# Patient Record
Sex: MALE | Race: WHITE | Employment: OTHER | URBAN - METROPOLITAN AREA
[De-identification: names, ages, dates, MRNs, and addresses within clinical notes are randomized per-mention and may not be internally consistent; named-entity substitution may affect disease eponyms.]

---

## 2019-01-01 ENCOUNTER — TELEPHONE (OUTPATIENT)
Dept: ONCOLOGY | Facility: CLINIC | Age: 34
End: 2019-01-01

## 2019-01-01 ENCOUNTER — HOSPITAL ENCOUNTER (OUTPATIENT)
Facility: CLINIC | Age: 34
Setting detail: SPECIMEN
Discharge: HOME | End: 2019-11-15
Attending: INTERNAL MEDICINE | Admitting: INTERNAL MEDICINE
Payer: COMMERCIAL

## 2019-01-01 ENCOUNTER — ONCOLOGY VISIT (OUTPATIENT)
Dept: ONCOLOGY | Facility: CLINIC | Age: 34
End: 2019-01-01
Attending: INTERNAL MEDICINE
Payer: COMMERCIAL

## 2019-01-01 ENCOUNTER — PRE VISIT (OUTPATIENT)
Dept: ONCOLOGY | Facility: CLINIC | Age: 34
End: 2019-01-01

## 2019-01-01 ENCOUNTER — HOSPITAL ENCOUNTER (OUTPATIENT)
Dept: PET IMAGING | Facility: CLINIC | Age: 34
End: 2019-11-25
Attending: INTERNAL MEDICINE
Payer: COMMERCIAL

## 2019-01-01 ENCOUNTER — HOSPITAL ENCOUNTER (OUTPATIENT)
Dept: MRI IMAGING | Facility: CLINIC | Age: 34
Discharge: HOME | End: 2019-11-25
Attending: INTERNAL MEDICINE | Admitting: INTERNAL MEDICINE
Payer: COMMERCIAL

## 2019-01-01 ENCOUNTER — TRANSFERRED RECORDS (OUTPATIENT)
Dept: HEALTH INFORMATION MANAGEMENT | Facility: CLINIC | Age: 34
End: 2019-01-01

## 2019-01-01 ENCOUNTER — PATIENT OUTREACH (OUTPATIENT)
Dept: ONCOLOGY | Facility: CLINIC | Age: 34
End: 2019-01-01

## 2019-01-01 ENCOUNTER — HOSPITAL ENCOUNTER (OUTPATIENT)
Facility: CLINIC | Age: 34
Setting detail: SPECIMEN
End: 2019-12-05
Attending: INTERNAL MEDICINE
Payer: COMMERCIAL

## 2019-01-01 ENCOUNTER — MEDICAL CORRESPONDENCE (OUTPATIENT)
Dept: HEALTH INFORMATION MANAGEMENT | Facility: CLINIC | Age: 34
End: 2019-01-01

## 2019-01-01 VITALS
SYSTOLIC BLOOD PRESSURE: 132 MMHG | HEART RATE: 91 BPM | BODY MASS INDEX: 21.3 KG/M2 | RESPIRATION RATE: 17 BRPM | HEIGHT: 70 IN | WEIGHT: 148.8 LBS | TEMPERATURE: 98.7 F | OXYGEN SATURATION: 100 % | DIASTOLIC BLOOD PRESSURE: 80 MMHG

## 2019-01-01 VITALS
RESPIRATION RATE: 16 BRPM | DIASTOLIC BLOOD PRESSURE: 77 MMHG | WEIGHT: 144 LBS | HEIGHT: 70 IN | BODY MASS INDEX: 20.62 KG/M2 | HEART RATE: 106 BPM | OXYGEN SATURATION: 99 % | SYSTOLIC BLOOD PRESSURE: 138 MMHG

## 2019-01-01 DIAGNOSIS — C79.31 METASTASIS TO BRAIN (H): Primary | ICD-10-CM

## 2019-01-01 DIAGNOSIS — C43.9 METASTATIC MALIGNANT MELANOMA (H): ICD-10-CM

## 2019-01-01 DIAGNOSIS — C43.9 METASTATIC MALIGNANT MELANOMA (H): Primary | ICD-10-CM

## 2019-01-01 LAB
ALBUMIN SERPL-MCNC: 4.4 G/DL (ref 3.4–5)
ALP SERPL-CCNC: 110 U/L (ref 40–150)
ALT SERPL W P-5'-P-CCNC: 41 U/L (ref 0–70)
ANION GAP SERPL CALCULATED.3IONS-SCNC: 4 MMOL/L (ref 3–14)
AST SERPL W P-5'-P-CCNC: 11 U/L (ref 0–45)
BILIRUB SERPL-MCNC: 0.5 MG/DL (ref 0.2–1.3)
BUN SERPL-MCNC: 9 MG/DL (ref 7–30)
CALCIUM SERPL-MCNC: 9.6 MG/DL (ref 8.5–10.1)
CHLORIDE SERPL-SCNC: 106 MMOL/L (ref 94–109)
CO2 SERPL-SCNC: 30 MMOL/L (ref 20–32)
COPATH REPORT: NORMAL
CREAT SERPL-MCNC: 0.78 MG/DL (ref 0.66–1.25)
ERYTHROCYTE [DISTWIDTH] IN BLOOD BY AUTOMATED COUNT: 12.8 % (ref 10–15)
GFR SERPL CREATININE-BSD FRML MDRD: >90 ML/MIN/{1.73_M2}
GLUCOSE SERPL-MCNC: 102 MG/DL (ref 70–99)
HCT VFR BLD AUTO: 37.6 % (ref 40–53)
HGB BLD-MCNC: 12.6 G/DL (ref 13.3–17.7)
LDH SERPL L TO P-CCNC: 196 U/L (ref 85–227)
MCH RBC QN AUTO: 30.8 PG (ref 26.5–33)
MCHC RBC AUTO-ENTMCNC: 33.5 G/DL (ref 31.5–36.5)
MCV RBC AUTO: 92 FL (ref 78–100)
PLATELET # BLD AUTO: 184 10E9/L (ref 150–450)
POTASSIUM SERPL-SCNC: 3.8 MMOL/L (ref 3.4–5.3)
PROT SERPL-MCNC: 7.3 G/DL (ref 6.8–8.8)
RBC # BLD AUTO: 4.09 10E12/L (ref 4.4–5.9)
SODIUM SERPL-SCNC: 140 MMOL/L (ref 133–144)
WBC # BLD AUTO: 4.9 10E9/L (ref 4–11)

## 2019-01-01 PROCEDURE — 78816 PET IMAGE W/CT FULL BODY: CPT | Mod: PS

## 2019-01-01 PROCEDURE — 00000346 ZZHCL STATISTIC REVIEW OUTSIDE SLIDES TC 88321: Performed by: INTERNAL MEDICINE

## 2019-01-01 PROCEDURE — 83615 LACTATE (LD) (LDH) ENZYME: CPT | Performed by: INTERNAL MEDICINE

## 2019-01-01 PROCEDURE — A9552 F18 FDG: HCPCS | Performed by: INTERNAL MEDICINE

## 2019-01-01 PROCEDURE — A9585 GADOBUTROL INJECTION: HCPCS | Performed by: INTERNAL MEDICINE

## 2019-01-01 PROCEDURE — 99214 OFFICE O/P EST MOD 30 MIN: CPT | Performed by: INTERNAL MEDICINE

## 2019-01-01 PROCEDURE — 99204 OFFICE O/P NEW MOD 45 MIN: CPT | Performed by: INTERNAL MEDICINE

## 2019-01-01 PROCEDURE — G0463 HOSPITAL OUTPT CLINIC VISIT: HCPCS

## 2019-01-01 PROCEDURE — 25500064 ZZH RX 255 OP 636: Performed by: INTERNAL MEDICINE

## 2019-01-01 PROCEDURE — 85027 COMPLETE CBC AUTOMATED: CPT | Performed by: INTERNAL MEDICINE

## 2019-01-01 PROCEDURE — 80053 COMPREHEN METABOLIC PANEL: CPT | Performed by: INTERNAL MEDICINE

## 2019-01-01 PROCEDURE — 70553 MRI BRAIN STEM W/O & W/DYE: CPT

## 2019-01-01 PROCEDURE — 36415 COLL VENOUS BLD VENIPUNCTURE: CPT

## 2019-01-01 PROCEDURE — 34300033 ZZH RX 343: Performed by: INTERNAL MEDICINE

## 2019-01-01 RX ORDER — GADOBUTROL 604.72 MG/ML
0.1 INJECTION INTRAVENOUS ONCE
Status: COMPLETED | OUTPATIENT
Start: 2019-01-01 | End: 2019-01-01

## 2019-01-01 RX ADMIN — FLUDEOXYGLUCOSE F-18 13.67 MCI.: 500 INJECTION, SOLUTION INTRAVENOUS at 10:09

## 2019-01-01 RX ADMIN — GADOBUTROL 6.75 ML: 604.72 INJECTION INTRAVENOUS at 09:09

## 2019-01-01 ASSESSMENT — MIFFLIN-ST. JEOR
SCORE: 1604.43
SCORE: 1626.2

## 2019-01-01 ASSESSMENT — PAIN SCALES - GENERAL
PAINLEVEL: NO PAIN (0)
PAINLEVEL: NO PAIN (0)

## 2019-09-30 NOTE — TELEPHONE ENCOUNTER
ONCOLOGY INTAKE: Records Information      APPT INFORMATION:  Referring provider:  Dr. Lisa Harrell   Referring provider s clinic:  [facility]  Reason for visit/diagnosis:  Metastatic Melanoma  Has patient been notified of appointment date and time?: no - patient will call to scheduled once he talks with his wife.  Patient is moving from [place] to [place] at the end of Oct early Nov 2019    RECORDS INFORMATION:  Were the records received with the referral (via [employer])? Some, but this is a transfer patient and we will need records going back to the diagnosis date of 2012    Has patient been seen for any external appt for this diagnosis? yes    If yes, where? [facility]    Has patient had any imaging or procedures outside of [place] for this condition? Yes       If Yes, where? [facility], multiple PET and Brain MRIs    ADDITIONAL INFORMATION:  Patient's referral is saved on the [address], after scheduling please fax to [facility]

## 2019-11-05 NOTE — TELEPHONE ENCOUNTER
APPT INFORMATION:  Referring provider:  Dr. Lisa Harrell      Referring provider s clinic:  [facility]  Reason for visit/diagnosis:  Metastatic Melanoma  Has patient been notified of appointment date and time?: Yes      RECORDS INFORMATION:  Were the records received with the referral (via [employer])? Some, but this is a transfer patient and we will need records going back to the diagnosis date of 2012     Has patient been seen for any external appt for this diagnosis? yes     If yes, where? [facility]     Has patient had any imaging or procedures outside of [place] for this condition? Yes                                    If Yes, where? [facility], multiple PET and Brain MRIs     ADDITIONAL INFORMATION:

## 2019-11-07 NOTE — PROGRESS NOTES
Patient referred for melanoma. Dr. Tesfaye has approved of the referral and records are in [facility].    I have called the patient introducing myself and my role. I informed patient that our schedulers would be reaching out to schedule for Friday 11/15. I provided a brief description of our location and appointment details.    Olamide Celeste RN

## 2019-11-11 NOTE — TELEPHONE ENCOUNTER
RECORDS STATUS - ALL OTHER DIAGNOSIS      RECORDS RECEIVED FROM: Elaine CARR   DATE RECEIVED: Care Everywhere   NOTES STATUS DETAILS   OFFICE NOTE from referring provider     OFFICE NOTE from medical oncologist     DISCHARGE SUMMARY from hospital     DISCHARGE REPORT from the ER     OPERATIVE REPORT     MEDICATION LIST     CLINICAL TRIAL TREATMENTS TO DATE     LABS     PATHOLOGY REPORTS     ANYTHING RELATED TO DIAGNOSIS Slides requested 11/11/19  TRK# [id number] [facility] CASE #'s   [id number]  [facility]-  [facility]-   GENONOMIC TESTING     TYPE:     IMAGING (NEED IMAGES & REPORT)     CT SCANS Requested 11/11/19  TRK# [id number] [facility]   MRI Requested 11/11/19  TRK# [id number] [facility]   MAMMO     ULTRASOUND     PET Requested 11/11/19  TRK# [id number] [facility]

## 2019-11-15 NOTE — PATIENT INSTRUCTIONS
1. PET scan.  2. Brain MRI.  3. Labs today. SLS  4. Follow-up after above.  5. Get scan pictures transferred from [place] to our computer ([employer]).    Scheduled, AVS Given

## 2019-11-15 NOTE — Clinical Note
11/15/2019         RE: Dallin Stevens  [address] [address] [facility] [address] [address]        Dear Colleague,    Thank you for referring your patient, Dallin Stevens, to the [facility] CANCER [facility]. Please see a copy of my visit note below.    No notes on file    Again, thank you for allowing me to participate in the care of your patient.        Sincerely,        Vero Tesfaye MD

## 2019-11-18 NOTE — PROGRESS NOTES
Visit Date:   11/15/2019     This consult has been requested by Dr. Lisa Harrell for metastatic melanoma.        Mr. Stevens is a 34-year-old male with metastatic melanoma.  The patient lived in [place] and was under the care of Dr. Lisa Harrell.  The patient has now moved to [place] and wants to establish care.      The patient was diagnosed with melanoma in 2012.  His history is reviewed and summarized below.   1.  He had superficial spreading melanoma in the left flank.   -On 02/03/2012, biopsy of left flank skin lesion revealed 1.7 mm superficial spreading melanoma without ulceration.  -On 02/23/2012, he had left flank wide local excision and left axillary sentinel node biopsy. One of two lymph node positive. He had stage III (pT2a pN1a M0) disease.    -On 03/02/2012, left axillary radical lymph node dissection was done. All 23 lymph nodes benign.  - He received 4 weeks of high-dose interferon between 04/02/2012 and 04/27/2012.     2.  The patient developed a lymph node in the neck.    -On 01/25/2017, CT scan revealed metastatic disease involving enlarged lymph nodes in the right supraclavicular (level 4), anterior and middle mediastinum, and right hilum. 3 small pulmonary nodules are present and could represent additional disease.  -On 01/26/2017, biopsy of right supraclavicular lymph node was done. It revealed metastatic melanoma.  BRAF positive.    -He received pembrolizumab between 02/08/2017 and 11/08/2017. Patient did not want any further treatment.  -PET scan on 06/28/2017 revealed significant improvement.  -PET scan on 11/29/2017 revealed new focus of mild FDG uptake (max SUV 3.9) within a 6 mm right paratracheal lymph node. No other evidence of hypermetabolic lymphadenopathy on this exam.   -PET scan on 10/02/2018 revealed new hypermetabolic lymphadenopathy in the supraclavicular region of the left neck and in the mediastinum.     3.  MRI of the brain on 04/24/2019 revealed an enhancing  intraparenchymal mass within the right parietal occipital lobe.    -He had a craniotomy and resection of a brain tumor on 04/26/2019.  Pathology was consistent with melanoma.     -The patient was recommended further treatment including postoperative radiation to the brain.  He did not want any other treatment.   -PET scan on 07/23/2019 reveals progression of disease. New hypermetabolic lymphadenopathy seen in the right supraclavicular fossa and anterior mediastinum (stations 1R and 3A).     The patient has been doing alternative/holistic treatment.  He is getting high-dose vitamin C and other treatment.  He also has done dietary modification.  The patient says that he has been feeling good.      The patient has multiple subcutaneous nodules, mainly in the chest and the back.  These seem to migrate.       REVIEW OF SYSTEMS:  The patient presents to the clinic with his wife.  He feels good.  No headache.  No dizziness.  No ear pain or sore throat.  No neck pain.  No chest pain or difficulty breathing.  No abdominal pain, nausea or vomiting.  Appetite is good.  No urinary or bowel complaints.  No bleeding.  No fever, chills or night sweats.      All other review of systems are negative.      ALLERGIES:  NONE.      MEDICATIONS:  None.      PAST MEDICAL HISTORY:   1.  Metastatic melanoma as described above.   2.  Status post wide local excision of melanoma from the left flank   3.  Craniotomy for resection of brain metastasis.      SOCIAL HISTORY:   -He is .   -No smoking.   -Occasional alcohol use.      FAMILY HISTORY:   -His mother and father are in good health.   -He has 1 brother who is in good health.   -No family history of malignancy.      PHYSICAL EXAMINATION:   GENERAL:  Alert and oriented x 3.   VITAL SIGNS:  Reviewed.  ECOG PS of 0.     EYES:  No icterus.   THROAT:  No ulcer. No thrush.   NECK:  Supple. No lymphadenopathy. No thyromegaly.   AXILLAE:  No lymphadenopathy.   LUNGS:  Good air entry  bilaterally.  No crackles or wheezing.   HEART:  Regular.  No murmur.   GI:  Soft.  Nontender. No mass.   EXTREMITIES:  No pedal edema.  No calf swelling or tenderness.   SKIN:  There are multiple firm nodules under the skin in the chest, abdomen, back and also in the axilla.  Clinically suspicious for melanoma.      ASSESSMENT:   1.  A 34-year-old gentleman with recurrent melanoma.   2.  Multiple subcutaneous nodules. Highly suspicious for metastatic melanoma.      PLAN:   1.  I had a long discussion with the patient regarding melanoma.  Previous scans were all reviewed.  I explained to him that he has progression of disease.  Last PET scan done on 07/23/2019 had revealed progression of disease.      The patient at this time is asymptomatic.  Discussed regarding getting another PET scan and also brain MRI.  He is agreeable for it.  Those will be scheduled.  We will also get some labs including LDH.      2.  Discussed regarding treatment.  The patient has not been on any systemic treatment. He is doing holistic/alternative treatment.  The patient mentioned that at this time he plans to continue with alternative/holistic treatment.  When there is significant progression of disease or he has symptoms, he will consider treatment.  The patient understands that his disease will continue to progress without systemic treatment.      As per patient's wishes, no treatment is being started.  He plans to continue holistic/alternative treatment.      When he agrees for treatment, he can be either treated with pembrolizumab or with BRAF inhibitor.      3.  The patient and his wife had a few questions, which were all answered.  I will see him back in the clinic after the above investigations.      Thanks for the consult.     ADDENDUM:   Labs from today revealS normal CMP and LDH.  CBC reveals mild anemia.         JULIANNA DOMINGUEZ MD             D: 11/18/2019   T: 11/18/2019   MT: SUPRIYA      Name:     DAVID JEANIE   MRN:       [id number]        Account:      [id number]   :      1985           Visit Date:   11/15/2019      Document: [id number]       cc: Lisa Harrell MD

## 2019-11-19 NOTE — RESULT ENCOUNTER NOTE
Dear Mr. Stevens,    Your blood tests are overall good.  There are few minor abnormalities.  They will be periodically monitored.    Please, call me with any questions.    Vero Tesfaye MD

## 2019-11-25 NOTE — TELEPHONE ENCOUNTER
I spoke to the patient today.  Informed him of the result of the brain MRI and PET scan.  It reveals progression of disease.  Brain MRI reveals new brain lesion.  PET scan reveals multiple hypermetabolic subcutaneous nodules.    Discussed with the patient regarding gamma knife treatment to the brain lesions.  Patient wants to think about it.  Patient mentioned that he will call me after he discusses this with his family.  Patient will also need systemic treatment.  Again he wants to think about it    Will wait for patient's telephone call and treatment will be decided accordingly.

## 2019-11-25 NOTE — RESULT ENCOUNTER NOTE
Dear Mr. Stevens,    This is the MRI brain we discussed over the phone.  As we discussed, you will benefit from gamma knife treatment.  You should hear from radiation in the next few days.  Please call us if you do not hear from them.    Please, call me with any questions.    Vero Tesfaye MD

## 2019-12-05 NOTE — Clinical Note
"    12/5/2019         RE: Dallin Stevens  [address] [address] [facility] [address] [facility] [address]        Dear Colleague,    Thank you for referring your patient, Dallin Stevens, to the [facility] CANCER CLINIC. Please see a copy of my visit note below.    Oncology Rooming Note    December 5, 2019 10:04 AM   Dallin Stevens is a 34 year old male who presents for:    Chief Complaint   Patient presents with     Oncology Clinic Visit     Initial Vitals: /77   Pulse 106   Resp 16   Ht 1.778 m (5' 10\")   Wt 65.3 kg (144 lb)   SpO2 99%   BMI 20.66 kg/m    Estimated body mass index is 20.66 kg/m  as calculated from the following:    Height as of this encounter: 1.778 m (5' 10\").    Weight as of this encounter: 65.3 kg (144 lb). Body surface area is 1.8 meters squared.  No Pain (0) Comment: Data Unavailable   No LMP for male patient.  Allergies reviewed: Yes  Medications reviewed: Yes    Medications: Medication refills not needed today.    Clinical concerns: no      Dena Ontiveros, [facility]              Visit Date:   12/05/2019      SUBJECTIVE:  Mr. Stevens is a 34-year-old gentleman with metastatic melanoma.  He is not on any treatment.  The patient is doing alternative/holistic treatment.  He is here for followup on the result of the scans.      MRI brain on 11/25/2019 reveals 0.7 cm enhancing lesion in the left posterior frontal lobe.  There is also an enhancing lesion in the right peritrigonal region.  There is also a 0.8 cm right posterior frontal parafalcine lesion.  A PET scan on 11/25/2019 reveals progression of his disease.  There are multiple new hypermetabolic subcutaneous nodules throughout the body.  No change in size of mediastinal lymphadenopathy.      The patient is doing well.  He has no new complaints or concerns.      ASSESSMENT:  A 34-year-old gentleman with metastatic melanoma which is progressing.  There are new brain metastases.      PLAN:   1.  I had a long discussion with the patient and his wife.  Scans were " reviewed.  Pictures were also done.  I told him there is definite progression of disease.      Discussed regarding brain metastases.  I have already discussed the case with a Gamma Radiation.  He will benefit from Gamma Knife treatment.  That Radiation Oncology Department is contacting him for an appointment.  I told the patient to take Gamma Knife for this brain metastases.      2.  Discussed regarding systemic treatment for progressive disease.  I would recommend going back on pembrolizumab.  Previously he received it and tolerated it well.      The patient at this time wants to see how he does.  He does not want to take any treatment.  He is doing alternative/holistic medicine.  He will consider taking treatment in future for any symptomatic or significant worsening.  He does understand that his cancer will continue to get worse.      3.  I will see him in 3 months' time.  Advised him to call us with any questions or concerns.         JULIANNA DOMINGUEZ MD             D: 2019   T: 2019   MT: JANIE      Name:     JEANIE HERNANDEZ   MRN:      [id number]        Account:      [id number]   :      1985           Visit Date:   2019      Document: [id number]       Again, thank you for allowing me to participate in the care of your patient.        Sincerely,        Julianna Dominguez MD

## 2019-12-05 NOTE — PROGRESS NOTES
"Oncology Rooming Note    December 5, 2019 10:04 AM   Dallin Stevens is a 34 year old male who presents for:    Chief Complaint   Patient presents with     Oncology Clinic Visit     Initial Vitals: /77   Pulse 106   Resp 16   Ht 1.778 m (5' 10\")   Wt 65.3 kg (144 lb)   SpO2 99%   BMI 20.66 kg/m   Estimated body mass index is 20.66 kg/m  as calculated from the following:    Height as of this encounter: 1.778 m (5' 10\").    Weight as of this encounter: 65.3 kg (144 lb). Body surface area is 1.8 meters squared.  No Pain (0) Comment: Data Unavailable   No LMP for male patient.  Allergies reviewed: Yes  Medications reviewed: Yes    Medications: Medication refills not needed today.    Clinical concerns: no      Dena Ontiveros CMA            "

## 2019-12-05 NOTE — PATIENT INSTRUCTIONS
1. Radiation oncology appointment. Patient will schedule.  2. Follow-up in 3 months.    Please call patient to schedule appt

## 2019-12-07 NOTE — PROGRESS NOTES
Visit Date:   12/05/2019     ONCOLOGY HISTORY: Mr. Stevens is a 34-year-old male with metastatic melanoma.    1.  He had superficial spreading melanoma in the left flank in 2012.   -On 02/03/2012, biopsy of left flank skin lesion revealed 1.7 mm superficial spreading melanoma without ulceration.  -On 02/23/2012, he had left flank wide local excision and left axillary sentinel node biopsy. One of two lymph node positive. He had stage III (pT2a pN1a M0) disease.    -On 03/02/2012, left axillary radical lymph node dissection was done. All 23 lymph nodes benign.  - He received 4 weeks of high-dose interferon between 04/02/2012 and 04/27/2012.      2.  On 01/25/2017, CT scan revealed metastatic disease involving lymph nodes in the right supraclavicular , mediastinum and right hilum. 3 small pulmonary nodules are present.  -On 01/26/2017, biopsy of right supraclavicular lymph node revealed metastatic melanoma.  BRAF positive.    -He received pembrolizumab between 02/08/2017 and 11/08/2017. Patient did not want any further treatment.  -PET scan on 06/28/2017 revealed significant improvement.  -PET scan on 11/29/2017 revealed new focus of mild FDG uptake within a 6 mm right paratracheal lymph node. No other evidence of hypermetabolic lymphadenopathy on this exam.   -PET scan on 10/02/2018 revealed new hypermetabolic lymphadenopathy in the supraclavicular region of the left neck and in the mediastinum.      3.  MRI of the brain on 04/24/2019 revealed intraparenchymal mass within the right parietal occipital lobe.    -He had a craniotomy and resection of a brain tumor on 04/26/2019.  Pathology was consistent with melanoma.     -The patient was recommended further treatment including postoperative radiation to the brain.  He did not want any other treatment.   -PET scan on 07/23/2019 revealed progression of disease. New hypermetabolic lymphadenopathy seen in the right supraclavicular fossa and anterior mediastinum.    4. MRI  brain on 11/25/2019 reveals metastasis in left posterior frontal lobe, right peritrigonal region and right posterior frontal parafalcine lesion.    -PET scan on 11/25/2019 reveals progression of his disease.  There are multiple new hypermetabolic subcutaneous nodules throughout the body.  No change in size of mediastinal lymphadenopathy.      SUBJECTIVE:  Mr. Stevens is a 34-year-old gentleman with metastatic melanoma.  He is not on any treatment.  The patient is doing alternative/holistic treatment.  He is here for followup on the result of the scans.      MRI brain on 11/25/2019 reveals 0.7 cm enhancing lesion in the left posterior frontal lobe.  There is also an enhancing lesion in the right peritrigonal region.  There is also a 0.8 cm right posterior frontal parafalcine lesion.  A PET scan on 11/25/2019 reveals progression of his disease.  There are multiple new hypermetabolic subcutaneous nodules throughout the body.  No change in size of mediastinal lymphadenopathy.      The patient is doing well.  He has no new complaints or concerns.      ASSESSMENT:  A 34-year-old gentleman with metastatic melanoma which is progressing.  There are new brain metastases.      PLAN:   1.  I had a long discussion with the patient and his wife.  Scans were reviewed.  Pictures were also done.  I told him there is definite progression of disease.      Discussed regarding brain metastases.  I have already discussed the case with radiation.  He will benefit from Gamma Knife treatment.  That Radiation Oncology Department is contacting him for an appointment.  I told the patient to take Gamma Knife for this brain metastases.      2.  Discussed regarding systemic treatment for progressive disease.  I would recommend going back on pembrolizumab.  Previously he received it and tolerated it well.      He does not want to take any treatment.  He is doing alternative/holistic medicine.  He will consider taking treatment in future for any  symptomatic or significant worsening.  He does understand that his cancer will continue to get worse.      3.  I will see him in 3 months' time.  Advised him to call us with any questions or concerns.         JULIANNA DOMINGUEZ MD             D: 2019   T: 2019   MT: JANIE      Name:     JEANIE HERNANDEZ   MRN:      [id number]        Account:      [id number]   :      1985           Visit Date:   2019      Document: [id number]

## 2020-01-01 ENCOUNTER — PATIENT OUTREACH (OUTPATIENT)
Dept: ONCOLOGY | Facility: CLINIC | Age: 35
End: 2020-01-01

## 2020-01-01 ENCOUNTER — HOME CARE/HOSPICE - HEALTHEAST (OUTPATIENT)
Dept: HOME HEALTH SERVICES | Facility: HOME HEALTH | Age: 35
End: 2020-01-01

## 2020-01-01 ENCOUNTER — MEDICAL CORRESPONDENCE (OUTPATIENT)
Dept: HEALTH INFORMATION MANAGEMENT | Facility: CLINIC | Age: 35
End: 2020-01-01

## 2020-01-01 ENCOUNTER — HOSPITAL ENCOUNTER (OUTPATIENT)
Dept: MRI IMAGING | Facility: CLINIC | Age: 35
Discharge: HOME | End: 2020-03-27
Attending: RADIOLOGY | Admitting: RADIOLOGY
Payer: COMMERCIAL

## 2020-01-01 ENCOUNTER — APPOINTMENT (OUTPATIENT)
Dept: CT IMAGING | Facility: CLINIC | Age: 35
End: 2020-01-01
Attending: STUDENT IN AN ORGANIZED HEALTH CARE EDUCATION/TRAINING PROGRAM
Payer: COMMERCIAL

## 2020-01-01 ENCOUNTER — MYC MEDICAL ADVICE (OUTPATIENT)
Dept: ONCOLOGY | Facility: CLINIC | Age: 35
End: 2020-01-01

## 2020-01-01 ENCOUNTER — RECORDS - HEALTHEAST (OUTPATIENT)
Dept: ADMINISTRATIVE | Facility: OTHER | Age: 35
End: 2020-01-01

## 2020-01-01 ENCOUNTER — TELEPHONE (OUTPATIENT)
Dept: ONCOLOGY | Facility: CLINIC | Age: 35
End: 2020-01-01

## 2020-01-01 ENCOUNTER — AMBULATORY - HEALTHEAST (OUTPATIENT)
Dept: FAMILY MEDICINE | Facility: CLINIC | Age: 35
End: 2020-01-01

## 2020-01-01 ENCOUNTER — HOSPITAL ENCOUNTER (INPATIENT)
Facility: CLINIC | Age: 35
LOS: 6 days | Discharge: ANOTHER HOSPITAL | End: 2020-01-15
Attending: PHYSICAL MEDICINE & REHABILITATION | Admitting: PHYSICAL MEDICINE & REHABILITATION
Payer: COMMERCIAL

## 2020-01-01 ENCOUNTER — RECORDS - HEALTHEAST (OUTPATIENT)
Dept: LAB | Facility: HOSPITAL | Age: 35
End: 2020-01-01

## 2020-01-01 ENCOUNTER — DOCUMENTATION ONLY (OUTPATIENT)
Dept: NEUROSURGERY | Facility: CLINIC | Age: 35
End: 2020-01-01

## 2020-01-01 ENCOUNTER — APPOINTMENT (OUTPATIENT)
Dept: PHYSICAL THERAPY | Facility: CLINIC | Age: 35
End: 2020-01-01
Attending: NURSE PRACTITIONER
Payer: COMMERCIAL

## 2020-01-01 ENCOUNTER — APPOINTMENT (OUTPATIENT)
Dept: OCCUPATIONAL THERAPY | Facility: CLINIC | Age: 35
End: 2020-01-01
Attending: HOSPITALIST
Payer: COMMERCIAL

## 2020-01-01 ENCOUNTER — VIRTUAL VISIT (OUTPATIENT)
Dept: ONCOLOGY | Facility: CLINIC | Age: 35
End: 2020-01-01
Attending: INTERNAL MEDICINE
Payer: COMMERCIAL

## 2020-01-01 ENCOUNTER — OFFICE VISIT (OUTPATIENT)
Dept: RADIATION ONCOLOGY | Facility: CLINIC | Age: 35
End: 2020-01-01
Attending: NEUROLOGICAL SURGERY
Payer: COMMERCIAL

## 2020-01-01 ENCOUNTER — APPOINTMENT (OUTPATIENT)
Dept: GENERAL RADIOLOGY | Facility: CLINIC | Age: 35
End: 2020-01-01
Attending: HOSPITALIST
Payer: COMMERCIAL

## 2020-01-01 ENCOUNTER — ANESTHESIA EVENT (OUTPATIENT)
Dept: SURGERY | Facility: CLINIC | Age: 35
End: 2020-01-01
Payer: COMMERCIAL

## 2020-01-01 ENCOUNTER — INFUSION THERAPY VISIT (OUTPATIENT)
Dept: INFUSION THERAPY | Facility: CLINIC | Age: 35
End: 2020-01-01
Attending: INTERNAL MEDICINE
Payer: COMMERCIAL

## 2020-01-01 ENCOUNTER — HOSPITAL ENCOUNTER (OUTPATIENT)
Facility: CLINIC | Age: 35
Setting detail: SPECIMEN
Discharge: HOME | End: 2020-03-03
Attending: INTERNAL MEDICINE | Admitting: NURSE PRACTITIONER
Payer: COMMERCIAL

## 2020-01-01 ENCOUNTER — APPOINTMENT (OUTPATIENT)
Dept: GENERAL RADIOLOGY | Facility: CLINIC | Age: 35
End: 2020-01-01
Attending: INTERNAL MEDICINE
Payer: COMMERCIAL

## 2020-01-01 ENCOUNTER — APPOINTMENT (OUTPATIENT)
Dept: PHYSICAL THERAPY | Facility: CLINIC | Age: 35
End: 2020-01-01
Attending: HOSPITALIST
Payer: COMMERCIAL

## 2020-01-01 ENCOUNTER — APPOINTMENT (OUTPATIENT)
Dept: NEUROLOGY | Facility: CLINIC | Age: 35
End: 2020-01-01
Attending: PHYSICIAN ASSISTANT
Payer: COMMERCIAL

## 2020-01-01 ENCOUNTER — APPOINTMENT (OUTPATIENT)
Dept: MRI IMAGING | Facility: CLINIC | Age: 35
End: 2020-01-01
Attending: STUDENT IN AN ORGANIZED HEALTH CARE EDUCATION/TRAINING PROGRAM
Payer: COMMERCIAL

## 2020-01-01 ENCOUNTER — HOSPITAL ENCOUNTER (OUTPATIENT)
Facility: CLINIC | Age: 35
Setting detail: SPECIMEN
Discharge: HOME | End: 2020-02-11
Attending: INTERNAL MEDICINE | Admitting: INTERNAL MEDICINE
Payer: COMMERCIAL

## 2020-01-01 ENCOUNTER — ANESTHESIA (OUTPATIENT)
Dept: SURGERY | Facility: CLINIC | Age: 35
End: 2020-01-01
Payer: COMMERCIAL

## 2020-01-01 ENCOUNTER — APPOINTMENT (OUTPATIENT)
Dept: PHYSICAL THERAPY | Facility: CLINIC | Age: 35
End: 2020-01-01
Attending: STUDENT IN AN ORGANIZED HEALTH CARE EDUCATION/TRAINING PROGRAM
Payer: COMMERCIAL

## 2020-01-01 ENCOUNTER — INFUSION THERAPY VISIT (OUTPATIENT)
Dept: INFUSION THERAPY | Facility: CLINIC | Age: 35
End: 2020-01-01
Attending: PHYSICIAN ASSISTANT
Payer: COMMERCIAL

## 2020-01-01 ENCOUNTER — HOME CARE/HOSPICE - HEALTHEAST (OUTPATIENT)
Dept: HOSPICE | Facility: HOSPICE | Age: 35
End: 2020-01-01

## 2020-01-01 ENCOUNTER — TUMOR CONFERENCE (OUTPATIENT)
Dept: ONCOLOGY | Facility: CLINIC | Age: 35
End: 2020-01-01
Payer: COMMERCIAL

## 2020-01-01 ENCOUNTER — DOCUMENTATION ONLY (OUTPATIENT)
Dept: MEDSURG UNIT | Facility: CLINIC | Age: 35
End: 2020-01-01

## 2020-01-01 ENCOUNTER — OFFICE VISIT (OUTPATIENT)
Dept: RADIATION ONCOLOGY | Facility: CLINIC | Age: 35
End: 2020-01-01
Attending: RADIOLOGY
Payer: COMMERCIAL

## 2020-01-01 ENCOUNTER — RECORDS - HEALTHEAST (OUTPATIENT)
Dept: LAB | Facility: CLINIC | Age: 35
End: 2020-01-01

## 2020-01-01 ENCOUNTER — TELEPHONE (OUTPATIENT)
Dept: NEUROSURGERY | Facility: CLINIC | Age: 35
End: 2020-01-01

## 2020-01-01 ENCOUNTER — APPOINTMENT (OUTPATIENT)
Dept: GENERAL RADIOLOGY | Facility: CLINIC | Age: 35
End: 2020-01-01
Attending: EMERGENCY MEDICINE
Payer: COMMERCIAL

## 2020-01-01 ENCOUNTER — HOSPITAL ENCOUNTER (INPATIENT)
Facility: CLINIC | Age: 35
LOS: 4 days | Discharge: HOME HEALTH | End: 2020-08-20
Attending: EMERGENCY MEDICINE | Admitting: HOSPITALIST
Payer: COMMERCIAL

## 2020-01-01 ENCOUNTER — APPOINTMENT (OUTPATIENT)
Dept: GENERAL RADIOLOGY | Facility: CLINIC | Age: 35
End: 2020-01-01
Attending: STUDENT IN AN ORGANIZED HEALTH CARE EDUCATION/TRAINING PROGRAM
Payer: COMMERCIAL

## 2020-01-01 ENCOUNTER — HOSPITAL ENCOUNTER (OUTPATIENT)
Facility: CLINIC | Age: 35
Setting detail: SPECIMEN
Discharge: HOME | End: 2020-04-15
Attending: INTERNAL MEDICINE | Admitting: INTERNAL MEDICINE
Payer: COMMERCIAL

## 2020-01-01 ENCOUNTER — HOSPITAL ENCOUNTER (INPATIENT)
Facility: SKILLED NURSING FACILITY | Age: 35
LOS: 7 days | Discharge: HOME HEALTH | End: 2020-01-28
Attending: HOSPITALIST | Admitting: HOSPITALIST
Payer: COMMERCIAL

## 2020-01-01 ENCOUNTER — APPOINTMENT (OUTPATIENT)
Dept: OCCUPATIONAL THERAPY | Facility: SKILLED NURSING FACILITY | Age: 35
End: 2020-01-01
Payer: COMMERCIAL

## 2020-01-01 ENCOUNTER — APPOINTMENT (OUTPATIENT)
Dept: PHYSICAL THERAPY | Facility: SKILLED NURSING FACILITY | Age: 35
End: 2020-01-01
Payer: COMMERCIAL

## 2020-01-01 ENCOUNTER — APPOINTMENT (OUTPATIENT)
Dept: MRI IMAGING | Facility: CLINIC | Age: 35
End: 2020-01-01
Attending: NURSE PRACTITIONER
Payer: COMMERCIAL

## 2020-01-01 ENCOUNTER — APPOINTMENT (OUTPATIENT)
Dept: CT IMAGING | Facility: CLINIC | Age: 35
End: 2020-01-01
Attending: EMERGENCY MEDICINE
Payer: COMMERCIAL

## 2020-01-01 ENCOUNTER — APPOINTMENT (OUTPATIENT)
Dept: CT IMAGING | Facility: CLINIC | Age: 35
End: 2020-01-01
Attending: NEUROLOGICAL SURGERY
Payer: COMMERCIAL

## 2020-01-01 ENCOUNTER — DOCUMENTATION ONLY (OUTPATIENT)
Dept: PHARMACY | Facility: CLINIC | Age: 35
End: 2020-01-01

## 2020-01-01 ENCOUNTER — APPOINTMENT (OUTPATIENT)
Dept: MRI IMAGING | Facility: CLINIC | Age: 35
End: 2020-01-01
Attending: INTERNAL MEDICINE
Payer: COMMERCIAL

## 2020-01-01 ENCOUNTER — PRE VISIT (OUTPATIENT)
Dept: ONCOLOGY | Facility: CLINIC | Age: 35
End: 2020-01-01

## 2020-01-01 ENCOUNTER — APPOINTMENT (OUTPATIENT)
Dept: OCCUPATIONAL THERAPY | Facility: CLINIC | Age: 35
End: 2020-01-01
Payer: COMMERCIAL

## 2020-01-01 ENCOUNTER — PRE VISIT (OUTPATIENT)
Dept: SURGERY | Facility: CLINIC | Age: 35
End: 2020-01-01

## 2020-01-01 ENCOUNTER — HOSPITAL ENCOUNTER (OUTPATIENT)
Facility: CLINIC | Age: 35
Discharge: HOME | End: 2020-07-30
Attending: INTERNAL MEDICINE | Admitting: INTERNAL MEDICINE
Payer: COMMERCIAL

## 2020-01-01 ENCOUNTER — HOSPITAL ENCOUNTER (OUTPATIENT)
Facility: CLINIC | Age: 35
Setting detail: SPECIMEN
Discharge: HOME | End: 2020-04-14
Attending: INTERNAL MEDICINE | Admitting: INTERNAL MEDICINE
Payer: COMMERCIAL

## 2020-01-01 ENCOUNTER — PATIENT OUTREACH (OUTPATIENT)
Dept: CARE COORDINATION | Facility: CLINIC | Age: 35
End: 2020-01-01

## 2020-01-01 ENCOUNTER — APPOINTMENT (OUTPATIENT)
Dept: LAB | Facility: CLINIC | Age: 35
End: 2020-01-01
Attending: INTERNAL MEDICINE
Payer: COMMERCIAL

## 2020-01-01 ENCOUNTER — SURGERY - HEALTHEAST (OUTPATIENT)
Dept: SURGERY | Facility: CLINIC | Age: 35
End: 2020-01-01

## 2020-01-01 ENCOUNTER — DOCUMENTATION ONLY (OUTPATIENT)
Dept: GASTROENTEROLOGY | Facility: CLINIC | Age: 35
End: 2020-01-01

## 2020-01-01 ENCOUNTER — APPOINTMENT (OUTPATIENT)
Dept: CARDIOLOGY | Facility: CLINIC | Age: 35
End: 2020-01-01
Attending: INTERNAL MEDICINE
Payer: COMMERCIAL

## 2020-01-01 ENCOUNTER — HEALTH MAINTENANCE LETTER (OUTPATIENT)
Age: 35
End: 2020-01-01

## 2020-01-01 ENCOUNTER — TELEPHONE (OUTPATIENT)
Dept: PULMONOLOGY | Facility: CLINIC | Age: 35
End: 2020-01-01

## 2020-01-01 ENCOUNTER — APPOINTMENT (OUTPATIENT)
Dept: ULTRASOUND IMAGING | Facility: CLINIC | Age: 35
End: 2020-01-01
Attending: PHYSICIAN ASSISTANT
Payer: COMMERCIAL

## 2020-01-01 ENCOUNTER — PREP FOR PROCEDURE (OUTPATIENT)
Dept: NEUROSURGERY | Facility: CLINIC | Age: 35
End: 2020-01-01

## 2020-01-01 ENCOUNTER — ANCILLARY PROCEDURE (OUTPATIENT)
Dept: MRI IMAGING | Facility: CLINIC | Age: 35
End: 2020-01-01
Attending: PHYSICIAN ASSISTANT
Payer: COMMERCIAL

## 2020-01-01 ENCOUNTER — TELEPHONE (OUTPATIENT)
Dept: PHARMACY | Facility: CLINIC | Age: 35
End: 2020-01-01

## 2020-01-01 ENCOUNTER — APPOINTMENT (OUTPATIENT)
Dept: CARDIOLOGY | Facility: CLINIC | Age: 35
End: 2020-01-01
Attending: PHYSICIAN ASSISTANT
Payer: COMMERCIAL

## 2020-01-01 ENCOUNTER — ANESTHESIA - HEALTHEAST (OUTPATIENT)
Dept: SURGERY | Facility: CLINIC | Age: 35
End: 2020-01-01

## 2020-01-01 ENCOUNTER — APPOINTMENT (OUTPATIENT)
Dept: CARDIOLOGY | Facility: CLINIC | Age: 35
End: 2020-01-01
Attending: HOSPITALIST
Payer: COMMERCIAL

## 2020-01-01 ENCOUNTER — HOSPITAL ENCOUNTER (INPATIENT)
Facility: CLINIC | Age: 35
LOS: 1 days | Discharge: HOME HEALTH | End: 2020-07-16
Attending: EMERGENCY MEDICINE | Admitting: STUDENT IN AN ORGANIZED HEALTH CARE EDUCATION/TRAINING PROGRAM
Payer: COMMERCIAL

## 2020-01-01 ENCOUNTER — TRANSFERRED RECORDS (OUTPATIENT)
Dept: HEALTH INFORMATION MANAGEMENT | Facility: CLINIC | Age: 35
End: 2020-01-01

## 2020-01-01 ENCOUNTER — COMMUNICATION - HEALTHEAST (OUTPATIENT)
Dept: SCHEDULING | Facility: CLINIC | Age: 35
End: 2020-01-01

## 2020-01-01 ENCOUNTER — HOSPITAL ENCOUNTER (OUTPATIENT)
Facility: CLINIC | Age: 35
Setting detail: SPECIMEN
Discharge: HOME | End: 2020-03-25
Attending: INTERNAL MEDICINE | Admitting: INTERNAL MEDICINE
Payer: COMMERCIAL

## 2020-01-01 ENCOUNTER — SURGERY - HEALTHEAST (OUTPATIENT)
Dept: CARDIOLOGY | Facility: CLINIC | Age: 35
End: 2020-01-01

## 2020-01-01 ENCOUNTER — APPOINTMENT (OUTPATIENT)
Dept: PHYSICAL THERAPY | Facility: CLINIC | Age: 35
End: 2020-01-01
Payer: COMMERCIAL

## 2020-01-01 ENCOUNTER — APPOINTMENT (OUTPATIENT)
Dept: MRI IMAGING | Facility: CLINIC | Age: 35
End: 2020-01-01
Attending: NEUROLOGICAL SURGERY
Payer: COMMERCIAL

## 2020-01-01 ENCOUNTER — TELEPHONE (OUTPATIENT)
Dept: RADIATION ONCOLOGY | Facility: CLINIC | Age: 35
End: 2020-01-01

## 2020-01-01 ENCOUNTER — PREP FOR PROCEDURE (OUTPATIENT)
Dept: SURGERY | Facility: CLINIC | Age: 35
End: 2020-01-01

## 2020-01-01 ENCOUNTER — APPOINTMENT (OUTPATIENT)
Dept: CT IMAGING | Facility: CLINIC | Age: 35
End: 2020-01-01
Attending: INTERNAL MEDICINE
Payer: COMMERCIAL

## 2020-01-01 ENCOUNTER — VIRTUAL VISIT (OUTPATIENT)
Dept: NEUROSURGERY | Facility: CLINIC | Age: 35
End: 2020-01-01
Attending: NEUROLOGICAL SURGERY
Payer: COMMERCIAL

## 2020-01-01 ENCOUNTER — ANCILLARY PROCEDURE (OUTPATIENT)
Dept: CT IMAGING | Facility: CLINIC | Age: 35
End: 2020-01-01
Attending: NURSE PRACTITIONER
Payer: COMMERCIAL

## 2020-01-01 ENCOUNTER — HOSPITAL ENCOUNTER (INPATIENT)
Facility: CLINIC | Age: 35
LOS: 6 days | Discharge: NURSING, REHAB OR LONG-TERM CARE | End: 2020-01-21
Attending: NEUROLOGICAL SURGERY | Admitting: NEUROLOGICAL SURGERY
Payer: COMMERCIAL

## 2020-01-01 ENCOUNTER — HOSPITAL ENCOUNTER (INPATIENT)
Facility: CLINIC | Age: 35
LOS: 3 days | Discharge: HOME HEALTH | End: 2020-04-21
Attending: EMERGENCY MEDICINE | Admitting: INTERNAL MEDICINE
Payer: COMMERCIAL

## 2020-01-01 ENCOUNTER — APPOINTMENT (OUTPATIENT)
Dept: OCCUPATIONAL THERAPY | Facility: CLINIC | Age: 35
End: 2020-01-01
Attending: NURSE PRACTITIONER
Payer: COMMERCIAL

## 2020-01-01 ENCOUNTER — HOSPITAL ENCOUNTER (INPATIENT)
Facility: CLINIC | Age: 35
LOS: 1 days | Discharge: HOME HEALTH | End: 2020-05-23
Attending: EMERGENCY MEDICINE | Admitting: INTERNAL MEDICINE
Payer: COMMERCIAL

## 2020-01-01 ENCOUNTER — NURSE TRIAGE (OUTPATIENT)
Dept: NURSING | Facility: CLINIC | Age: 35
End: 2020-01-01

## 2020-01-01 ENCOUNTER — OFFICE VISIT (OUTPATIENT)
Dept: SURGERY | Facility: CLINIC | Age: 35
End: 2020-01-01
Payer: COMMERCIAL

## 2020-01-01 ENCOUNTER — APPOINTMENT (OUTPATIENT)
Dept: INTERVENTIONAL RADIOLOGY/VASCULAR | Facility: CLINIC | Age: 35
End: 2020-01-01
Payer: COMMERCIAL

## 2020-01-01 ENCOUNTER — ONCOLOGY VISIT (OUTPATIENT)
Dept: ONCOLOGY | Facility: CLINIC | Age: 35
End: 2020-01-01
Attending: NEUROLOGICAL SURGERY
Payer: COMMERCIAL

## 2020-01-01 ENCOUNTER — HOSPITAL ENCOUNTER (OUTPATIENT)
Dept: MRI IMAGING | Facility: CLINIC | Age: 35
Discharge: HOME | End: 2020-01-23
Attending: RADIOLOGY | Admitting: RADIOLOGY
Payer: COMMERCIAL

## 2020-01-01 ENCOUNTER — VIRTUAL VISIT (OUTPATIENT)
Dept: ONCOLOGY | Facility: CLINIC | Age: 35
End: 2020-01-01
Attending: NURSE PRACTITIONER
Payer: COMMERCIAL

## 2020-01-01 ENCOUNTER — APPOINTMENT (OUTPATIENT)
Dept: OCCUPATIONAL THERAPY | Facility: CLINIC | Age: 35
End: 2020-01-01
Attending: NEUROLOGICAL SURGERY
Payer: COMMERCIAL

## 2020-01-01 ENCOUNTER — ONCOLOGY VISIT (OUTPATIENT)
Dept: RADIATION ONCOLOGY | Facility: CLINIC | Age: 35
End: 2020-01-01

## 2020-01-01 ENCOUNTER — AMBULATORY - HEALTHEAST (OUTPATIENT)
Dept: NEUROSURGERY | Facility: CLINIC | Age: 35
End: 2020-01-01

## 2020-01-01 ENCOUNTER — HOSPITAL ENCOUNTER (OUTPATIENT)
Dept: MRI IMAGING | Facility: CLINIC | Age: 35
Discharge: HOME | End: 2020-06-24
Attending: RADIOLOGY | Admitting: RADIOLOGY
Payer: COMMERCIAL

## 2020-01-01 ENCOUNTER — ANCILLARY PROCEDURE (OUTPATIENT)
Dept: ULTRASOUND IMAGING | Facility: CLINIC | Age: 35
End: 2020-01-01
Attending: EMERGENCY MEDICINE
Payer: COMMERCIAL

## 2020-01-01 ENCOUNTER — HOSPITAL ENCOUNTER (INPATIENT)
Facility: CLINIC | Age: 35
LOS: 1 days | Discharge: HOME | End: 2020-02-28
Attending: NEUROLOGICAL SURGERY | Admitting: NEUROLOGICAL SURGERY
Payer: COMMERCIAL

## 2020-01-01 ENCOUNTER — HOSPITAL ENCOUNTER (INPATIENT)
Facility: CLINIC | Age: 35
Setting detail: SURGERY ADMIT
End: 2020-01-01
Attending: NEUROLOGICAL SURGERY | Admitting: NEUROLOGICAL SURGERY
Payer: COMMERCIAL

## 2020-01-01 ENCOUNTER — TELEPHONE (OUTPATIENT)
Dept: FAMILY MEDICINE | Facility: CLINIC | Age: 35
End: 2020-01-01

## 2020-01-01 VITALS
WEIGHT: 129 LBS | HEART RATE: 90 BPM | DIASTOLIC BLOOD PRESSURE: 74 MMHG | SYSTOLIC BLOOD PRESSURE: 108 MMHG | RESPIRATION RATE: 12 BRPM | BODY MASS INDEX: 18.06 KG/M2 | OXYGEN SATURATION: 100 % | HEIGHT: 71 IN | TEMPERATURE: 98.5 F

## 2020-01-01 VITALS
HEART RATE: 110 BPM | DIASTOLIC BLOOD PRESSURE: 75 MMHG | BODY MASS INDEX: 17.94 KG/M2 | RESPIRATION RATE: 16 BRPM | OXYGEN SATURATION: 94 % | HEIGHT: 71 IN | TEMPERATURE: 98.9 F | SYSTOLIC BLOOD PRESSURE: 107 MMHG

## 2020-01-01 VITALS
HEIGHT: 70 IN | RESPIRATION RATE: 20 BRPM | TEMPERATURE: 97.3 F | HEART RATE: 107 BPM | SYSTOLIC BLOOD PRESSURE: 101 MMHG | DIASTOLIC BLOOD PRESSURE: 74 MMHG | BODY MASS INDEX: 18.85 KG/M2 | OXYGEN SATURATION: 99 % | WEIGHT: 131.7 LBS

## 2020-01-01 VITALS
SYSTOLIC BLOOD PRESSURE: 102 MMHG | OXYGEN SATURATION: 100 % | BODY MASS INDEX: 18.3 KG/M2 | DIASTOLIC BLOOD PRESSURE: 65 MMHG | HEART RATE: 101 BPM | WEIGHT: 131.2 LBS | RESPIRATION RATE: 18 BRPM | TEMPERATURE: 98.3 F

## 2020-01-01 VITALS
DIASTOLIC BLOOD PRESSURE: 61 MMHG | TEMPERATURE: 95.9 F | SYSTOLIC BLOOD PRESSURE: 112 MMHG | RESPIRATION RATE: 16 BRPM | BODY MASS INDEX: 17.22 KG/M2 | OXYGEN SATURATION: 99 % | HEART RATE: 82 BPM | WEIGHT: 123.46 LBS

## 2020-01-01 VITALS
BODY MASS INDEX: 18.54 KG/M2 | DIASTOLIC BLOOD PRESSURE: 58 MMHG | WEIGHT: 132.4 LBS | HEART RATE: 63 BPM | OXYGEN SATURATION: 99 % | TEMPERATURE: 96.8 F | SYSTOLIC BLOOD PRESSURE: 105 MMHG | RESPIRATION RATE: 17 BRPM | HEIGHT: 71 IN

## 2020-01-01 VITALS
SYSTOLIC BLOOD PRESSURE: 123 MMHG | TEMPERATURE: 99.3 F | BODY MASS INDEX: 18.49 KG/M2 | WEIGHT: 132.6 LBS | RESPIRATION RATE: 16 BRPM | HEART RATE: 86 BPM | DIASTOLIC BLOOD PRESSURE: 84 MMHG | OXYGEN SATURATION: 99 %

## 2020-01-01 VITALS
DIASTOLIC BLOOD PRESSURE: 69 MMHG | RESPIRATION RATE: 16 BRPM | WEIGHT: 126.6 LBS | HEART RATE: 89 BPM | TEMPERATURE: 97.5 F | HEIGHT: 71 IN | OXYGEN SATURATION: 99 % | SYSTOLIC BLOOD PRESSURE: 103 MMHG | BODY MASS INDEX: 17.72 KG/M2

## 2020-01-01 VITALS
SYSTOLIC BLOOD PRESSURE: 100 MMHG | TEMPERATURE: 98.3 F | RESPIRATION RATE: 40 BRPM | HEART RATE: 125 BPM | DIASTOLIC BLOOD PRESSURE: 74 MMHG | OXYGEN SATURATION: 90 % | BODY MASS INDEX: 18.51 KG/M2 | WEIGHT: 129 LBS

## 2020-01-01 VITALS
WEIGHT: 121.6 LBS | RESPIRATION RATE: 20 BRPM | TEMPERATURE: 96.7 F | HEART RATE: 109 BPM | OXYGEN SATURATION: 96 % | DIASTOLIC BLOOD PRESSURE: 68 MMHG | SYSTOLIC BLOOD PRESSURE: 111 MMHG | BODY MASS INDEX: 17.41 KG/M2 | HEIGHT: 70 IN

## 2020-01-01 VITALS — OXYGEN SATURATION: 97 % | HEART RATE: 86 BPM | DIASTOLIC BLOOD PRESSURE: 62 MMHG | SYSTOLIC BLOOD PRESSURE: 111 MMHG

## 2020-01-01 VITALS
HEART RATE: 130 BPM | OXYGEN SATURATION: 96 % | DIASTOLIC BLOOD PRESSURE: 84 MMHG | WEIGHT: 132 LBS | TEMPERATURE: 97.9 F | RESPIRATION RATE: 14 BRPM | SYSTOLIC BLOOD PRESSURE: 137 MMHG | BODY MASS INDEX: 18.9 KG/M2 | HEIGHT: 70 IN

## 2020-01-01 VITALS
SYSTOLIC BLOOD PRESSURE: 111 MMHG | HEART RATE: 86 BPM | DIASTOLIC BLOOD PRESSURE: 62 MMHG | HEIGHT: 71 IN | WEIGHT: 131.6 LBS | RESPIRATION RATE: 16 BRPM | TEMPERATURE: 98.6 F | OXYGEN SATURATION: 97 % | BODY MASS INDEX: 18.42 KG/M2

## 2020-01-01 VITALS
HEIGHT: 71 IN | WEIGHT: 130.4 LBS | SYSTOLIC BLOOD PRESSURE: 112 MMHG | RESPIRATION RATE: 16 BRPM | OXYGEN SATURATION: 98 % | HEART RATE: 98 BPM | BODY MASS INDEX: 18.26 KG/M2 | DIASTOLIC BLOOD PRESSURE: 75 MMHG | TEMPERATURE: 97.3 F

## 2020-01-01 VITALS
SYSTOLIC BLOOD PRESSURE: 96 MMHG | HEART RATE: 130 BPM | RESPIRATION RATE: 30 BRPM | WEIGHT: 139.1 LBS | TEMPERATURE: 97.8 F | OXYGEN SATURATION: 96 % | BODY MASS INDEX: 20.6 KG/M2 | DIASTOLIC BLOOD PRESSURE: 75 MMHG | HEIGHT: 69 IN

## 2020-01-01 VITALS
HEART RATE: 56 BPM | BODY MASS INDEX: 18.18 KG/M2 | HEIGHT: 71 IN | TEMPERATURE: 97.2 F | RESPIRATION RATE: 17 BRPM | SYSTOLIC BLOOD PRESSURE: 110 MMHG | WEIGHT: 129.85 LBS | DIASTOLIC BLOOD PRESSURE: 72 MMHG | OXYGEN SATURATION: 100 %

## 2020-01-01 VITALS
RESPIRATION RATE: 16 BRPM | SYSTOLIC BLOOD PRESSURE: 110 MMHG | TEMPERATURE: 98.7 F | OXYGEN SATURATION: 98 % | DIASTOLIC BLOOD PRESSURE: 73 MMHG | WEIGHT: 129.2 LBS | HEART RATE: 107 BPM | BODY MASS INDEX: 18.54 KG/M2

## 2020-01-01 VITALS
BODY MASS INDEX: 17.4 KG/M2 | RESPIRATION RATE: 18 BRPM | DIASTOLIC BLOOD PRESSURE: 62 MMHG | WEIGHT: 121.3 LBS | TEMPERATURE: 98.2 F | OXYGEN SATURATION: 97 % | SYSTOLIC BLOOD PRESSURE: 98 MMHG | HEART RATE: 97 BPM

## 2020-01-01 VITALS — BODY MASS INDEX: 17.94 KG/M2 | WEIGHT: 128.6 LBS

## 2020-01-01 VITALS
OXYGEN SATURATION: 95 % | SYSTOLIC BLOOD PRESSURE: 94 MMHG | HEART RATE: 113 BPM | DIASTOLIC BLOOD PRESSURE: 65 MMHG | RESPIRATION RATE: 23 BRPM

## 2020-01-01 DIAGNOSIS — C43.9 METASTATIC MALIGNANT MELANOMA (H): Primary | ICD-10-CM

## 2020-01-01 DIAGNOSIS — F51.02 ADJUSTMENT INSOMNIA: ICD-10-CM

## 2020-01-01 DIAGNOSIS — C79.31 METASTASIS TO BRAIN (H): Primary | ICD-10-CM

## 2020-01-01 DIAGNOSIS — D64.9 ANEMIA, NORMOCYTIC NORMOCHROMIC: ICD-10-CM

## 2020-01-01 DIAGNOSIS — C43.59 MALIGNANT MELANOMA OF SKIN OF TRUNK, EXCEPT SCROTUM (H): ICD-10-CM

## 2020-01-01 DIAGNOSIS — C79.31 MALIGNANT NEOPLASM METASTATIC TO BRAIN (H): ICD-10-CM

## 2020-01-01 DIAGNOSIS — C43.9 METASTATIC MALIGNANT MELANOMA (H): ICD-10-CM

## 2020-01-01 DIAGNOSIS — C79.31 MALIGNANT NEOPLASM METASTATIC TO BRAIN (H): Primary | ICD-10-CM

## 2020-01-01 DIAGNOSIS — C71.1 MALIGNANT NEOPLASM OF FRONTAL LOBE OF BRAIN (H): Primary | ICD-10-CM

## 2020-01-01 DIAGNOSIS — E87.1 HYPONATREMIA: ICD-10-CM

## 2020-01-01 DIAGNOSIS — I31.39 PERICARDIAL EFFUSION: ICD-10-CM

## 2020-01-01 DIAGNOSIS — J90 PLEURAL EFFUSION: Primary | ICD-10-CM

## 2020-01-01 DIAGNOSIS — M62.81 GENERALIZED MUSCLE WEAKNESS: ICD-10-CM

## 2020-01-01 DIAGNOSIS — E27.40 ADRENAL INSUFFICIENCY (H): ICD-10-CM

## 2020-01-01 DIAGNOSIS — C43.9 METASTATIC MELANOMA (H): ICD-10-CM

## 2020-01-01 DIAGNOSIS — R06.00 DYSPNEA, UNSPECIFIED TYPE: ICD-10-CM

## 2020-01-01 DIAGNOSIS — C43.59 MALIGNANT MELANOMA OF SKIN OF TRUNK, EXCEPT SCROTUM (H): Primary | ICD-10-CM

## 2020-01-01 DIAGNOSIS — Z03.818 ENCNTR FOR OBS FOR SUSP EXPSR TO OTH BIOLG AGENTS RULED OUT: ICD-10-CM

## 2020-01-01 DIAGNOSIS — C79.31 BRAIN METASTASIS: ICD-10-CM

## 2020-01-01 DIAGNOSIS — Z01.818 PREOP EXAMINATION: Primary | ICD-10-CM

## 2020-01-01 DIAGNOSIS — F41.9 ANXIETY: Primary | ICD-10-CM

## 2020-01-01 DIAGNOSIS — Z11.59 ENCOUNTER FOR SCREENING FOR OTHER VIRAL DISEASES: ICD-10-CM

## 2020-01-01 DIAGNOSIS — C79.31 BRAIN METASTASIS: Primary | ICD-10-CM

## 2020-01-01 DIAGNOSIS — R40.20 LOSS OF CONSCIOUSNESS (H): ICD-10-CM

## 2020-01-01 DIAGNOSIS — K59.00 CONSTIPATION, UNSPECIFIED CONSTIPATION TYPE: ICD-10-CM

## 2020-01-01 DIAGNOSIS — C79.31 METASTASIS TO BRAIN (H): ICD-10-CM

## 2020-01-01 DIAGNOSIS — C71.1 MALIGNANT NEOPLASM OF FRONTAL LOBE OF BRAIN (H): ICD-10-CM

## 2020-01-01 DIAGNOSIS — F43.20 ADJUSTMENT DISORDER, UNSPECIFIED TYPE: ICD-10-CM

## 2020-01-01 DIAGNOSIS — Z11.59 ENCOUNTER FOR SCREENING FOR OTHER VIRAL DISEASES: Primary | ICD-10-CM

## 2020-01-01 DIAGNOSIS — R50.9 FEVER, UNSPECIFIED FEVER CAUSE: ICD-10-CM

## 2020-01-01 DIAGNOSIS — K59.00 CONSTIPATION, UNSPECIFIED CONSTIPATION TYPE: Primary | ICD-10-CM

## 2020-01-01 DIAGNOSIS — J90 PLEURAL EFFUSION: ICD-10-CM

## 2020-01-01 DIAGNOSIS — R56.9 GENERALIZED-ONSET SEIZURES (H): ICD-10-CM

## 2020-01-01 DIAGNOSIS — Z01.818 PREOP EXAMINATION: ICD-10-CM

## 2020-01-01 DIAGNOSIS — R56.9 SEIZURES (H): ICD-10-CM

## 2020-01-01 DIAGNOSIS — F41.9 ANXIETY: ICD-10-CM

## 2020-01-01 DIAGNOSIS — C43.9 MALIGNANT MELANOMA, UNSPECIFIED SITE (H): ICD-10-CM

## 2020-01-01 DIAGNOSIS — E87.1 HYPONATREMIA: Primary | ICD-10-CM

## 2020-01-01 DIAGNOSIS — R06.02 SHORTNESS OF BREATH: ICD-10-CM

## 2020-01-01 LAB
ABO + RH BLD: NORMAL
ACTH PLAS-MCNC: 52 PG/ML
ALBUMIN SERPL-MCNC: 2 G/DL (ref 3.4–5)
ALBUMIN SERPL-MCNC: 2 G/DL (ref 3.5–5)
ALBUMIN SERPL-MCNC: 2.2 G/DL (ref 3.4–5)
ALBUMIN SERPL-MCNC: 2.3 G/DL (ref 3.5–5)
ALBUMIN SERPL-MCNC: 2.4 G/DL (ref 3.4–5)
ALBUMIN SERPL-MCNC: 2.5 G/DL (ref 3.4–5)
ALBUMIN SERPL-MCNC: 2.6 G/DL (ref 3.4–5)
ALBUMIN SERPL-MCNC: 2.7 G/DL (ref 3.4–5)
ALBUMIN SERPL-MCNC: 2.7 G/DL (ref 3.4–5)
ALBUMIN SERPL-MCNC: 2.8 G/DL (ref 3.4–5)
ALBUMIN SERPL-MCNC: 2.8 G/DL (ref 3.4–5)
ALBUMIN SERPL-MCNC: 3 G/DL (ref 3.4–5)
ALBUMIN SERPL-MCNC: 3.1 G/DL (ref 3.4–5)
ALBUMIN SERPL-MCNC: 3.1 G/DL (ref 3.4–5)
ALBUMIN SERPL-MCNC: 3.1 G/DL (ref 3.5–5)
ALBUMIN SERPL-MCNC: 3.2 G/DL (ref 3.4–5)
ALBUMIN SERPL-MCNC: 3.4 G/DL (ref 3.4–5)
ALBUMIN SERPL-MCNC: 3.4 G/DL (ref 3.4–5)
ALBUMIN SERPL-MCNC: 3.4 G/DL (ref 3.5–5)
ALBUMIN SERPL-MCNC: 3.5 G/DL (ref 3.4–5)
ALBUMIN SERPL-MCNC: 3.6 G/DL (ref 3.4–5)
ALBUMIN UR-MCNC: 10 MG/DL
ALBUMIN UR-MCNC: NEGATIVE MG/DL
ALP SERPL-CCNC: 100 U/L (ref 40–150)
ALP SERPL-CCNC: 102 U/L (ref 45–120)
ALP SERPL-CCNC: 103 U/L (ref 40–150)
ALP SERPL-CCNC: 107 U/L (ref 40–150)
ALP SERPL-CCNC: 108 U/L (ref 45–120)
ALP SERPL-CCNC: 111 U/L (ref 40–150)
ALP SERPL-CCNC: 111 U/L (ref 40–150)
ALP SERPL-CCNC: 117 U/L (ref 40–150)
ALP SERPL-CCNC: 121 U/L (ref 45–120)
ALP SERPL-CCNC: 126 U/L (ref 40–150)
ALP SERPL-CCNC: 68 U/L (ref 40–150)
ALP SERPL-CCNC: 72 U/L (ref 40–150)
ALP SERPL-CCNC: 73 U/L (ref 40–150)
ALP SERPL-CCNC: 74 U/L (ref 40–150)
ALP SERPL-CCNC: 77 U/L (ref 40–150)
ALP SERPL-CCNC: 82 U/L (ref 40–150)
ALP SERPL-CCNC: 84 U/L (ref 40–150)
ALP SERPL-CCNC: 85 U/L (ref 40–150)
ALP SERPL-CCNC: 89 U/L (ref 40–150)
ALP SERPL-CCNC: 97 U/L (ref 45–120)
ALP SERPL-CCNC: 98 U/L (ref 40–150)
ALT SERPL W P-5'-P-CCNC: 10 U/L (ref 0–45)
ALT SERPL W P-5'-P-CCNC: 10 U/L (ref 0–70)
ALT SERPL W P-5'-P-CCNC: 11 U/L (ref 0–70)
ALT SERPL W P-5'-P-CCNC: 13 U/L (ref 0–45)
ALT SERPL W P-5'-P-CCNC: 14 U/L (ref 0–70)
ALT SERPL W P-5'-P-CCNC: 15 U/L (ref 0–70)
ALT SERPL W P-5'-P-CCNC: 16 U/L (ref 0–70)
ALT SERPL W P-5'-P-CCNC: 17 U/L (ref 0–70)
ALT SERPL W P-5'-P-CCNC: 18 U/L (ref 0–70)
ALT SERPL W P-5'-P-CCNC: 24 U/L (ref 0–70)
ALT SERPL W P-5'-P-CCNC: 26 U/L (ref 0–70)
ALT SERPL W P-5'-P-CCNC: 27 U/L (ref 0–70)
ALT SERPL W P-5'-P-CCNC: 9 U/L (ref 0–70)
AMORPH CRY #/AREA URNS HPF: ABNORMAL /HPF
ANION GAP SERPL CALCULATED.3IONS-SCNC: 10 MMOL/L (ref 3–14)
ANION GAP SERPL CALCULATED.3IONS-SCNC: 11 MMOL/L (ref 3–14)
ANION GAP SERPL CALCULATED.3IONS-SCNC: 11 MMOL/L (ref 5–18)
ANION GAP SERPL CALCULATED.3IONS-SCNC: 11 MMOL/L (ref 5–18)
ANION GAP SERPL CALCULATED.3IONS-SCNC: 12 MMOL/L (ref 3–14)
ANION GAP SERPL CALCULATED.3IONS-SCNC: 12 MMOL/L (ref 5–18)
ANION GAP SERPL CALCULATED.3IONS-SCNC: 13 MMOL/L (ref 5–18)
ANION GAP SERPL CALCULATED.3IONS-SCNC: 2 MMOL/L (ref 3–14)
ANION GAP SERPL CALCULATED.3IONS-SCNC: 3 MMOL/L (ref 3–14)
ANION GAP SERPL CALCULATED.3IONS-SCNC: 4 MMOL/L (ref 3–14)
ANION GAP SERPL CALCULATED.3IONS-SCNC: 4 MMOL/L (ref 3–14)
ANION GAP SERPL CALCULATED.3IONS-SCNC: 5 MMOL/L (ref 3–14)
ANION GAP SERPL CALCULATED.3IONS-SCNC: 6 MMOL/L (ref 3–14)
ANION GAP SERPL CALCULATED.3IONS-SCNC: 7 MMOL/L (ref 3–14)
ANION GAP SERPL CALCULATED.3IONS-SCNC: 7 MMOL/L (ref 3–14)
ANION GAP SERPL CALCULATED.3IONS-SCNC: 8 MMOL/L (ref 3–14)
ANION GAP SERPL CALCULATED.3IONS-SCNC: 8 MMOL/L (ref 5–18)
ANION GAP SERPL CALCULATED.3IONS-SCNC: 9 MMOL/L (ref 3–14)
ANION GAP SERPL CALCULATED.3IONS-SCNC: 9 MMOL/L (ref 5–18)
ANISOCYTOSIS BLD QL SMEAR: SLIGHT
APPEARANCE FLD: CLEAR
APPEARANCE FLD: NORMAL
APPEARANCE UR: ABNORMAL
APPEARANCE UR: CLEAR
APTT PPP: 25 SEC (ref 22–37)
APTT PPP: 27 SEC (ref 22–37)
APTT PPP: 29 SEC (ref 22–37)
APTT PPP: 32 SEC (ref 22–37)
AST SERPL W P-5'-P-CCNC: 10 U/L (ref 0–45)
AST SERPL W P-5'-P-CCNC: 10 U/L (ref 0–45)
AST SERPL W P-5'-P-CCNC: 12 U/L (ref 0–45)
AST SERPL W P-5'-P-CCNC: 14 U/L (ref 0–45)
AST SERPL W P-5'-P-CCNC: 14 U/L (ref 0–45)
AST SERPL W P-5'-P-CCNC: 15 U/L (ref 0–45)
AST SERPL W P-5'-P-CCNC: 16 U/L (ref 0–45)
AST SERPL W P-5'-P-CCNC: 19 U/L (ref 0–40)
AST SERPL W P-5'-P-CCNC: 20 U/L (ref 0–40)
AST SERPL W P-5'-P-CCNC: 20 U/L (ref 0–45)
AST SERPL W P-5'-P-CCNC: 20 U/L (ref 0–45)
AST SERPL W P-5'-P-CCNC: 21 U/L (ref 0–45)
AST SERPL W P-5'-P-CCNC: 24 U/L (ref 0–45)
AST SERPL W P-5'-P-CCNC: 25 U/L (ref 0–40)
AST SERPL W P-5'-P-CCNC: 26 U/L (ref 0–40)
AST SERPL W P-5'-P-CCNC: 28 U/L (ref 0–45)
AST SERPL W P-5'-P-CCNC: 28 U/L (ref 0–45)
AST SERPL W P-5'-P-CCNC: 33 U/L (ref 0–45)
AST SERPL W P-5'-P-CCNC: 41 U/L (ref 0–45)
AST SERPL W P-5'-P-CCNC: 57 U/L (ref 0–45)
AST SERPL W P-5'-P-CCNC: 6 U/L (ref 0–45)
BACTERIA SPEC CULT: NO GROWTH
BACTERIA SPEC CULT: NORMAL
BACTERIA SPEC CULT: NORMAL
BASE DEFICIT BLDV-SCNC: 1.5 MMOL/L
BASE DEFICIT BLDV-SCNC: 5.5 MMOL/L
BASOPHILS # BLD AUTO: 0 10E9/L (ref 0–0.2)
BASOPHILS # BLD AUTO: 0 THOU/UL (ref 0–0.2)
BASOPHILS # BLD AUTO: 0.1 10E9/L (ref 0–0.2)
BASOPHILS # BLD AUTO: 0.1 THOU/UL (ref 0–0.2)
BASOPHILS # BLD AUTO: 0.1 THOU/UL (ref 0–0.2)
BASOPHILS NFR BLD AUTO: 0 %
BASOPHILS NFR BLD AUTO: 0.1 %
BASOPHILS NFR BLD AUTO: 0.2 %
BASOPHILS NFR BLD AUTO: 0.4 %
BASOPHILS NFR BLD AUTO: 0.4 %
BASOPHILS NFR BLD AUTO: 0.5 %
BASOPHILS NFR BLD AUTO: 0.5 %
BASOPHILS NFR BLD AUTO: 0.6 %
BASOPHILS NFR BLD AUTO: 0.9 %
BASOPHILS NFR BLD AUTO: 1 % (ref 0–2)
BASOPHILS NFR BLD AUTO: 1 % (ref 0–2)
BASOPHILS NFR BLD AUTO: 1.2 %
BASOPHILS NFR BLD AUTO: 1.3 %
BASOPHILS NFR BLD AUTO: 1.7 %
BASOPHILS NFR BLD AUTO: 2 % (ref 0–2)
BASOPHILS NFR BLD AUTO: 2.7 %
BASOPHILS NFR FLD MANUAL: 2 %
BILIRUB SERPL-MCNC: 0.2 MG/DL (ref 0.2–1.3)
BILIRUB SERPL-MCNC: 0.2 MG/DL (ref 0–1)
BILIRUB SERPL-MCNC: 0.3 MG/DL (ref 0.2–1.3)
BILIRUB SERPL-MCNC: 0.4 MG/DL (ref 0.2–1.3)
BILIRUB SERPL-MCNC: 0.4 MG/DL (ref 0–1)
BILIRUB SERPL-MCNC: 0.5 MG/DL (ref 0.2–1.3)
BILIRUB SERPL-MCNC: 0.7 MG/DL (ref 0.2–1.3)
BILIRUB SERPL-MCNC: 0.7 MG/DL (ref 0–1)
BILIRUB SERPL-MCNC: 0.9 MG/DL (ref 0.2–1.3)
BILIRUB SERPL-MCNC: 0.9 MG/DL (ref 0.2–1.3)
BILIRUB SERPL-MCNC: 0.9 MG/DL (ref 0–1)
BILIRUB UR QL STRIP: NEGATIVE
BLD GP AB SCN SERPL QL: NORMAL
BLD GP AB SCN SERPL QL: NORMAL
BLD PROD TYP BPU: NORMAL
BLD PROD TYP BPU: NORMAL
BLD UNIT ID BPU: 0
BLOOD BANK CMNT PATIENT-IMP: NORMAL
BLOOD BANK CMNT PATIENT-IMP: NORMAL
BLOOD PRODUCT CODE: NORMAL
BPU ID: NORMAL
BUN SERPL-MCNC: 10 MG/DL (ref 7–30)
BUN SERPL-MCNC: 10 MG/DL (ref 8–22)
BUN SERPL-MCNC: 11 MG/DL (ref 7–30)
BUN SERPL-MCNC: 12 MG/DL (ref 7–30)
BUN SERPL-MCNC: 12 MG/DL (ref 8–22)
BUN SERPL-MCNC: 12 MG/DL (ref 8–22)
BUN SERPL-MCNC: 13 MG/DL (ref 7–30)
BUN SERPL-MCNC: 14 MG/DL (ref 7–30)
BUN SERPL-MCNC: 14 MG/DL (ref 7–30)
BUN SERPL-MCNC: 14 MG/DL (ref 8–22)
BUN SERPL-MCNC: 17 MG/DL (ref 7–30)
BUN SERPL-MCNC: 17 MG/DL (ref 7–30)
BUN SERPL-MCNC: 17 MG/DL (ref 8–22)
BUN SERPL-MCNC: 18 MG/DL (ref 7–30)
BUN SERPL-MCNC: 26 MG/DL (ref 7–30)
BUN SERPL-MCNC: 31 MG/DL (ref 7–30)
BUN SERPL-MCNC: 6 MG/DL (ref 7–30)
BUN SERPL-MCNC: 7 MG/DL (ref 7–30)
BUN SERPL-MCNC: 8 MG/DL (ref 7–30)
BUN SERPL-MCNC: 8 MG/DL (ref 8–22)
BUN SERPL-MCNC: 9 MG/DL (ref 7–30)
BURR CELLS BLD QL SMEAR: SLIGHT
C COLI+JEJUNI+LARI FUSA STL QL NAA+PROBE: NOT DETECTED
CA-I BLD-SCNC: 5 MG/DL (ref 4.4–5.2)
CALCIUM SERPL-MCNC: 7.6 MG/DL (ref 8.5–10.1)
CALCIUM SERPL-MCNC: 7.9 MG/DL (ref 8.5–10.1)
CALCIUM SERPL-MCNC: 7.9 MG/DL (ref 8.5–10.5)
CALCIUM SERPL-MCNC: 8 MG/DL (ref 8.5–10.1)
CALCIUM SERPL-MCNC: 8 MG/DL (ref 8.5–10.1)
CALCIUM SERPL-MCNC: 8.2 MG/DL (ref 8.5–10.1)
CALCIUM SERPL-MCNC: 8.2 MG/DL (ref 8.5–10.1)
CALCIUM SERPL-MCNC: 8.2 MG/DL (ref 8.5–10.5)
CALCIUM SERPL-MCNC: 8.2 MG/DL (ref 8.5–10.5)
CALCIUM SERPL-MCNC: 8.4 MG/DL (ref 8.5–10.1)
CALCIUM SERPL-MCNC: 8.4 MG/DL (ref 8.5–10.1)
CALCIUM SERPL-MCNC: 8.4 MG/DL (ref 8.5–10.5)
CALCIUM SERPL-MCNC: 8.5 MG/DL (ref 8.5–10.1)
CALCIUM SERPL-MCNC: 8.5 MG/DL (ref 8.5–10.1)
CALCIUM SERPL-MCNC: 8.6 MG/DL (ref 8.5–10.1)
CALCIUM SERPL-MCNC: 8.7 MG/DL (ref 8.5–10.1)
CALCIUM SERPL-MCNC: 8.8 MG/DL (ref 8.5–10.1)
CALCIUM SERPL-MCNC: 8.8 MG/DL (ref 8.5–10.5)
CALCIUM SERPL-MCNC: 8.9 MG/DL (ref 8.5–10.1)
CALCIUM SERPL-MCNC: 8.9 MG/DL (ref 8.5–10.5)
CALCIUM SERPL-MCNC: 9 MG/DL (ref 8.5–10.1)
CALCIUM SERPL-MCNC: 9.2 MG/DL (ref 8.5–10.1)
CHLORIDE BLD-SCNC: 100 MMOL/L (ref 98–107)
CHLORIDE BLD-SCNC: 100 MMOL/L (ref 98–107)
CHLORIDE BLD-SCNC: 92 MMOL/L (ref 98–107)
CHLORIDE BLD-SCNC: 93 MMOL/L (ref 98–107)
CHLORIDE BLD-SCNC: 97 MMOL/L (ref 98–107)
CHLORIDE BLD-SCNC: 99 MMOL/L (ref 98–107)
CHLORIDE SERPL-SCNC: 100 MMOL/L (ref 94–109)
CHLORIDE SERPL-SCNC: 100 MMOL/L (ref 94–109)
CHLORIDE SERPL-SCNC: 101 MMOL/L (ref 94–109)
CHLORIDE SERPL-SCNC: 101 MMOL/L (ref 94–109)
CHLORIDE SERPL-SCNC: 102 MMOL/L (ref 94–109)
CHLORIDE SERPL-SCNC: 103 MMOL/L (ref 94–109)
CHLORIDE SERPL-SCNC: 105 MMOL/L (ref 94–109)
CHLORIDE SERPL-SCNC: 107 MMOL/L (ref 94–109)
CHLORIDE SERPL-SCNC: 110 MMOL/L (ref 94–109)
CHLORIDE SERPL-SCNC: 90 MMOL/L (ref 94–109)
CHLORIDE SERPL-SCNC: 94 MMOL/L (ref 94–109)
CHLORIDE SERPL-SCNC: 95 MMOL/L (ref 94–109)
CHLORIDE SERPL-SCNC: 96 MMOL/L (ref 94–109)
CHLORIDE SERPL-SCNC: 97 MMOL/L (ref 94–109)
CHLORIDE SERPL-SCNC: 98 MMOL/L (ref 94–109)
CHLORIDE SERPL-SCNC: 99 MMOL/L (ref 94–109)
CO2 BLDCOV-SCNC: 24 MMOL/L (ref 21–28)
CO2 SERPL-SCNC: 18 MMOL/L (ref 20–32)
CO2 SERPL-SCNC: 18 MMOL/L (ref 20–32)
CO2 SERPL-SCNC: 20 MMOL/L (ref 20–32)
CO2 SERPL-SCNC: 21 MMOL/L (ref 20–32)
CO2 SERPL-SCNC: 22 MMOL/L (ref 20–32)
CO2 SERPL-SCNC: 22 MMOL/L (ref 22–31)
CO2 SERPL-SCNC: 22 MMOL/L (ref 22–31)
CO2 SERPL-SCNC: 23 MMOL/L (ref 20–32)
CO2 SERPL-SCNC: 23 MMOL/L (ref 22–31)
CO2 SERPL-SCNC: 23 MMOL/L (ref 22–31)
CO2 SERPL-SCNC: 24 MMOL/L (ref 20–32)
CO2 SERPL-SCNC: 25 MMOL/L (ref 20–32)
CO2 SERPL-SCNC: 25 MMOL/L (ref 22–31)
CO2 SERPL-SCNC: 26 MMOL/L (ref 20–32)
CO2 SERPL-SCNC: 27 MMOL/L (ref 20–32)
CO2 SERPL-SCNC: 29 MMOL/L (ref 20–32)
CO2 SERPL-SCNC: 29 MMOL/L (ref 20–32)
CO2 SERPL-SCNC: 30 MMOL/L (ref 22–31)
CO2 SERPL-SCNC: 31 MMOL/L (ref 20–32)
COLOR FLD: NORMAL
COLOR FLD: YELLOW
COLOR UR AUTO: ABNORMAL
COLOR UR AUTO: YELLOW
COPATH REPORT: NORMAL
CORTIS SERPL-MCNC: 18 UG/DL (ref 4–22)
CORTIS SERPL-MCNC: 20.2 UG/DL (ref 4–22)
CORTIS SERPL-MCNC: 25.8 UG/DL (ref 4–22)
CORTIS SERPL-MCNC: 30.1 UG/DL (ref 4–22)
CORTIS SERPL-MCNC: 30.2 UG/DL (ref 4–22)
CREAT SERPL-MCNC: 0.39 MG/DL (ref 0.66–1.25)
CREAT SERPL-MCNC: 0.45 MG/DL (ref 0.66–1.25)
CREAT SERPL-MCNC: 0.54 MG/DL (ref 0.66–1.25)
CREAT SERPL-MCNC: 0.55 MG/DL (ref 0.7–1.3)
CREAT SERPL-MCNC: 0.56 MG/DL (ref 0.66–1.25)
CREAT SERPL-MCNC: 0.56 MG/DL (ref 0.7–1.3)
CREAT SERPL-MCNC: 0.58 MG/DL (ref 0.66–1.25)
CREAT SERPL-MCNC: 0.59 MG/DL (ref 0.66–1.25)
CREAT SERPL-MCNC: 0.6 MG/DL (ref 0.66–1.25)
CREAT SERPL-MCNC: 0.61 MG/DL (ref 0.66–1.25)
CREAT SERPL-MCNC: 0.61 MG/DL (ref 0.7–1.3)
CREAT SERPL-MCNC: 0.62 MG/DL (ref 0.66–1.25)
CREAT SERPL-MCNC: 0.63 MG/DL (ref 0.66–1.25)
CREAT SERPL-MCNC: 0.63 MG/DL (ref 0.7–1.3)
CREAT SERPL-MCNC: 0.64 MG/DL (ref 0.66–1.25)
CREAT SERPL-MCNC: 0.64 MG/DL (ref 0.7–1.3)
CREAT SERPL-MCNC: 0.65 MG/DL (ref 0.66–1.25)
CREAT SERPL-MCNC: 0.66 MG/DL (ref 0.66–1.25)
CREAT SERPL-MCNC: 0.67 MG/DL (ref 0.66–1.25)
CREAT SERPL-MCNC: 0.67 MG/DL (ref 0.66–1.25)
CREAT SERPL-MCNC: 0.67 MG/DL (ref 0.7–1.3)
CREAT SERPL-MCNC: 0.69 MG/DL (ref 0.66–1.25)
CREAT SERPL-MCNC: 0.7 MG/DL (ref 0.66–1.25)
CREAT SERPL-MCNC: 0.72 MG/DL (ref 0.66–1.25)
CREAT SERPL-MCNC: 0.72 MG/DL (ref 0.66–1.25)
CREAT SERPL-MCNC: 0.85 MG/DL (ref 0.66–1.25)
CREAT UR-MCNC: 99 MG/DL
CRP SERPL-MCNC: 7.5 MG/L (ref 0–8)
CRP SERPL-MCNC: 9.9 MG/L (ref 0–8)
DIFFERENTIAL METHOD BLD: ABNORMAL
DIFFERENTIAL METHOD BLD: NORMAL
EC STX1 GENE STL QL NAA+PROBE: NOT DETECTED
EC STX2 GENE STL QL NAA+PROBE: NOT DETECTED
ENTERIC PATHOGEN COMMENT: NORMAL
EOSINOPHIL # BLD AUTO: 0 10E9/L (ref 0–0.7)
EOSINOPHIL # BLD AUTO: 0 THOU/UL (ref 0–0.4)
EOSINOPHIL # BLD AUTO: 0 THOU/UL (ref 0–0.4)
EOSINOPHIL # BLD AUTO: 0.1 10E9/L (ref 0–0.7)
EOSINOPHIL # BLD AUTO: 0.1 THOU/UL (ref 0–0.4)
EOSINOPHIL NFR BLD AUTO: 0 %
EOSINOPHIL NFR BLD AUTO: 0.1 %
EOSINOPHIL NFR BLD AUTO: 0.2 %
EOSINOPHIL NFR BLD AUTO: 0.3 %
EOSINOPHIL NFR BLD AUTO: 0.4 %
EOSINOPHIL NFR BLD AUTO: 0.5 %
EOSINOPHIL NFR BLD AUTO: 0.9 %
EOSINOPHIL NFR BLD AUTO: 1 % (ref 0–6)
EOSINOPHIL NFR BLD AUTO: 1.5 %
EOSINOPHIL NFR BLD AUTO: 1.7 %
EOSINOPHIL NFR BLD AUTO: 1.9 %
ERYTHROCYTE [DISTWIDTH] IN BLOOD BY AUTOMATED COUNT: 11.9 % (ref 10–15)
ERYTHROCYTE [DISTWIDTH] IN BLOOD BY AUTOMATED COUNT: 12.1 % (ref 10–15)
ERYTHROCYTE [DISTWIDTH] IN BLOOD BY AUTOMATED COUNT: 12.2 % (ref 10–15)
ERYTHROCYTE [DISTWIDTH] IN BLOOD BY AUTOMATED COUNT: 12.2 % (ref 10–15)
ERYTHROCYTE [DISTWIDTH] IN BLOOD BY AUTOMATED COUNT: 12.3 % (ref 10–15)
ERYTHROCYTE [DISTWIDTH] IN BLOOD BY AUTOMATED COUNT: 12.3 % (ref 10–15)
ERYTHROCYTE [DISTWIDTH] IN BLOOD BY AUTOMATED COUNT: 12.5 % (ref 10–15)
ERYTHROCYTE [DISTWIDTH] IN BLOOD BY AUTOMATED COUNT: 12.5 % (ref 10–15)
ERYTHROCYTE [DISTWIDTH] IN BLOOD BY AUTOMATED COUNT: 13 % (ref 10–15)
ERYTHROCYTE [DISTWIDTH] IN BLOOD BY AUTOMATED COUNT: 13 % (ref 10–15)
ERYTHROCYTE [DISTWIDTH] IN BLOOD BY AUTOMATED COUNT: 15.5 % (ref 10–15)
ERYTHROCYTE [DISTWIDTH] IN BLOOD BY AUTOMATED COUNT: 15.6 % (ref 10–15)
ERYTHROCYTE [DISTWIDTH] IN BLOOD BY AUTOMATED COUNT: 15.6 % (ref 10–15)
ERYTHROCYTE [DISTWIDTH] IN BLOOD BY AUTOMATED COUNT: 15.8 % (ref 10–15)
ERYTHROCYTE [DISTWIDTH] IN BLOOD BY AUTOMATED COUNT: 15.9 % (ref 10–15)
ERYTHROCYTE [DISTWIDTH] IN BLOOD BY AUTOMATED COUNT: 16.6 % (ref 11–14.5)
ERYTHROCYTE [DISTWIDTH] IN BLOOD BY AUTOMATED COUNT: 16.9 % (ref 11–14.5)
ERYTHROCYTE [DISTWIDTH] IN BLOOD BY AUTOMATED COUNT: 17 % (ref 10–15)
ERYTHROCYTE [DISTWIDTH] IN BLOOD BY AUTOMATED COUNT: 17.2 % (ref 10–15)
ERYTHROCYTE [DISTWIDTH] IN BLOOD BY AUTOMATED COUNT: 17.2 % (ref 10–15)
ERYTHROCYTE [DISTWIDTH] IN BLOOD BY AUTOMATED COUNT: 17.5 % (ref 10–15)
ERYTHROCYTE [DISTWIDTH] IN BLOOD BY AUTOMATED COUNT: 17.6 % (ref 10–15)
ERYTHROCYTE [DISTWIDTH] IN BLOOD BY AUTOMATED COUNT: 17.6 % (ref 11–14.5)
ERYTHROCYTE [DISTWIDTH] IN BLOOD BY AUTOMATED COUNT: 17.7 % (ref 10–15)
ERYTHROCYTE [DISTWIDTH] IN BLOOD BY AUTOMATED COUNT: 17.9 % (ref 10–15)
ERYTHROCYTE [DISTWIDTH] IN BLOOD BY AUTOMATED COUNT: 18.1 % (ref 10–15)
ERYTHROCYTE [DISTWIDTH] IN BLOOD BY AUTOMATED COUNT: 18.1 % (ref 10–15)
ERYTHROCYTE [DISTWIDTH] IN BLOOD BY AUTOMATED COUNT: 18.4 % (ref 11–14.5)
ERYTHROCYTE [DISTWIDTH] IN BLOOD BY AUTOMATED COUNT: 18.6 % (ref 10–15)
ERYTHROCYTE [DISTWIDTH] IN BLOOD BY AUTOMATED COUNT: 18.7 % (ref 11–14.5)
FERRITIN SERPL-MCNC: 528 NG/ML (ref 27–300)
FERRITIN SERPL-MCNC: 87 NG/ML (ref 26–388)
FOLATE SERPL-MCNC: 59.3 NG/ML
FOLATE SERPL-MCNC: 7.3 NG/ML
FRACT EXCRET NA UR+SERPL-RTO: 0.5 %
GAMMA INTERFERON BACKGROUND BLD IA-ACNC: 0.03 IU/ML
GFR SERPL CREATININE-BSD FRML MDRD: >60 ML/MIN/1.73M2
GFR SERPL CREATININE-BSD FRML MDRD: >90 ML/MIN/{1.73_M2}
GLUCOSE BLD-MCNC: 100 MG/DL (ref 70–99)
GLUCOSE BLD-MCNC: 101 MG/DL (ref 70–125)
GLUCOSE BLD-MCNC: 104 MG/DL (ref 70–125)
GLUCOSE BLD-MCNC: 126 MG/DL (ref 70–125)
GLUCOSE BLD-MCNC: 128 MG/DL (ref 70–125)
GLUCOSE BLD-MCNC: 86 MG/DL (ref 70–125)
GLUCOSE BLD-MCNC: 97 MG/DL (ref 70–125)
GLUCOSE BLDC GLUCOMTR-MCNC: 101 MG/DL (ref 70–99)
GLUCOSE BLDC GLUCOMTR-MCNC: 106 MG/DL (ref 70–99)
GLUCOSE BLDC GLUCOMTR-MCNC: 107 MG/DL (ref 70–99)
GLUCOSE BLDC GLUCOMTR-MCNC: 112 MG/DL (ref 70–99)
GLUCOSE BLDC GLUCOMTR-MCNC: 114 MG/DL (ref 70–99)
GLUCOSE BLDC GLUCOMTR-MCNC: 114 MG/DL (ref 70–99)
GLUCOSE BLDC GLUCOMTR-MCNC: 116 MG/DL (ref 70–99)
GLUCOSE BLDC GLUCOMTR-MCNC: 118 MG/DL (ref 70–99)
GLUCOSE BLDC GLUCOMTR-MCNC: 120 MG/DL (ref 70–99)
GLUCOSE BLDC GLUCOMTR-MCNC: 121 MG/DL (ref 70–99)
GLUCOSE BLDC GLUCOMTR-MCNC: 123 MG/DL (ref 70–99)
GLUCOSE BLDC GLUCOMTR-MCNC: 123 MG/DL (ref 70–99)
GLUCOSE BLDC GLUCOMTR-MCNC: 124 MG/DL (ref 70–99)
GLUCOSE BLDC GLUCOMTR-MCNC: 125 MG/DL (ref 70–99)
GLUCOSE BLDC GLUCOMTR-MCNC: 130 MG/DL (ref 70–99)
GLUCOSE BLDC GLUCOMTR-MCNC: 133 MG/DL (ref 70–99)
GLUCOSE BLDC GLUCOMTR-MCNC: 133 MG/DL (ref 70–99)
GLUCOSE BLDC GLUCOMTR-MCNC: 135 MG/DL (ref 70–99)
GLUCOSE BLDC GLUCOMTR-MCNC: 135 MG/DL (ref 70–99)
GLUCOSE BLDC GLUCOMTR-MCNC: 137 MG/DL (ref 70–99)
GLUCOSE BLDC GLUCOMTR-MCNC: 142 MG/DL (ref 70–99)
GLUCOSE BLDC GLUCOMTR-MCNC: 142 MG/DL (ref 70–99)
GLUCOSE BLDC GLUCOMTR-MCNC: 149 MG/DL (ref 70–99)
GLUCOSE BLDC GLUCOMTR-MCNC: 151 MG/DL (ref 70–99)
GLUCOSE BLDC GLUCOMTR-MCNC: 163 MG/DL (ref 70–99)
GLUCOSE BLDC GLUCOMTR-MCNC: 168 MG/DL (ref 70–99)
GLUCOSE SERPL-MCNC: 100 MG/DL (ref 70–99)
GLUCOSE SERPL-MCNC: 101 MG/DL (ref 70–99)
GLUCOSE SERPL-MCNC: 104 MG/DL (ref 70–99)
GLUCOSE SERPL-MCNC: 105 MG/DL (ref 70–99)
GLUCOSE SERPL-MCNC: 106 MG/DL (ref 70–99)
GLUCOSE SERPL-MCNC: 110 MG/DL (ref 70–99)
GLUCOSE SERPL-MCNC: 112 MG/DL (ref 70–99)
GLUCOSE SERPL-MCNC: 112 MG/DL (ref 70–99)
GLUCOSE SERPL-MCNC: 113 MG/DL (ref 70–99)
GLUCOSE SERPL-MCNC: 114 MG/DL (ref 70–99)
GLUCOSE SERPL-MCNC: 116 MG/DL (ref 70–99)
GLUCOSE SERPL-MCNC: 116 MG/DL (ref 70–99)
GLUCOSE SERPL-MCNC: 118 MG/DL (ref 70–99)
GLUCOSE SERPL-MCNC: 119 MG/DL (ref 70–99)
GLUCOSE SERPL-MCNC: 120 MG/DL (ref 70–99)
GLUCOSE SERPL-MCNC: 122 MG/DL (ref 70–99)
GLUCOSE SERPL-MCNC: 122 MG/DL (ref 70–99)
GLUCOSE SERPL-MCNC: 124 MG/DL (ref 70–99)
GLUCOSE SERPL-MCNC: 125 MG/DL (ref 70–99)
GLUCOSE SERPL-MCNC: 126 MG/DL (ref 70–99)
GLUCOSE SERPL-MCNC: 127 MG/DL (ref 70–99)
GLUCOSE SERPL-MCNC: 131 MG/DL (ref 70–99)
GLUCOSE SERPL-MCNC: 135 MG/DL (ref 70–99)
GLUCOSE SERPL-MCNC: 151 MG/DL (ref 70–99)
GLUCOSE SERPL-MCNC: 86 MG/DL (ref 70–99)
GLUCOSE SERPL-MCNC: 89 MG/DL (ref 70–99)
GLUCOSE SERPL-MCNC: 98 MG/DL (ref 70–99)
GLUCOSE UR STRIP-MCNC: NEGATIVE MG/DL
GRAM STN SPEC: NORMAL
HAPTOGLOB SERPL-MCNC: 174 MG/DL (ref 33–171)
HBA1C MFR BLD: 5.3 % (ref 0–5.6)
HCO3 BLDV-SCNC: 20 MMOL/L (ref 21–28)
HCO3 BLDV-SCNC: 23 MMOL/L (ref 21–28)
HCT VFR BLD AUTO: 26.5 % (ref 40–53)
HCT VFR BLD AUTO: 27 % (ref 40–53)
HCT VFR BLD AUTO: 27.1 % (ref 40–54)
HCT VFR BLD AUTO: 27.5 % (ref 40–54)
HCT VFR BLD AUTO: 27.6 % (ref 40–53)
HCT VFR BLD AUTO: 28 % (ref 40–54)
HCT VFR BLD AUTO: 28.3 % (ref 40–53)
HCT VFR BLD AUTO: 28.6 % (ref 40–54)
HCT VFR BLD AUTO: 28.7 % (ref 40–53)
HCT VFR BLD AUTO: 28.9 % (ref 40–53)
HCT VFR BLD AUTO: 30.1 % (ref 40–53)
HCT VFR BLD AUTO: 30.1 % (ref 40–54)
HCT VFR BLD AUTO: 30.3 % (ref 40–53)
HCT VFR BLD AUTO: 30.9 % (ref 40–53)
HCT VFR BLD AUTO: 31 % (ref 40–53)
HCT VFR BLD AUTO: 31.1 % (ref 40–53)
HCT VFR BLD AUTO: 31.3 % (ref 40–53)
HCT VFR BLD AUTO: 32.3 % (ref 40–53)
HCT VFR BLD AUTO: 32.7 % (ref 40–53)
HCT VFR BLD AUTO: 33.4 % (ref 40–53)
HCT VFR BLD AUTO: 34.5 % (ref 40–53)
HCT VFR BLD AUTO: 34.8 % (ref 40–53)
HCT VFR BLD AUTO: 35.3 % (ref 40–53)
HCT VFR BLD AUTO: 35.6 % (ref 40–53)
HCT VFR BLD AUTO: 35.7 % (ref 40–53)
HCT VFR BLD AUTO: 36.4 % (ref 40–53)
HCT VFR BLD AUTO: 37.2 % (ref 40–53)
HCT VFR BLD AUTO: 37.4 % (ref 40–53)
HCT VFR BLD AUTO: 40 % (ref 40–53)
HCT VFR BLD AUTO: 40.7 % (ref 40–53)
HCT VFR BLD CALC: 30 %PCV (ref 40–53)
HGB BLD CALC-MCNC: 10.2 G/DL (ref 13.3–17.7)
HGB BLD-MCNC: 10.2 G/DL (ref 13.3–17.7)
HGB BLD-MCNC: 10.3 G/DL (ref 13.3–17.7)
HGB BLD-MCNC: 10.6 G/DL (ref 13.3–17.7)
HGB BLD-MCNC: 10.7 G/DL (ref 13.3–17.7)
HGB BLD-MCNC: 11.7 G/DL (ref 13.3–17.7)
HGB BLD-MCNC: 11.8 G/DL (ref 13.3–17.7)
HGB BLD-MCNC: 11.9 G/DL (ref 13.3–17.7)
HGB BLD-MCNC: 12.1 G/DL (ref 13.3–17.7)
HGB BLD-MCNC: 12.3 G/DL (ref 13.3–17.7)
HGB BLD-MCNC: 12.4 G/DL (ref 13.3–17.7)
HGB BLD-MCNC: 13.3 G/DL (ref 13.3–17.7)
HGB BLD-MCNC: 13.5 G/DL (ref 13.3–17.7)
HGB BLD-MCNC: 7.9 G/DL (ref 13.3–17.7)
HGB BLD-MCNC: 7.9 G/DL (ref 14–18)
HGB BLD-MCNC: 8 G/DL (ref 13.3–17.7)
HGB BLD-MCNC: 8.1 G/DL (ref 13.3–17.7)
HGB BLD-MCNC: 8.3 G/DL (ref 13.3–17.7)
HGB BLD-MCNC: 8.5 G/DL (ref 13.3–17.7)
HGB BLD-MCNC: 8.5 G/DL (ref 14–18)
HGB BLD-MCNC: 8.6 G/DL (ref 14–18)
HGB BLD-MCNC: 8.7 G/DL (ref 14–18)
HGB BLD-MCNC: 8.8 G/DL (ref 13.3–17.7)
HGB BLD-MCNC: 8.9 G/DL (ref 13.3–17.7)
HGB BLD-MCNC: 9.1 G/DL (ref 13.3–17.7)
HGB BLD-MCNC: 9.2 G/DL (ref 13.3–17.7)
HGB BLD-MCNC: 9.4 G/DL (ref 13.3–17.7)
HGB BLD-MCNC: 9.4 G/DL (ref 13.3–17.7)
HGB BLD-MCNC: 9.5 G/DL (ref 13.3–17.7)
HGB BLD-MCNC: 9.5 G/DL (ref 14–18)
HGB BLD-MCNC: 9.8 G/DL (ref 13.3–17.7)
HGB BLD-MCNC: 9.9 G/DL (ref 13.3–17.7)
HGB BLD-MCNC: 9.9 G/DL (ref 13.3–17.7)
HGB UR QL STRIP: ABNORMAL
HGB UR QL STRIP: ABNORMAL
HGB UR QL STRIP: NEGATIVE
HYALINE CASTS #/AREA URNS LPF: 1 /LPF (ref 0–2)
IMM GRANULOCYTES # BLD: 0 10E9/L (ref 0–0.4)
IMM GRANULOCYTES # BLD: 0.1 10E9/L (ref 0–0.4)
IMM GRANULOCYTES NFR BLD: 0.1 %
IMM GRANULOCYTES NFR BLD: 0.3 %
IMM GRANULOCYTES NFR BLD: 0.4 %
IMM GRANULOCYTES NFR BLD: 0.5 %
IMM GRANULOCYTES NFR BLD: 0.6 %
IMM GRANULOCYTES NFR BLD: 0.7 %
IMM GRANULOCYTES NFR BLD: 0.9 %
IMM GRANULOCYTES NFR BLD: 0.9 %
IMM GRANULOCYTES NFR BLD: 1 %
IMM GRANULOCYTES NFR BLD: 1.1 %
IMM GRANULOCYTES NFR BLD: 1.5 %
IMM GRANULOCYTES NFR BLD: 1.6 %
INR PPP: 1.03 (ref 0.86–1.14)
INR PPP: 1.04 (ref 0.86–1.14)
INR PPP: 1.05 (ref 0.86–1.14)
INR PPP: 1.06 (ref 0.86–1.14)
INR PPP: 1.06 (ref 0.86–1.14)
INR PPP: 1.1 (ref 0.86–1.14)
INR PPP: 1.12 (ref 0.86–1.14)
INR PPP: 1.5 (ref 0.86–1.14)
INTERPRETATION ECG - MUSE: NORMAL
IRON SATN MFR SERPL: 10 % (ref 20–50)
IRON SATN MFR SERPL: 7 % (ref 15–46)
IRON SERPL-MCNC: 21 UG/DL (ref 35–180)
IRON SERPL-MCNC: 23 UG/DL (ref 42–175)
KETONES UR STRIP-MCNC: 10 MG/DL
KETONES UR STRIP-MCNC: 40 MG/DL
KETONES UR STRIP-MCNC: NEGATIVE MG/DL
LACTATE BLD-SCNC: 0.7 MMOL/L (ref 0.7–2)
LACTATE BLD-SCNC: 0.9 MMOL/L (ref 0.7–2)
LACTATE BLD-SCNC: 0.9 MMOL/L (ref 0.7–2)
LACTATE BLD-SCNC: 1 MMOL/L (ref 0.7–2)
LACTATE BLD-SCNC: 1.4 MMOL/L (ref 0.7–2)
LACTATE BLD-SCNC: 1.7 MMOL/L (ref 0.7–2)
LACTATE BLD-SCNC: 1.8 MMOL/L (ref 0.7–2)
LACTATE BLD-SCNC: 1.9 MMOL/L (ref 0.7–2)
LACTATE BLD-SCNC: 2 MMOL/L (ref 0.7–2)
LACTATE BLD-SCNC: 2.2 MMOL/L (ref 0.7–2)
LACTATE BLD-SCNC: 2.3 MMOL/L (ref 0.7–2)
LACTATE BLD-SCNC: 2.6 MMOL/L (ref 0.7–2)
LACTATE BLD-SCNC: 2.7 MMOL/L (ref 0.7–2)
LACTATE BLD-SCNC: 4.2 MMOL/L (ref 0.7–2)
LDH SERPL L TO P-CCNC: 456 U/L (ref 125–220)
LDH SERPL L TO P-CCNC: 715 U/L (ref 125–220)
LEUKOCYTE ESTERASE UR QL STRIP: NEGATIVE
LIPASE SERPL-CCNC: 34 U/L (ref 73–393)
LIPASE SERPL-CCNC: 43 U/L (ref 73–393)
LYMPHOCYTES # BLD AUTO: 0.2 10E9/L (ref 0.8–5.3)
LYMPHOCYTES # BLD AUTO: 0.4 10E9/L (ref 0.8–5.3)
LYMPHOCYTES # BLD AUTO: 0.5 10E9/L (ref 0.8–5.3)
LYMPHOCYTES # BLD AUTO: 0.6 10E9/L (ref 0.8–5.3)
LYMPHOCYTES # BLD AUTO: 0.6 10E9/L (ref 0.8–5.3)
LYMPHOCYTES # BLD AUTO: 0.7 10E9/L (ref 0.8–5.3)
LYMPHOCYTES # BLD AUTO: 0.7 THOU/UL (ref 0.8–4.4)
LYMPHOCYTES # BLD AUTO: 0.8 10E9/L (ref 0.8–5.3)
LYMPHOCYTES # BLD AUTO: 0.8 10E9/L (ref 0.8–5.3)
LYMPHOCYTES # BLD AUTO: 0.9 10E9/L (ref 0.8–5.3)
LYMPHOCYTES # BLD AUTO: 0.9 10E9/L (ref 0.8–5.3)
LYMPHOCYTES # BLD AUTO: 0.9 THOU/UL (ref 0.8–4.4)
LYMPHOCYTES # BLD AUTO: 1 10E9/L (ref 0.8–5.3)
LYMPHOCYTES # BLD AUTO: 1 10E9/L (ref 0.8–5.3)
LYMPHOCYTES # BLD AUTO: 1 THOU/UL (ref 0.8–4.4)
LYMPHOCYTES # BLD AUTO: 1.8 10E9/L (ref 0.8–5.3)
LYMPHOCYTES NFR BLD AUTO: 1.7 %
LYMPHOCYTES NFR BLD AUTO: 11.9 %
LYMPHOCYTES NFR BLD AUTO: 12 %
LYMPHOCYTES NFR BLD AUTO: 12.1 %
LYMPHOCYTES NFR BLD AUTO: 13 % (ref 20–40)
LYMPHOCYTES NFR BLD AUTO: 13.9 %
LYMPHOCYTES NFR BLD AUTO: 14.2 %
LYMPHOCYTES NFR BLD AUTO: 15 % (ref 20–40)
LYMPHOCYTES NFR BLD AUTO: 16.4 %
LYMPHOCYTES NFR BLD AUTO: 19.8 %
LYMPHOCYTES NFR BLD AUTO: 2.6 %
LYMPHOCYTES NFR BLD AUTO: 20 % (ref 20–40)
LYMPHOCYTES NFR BLD AUTO: 3.8 %
LYMPHOCYTES NFR BLD AUTO: 38.3 %
LYMPHOCYTES NFR BLD AUTO: 4.3 %
LYMPHOCYTES NFR BLD AUTO: 5.1 %
LYMPHOCYTES NFR BLD AUTO: 5.3 %
LYMPHOCYTES NFR BLD AUTO: 6.2 %
LYMPHOCYTES NFR BLD AUTO: 6.3 %
LYMPHOCYTES NFR BLD AUTO: 6.6 %
LYMPHOCYTES NFR BLD AUTO: 6.7 %
LYMPHOCYTES NFR BLD AUTO: 7.2 %
LYMPHOCYTES NFR BLD AUTO: 9.3 %
LYMPHOCYTES NFR BLD AUTO: 9.8 %
LYMPHOCYTES NFR FLD MANUAL: 14 %
LYMPHOCYTES NFR FLD MANUAL: 2 %
Lab: NORMAL
M TB IFN-G BLD-IMP: NEGATIVE
M TB IFN-G CD4+ BCKGRND COR BLD-ACNC: >10 IU/ML
MAGNESIUM SERPL-MCNC: 1.8 MG/DL (ref 1.8–2.6)
MAGNESIUM SERPL-MCNC: 1.9 MG/DL (ref 1.6–2.3)
MAGNESIUM SERPL-MCNC: 1.9 MG/DL (ref 1.6–2.3)
MAGNESIUM SERPL-MCNC: 2 MG/DL (ref 1.6–2.3)
MAGNESIUM SERPL-MCNC: 2.1 MG/DL (ref 1.6–2.3)
MAGNESIUM SERPL-MCNC: 2.2 MG/DL (ref 1.6–2.3)
MAGNESIUM SERPL-MCNC: 2.3 MG/DL (ref 1.6–2.3)
MAGNESIUM SERPL-MCNC: 2.3 MG/DL (ref 1.6–2.3)
MCH RBC QN AUTO: 24.4 PG (ref 26.5–33)
MCH RBC QN AUTO: 24.5 PG (ref 27–34)
MCH RBC QN AUTO: 24.6 PG (ref 26.5–33)
MCH RBC QN AUTO: 24.9 PG (ref 26.5–33)
MCH RBC QN AUTO: 25 PG (ref 26.5–33)
MCH RBC QN AUTO: 25.1 PG (ref 26.5–33)
MCH RBC QN AUTO: 25.1 PG (ref 26.5–33)
MCH RBC QN AUTO: 25.2 PG (ref 26.5–33)
MCH RBC QN AUTO: 25.4 PG (ref 26.5–33)
MCH RBC QN AUTO: 25.4 PG (ref 27–34)
MCH RBC QN AUTO: 25.5 PG (ref 26.5–33)
MCH RBC QN AUTO: 25.6 PG (ref 26.5–33)
MCH RBC QN AUTO: 25.6 PG (ref 26.5–33)
MCH RBC QN AUTO: 25.9 PG (ref 27–34)
MCH RBC QN AUTO: 26 PG (ref 27–34)
MCH RBC QN AUTO: 26.1 PG (ref 26.5–33)
MCH RBC QN AUTO: 26.2 PG (ref 27–34)
MCH RBC QN AUTO: 26.3 PG (ref 26.5–33)
MCH RBC QN AUTO: 26.4 PG (ref 26.5–33)
MCH RBC QN AUTO: 26.7 PG (ref 26.5–33)
MCH RBC QN AUTO: 27.6 PG (ref 26.5–33)
MCH RBC QN AUTO: 27.8 PG (ref 26.5–33)
MCH RBC QN AUTO: 29.3 PG (ref 26.5–33)
MCH RBC QN AUTO: 29.4 PG (ref 26.5–33)
MCH RBC QN AUTO: 29.4 PG (ref 26.5–33)
MCH RBC QN AUTO: 29.6 PG (ref 26.5–33)
MCH RBC QN AUTO: 29.7 PG (ref 26.5–33)
MCH RBC QN AUTO: 30.1 PG (ref 26.5–33)
MCHC RBC AUTO-ENTMCNC: 28.7 G/DL (ref 32–36)
MCHC RBC AUTO-ENTMCNC: 29 G/DL (ref 31.5–36.5)
MCHC RBC AUTO-ENTMCNC: 29.8 G/DL (ref 31.5–36.5)
MCHC RBC AUTO-ENTMCNC: 30 G/DL (ref 31.5–36.5)
MCHC RBC AUTO-ENTMCNC: 30 G/DL (ref 31.5–36.5)
MCHC RBC AUTO-ENTMCNC: 30.2 G/DL (ref 31.5–36.5)
MCHC RBC AUTO-ENTMCNC: 30.2 G/DL (ref 31.5–36.5)
MCHC RBC AUTO-ENTMCNC: 30.4 G/DL (ref 31.5–36.5)
MCHC RBC AUTO-ENTMCNC: 30.4 G/DL (ref 32–36)
MCHC RBC AUTO-ENTMCNC: 30.7 G/DL (ref 31.5–36.5)
MCHC RBC AUTO-ENTMCNC: 30.7 G/DL (ref 32–36)
MCHC RBC AUTO-ENTMCNC: 30.8 G/DL (ref 31.5–36.5)
MCHC RBC AUTO-ENTMCNC: 31 G/DL (ref 31.5–36.5)
MCHC RBC AUTO-ENTMCNC: 31 G/DL (ref 31.5–36.5)
MCHC RBC AUTO-ENTMCNC: 31.2 G/DL (ref 31.5–36.5)
MCHC RBC AUTO-ENTMCNC: 31.4 G/DL (ref 32–36)
MCHC RBC AUTO-ENTMCNC: 31.6 G/DL (ref 32–36)
MCHC RBC AUTO-ENTMCNC: 31.7 G/DL (ref 31.5–36.5)
MCHC RBC AUTO-ENTMCNC: 31.8 G/DL (ref 31.5–36.5)
MCHC RBC AUTO-ENTMCNC: 31.9 G/DL (ref 31.5–36.5)
MCHC RBC AUTO-ENTMCNC: 32.7 G/DL (ref 31.5–36.5)
MCHC RBC AUTO-ENTMCNC: 33.1 G/DL (ref 31.5–36.5)
MCHC RBC AUTO-ENTMCNC: 33.2 G/DL (ref 31.5–36.5)
MCHC RBC AUTO-ENTMCNC: 33.2 G/DL (ref 31.5–36.5)
MCHC RBC AUTO-ENTMCNC: 33.4 G/DL (ref 31.5–36.5)
MCHC RBC AUTO-ENTMCNC: 33.8 G/DL (ref 31.5–36.5)
MCV RBC AUTO: 80 FL (ref 78–100)
MCV RBC AUTO: 81 FL (ref 78–100)
MCV RBC AUTO: 82 FL (ref 78–100)
MCV RBC AUTO: 83 FL (ref 78–100)
MCV RBC AUTO: 83 FL (ref 80–100)
MCV RBC AUTO: 84 FL (ref 78–100)
MCV RBC AUTO: 84 FL (ref 78–100)
MCV RBC AUTO: 85 FL (ref 78–100)
MCV RBC AUTO: 85 FL (ref 80–100)
MCV RBC AUTO: 85 FL (ref 80–100)
MCV RBC AUTO: 86 FL (ref 78–100)
MCV RBC AUTO: 87 FL (ref 78–100)
MCV RBC AUTO: 87 FL (ref 78–100)
MCV RBC AUTO: 88 FL (ref 78–100)
MCV RBC AUTO: 89 FL (ref 78–100)
MCV RBC AUTO: 90 FL (ref 78–100)
MCV RBC AUTO: 91 FL (ref 78–100)
MCV RBC AUTO: 91 FL (ref 78–100)
METAMYELOCYTES # BLD: 0.2 10E9/L
METAMYELOCYTES NFR BLD MANUAL: 1.7 %
MITOGEN IGNF BCKGRD COR BLD-ACNC: 0 IU/ML
MITOGEN IGNF BCKGRD COR BLD-ACNC: 0 IU/ML
MONOCYTES # BLD AUTO: 0 10E9/L (ref 0–1.3)
MONOCYTES # BLD AUTO: 0.1 10E9/L (ref 0–1.3)
MONOCYTES # BLD AUTO: 0.2 10E9/L (ref 0–1.3)
MONOCYTES # BLD AUTO: 0.2 10E9/L (ref 0–1.3)
MONOCYTES # BLD AUTO: 0.3 10E9/L (ref 0–1.3)
MONOCYTES # BLD AUTO: 0.3 THOU/UL (ref 0–0.9)
MONOCYTES # BLD AUTO: 0.4 10E9/L (ref 0–1.3)
MONOCYTES # BLD AUTO: 0.5 10E9/L (ref 0–1.3)
MONOCYTES # BLD AUTO: 0.6 10E9/L (ref 0–1.3)
MONOCYTES # BLD AUTO: 0.6 THOU/UL (ref 0–0.9)
MONOCYTES # BLD AUTO: 0.7 THOU/UL (ref 0–0.9)
MONOCYTES # BLD AUTO: 0.8 10E9/L (ref 0–1.3)
MONOCYTES # BLD AUTO: 0.9 10E9/L (ref 0–1.3)
MONOCYTES # BLD AUTO: 0.9 10E9/L (ref 0–1.3)
MONOCYTES # BLD AUTO: 1.5 10E9/L (ref 0–1.3)
MONOCYTES NFR BLD AUTO: 0 %
MONOCYTES NFR BLD AUTO: 1.6 %
MONOCYTES NFR BLD AUTO: 10 %
MONOCYTES NFR BLD AUTO: 10 % (ref 2–10)
MONOCYTES NFR BLD AUTO: 10.1 %
MONOCYTES NFR BLD AUTO: 10.8 %
MONOCYTES NFR BLD AUTO: 13 % (ref 2–10)
MONOCYTES NFR BLD AUTO: 3.2 %
MONOCYTES NFR BLD AUTO: 3.7 %
MONOCYTES NFR BLD AUTO: 3.9 %
MONOCYTES NFR BLD AUTO: 4.6 %
MONOCYTES NFR BLD AUTO: 5.1 %
MONOCYTES NFR BLD AUTO: 5.1 %
MONOCYTES NFR BLD AUTO: 5.4 %
MONOCYTES NFR BLD AUTO: 5.7 %
MONOCYTES NFR BLD AUTO: 6 % (ref 2–10)
MONOCYTES NFR BLD AUTO: 6.2 %
MONOCYTES NFR BLD AUTO: 6.9 %
MONOCYTES NFR BLD AUTO: 7 %
MONOCYTES NFR BLD AUTO: 7.3 %
MONOCYTES NFR BLD AUTO: 7.5 %
MONOCYTES NFR BLD AUTO: 8 %
MONOCYTES NFR BLD AUTO: 9.5 %
MONOCYTES NFR BLD AUTO: 9.6 %
MRSA DNA SPEC QL NAA+PROBE: NEGATIVE
MUCOUS THREADS #/AREA URNS LPF: PRESENT /LPF
MYELOCYTES # BLD: 0.1 10E9/L
MYELOCYTES NFR BLD MANUAL: 0.9 %
NEUTROPHILS # BLD AUTO: 0.9 10E9/L (ref 1.6–8.3)
NEUTROPHILS # BLD AUTO: 10 10E9/L (ref 1.6–8.3)
NEUTROPHILS # BLD AUTO: 10.2 10E9/L (ref 1.6–8.3)
NEUTROPHILS # BLD AUTO: 10.4 10E9/L (ref 1.6–8.3)
NEUTROPHILS # BLD AUTO: 10.6 10E9/L (ref 1.6–8.3)
NEUTROPHILS # BLD AUTO: 10.9 10E9/L (ref 1.6–8.3)
NEUTROPHILS # BLD AUTO: 11.4 10E9/L (ref 1.6–8.3)
NEUTROPHILS # BLD AUTO: 12.4 10E9/L (ref 1.6–8.3)
NEUTROPHILS # BLD AUTO: 14.1 10E9/L (ref 1.6–8.3)
NEUTROPHILS # BLD AUTO: 14.1 10E9/L (ref 1.6–8.3)
NEUTROPHILS # BLD AUTO: 15.9 10E9/L (ref 1.6–8.3)
NEUTROPHILS # BLD AUTO: 2.6 10E9/L (ref 1.6–8.3)
NEUTROPHILS # BLD AUTO: 3.2 THOU/UL (ref 2–7.7)
NEUTROPHILS # BLD AUTO: 3.3 10E9/L (ref 1.6–8.3)
NEUTROPHILS # BLD AUTO: 3.4 10E9/L (ref 1.6–8.3)
NEUTROPHILS # BLD AUTO: 3.6 10E9/L (ref 1.6–8.3)
NEUTROPHILS # BLD AUTO: 3.7 THOU/UL (ref 2–7.7)
NEUTROPHILS # BLD AUTO: 4.1 10E9/L (ref 1.6–8.3)
NEUTROPHILS # BLD AUTO: 4.7 10E9/L (ref 1.6–8.3)
NEUTROPHILS # BLD AUTO: 5.1 THOU/UL (ref 2–7.7)
NEUTROPHILS # BLD AUTO: 7.3 10E9/L (ref 1.6–8.3)
NEUTROPHILS # BLD AUTO: 9.3 10E9/L (ref 1.6–8.3)
NEUTROPHILS # BLD AUTO: 9.3 10E9/L (ref 1.6–8.3)
NEUTROPHILS # BLD AUTO: 9.6 10E9/L (ref 1.6–8.3)
NEUTROPHILS NFR BLD AUTO: 47.3 %
NEUTROPHILS NFR BLD AUTO: 70 % (ref 50–70)
NEUTROPHILS NFR BLD AUTO: 70.7 %
NEUTROPHILS NFR BLD AUTO: 71 % (ref 50–70)
NEUTROPHILS NFR BLD AUTO: 73.6 %
NEUTROPHILS NFR BLD AUTO: 73.9 %
NEUTROPHILS NFR BLD AUTO: 74 % (ref 50–70)
NEUTROPHILS NFR BLD AUTO: 74.3 %
NEUTROPHILS NFR BLD AUTO: 75.7 %
NEUTROPHILS NFR BLD AUTO: 78.4 %
NEUTROPHILS NFR BLD AUTO: 81.5 %
NEUTROPHILS NFR BLD AUTO: 84.6 %
NEUTROPHILS NFR BLD AUTO: 85.3 %
NEUTROPHILS NFR BLD AUTO: 86 %
NEUTROPHILS NFR BLD AUTO: 86.2 %
NEUTROPHILS NFR BLD AUTO: 86.9 %
NEUTROPHILS NFR BLD AUTO: 88.1 %
NEUTROPHILS NFR BLD AUTO: 91.3 %
NEUTROPHILS NFR BLD AUTO: 91.6 %
NEUTROPHILS NFR BLD AUTO: 95 %
NEUTROPHILS NFR BLD AUTO: 95.7 %
NEUTS BAND NFR FLD MANUAL: 54 %
NEUTS BAND NFR FLD MANUAL: 94 %
NITRATE UR QL: NEGATIVE
NOROV GI+II ORF1-ORF2 JNC STL QL NAA+PR: NOT DETECTED
NRBC # BLD AUTO: 0 10*3/UL
NRBC BLD AUTO-RTO: 0 /100
NUM BPU REQUESTED: 1
O+P STL MICRO: NORMAL
O+P STL MICRO: NORMAL
O2/TOTAL GAS SETTING VFR VENT: ABNORMAL %
O2/TOTAL GAS SETTING VFR VENT: ABNORMAL %
OSMOLALITY SERPL: 257 MMOL/KG (ref 275–295)
OSMOLALITY SERPL: 258 MMOL/KG (ref 275–295)
OSMOLALITY SERPL: 283 MMOL/KG (ref 275–295)
OSMOLALITY UR: 104 MMOL/KG (ref 100–1200)
OSMOLALITY UR: 202 MMOL/KG (ref 100–1200)
OSMOLALITY UR: 371 MMOL/KG (ref 100–1200)
OTHER CELLS FLD MANUAL: 2 %
OTHER CELLS FLD MANUAL: 32 %
PCO2 BLDV: 35 MM HG (ref 40–50)
PCO2 BLDV: 35 MM HG (ref 40–50)
PCO2 BLDV: 37 MM HG (ref 40–50)
PH BLDV: 7.35 PH (ref 7.32–7.43)
PH BLDV: 7.4 PH (ref 7.32–7.43)
PH BLDV: 7.44 PH (ref 7.32–7.43)
PH UR STRIP: 5.5 PH (ref 5–7)
PH UR STRIP: 7 PH (ref 5–7)
PH UR STRIP: 7 PH (ref 5–7)
PH UR STRIP: 8 PH (ref 5–7)
PH UR STRIP: 8 PH (ref 5–7)
PHOSPHATE SERPL-MCNC: 2.2 MG/DL (ref 2.5–4.5)
PHOSPHATE SERPL-MCNC: 2.4 MG/DL (ref 2.5–4.5)
PHOSPHATE SERPL-MCNC: 2.8 MG/DL (ref 2.5–4.5)
PHOSPHATE SERPL-MCNC: 3.1 MG/DL (ref 2.5–4.5)
PHOSPHATE SERPL-MCNC: 3.3 MG/DL (ref 2.5–4.5)
PHOSPHATE SERPL-MCNC: 3.3 MG/DL (ref 2.5–4.5)
PHOSPHATE SERPL-MCNC: 3.5 MG/DL (ref 2.5–4.5)
PHOSPHATE SERPL-MCNC: 3.5 MG/DL (ref 2.5–4.5)
PHOSPHATE SERPL-MCNC: 3.6 MG/DL (ref 2.5–4.5)
PHOSPHATE SERPL-MCNC: 4 MG/DL (ref 2.5–4.5)
PHOSPHATE SERPL-MCNC: 4.1 MG/DL (ref 2.5–4.5)
PHOSPHATE SERPL-MCNC: 4.5 MG/DL (ref 2.5–4.5)
PHOSPHATE SERPL-MCNC: 5.2 MG/DL (ref 2.5–4.5)
PLATELET # BLD AUTO: 138 THOU/UL (ref 140–440)
PLATELET # BLD AUTO: 148 10E9/L (ref 150–450)
PLATELET # BLD AUTO: 174 10E9/L (ref 150–450)
PLATELET # BLD AUTO: 192 10E9/L (ref 150–450)
PLATELET # BLD AUTO: 195 10E9/L (ref 150–450)
PLATELET # BLD AUTO: 200 10E9/L (ref 150–450)
PLATELET # BLD AUTO: 202 10E9/L (ref 150–450)
PLATELET # BLD AUTO: 202 10E9/L (ref 150–450)
PLATELET # BLD AUTO: 203 10E9/L (ref 150–450)
PLATELET # BLD AUTO: 205 10E9/L (ref 150–450)
PLATELET # BLD AUTO: 214 10E9/L (ref 150–450)
PLATELET # BLD AUTO: 215 10E9/L (ref 150–450)
PLATELET # BLD AUTO: 216 10E9/L (ref 150–450)
PLATELET # BLD AUTO: 221 10E9/L (ref 150–450)
PLATELET # BLD AUTO: 225 THOU/UL (ref 140–440)
PLATELET # BLD AUTO: 228 10E9/L (ref 150–450)
PLATELET # BLD AUTO: 228 10E9/L (ref 150–450)
PLATELET # BLD AUTO: 229 10E9/L (ref 150–450)
PLATELET # BLD AUTO: 230 10E9/L (ref 150–450)
PLATELET # BLD AUTO: 245 10E9/L (ref 150–450)
PLATELET # BLD AUTO: 251 THOU/UL (ref 140–440)
PLATELET # BLD AUTO: 259 10E9/L (ref 150–450)
PLATELET # BLD AUTO: 260 10E9/L (ref 150–450)
PLATELET # BLD AUTO: 263 10E9/L (ref 150–450)
PLATELET # BLD AUTO: 274 10E9/L (ref 150–450)
PLATELET # BLD AUTO: 324 THOU/UL (ref 140–440)
PLATELET # BLD AUTO: 325 10E9/L (ref 150–450)
PLATELET # BLD AUTO: 389 THOU/UL (ref 140–440)
PLATELET # BLD AUTO: 425 10E9/L (ref 150–450)
PLATELET # BLD AUTO: 434 10E9/L (ref 150–450)
PLATELET # BLD AUTO: 489 10E9/L (ref 150–450)
PLATELET # BLD AUTO: 490 10E9/L (ref 150–450)
PLATELET # BLD AUTO: 510 10E9/L (ref 150–450)
PLATELET # BLD EST: ABNORMAL 10*3/UL
PMV BLD AUTO: 10.4 FL (ref 8.5–12.5)
PMV BLD AUTO: 10.8 FL (ref 8.5–12.5)
PMV BLD AUTO: 9.1 FL (ref 8.5–12.5)
PMV BLD AUTO: 9.2 FL (ref 8.5–12.5)
PMV BLD AUTO: 9.7 FL (ref 8.5–12.5)
PO2 BLDV: 28 MM HG (ref 25–47)
PO2 BLDV: 33 MM HG (ref 25–47)
PO2 BLDV: 65 MM HG (ref 25–47)
POIKILOCYTOSIS BLD QL SMEAR: SLIGHT
POTASSIUM BLD-SCNC: 3.5 MMOL/L (ref 3.5–5)
POTASSIUM BLD-SCNC: 3.9 MMOL/L (ref 3.4–5.3)
POTASSIUM BLD-SCNC: 3.9 MMOL/L (ref 3.5–5)
POTASSIUM BLD-SCNC: 4 MMOL/L (ref 3.5–5)
POTASSIUM BLD-SCNC: 4 MMOL/L (ref 3.5–5)
POTASSIUM BLD-SCNC: 4.1 MMOL/L (ref 3.5–5)
POTASSIUM BLD-SCNC: 4.7 MMOL/L (ref 3.5–5)
POTASSIUM SERPL-SCNC: 3.5 MMOL/L (ref 3.4–5.3)
POTASSIUM SERPL-SCNC: 3.6 MMOL/L (ref 3.4–5.3)
POTASSIUM SERPL-SCNC: 3.7 MMOL/L (ref 3.4–5.3)
POTASSIUM SERPL-SCNC: 3.7 MMOL/L (ref 3.4–5.3)
POTASSIUM SERPL-SCNC: 3.8 MMOL/L (ref 3.4–5.3)
POTASSIUM SERPL-SCNC: 4 MMOL/L (ref 3.4–5.3)
POTASSIUM SERPL-SCNC: 4.1 MMOL/L (ref 3.4–5.3)
POTASSIUM SERPL-SCNC: 4.1 MMOL/L (ref 3.4–5.3)
POTASSIUM SERPL-SCNC: 4.2 MMOL/L (ref 3.4–5.3)
POTASSIUM SERPL-SCNC: 4.3 MMOL/L (ref 3.4–5.3)
POTASSIUM SERPL-SCNC: 4.3 MMOL/L (ref 3.4–5.3)
POTASSIUM SERPL-SCNC: 4.4 MMOL/L (ref 3.4–5.3)
POTASSIUM SERPL-SCNC: 4.4 MMOL/L (ref 3.4–5.3)
POTASSIUM SERPL-SCNC: 4.5 MMOL/L (ref 3.4–5.3)
PROCALCITONIN SERPL-MCNC: 0.47 NG/ML
PROCALCITONIN SERPL-MCNC: <0.05 NG/ML
PROLACTIN SERPL-MCNC: 25 UG/L (ref 2–18)
PROT SERPL-MCNC: 5.2 G/DL (ref 6.8–8.8)
PROT SERPL-MCNC: 5.2 G/DL (ref 6.8–8.8)
PROT SERPL-MCNC: 5.3 G/DL (ref 6–8)
PROT SERPL-MCNC: 5.4 G/DL (ref 6–8)
PROT SERPL-MCNC: 5.7 G/DL (ref 6.8–8.8)
PROT SERPL-MCNC: 5.8 G/DL (ref 6.8–8.8)
PROT SERPL-MCNC: 5.9 G/DL (ref 6.8–8.8)
PROT SERPL-MCNC: 6.1 G/DL (ref 6–8)
PROT SERPL-MCNC: 6.3 G/DL (ref 6.8–8.8)
PROT SERPL-MCNC: 6.5 G/DL (ref 6.8–8.8)
PROT SERPL-MCNC: 6.7 G/DL (ref 6.8–8.8)
PROT SERPL-MCNC: 6.7 G/DL (ref 6.8–8.8)
PROT SERPL-MCNC: 6.7 G/DL (ref 6–8)
PROT SERPL-MCNC: 6.8 G/DL (ref 6.8–8.8)
PROT SERPL-MCNC: 6.9 G/DL (ref 6.8–8.8)
PROT SERPL-MCNC: 7 G/DL (ref 6.8–8.8)
PROT SERPL-MCNC: 7.1 G/DL (ref 6.8–8.8)
PROT SERPL-MCNC: 7.1 G/DL (ref 6.8–8.8)
PROT SERPL-MCNC: 7.2 G/DL (ref 6.8–8.8)
PROT SERPL-MCNC: 7.4 G/DL (ref 6.8–8.8)
RADIOLOGIST FLAGS: ABNORMAL
RBC # BLD AUTO: 3.08 10E12/L (ref 4.4–5.9)
RBC # BLD AUTO: 3.2 10E12/L (ref 4.4–5.9)
RBC # BLD AUTO: 3.23 MILL/UL (ref 4.4–6.2)
RBC # BLD AUTO: 3.27 MILL/UL (ref 4.4–6.2)
RBC # BLD AUTO: 3.29 10E12/L (ref 4.4–5.9)
RBC # BLD AUTO: 3.36 MILL/UL (ref 4.4–6.2)
RBC # BLD AUTO: 3.37 10E12/L (ref 4.4–5.9)
RBC # BLD AUTO: 3.39 MILL/UL (ref 4.4–6.2)
RBC # BLD AUTO: 3.4 10E12/L (ref 4.4–5.9)
RBC # BLD AUTO: 3.52 10E12/L (ref 4.4–5.9)
RBC # BLD AUTO: 3.55 10E12/L (ref 4.4–5.9)
RBC # BLD AUTO: 3.6 10E12/L (ref 4.4–5.9)
RBC # BLD AUTO: 3.62 MILL/UL (ref 4.4–6.2)
RBC # BLD AUTO: 3.68 10E12/L (ref 4.4–5.9)
RBC # BLD AUTO: 3.73 10E12/L (ref 4.4–5.9)
RBC # BLD AUTO: 3.75 10E12/L (ref 4.4–5.9)
RBC # BLD AUTO: 3.78 10E12/L (ref 4.4–5.9)
RBC # BLD AUTO: 3.9 10E12/L (ref 4.4–5.9)
RBC # BLD AUTO: 3.91 10E12/L (ref 4.4–5.9)
RBC # BLD AUTO: 3.98 10E12/L (ref 4.4–5.9)
RBC # BLD AUTO: 3.98 10E12/L (ref 4.4–5.9)
RBC # BLD AUTO: 4.01 10E12/L (ref 4.4–5.9)
RBC # BLD AUTO: 4.02 10E12/L (ref 4.4–5.9)
RBC # BLD AUTO: 4.02 10E12/L (ref 4.4–5.9)
RBC # BLD AUTO: 4.03 10E12/L (ref 4.4–5.9)
RBC # BLD AUTO: 4.13 10E12/L (ref 4.4–5.9)
RBC # BLD AUTO: 4.19 10E12/L (ref 4.4–5.9)
RBC # BLD AUTO: 4.19 10E12/L (ref 4.4–5.9)
RBC # BLD AUTO: 4.48 10E12/L (ref 4.4–5.9)
RBC # BLD AUTO: 4.61 10E12/L (ref 4.4–5.9)
RBC #/AREA URNS AUTO: 1 /HPF (ref 0–2)
RBC #/AREA URNS AUTO: 1 /HPF (ref 0–2)
RBC #/AREA URNS AUTO: 10 /HPF (ref 0–2)
RBC #/AREA URNS AUTO: 2 /HPF (ref 0–2)
RBC #/AREA URNS AUTO: 20 /HPF (ref 0–2)
RENIN PLAS-CCNC: 2.6 NG/ML/HR
RETICS # AUTO: 0.12 MILL/UL (ref 0.01–0.11)
RETICS # AUTO: 146.8 10E9/L (ref 25–95)
RETICS/RBC NFR AUTO: 3.7 % (ref 0.5–2)
RETICS/RBC NFR AUTO: 3.87 % (ref 0.8–2.7)
RVA NSP5 STL QL NAA+PROBE: NOT DETECTED
SALMONELLA SP RPOD STL QL NAA+PROBE: NOT DETECTED
SAO2 % BLDV FROM PO2: 93 %
SARS-COV-2 PCR COMMENT: NORMAL
SARS-COV-2 RNA SPEC QL NAA+PROBE: NEGATIVE
SARS-COV-2 RNA SPEC QL NAA+PROBE: NOT DETECTED
SARS-COV-2 RNA SPEC QL NAA+PROBE: NOT DETECTED
SARS-COV-2 VIRUS SPECIMEN SOURCE: NORMAL
SHIGELLA SP+EIEC IPAH STL QL NAA+PROBE: NOT DETECTED
SODIUM BLD-SCNC: 128 MMOL/L (ref 133–144)
SODIUM SERPL-SCNC: 122 MMOL/L (ref 133–144)
SODIUM SERPL-SCNC: 124 MMOL/L (ref 133–144)
SODIUM SERPL-SCNC: 124 MMOL/L (ref 133–144)
SODIUM SERPL-SCNC: 125 MMOL/L (ref 133–144)
SODIUM SERPL-SCNC: 125 MMOL/L (ref 133–144)
SODIUM SERPL-SCNC: 125 MMOL/L (ref 136–145)
SODIUM SERPL-SCNC: 126 MMOL/L (ref 133–144)
SODIUM SERPL-SCNC: 126 MMOL/L (ref 136–145)
SODIUM SERPL-SCNC: 127 MMOL/L (ref 133–144)
SODIUM SERPL-SCNC: 128 MMOL/L (ref 133–144)
SODIUM SERPL-SCNC: 129 MMOL/L (ref 133–144)
SODIUM SERPL-SCNC: 129 MMOL/L (ref 133–144)
SODIUM SERPL-SCNC: 130 MMOL/L (ref 133–144)
SODIUM SERPL-SCNC: 131 MMOL/L (ref 133–144)
SODIUM SERPL-SCNC: 131 MMOL/L (ref 133–144)
SODIUM SERPL-SCNC: 131 MMOL/L (ref 136–145)
SODIUM SERPL-SCNC: 132 MMOL/L (ref 133–144)
SODIUM SERPL-SCNC: 133 MMOL/L (ref 133–144)
SODIUM SERPL-SCNC: 134 MMOL/L (ref 133–144)
SODIUM SERPL-SCNC: 134 MMOL/L (ref 133–144)
SODIUM SERPL-SCNC: 134 MMOL/L (ref 136–145)
SODIUM SERPL-SCNC: 135 MMOL/L (ref 133–144)
SODIUM SERPL-SCNC: 136 MMOL/L (ref 133–144)
SODIUM SERPL-SCNC: 136 MMOL/L (ref 133–144)
SODIUM SERPL-SCNC: 136 MMOL/L (ref 136–145)
SODIUM SERPL-SCNC: 137 MMOL/L (ref 133–144)
SODIUM SERPL-SCNC: 138 MMOL/L (ref 136–145)
SODIUM SERPL-SCNC: 139 MMOL/L (ref 133–144)
SODIUM SERPL-SCNC: 139 MMOL/L (ref 133–144)
SODIUM UR-SCNC: 34 MMOL/L
SODIUM UR-SCNC: 39 MMOL/L
SODIUM UR-SCNC: 56 MMOL/L
SODIUM UR-SCNC: 92 MMOL/L
SOURCE: ABNORMAL
SP GR UR STRIP: 1 (ref 1–1.03)
SP GR UR STRIP: 1.01 (ref 1–1.03)
SP GR UR STRIP: 1.02 (ref 1–1.03)
SP GR UR STRIP: 1.02 (ref 1–1.03)
SP GR UR STRIP: 1.03 (ref 1–1.03)
SPECIMEN EXP DATE BLD: NORMAL
SPECIMEN EXP DATE BLD: NORMAL
SPECIMEN SOURCE FLD: NORMAL
SPECIMEN SOURCE FLD: NORMAL
SPECIMEN SOURCE: NORMAL
T4 FREE SERPL-MCNC: 1.3 NG/DL (ref 0.76–1.46)
T4 FREE SERPL-MCNC: 1.61 NG/DL (ref 0.76–1.46)
TIBC SERPL-MCNC: 237 UG/DL (ref 313–563)
TIBC SERPL-MCNC: 315 UG/DL (ref 240–430)
TRANS CELLS #/AREA URNS HPF: <1 /HPF (ref 0–1)
TRANSFERRIN SERPL-MCNC: 190 MG/DL (ref 212–360)
TRANSFUSION STATUS PATIENT QL: NORMAL
TRANSFUSION STATUS PATIENT QL: NORMAL
TROPONIN I SERPL-MCNC: 0.02 UG/L (ref 0–0.04)
TROPONIN I SERPL-MCNC: 0.03 UG/L (ref 0–0.04)
TROPONIN I SERPL-MCNC: 0.03 UG/L (ref 0–0.04)
TROPONIN I SERPL-MCNC: 0.07 UG/L (ref 0–0.04)
TROPONIN I SERPL-MCNC: <0.015 UG/L (ref 0–0.04)
TSH SERPL DL<=0.005 MIU/L-ACNC: 0.28 MU/L (ref 0.4–4)
TSH SERPL DL<=0.005 MIU/L-ACNC: 0.51 MU/L (ref 0.4–4)
TSH SERPL DL<=0.005 MIU/L-ACNC: 0.52 MU/L (ref 0.4–4)
TSH SERPL DL<=0.005 MIU/L-ACNC: 0.64 MU/L (ref 0.4–4)
TSH SERPL DL<=0.005 MIU/L-ACNC: 1.47 MU/L (ref 0.4–4)
TSH SERPL DL<=0.005 MIU/L-ACNC: 1.54 MU/L (ref 0.4–4)
TSH SERPL DL<=0.005 MIU/L-ACNC: 1.62 MU/L (ref 0.4–4)
TSH SERPL DL<=0.005 MIU/L-ACNC: 1.68 MU/L (ref 0.4–4)
UROBILINOGEN UR STRIP-MCNC: NORMAL MG/DL (ref 0–2)
V CHOL+PARA RFBL+TRKH+TNAA STL QL NAA+PR: NOT DETECTED
VIT B12 SERPL-MCNC: 1227 PG/ML (ref 213–816)
VIT B12 SERPL-MCNC: 987 PG/ML (ref 193–986)
WBC # BLD AUTO: 1.9 10E9/L (ref 4–11)
WBC # BLD AUTO: 11 10E9/L (ref 4–11)
WBC # BLD AUTO: 11 10E9/L (ref 4–11)
WBC # BLD AUTO: 11.2 10E9/L (ref 4–11)
WBC # BLD AUTO: 11.5 10E9/L (ref 4–11)
WBC # BLD AUTO: 12.2 10E9/L (ref 4–11)
WBC # BLD AUTO: 12.3 10E9/L (ref 4–11)
WBC # BLD AUTO: 12.5 10E9/L (ref 4–11)
WBC # BLD AUTO: 12.5 10E9/L (ref 4–11)
WBC # BLD AUTO: 12.8 10E9/L (ref 4–11)
WBC # BLD AUTO: 13 10E9/L (ref 4–11)
WBC # BLD AUTO: 14.8 10E9/L (ref 4–11)
WBC # BLD AUTO: 15.5 10E9/L (ref 4–11)
WBC # BLD AUTO: 18.5 10E9/L (ref 4–11)
WBC # BLD AUTO: 3.4 10E9/L (ref 4–11)
WBC # BLD AUTO: 4.2 10E9/L (ref 4–11)
WBC # BLD AUTO: 4.6 10E9/L (ref 4–11)
WBC # BLD AUTO: 4.6 10E9/L (ref 4–11)
WBC # BLD AUTO: 4.8 10E9/L (ref 4–11)
WBC # BLD AUTO: 5.1 10E9/L (ref 4–11)
WBC # BLD AUTO: 5.4 10E9/L (ref 4–11)
WBC # BLD AUTO: 5.8 10E9/L (ref 4–11)
WBC # BLD AUTO: 6.5 10E9/L (ref 4–11)
WBC # BLD AUTO: 6.9 10E9/L (ref 4–11)
WBC # BLD AUTO: 8.6 10E9/L (ref 4–11)
WBC # FLD AUTO: 2226 /UL
WBC # FLD AUTO: 626 /UL
WBC #/AREA URNS AUTO: 0 /HPF (ref 0–5)
WBC #/AREA URNS AUTO: 0 /HPF (ref 0–5)
WBC #/AREA URNS AUTO: 2 /HPF (ref 0–5)
WBC #/AREA URNS AUTO: 2 /HPF (ref 0–5)
WBC #/AREA URNS AUTO: 3 /HPF (ref 0–5)
WBC: 2.6 THOU/UL (ref 4–11)
WBC: 4.5 THOU/UL (ref 4–11)
WBC: 4.9 THOU/UL (ref 4–11)
WBC: 5.2 THOU/UL (ref 4–11)
WBC: 6.9 THOU/UL (ref 4–11)
Y ENTERO RECN STL QL NAA+PROBE: NOT DETECTED

## 2020-01-01 PROCEDURE — 25000132 ZZH RX MED GY IP 250 OP 250 PS 637: Performed by: STUDENT IN AN ORGANIZED HEALTH CARE EDUCATION/TRAINING PROGRAM

## 2020-01-01 PROCEDURE — 88112 CYTOPATH CELL ENHANCE TECH: CPT | Performed by: INTERNAL MEDICINE

## 2020-01-01 PROCEDURE — 85730 THROMBOPLASTIN TIME PARTIAL: CPT | Performed by: NEUROLOGICAL SURGERY

## 2020-01-01 PROCEDURE — U0003 INFECTIOUS AGENT DETECTION BY NUCLEIC ACID (DNA OR RNA); SEVERE ACUTE RESPIRATORY SYNDROME CORONAVIRUS 2 (SARS-COV-2) (CORONAVIRUS DISEASE [COVID-19]), AMPLIFIED PROBE TECHNIQUE, MAKING USE OF HIGH THROUGHPUT TECHNOLOGIES AS DESCRIBED BY CMS-2020-01-R: HCPCS | Performed by: EMERGENCY MEDICINE

## 2020-01-01 PROCEDURE — 25800030 ZZH RX IP 258 OP 636: Performed by: INTERNAL MEDICINE

## 2020-01-01 PROCEDURE — 25800030 ZZH RX IP 258 OP 636: Performed by: NURSE PRACTITIONER

## 2020-01-01 PROCEDURE — 25000128 H RX IP 250 OP 636: Performed by: NEUROLOGICAL SURGERY

## 2020-01-01 PROCEDURE — 25000128 H RX IP 250 OP 636: Performed by: INTERNAL MEDICINE

## 2020-01-01 PROCEDURE — 70553 MRI BRAIN STEM W/O & W/DYE: CPT

## 2020-01-01 PROCEDURE — 86850 RBC ANTIBODY SCREEN: CPT | Performed by: STUDENT IN AN ORGANIZED HEALTH CARE EDUCATION/TRAINING PROGRAM

## 2020-01-01 PROCEDURE — 36415 COLL VENOUS BLD VENIPUNCTURE: CPT | Performed by: STUDENT IN AN ORGANIZED HEALTH CARE EDUCATION/TRAINING PROGRAM

## 2020-01-01 PROCEDURE — 99223 1ST HOSP IP/OBS HIGH 75: CPT | Mod: AI | Performed by: INTERNAL MEDICINE

## 2020-01-01 PROCEDURE — 87070 CULTURE OTHR SPECIMN AEROBIC: CPT | Performed by: INTERNAL MEDICINE

## 2020-01-01 PROCEDURE — 84295 ASSAY OF SERUM SODIUM: CPT

## 2020-01-01 PROCEDURE — 25000128 H RX IP 250 OP 636: Performed by: NURSE PRACTITIONER

## 2020-01-01 PROCEDURE — 97116 GAIT TRAINING THERAPY: CPT | Mod: GP

## 2020-01-01 PROCEDURE — 12000001 ZZH R&B MED SURG/OB UMMC

## 2020-01-01 PROCEDURE — 99207 ZZC CDG-MDM COMPONENT: MEETS LOW - DOWN CODED: CPT | Performed by: INTERNAL MEDICINE

## 2020-01-01 PROCEDURE — 77373 STRTCTC BDY RAD THER TX DLVR: CPT | Performed by: RADIOLOGY

## 2020-01-01 PROCEDURE — 25000565 ZZH ISOFLURANE, EA 15 MIN: Performed by: NEUROLOGICAL SURGERY

## 2020-01-01 PROCEDURE — 25000132 ZZH RX MED GY IP 250 OP 250 PS 637: Performed by: PHYSICIAN ASSISTANT

## 2020-01-01 PROCEDURE — 25800030 ZZH RX IP 258 OP 636: Performed by: STUDENT IN AN ORGANIZED HEALTH CARE EDUCATION/TRAINING PROGRAM

## 2020-01-01 PROCEDURE — 77370 RADIATION PHYSICS CONSULT: CPT | Performed by: RADIOLOGY

## 2020-01-01 PROCEDURE — 82565 ASSAY OF CREATININE: CPT | Performed by: INTERNAL MEDICINE

## 2020-01-01 PROCEDURE — 97535 SELF CARE MNGMENT TRAINING: CPT | Mod: GO | Performed by: OCCUPATIONAL THERAPIST

## 2020-01-01 PROCEDURE — 40000275 ZZH STATISTIC RCP TIME EA 10 MIN

## 2020-01-01 PROCEDURE — 99285 EMERGENCY DEPT VISIT HI MDM: CPT | Mod: 25 | Performed by: EMERGENCY MEDICINE

## 2020-01-01 PROCEDURE — 36415 COLL VENOUS BLD VENIPUNCTURE: CPT | Performed by: INTERNAL MEDICINE

## 2020-01-01 PROCEDURE — 40001009 ZZH VIDEO/TELEPHONE VISIT; NO CHARGE

## 2020-01-01 PROCEDURE — 93321 DOPPLER ECHO F-UP/LMTD STD: CPT | Mod: 26 | Performed by: INTERNAL MEDICINE

## 2020-01-01 PROCEDURE — 88341 IMHCHEM/IMCYTCHM EA ADD ANTB: CPT | Performed by: INTERNAL MEDICINE

## 2020-01-01 PROCEDURE — 96377 APPLICATON ON-BODY INJECTOR: CPT | Mod: 59

## 2020-01-01 PROCEDURE — 36415 COLL VENOUS BLD VENIPUNCTURE: CPT | Performed by: PHYSICIAN ASSISTANT

## 2020-01-01 PROCEDURE — 83605 ASSAY OF LACTIC ACID: CPT | Performed by: EMERGENCY MEDICINE

## 2020-01-01 PROCEDURE — 12000004 ZZH R&B IMCU UMMC

## 2020-01-01 PROCEDURE — 25000132 ZZH RX MED GY IP 250 OP 250 PS 637: Performed by: HOSPITALIST

## 2020-01-01 PROCEDURE — 99153 MOD SED SAME PHYS/QHP EA: CPT | Performed by: INTERNAL MEDICINE

## 2020-01-01 PROCEDURE — 40000986 XR CHEST PORT 1 VW

## 2020-01-01 PROCEDURE — 70552 MRI BRAIN STEM W/DYE: CPT

## 2020-01-01 PROCEDURE — 25000131 ZZH RX MED GY IP 250 OP 636 PS 637: Performed by: STUDENT IN AN ORGANIZED HEALTH CARE EDUCATION/TRAINING PROGRAM

## 2020-01-01 PROCEDURE — 85049 AUTOMATED PLATELET COUNT: CPT | Performed by: PHYSICIAN ASSISTANT

## 2020-01-01 PROCEDURE — 00000155 ZZHCL STATISTIC H-CELL BLOCK W/STAIN: Performed by: STUDENT IN AN ORGANIZED HEALTH CARE EDUCATION/TRAINING PROGRAM

## 2020-01-01 PROCEDURE — 85025 COMPLETE CBC W/AUTO DIFF WBC: CPT | Performed by: INTERNAL MEDICINE

## 2020-01-01 PROCEDURE — 37000008 ZZH ANESTHESIA TECHNICAL FEE, 1ST 30 MIN: Performed by: THORACIC SURGERY (CARDIOTHORACIC VASCULAR SURGERY)

## 2020-01-01 PROCEDURE — 40000114 ZZH STATISTIC NO CHARGE CLINIC VISIT

## 2020-01-01 PROCEDURE — 97535 SELF CARE MNGMENT TRAINING: CPT | Mod: GO | Performed by: STUDENT IN AN ORGANIZED HEALTH CARE EDUCATION/TRAINING PROGRAM

## 2020-01-01 PROCEDURE — 71045 X-RAY EXAM CHEST 1 VIEW: CPT

## 2020-01-01 PROCEDURE — 12000022 ZZH R&B SNF

## 2020-01-01 PROCEDURE — 80053 COMPREHEN METABOLIC PANEL: CPT | Performed by: EMERGENCY MEDICINE

## 2020-01-01 PROCEDURE — 84300 ASSAY OF URINE SODIUM: CPT | Performed by: STUDENT IN AN ORGANIZED HEALTH CARE EDUCATION/TRAINING PROGRAM

## 2020-01-01 PROCEDURE — 77290 THER RAD SIMULAJ FIELD CPLX: CPT | Performed by: RADIOLOGY

## 2020-01-01 PROCEDURE — 97112 NEUROMUSCULAR REEDUCATION: CPT | Mod: GO | Performed by: OCCUPATIONAL THERAPIST

## 2020-01-01 PROCEDURE — 99239 HOSP IP/OBS DSCHRG MGMT >30: CPT | Performed by: INTERNAL MEDICINE

## 2020-01-01 PROCEDURE — 97110 THERAPEUTIC EXERCISES: CPT | Mod: GP | Performed by: PHYSICAL THERAPIST

## 2020-01-01 PROCEDURE — 99233 SBSQ HOSP IP/OBS HIGH 50: CPT | Performed by: INTERNAL MEDICINE

## 2020-01-01 PROCEDURE — 97530 THERAPEUTIC ACTIVITIES: CPT | Mod: GP

## 2020-01-01 PROCEDURE — 97535 SELF CARE MNGMENT TRAINING: CPT | Mod: GO

## 2020-01-01 PROCEDURE — 25800030 ZZH RX IP 258 OP 636: Performed by: EMERGENCY MEDICINE

## 2020-01-01 PROCEDURE — 27210794 ZZH OR GENERAL SUPPLY STERILE: Performed by: NEUROLOGICAL SURGERY

## 2020-01-01 PROCEDURE — 85025 COMPLETE CBC W/AUTO DIFF WBC: CPT | Performed by: EMERGENCY MEDICINE

## 2020-01-01 PROCEDURE — 80053 COMPREHEN METABOLIC PANEL: CPT | Performed by: INTERNAL MEDICINE

## 2020-01-01 PROCEDURE — 37000008 ZZH ANESTHESIA TECHNICAL FEE, 1ST 30 MIN: Performed by: NEUROLOGICAL SURGERY

## 2020-01-01 PROCEDURE — 25000131 ZZH RX MED GY IP 250 OP 636 PS 637: Performed by: PHYSICIAN ASSISTANT

## 2020-01-01 PROCEDURE — 85610 PROTHROMBIN TIME: CPT | Performed by: NEUROLOGICAL SURGERY

## 2020-01-01 PROCEDURE — 84439 ASSAY OF FREE THYROXINE: CPT | Performed by: EMERGENCY MEDICINE

## 2020-01-01 PROCEDURE — 80048 BASIC METABOLIC PNL TOTAL CA: CPT | Performed by: INTERNAL MEDICINE

## 2020-01-01 PROCEDURE — 97116 GAIT TRAINING THERAPY: CPT | Mod: GP | Performed by: PHYSICAL THERAPIST

## 2020-01-01 PROCEDURE — 99215 OFFICE O/P EST HI 40 MIN: CPT | Mod: 95 | Performed by: INTERNAL MEDICINE

## 2020-01-01 PROCEDURE — 70450 CT HEAD/BRAIN W/O DYE: CPT

## 2020-01-01 PROCEDURE — C1769 GUIDE WIRE: HCPCS

## 2020-01-01 PROCEDURE — 86140 C-REACTIVE PROTEIN: CPT | Performed by: STUDENT IN AN ORGANIZED HEALTH CARE EDUCATION/TRAINING PROGRAM

## 2020-01-01 PROCEDURE — 99233 SBSQ HOSP IP/OBS HIGH 50: CPT | Mod: GC | Performed by: INTERNAL MEDICINE

## 2020-01-01 PROCEDURE — 25000128 H RX IP 250 OP 636: Performed by: NURSE ANESTHETIST, CERTIFIED REGISTERED

## 2020-01-01 PROCEDURE — 80053 COMPREHEN METABOLIC PANEL: CPT | Performed by: HOSPITALIST

## 2020-01-01 PROCEDURE — 96366 THER/PROPH/DIAG IV INF ADDON: CPT | Performed by: EMERGENCY MEDICINE

## 2020-01-01 PROCEDURE — 85610 PROTHROMBIN TIME: CPT | Performed by: INTERNAL MEDICINE

## 2020-01-01 PROCEDURE — 99215 OFFICE O/P EST HI 40 MIN: CPT | Mod: 95 | Performed by: NURSE PRACTITIONER

## 2020-01-01 PROCEDURE — 40000014 ZZH STATISTIC ARTERIAL MONITORING DAILY

## 2020-01-01 PROCEDURE — 99221 1ST HOSP IP/OBS SF/LOW 40: CPT | Mod: GC | Performed by: INTERNAL MEDICINE

## 2020-01-01 PROCEDURE — 82803 BLOOD GASES ANY COMBINATION: CPT | Performed by: EMERGENCY MEDICINE

## 2020-01-01 PROCEDURE — 00000146 ZZHCL STATISTIC GLUCOSE BY METER IP

## 2020-01-01 PROCEDURE — 85027 COMPLETE CBC AUTOMATED: CPT | Performed by: STUDENT IN AN ORGANIZED HEALTH CARE EDUCATION/TRAINING PROGRAM

## 2020-01-01 PROCEDURE — 97166 OT EVAL MOD COMPLEX 45 MIN: CPT | Mod: GO | Performed by: OCCUPATIONAL THERAPIST

## 2020-01-01 PROCEDURE — 84484 ASSAY OF TROPONIN QUANT: CPT | Performed by: EMERGENCY MEDICINE

## 2020-01-01 PROCEDURE — 36415 COLL VENOUS BLD VENIPUNCTURE: CPT | Performed by: NEUROLOGICAL SURGERY

## 2020-01-01 PROCEDURE — 85025 COMPLETE CBC W/AUTO DIFF WBC: CPT | Performed by: STUDENT IN AN ORGANIZED HEALTH CARE EDUCATION/TRAINING PROGRAM

## 2020-01-01 PROCEDURE — 71000015 ZZH RECOVERY PHASE 1 LEVEL 2 EA ADDTL HR: Performed by: THORACIC SURGERY (CARDIOTHORACIC VASCULAR SURGERY)

## 2020-01-01 PROCEDURE — 86850 RBC ANTIBODY SCREEN: CPT | Performed by: ANESTHESIOLOGY

## 2020-01-01 PROCEDURE — 97530 THERAPEUTIC ACTIVITIES: CPT | Mod: GO

## 2020-01-01 PROCEDURE — 27211024 ZZHC OR SUPPLY OTHER OPNP: Performed by: NEUROLOGICAL SURGERY

## 2020-01-01 PROCEDURE — 97112 NEUROMUSCULAR REEDUCATION: CPT | Mod: GP

## 2020-01-01 PROCEDURE — 82803 BLOOD GASES ANY COMBINATION: CPT

## 2020-01-01 PROCEDURE — 36415 COLL VENOUS BLD VENIPUNCTURE: CPT | Performed by: HOSPITALIST

## 2020-01-01 PROCEDURE — C1763 CONN TISS, NON-HUMAN: HCPCS | Performed by: NEUROLOGICAL SURGERY

## 2020-01-01 PROCEDURE — 97112 NEUROMUSCULAR REEDUCATION: CPT | Mod: GO | Performed by: STUDENT IN AN ORGANIZED HEALTH CARE EDUCATION/TRAINING PROGRAM

## 2020-01-01 PROCEDURE — 83735 ASSAY OF MAGNESIUM: CPT | Performed by: INTERNAL MEDICINE

## 2020-01-01 PROCEDURE — 88342 IMHCHEM/IMCYTCHM 1ST ANTB: CPT | Performed by: INTERNAL MEDICINE

## 2020-01-01 PROCEDURE — 36000062 ZZH SURGERY LEVEL 4 1ST 30 MIN - UMMC: Performed by: THORACIC SURGERY (CARDIOTHORACIC VASCULAR SURGERY)

## 2020-01-01 PROCEDURE — 83690 ASSAY OF LIPASE: CPT | Performed by: EMERGENCY MEDICINE

## 2020-01-01 PROCEDURE — 81001 URINALYSIS AUTO W/SCOPE: CPT | Performed by: STUDENT IN AN ORGANIZED HEALTH CARE EDUCATION/TRAINING PROGRAM

## 2020-01-01 PROCEDURE — 25000132 ZZH RX MED GY IP 250 OP 250 PS 637: Performed by: INTERNAL MEDICINE

## 2020-01-01 PROCEDURE — 97530 THERAPEUTIC ACTIVITIES: CPT | Mod: GO | Performed by: OCCUPATIONAL THERAPIST

## 2020-01-01 PROCEDURE — 32552 REMOVE LUNG CATHETER: CPT | Performed by: INTERNAL MEDICINE

## 2020-01-01 PROCEDURE — 96361 HYDRATE IV INFUSION ADD-ON: CPT | Performed by: EMERGENCY MEDICINE

## 2020-01-01 PROCEDURE — 84295 ASSAY OF SERUM SODIUM: CPT | Performed by: EMERGENCY MEDICINE

## 2020-01-01 PROCEDURE — 97162 PT EVAL MOD COMPLEX 30 MIN: CPT | Mod: GP

## 2020-01-01 PROCEDURE — 85027 COMPLETE CBC AUTOMATED: CPT | Performed by: NEUROLOGICAL SURGERY

## 2020-01-01 PROCEDURE — 85027 COMPLETE CBC AUTOMATED: CPT | Performed by: INTERNAL MEDICINE

## 2020-01-01 PROCEDURE — 93308 TTE F-UP OR LMTD: CPT | Mod: 26 | Performed by: EMERGENCY MEDICINE

## 2020-01-01 PROCEDURE — 25500064 ZZH RX 255 OP 636: Performed by: HOSPITALIST

## 2020-01-01 PROCEDURE — 85610 PROTHROMBIN TIME: CPT | Performed by: EMERGENCY MEDICINE

## 2020-01-01 PROCEDURE — 25500064 ZZH RX 255 OP 636: Performed by: NEUROLOGICAL SURGERY

## 2020-01-01 PROCEDURE — 25000132 ZZH RX MED GY IP 250 OP 250 PS 637: Performed by: NURSE PRACTITIONER

## 2020-01-01 PROCEDURE — 8E09XBH COMPUTER ASSISTED PROCEDURE OF HEAD AND NECK REGION, WITH MAGNETIC RESONANCE IMAGING: ICD-10-PCS | Performed by: NEUROLOGICAL SURGERY

## 2020-01-01 PROCEDURE — 12800006 ZZH R&B REHAB

## 2020-01-01 PROCEDURE — 40000893 ZZH STATISTIC PT IP EVAL DEFER

## 2020-01-01 PROCEDURE — 93005 ELECTROCARDIOGRAM TRACING: CPT

## 2020-01-01 PROCEDURE — 25000128 H RX IP 250 OP 636: Mod: ZF | Performed by: PHYSICIAN ASSISTANT

## 2020-01-01 PROCEDURE — 25000125 ZZHC RX 250: Performed by: STUDENT IN AN ORGANIZED HEALTH CARE EDUCATION/TRAINING PROGRAM

## 2020-01-01 PROCEDURE — 27211039 ZZH NEEDLE CR2

## 2020-01-01 PROCEDURE — 84300 ASSAY OF URINE SODIUM: CPT | Performed by: INTERNAL MEDICINE

## 2020-01-01 PROCEDURE — 25000128 H RX IP 250 OP 636: Mod: ZF | Performed by: INTERNAL MEDICINE

## 2020-01-01 PROCEDURE — 87040 BLOOD CULTURE FOR BACTERIA: CPT | Performed by: EMERGENCY MEDICINE

## 2020-01-01 PROCEDURE — 25000128 H RX IP 250 OP 636: Performed by: PHYSICIAN ASSISTANT

## 2020-01-01 PROCEDURE — 40000170 ZZH STATISTIC PRE-PROCEDURE ASSESSMENT II: Performed by: NEUROLOGICAL SURGERY

## 2020-01-01 PROCEDURE — 83735 ASSAY OF MAGNESIUM: CPT | Performed by: NEUROLOGICAL SURGERY

## 2020-01-01 PROCEDURE — 84443 ASSAY THYROID STIM HORMONE: CPT | Performed by: INTERNAL MEDICINE

## 2020-01-01 PROCEDURE — 82565 ASSAY OF CREATININE: CPT | Performed by: PHYSICIAN ASSISTANT

## 2020-01-01 PROCEDURE — 96367 TX/PROPH/DG ADDL SEQ IV INF: CPT

## 2020-01-01 PROCEDURE — 0W9B3ZZ DRAINAGE OF LEFT PLEURAL CAVITY, PERCUTANEOUS APPROACH: ICD-10-PCS | Performed by: INTERNAL MEDICINE

## 2020-01-01 PROCEDURE — 83935 ASSAY OF URINE OSMOLALITY: CPT | Performed by: STUDENT IN AN ORGANIZED HEALTH CARE EDUCATION/TRAINING PROGRAM

## 2020-01-01 PROCEDURE — 93321 DOPPLER ECHO F-UP/LMTD STD: CPT

## 2020-01-01 PROCEDURE — 71000016 ZZH RECOVERY PHASE 1 LEVEL 3 FIRST HR: Performed by: NEUROLOGICAL SURGERY

## 2020-01-01 PROCEDURE — 97112 NEUROMUSCULAR REEDUCATION: CPT | Mod: GO

## 2020-01-01 PROCEDURE — 96417 CHEMO IV INFUS EACH ADDL SEQ: CPT

## 2020-01-01 PROCEDURE — 37000009 ZZH ANESTHESIA TECHNICAL FEE, EACH ADDTL 15 MIN: Performed by: NEUROLOGICAL SURGERY

## 2020-01-01 PROCEDURE — 32554 ASPIRATE PLEURA W/O IMAGING: CPT | Performed by: INTERNAL MEDICINE

## 2020-01-01 PROCEDURE — 97163 PT EVAL HIGH COMPLEX 45 MIN: CPT | Mod: GP

## 2020-01-01 PROCEDURE — 84443 ASSAY THYROID STIM HORMONE: CPT | Performed by: NURSE PRACTITIONER

## 2020-01-01 PROCEDURE — 99309 SBSQ NF CARE MODERATE MDM 30: CPT | Performed by: PHYSICIAN ASSISTANT

## 2020-01-01 PROCEDURE — 36000088 ZZH SURGERY LEVEL 8 EA 15 ADDTL MIN - UMMC: Performed by: NEUROLOGICAL SURGERY

## 2020-01-01 PROCEDURE — 96360 HYDRATION IV INFUSION INIT: CPT | Performed by: EMERGENCY MEDICINE

## 2020-01-01 PROCEDURE — 84295 ASSAY OF SERUM SODIUM: CPT | Performed by: INTERNAL MEDICINE

## 2020-01-01 PROCEDURE — 84100 ASSAY OF PHOSPHORUS: CPT | Performed by: NEUROLOGICAL SURGERY

## 2020-01-01 PROCEDURE — 32557 INSERT CATH PLEURA W/ IMAGE: CPT | Mod: 50

## 2020-01-01 PROCEDURE — 80053 COMPREHEN METABOLIC PANEL: CPT | Performed by: NURSE PRACTITIONER

## 2020-01-01 PROCEDURE — 20000004 ZZH R&B ICU UMMC

## 2020-01-01 PROCEDURE — 93010 ELECTROCARDIOGRAM REPORT: CPT | Mod: Z6 | Performed by: EMERGENCY MEDICINE

## 2020-01-01 PROCEDURE — 80048 BASIC METABOLIC PNL TOTAL CA: CPT | Performed by: STUDENT IN AN ORGANIZED HEALTH CARE EDUCATION/TRAINING PROGRAM

## 2020-01-01 PROCEDURE — 99214 OFFICE O/P EST MOD 30 MIN: CPT | Performed by: INTERNAL MEDICINE

## 2020-01-01 PROCEDURE — 83605 ASSAY OF LACTIC ACID: CPT | Performed by: INTERNAL MEDICINE

## 2020-01-01 PROCEDURE — G0463 HOSPITAL OUTPT CLINIC VISIT: HCPCS | Mod: 25

## 2020-01-01 PROCEDURE — 99207 ZZC CDG-CODE INCORRECT PER BILLING BASED ON TIME: CPT | Performed by: PHYSICIAN ASSISTANT

## 2020-01-01 PROCEDURE — 87086 URINE CULTURE/COLONY COUNT: CPT | Performed by: EMERGENCY MEDICINE

## 2020-01-01 PROCEDURE — 93325 DOPPLER ECHO COLOR FLOW MAPG: CPT | Mod: 26 | Performed by: INTERNAL MEDICINE

## 2020-01-01 PROCEDURE — 25500064 ZZH RX 255 OP 636: Performed by: RADIOLOGY

## 2020-01-01 PROCEDURE — C1713 ANCHOR/SCREW BN/BN,TIS/BN: HCPCS | Performed by: NEUROLOGICAL SURGERY

## 2020-01-01 PROCEDURE — 99223 1ST HOSP IP/OBS HIGH 75: CPT | Mod: GC | Performed by: INTERNAL MEDICINE

## 2020-01-01 PROCEDURE — 27210732 ZZH ACCESSORY CR1

## 2020-01-01 PROCEDURE — 96413 CHEMO IV INFUSION 1 HR: CPT

## 2020-01-01 PROCEDURE — 70450 CT HEAD/BRAIN W/O DYE: CPT | Mod: 77

## 2020-01-01 PROCEDURE — 87209 SMEAR COMPLEX STAIN: CPT | Performed by: INTERNAL MEDICINE

## 2020-01-01 PROCEDURE — 32551 INSERTION OF CHEST TUBE: CPT | Performed by: INTERNAL MEDICINE

## 2020-01-01 PROCEDURE — 85025 COMPLETE CBC W/AUTO DIFF WBC: CPT | Performed by: NEUROLOGICAL SURGERY

## 2020-01-01 PROCEDURE — 99222 1ST HOSP IP/OBS MODERATE 55: CPT | Mod: GC | Performed by: INTERNAL MEDICINE

## 2020-01-01 PROCEDURE — 87641 MR-STAPH DNA AMP PROBE: CPT | Performed by: STUDENT IN AN ORGANIZED HEALTH CARE EDUCATION/TRAINING PROGRAM

## 2020-01-01 PROCEDURE — 25000128 H RX IP 250 OP 636: Performed by: EMERGENCY MEDICINE

## 2020-01-01 PROCEDURE — 85730 THROMBOPLASTIN TIME PARTIAL: CPT | Performed by: STUDENT IN AN ORGANIZED HEALTH CARE EDUCATION/TRAINING PROGRAM

## 2020-01-01 PROCEDURE — 86481 TB AG RESPONSE T-CELL SUSP: CPT | Performed by: PHYSICIAN ASSISTANT

## 2020-01-01 PROCEDURE — 84145 PROCALCITONIN (PCT): CPT | Performed by: EMERGENCY MEDICINE

## 2020-01-01 PROCEDURE — 99231 SBSQ HOSP IP/OBS SF/LOW 25: CPT | Performed by: INTERNAL MEDICINE

## 2020-01-01 PROCEDURE — C1729 CATH, DRAINAGE: HCPCS

## 2020-01-01 PROCEDURE — 82533 TOTAL CORTISOL: CPT | Performed by: INTERNAL MEDICINE

## 2020-01-01 PROCEDURE — 25000125 ZZHC RX 250: Performed by: NURSE ANESTHETIST, CERTIFIED REGISTERED

## 2020-01-01 PROCEDURE — 25800030 ZZH RX IP 258 OP 636: Performed by: HOSPITALIST

## 2020-01-01 PROCEDURE — 36415 COLL VENOUS BLD VENIPUNCTURE: CPT

## 2020-01-01 PROCEDURE — 25800030 ZZH RX IP 258 OP 636: Mod: ZF | Performed by: PHYSICIAN ASSISTANT

## 2020-01-01 PROCEDURE — 83735 ASSAY OF MAGNESIUM: CPT | Performed by: EMERGENCY MEDICINE

## 2020-01-01 PROCEDURE — 25000125 ZZHC RX 250: Performed by: THORACIC SURGERY (CARDIOTHORACIC VASCULAR SURGERY)

## 2020-01-01 PROCEDURE — 25000128 H RX IP 250 OP 636: Performed by: STUDENT IN AN ORGANIZED HEALTH CARE EDUCATION/TRAINING PROGRAM

## 2020-01-01 PROCEDURE — 83036 HEMOGLOBIN GLYCOSYLATED A1C: CPT | Performed by: STUDENT IN AN ORGANIZED HEALTH CARE EDUCATION/TRAINING PROGRAM

## 2020-01-01 PROCEDURE — 81001 URINALYSIS AUTO W/SCOPE: CPT | Performed by: EMERGENCY MEDICINE

## 2020-01-01 PROCEDURE — 97110 THERAPEUTIC EXERCISES: CPT | Mod: GP

## 2020-01-01 PROCEDURE — 84100 ASSAY OF PHOSPHORUS: CPT | Performed by: INTERNAL MEDICINE

## 2020-01-01 PROCEDURE — 99291 CRITICAL CARE FIRST HOUR: CPT | Mod: 25 | Performed by: EMERGENCY MEDICINE

## 2020-01-01 PROCEDURE — 87075 CULTR BACTERIA EXCEPT BLOOD: CPT | Performed by: INTERNAL MEDICINE

## 2020-01-01 PROCEDURE — 84484 ASSAY OF TROPONIN QUANT: CPT | Performed by: STUDENT IN AN ORGANIZED HEALTH CARE EDUCATION/TRAINING PROGRAM

## 2020-01-01 PROCEDURE — 83605 ASSAY OF LACTIC ACID: CPT | Performed by: STUDENT IN AN ORGANIZED HEALTH CARE EDUCATION/TRAINING PROGRAM

## 2020-01-01 PROCEDURE — 88305 TISSUE EXAM BY PATHOLOGIST: CPT | Performed by: INTERNAL MEDICINE

## 2020-01-01 PROCEDURE — 86900 BLOOD TYPING SEROLOGIC ABO: CPT | Performed by: ANESTHESIOLOGY

## 2020-01-01 PROCEDURE — 99207 ZZC CONSULT E&M CHANGED TO INITIAL LEVEL: CPT | Performed by: INTERNAL MEDICINE

## 2020-01-01 PROCEDURE — 00000155 ZZHCL STATISTIC H-CELL BLOCK W/STAIN: Performed by: INTERNAL MEDICINE

## 2020-01-01 PROCEDURE — 71045 X-RAY EXAM CHEST 1 VIEW: CPT | Mod: 77

## 2020-01-01 PROCEDURE — 93308 TTE F-UP OR LMTD: CPT | Performed by: EMERGENCY MEDICINE

## 2020-01-01 PROCEDURE — 87015 SPECIMEN INFECT AGNT CONCNTJ: CPT | Performed by: INTERNAL MEDICINE

## 2020-01-01 PROCEDURE — 95816 EEG AWAKE AND DROWSY: CPT

## 2020-01-01 PROCEDURE — 84100 ASSAY OF PHOSPHORUS: CPT | Performed by: EMERGENCY MEDICINE

## 2020-01-01 PROCEDURE — 93308 TTE F-UP OR LMTD: CPT | Mod: 26 | Performed by: INTERNAL MEDICINE

## 2020-01-01 PROCEDURE — 99214 OFFICE O/P EST MOD 30 MIN: CPT | Mod: ZP | Performed by: PHYSICIAN ASSISTANT

## 2020-01-01 PROCEDURE — 99291 CRITICAL CARE FIRST HOUR: CPT | Performed by: HOSPITALIST

## 2020-01-01 PROCEDURE — 27810169 ZZH OR IMPLANT GENERAL: Performed by: NEUROLOGICAL SURGERY

## 2020-01-01 PROCEDURE — 88112 CYTOPATH CELL ENHANCE TECH: CPT | Performed by: STUDENT IN AN ORGANIZED HEALTH CARE EDUCATION/TRAINING PROGRAM

## 2020-01-01 PROCEDURE — 85610 PROTHROMBIN TIME: CPT | Performed by: STUDENT IN AN ORGANIZED HEALTH CARE EDUCATION/TRAINING PROGRAM

## 2020-01-01 PROCEDURE — 93010 ELECTROCARDIOGRAM REPORT: CPT | Mod: 59 | Performed by: INTERNAL MEDICINE

## 2020-01-01 PROCEDURE — 99215 OFFICE O/P EST HI 40 MIN: CPT | Mod: 95 | Performed by: PHYSICIAN ASSISTANT

## 2020-01-01 PROCEDURE — 25500064 ZZH RX 255 OP 636: Performed by: INTERNAL MEDICINE

## 2020-01-01 PROCEDURE — 25000566 ZZH SEVOFLURANE, EA 15 MIN: Performed by: NEUROLOGICAL SURGERY

## 2020-01-01 PROCEDURE — 83930 ASSAY OF BLOOD OSMOLALITY: CPT | Performed by: EMERGENCY MEDICINE

## 2020-01-01 PROCEDURE — 85025 COMPLETE CBC W/AUTO DIFF WBC: CPT | Performed by: PHYSICIAN ASSISTANT

## 2020-01-01 PROCEDURE — G0463 HOSPITAL OUTPT CLINIC VISIT: HCPCS | Mod: TEL | Performed by: RADIOLOGY

## 2020-01-01 PROCEDURE — 83935 ASSAY OF URINE OSMOLALITY: CPT | Performed by: EMERGENCY MEDICINE

## 2020-01-01 PROCEDURE — 88307 TISSUE EXAM BY PATHOLOGIST: CPT | Performed by: NEUROLOGICAL SURGERY

## 2020-01-01 PROCEDURE — 71000017 ZZH RECOVERY PHASE 1 LEVEL 3 EA ADDTL HR: Performed by: NEUROLOGICAL SURGERY

## 2020-01-01 PROCEDURE — 80053 COMPREHEN METABOLIC PANEL: CPT | Performed by: STUDENT IN AN ORGANIZED HEALTH CARE EDUCATION/TRAINING PROGRAM

## 2020-01-01 PROCEDURE — 85014 HEMATOCRIT: CPT

## 2020-01-01 PROCEDURE — 27210794 ZZH OR GENERAL SUPPLY STERILE: Performed by: THORACIC SURGERY (CARDIOTHORACIC VASCULAR SURGERY)

## 2020-01-01 PROCEDURE — 25000132 ZZH RX MED GY IP 250 OP 250 PS 637: Mod: ZF | Performed by: INTERNAL MEDICINE

## 2020-01-01 PROCEDURE — 88331 PATH CONSLTJ SURG 1 BLK 1SPC: CPT | Performed by: NEUROLOGICAL SURGERY

## 2020-01-01 PROCEDURE — 86923 COMPATIBILITY TEST ELECTRIC: CPT | Performed by: STUDENT IN AN ORGANIZED HEALTH CARE EDUCATION/TRAINING PROGRAM

## 2020-01-01 PROCEDURE — 83605 ASSAY OF LACTIC ACID: CPT | Performed by: HOSPITALIST

## 2020-01-01 PROCEDURE — 84132 ASSAY OF SERUM POTASSIUM: CPT

## 2020-01-01 PROCEDURE — 99221 1ST HOSP IP/OBS SF/LOW 40: CPT | Performed by: INTERNAL MEDICINE

## 2020-01-01 PROCEDURE — 0B9P30Z DRAINAGE OF LEFT PLEURA WITH DRAINAGE DEVICE, PERCUTANEOUS APPROACH: ICD-10-PCS | Performed by: INTERNAL MEDICINE

## 2020-01-01 PROCEDURE — 83735 ASSAY OF MAGNESIUM: CPT | Performed by: STUDENT IN AN ORGANIZED HEALTH CARE EDUCATION/TRAINING PROGRAM

## 2020-01-01 PROCEDURE — C9113 INJ PANTOPRAZOLE SODIUM, VIA: HCPCS | Performed by: STUDENT IN AN ORGANIZED HEALTH CARE EDUCATION/TRAINING PROGRAM

## 2020-01-01 PROCEDURE — 99215 OFFICE O/P EST HI 40 MIN: CPT | Performed by: INTERNAL MEDICINE

## 2020-01-01 PROCEDURE — C1729 CATH, DRAINAGE: HCPCS | Performed by: THORACIC SURGERY (CARDIOTHORACIC VASCULAR SURGERY)

## 2020-01-01 PROCEDURE — 40000141 ZZH STATISTIC PERIPHERAL IV START W/O US GUIDANCE

## 2020-01-01 PROCEDURE — 80048 BASIC METABOLIC PNL TOTAL CA: CPT | Performed by: HOSPITALIST

## 2020-01-01 PROCEDURE — 72125 CT NECK SPINE W/O DYE: CPT

## 2020-01-01 PROCEDURE — A9585 GADOBUTROL INJECTION: HCPCS | Performed by: NEUROLOGICAL SURGERY

## 2020-01-01 PROCEDURE — 96375 TX/PRO/DX INJ NEW DRUG ADDON: CPT

## 2020-01-01 PROCEDURE — 36000064 ZZH SURGERY LEVEL 4 EA 15 ADDTL MIN - UMMC: Performed by: THORACIC SURGERY (CARDIOTHORACIC VASCULAR SURGERY)

## 2020-01-01 PROCEDURE — 27210886 ZZH ACCESSORY CR5

## 2020-01-01 PROCEDURE — 86901 BLOOD TYPING SEROLOGIC RH(D): CPT | Performed by: ANESTHESIOLOGY

## 2020-01-01 PROCEDURE — 83735 ASSAY OF MAGNESIUM: CPT | Performed by: HOSPITALIST

## 2020-01-01 PROCEDURE — 77295 3-D RADIOTHERAPY PLAN: CPT | Performed by: RADIOLOGY

## 2020-01-01 PROCEDURE — 97110 THERAPEUTIC EXERCISES: CPT | Mod: GO | Performed by: STUDENT IN AN ORGANIZED HEALTH CARE EDUCATION/TRAINING PROGRAM

## 2020-01-01 PROCEDURE — 40000065 ZZH STATISTIC EKG NON-CHARGEABLE

## 2020-01-01 PROCEDURE — 00000102 ZZHCL STATISTIC CYTO WRIGHT STAIN TC: Performed by: STUDENT IN AN ORGANIZED HEALTH CARE EDUCATION/TRAINING PROGRAM

## 2020-01-01 PROCEDURE — 84100 ASSAY OF PHOSPHORUS: CPT | Performed by: STUDENT IN AN ORGANIZED HEALTH CARE EDUCATION/TRAINING PROGRAM

## 2020-01-01 PROCEDURE — 84100 ASSAY OF PHOSPHORUS: CPT | Performed by: HOSPITALIST

## 2020-01-01 PROCEDURE — 93010 ELECTROCARDIOGRAM REPORT: CPT | Mod: 59 | Performed by: EMERGENCY MEDICINE

## 2020-01-01 PROCEDURE — 97166 OT EVAL MOD COMPLEX 45 MIN: CPT | Mod: GO | Performed by: STUDENT IN AN ORGANIZED HEALTH CARE EDUCATION/TRAINING PROGRAM

## 2020-01-01 PROCEDURE — A9585 GADOBUTROL INJECTION: HCPCS | Performed by: HOSPITALIST

## 2020-01-01 PROCEDURE — 84295 ASSAY OF SERUM SODIUM: CPT | Performed by: NURSE PRACTITIONER

## 2020-01-01 PROCEDURE — D0Y0KZZ LASER INTERSTITIAL THERMAL THERAPY OF BRAIN: ICD-10-PCS | Performed by: NEUROLOGICAL SURGERY

## 2020-01-01 PROCEDURE — 82570 ASSAY OF URINE CREATININE: CPT | Performed by: PHYSICIAN ASSISTANT

## 2020-01-01 PROCEDURE — 00B70ZZ EXCISION OF CEREBRAL HEMISPHERE, OPEN APPROACH: ICD-10-PCS | Performed by: NEUROLOGICAL SURGERY

## 2020-01-01 PROCEDURE — 99223 1ST HOSP IP/OBS HIGH 75: CPT | Mod: AI | Performed by: STUDENT IN AN ORGANIZED HEALTH CARE EDUCATION/TRAINING PROGRAM

## 2020-01-01 PROCEDURE — 77336 RADIATION PHYSICS CONSULT: CPT | Performed by: RADIOLOGY

## 2020-01-01 PROCEDURE — 99214 OFFICE O/P EST MOD 30 MIN: CPT | Mod: 95 | Performed by: INTERNAL MEDICINE

## 2020-01-01 PROCEDURE — 84244 ASSAY OF RENIN: CPT | Performed by: INTERNAL MEDICINE

## 2020-01-01 PROCEDURE — 96365 THER/PROPH/DIAG IV INF INIT: CPT | Performed by: EMERGENCY MEDICINE

## 2020-01-01 PROCEDURE — 96374 THER/PROPH/DIAG INJ IV PUSH: CPT | Performed by: EMERGENCY MEDICINE

## 2020-01-01 PROCEDURE — 80048 BASIC METABOLIC PNL TOTAL CA: CPT | Performed by: PHYSICIAN ASSISTANT

## 2020-01-01 PROCEDURE — 84300 ASSAY OF URINE SODIUM: CPT | Performed by: EMERGENCY MEDICINE

## 2020-01-01 PROCEDURE — 87506 IADNA-DNA/RNA PROBE TQ 6-11: CPT | Performed by: INTERNAL MEDICINE

## 2020-01-01 PROCEDURE — 99223 1ST HOSP IP/OBS HIGH 75: CPT | Performed by: INTERNAL MEDICINE

## 2020-01-01 PROCEDURE — 87040 BLOOD CULTURE FOR BACTERIA: CPT | Performed by: STUDENT IN AN ORGANIZED HEALTH CARE EDUCATION/TRAINING PROGRAM

## 2020-01-01 PROCEDURE — 36000086 ZZH SURGERY LEVEL 8 1ST 30 MIN UMMC: Performed by: NEUROLOGICAL SURGERY

## 2020-01-01 PROCEDURE — 99304 1ST NF CARE SF/LOW MDM 25: CPT | Performed by: HOSPITALIST

## 2020-01-01 PROCEDURE — 82570 ASSAY OF URINE CREATININE: CPT | Performed by: INTERNAL MEDICINE

## 2020-01-01 PROCEDURE — 97110 THERAPEUTIC EXERCISES: CPT | Mod: GO

## 2020-01-01 PROCEDURE — 99232 SBSQ HOSP IP/OBS MODERATE 35: CPT | Mod: GC | Performed by: INTERNAL MEDICINE

## 2020-01-01 PROCEDURE — 97165 OT EVAL LOW COMPLEX 30 MIN: CPT | Mod: GO | Performed by: OCCUPATIONAL THERAPIST

## 2020-01-01 PROCEDURE — 82947 ASSAY GLUCOSE BLOOD QUANT: CPT

## 2020-01-01 PROCEDURE — 25800030 ZZH RX IP 258 OP 636: Performed by: NURSE ANESTHETIST, CERTIFIED REGISTERED

## 2020-01-01 PROCEDURE — A9585 GADOBUTROL INJECTION: HCPCS | Performed by: INTERNAL MEDICINE

## 2020-01-01 PROCEDURE — 25800030 ZZH RX IP 258 OP 636: Performed by: ANESTHESIOLOGY

## 2020-01-01 PROCEDURE — 71260 CT THORAX DX C+: CPT

## 2020-01-01 PROCEDURE — 25000132 ZZH RX MED GY IP 250 OP 250 PS 637: Performed by: EMERGENCY MEDICINE

## 2020-01-01 PROCEDURE — 99223 1ST HOSP IP/OBS HIGH 75: CPT | Mod: AI | Performed by: HOSPITALIST

## 2020-01-01 PROCEDURE — 88342 IMHCHEM/IMCYTCHM 1ST ANTB: CPT | Performed by: STUDENT IN AN ORGANIZED HEALTH CARE EDUCATION/TRAINING PROGRAM

## 2020-01-01 PROCEDURE — 93005 ELECTROCARDIOGRAM TRACING: CPT | Performed by: EMERGENCY MEDICINE

## 2020-01-01 PROCEDURE — 93971 EXTREMITY STUDY: CPT | Mod: RT

## 2020-01-01 PROCEDURE — 96367 TX/PROPH/DG ADDL SEQ IV INF: CPT | Performed by: EMERGENCY MEDICINE

## 2020-01-01 PROCEDURE — 99152 MOD SED SAME PHYS/QHP 5/>YRS: CPT | Performed by: INTERNAL MEDICINE

## 2020-01-01 PROCEDURE — 84295 ASSAY OF SERUM SODIUM: CPT | Performed by: STUDENT IN AN ORGANIZED HEALTH CARE EDUCATION/TRAINING PROGRAM

## 2020-01-01 PROCEDURE — 83930 ASSAY OF BLOOD OSMOLALITY: CPT | Performed by: STUDENT IN AN ORGANIZED HEALTH CARE EDUCATION/TRAINING PROGRAM

## 2020-01-01 PROCEDURE — 87205 SMEAR GRAM STAIN: CPT | Performed by: INTERNAL MEDICINE

## 2020-01-01 PROCEDURE — 85049 AUTOMATED PLATELET COUNT: CPT | Performed by: STUDENT IN AN ORGANIZED HEALTH CARE EDUCATION/TRAINING PROGRAM

## 2020-01-01 PROCEDURE — 0W9B30Z DRAINAGE OF LEFT PLEURAL CAVITY WITH DRAINAGE DEVICE, PERCUTANEOUS APPROACH: ICD-10-PCS | Performed by: THORACIC SURGERY (CARDIOTHORACIC VASCULAR SURGERY)

## 2020-01-01 PROCEDURE — 87640 STAPH A DNA AMP PROBE: CPT | Performed by: STUDENT IN AN ORGANIZED HEALTH CARE EDUCATION/TRAINING PROGRAM

## 2020-01-01 PROCEDURE — 25000125 ZZHC RX 250: Mod: ZF | Performed by: INTERNAL MEDICINE

## 2020-01-01 PROCEDURE — 00000102 ZZHCL STATISTIC CYTO WRIGHT STAIN TC: Performed by: INTERNAL MEDICINE

## 2020-01-01 PROCEDURE — 97112 NEUROMUSCULAR REEDUCATION: CPT | Mod: GP | Performed by: PHYSICAL THERAPIST

## 2020-01-01 PROCEDURE — 99232 SBSQ HOSP IP/OBS MODERATE 35: CPT | Performed by: INTERNAL MEDICINE

## 2020-01-01 PROCEDURE — 89051 BODY FLUID CELL COUNT: CPT | Performed by: INTERNAL MEDICINE

## 2020-01-01 PROCEDURE — 84443 ASSAY THYROID STIM HORMONE: CPT | Performed by: STUDENT IN AN ORGANIZED HEALTH CARE EDUCATION/TRAINING PROGRAM

## 2020-01-01 PROCEDURE — 84155 ASSAY OF PROTEIN SERUM: CPT | Performed by: PHYSICIAN ASSISTANT

## 2020-01-01 PROCEDURE — 82803 BLOOD GASES ANY COMBINATION: CPT | Performed by: STUDENT IN AN ORGANIZED HEALTH CARE EDUCATION/TRAINING PROGRAM

## 2020-01-01 PROCEDURE — 99214 OFFICE O/P EST MOD 30 MIN: CPT | Performed by: NURSE PRACTITIONER

## 2020-01-01 PROCEDURE — A9585 GADOBUTROL INJECTION: HCPCS | Performed by: RADIOLOGY

## 2020-01-01 PROCEDURE — 36000074 ZZH SURGERY LEVEL 6 1ST 30 MIN - UMMC: Performed by: NEUROLOGICAL SURGERY

## 2020-01-01 PROCEDURE — U0003 INFECTIOUS AGENT DETECTION BY NUCLEIC ACID (DNA OR RNA); SEVERE ACUTE RESPIRATORY SYNDROME CORONAVIRUS 2 (SARS-COV-2) (CORONAVIRUS DISEASE [COVID-19]), AMPLIFIED PROBE TECHNIQUE, MAKING USE OF HIGH THROUGHPUT TECHNOLOGIES AS DESCRIBED BY CMS-2020-01-R: HCPCS | Performed by: STUDENT IN AN ORGANIZED HEALTH CARE EDUCATION/TRAINING PROGRAM

## 2020-01-01 PROCEDURE — 27210995 ZZH RX 272: Performed by: NEUROLOGICAL SURGERY

## 2020-01-01 PROCEDURE — 97110 THERAPEUTIC EXERCISES: CPT | Mod: GO | Performed by: OCCUPATIONAL THERAPIST

## 2020-01-01 PROCEDURE — 99316 NF DSCHRG MGMT 30 MIN+: CPT | Performed by: PHYSICIAN ASSISTANT

## 2020-01-01 PROCEDURE — 71000014 ZZH RECOVERY PHASE 1 LEVEL 2 FIRST HR: Performed by: THORACIC SURGERY (CARDIOTHORACIC VASCULAR SURGERY)

## 2020-01-01 PROCEDURE — G0463 HOSPITAL OUTPT CLINIC VISIT: HCPCS

## 2020-01-01 PROCEDURE — 25800030 ZZH RX IP 258 OP 636: Mod: ZF | Performed by: INTERNAL MEDICINE

## 2020-01-01 PROCEDURE — 84443 ASSAY THYROID STIM HORMONE: CPT | Performed by: EMERGENCY MEDICINE

## 2020-01-01 PROCEDURE — 85025 COMPLETE CBC W/AUTO DIFF WBC: CPT | Performed by: HOSPITALIST

## 2020-01-01 PROCEDURE — 97162 PT EVAL MOD COMPLEX 30 MIN: CPT | Mod: GP | Performed by: PHYSICAL THERAPIST

## 2020-01-01 PROCEDURE — 93010 ELECTROCARDIOGRAM REPORT: CPT | Performed by: INTERNAL MEDICINE

## 2020-01-01 PROCEDURE — 25000128 H RX IP 250 OP 636: Performed by: THORACIC SURGERY (CARDIOTHORACIC VASCULAR SURGERY)

## 2020-01-01 PROCEDURE — 77334 RADIATION TREATMENT AID(S): CPT | Performed by: RADIOLOGY

## 2020-01-01 PROCEDURE — 99285 EMERGENCY DEPT VISIT HI MDM: CPT | Mod: Z6 | Performed by: EMERGENCY MEDICINE

## 2020-01-01 PROCEDURE — 97161 PT EVAL LOW COMPLEX 20 MIN: CPT | Mod: GP

## 2020-01-01 PROCEDURE — 36000076 ZZH SURGERY LEVEL 6 EA 15 ADDTL MIN - UMMC: Performed by: NEUROLOGICAL SURGERY

## 2020-01-01 PROCEDURE — 96415 CHEMO IV INFUSION ADDL HR: CPT

## 2020-01-01 PROCEDURE — 82565 ASSAY OF CREATININE: CPT | Performed by: STUDENT IN AN ORGANIZED HEALTH CARE EDUCATION/TRAINING PROGRAM

## 2020-01-01 PROCEDURE — 86901 BLOOD TYPING SEROLOGIC RH(D): CPT | Performed by: STUDENT IN AN ORGANIZED HEALTH CARE EDUCATION/TRAINING PROGRAM

## 2020-01-01 PROCEDURE — 82024 ASSAY OF ACTH: CPT | Performed by: INTERNAL MEDICINE

## 2020-01-01 PROCEDURE — 99207 ZZC CDG-CUT & PASTE-POTENTIAL IMPACT ON LEVEL: CPT | Performed by: HOSPITALIST

## 2020-01-01 PROCEDURE — 00000346 ZZHCL STATISTIC REVIEW OUTSIDE SLIDES TC 88321: Performed by: INTERNAL MEDICINE

## 2020-01-01 PROCEDURE — 40000556 ZZH STATISTIC PERIPHERAL IV START W US GUIDANCE

## 2020-01-01 PROCEDURE — 84146 ASSAY OF PROLACTIN: CPT | Performed by: EMERGENCY MEDICINE

## 2020-01-01 PROCEDURE — 25000125 ZZHC RX 250: Performed by: RADIOLOGY

## 2020-01-01 PROCEDURE — 84439 ASSAY OF FREE THYROXINE: CPT | Performed by: NURSE PRACTITIONER

## 2020-01-01 PROCEDURE — 88305 TISSUE EXAM BY PATHOLOGIST: CPT | Performed by: STUDENT IN AN ORGANIZED HEALTH CARE EDUCATION/TRAINING PROGRAM

## 2020-01-01 PROCEDURE — 82330 ASSAY OF CALCIUM: CPT

## 2020-01-01 PROCEDURE — 86900 BLOOD TYPING SEROLOGIC ABO: CPT | Performed by: STUDENT IN AN ORGANIZED HEALTH CARE EDUCATION/TRAINING PROGRAM

## 2020-01-01 PROCEDURE — G0463 HOSPITAL OUTPT CLINIC VISIT: HCPCS | Mod: ZF

## 2020-01-01 PROCEDURE — 40000169 ZZH STATISTIC PRE-PROCEDURE ASSESSMENT I: Performed by: THORACIC SURGERY (CARDIOTHORACIC VASCULAR SURGERY)

## 2020-01-01 PROCEDURE — 99207 ZZC CDG-CUT & PASTE-POTENTIAL IMPACT ON LEVEL: CPT | Performed by: PHYSICIAN ASSISTANT

## 2020-01-01 PROCEDURE — 80048 BASIC METABOLIC PNL TOTAL CA: CPT | Performed by: NEUROLOGICAL SURGERY

## 2020-01-01 PROCEDURE — 83935 ASSAY OF URINE OSMOLALITY: CPT | Performed by: INTERNAL MEDICINE

## 2020-01-01 PROCEDURE — 37000009 ZZH ANESTHESIA TECHNICAL FEE, EACH ADDTL 15 MIN: Performed by: THORACIC SURGERY (CARDIOTHORACIC VASCULAR SURGERY)

## 2020-01-01 PROCEDURE — 99207 ZZC CDG-MDM COMPONENT: MEETS MODERATE - UP CODED: CPT | Performed by: STUDENT IN AN ORGANIZED HEALTH CARE EDUCATION/TRAINING PROGRAM

## 2020-01-01 PROCEDURE — 77300 RADIATION THERAPY DOSE PLAN: CPT | Performed by: RADIOLOGY

## 2020-01-01 PROCEDURE — 0W9930Z DRAINAGE OF RIGHT PLEURAL CAVITY WITH DRAINAGE DEVICE, PERCUTANEOUS APPROACH: ICD-10-PCS | Performed by: THORACIC SURGERY (CARDIOTHORACIC VASCULAR SURGERY)

## 2020-01-01 PROCEDURE — 82533 TOTAL CORTISOL: CPT | Performed by: EMERGENCY MEDICINE

## 2020-01-01 PROCEDURE — 25000125 ZZHC RX 250: Performed by: EMERGENCY MEDICINE

## 2020-01-01 PROCEDURE — 25000125 ZZHC RX 250: Performed by: NEUROLOGICAL SURGERY

## 2020-01-01 PROCEDURE — 40001009 ZZH VIDEO/TELEPHONE VISIT; NO CHARGE: Performed by: RADIOLOGY

## 2020-01-01 PROCEDURE — 87177 OVA AND PARASITES SMEARS: CPT | Performed by: INTERNAL MEDICINE

## 2020-01-01 DEVICE — GRAFT DURAGEN 3X3" ID330: Type: IMPLANTABLE DEVICE | Site: CRANIAL | Status: FUNCTIONAL

## 2020-01-01 DEVICE — IMP BUR HOLE COVER 24MM LOW PROFILE TI 421.528: Type: IMPLANTABLE DEVICE | Site: CRANIAL | Status: FUNCTIONAL

## 2020-01-01 DEVICE — IMP SCR SYN MATRIX LOW PRO 1.5X04MM SELF DRILL 04.503.104.01: Type: IMPLANTABLE DEVICE | Site: CRANIAL | Status: FUNCTIONAL

## 2020-01-01 RX ORDER — ONDANSETRON 2 MG/ML
4-8 INJECTION INTRAMUSCULAR; INTRAVENOUS EVERY 6 HOURS PRN
Status: DISCONTINUED | OUTPATIENT
Start: 2020-01-01 | End: 2020-01-01 | Stop reason: HOSPADM

## 2020-01-01 RX ORDER — LORAZEPAM 0.5 MG/1
0.25 TABLET ORAL EVERY 6 HOURS PRN
Status: DISCONTINUED | OUTPATIENT
Start: 2020-01-01 | End: 2020-01-01

## 2020-01-01 RX ORDER — OXYCODONE HYDROCHLORIDE 5 MG/1
5 TABLET ORAL EVERY 6 HOURS PRN
Status: DISCONTINUED | OUTPATIENT
Start: 2020-01-01 | End: 2020-01-01 | Stop reason: HOSPADM

## 2020-01-01 RX ORDER — FAMOTIDINE 20 MG
1 TABLET ORAL DAILY
COMMUNITY

## 2020-01-01 RX ORDER — GADOBUTROL 604.72 MG/ML
7.5 INJECTION INTRAVENOUS ONCE
Status: COMPLETED | OUTPATIENT
Start: 2020-01-01 | End: 2020-01-01

## 2020-01-01 RX ORDER — LABETALOL HYDROCHLORIDE 5 MG/ML
10 INJECTION, SOLUTION INTRAVENOUS
Status: DISCONTINUED | OUTPATIENT
Start: 2020-01-01 | End: 2020-01-01 | Stop reason: HOSPADM

## 2020-01-01 RX ORDER — PROCHLORPERAZINE MALEATE 5 MG
5 TABLET ORAL EVERY 6 HOURS PRN
Status: DISCONTINUED | OUTPATIENT
Start: 2020-01-01 | End: 2020-01-01 | Stop reason: HOSPADM

## 2020-01-01 RX ORDER — SODIUM CHLORIDE 1 G/1
1 TABLET ORAL 2 TIMES DAILY
Qty: 60 TABLET | Refills: 0 | Status: SHIPPED | OUTPATIENT
Start: 2020-01-01 | End: 2020-01-01

## 2020-01-01 RX ORDER — IOPAMIDOL 755 MG/ML
74 INJECTION, SOLUTION INTRAVASCULAR ONCE
Status: COMPLETED | OUTPATIENT
Start: 2020-01-01 | End: 2020-01-01

## 2020-01-01 RX ORDER — LORAZEPAM 2 MG/ML
1 INJECTION INTRAMUSCULAR ONCE
Status: DISCONTINUED | OUTPATIENT
Start: 2020-01-01 | End: 2020-01-01

## 2020-01-01 RX ORDER — LORAZEPAM 2 MG/ML
0.5 INJECTION INTRAMUSCULAR ONCE
Status: COMPLETED | OUTPATIENT
Start: 2020-01-01 | End: 2020-01-01

## 2020-01-01 RX ORDER — HYDROMORPHONE HYDROCHLORIDE 1 MG/ML
1 SOLUTION ORAL
Status: ON HOLD | COMMUNITY
End: 2020-01-01

## 2020-01-01 RX ORDER — GENTAMICIN SULFATE 80 MG/100ML
80 INJECTION, SOLUTION INTRAVENOUS ONCE
Status: COMPLETED | OUTPATIENT
Start: 2020-01-01 | End: 2020-01-01

## 2020-01-01 RX ORDER — NALOXONE HYDROCHLORIDE 0.4 MG/ML
.1-.4 INJECTION, SOLUTION INTRAMUSCULAR; INTRAVENOUS; SUBCUTANEOUS
Status: CANCELLED | OUTPATIENT
Start: 2020-01-01

## 2020-01-01 RX ORDER — FENTANYL CITRATE 50 UG/ML
25-50 INJECTION, SOLUTION INTRAMUSCULAR; INTRAVENOUS EVERY 5 MIN PRN
Status: DISCONTINUED | OUTPATIENT
Start: 2020-01-01 | End: 2020-01-01 | Stop reason: HOSPADM

## 2020-01-01 RX ORDER — FLUDROCORTISONE ACETATE 0.1 MG/1
0.1 TABLET ORAL EVERY OTHER DAY
Status: DISCONTINUED | OUTPATIENT
Start: 2020-01-01 | End: 2020-01-01

## 2020-01-01 RX ORDER — POTASSIUM CHLORIDE 1.5 G/1.58G
20-40 POWDER, FOR SOLUTION ORAL
Status: DISCONTINUED | OUTPATIENT
Start: 2020-01-01 | End: 2020-01-01 | Stop reason: HOSPADM

## 2020-01-01 RX ORDER — LORAZEPAM 0.5 MG/1
0.5 TABLET ORAL EVERY 4 HOURS PRN
Qty: 30 TABLET | Refills: 2 | Status: SHIPPED | OUTPATIENT
Start: 2020-01-01 | End: 2020-01-01

## 2020-01-01 RX ORDER — ONDANSETRON 4 MG/1
8 TABLET, ORALLY DISINTEGRATING ORAL EVERY 8 HOURS PRN
Status: DISCONTINUED | OUTPATIENT
Start: 2020-01-01 | End: 2020-01-01 | Stop reason: HOSPADM

## 2020-01-01 RX ORDER — ALBUTEROL SULFATE 90 UG/1
1-2 AEROSOL, METERED RESPIRATORY (INHALATION)
Status: CANCELLED
Start: 2020-01-01

## 2020-01-01 RX ORDER — DEXAMETHASONE 2 MG/1
2 TABLET ORAL EVERY 12 HOURS
Qty: 4 TABLET | Refills: 0 | Status: SHIPPED | OUTPATIENT
Start: 2020-01-01 | End: 2020-01-01

## 2020-01-01 RX ORDER — CEFTRIAXONE 1 G/1
1 INJECTION, POWDER, FOR SOLUTION INTRAMUSCULAR; INTRAVENOUS ONCE
Status: DISCONTINUED | OUTPATIENT
Start: 2020-01-01 | End: 2020-01-01

## 2020-01-01 RX ORDER — METOCLOPRAMIDE HYDROCHLORIDE 5 MG/ML
10 INJECTION INTRAMUSCULAR; INTRAVENOUS EVERY 6 HOURS PRN
Status: DISCONTINUED | OUTPATIENT
Start: 2020-01-01 | End: 2020-01-01 | Stop reason: HOSPADM

## 2020-01-01 RX ORDER — LORAZEPAM 2 MG/ML
.5-1 INJECTION INTRAMUSCULAR EVERY 6 HOURS PRN
Status: DISCONTINUED | OUTPATIENT
Start: 2020-01-01 | End: 2020-01-01 | Stop reason: HOSPADM

## 2020-01-01 RX ORDER — EPINEPHRINE 1 MG/ML
0.3 INJECTION, SOLUTION INTRAMUSCULAR; SUBCUTANEOUS EVERY 5 MIN PRN
Status: CANCELLED | OUTPATIENT
Start: 2020-01-01

## 2020-01-01 RX ORDER — HYDROXYZINE PAMOATE 25 MG/1
50 CAPSULE ORAL 3 TIMES DAILY PRN
Status: ON HOLD | COMMUNITY
End: 2020-01-01

## 2020-01-01 RX ORDER — NALOXONE HYDROCHLORIDE 0.4 MG/ML
.1-.4 INJECTION, SOLUTION INTRAMUSCULAR; INTRAVENOUS; SUBCUTANEOUS
Status: DISCONTINUED | OUTPATIENT
Start: 2020-01-01 | End: 2020-01-01 | Stop reason: HOSPADM

## 2020-01-01 RX ORDER — POLYETHYLENE GLYCOL 3350 17 G/17G
17 POWDER, FOR SOLUTION ORAL DAILY PRN
Status: ON HOLD | DISCHARGE
Start: 2020-01-01 | End: 2020-01-01

## 2020-01-01 RX ORDER — IOPAMIDOL 755 MG/ML
73 INJECTION, SOLUTION INTRAVASCULAR ONCE
Status: COMPLETED | OUTPATIENT
Start: 2020-01-01 | End: 2020-01-01

## 2020-01-01 RX ORDER — ACETAMINOPHEN 325 MG/1
650 TABLET ORAL EVERY 4 HOURS PRN
Status: DISCONTINUED | OUTPATIENT
Start: 2020-01-01 | End: 2020-01-01 | Stop reason: HOSPADM

## 2020-01-01 RX ORDER — ONDANSETRON 4 MG/1
4 TABLET, FILM COATED ORAL EVERY 8 HOURS PRN
Status: ON HOLD | DISCHARGE
Start: 2020-01-01 | End: 2020-01-01

## 2020-01-01 RX ORDER — ACETAMINOPHEN 325 MG/1
650 TABLET ORAL EVERY 4 HOURS PRN
Status: DISCONTINUED | OUTPATIENT
Start: 2020-01-01 | End: 2020-01-01

## 2020-01-01 RX ORDER — POTASSIUM CL/LIDO/0.9 % NACL 10MEQ/0.1L
10 INTRAVENOUS SOLUTION, PIGGYBACK (ML) INTRAVENOUS
Status: DISCONTINUED | OUTPATIENT
Start: 2020-01-01 | End: 2020-01-01 | Stop reason: HOSPADM

## 2020-01-01 RX ORDER — POTASSIUM CHLORIDE 7.45 MG/ML
10 INJECTION INTRAVENOUS
Status: DISCONTINUED | OUTPATIENT
Start: 2020-01-01 | End: 2020-01-01 | Stop reason: HOSPADM

## 2020-01-01 RX ORDER — LORAZEPAM 0.5 MG/1
0.5 TABLET ORAL EVERY 6 HOURS PRN
Qty: 20 TABLET | Refills: 0 | Status: SHIPPED | OUTPATIENT
Start: 2020-01-01 | End: 2020-01-01

## 2020-01-01 RX ORDER — LABETALOL 20 MG/4 ML (5 MG/ML) INTRAVENOUS SYRINGE
10-40 EVERY 10 MIN PRN
Status: DISCONTINUED | OUTPATIENT
Start: 2020-01-01 | End: 2020-01-01 | Stop reason: HOSPADM

## 2020-01-01 RX ORDER — OXYCODONE HYDROCHLORIDE 5 MG/1
5-10 TABLET ORAL EVERY 6 HOURS PRN
Status: DISCONTINUED | OUTPATIENT
Start: 2020-01-01 | End: 2020-01-01 | Stop reason: HOSPADM

## 2020-01-01 RX ORDER — MEPERIDINE HYDROCHLORIDE 25 MG/ML
25 INJECTION INTRAMUSCULAR; INTRAVENOUS; SUBCUTANEOUS EVERY 30 MIN PRN
Status: CANCELLED | OUTPATIENT
Start: 2020-01-01

## 2020-01-01 RX ORDER — HYDROMORPHONE HYDROCHLORIDE 1 MG/ML
.3-.5 INJECTION, SOLUTION INTRAMUSCULAR; INTRAVENOUS; SUBCUTANEOUS
Status: DISCONTINUED | OUTPATIENT
Start: 2020-01-01 | End: 2020-01-01 | Stop reason: HOSPADM

## 2020-01-01 RX ORDER — DEXTROSE MONOHYDRATE 25 G/50ML
25-50 INJECTION, SOLUTION INTRAVENOUS
Status: DISCONTINUED | OUTPATIENT
Start: 2020-01-01 | End: 2020-01-01 | Stop reason: HOSPADM

## 2020-01-01 RX ORDER — PROCHLORPERAZINE MALEATE 5 MG
10 TABLET ORAL EVERY 6 HOURS PRN
Status: DISCONTINUED | OUTPATIENT
Start: 2020-01-01 | End: 2020-01-01 | Stop reason: HOSPADM

## 2020-01-01 RX ORDER — HEPARIN SODIUM,PORCINE 10 UNIT/ML
5 VIAL (ML) INTRAVENOUS
Status: CANCELLED | OUTPATIENT
Start: 2020-01-01

## 2020-01-01 RX ORDER — LIDOCAINE 40 MG/G
CREAM TOPICAL
Status: DISCONTINUED | OUTPATIENT
Start: 2020-01-01 | End: 2020-01-01 | Stop reason: HOSPADM

## 2020-01-01 RX ORDER — CEFAZOLIN SODIUM 1 G/3ML
1 INJECTION, POWDER, FOR SOLUTION INTRAMUSCULAR; INTRAVENOUS EVERY 8 HOURS
Status: COMPLETED | OUTPATIENT
Start: 2020-01-01 | End: 2020-01-01

## 2020-01-01 RX ORDER — PROCHLORPERAZINE MALEATE 10 MG
10 TABLET ORAL EVERY 6 HOURS PRN
Qty: 30 TABLET | Refills: 3 | Status: ON HOLD | OUTPATIENT
Start: 2020-01-01 | End: 2020-01-01

## 2020-01-01 RX ORDER — SODIUM CHLORIDE 9 MG/ML
1000 INJECTION, SOLUTION INTRAVENOUS CONTINUOUS PRN
Status: CANCELLED
Start: 2020-01-01

## 2020-01-01 RX ORDER — ONDANSETRON 4 MG/1
4 TABLET, FILM COATED ORAL EVERY 8 HOURS PRN
Qty: 30 TABLET | Refills: 0 | Status: ON HOLD | OUTPATIENT
Start: 2020-01-01 | End: 2020-01-01

## 2020-01-01 RX ORDER — LORAZEPAM 0.5 MG/1
0.25 TABLET ORAL EVERY 6 HOURS PRN
Status: DISCONTINUED | OUTPATIENT
Start: 2020-01-01 | End: 2020-01-01 | Stop reason: HOSPADM

## 2020-01-01 RX ORDER — PROPOFOL 10 MG/ML
INJECTION, EMULSION INTRAVENOUS PRN
Status: DISCONTINUED | OUTPATIENT
Start: 2020-01-01 | End: 2020-01-01

## 2020-01-01 RX ORDER — METHYLPREDNISOLONE SODIUM SUCCINATE 125 MG/2ML
125 INJECTION, POWDER, LYOPHILIZED, FOR SOLUTION INTRAMUSCULAR; INTRAVENOUS
Status: CANCELLED
Start: 2020-01-01

## 2020-01-01 RX ORDER — DIPHENHYDRAMINE HYDROCHLORIDE 50 MG/ML
50 INJECTION INTRAMUSCULAR; INTRAVENOUS
Status: CANCELLED
Start: 2020-01-01

## 2020-01-01 RX ORDER — AMOXICILLIN 250 MG
1 CAPSULE ORAL 2 TIMES DAILY
Status: DISCONTINUED | OUTPATIENT
Start: 2020-01-01 | End: 2020-01-01 | Stop reason: HOSPADM

## 2020-01-01 RX ORDER — MANNITOL 20 G/100ML
INJECTION, SOLUTION INTRAVENOUS PRN
Status: DISCONTINUED | OUTPATIENT
Start: 2020-01-01 | End: 2020-01-01

## 2020-01-01 RX ORDER — COSYNTROPIN 0.25 MG/ML
250 INJECTION, POWDER, FOR SOLUTION INTRAMUSCULAR; INTRAVENOUS ONCE
Status: COMPLETED | OUTPATIENT
Start: 2020-01-01 | End: 2020-01-01

## 2020-01-01 RX ORDER — GUAIFENESIN/DEXTROMETHORPHAN 100-10MG/5
5 SYRUP ORAL EVERY 4 HOURS PRN
Qty: 1 BOTTLE | Refills: 0 | Status: SHIPPED | OUTPATIENT
Start: 2020-01-01 | End: 2020-01-01

## 2020-01-01 RX ORDER — ALBUTEROL SULFATE 0.83 MG/ML
2.5 SOLUTION RESPIRATORY (INHALATION)
Status: CANCELLED | OUTPATIENT
Start: 2020-01-01

## 2020-01-01 RX ORDER — DEXAMETHASONE 2 MG/1
2 TABLET ORAL EVERY 12 HOURS SCHEDULED
Status: DISCONTINUED | OUTPATIENT
Start: 2020-01-01 | End: 2020-01-01 | Stop reason: HOSPADM

## 2020-01-01 RX ORDER — HYDROMORPHONE HYDROCHLORIDE 1 MG/ML
.3-.5 INJECTION, SOLUTION INTRAMUSCULAR; INTRAVENOUS; SUBCUTANEOUS EVERY 5 MIN PRN
Status: DISCONTINUED | OUTPATIENT
Start: 2020-01-01 | End: 2020-01-01 | Stop reason: HOSPADM

## 2020-01-01 RX ORDER — DIPHENHYDRAMINE HCL 25 MG
50 CAPSULE ORAL ONCE
Status: CANCELLED
Start: 2020-01-01

## 2020-01-01 RX ORDER — LORAZEPAM 0.5 MG/1
0.5 TABLET ORAL 3 TIMES DAILY PRN
Status: DISCONTINUED | OUTPATIENT
Start: 2020-01-01 | End: 2020-01-01 | Stop reason: HOSPADM

## 2020-01-01 RX ORDER — POTASSIUM CHLORIDE 750 MG/1
20-40 TABLET, EXTENDED RELEASE ORAL
Status: DISCONTINUED | OUTPATIENT
Start: 2020-01-01 | End: 2020-01-01 | Stop reason: HOSPADM

## 2020-01-01 RX ORDER — SODIUM CHLORIDE 9 MG/ML
INJECTION, SOLUTION INTRAVENOUS CONTINUOUS
Status: DISCONTINUED | OUTPATIENT
Start: 2020-01-01 | End: 2020-01-01

## 2020-01-01 RX ORDER — LORAZEPAM 0.5 MG/1
0.5 TABLET ORAL PRN
Qty: 5 TABLET | Refills: 0 | Status: SHIPPED | OUTPATIENT
Start: 2020-01-01 | End: 2020-01-01

## 2020-01-01 RX ORDER — MEPERIDINE HYDROCHLORIDE 25 MG/ML
12.5 INJECTION INTRAMUSCULAR; INTRAVENOUS; SUBCUTANEOUS EVERY 5 MIN PRN
Status: DISCONTINUED | OUTPATIENT
Start: 2020-01-01 | End: 2020-01-01 | Stop reason: HOSPADM

## 2020-01-01 RX ORDER — FENTANYL CITRATE 50 UG/ML
INJECTION, SOLUTION INTRAMUSCULAR; INTRAVENOUS PRN
Status: DISCONTINUED | OUTPATIENT
Start: 2020-01-01 | End: 2020-01-01

## 2020-01-01 RX ORDER — ACETAMINOPHEN 325 MG/1
650 TABLET ORAL EVERY 6 HOURS PRN
Status: DISCONTINUED | OUTPATIENT
Start: 2020-01-01 | End: 2020-01-01 | Stop reason: HOSPADM

## 2020-01-01 RX ORDER — ONDANSETRON 2 MG/ML
INJECTION INTRAMUSCULAR; INTRAVENOUS PRN
Status: DISCONTINUED | OUTPATIENT
Start: 2020-01-01 | End: 2020-01-01

## 2020-01-01 RX ORDER — ACETAMINOPHEN 500 MG
500-1000 TABLET ORAL EVERY 4 HOURS PRN
Status: ON HOLD | COMMUNITY
End: 2020-01-01

## 2020-01-01 RX ORDER — PROCHLORPERAZINE MALEATE 10 MG
10 TABLET ORAL EVERY 6 HOURS PRN
Qty: 30 TABLET | Refills: 2 | Status: SHIPPED | OUTPATIENT
Start: 2020-01-01 | End: 2020-01-01

## 2020-01-01 RX ORDER — LORAZEPAM 0.5 MG/1
0.5 TABLET ORAL ONCE
Status: COMPLETED | OUTPATIENT
Start: 2020-01-01 | End: 2020-01-01

## 2020-01-01 RX ORDER — SODIUM CHLORIDE 1 G/1
2 TABLET ORAL 2 TIMES DAILY WITH MEALS
Status: DISCONTINUED | OUTPATIENT
Start: 2020-01-01 | End: 2020-01-01 | Stop reason: HOSPADM

## 2020-01-01 RX ORDER — HEPARIN SODIUM (PORCINE) LOCK FLUSH IV SOLN 100 UNIT/ML 100 UNIT/ML
5 SOLUTION INTRAVENOUS
Status: CANCELLED | OUTPATIENT
Start: 2020-01-01

## 2020-01-01 RX ORDER — IOPAMIDOL 755 MG/ML
82 INJECTION, SOLUTION INTRAVASCULAR ONCE
Status: COMPLETED | OUTPATIENT
Start: 2020-01-01 | End: 2020-01-01

## 2020-01-01 RX ORDER — LORAZEPAM 2 MG/ML
0.5 INJECTION INTRAMUSCULAR EVERY 4 HOURS PRN
Status: CANCELLED
Start: 2020-01-01

## 2020-01-01 RX ORDER — LORAZEPAM 0.5 MG/1
0.5 TABLET ORAL EVERY 6 HOURS PRN
Qty: 30 TABLET | Refills: 1 | Status: SHIPPED | OUTPATIENT
Start: 2020-01-01

## 2020-01-01 RX ORDER — DEXAMETHASONE 1 MG
1 TABLET ORAL EVERY 12 HOURS
Qty: 4 TABLET | Refills: 0 | Status: SHIPPED | OUTPATIENT
Start: 2020-01-01 | End: 2020-01-01

## 2020-01-01 RX ORDER — SODIUM CHLORIDE, SODIUM LACTATE, POTASSIUM CHLORIDE, CALCIUM CHLORIDE 600; 310; 30; 20 MG/100ML; MG/100ML; MG/100ML; MG/100ML
INJECTION, SOLUTION INTRAVENOUS CONTINUOUS
Status: DISCONTINUED | OUTPATIENT
Start: 2020-01-01 | End: 2020-01-01 | Stop reason: HOSPADM

## 2020-01-01 RX ORDER — PIPERACILLIN SODIUM, TAZOBACTAM SODIUM 4; .5 G/20ML; G/20ML
4.5 INJECTION, POWDER, LYOPHILIZED, FOR SOLUTION INTRAVENOUS ONCE
Status: COMPLETED | OUTPATIENT
Start: 2020-01-01 | End: 2020-01-01

## 2020-01-01 RX ORDER — HYDROXYZINE HYDROCHLORIDE 25 MG/1
25-50 TABLET, FILM COATED ORAL 3 TIMES DAILY PRN
Status: DISCONTINUED | OUTPATIENT
Start: 2020-01-01 | End: 2020-01-01 | Stop reason: HOSPADM

## 2020-01-01 RX ORDER — LORAZEPAM 0.5 MG/1
0.5 TABLET ORAL EVERY 6 HOURS PRN
Qty: 10 TABLET | Refills: 0 | Status: CANCELLED | OUTPATIENT
Start: 2020-01-01

## 2020-01-01 RX ORDER — ONDANSETRON 8 MG/1
8 TABLET, FILM COATED ORAL EVERY 8 HOURS PRN
Status: DISCONTINUED | OUTPATIENT
Start: 2020-01-01 | End: 2020-01-01 | Stop reason: HOSPADM

## 2020-01-01 RX ORDER — ACETAMINOPHEN 650 MG/1
650 SUPPOSITORY RECTAL EVERY 4 HOURS PRN
Status: DISCONTINUED | OUTPATIENT
Start: 2020-01-01 | End: 2020-01-01 | Stop reason: HOSPADM

## 2020-01-01 RX ORDER — ONDANSETRON 4 MG/1
4 TABLET, ORALLY DISINTEGRATING ORAL EVERY 6 HOURS PRN
Status: DISCONTINUED | OUTPATIENT
Start: 2020-01-01 | End: 2020-01-01 | Stop reason: HOSPADM

## 2020-01-01 RX ORDER — POLYETHYLENE GLYCOL 3350 17 G/17G
17 POWDER, FOR SOLUTION ORAL DAILY PRN
Status: DISCONTINUED | OUTPATIENT
Start: 2020-01-01 | End: 2020-01-01 | Stop reason: HOSPADM

## 2020-01-01 RX ORDER — LEVETIRACETAM 1000 MG/1
1000 TABLET ORAL 2 TIMES DAILY
Qty: 60 TABLET | Refills: 1 | Status: SHIPPED | OUTPATIENT
Start: 2020-01-01

## 2020-01-01 RX ORDER — LIDOCAINE HYDROCHLORIDE 20 MG/ML
INJECTION, SOLUTION INFILTRATION; PERINEURAL PRN
Status: DISCONTINUED | OUTPATIENT
Start: 2020-01-01 | End: 2020-01-01

## 2020-01-01 RX ORDER — DEXAMETHASONE 2 MG/1
2 TABLET ORAL EVERY 8 HOURS SCHEDULED
Status: COMPLETED | OUTPATIENT
Start: 2020-01-01 | End: 2020-01-01

## 2020-01-01 RX ORDER — DEXAMETHASONE 4 MG/1
4 TABLET ORAL EVERY 6 HOURS
Refills: 0 | Status: ON HOLD | DISCHARGE
Start: 2020-01-01 | End: 2020-01-01

## 2020-01-01 RX ORDER — HYDROXYZINE HYDROCHLORIDE 25 MG/1
25-50 TABLET, FILM COATED ORAL 3 TIMES DAILY PRN
Status: ON HOLD | DISCHARGE
Start: 2020-01-01 | End: 2020-01-01

## 2020-01-01 RX ORDER — HYDROXYZINE HYDROCHLORIDE 25 MG/1
25-50 TABLET, FILM COATED ORAL 3 TIMES DAILY PRN
Qty: 20 TABLET | Refills: 0 | Status: ON HOLD | OUTPATIENT
Start: 2020-01-01 | End: 2020-01-01

## 2020-01-01 RX ORDER — DEXAMETHASONE SODIUM PHOSPHATE 4 MG/ML
4 INJECTION, SOLUTION INTRA-ARTICULAR; INTRALESIONAL; INTRAMUSCULAR; INTRAVENOUS; SOFT TISSUE
Status: DISCONTINUED | OUTPATIENT
Start: 2020-01-01 | End: 2020-01-01 | Stop reason: HOSPADM

## 2020-01-01 RX ORDER — DEXAMETHASONE SODIUM PHOSPHATE 4 MG/ML
INJECTION, SOLUTION INTRA-ARTICULAR; INTRALESIONAL; INTRAMUSCULAR; INTRAVENOUS; SOFT TISSUE PRN
Status: DISCONTINUED | OUTPATIENT
Start: 2020-01-01 | End: 2020-01-01

## 2020-01-01 RX ORDER — POTASSIUM CHLORIDE 29.8 MG/ML
20 INJECTION INTRAVENOUS
Status: DISCONTINUED | OUTPATIENT
Start: 2020-01-01 | End: 2020-01-01 | Stop reason: HOSPADM

## 2020-01-01 RX ORDER — DEXAMETHASONE 1.5 MG/1
3 TABLET ORAL EVERY 12 HOURS SCHEDULED
Status: DISCONTINUED | OUTPATIENT
Start: 2020-01-01 | End: 2020-01-01 | Stop reason: HOSPADM

## 2020-01-01 RX ORDER — EPHEDRINE SULFATE 50 MG/ML
INJECTION, SOLUTION INTRAMUSCULAR; INTRAVENOUS; SUBCUTANEOUS PRN
Status: DISCONTINUED | OUTPATIENT
Start: 2020-01-01 | End: 2020-01-01

## 2020-01-01 RX ORDER — FERROUS SULFATE 325(65) MG
325 TABLET ORAL
Status: DISCONTINUED | OUTPATIENT
Start: 2020-01-01 | End: 2020-01-01 | Stop reason: HOSPADM

## 2020-01-01 RX ORDER — OXYCODONE HYDROCHLORIDE 5 MG/1
5-10 TABLET ORAL
Status: DISCONTINUED | OUTPATIENT
Start: 2020-01-01 | End: 2020-01-01 | Stop reason: HOSPADM

## 2020-01-01 RX ORDER — OXYCODONE HYDROCHLORIDE 5 MG/1
5-10 TABLET ORAL EVERY 6 HOURS PRN
Refills: 0 | Status: ON HOLD | DISCHARGE
Start: 2020-01-01 | End: 2020-01-01

## 2020-01-01 RX ORDER — LORAZEPAM 0.5 MG/1
0.5 TABLET ORAL EVERY 4 HOURS PRN
Qty: 30 TABLET | Refills: 3 | Status: SHIPPED | OUTPATIENT
Start: 2020-01-01 | End: 2020-01-01

## 2020-01-01 RX ORDER — METOCLOPRAMIDE 10 MG/1
10 TABLET ORAL EVERY 6 HOURS PRN
Status: DISCONTINUED | OUTPATIENT
Start: 2020-01-01 | End: 2020-01-01 | Stop reason: HOSPADM

## 2020-01-01 RX ORDER — LORAZEPAM 0.5 MG/1
0.25 TABLET ORAL EVERY 6 HOURS PRN
Status: ON HOLD | DISCHARGE
Start: 2020-01-01 | End: 2020-01-01

## 2020-01-01 RX ORDER — EPINEPHRINE 0.3 MG/.3ML
0.3 INJECTION SUBCUTANEOUS EVERY 5 MIN PRN
Status: CANCELLED | OUTPATIENT
Start: 2020-01-01

## 2020-01-01 RX ORDER — HYDRALAZINE HYDROCHLORIDE 20 MG/ML
2.5-5 INJECTION INTRAMUSCULAR; INTRAVENOUS EVERY 10 MIN PRN
Status: DISCONTINUED | OUTPATIENT
Start: 2020-01-01 | End: 2020-01-01 | Stop reason: HOSPADM

## 2020-01-01 RX ORDER — LORAZEPAM 1 MG/1
1 TABLET ORAL DAILY PRN
Status: DISCONTINUED | OUTPATIENT
Start: 2020-01-01 | End: 2020-01-01 | Stop reason: HOSPADM

## 2020-01-01 RX ORDER — DEXAMETHASONE 4 MG/1
4 TABLET ORAL EVERY 6 HOURS
Status: DISCONTINUED | OUTPATIENT
Start: 2020-01-01 | End: 2020-01-01

## 2020-01-01 RX ORDER — HYDROCORTISONE 10 MG/1
TABLET ORAL
Qty: 90 TABLET | Refills: 0 | Status: ON HOLD | OUTPATIENT
Start: 2020-01-01 | End: 2020-01-01

## 2020-01-01 RX ORDER — VANCOMYCIN HYDROCHLORIDE 1 G/200ML
1000 INJECTION, SOLUTION INTRAVENOUS
Status: COMPLETED | OUTPATIENT
Start: 2020-01-01 | End: 2020-01-01

## 2020-01-01 RX ORDER — PANTOPRAZOLE SODIUM 40 MG/1
40 TABLET, DELAYED RELEASE ORAL
Status: ON HOLD | DISCHARGE
Start: 2020-01-01 | End: 2020-01-01

## 2020-01-01 RX ORDER — MAGNESIUM SULFATE HEPTAHYDRATE 40 MG/ML
4 INJECTION, SOLUTION INTRAVENOUS EVERY 4 HOURS PRN
Status: DISCONTINUED | OUTPATIENT
Start: 2020-01-01 | End: 2020-01-01 | Stop reason: HOSPADM

## 2020-01-01 RX ORDER — GADOBUTROL 604.72 MG/ML
0.1 INJECTION INTRAVENOUS ONCE
Status: COMPLETED | OUTPATIENT
Start: 2020-01-01 | End: 2020-01-01

## 2020-01-01 RX ORDER — LORAZEPAM 0.5 MG/1
.5-1 TABLET ORAL EVERY 6 HOURS PRN
Status: DISCONTINUED | OUTPATIENT
Start: 2020-01-01 | End: 2020-01-01 | Stop reason: HOSPADM

## 2020-01-01 RX ORDER — ONDANSETRON 2 MG/ML
4 INJECTION INTRAMUSCULAR; INTRAVENOUS EVERY 30 MIN PRN
Status: DISCONTINUED | OUTPATIENT
Start: 2020-01-01 | End: 2020-01-01 | Stop reason: HOSPADM

## 2020-01-01 RX ORDER — LABETALOL 20 MG/4 ML (5 MG/ML) INTRAVENOUS SYRINGE
10
Status: DISCONTINUED | OUTPATIENT
Start: 2020-01-01 | End: 2020-01-01 | Stop reason: HOSPADM

## 2020-01-01 RX ORDER — LIDOCAINE 40 MG/G
CREAM TOPICAL
Status: DISCONTINUED | OUTPATIENT
Start: 2020-01-01 | End: 2020-01-01

## 2020-01-01 RX ORDER — LEVETIRACETAM 10 MG/ML
1000 INJECTION INTRAVASCULAR ONCE
Status: COMPLETED | OUTPATIENT
Start: 2020-01-01 | End: 2020-01-01

## 2020-01-01 RX ORDER — ONDANSETRON 4 MG/1
4 TABLET, FILM COATED ORAL EVERY 8 HOURS PRN
Qty: 30 TABLET | Refills: 0 | Status: SHIPPED | OUTPATIENT
Start: 2020-01-01 | End: 2020-01-01

## 2020-01-01 RX ORDER — ACETAMINOPHEN 325 MG/1
650 TABLET ORAL EVERY 6 HOURS PRN
Status: ON HOLD | DISCHARGE
Start: 2020-01-01 | End: 2020-01-01

## 2020-01-01 RX ORDER — OXYCODONE HYDROCHLORIDE 5 MG/1
5 TABLET ORAL EVERY 6 HOURS PRN
Qty: 20 TABLET | Refills: 0 | Status: SHIPPED | OUTPATIENT
Start: 2020-01-01 | End: 2020-01-01

## 2020-01-01 RX ORDER — AMOXICILLIN 250 MG
2 CAPSULE ORAL 2 TIMES DAILY PRN
Status: DISCONTINUED | OUTPATIENT
Start: 2020-01-01 | End: 2020-01-01 | Stop reason: HOSPADM

## 2020-01-01 RX ORDER — LEVETIRACETAM 250 MG/1
250 TABLET ORAL 2 TIMES DAILY
Status: DISCONTINUED | OUTPATIENT
Start: 2020-01-01 | End: 2020-01-01

## 2020-01-01 RX ORDER — ONDANSETRON 8 MG/1
8 TABLET, FILM COATED ORAL EVERY 8 HOURS PRN
Qty: 10 TABLET | Refills: 3 | Status: SHIPPED | OUTPATIENT
Start: 2020-01-01

## 2020-01-01 RX ORDER — GUAIFENESIN/DEXTROMETHORPHAN 100-10MG/5
5 SYRUP ORAL EVERY 4 HOURS PRN
Status: DISCONTINUED | OUTPATIENT
Start: 2020-01-01 | End: 2020-01-01 | Stop reason: HOSPADM

## 2020-01-01 RX ORDER — DEXAMETHASONE 4 MG/1
4 TABLET ORAL EVERY 12 HOURS
Qty: 4 TABLET | Refills: 0 | Status: SHIPPED | OUTPATIENT
Start: 2020-01-01 | End: 2020-01-01

## 2020-01-01 RX ORDER — DEXAMETHASONE 4 MG/1
4 TABLET ORAL EVERY 6 HOURS SCHEDULED
Status: DISCONTINUED | OUTPATIENT
Start: 2020-01-01 | End: 2020-01-01 | Stop reason: HOSPADM

## 2020-01-01 RX ORDER — SODIUM CHLORIDE 1 G/1
2 TABLET ORAL 3 TIMES DAILY
Status: DISCONTINUED | OUTPATIENT
Start: 2020-01-01 | End: 2020-01-01 | Stop reason: HOSPADM

## 2020-01-01 RX ORDER — LORAZEPAM 0.5 MG/1
0.5 TABLET ORAL EVERY 4 HOURS PRN
Qty: 30 TABLET | Refills: 3 | Status: ON HOLD | OUTPATIENT
Start: 2020-01-01 | End: 2020-01-01

## 2020-01-01 RX ORDER — NALOXONE HYDROCHLORIDE 0.4 MG/ML
.1-.4 INJECTION, SOLUTION INTRAMUSCULAR; INTRAVENOUS; SUBCUTANEOUS
Status: ACTIVE | OUTPATIENT
Start: 2020-01-01 | End: 2020-01-01

## 2020-01-01 RX ORDER — CEFTRIAXONE 2 G/1
2 INJECTION, POWDER, FOR SOLUTION INTRAMUSCULAR; INTRAVENOUS EVERY 24 HOURS
Status: DISCONTINUED | OUTPATIENT
Start: 2020-01-01 | End: 2020-01-01 | Stop reason: HOSPADM

## 2020-01-01 RX ORDER — LIDOCAINE HYDROCHLORIDE 10 MG/ML
1-30 INJECTION, SOLUTION EPIDURAL; INFILTRATION; INTRACAUDAL; PERINEURAL
Status: DISCONTINUED | OUTPATIENT
Start: 2020-01-01 | End: 2020-01-01

## 2020-01-01 RX ORDER — ONDANSETRON 4 MG/1
4-8 TABLET, ORALLY DISINTEGRATING ORAL EVERY 6 HOURS PRN
Status: DISCONTINUED | OUTPATIENT
Start: 2020-01-01 | End: 2020-01-01 | Stop reason: HOSPADM

## 2020-01-01 RX ORDER — FLUDROCORTISONE ACETATE 0.1 MG/1
0.1 TABLET ORAL EVERY OTHER DAY
Qty: 15 TABLET | Refills: 0 | Status: ON HOLD | OUTPATIENT
Start: 2020-01-01 | End: 2020-01-01

## 2020-01-01 RX ORDER — LEVETIRACETAM 500 MG/1
500 TABLET ORAL 2 TIMES DAILY
Status: ON HOLD | DISCHARGE
Start: 2020-01-01 | End: 2020-01-01

## 2020-01-01 RX ORDER — CEFAZOLIN SODIUM 2 G/100ML
2 INJECTION, SOLUTION INTRAVENOUS
Status: DISCONTINUED | OUTPATIENT
Start: 2020-01-01 | End: 2020-01-01

## 2020-01-01 RX ORDER — FENTANYL CITRATE 50 UG/ML
25-50 INJECTION, SOLUTION INTRAMUSCULAR; INTRAVENOUS
Status: DISCONTINUED | OUTPATIENT
Start: 2020-01-01 | End: 2020-01-01 | Stop reason: HOSPADM

## 2020-01-01 RX ORDER — LORAZEPAM 1 MG/1
1 TABLET ORAL ONCE
Status: DISCONTINUED | OUTPATIENT
Start: 2020-01-01 | End: 2020-01-01

## 2020-01-01 RX ORDER — LORAZEPAM 0.5 MG/1
0.5 TABLET ORAL EVERY 6 HOURS PRN
Qty: 5 TABLET | Refills: 0 | Status: CANCELLED | OUTPATIENT
Start: 2020-01-01

## 2020-01-01 RX ORDER — HYDROCORTISONE 10 MG/1
10 TABLET ORAL
Status: DISCONTINUED | OUTPATIENT
Start: 2020-01-01 | End: 2020-01-01

## 2020-01-01 RX ORDER — HYDROXYZINE HYDROCHLORIDE 25 MG/1
25-50 TABLET, FILM COATED ORAL 3 TIMES DAILY PRN
Qty: 20 TABLET | Refills: 0 | Status: SHIPPED | OUTPATIENT
Start: 2020-01-01 | End: 2020-01-01

## 2020-01-01 RX ORDER — DEXAMETHASONE 1 MG
1 TABLET ORAL EVERY 8 HOURS SCHEDULED
Status: COMPLETED | OUTPATIENT
Start: 2020-01-01 | End: 2020-01-01

## 2020-01-01 RX ORDER — AMOXICILLIN 250 MG
2 CAPSULE ORAL 2 TIMES DAILY
Status: DISCONTINUED | OUTPATIENT
Start: 2020-01-01 | End: 2020-01-01 | Stop reason: HOSPADM

## 2020-01-01 RX ORDER — LORAZEPAM 0.5 MG/1
0.5 TABLET ORAL EVERY 4 HOURS PRN
Status: DISCONTINUED | OUTPATIENT
Start: 2020-01-01 | End: 2020-01-01

## 2020-01-01 RX ORDER — LEVETIRACETAM 500 MG/1
500 TABLET ORAL 2 TIMES DAILY
Qty: 14 TABLET | Refills: 0 | Status: SHIPPED | OUTPATIENT
Start: 2020-01-01 | End: 2020-01-01

## 2020-01-01 RX ORDER — ONDANSETRON 2 MG/ML
4 INJECTION INTRAMUSCULAR; INTRAVENOUS EVERY 6 HOURS PRN
Status: DISCONTINUED | OUTPATIENT
Start: 2020-01-01 | End: 2020-01-01 | Stop reason: HOSPADM

## 2020-01-01 RX ORDER — OXYCODONE HYDROCHLORIDE 5 MG/1
5 TABLET ORAL ONCE
Status: COMPLETED | OUTPATIENT
Start: 2020-01-01 | End: 2020-01-01

## 2020-01-01 RX ORDER — ACETAMINOPHEN 325 MG/1
650 TABLET ORAL EVERY 4 HOURS PRN
DISCHARGE
Start: 2020-01-01

## 2020-01-01 RX ORDER — LORAZEPAM 0.5 MG/1
1 TABLET ORAL ONCE
Status: COMPLETED | OUTPATIENT
Start: 2020-01-01 | End: 2020-01-01

## 2020-01-01 RX ORDER — SODIUM CHLORIDE, SODIUM LACTATE, POTASSIUM CHLORIDE, CALCIUM CHLORIDE 600; 310; 30; 20 MG/100ML; MG/100ML; MG/100ML; MG/100ML
INJECTION, SOLUTION INTRAVENOUS CONTINUOUS
Status: DISCONTINUED | OUTPATIENT
Start: 2020-01-01 | End: 2020-01-01

## 2020-01-01 RX ORDER — NICOTINE POLACRILEX 4 MG
15-30 LOZENGE BUCCAL
Status: DISCONTINUED | OUTPATIENT
Start: 2020-01-01 | End: 2020-01-01 | Stop reason: HOSPADM

## 2020-01-01 RX ORDER — GADOBUTROL 604.72 MG/ML
7 INJECTION INTRAVENOUS ONCE
Status: COMPLETED | OUTPATIENT
Start: 2020-01-01 | End: 2020-01-01

## 2020-01-01 RX ORDER — POLYETHYLENE GLYCOL 3350 17 G/17G
17 POWDER, FOR SOLUTION ORAL DAILY PRN
Status: DISCONTINUED | OUTPATIENT
Start: 2020-01-01 | End: 2020-01-01

## 2020-01-01 RX ORDER — CEFAZOLIN SODIUM 2 G/100ML
2 INJECTION, SOLUTION INTRAVENOUS
Status: COMPLETED | OUTPATIENT
Start: 2020-01-01 | End: 2020-01-01

## 2020-01-01 RX ORDER — OXYCODONE HYDROCHLORIDE 5 MG/1
5 TABLET ORAL EVERY 6 HOURS PRN
Qty: 12 TABLET | Refills: 0 | Status: SHIPPED | OUTPATIENT
Start: 2020-01-01 | End: 2020-01-01

## 2020-01-01 RX ORDER — LEVETIRACETAM 500 MG/1
500 TABLET ORAL 2 TIMES DAILY
Status: DISCONTINUED | OUTPATIENT
Start: 2020-01-01 | End: 2020-01-01 | Stop reason: HOSPADM

## 2020-01-01 RX ORDER — CEFAZOLIN SODIUM 1 G/3ML
1 INJECTION, POWDER, FOR SOLUTION INTRAMUSCULAR; INTRAVENOUS SEE ADMIN INSTRUCTIONS
Status: DISCONTINUED | OUTPATIENT
Start: 2020-01-01 | End: 2020-01-01

## 2020-01-01 RX ORDER — ONDANSETRON 4 MG/1
4 TABLET, ORALLY DISINTEGRATING ORAL EVERY 30 MIN PRN
Status: DISCONTINUED | OUTPATIENT
Start: 2020-01-01 | End: 2020-01-01 | Stop reason: HOSPADM

## 2020-01-01 RX ORDER — ONDANSETRON 2 MG/ML
8 INJECTION INTRAMUSCULAR; INTRAVENOUS EVERY 8 HOURS PRN
Status: DISCONTINUED | OUTPATIENT
Start: 2020-01-01 | End: 2020-01-01 | Stop reason: HOSPADM

## 2020-01-01 RX ORDER — DEXTROSE MONOHYDRATE 50 MG/ML
INJECTION, SOLUTION INTRAVENOUS CONTINUOUS
Status: DISCONTINUED | OUTPATIENT
Start: 2020-01-01 | End: 2020-01-01

## 2020-01-01 RX ORDER — LEVETIRACETAM 500 MG/1
500 TABLET ORAL 2 TIMES DAILY
Status: COMPLETED | OUTPATIENT
Start: 2020-01-01 | End: 2020-01-01

## 2020-01-01 RX ORDER — GADOBUTROL 604.72 MG/ML
6 INJECTION INTRAVENOUS ONCE
Status: COMPLETED | OUTPATIENT
Start: 2020-01-01 | End: 2020-01-01

## 2020-01-01 RX ORDER — LORAZEPAM 0.5 MG/1
0.5 TABLET ORAL EVERY 6 HOURS PRN
Qty: 30 TABLET | Refills: 1 | Status: SHIPPED | OUTPATIENT
Start: 2020-01-01 | End: 2020-01-01

## 2020-01-01 RX ORDER — ONDANSETRON 4 MG/1
4-8 TABLET, FILM COATED ORAL EVERY 8 HOURS PRN
Status: DISCONTINUED | OUTPATIENT
Start: 2020-01-01 | End: 2020-01-01 | Stop reason: HOSPADM

## 2020-01-01 RX ORDER — DEXAMETHASONE SODIUM PHOSPHATE 4 MG/ML
10 INJECTION, SOLUTION INTRA-ARTICULAR; INTRALESIONAL; INTRAMUSCULAR; INTRAVENOUS; SOFT TISSUE ONCE
Status: COMPLETED | OUTPATIENT
Start: 2020-01-01 | End: 2020-01-01

## 2020-01-01 RX ORDER — ACETAMINOPHEN 325 MG/1
650 TABLET ORAL EVERY 4 HOURS PRN
Status: CANCELLED | OUTPATIENT
Start: 2020-01-01

## 2020-01-01 RX ORDER — AMOXICILLIN 250 MG
1 CAPSULE ORAL 2 TIMES DAILY PRN
Status: DISCONTINUED | OUTPATIENT
Start: 2020-01-01 | End: 2020-01-01 | Stop reason: HOSPADM

## 2020-01-01 RX ORDER — FENTANYL CITRATE 50 UG/ML
INJECTION, SOLUTION INTRAMUSCULAR; INTRAVENOUS PRN
Status: DISCONTINUED | OUTPATIENT
Start: 2020-01-01 | End: 2020-01-01 | Stop reason: HOSPADM

## 2020-01-01 RX ORDER — SODIUM CHLORIDE, SODIUM LACTATE, POTASSIUM CHLORIDE, CALCIUM CHLORIDE 600; 310; 30; 20 MG/100ML; MG/100ML; MG/100ML; MG/100ML
INJECTION, SOLUTION INTRAVENOUS CONTINUOUS PRN
Status: DISCONTINUED | OUTPATIENT
Start: 2020-01-01 | End: 2020-01-01

## 2020-01-01 RX ORDER — MORPHINE SULFATE 2 MG/ML
2-4 INJECTION, SOLUTION INTRAMUSCULAR; INTRAVENOUS
Status: DISCONTINUED | OUTPATIENT
Start: 2020-01-01 | End: 2020-01-01

## 2020-01-01 RX ORDER — DEXAMETHASONE SODIUM PHOSPHATE 4 MG/ML
4 INJECTION, SOLUTION INTRA-ARTICULAR; INTRALESIONAL; INTRAMUSCULAR; INTRAVENOUS; SOFT TISSUE EVERY 8 HOURS
Status: COMPLETED | OUTPATIENT
Start: 2020-01-01 | End: 2020-01-01

## 2020-01-01 RX ORDER — ACETAMINOPHEN 325 MG/1
325-650 TABLET ORAL EVERY 4 HOURS PRN
Status: DISCONTINUED | OUTPATIENT
Start: 2020-01-01 | End: 2020-01-01 | Stop reason: HOSPADM

## 2020-01-01 RX ORDER — PANTOPRAZOLE SODIUM 40 MG/1
40 TABLET, DELAYED RELEASE ORAL
Status: DISCONTINUED | OUTPATIENT
Start: 2020-01-01 | End: 2020-01-01 | Stop reason: HOSPADM

## 2020-01-01 RX ORDER — HYDRALAZINE HYDROCHLORIDE 20 MG/ML
10-20 INJECTION INTRAMUSCULAR; INTRAVENOUS EVERY 30 MIN PRN
Status: DISCONTINUED | OUTPATIENT
Start: 2020-01-01 | End: 2020-01-01 | Stop reason: HOSPADM

## 2020-01-01 RX ORDER — SODIUM CHLORIDE 9 MG/ML
INJECTION, SOLUTION INTRAVENOUS CONTINUOUS
Status: ACTIVE | OUTPATIENT
Start: 2020-01-01 | End: 2020-01-01

## 2020-01-01 RX ORDER — BUPIVACAINE HYDROCHLORIDE AND EPINEPHRINE 2.5; 5 MG/ML; UG/ML
INJECTION, SOLUTION INFILTRATION; PERINEURAL PRN
Status: DISCONTINUED | OUTPATIENT
Start: 2020-01-01 | End: 2020-01-01 | Stop reason: HOSPADM

## 2020-01-01 RX ORDER — LORAZEPAM 2 MG/ML
2 INJECTION INTRAMUSCULAR
Status: COMPLETED | OUTPATIENT
Start: 2020-01-01 | End: 2020-01-01

## 2020-01-01 RX ORDER — LORATADINE 10 MG/1
10 TABLET ORAL
Status: DISCONTINUED | OUTPATIENT
Start: 2020-01-01 | End: 2020-01-01

## 2020-01-01 RX ORDER — ONDANSETRON 4 MG/1
4 TABLET, FILM COATED ORAL EVERY 8 HOURS PRN
Status: DISCONTINUED | OUTPATIENT
Start: 2020-01-01 | End: 2020-01-01 | Stop reason: HOSPADM

## 2020-01-01 RX ORDER — PROCHLORPERAZINE MALEATE 10 MG
10 TABLET ORAL EVERY 6 HOURS PRN
Qty: 30 TABLET | Refills: 2 | Status: ON HOLD | OUTPATIENT
Start: 2020-01-01 | End: 2020-01-01

## 2020-01-01 RX ORDER — DEXAMETHASONE 4 MG/1
4 TABLET ORAL EVERY 8 HOURS SCHEDULED
Status: COMPLETED | OUTPATIENT
Start: 2020-01-01 | End: 2020-01-01

## 2020-01-01 RX ORDER — LEVETIRACETAM 500 MG/1
1000 TABLET ORAL 2 TIMES DAILY
Status: DISCONTINUED | OUTPATIENT
Start: 2020-01-01 | End: 2020-01-01 | Stop reason: HOSPADM

## 2020-01-01 RX ORDER — SODIUM CHLORIDE 1 G/1
2 TABLET ORAL 3 TIMES DAILY
Qty: 180 TABLET | Refills: 1 | Status: SHIPPED | OUTPATIENT
Start: 2020-01-01

## 2020-01-01 RX ORDER — DEXAMETHASONE 1.5 MG/1
3 TABLET ORAL EVERY 12 HOURS
Qty: 8 TABLET | Refills: 0 | Status: SHIPPED | OUTPATIENT
Start: 2020-01-01 | End: 2020-01-01

## 2020-01-01 RX ORDER — ACETAMINOPHEN 325 MG/1
975 TABLET ORAL ONCE
Status: COMPLETED | OUTPATIENT
Start: 2020-01-01 | End: 2020-01-01

## 2020-01-01 RX ORDER — OXYCODONE HYDROCHLORIDE 5 MG/1
5 TABLET ORAL EVERY 4 HOURS PRN
Status: DISCONTINUED | OUTPATIENT
Start: 2020-01-01 | End: 2020-01-01 | Stop reason: HOSPADM

## 2020-01-01 RX ORDER — LEVETIRACETAM 10 MG/ML
1000 INJECTION INTRAVASCULAR
Status: COMPLETED | OUTPATIENT
Start: 2020-01-01 | End: 2020-01-01

## 2020-01-01 RX ORDER — LORAZEPAM 0.5 MG/1
0.5 TABLET ORAL EVERY 6 HOURS PRN
Status: DISCONTINUED | OUTPATIENT
Start: 2020-01-01 | End: 2020-01-01 | Stop reason: HOSPADM

## 2020-01-01 RX ORDER — DEXAMETHASONE 1 MG
1 TABLET ORAL EVERY 12 HOURS SCHEDULED
Status: DISCONTINUED | OUTPATIENT
Start: 2020-01-01 | End: 2020-01-01 | Stop reason: HOSPADM

## 2020-01-01 RX ORDER — HYDROCORTISONE 20 MG/1
20 TABLET ORAL DAILY
Status: DISCONTINUED | OUTPATIENT
Start: 2020-01-01 | End: 2020-01-01

## 2020-01-01 RX ORDER — GENTAMICIN SULFATE 80 MG/100ML
80 INJECTION, SOLUTION INTRAVENOUS
Status: COMPLETED | OUTPATIENT
Start: 2020-01-01 | End: 2020-01-01

## 2020-01-01 RX ORDER — CEFAZOLIN SODIUM 1 G/3ML
1 INJECTION, POWDER, FOR SOLUTION INTRAMUSCULAR; INTRAVENOUS SEE ADMIN INSTRUCTIONS
Status: DISCONTINUED | OUTPATIENT
Start: 2020-01-01 | End: 2020-01-01 | Stop reason: HOSPADM

## 2020-01-01 RX ORDER — LORAZEPAM 0.5 MG/1
0.5 TABLET ORAL EVERY 4 HOURS PRN
Status: DISCONTINUED | OUTPATIENT
Start: 2020-01-01 | End: 2020-01-01 | Stop reason: HOSPADM

## 2020-01-01 RX ORDER — LEVETIRACETAM 5 MG/ML
500 INJECTION INTRAVASCULAR EVERY 12 HOURS
Status: DISCONTINUED | OUTPATIENT
Start: 2020-01-01 | End: 2020-01-01 | Stop reason: HOSPADM

## 2020-01-01 RX ORDER — FERROUS SULFATE 325(65) MG
325 TABLET ORAL
Qty: 30 TABLET | Refills: 4 | Status: SHIPPED | OUTPATIENT
Start: 2020-01-01

## 2020-01-01 RX ORDER — DEXAMETHASONE 4 MG/1
4 TABLET ORAL EVERY 12 HOURS SCHEDULED
Status: DISCONTINUED | OUTPATIENT
Start: 2020-01-01 | End: 2020-01-01 | Stop reason: HOSPADM

## 2020-01-01 RX ORDER — SODIUM CHLORIDE 9 MG/ML
INJECTION, SOLUTION INTRAVENOUS
Status: DISCONTINUED
Start: 2020-01-01 | End: 2020-01-01 | Stop reason: HOSPADM

## 2020-01-01 RX ORDER — NALOXONE HYDROCHLORIDE 0.4 MG/ML
.1-.4 INJECTION, SOLUTION INTRAMUSCULAR; INTRAVENOUS; SUBCUTANEOUS
Status: DISCONTINUED | OUTPATIENT
Start: 2020-01-01 | End: 2020-01-01

## 2020-01-01 RX ORDER — HYDROCORTISONE 20 MG/1
20 TABLET ORAL EVERY EVENING
Status: DISCONTINUED | OUTPATIENT
Start: 2020-01-01 | End: 2020-01-01

## 2020-01-01 RX ORDER — PIPERACILLIN SODIUM, TAZOBACTAM SODIUM 4; .5 G/20ML; G/20ML
4.5 INJECTION, POWDER, LYOPHILIZED, FOR SOLUTION INTRAVENOUS EVERY 6 HOURS
Status: DISCONTINUED | OUTPATIENT
Start: 2020-01-01 | End: 2020-01-01

## 2020-01-01 RX ORDER — DIPHENHYDRAMINE HCL 25 MG
50 CAPSULE ORAL ONCE
Status: COMPLETED | OUTPATIENT
Start: 2020-01-01 | End: 2020-01-01

## 2020-01-01 RX ORDER — DIAZEPAM 10 MG/2ML
1-2 INJECTION, SOLUTION INTRAMUSCULAR; INTRAVENOUS
Status: COMPLETED | OUTPATIENT
Start: 2020-01-01 | End: 2020-01-01

## 2020-01-01 RX ORDER — HYDROCORTISONE 10 MG/1
10 TABLET ORAL DAILY
Status: DISCONTINUED | OUTPATIENT
Start: 2020-01-01 | End: 2020-01-01

## 2020-01-01 RX ORDER — LORAZEPAM 0.5 MG/1
1 TABLET ORAL DAILY PRN
Status: DISCONTINUED | OUTPATIENT
Start: 2020-01-01 | End: 2020-01-01 | Stop reason: HOSPADM

## 2020-01-01 RX ORDER — ACETAMINOPHEN 325 MG/1
975 TABLET ORAL EVERY 8 HOURS
Status: DISCONTINUED | OUTPATIENT
Start: 2020-01-01 | End: 2020-01-01 | Stop reason: HOSPADM

## 2020-01-01 RX ORDER — LORATADINE 10 MG/1
10 TABLET ORAL EVERY 6 HOURS PRN
Status: DISCONTINUED | OUTPATIENT
Start: 2020-01-01 | End: 2020-01-01

## 2020-01-01 RX ORDER — DIAZEPAM 2 MG
2 TABLET ORAL ONCE
Status: COMPLETED | OUTPATIENT
Start: 2020-01-01 | End: 2020-01-01

## 2020-01-01 RX ORDER — ONDANSETRON 2 MG/ML
8 INJECTION INTRAMUSCULAR; INTRAVENOUS ONCE
Status: COMPLETED | OUTPATIENT
Start: 2020-01-01 | End: 2020-01-01

## 2020-01-01 RX ADMIN — ACETAMINOPHEN 650 MG: 325 TABLET, FILM COATED ORAL at 22:04

## 2020-01-01 RX ADMIN — Medication 5 MG: at 07:58

## 2020-01-01 RX ADMIN — DEXAMETHASONE 3 MG: 2 TABLET ORAL at 06:48

## 2020-01-01 RX ADMIN — OXYCODONE HYDROCHLORIDE 5 MG: 5 TABLET ORAL at 22:47

## 2020-01-01 RX ADMIN — LORAZEPAM 0.5 MG: 0.5 TABLET ORAL at 20:39

## 2020-01-01 RX ADMIN — LORAZEPAM 0.5 MG: 0.5 TABLET ORAL at 07:56

## 2020-01-01 RX ADMIN — OXYCODONE HYDROCHLORIDE 5 MG: 5 TABLET ORAL at 08:44

## 2020-01-01 RX ADMIN — ROCURONIUM BROMIDE 10 MG: 10 INJECTION INTRAVENOUS at 11:01

## 2020-01-01 RX ADMIN — ACETAMINOPHEN 650 MG: 325 TABLET ORAL at 22:25

## 2020-01-01 RX ADMIN — SODIUM CHLORIDE: 9 INJECTION, SOLUTION INTRAVENOUS at 14:52

## 2020-01-01 RX ADMIN — LORAZEPAM 0.5 MG: 0.5 TABLET ORAL at 20:20

## 2020-01-01 RX ADMIN — DEXAMETHASONE 4 MG: 4 TABLET ORAL at 10:02

## 2020-01-01 RX ADMIN — LEVETIRACETAM 500 MG: 500 TABLET, FILM COATED ORAL at 08:19

## 2020-01-01 RX ADMIN — LEVETIRACETAM 500 MG: 500 TABLET ORAL at 08:46

## 2020-01-01 RX ADMIN — MIDAZOLAM 0.5 MG: 1 INJECTION INTRAMUSCULAR; INTRAVENOUS at 11:09

## 2020-01-01 RX ADMIN — SODIUM CHLORIDE, POTASSIUM CHLORIDE, SODIUM LACTATE AND CALCIUM CHLORIDE: 600; 310; 30; 20 INJECTION, SOLUTION INTRAVENOUS at 04:48

## 2020-01-01 RX ADMIN — ACETAMINOPHEN 650 MG: 325 TABLET, FILM COATED ORAL at 05:07

## 2020-01-01 RX ADMIN — DEXAMETHASONE 4 MG: 4 TABLET ORAL at 16:24

## 2020-01-01 RX ADMIN — Medication 0.25 MG: at 16:24

## 2020-01-01 RX ADMIN — ACETAMINOPHEN 650 MG: 325 TABLET ORAL at 14:17

## 2020-01-01 RX ADMIN — Medication 1 MG: at 21:55

## 2020-01-01 RX ADMIN — LORAZEPAM 0.5 MG: 0.5 TABLET ORAL at 21:03

## 2020-01-01 RX ADMIN — ROCURONIUM BROMIDE 20 MG: 10 INJECTION INTRAVENOUS at 08:58

## 2020-01-01 RX ADMIN — SODIUM CHLORIDE TAB 1 GM 2 G: 1 TAB at 08:40

## 2020-01-01 RX ADMIN — FENTANYL CITRATE 25 MCG: 50 INJECTION, SOLUTION INTRAMUSCULAR; INTRAVENOUS at 11:21

## 2020-01-01 RX ADMIN — ONDANSETRON HYDROCHLORIDE 4 MG: 4 TABLET, FILM COATED ORAL at 10:26

## 2020-01-01 RX ADMIN — PANTOPRAZOLE SODIUM 40 MG: 40 TABLET, DELAYED RELEASE ORAL at 06:42

## 2020-01-01 RX ADMIN — ACETAMINOPHEN 650 MG: 325 TABLET, FILM COATED ORAL at 21:55

## 2020-01-01 RX ADMIN — DOCUSATE SODIUM AND SENNOSIDES 1 TABLET: 8.6; 5 TABLET ORAL at 02:35

## 2020-01-01 RX ADMIN — ACETAMINOPHEN 650 MG: 325 TABLET, FILM COATED ORAL at 02:43

## 2020-01-01 RX ADMIN — GADOBUTROL 5.8 ML: 604.72 INJECTION INTRAVENOUS at 11:19

## 2020-01-01 RX ADMIN — SENNOSIDES AND DOCUSATE SODIUM 2 TABLET: 8.6; 5 TABLET ORAL at 19:49

## 2020-01-01 RX ADMIN — Medication 0.25 MG: at 21:42

## 2020-01-01 RX ADMIN — SODIUM CHLORIDE 150 MG: 9 INJECTION, SOLUTION INTRAVENOUS at 11:51

## 2020-01-01 RX ADMIN — CEFAZOLIN SODIUM 2 G: 2 INJECTION, SOLUTION INTRAVENOUS at 10:47

## 2020-01-01 RX ADMIN — HYDROXYZINE HYDROCHLORIDE 50 MG: 25 TABLET, FILM COATED ORAL at 18:46

## 2020-01-01 RX ADMIN — HYDROXYZINE HYDROCHLORIDE 50 MG: 25 TABLET, FILM COATED ORAL at 02:28

## 2020-01-01 RX ADMIN — LEVETIRACETAM 500 MG: 500 TABLET, FILM COATED ORAL at 22:13

## 2020-01-01 RX ADMIN — FENTANYL CITRATE 100 MCG: 50 INJECTION, SOLUTION INTRAMUSCULAR; INTRAVENOUS at 07:50

## 2020-01-01 RX ADMIN — DEXAMETHASONE 4 MG: 4 TABLET ORAL at 21:05

## 2020-01-01 RX ADMIN — HYDROXYZINE HYDROCHLORIDE 50 MG: 25 TABLET, FILM COATED ORAL at 02:45

## 2020-01-01 RX ADMIN — LEVETIRACETAM 1000 MG: 500 TABLET, FILM COATED ORAL at 20:34

## 2020-01-01 RX ADMIN — SENNOSIDES AND DOCUSATE SODIUM 2 TABLET: 8.6; 5 TABLET ORAL at 08:09

## 2020-01-01 RX ADMIN — OXYCODONE HYDROCHLORIDE 5 MG: 5 TABLET ORAL at 19:28

## 2020-01-01 RX ADMIN — DEXAMETHASONE 4 MG: 4 TABLET ORAL at 09:42

## 2020-01-01 RX ADMIN — LEVETIRACETAM 1 G: 10 INJECTION INTRAVENOUS at 16:06

## 2020-01-01 RX ADMIN — DEXAMETHASONE 4 MG: 4 TABLET ORAL at 13:39

## 2020-01-01 RX ADMIN — ROCURONIUM BROMIDE 10 MG: 10 INJECTION INTRAVENOUS at 11:30

## 2020-01-01 RX ADMIN — LORAZEPAM 0.5 MG: 0.5 TABLET ORAL at 04:46

## 2020-01-01 RX ADMIN — ACETAMINOPHEN 975 MG: 325 TABLET, FILM COATED ORAL at 19:50

## 2020-01-01 RX ADMIN — GADOBUTROL 7 ML: 604.72 INJECTION INTRAVENOUS at 11:15

## 2020-01-01 RX ADMIN — DEXAMETHASONE SODIUM PHOSPHATE 10 MG: 4 INJECTION, SOLUTION INTRA-ARTICULAR; INTRALESIONAL; INTRAMUSCULAR; INTRAVENOUS; SOFT TISSUE at 16:04

## 2020-01-01 RX ADMIN — SODIUM CHLORIDE, POTASSIUM CHLORIDE, SODIUM LACTATE AND CALCIUM CHLORIDE: 600; 310; 30; 20 INJECTION, SOLUTION INTRAVENOUS at 14:22

## 2020-01-01 RX ADMIN — FENTANYL CITRATE 50 MCG: 50 INJECTION, SOLUTION INTRAMUSCULAR; INTRAVENOUS at 09:47

## 2020-01-01 RX ADMIN — SODIUM CHLORIDE TAB 1 GM 2 G: 1 TAB at 08:12

## 2020-01-01 RX ADMIN — SODIUM CHLORIDE: 9 INJECTION, SOLUTION INTRAVENOUS at 13:38

## 2020-01-01 RX ADMIN — HYDROXYZINE HYDROCHLORIDE 50 MG: 25 TABLET, FILM COATED ORAL at 16:44

## 2020-01-01 RX ADMIN — PACLITAXEL 289 MG: 6 INJECTION, SOLUTION INTRAVENOUS at 12:32

## 2020-01-01 RX ADMIN — LEVETIRACETAM 1000 MG: 500 TABLET, FILM COATED ORAL at 19:39

## 2020-01-01 RX ADMIN — OXYCODONE HYDROCHLORIDE 5 MG: 5 TABLET ORAL at 23:26

## 2020-01-01 RX ADMIN — PANTOPRAZOLE SODIUM 40 MG: 40 TABLET, DELAYED RELEASE ORAL at 07:48

## 2020-01-01 RX ADMIN — GADOBUTROL 7.5 ML: 604.72 INJECTION INTRAVENOUS at 17:20

## 2020-01-01 RX ADMIN — HYDROXYZINE HYDROCHLORIDE 50 MG: 25 TABLET, FILM COATED ORAL at 11:10

## 2020-01-01 RX ADMIN — SODIUM CHLORIDE 200 MG: 9 INJECTION, SOLUTION INTRAVENOUS at 11:50

## 2020-01-01 RX ADMIN — PANTOPRAZOLE SODIUM 40 MG: 40 TABLET, DELAYED RELEASE ORAL at 08:47

## 2020-01-01 RX ADMIN — LORAZEPAM 0.5 MG: 0.5 TABLET ORAL at 05:29

## 2020-01-01 RX ADMIN — LEVETIRACETAM 500 MG: 500 TABLET, FILM COATED ORAL at 06:46

## 2020-01-01 RX ADMIN — LORAZEPAM 0.5 MG: 0.5 TABLET ORAL at 08:12

## 2020-01-01 RX ADMIN — Medication 0.25 MG: at 06:46

## 2020-01-01 RX ADMIN — PROPOFOL 20 MG: 10 INJECTION, EMULSION INTRAVENOUS at 08:26

## 2020-01-01 RX ADMIN — LEVETIRACETAM 500 MG: 5 INJECTION INTRAVENOUS at 06:22

## 2020-01-01 RX ADMIN — ACETAMINOPHEN 650 MG: 325 TABLET, FILM COATED ORAL at 17:53

## 2020-01-01 RX ADMIN — DEXAMETHASONE 4 MG: 4 TABLET ORAL at 06:21

## 2020-01-01 RX ADMIN — LEVETIRACETAM 500 MG: 5 INJECTION INTRAVENOUS at 17:50

## 2020-01-01 RX ADMIN — PEGFILGRASTIM 6 MG: KIT SUBCUTANEOUS at 16:34

## 2020-01-01 RX ADMIN — PHENYLEPHRINE HYDROCHLORIDE 50 MCG: 10 INJECTION INTRAVENOUS at 10:21

## 2020-01-01 RX ADMIN — PROPOFOL 100 MG: 10 INJECTION, EMULSION INTRAVENOUS at 16:06

## 2020-01-01 RX ADMIN — HYDROXYZINE HYDROCHLORIDE 50 MG: 25 TABLET, FILM COATED ORAL at 10:02

## 2020-01-01 RX ADMIN — ROCURONIUM BROMIDE 30 MG: 10 INJECTION INTRAVENOUS at 17:38

## 2020-01-01 RX ADMIN — LORAZEPAM 0.5 MG: 0.5 TABLET ORAL at 03:01

## 2020-01-01 RX ADMIN — LEVETIRACETAM 500 MG: 500 TABLET ORAL at 22:32

## 2020-01-01 RX ADMIN — DEXAMETHASONE 4 MG: 4 TABLET ORAL at 23:00

## 2020-01-01 RX ADMIN — ACETAMINOPHEN 650 MG: 325 TABLET, FILM COATED ORAL at 16:35

## 2020-01-01 RX ADMIN — DEXAMETHASONE 4 MG: 4 TABLET ORAL at 06:46

## 2020-01-01 RX ADMIN — Medication 1 MG: at 22:59

## 2020-01-01 RX ADMIN — GADOBUTROL 6 ML: 604.72 INJECTION INTRAVENOUS at 08:13

## 2020-01-01 RX ADMIN — LORAZEPAM 0.5 MG: 0.5 TABLET ORAL at 10:26

## 2020-01-01 RX ADMIN — FENTANYL CITRATE 50 MCG: 50 INJECTION, SOLUTION INTRAMUSCULAR; INTRAVENOUS at 09:24

## 2020-01-01 RX ADMIN — ONDANSETRON 4 MG: 2 INJECTION INTRAMUSCULAR; INTRAVENOUS at 12:13

## 2020-01-01 RX ADMIN — SODIUM CHLORIDE TAB 1 GM 2 G: 1 TAB at 14:17

## 2020-01-01 RX ADMIN — LORAZEPAM 1 MG: 0.5 TABLET ORAL at 17:20

## 2020-01-01 RX ADMIN — LORATADINE 5 MG: 5 SOLUTION ORAL at 00:13

## 2020-01-01 RX ADMIN — LORAZEPAM 1 MG: 0.5 TABLET ORAL at 23:25

## 2020-01-01 RX ADMIN — Medication 0.25 MG: at 12:34

## 2020-01-01 RX ADMIN — VANCOMYCIN HYDROCHLORIDE 1000 MG: 1 INJECTION, SOLUTION INTRAVENOUS at 07:13

## 2020-01-01 RX ADMIN — GADOBUTROL 5.5 ML: 604.72 INJECTION INTRAVENOUS at 14:37

## 2020-01-01 RX ADMIN — SODIUM CHLORIDE, POTASSIUM CHLORIDE, SODIUM LACTATE AND CALCIUM CHLORIDE: 600; 310; 30; 20 INJECTION, SOLUTION INTRAVENOUS at 16:03

## 2020-01-01 RX ADMIN — SODIUM CHLORIDE 200 MG: 9 INJECTION, SOLUTION INTRAVENOUS at 12:10

## 2020-01-01 RX ADMIN — Medication 1 MG: at 20:21

## 2020-01-01 RX ADMIN — DEXAMETHASONE 4 MG: 4 TABLET ORAL at 07:02

## 2020-01-01 RX ADMIN — DEXTROSE MONOHYDRATE: 50 INJECTION, SOLUTION INTRAVENOUS at 08:58

## 2020-01-01 RX ADMIN — ACETAMINOPHEN 975 MG: 325 TABLET, FILM COATED ORAL at 04:46

## 2020-01-01 RX ADMIN — DEXAMETHASONE 1 MG: 1 TABLET ORAL at 13:57

## 2020-01-01 RX ADMIN — ENOXAPARIN SODIUM 40 MG: 40 INJECTION SUBCUTANEOUS at 11:49

## 2020-01-01 RX ADMIN — SODIUM CHLORIDE: 9 INJECTION, SOLUTION INTRAVENOUS at 10:02

## 2020-01-01 RX ADMIN — IOPAMIDOL 74 ML: 755 INJECTION, SOLUTION INTRAVASCULAR at 14:27

## 2020-01-01 RX ADMIN — FERROUS SULFATE TAB 325 MG (65 MG ELEMENTAL FE) 325 MG: 325 (65 FE) TAB at 08:17

## 2020-01-01 RX ADMIN — Medication 0.25 MG: at 10:06

## 2020-01-01 RX ADMIN — IOPAMIDOL 73 ML: 755 INJECTION, SOLUTION INTRAVENOUS at 11:14

## 2020-01-01 RX ADMIN — SODIUM CHLORIDE: 9 INJECTION, SOLUTION INTRAVENOUS at 08:40

## 2020-01-01 RX ADMIN — DEXAMETHASONE 1 MG: 1 TABLET ORAL at 06:11

## 2020-01-01 RX ADMIN — SENNOSIDES AND DOCUSATE SODIUM 1 TABLET: 8.6; 5 TABLET ORAL at 09:37

## 2020-01-01 RX ADMIN — SODIUM CHLORIDE 1000 ML: 9 INJECTION, SOLUTION INTRAVENOUS at 00:51

## 2020-01-01 RX ADMIN — OXYCODONE HYDROCHLORIDE 5 MG: 5 TABLET ORAL at 04:01

## 2020-01-01 RX ADMIN — LORAZEPAM 0.5 MG: 0.5 TABLET ORAL at 18:23

## 2020-01-01 RX ADMIN — HYDROXYZINE HYDROCHLORIDE 50 MG: 25 TABLET, FILM COATED ORAL at 23:09

## 2020-01-01 RX ADMIN — PIPERACILLIN SODIUM AND TAZOBACTAM SODIUM 4.5 G: 4; .5 INJECTION, POWDER, LYOPHILIZED, FOR SOLUTION INTRAVENOUS at 01:19

## 2020-01-01 RX ADMIN — SODIUM CHLORIDE, POTASSIUM CHLORIDE, SODIUM LACTATE AND CALCIUM CHLORIDE 1000 ML: 600; 310; 30; 20 INJECTION, SOLUTION INTRAVENOUS at 18:55

## 2020-01-01 RX ADMIN — ACETAMINOPHEN 650 MG: 325 TABLET, FILM COATED ORAL at 16:21

## 2020-01-01 RX ADMIN — CARBOPLATIN 750 MG: 10 INJECTION, SOLUTION INTRAVENOUS at 15:50

## 2020-01-01 RX ADMIN — Medication 0.25 MG: at 18:32

## 2020-01-01 RX ADMIN — GENTAMICIN SULFATE 80 MG: 80 INJECTION, SOLUTION INTRAVENOUS at 16:06

## 2020-01-01 RX ADMIN — Medication 0.25 MG: at 06:42

## 2020-01-01 RX ADMIN — DEXAMETHASONE 4 MG: 4 TABLET ORAL at 12:34

## 2020-01-01 RX ADMIN — MIDAZOLAM 0.5 MG: 1 INJECTION INTRAMUSCULAR; INTRAVENOUS at 10:39

## 2020-01-01 RX ADMIN — SODIUM CHLORIDE TAB 1 GM 2 G: 1 TAB at 16:28

## 2020-01-01 RX ADMIN — LORATADINE 5 MG: 5 SOLUTION ORAL at 19:39

## 2020-01-01 RX ADMIN — SODIUM CHLORIDE TAB 1 GM 2 G: 1 TAB at 09:16

## 2020-01-01 RX ADMIN — LEVETIRACETAM 500 MG: 500 TABLET, FILM COATED ORAL at 09:26

## 2020-01-01 RX ADMIN — HYDROXYZINE HYDROCHLORIDE 50 MG: 25 TABLET, FILM COATED ORAL at 09:42

## 2020-01-01 RX ADMIN — DEXAMETHASONE SODIUM PHOSPHATE 4 MG: 4 INJECTION, SOLUTION INTRAMUSCULAR; INTRAVENOUS at 21:02

## 2020-01-01 RX ADMIN — LEVETIRACETAM 500 MG: 500 TABLET, FILM COATED ORAL at 08:35

## 2020-01-01 RX ADMIN — FENTANYL CITRATE 250 MCG: 50 INJECTION, SOLUTION INTRAMUSCULAR; INTRAVENOUS at 15:54

## 2020-01-01 RX ADMIN — HYDROXYZINE HYDROCHLORIDE 50 MG: 25 TABLET, FILM COATED ORAL at 19:37

## 2020-01-01 RX ADMIN — GADOBUTROL 7.5 ML: 604.72 INJECTION INTRAVENOUS at 11:00

## 2020-01-01 RX ADMIN — Medication 0.25 MG: at 10:46

## 2020-01-01 RX ADMIN — Medication 1 MG: at 22:03

## 2020-01-01 RX ADMIN — DEXAMETHASONE 4 MG: 4 TABLET ORAL at 17:49

## 2020-01-01 RX ADMIN — Medication 0.25 MG: at 16:47

## 2020-01-01 RX ADMIN — PROPOFOL 100 MG: 10 INJECTION, EMULSION INTRAVENOUS at 07:50

## 2020-01-01 RX ADMIN — ACETAMINOPHEN 650 MG: 325 TABLET, FILM COATED ORAL at 14:46

## 2020-01-01 RX ADMIN — HYDROXYZINE HYDROCHLORIDE 50 MG: 25 TABLET, FILM COATED ORAL at 05:43

## 2020-01-01 RX ADMIN — DEXAMETHASONE 4 MG: 4 TABLET ORAL at 18:32

## 2020-01-01 RX ADMIN — DEXAMETHASONE SODIUM PHOSPHATE 10 MG: 4 INJECTION, SOLUTION INTRA-ARTICULAR; INTRALESIONAL; INTRAMUSCULAR; INTRAVENOUS; SOFT TISSUE at 08:00

## 2020-01-01 RX ADMIN — ACETAMINOPHEN 650 MG: 325 TABLET, FILM COATED ORAL at 10:46

## 2020-01-01 RX ADMIN — LORAZEPAM 0.5 MG: 0.5 TABLET ORAL at 12:45

## 2020-01-01 RX ADMIN — LORAZEPAM 0.5 MG: 0.5 TABLET ORAL at 06:39

## 2020-01-01 RX ADMIN — VANCOMYCIN HYDROCHLORIDE 1500 MG: 10 INJECTION, POWDER, LYOPHILIZED, FOR SOLUTION INTRAVENOUS at 02:31

## 2020-01-01 RX ADMIN — DIAZEPAM 2 MG: 5 INJECTION, SOLUTION INTRAMUSCULAR; INTRAVENOUS at 14:03

## 2020-01-01 RX ADMIN — SODIUM CHLORIDE TAB 1 GM 2 G: 1 TAB at 13:07

## 2020-01-01 RX ADMIN — LORAZEPAM 1 MG: 0.5 TABLET ORAL at 08:31

## 2020-01-01 RX ADMIN — SODIUM CHLORIDE 250 ML: 9 INJECTION, SOLUTION INTRAVENOUS at 11:37

## 2020-01-01 RX ADMIN — LEVETIRACETAM 1000 MG: 500 TABLET, FILM COATED ORAL at 09:16

## 2020-01-01 RX ADMIN — LORAZEPAM 0.5 MG: 0.5 TABLET ORAL at 20:32

## 2020-01-01 RX ADMIN — LIDOCAINE HYDROCHLORIDE 100 MG: 20 INJECTION, SOLUTION INFILTRATION; PERINEURAL at 07:50

## 2020-01-01 RX ADMIN — LORAZEPAM 0.5 MG: 0.5 TABLET ORAL at 20:25

## 2020-01-01 RX ADMIN — HYDROMORPHONE HYDROCHLORIDE 0.5 MG: 1 INJECTION, SOLUTION INTRAMUSCULAR; INTRAVENOUS; SUBCUTANEOUS at 20:30

## 2020-01-01 RX ADMIN — ACETAMINOPHEN 650 MG: 325 TABLET, FILM COATED ORAL at 21:50

## 2020-01-01 RX ADMIN — SODIUM CHLORIDE 500 ML: 9 INJECTION, SOLUTION INTRAVENOUS at 19:48

## 2020-01-01 RX ADMIN — SODIUM CHLORIDE: 9 INJECTION, SOLUTION INTRAVENOUS at 11:49

## 2020-01-01 RX ADMIN — HYDROXYZINE HYDROCHLORIDE 50 MG: 25 TABLET, FILM COATED ORAL at 14:22

## 2020-01-01 RX ADMIN — LEVETIRACETAM 500 MG: 500 TABLET, FILM COATED ORAL at 08:00

## 2020-01-01 RX ADMIN — GENTAMICIN SULFATE 80 MG: 80 INJECTION, SOLUTION INTRAVENOUS at 08:26

## 2020-01-01 RX ADMIN — ACETAMINOPHEN 650 MG: 325 TABLET, FILM COATED ORAL at 06:21

## 2020-01-01 RX ADMIN — PANTOPRAZOLE SODIUM 40 MG: 40 INJECTION, POWDER, FOR SOLUTION INTRAVENOUS at 15:18

## 2020-01-01 RX ADMIN — ACETAMINOPHEN 650 MG: 325 TABLET, FILM COATED ORAL at 17:06

## 2020-01-01 RX ADMIN — DEXAMETHASONE 2 MG: 2 TABLET ORAL at 21:31

## 2020-01-01 RX ADMIN — SODIUM CHLORIDE: 9 INJECTION, SOLUTION INTRAVENOUS at 04:04

## 2020-01-01 RX ADMIN — DEXAMETHASONE 4 MG: 4 TABLET ORAL at 21:42

## 2020-01-01 RX ADMIN — LEVETIRACETAM 500 MG: 500 TABLET, FILM COATED ORAL at 08:16

## 2020-01-01 RX ADMIN — Medication 0.25 MG: at 16:35

## 2020-01-01 RX ADMIN — SODIUM CHLORIDE TAB 1 GM 2 G: 1 TAB at 20:50

## 2020-01-01 RX ADMIN — Medication 0.25 MG: at 02:10

## 2020-01-01 RX ADMIN — LORATADINE 5 MG: 5 SOLUTION ORAL at 16:30

## 2020-01-01 RX ADMIN — HYDROXYZINE HYDROCHLORIDE 50 MG: 25 TABLET, FILM COATED ORAL at 21:13

## 2020-01-01 RX ADMIN — ROCURONIUM BROMIDE 20 MG: 10 INJECTION INTRAVENOUS at 18:41

## 2020-01-01 RX ADMIN — FENTANYL CITRATE 25 MCG: 50 INJECTION INTRAMUSCULAR; INTRAVENOUS at 20:36

## 2020-01-01 RX ADMIN — Medication 1 MG: at 22:57

## 2020-01-01 RX ADMIN — SODIUM CHLORIDE TAB 1 GM 2 G: 1 TAB at 14:19

## 2020-01-01 RX ADMIN — LEVETIRACETAM 500 MG: 500 TABLET ORAL at 19:26

## 2020-01-01 RX ADMIN — LORATADINE 5 MG: 5 SOLUTION ORAL at 08:12

## 2020-01-01 RX ADMIN — ACETAMINOPHEN 975 MG: 325 TABLET, FILM COATED ORAL at 14:24

## 2020-01-01 RX ADMIN — LORAZEPAM 0.5 MG: 0.5 TABLET ORAL at 22:30

## 2020-01-01 RX ADMIN — LEVETIRACETAM 500 MG: 500 TABLET, FILM COATED ORAL at 21:05

## 2020-01-01 RX ADMIN — LEVETIRACETAM 500 MG: 500 TABLET ORAL at 07:47

## 2020-01-01 RX ADMIN — ROCURONIUM BROMIDE 20 MG: 10 INJECTION INTRAVENOUS at 09:59

## 2020-01-01 RX ADMIN — LEVETIRACETAM 500 MG: 500 TABLET, FILM COATED ORAL at 08:03

## 2020-01-01 RX ADMIN — OXYCODONE HYDROCHLORIDE 5 MG: 5 TABLET ORAL at 10:46

## 2020-01-01 RX ADMIN — ACETAMINOPHEN 650 MG: 325 TABLET ORAL at 03:07

## 2020-01-01 RX ADMIN — ACETAMINOPHEN 650 MG: 325 TABLET, FILM COATED ORAL at 08:35

## 2020-01-01 RX ADMIN — ACETAMINOPHEN 650 MG: 325 TABLET, FILM COATED ORAL at 00:58

## 2020-01-01 RX ADMIN — SODIUM CHLORIDE TAB 1 GM 2 G: 1 TAB at 08:21

## 2020-01-01 RX ADMIN — LORAZEPAM 0.5 MG: 0.5 TABLET ORAL at 20:07

## 2020-01-01 RX ADMIN — LORATADINE 5 MG: 5 SOLUTION ORAL at 10:04

## 2020-01-01 RX ADMIN — ACETAMINOPHEN 650 MG: 325 TABLET, FILM COATED ORAL at 21:37

## 2020-01-01 RX ADMIN — ACETAMINOPHEN 650 MG: 325 TABLET, FILM COATED ORAL at 20:39

## 2020-01-01 RX ADMIN — ACETAMINOPHEN 975 MG: 325 TABLET, FILM COATED ORAL at 12:18

## 2020-01-01 RX ADMIN — LORATADINE 10 MG: 10 TABLET ORAL at 18:50

## 2020-01-01 RX ADMIN — SODIUM CHLORIDE 1000 ML: 9 INJECTION, SOLUTION INTRAVENOUS at 17:49

## 2020-01-01 RX ADMIN — ACETAMINOPHEN 650 MG: 325 TABLET, FILM COATED ORAL at 06:41

## 2020-01-01 RX ADMIN — SODIUM CHLORIDE 200 MG: 9 INJECTION, SOLUTION INTRAVENOUS at 10:50

## 2020-01-01 RX ADMIN — CEFAZOLIN 1 G: 1 INJECTION, POWDER, FOR SOLUTION INTRAMUSCULAR; INTRAVENOUS at 10:02

## 2020-01-01 RX ADMIN — DEXAMETHASONE 2 MG: 2 TABLET ORAL at 05:04

## 2020-01-01 RX ADMIN — SENNOSIDES AND DOCUSATE SODIUM 1 TABLET: 8.6; 5 TABLET ORAL at 08:28

## 2020-01-01 RX ADMIN — ONDANSETRON 4 MG: 2 INJECTION INTRAMUSCULAR; INTRAVENOUS at 19:48

## 2020-01-01 RX ADMIN — FENTANYL CITRATE 25 MCG: 50 INJECTION INTRAMUSCULAR; INTRAVENOUS at 20:28

## 2020-01-01 RX ADMIN — LEVETIRACETAM 1000 MG: 500 TABLET, FILM COATED ORAL at 08:12

## 2020-01-01 RX ADMIN — ACETAMINOPHEN 650 MG: 325 TABLET, FILM COATED ORAL at 18:22

## 2020-01-01 RX ADMIN — HYDROXYZINE HYDROCHLORIDE 50 MG: 25 TABLET, FILM COATED ORAL at 20:48

## 2020-01-01 RX ADMIN — SODIUM CHLORIDE 250 ML: 9 INJECTION, SOLUTION INTRAVENOUS at 10:28

## 2020-01-01 RX ADMIN — SODIUM CHLORIDE TAB 1 GM 2 G: 1 TAB at 20:34

## 2020-01-01 RX ADMIN — ACETAMINOPHEN 650 MG: 325 TABLET, FILM COATED ORAL at 12:45

## 2020-01-01 RX ADMIN — SENNOSIDES AND DOCUSATE SODIUM 1 TABLET: 8.6; 5 TABLET ORAL at 19:57

## 2020-01-01 RX ADMIN — MIDAZOLAM 0.5 MG: 1 INJECTION INTRAMUSCULAR; INTRAVENOUS at 11:15

## 2020-01-01 RX ADMIN — ACETAMINOPHEN 650 MG: 325 TABLET, FILM COATED ORAL at 22:46

## 2020-01-01 RX ADMIN — SODIUM CHLORIDE TAB 1 GM 2 G: 1 TAB at 17:20

## 2020-01-01 RX ADMIN — PANTOPRAZOLE SODIUM 40 MG: 40 TABLET, DELAYED RELEASE ORAL at 08:46

## 2020-01-01 RX ADMIN — Medication 1 MG: at 21:10

## 2020-01-01 RX ADMIN — DEXAMETHASONE 4 MG: 4 TABLET ORAL at 06:42

## 2020-01-01 RX ADMIN — ACETAMINOPHEN 650 MG: 325 TABLET, FILM COATED ORAL at 18:20

## 2020-01-01 RX ADMIN — ACETAMINOPHEN 650 MG: 325 TABLET ORAL at 18:25

## 2020-01-01 RX ADMIN — Medication 0.25 MG: at 16:32

## 2020-01-01 RX ADMIN — LEVETIRACETAM 500 MG: 500 TABLET ORAL at 08:47

## 2020-01-01 RX ADMIN — LORAZEPAM 0.5 MG: 2 INJECTION INTRAMUSCULAR; INTRAVENOUS at 18:38

## 2020-01-01 RX ADMIN — SODIUM CHLORIDE: 9 INJECTION, SOLUTION INTRAVENOUS at 02:59

## 2020-01-01 RX ADMIN — OXYCODONE HYDROCHLORIDE 5 MG: 5 TABLET ORAL at 02:55

## 2020-01-01 RX ADMIN — SODIUM CHLORIDE, POTASSIUM CHLORIDE, SODIUM LACTATE AND CALCIUM CHLORIDE: 600; 310; 30; 20 INJECTION, SOLUTION INTRAVENOUS at 10:43

## 2020-01-01 RX ADMIN — HYDROCORTISONE 20 MG: 20 TABLET ORAL at 08:40

## 2020-01-01 RX ADMIN — GADOBUTROL 7.5 ML: 604.72 INJECTION INTRAVENOUS at 08:57

## 2020-01-01 RX ADMIN — DEXAMETHASONE 4 MG: 4 TABLET ORAL at 08:28

## 2020-01-01 RX ADMIN — LEVETIRACETAM 500 MG: 500 TABLET, FILM COATED ORAL at 21:47

## 2020-01-01 RX ADMIN — ACETAMINOPHEN 650 MG: 325 TABLET, FILM COATED ORAL at 20:21

## 2020-01-01 RX ADMIN — VANCOMYCIN HYDROCHLORIDE 1 G: 1 INJECTION, SOLUTION INTRAVENOUS at 16:06

## 2020-01-01 RX ADMIN — Medication 1 MG: at 22:30

## 2020-01-01 RX ADMIN — LORAZEPAM 0.5 MG: 0.5 TABLET ORAL at 17:53

## 2020-01-01 RX ADMIN — SENNOSIDES AND DOCUSATE SODIUM 1 TABLET: 8.6; 5 TABLET ORAL at 22:08

## 2020-01-01 RX ADMIN — LORAZEPAM 0.5 MG: 0.5 TABLET ORAL at 16:34

## 2020-01-01 RX ADMIN — DIPHENHYDRAMINE HYDROCHLORIDE 50 MG: 25 CAPSULE ORAL at 11:45

## 2020-01-01 RX ADMIN — MIDAZOLAM 2 MG: 1 INJECTION INTRAMUSCULAR; INTRAVENOUS at 07:48

## 2020-01-01 RX ADMIN — Medication 0.25 MG: at 22:17

## 2020-01-01 RX ADMIN — ACETAMINOPHEN 650 MG: 325 TABLET, FILM COATED ORAL at 14:19

## 2020-01-01 RX ADMIN — PANTOPRAZOLE SODIUM 40 MG: 40 TABLET, DELAYED RELEASE ORAL at 07:02

## 2020-01-01 RX ADMIN — HYDROXYZINE HYDROCHLORIDE 50 MG: 25 TABLET, FILM COATED ORAL at 08:20

## 2020-01-01 RX ADMIN — LORAZEPAM 0.5 MG: 0.5 TABLET ORAL at 13:06

## 2020-01-01 RX ADMIN — Medication 0.25 MG: at 06:33

## 2020-01-01 RX ADMIN — FERROUS SULFATE TAB 325 MG (65 MG ELEMENTAL FE) 325 MG: 325 (65 FE) TAB at 08:13

## 2020-01-01 RX ADMIN — CEFAZOLIN 1 G: 1 INJECTION, POWDER, FOR SOLUTION INTRAMUSCULAR; INTRAVENOUS at 18:22

## 2020-01-01 RX ADMIN — OXYCODONE HYDROCHLORIDE 5 MG: 5 TABLET ORAL at 03:11

## 2020-01-01 RX ADMIN — ACETAMINOPHEN 650 MG: 325 TABLET, FILM COATED ORAL at 01:48

## 2020-01-01 RX ADMIN — LEVETIRACETAM 1000 MG: 10 INJECTION INTRAVENOUS at 08:30

## 2020-01-01 RX ADMIN — ACETAMINOPHEN 650 MG: 325 TABLET, FILM COATED ORAL at 02:10

## 2020-01-01 RX ADMIN — Medication 0.25 MG: at 20:38

## 2020-01-01 RX ADMIN — OXYCODONE HYDROCHLORIDE 5 MG: 5 TABLET ORAL at 00:58

## 2020-01-01 RX ADMIN — Medication 0.25 MG: at 06:11

## 2020-01-01 RX ADMIN — SODIUM CHLORIDE TAB 1 GM 2 G: 1 TAB at 09:08

## 2020-01-01 RX ADMIN — OXYCODONE HYDROCHLORIDE 5 MG: 5 TABLET ORAL at 16:40

## 2020-01-01 RX ADMIN — LORAZEPAM 0.5 MG: 0.5 TABLET ORAL at 22:14

## 2020-01-01 RX ADMIN — SODIUM CHLORIDE, POTASSIUM CHLORIDE, SODIUM LACTATE AND CALCIUM CHLORIDE 1000 ML: 600; 310; 30; 20 INJECTION, SOLUTION INTRAVENOUS at 21:57

## 2020-01-01 RX ADMIN — ACETAMINOPHEN 650 MG: 325 TABLET, FILM COATED ORAL at 10:02

## 2020-01-01 RX ADMIN — LEVETIRACETAM 500 MG: 500 TABLET, FILM COATED ORAL at 08:06

## 2020-01-01 RX ADMIN — SODIUM CHLORIDE: 9 INJECTION, SOLUTION INTRAVENOUS at 20:52

## 2020-01-01 RX ADMIN — LORAZEPAM 0.5 MG: 0.5 TABLET ORAL at 22:44

## 2020-01-01 RX ADMIN — DEXAMETHASONE 4 MG: 4 TABLET ORAL at 16:44

## 2020-01-01 RX ADMIN — SUGAMMADEX 200 MG: 100 INJECTION, SOLUTION INTRAVENOUS at 12:19

## 2020-01-01 RX ADMIN — ONDANSETRON 8 MG: 2 INJECTION INTRAMUSCULAR; INTRAVENOUS at 11:37

## 2020-01-01 RX ADMIN — LORAZEPAM 1 MG: 0.5 TABLET ORAL at 04:45

## 2020-01-01 RX ADMIN — COSYNTROPIN 250 MCG: 0.25 INJECTION, POWDER, LYOPHILIZED, FOR SOLUTION INTRAMUSCULAR; INTRAVENOUS at 14:26

## 2020-01-01 RX ADMIN — PANTOPRAZOLE SODIUM 40 MG: 40 TABLET, DELAYED RELEASE ORAL at 08:19

## 2020-01-01 RX ADMIN — DEXAMETHASONE 3 MG: 2 TABLET ORAL at 15:15

## 2020-01-01 RX ADMIN — DEXAMETHASONE 4 MG: 4 TABLET ORAL at 09:36

## 2020-01-01 RX ADMIN — ACETAMINOPHEN 650 MG: 325 TABLET, FILM COATED ORAL at 06:33

## 2020-01-01 RX ADMIN — PROPOFOL 150 MG: 10 INJECTION, EMULSION INTRAVENOUS at 15:55

## 2020-01-01 RX ADMIN — CEFTRIAXONE SODIUM 2 G: 2 INJECTION, POWDER, FOR SOLUTION INTRAMUSCULAR; INTRAVENOUS at 02:30

## 2020-01-01 RX ADMIN — Medication 1 MG: at 23:26

## 2020-01-01 RX ADMIN — ACETAMINOPHEN 650 MG: 325 TABLET, FILM COATED ORAL at 22:47

## 2020-01-01 RX ADMIN — HYDROXYZINE HYDROCHLORIDE 50 MG: 25 TABLET, FILM COATED ORAL at 22:22

## 2020-01-01 RX ADMIN — LORAZEPAM 0.5 MG: 0.5 TABLET ORAL at 14:52

## 2020-01-01 RX ADMIN — ACETAMINOPHEN 650 MG: 325 TABLET, FILM COATED ORAL at 23:44

## 2020-01-01 RX ADMIN — LIDOCAINE HYDROCHLORIDE 100 MG: 20 INJECTION, SOLUTION INFILTRATION; PERINEURAL at 15:54

## 2020-01-01 RX ADMIN — OXYCODONE HYDROCHLORIDE 5 MG: 5 TABLET ORAL at 06:40

## 2020-01-01 RX ADMIN — FERROUS SULFATE TAB 325 MG (65 MG ELEMENTAL FE) 325 MG: 325 (65 FE) TAB at 07:56

## 2020-01-01 RX ADMIN — OXYCODONE HYDROCHLORIDE 5 MG: 5 TABLET ORAL at 06:21

## 2020-01-01 RX ADMIN — LEVETIRACETAM 1000 MG: 500 TABLET, FILM COATED ORAL at 13:08

## 2020-01-01 RX ADMIN — GADOBUTROL 6 ML: 604.72 INJECTION INTRAVENOUS at 14:08

## 2020-01-01 RX ADMIN — HYDROXYZINE HYDROCHLORIDE 50 MG: 25 TABLET, FILM COATED ORAL at 13:20

## 2020-01-01 RX ADMIN — DEXAMETHASONE 4 MG: 4 TABLET ORAL at 00:08

## 2020-01-01 RX ADMIN — ACETAMINOPHEN 650 MG: 325 TABLET, FILM COATED ORAL at 17:29

## 2020-01-01 RX ADMIN — LORAZEPAM 0.5 MG: 0.5 TABLET ORAL at 20:53

## 2020-01-01 RX ADMIN — Medication 0.25 MG: at 02:28

## 2020-01-01 RX ADMIN — LORAZEPAM 0.5 MG: 0.5 TABLET ORAL at 21:06

## 2020-01-01 RX ADMIN — PROPOFOL 100 MG: 10 INJECTION, EMULSION INTRAVENOUS at 07:51

## 2020-01-01 RX ADMIN — SENNOSIDES AND DOCUSATE SODIUM 1 TABLET: 8.6; 5 TABLET ORAL at 19:07

## 2020-01-01 RX ADMIN — FENTANYL CITRATE 25 MCG: 50 INJECTION, SOLUTION INTRAMUSCULAR; INTRAVENOUS at 11:11

## 2020-01-01 RX ADMIN — Medication 1 MG: at 21:13

## 2020-01-01 RX ADMIN — LEVETIRACETAM 500 MG: 500 TABLET ORAL at 20:32

## 2020-01-01 RX ADMIN — LEVETIRACETAM 500 MG: 500 TABLET, FILM COATED ORAL at 22:03

## 2020-01-01 RX ADMIN — Medication 1 MG: at 22:23

## 2020-01-01 RX ADMIN — DEXAMETHASONE 2 MG: 2 TABLET ORAL at 14:19

## 2020-01-01 RX ADMIN — ACETAMINOPHEN 650 MG: 325 TABLET, FILM COATED ORAL at 16:12

## 2020-01-01 RX ADMIN — SODIUM CHLORIDE, POTASSIUM CHLORIDE, SODIUM LACTATE AND CALCIUM CHLORIDE: 600; 310; 30; 20 INJECTION, SOLUTION INTRAVENOUS at 15:38

## 2020-01-01 RX ADMIN — LEVETIRACETAM 500 MG: 500 TABLET, FILM COATED ORAL at 20:38

## 2020-01-01 RX ADMIN — ACETAMINOPHEN 650 MG: 325 TABLET, FILM COATED ORAL at 18:23

## 2020-01-01 RX ADMIN — DEXAMETHASONE 4 MG: 4 TABLET ORAL at 17:29

## 2020-01-01 RX ADMIN — LEVETIRACETAM 500 MG: 500 TABLET ORAL at 07:48

## 2020-01-01 RX ADMIN — SODIUM CHLORIDE 500 ML: 9 INJECTION, SOLUTION INTRAVENOUS at 17:29

## 2020-01-01 RX ADMIN — SODIUM CHLORIDE TAB 1 GM 2 G: 1 TAB at 19:39

## 2020-01-01 RX ADMIN — Medication 1 MG: at 20:39

## 2020-01-01 RX ADMIN — ACETAMINOPHEN 650 MG: 325 TABLET, FILM COATED ORAL at 13:43

## 2020-01-01 RX ADMIN — LORAZEPAM 0.5 MG: 0.5 TABLET ORAL at 02:31

## 2020-01-01 RX ADMIN — ROCURONIUM BROMIDE 50 MG: 10 INJECTION INTRAVENOUS at 15:56

## 2020-01-01 RX ADMIN — FENTANYL CITRATE 50 MCG: 50 INJECTION, SOLUTION INTRAMUSCULAR; INTRAVENOUS at 09:45

## 2020-01-01 RX ADMIN — LEVETIRACETAM 500 MG: 500 TABLET, FILM COATED ORAL at 20:48

## 2020-01-01 RX ADMIN — SODIUM CHLORIDE TAB 1 GM 2 G: 1 TAB at 22:07

## 2020-01-01 RX ADMIN — ACETAMINOPHEN 650 MG: 325 TABLET, FILM COATED ORAL at 13:38

## 2020-01-01 RX ADMIN — LORAZEPAM 0.5 MG: 0.5 TABLET ORAL at 18:25

## 2020-01-01 RX ADMIN — HYDROCORTISONE 10 MG: 10 TABLET ORAL at 17:01

## 2020-01-01 RX ADMIN — ACETAMINOPHEN 650 MG: 325 TABLET ORAL at 08:21

## 2020-01-01 RX ADMIN — PHENYLEPHRINE HYDROCHLORIDE 50 MCG: 10 INJECTION INTRAVENOUS at 11:30

## 2020-01-01 RX ADMIN — DEXAMETHASONE 4 MG: 4 TABLET ORAL at 22:13

## 2020-01-01 RX ADMIN — Medication 1 MG: at 21:31

## 2020-01-01 RX ADMIN — HYDROXYZINE HYDROCHLORIDE 50 MG: 25 TABLET, FILM COATED ORAL at 21:31

## 2020-01-01 RX ADMIN — LEVETIRACETAM 500 MG: 500 TABLET ORAL at 21:06

## 2020-01-01 RX ADMIN — DEXAMETHASONE 3 MG: 2 TABLET ORAL at 21:05

## 2020-01-01 RX ADMIN — ACETAMINOPHEN 650 MG: 325 TABLET, FILM COATED ORAL at 11:54

## 2020-01-01 RX ADMIN — OXYCODONE HYDROCHLORIDE 5 MG: 5 TABLET ORAL at 15:06

## 2020-01-01 RX ADMIN — Medication 1 MG: at 20:38

## 2020-01-01 RX ADMIN — DEXAMETHASONE SODIUM PHOSPHATE 4 MG: 4 INJECTION, SOLUTION INTRAMUSCULAR; INTRAVENOUS at 13:23

## 2020-01-01 RX ADMIN — LEVETIRACETAM 500 MG: 500 TABLET, FILM COATED ORAL at 20:09

## 2020-01-01 RX ADMIN — DOCUSATE SODIUM AND SENNOSIDES 2 TABLET: 8.6; 5 TABLET ORAL at 20:23

## 2020-01-01 RX ADMIN — Medication 5 MG: at 22:07

## 2020-01-01 RX ADMIN — SODIUM CHLORIDE TAB 1 GM 2 G: 1 TAB at 14:46

## 2020-01-01 RX ADMIN — PIPERACILLIN SODIUM AND TAZOBACTAM SODIUM 4.5 G: 4; .5 INJECTION, POWDER, LYOPHILIZED, FOR SOLUTION INTRAVENOUS at 08:40

## 2020-01-01 RX ADMIN — PROPOFOL 50 MG: 10 INJECTION, EMULSION INTRAVENOUS at 08:24

## 2020-01-01 RX ADMIN — ACETAMINOPHEN 650 MG: 325 TABLET ORAL at 08:08

## 2020-01-01 RX ADMIN — DEXAMETHASONE 4 MG: 4 TABLET ORAL at 08:19

## 2020-01-01 RX ADMIN — HYDROXYZINE HYDROCHLORIDE 50 MG: 25 TABLET, FILM COATED ORAL at 05:17

## 2020-01-01 RX ADMIN — FENTANYL CITRATE 25 MCG: 50 INJECTION, SOLUTION INTRAMUSCULAR; INTRAVENOUS at 11:34

## 2020-01-01 RX ADMIN — MANNITOL 60 G: 20 INJECTION, SOLUTION INTRAVENOUS at 16:06

## 2020-01-01 RX ADMIN — DEXAMETHASONE 4 MG: 4 TABLET ORAL at 16:32

## 2020-01-01 RX ADMIN — SODIUM CHLORIDE 200 MG: 9 INJECTION, SOLUTION INTRAVENOUS at 10:31

## 2020-01-01 RX ADMIN — PANTOPRAZOLE SODIUM 40 MG: 40 TABLET, DELAYED RELEASE ORAL at 06:33

## 2020-01-01 RX ADMIN — LEVETIRACETAM 500 MG: 500 TABLET, FILM COATED ORAL at 20:39

## 2020-01-01 RX ADMIN — PHENYLEPHRINE HYDROCHLORIDE 100 MCG: 10 INJECTION INTRAVENOUS at 10:10

## 2020-01-01 RX ADMIN — ACETAMINOPHEN 650 MG: 325 TABLET, FILM COATED ORAL at 10:26

## 2020-01-01 RX ADMIN — LEVETIRACETAM 1000 MG: 500 TABLET, FILM COATED ORAL at 09:08

## 2020-01-01 RX ADMIN — PANTOPRAZOLE SODIUM 40 MG: 40 TABLET, DELAYED RELEASE ORAL at 06:38

## 2020-01-01 RX ADMIN — OXYCODONE HYDROCHLORIDE 5 MG: 5 TABLET ORAL at 10:02

## 2020-01-01 RX ADMIN — DEXAMETHASONE 1 MG: 1 TABLET ORAL at 22:05

## 2020-01-01 RX ADMIN — Medication 0.25 MG: at 22:45

## 2020-01-01 RX ADMIN — ACETAMINOPHEN 650 MG: 325 TABLET, FILM COATED ORAL at 19:26

## 2020-01-01 RX ADMIN — Medication 0.25 MG: at 21:05

## 2020-01-01 RX ADMIN — DIAZEPAM 2 MG: 2 TABLET ORAL at 10:33

## 2020-01-01 RX ADMIN — SODIUM CHLORIDE 1000 ML: 9 INJECTION, SOLUTION INTRAVENOUS at 11:54

## 2020-01-01 RX ADMIN — Medication 1 MG: at 20:53

## 2020-01-01 RX ADMIN — IOPAMIDOL 74 ML: 755 INJECTION, SOLUTION INTRAVASCULAR at 16:52

## 2020-01-01 RX ADMIN — HYDROCORTISONE 20 MG: 20 TABLET ORAL at 08:12

## 2020-01-01 RX ADMIN — HYDROXYZINE HYDROCHLORIDE 25 MG: 25 TABLET, FILM COATED ORAL at 17:55

## 2020-01-01 RX ADMIN — LORAZEPAM 1 MG: 0.5 TABLET ORAL at 11:35

## 2020-01-01 RX ADMIN — Medication 0.25 MG: at 20:39

## 2020-01-01 RX ADMIN — LORAZEPAM 1 MG: 1 TABLET ORAL at 16:27

## 2020-01-01 RX ADMIN — Medication 20 MG: at 11:45

## 2020-01-01 RX ADMIN — LORAZEPAM 1 MG: 0.5 TABLET ORAL at 00:58

## 2020-01-01 RX ADMIN — ACETAMINOPHEN 975 MG: 325 TABLET, FILM COATED ORAL at 13:20

## 2020-01-01 RX ADMIN — ACETAMINOPHEN 650 MG: 325 TABLET, FILM COATED ORAL at 06:46

## 2020-01-01 RX ADMIN — Medication 5 MG: at 22:58

## 2020-01-01 RX ADMIN — SODIUM CHLORIDE, POTASSIUM CHLORIDE, SODIUM LACTATE AND CALCIUM CHLORIDE: 600; 310; 30; 20 INJECTION, SOLUTION INTRAVENOUS at 10:31

## 2020-01-01 RX ADMIN — Medication 1 MG: at 21:50

## 2020-01-01 RX ADMIN — LORAZEPAM 2 MG: 2 INJECTION INTRAMUSCULAR; INTRAVENOUS at 22:32

## 2020-01-01 RX ADMIN — ACETAMINOPHEN 650 MG: 325 TABLET, FILM COATED ORAL at 06:22

## 2020-01-01 RX ADMIN — PANTOPRAZOLE SODIUM 40 MG: 40 TABLET, DELAYED RELEASE ORAL at 08:00

## 2020-01-01 RX ADMIN — CEFAZOLIN 1 G: 1 INJECTION, POWDER, FOR SOLUTION INTRAMUSCULAR; INTRAVENOUS at 02:05

## 2020-01-01 RX ADMIN — DEXAMETHASONE 4 MG: 4 TABLET ORAL at 10:46

## 2020-01-01 RX ADMIN — SODIUM CHLORIDE TAB 1 GM 2 G: 1 TAB at 08:08

## 2020-01-01 RX ADMIN — PANTOPRAZOLE SODIUM 40 MG: 40 TABLET, DELAYED RELEASE ORAL at 07:47

## 2020-01-01 RX ADMIN — SODIUM CHLORIDE TAB 1 GM 2 G: 1 TAB at 09:37

## 2020-01-01 RX ADMIN — ROCURONIUM BROMIDE 50 MG: 10 INJECTION INTRAVENOUS at 07:52

## 2020-01-01 RX ADMIN — ENOXAPARIN SODIUM 40 MG: 40 INJECTION SUBCUTANEOUS at 10:48

## 2020-01-01 RX ADMIN — LIDOCAINE HYDROCHLORIDE 6 ML: 10 INJECTION, SOLUTION EPIDURAL; INFILTRATION; INTRACAUDAL; PERINEURAL at 13:08

## 2020-01-01 RX ADMIN — LORAZEPAM 0.5 MG: 0.5 TABLET ORAL at 14:17

## 2020-01-01 RX ADMIN — ACETAMINOPHEN 650 MG: 325 TABLET, FILM COATED ORAL at 08:27

## 2020-01-01 RX ADMIN — SUGAMMADEX 120 MG: 100 INJECTION, SOLUTION INTRAVENOUS at 20:02

## 2020-01-01 RX ADMIN — DEXAMETHASONE 4 MG: 4 TABLET ORAL at 06:33

## 2020-01-01 RX ADMIN — DEXAMETHASONE 4 MG: 4 TABLET ORAL at 06:11

## 2020-01-01 RX ADMIN — GADOBUTROL 6 ML: 604.72 INJECTION INTRAVENOUS at 23:23

## 2020-01-01 RX ADMIN — ACETAMINOPHEN 650 MG: 325 TABLET, FILM COATED ORAL at 04:19

## 2020-01-01 RX ADMIN — DEXAMETHASONE 4 MG: 4 TABLET ORAL at 21:03

## 2020-01-01 RX ADMIN — LORAZEPAM 1 MG: 0.5 TABLET ORAL at 06:22

## 2020-01-01 RX ADMIN — DEXAMETHASONE 4 MG: 4 TABLET ORAL at 21:10

## 2020-01-01 RX ADMIN — LEVETIRACETAM 2000 MG: 100 INJECTION, SOLUTION INTRAVENOUS at 01:38

## 2020-01-01 RX ADMIN — HYDROXYZINE HYDROCHLORIDE 50 MG: 25 TABLET, FILM COATED ORAL at 02:56

## 2020-01-01 RX ADMIN — ACETAMINOPHEN 650 MG: 325 TABLET, FILM COATED ORAL at 08:56

## 2020-01-01 RX ADMIN — ACETAMINOPHEN 650 MG: 325 TABLET, FILM COATED ORAL at 03:11

## 2020-01-01 RX ADMIN — LORATADINE 5 MG: 5 SOLUTION ORAL at 19:48

## 2020-01-01 RX ADMIN — SODIUM CHLORIDE 480 MG: 9 INJECTION, SOLUTION INTRAVENOUS at 10:08

## 2020-01-01 RX ADMIN — LEVETIRACETAM 500 MG: 500 TABLET, FILM COATED ORAL at 21:10

## 2020-01-01 RX ADMIN — DEXAMETHASONE 4 MG: 4 TABLET ORAL at 06:02

## 2020-01-01 RX ADMIN — Medication 1 MG: at 02:31

## 2020-01-01 RX ADMIN — LORAZEPAM 0.5 MG: 0.5 TABLET ORAL at 21:10

## 2020-01-01 RX ADMIN — SODIUM CHLORIDE TAB 1 GM 2 G: 1 TAB at 18:25

## 2020-01-01 RX ADMIN — LEVETIRACETAM 1000 MG: 500 TABLET, FILM COATED ORAL at 20:50

## 2020-01-01 RX ADMIN — IOPAMIDOL 82 ML: 755 INJECTION, SOLUTION INTRAVENOUS at 16:24

## 2020-01-01 RX ADMIN — ENOXAPARIN SODIUM 40 MG: 40 INJECTION SUBCUTANEOUS at 12:09

## 2020-01-01 RX ADMIN — ACETAMINOPHEN 650 MG: 325 TABLET, FILM COATED ORAL at 09:44

## 2020-01-01 RX ADMIN — DEXAMETHASONE 4 MG: 4 TABLET ORAL at 23:09

## 2020-01-01 RX ADMIN — ACETAMINOPHEN 650 MG: 325 TABLET, FILM COATED ORAL at 12:38

## 2020-01-01 RX ADMIN — HYDROXYZINE HYDROCHLORIDE 50 MG: 25 TABLET, FILM COATED ORAL at 06:52

## 2020-01-01 RX ADMIN — ACETAMINOPHEN 650 MG: 325 TABLET, FILM COATED ORAL at 22:30

## 2020-01-01 RX ADMIN — SODIUM CHLORIDE TAB 1 GM 2 G: 1 TAB at 19:48

## 2020-01-01 RX ADMIN — Medication 5 MG: at 22:25

## 2020-01-01 RX ADMIN — ACETAMINOPHEN 650 MG: 325 TABLET, FILM COATED ORAL at 15:18

## 2020-01-01 RX ADMIN — SODIUM PHOSPHATE, MONOBASIC, MONOHYDRATE AND SODIUM PHOSPHATE, DIBASIC, ANHYDROUS 15 MMOL: 276; 142 INJECTION, SOLUTION INTRAVENOUS at 01:50

## 2020-01-01 RX ADMIN — LEVETIRACETAM 500 MG: 500 TABLET ORAL at 19:27

## 2020-01-01 RX ADMIN — SODIUM CHLORIDE 250 ML: 9 INJECTION, SOLUTION INTRAVENOUS at 11:50

## 2020-01-01 RX ADMIN — FLUDROCORTISONE ACETATE 0.1 MG: 0.1 TABLET ORAL at 08:12

## 2020-01-01 RX ADMIN — Medication 1 MG: at 23:44

## 2020-01-01 RX ADMIN — Medication 1 MG: at 21:03

## 2020-01-01 RX ADMIN — MIDAZOLAM 1 MG: 1 INJECTION INTRAMUSCULAR; INTRAVENOUS at 15:35

## 2020-01-01 RX ADMIN — LEVETIRACETAM 500 MG: 500 TABLET, FILM COATED ORAL at 21:42

## 2020-01-01 RX ADMIN — ACETAMINOPHEN 650 MG: 325 TABLET, FILM COATED ORAL at 13:39

## 2020-01-01 RX ADMIN — OXYCODONE HYDROCHLORIDE 5 MG: 5 TABLET ORAL at 13:39

## 2020-01-01 RX ADMIN — ROCURONIUM BROMIDE 30 MG: 10 INJECTION INTRAVENOUS at 16:51

## 2020-01-01 RX ADMIN — SENNOSIDES AND DOCUSATE SODIUM 1 TABLET: 8.6; 5 TABLET ORAL at 08:21

## 2020-01-01 RX ADMIN — DEXAMETHASONE 4 MG: 4 TABLET ORAL at 23:57

## 2020-01-01 RX ADMIN — LEVETIRACETAM 500 MG: 500 TABLET ORAL at 07:02

## 2020-01-01 RX ADMIN — FERROUS SULFATE TAB 325 MG (65 MG ELEMENTAL FE) 325 MG: 325 (65 FE) TAB at 08:53

## 2020-01-01 RX ADMIN — LORAZEPAM 0.5 MG: 0.5 TABLET ORAL at 22:03

## 2020-01-01 RX ADMIN — SODIUM CHLORIDE, POTASSIUM CHLORIDE, SODIUM LACTATE AND CALCIUM CHLORIDE: 600; 310; 30; 20 INJECTION, SOLUTION INTRAVENOUS at 07:29

## 2020-01-01 RX ADMIN — LORAZEPAM 0.5 MG: 0.5 TABLET ORAL at 15:11

## 2020-01-01 RX ADMIN — LEVETIRACETAM 1000 MG: 500 TABLET, FILM COATED ORAL at 19:48

## 2020-01-01 ASSESSMENT — ACTIVITIES OF DAILY LIVING (ADL)
ADLS_ACUITY_SCORE: 15
TRANSFERRING: 0-->INDEPENDENT
ADLS_ACUITY_SCORE: 10
ADLS_ACUITY_SCORE: 14
AMBULATION: 0-->INDEPENDENT
RETIRED_COMMUNICATION: 0-->UNDERSTANDS/COMMUNICATES WITHOUT DIFFICULTY
ADLS_ACUITY_SCORE: 19
ADLS_ACUITY_SCORE: 15
COGNITION: 0 - NO COGNITION ISSUES REPORTED
ADLS_ACUITY_SCORE: 14
ADLS_ACUITY_SCORE: 11
ADLS_ACUITY_SCORE: 11
ADLS_ACUITY_SCORE: 15
ADLS_ACUITY_SCORE: 10
ADLS_ACUITY_SCORE: 11
FALL_HISTORY_WITHIN_LAST_SIX_MONTHS: YES
TOILETING: 0-->INDEPENDENT
AMBULATION: 0-->INDEPENDENT
ADLS_ACUITY_SCORE: 11
FALL_HISTORY_WITHIN_LAST_SIX_MONTHS: NO
ADLS_ACUITY_SCORE: 15
ADLS_ACUITY_SCORE: 15
ADLS_ACUITY_SCORE: 11
TRANSFERRING: 0-->INDEPENDENT
ADLS_ACUITY_SCORE: 11
ADLS_ACUITY_SCORE: 17
FALL_HISTORY_WITHIN_LAST_SIX_MONTHS: YES
ADLS_ACUITY_SCORE: 11
ADLS_ACUITY_SCORE: 14
ADLS_ACUITY_SCORE: 12
ADLS_ACUITY_SCORE: 15
ADLS_ACUITY_SCORE: 11
BATHING: 0-->INDEPENDENT
NUMBER_OF_TIMES_PATIENT_HAS_FALLEN_WITHIN_LAST_SIX_MONTHS: 1
RETIRED_EATING: 0-->INDEPENDENT
RETIRED_EATING: 0-->INDEPENDENT
ADLS_ACUITY_SCORE: 11
ADLS_ACUITY_SCORE: 11
ADLS_ACUITY_SCORE: 14
ADLS_ACUITY_SCORE: 14
ADLS_ACUITY_SCORE: 15
ADLS_ACUITY_SCORE: 12
DRESS: 0-->INDEPENDENT
ADLS_ACUITY_SCORE: 17
ADLS_ACUITY_SCORE: 15
RETIRED_COMMUNICATION: 0-->UNDERSTANDS/COMMUNICATES WITHOUT DIFFICULTY
ADLS_ACUITY_SCORE: 15
ADLS_ACUITY_SCORE: 10
WHICH_OF_THE_ABOVE_FUNCTIONAL_RISKS_HAD_A_RECENT_ONSET_OR_CHANGE?: FALL HISTORY
BATHING: 0-->INDEPENDENT
RETIRED_COMMUNICATION: 0-->UNDERSTANDS/COMMUNICATES WITHOUT DIFFICULTY
DRESS: 0-->INDEPENDENT
ADLS_ACUITY_SCORE: 15
COGNITION: 0 - NO COGNITION ISSUES REPORTED
DRESS: 0-->INDEPENDENT
ADLS_ACUITY_SCORE: 14
ADLS_ACUITY_SCORE: 15
SWALLOWING: 0-->SWALLOWS FOODS/LIQUIDS WITHOUT DIFFICULTY
DRESS: 0-->INDEPENDENT
FALL_HISTORY_WITHIN_LAST_SIX_MONTHS: NO
ADLS_ACUITY_SCORE: 19
RETIRED_EATING: 0-->INDEPENDENT
ADLS_ACUITY_SCORE: 11
ADLS_ACUITY_SCORE: 15
ADLS_ACUITY_SCORE: 18
SWALLOWING: 0-->SWALLOWS FOODS/LIQUIDS WITHOUT DIFFICULTY
ADLS_ACUITY_SCORE: 11
NUMBER_OF_TIMES_PATIENT_HAS_FALLEN_WITHIN_LAST_SIX_MONTHS: 1
ADLS_ACUITY_SCORE: 17
ADLS_ACUITY_SCORE: 11
BATHING: 0-->INDEPENDENT
ADLS_ACUITY_SCORE: 11
ADLS_ACUITY_SCORE: 15
ADLS_ACUITY_SCORE: 10
AMBULATION: 0-->INDEPENDENT
ADLS_ACUITY_SCORE: 13
ADLS_ACUITY_SCORE: 11
ADLS_ACUITY_SCORE: 11
ADLS_ACUITY_SCORE: 12
PREVIOUS_RESPONSIBILITIES: MEAL PREP;MEDICATION MANAGEMENT;FINANCES;DRIVING;WORK
ADLS_ACUITY_SCORE: 15
SWALLOWING: 0-->SWALLOWS FOODS/LIQUIDS WITHOUT DIFFICULTY
ADLS_ACUITY_SCORE: 15
DRESS: 0-->INDEPENDENT
COGNITION: 0 - NO COGNITION ISSUES REPORTED
ADLS_ACUITY_SCORE: 11
NUMBER_OF_TIMES_PATIENT_HAS_FALLEN_WITHIN_LAST_SIX_MONTHS: 1
COGNITION: 0 - NO COGNITION ISSUES REPORTED
AMBULATION: 0-->INDEPENDENT
ADLS_ACUITY_SCORE: 15
NUMBER_OF_TIMES_PATIENT_HAS_FALLEN_WITHIN_LAST_SIX_MONTHS: 1
AMBULATION: 0-->INDEPENDENT
COGNITION: 0 - NO COGNITION ISSUES REPORTED
ADLS_ACUITY_SCORE: 13
ADLS_ACUITY_SCORE: 11
SWALLOWING: 0-->SWALLOWS FOODS/LIQUIDS WITHOUT DIFFICULTY
ADLS_ACUITY_SCORE: 10
ADLS_ACUITY_SCORE: 15
ADLS_ACUITY_SCORE: 10
TOILETING: 0-->INDEPENDENT
ADLS_ACUITY_SCORE: 15
TRANSFERRING: 0-->INDEPENDENT
ADLS_ACUITY_SCORE: 15
ADLS_ACUITY_SCORE: 13
RETIRED_EATING: 0-->INDEPENDENT
ADLS_ACUITY_SCORE: 14
FALL_HISTORY_WITHIN_LAST_SIX_MONTHS: YES
ADLS_ACUITY_SCORE: 15
ADLS_ACUITY_SCORE: 17
WHICH_OF_THE_ABOVE_FUNCTIONAL_RISKS_HAD_A_RECENT_ONSET_OR_CHANGE?: AMBULATION;TRANSFERRING;TOILETING;BATHING;DRESSING;FALL HISTORY
BATHING: 0-->INDEPENDENT
BATHING: 0-->INDEPENDENT
ADLS_ACUITY_SCORE: 12
ADLS_ACUITY_SCORE: 15
ADLS_ACUITY_SCORE: 11
ADLS_ACUITY_SCORE: 11
ADLS_ACUITY_SCORE: 14
ADLS_ACUITY_SCORE: 15
ADLS_ACUITY_SCORE: 11
RETIRED_EATING: 0-->INDEPENDENT
ADLS_ACUITY_SCORE: 10
ADLS_ACUITY_SCORE: 13
RETIRED_EATING: 0-->INDEPENDENT
ADLS_ACUITY_SCORE: 15
TRANSFERRING: 0-->INDEPENDENT
ADLS_ACUITY_SCORE: 11
RETIRED_COMMUNICATION: 0-->UNDERSTANDS/COMMUNICATES WITHOUT DIFFICULTY
DRESS: 0-->INDEPENDENT
TOILETING: 0-->INDEPENDENT
TOILETING: 0-->INDEPENDENT
ADLS_ACUITY_SCORE: 11
RETIRED_COMMUNICATION: 0-->UNDERSTANDS/COMMUNICATES WITHOUT DIFFICULTY
TRANSFERRING: 0-->INDEPENDENT
ADLS_ACUITY_SCORE: 11
ADLS_ACUITY_SCORE: 13
ADLS_ACUITY_SCORE: 17
ADLS_ACUITY_SCORE: 15
ADLS_ACUITY_SCORE: 15
RETIRED_COMMUNICATION: 0-->UNDERSTANDS/COMMUNICATES WITHOUT DIFFICULTY
SWALLOWING: 0-->SWALLOWS FOODS/LIQUIDS WITHOUT DIFFICULTY
ADLS_ACUITY_SCORE: 13
FALL_HISTORY_WITHIN_LAST_SIX_MONTHS: YES
BATHING: 0-->INDEPENDENT
COGNITION: 0 - NO COGNITION ISSUES REPORTED
TRANSFERRING: 0-->INDEPENDENT
ADLS_ACUITY_SCORE: 11
ADLS_ACUITY_SCORE: 10
ADLS_ACUITY_SCORE: 14
AMBULATION: 0-->INDEPENDENT
TOILETING: 0-->INDEPENDENT
TOILETING: 0-->INDEPENDENT
SWALLOWING: 0-->SWALLOWS FOODS/LIQUIDS WITHOUT DIFFICULTY

## 2020-01-01 ASSESSMENT — PAIN DESCRIPTION - DESCRIPTORS
DESCRIPTORS: ACHING
DESCRIPTORS: ACHING
DESCRIPTORS: ACHING;SORE
DESCRIPTORS: HEADACHE
DESCRIPTORS: OTHER (COMMENT)
DESCRIPTORS: HEADACHE;PRESSURE
DESCRIPTORS: DISCOMFORT
DESCRIPTORS: ACHING
DESCRIPTORS: HEADACHE
DESCRIPTORS: ACHING
DESCRIPTORS: DISCOMFORT
DESCRIPTORS: HEADACHE;PRESSURE
DESCRIPTORS: ACHING
DESCRIPTORS: ACHING

## 2020-01-01 ASSESSMENT — MIFFLIN-ST. JEOR
SCORE: 1552.27
SCORE: 1561.78
SCORE: 1510.98
SCORE: 1564.06
SCORE: 1556.13
SCORE: 1567.69
SCORE: 1560.87
SCORE: 1563.61
SCORE: 1560.87
SCORE: 1558.61
SCORE: 1510.98
SCORE: 1541.38
SCORE: 1550
SCORE: 1548.64
SCORE: 1497.82
SCORE: 1548.64
SCORE: 1528.67
SCORE: 1561.78
SCORE: 1551.35
SCORE: 1558.62
SCORE: 1560.87
SCORE: 1560.87
SCORE: 1535.93
SCORE: 1551.35
SCORE: 1561.33
SCORE: 1558.61

## 2020-01-01 ASSESSMENT — ENCOUNTER SYMPTOMS
MUSCULOSKELETAL NEGATIVE: 1
COUGH: 1
HEADACHES: 0
DYSURIA: 0
FEVER: 0
TREMORS: 0
FEVER: 0
NERVOUS/ANXIOUS: 1
SORE THROAT: 0
NUMBNESS: 0
VOMITING: 0
SHORTNESS OF BREATH: 1
BACK PAIN: 0
ABDOMINAL PAIN: 0
FREQUENCY: 0
NAUSEA: 0
CHILLS: 0
PALPITATIONS: 0
LIGHT-HEADEDNESS: 1
RESPIRATORY NEGATIVE: 1
GASTROINTESTINAL NEGATIVE: 1
HEMATURIA: 0
FEVER: 1
TROUBLE SWALLOWING: 0
DYSURIA: 0
BLOOD IN STOOL: 0
PSYCHIATRIC NEGATIVE: 1
DIAPHORESIS: 0
DIZZINESS: 1
VOMITING: 0
ROS SKIN COMMENTS: MULTIPLE SKIN LESIONS
WEIGHT LOSS: 0
WOUND: 0
DIZZINESS: 1
EYES NEGATIVE: 1
COUGH: 1
DIARRHEA: 0
NEUROLOGICAL NEGATIVE: 1
SHORTNESS OF BREATH: 0
CARDIOVASCULAR NEGATIVE: 1
NAUSEA: 0
SEIZURES: 0
FATIGUE: 1
SEIZURES: 1
FEVER: 0
VOMITING: 0
WEAKNESS: 0
DIARRHEA: 0
COUGH: 0
NECK PAIN: 0
HEMATURIA: 0
COLOR CHANGE: 0
DIFFICULTY URINATING: 0

## 2020-01-01 ASSESSMENT — PAIN SCALES - GENERAL
PAINLEVEL: NO PAIN (0)
PAINLEVEL: SEVERE PAIN (6)
PAINLEVEL: NO PAIN (0)

## 2020-01-01 ASSESSMENT — VISUAL ACUITY
OU: NORMAL ACUITY
OU: BASELINE
OU: NORMAL ACUITY;GLASSES
OU: NORMAL ACUITY;GLASSES
OU: NORMAL ACUITY
OU: NORMAL ACUITY;GLASSES
OU: BASELINE
OU: NORMAL ACUITY
OU: BASELINE
OU: GLASSES;BASELINE
OU: NORMAL ACUITY
OU: GLASSES;BASELINE
OU: NORMAL ACUITY
OU: BASELINE
OU: GLASSES;BASELINE
OU: BASELINE
OU: GLASSES;BASELINE
OU: GLASSES;BASELINE
OU: NORMAL ACUITY
OU: GLASSES;BASELINE

## 2020-01-01 ASSESSMENT — LIFESTYLE VARIABLES: TOBACCO_USE: 0

## 2020-01-07 NOTE — TELEPHONE ENCOUNTER
Patient's wife Meg called stating that the patient was admitted to [facility] in [place] with intracranial hemorrhage. Patient had gamma knife consult, which appears to have been canceled and wife states they haven't heard back.  Per wife, [facility] has radiation, but not gamma knife.  She states that [facility] would help facilitate a transfer to the [place] if Dr. Tesfaye feels this would be the best.  Patient would also like to start back on Keytruda, as this was talked about at patient's last clinic visit with Dr. Tesfaye.  Message left with radiation oncolgy,(gamma Knife) regarding this patient.  Dr. Tesfaye is aware of patient's situation and has spoken to [facility] provider.  Spoke with Meg again, it sounds like they will take care of patient's bleeding and that providers are in contact.    .

## 2020-01-09 PROBLEM — I61.9 INTRACEREBRAL HEMORRHAGE (H): Status: ACTIVE | Noted: 2020-01-01

## 2020-01-09 NOTE — H&P
General acute hospital   Acute Rehabilitation Unit  Admission History and Physical    CHIEF COMPLAINT   Left sided weakness    HISTORY OF PRESENT ILLNESS  Dallin Stevens is a 34 year old man with a past medical history of metastatic melanoma diagnosed 3/2012, in 2017 had CT which revealed metastatic disease, confirmed by biopsy, received pembrolizumab between then opted for alternative/holistic treatment, including: (ketogenic diet, iv vitamin c and ozone therapy) .  In April 2019 diagnosed with brain metastasis, underwent craniotomy and tumor resection at that time. Transitioned to [facility] oncology and follows with Dr. Tesfaye last visit 12/7/2019, with mention of ongoing disease progression, brain metastasis and recommendations for gamma knife treatment, and starting pembrolizumab, reportedly patient wanted to continue alternative/holistic approach at that time.    On 1/6/2020  Patient presented with left sided weakness, ongoing headaches, imaging with cranial lesions and right frontal hemorrhage with edema. Was seen and evaluated by neurosurgery with initiation of dexamethasone and keppra.  Other current medical management includes keeping head of bed greater than 30 degrees, use of atarax for anxiety, and keep sbp <140. Case was discussed with oncology and radiation oncology with plan for follow up as outpatient to discuss gamma knife with radiation oncology.     During his acute hospitalization, patient was seen and evaluated by PT and OT, who collectively recommended that patient would benefit from ongoing therapies in the acute inpatient rehabilitation setting.       In review of the therapy notes patient noted to have left sided weakness, impaired balance and coordination.  He is currently min assist for sit to stand with use of walker and cues, ambulating up to 50 feet with min-mod assist and wheel chair following.  Responds well to cueing for sequencing.  Currently Min assist for  "combing hair, cues and setup requiring for basic grooming and hygiene, will need functional cognitive testing.  Goals for mod I with basic adls, transfers, and household ambulation.     On arrival to rehab Dallin was seen in presence of wife Meg.  He reports mild frontal headache, ongoing left sided weakness.  Denies fevers, dizziness, sob, n/v and diarrhea.  He had bowel movement today.  Denies issues with bladder.  Dallin reports appetite has been ok.  Denies numbness/tingling, pain/spasms, hearing or vision changes.  Is feeling \"trapped\"/ anxious and open to alternative/holistic therapies such as healing touch and acupuncture consult for  placed. He also reports trouble sleeping, atarax is helping with mood would like something else available if more severe.        PAST MEDICAL HISTORY   Reviewed and updated in Epic.  Past Medical History:   Diagnosis Date     Malignant melanoma (H)        SURGICAL HISTORY  Reviewed and updated in Epic.  Past Surgical History:   Procedure Laterality Date     CRANIOTOMY, EXCISE TUMOR COMPLEX, COMBINED  04/2019       SOCIAL HISTORY  Reviewed and updated in Epic.  Marital Status:   Living situation: lives in [place], wife and 2 kids,  2ste, 16 stairs inside ideal to be able to get up stairs but not required   Family support: supportive wife, parents, siblings and in laws  Vocational History: works full time for [employer] as [employer]  Tobacco use: non smoker  Alcohol use: no alcohol  Illicit drug use: none  Social History     Socioeconomic History     Marital status:      Spouse name: Not on file     Number of children: Not on file     Years of education: Not on file     Highest education level: Not on file   Occupational History     Not on file   Social Needs     Financial resource strain: Not on file     Food insecurity:     Worry: Not on file     Inability: Not on file     Transportation needs:     Medical: Not on file     Non-medical: Not on file   Tobacco Use " "    Smoking status: Never Smoker     Smokeless tobacco: Never Used   Substance and Sexual Activity     Alcohol use: Not on file     Drug use: Not on file     Sexual activity: Not on file   Lifestyle     Physical activity:     Days per week: Not on file     Minutes per session: Not on file     Stress: Not on file   Relationships     Social connections:     Talks on phone: Not on file     Gets together: Not on file     Attends [employer] service: Not on file     Active member of club or organization: Not on file     Attends meetings of clubs or organizations: Not on file     Relationship status: Not on file     Intimate partner violence:     Fear of current or ex partner: Not on file     Emotionally abused: Not on file     Physically abused: Not on file     Forced sexual activity: Not on file   Other Topics Concern     Not on file   Social History Narrative     Not on file       FAMILY HISTORY  Reviewed and updated in Epic.  No family history on file.      PRIOR FUNCTIONAL HISTORY   Pt was independent with all ADLs/IADLs, transfers, mobility and gait.      MEDICATIONS  Scheduled meds: keppra, decadron  No medications prior to admission.       ALLERGIES   No Known Allergies      REVIEW OF SYSTEMS  A 10 point ROS was performed and negative unless otherwise noted in HPI.       PHYSICAL EXAM  VITAL SIGNS:  /69 (BP Location: Right arm)   Pulse 69   Temp 98.8  F (37.1  C) (Oral)   Resp 18   Ht 1.803 m (5' 11\")   Wt 60.1 kg (132 lb 6.4 oz)   SpO2 97%   BMI 18.47 kg/m    BMI:  Estimated body mass index is 20.66 kg/m  as calculated from the following:    Height as of 12/5/19: 1.778 m (5' 10\").    Weight as of 12/5/19: 65.3 kg (144 lb).     General: thin awake alert nad  HEENT: mucus membranes moist,   Pulmonary: non labored clear  Cardiovascular: rrr  Abdominal: soft non tender non distended +BS  Extremities: warm, well perfused, no edema in bilateral lower extremities, no tenderness in calves   MSK/neuro:   Mental " Status:  alert and oriented    Cranial Nerves: grossly normal   1. 11th CN: weak shoulder shrug on left    Sensory: Normal to light touch in bilateral upper and lower extremities   Strength: 5/5 in all muscle groups on right   SF  EF  EE  WE  G  I  HF  KE  DF  EHL  PF   L  0          0            0          1          3         3           0          0          0          0            0   Reflexes: Present and symmetrical    Patel's test: negative bilaterally    Babinski reflex: flat   Tone per modified Alysia Scale: flaccid on upper, some tightness on left ankle prom no clear clonus, hip/knee flaccid   Abnormal movements: None   Coordination: coordination not tested on left   Speech: clear coherent   Cognition: linear logical on conversation level   Gait: not tested  Skin: skin not examined      LABS/ Imaging  CT Head 1/9/2019  No change in the size or appearance of the area of recent intraparenchymal hemorrhage in the parasagittal right frontal region since yesterday measuring approximately 3 cm x 2.9 cm x 3.4 cm. Stable moderate amount of surrounding   vasogenic edema and localized mass effect. Also no definite change in the size or appearance of the recent parenchymal hemorrhage in the medial right temporal occipital region and atrial region of the right lateral ventricle since yesterday, measuring   approximately 3 cm x 2.7 cm x 2.4 cm. Stable small amount of associated intraventricular hemorrhage in the occipital horn on the right. Stable surrounding mild vasogenic edema. Stable small 6 mm hyperdense focus left parietal area without significant   edema. No new hyperdense area of acute hemorrhage since yesterday. No CT evidence of acute infarct. Mass effect is stable from yesterday with approximately 4 mm midline shift to the left. Basal cisterns patent. Ventricular system stable from yesterday   with enlargement of the temporal horn of the right lateral ventricle compatible with some element of temporal  horn entrapment.      VISUALIZED ORBITS/SINUSES/MASTOIDS: No intraorbital abnormality. No paranasal sinus mucosal disease. No middle ear or mastoid effusion.     BONES/SOFT TISSUES: No acute abnormality. Old right parietal craniotomy site.      IMPRESSION/PLAN:  Dallin Stevens is a 34 year old man with known metastatic melanoma who presented with left sided weakness found to have acute right intracerebral hemorrhage with mild to moderate edema concerning for hemorrhagic melanoma metastasis.  Was managed non operatively per neurosurgery admitted to  ARU 1/9/2019 for ongoing rehabilitation and medical management.       Admission to acute inpatient rehab left weakness 2/2 intracerebral hemorrhage   Impairment group code: 01.1      1. PT, OT 90 minutes of each on a daily basis, in addition to rehab nursing and close management of physiatrist.      2. Impairment of ADL's: Noted to have impaired balance, strength, and coordination.  Currently requiring sba, setup,  and cues for basic seated grooming and hygiene.  Min assist to comb hair on left.  Min assist for standing balance.  Goals for mod I with basic adl transfers and adls.     3. Impairment of mobility:  Noted to have impaired strength and balance.  Currently min assist for sit to stand with use of hemiwalker and cues to posture.  He is ambulating up to 50 feet with min-mod assist and wheel chair follow.  Needs cues for sequencing.  Goals for mod I to I with transfers and household ambulation.     4. Medical Conditions  Metastatic Melanoma-   ICH  Diagnosed with melanoma 3/2012, Mets to brain, supraclavicular lymph nodes, mediastinum, right hilum, and lungs.  S/p craniotomy and tumor resection 4/2019. Follows with Dr. Tesfaye at [facility], with known disease progression 12/2019. Presented 1/6/20 Left sided weakness, impaired balance, coordination, ongoing headaches. Most recent CT Head (STABLE) 1/9: intraparenchymal hemorrhage in the parasagittal right frontal region  "moderate amount of surrounding  vasogenic edema and localized mass effect. parenchymal hemorrhage in the medial right temporal occipital region and atrial region of the right lateral ventricle, small amount of associated intraventricular hemorrhage in the occipital horn on the right, with mild vasogenic edema. Stable small  hyperdense focus left parietal area. Mass effect is stable with approximately 4 mm midline shift to the left. enlargement of the temporal horn of the right lateral ventricle compatible with some element of temporal horn entrapment.   -continue decadron 4 mg q 6 hours  -keppra 500 mg bid  -seizure precautions  -hold AC- SCD  -keep SBP<140  -f/u rad onc at [facility]- vs consult while at [facility]  -HOB > 30 at all times  -continue PT/OT  -f/u Dr. Tesfaye (oncology)  -prn tylenol for headache    Anxiety-acute due to new neurological deficits.  try prn hydroxyzine ok for \"very low dose ativan\" per neurosurgery if needed  -ativan 0.25 mg prn  -atarax prn  - consult   -acupuncture if available    Mechanical fall  Rib/Back pain  -fall 1/8  reportedly landed on bottom hitting back and ribs on bed.  CT head 1/9 stable.   -tylenol prn  -fall precautions  -continue PT/OT    5. Adjustment to disability:  Consult - interested in holistic/alternative medicine.   6. FEN: reg  7. Bowel: continent + BM 1/9  8. Bladder: continent  9. DVT Prophylaxis: scd  10. GI Prophylaxis: ppi  11. Code: full code  12. Disposition: goal for home  13. ELOS:  10-14 days  14. Rehab prognosis:   15. Follow up Appointments on Discharge: Oncology, Rad-Oncology       discussed with Dr. Kinsey , PM&R staff physician     Trina Charles PA-C  Rehab Service  Pager [phone]    "

## 2020-01-09 NOTE — PLAN OF CARE
FOCUS/GOAL  Mobility and Safety management    ASSESSMENT, INTERVENTIONS AND CONTINUING PLAN FOR GOAL:  Arrived to ARU this afternoon by [facility] transport via wheelchair. Wife arrived shortly after. Ao2 for pivot transfers. Denied pain, VSS. Room and unit orientation provided, questions encouraged and pt verbalized understanding. Pt instructed on how to use the call light, and to call for assistance with all transfers and he verbalized understanding. Bed alarm on for safety. Per report, pt is flaccid on left side, able to slightly wiggle his left fingers. Has been utilizing a quad cane for transfers. Pt did have a fall yesterday evening, with no injuries reported. Pt's wife reported that pt is on a restricted diet with no dairy, sugar, or gluten. Trina FLORES notified to update diet order. Has been continent of B/B, and was using BSC when writer was receiving report for possible BM. Will continue with admission process.

## 2020-01-10 NOTE — PROGRESS NOTES
01/10/20 0812   Quick Adds   Type of Visit Initial PT Evaluation   Living Environment   Lives With spouse;child(janell), dependent   Living Arrangements house   Home Accessibility stairs to enter home;stairs within home   Number of Stairs, Main Entrance 2   Stair Railings, Main Entrance none   Number of Stairs, Within Home, Primary other (see comments)  (14 stairs, R rail)   Stair Railings, Within Home, Primary railing on right side (ascending)   Transportation Anticipated family or friend will provide  (owns a [facility] full size truck and [place])   Living Environment Comment PT: bedroom - luis bed, exits on R. Bathroom - attached to bedroom, regular toilet, walk in shower with hand held shower head, no GBs. Living room - lower sitting couch but had not difficulty standing from couch prior.   Self-Care   Usual Activity Tolerance excellent   Current Activity Tolerance moderate   Regular Exercise Yes   Activity/Exercise Type team sports   Exercise Amount/Frequency 1 hr   Equipment Currently Used at Home none   Activity/Exercise/Self-Care Comment PT: enjoys playing soccer 1x/week, also enjoys [employer].   Functional Level Prior   Ambulation 0-->independent   Transferring 0-->independent   Toileting 0-->independent   Bathing 0-->independent   Communication 0-->understands/communicates without difficulty   Swallowing 0-->swallows foods/liquids without difficulty   Cognition 0 - no cognition issues reported   Fall history within last six months yes   Number of times patient has fallen within last six months 1   Which of the above functional risks had a recent onset or change? ambulation;transferring;toileting;bathing;dressing   Prior Functional Level Comment PT: independent PTA   General Information   Onset of Illness/Injury or Date of Surgery - Date 01/06/20  (date of [facility])   Referring Physician Trina Charles PA-C   Patient/Family Goals Statement To get back to normal   Pertinent History of Current Problem (include  personal factors and/or comorbidities that impact the POC) CMH: ICH x3 (R parasaggital frontal, R temporal occipital, and L parietal) 1/6/20; metastatic melanoma   Precautions/Limitations fall precautions   Cognitive Status Examination   Orientation orientation to person, place and time   Level of Consciousness alert   Follows Commands and Answers Questions 100% of the time   Personal Safety and Judgment intact   Memory intact   Pain Assessment   Patient Currently in Pain No   Integumentary/Edema   Integumentary/Edema other (describe)   Integumentary/Edema Comments PT; healed incision on back of scalp from prior crani   Strength   Manual Muscle Testing Quick Adds MMT: Shoulder;MMT: Elbow/Forearm;MMT: Wrist;MMT: Hand (General);MMT: Hip;MMT: Knee;MMT: Ankle   MMT: Shoulder   Shoulder Flexion - Left Side (0/5) zero, left   Shoulder ABduction - Left Side (0/5) zero, left   Shoulder Flexion - Right Side (5/5) normal,right   Shoulder ABduction - Right Side (5/5) normal,right   MMT: Elbow/Forearm, Rehab Eval   Elbow Flexion - Left Side (0/5) zero, left   Elbow Extension - Left Side (1/5) trace, left   Elbow Flexion - Right Side (5/5) normal,right   Elbow Extension - Right Side (5/5) normal,right   MMT: Hand   Hand Muscle Testing Results : R 5/5, L 2-/5   MMT: Wrist   Wrist Muscle Testing Results Flex: L 1+/5. Ext: L 0/5   MMT: Hip, Rehab Eval   Hip Flexion - Left Side (0/5) zero, left   Hip Extension - Left Side (1/5) trace, left   Hip Flexion - Right Side (5/5) normal,right   Hip Extension - Right Side (5/5) normal,right   MMT: Knee, Rehab Eval   Knee Flexion - Left Side (0/5) zero, left   Knee Extension - Left Side (0/5) zero, left   Knee Flexion - Right Side (5/5) normal,right   Knee Extension - Right Side (5/5) normal,right   MMT: Ankle, Rehab Eval   Ankle Dorsiflexion - Left Side (0/5) zero, left   Ankle Plantarflexion - Left Side (0/5) zero, left   Ankle Dorsiflexion - Right Side 5/5) normal,left   Ankle  Plantarflexion - Right Side 5/5) normal,left   ARC Assessment Only   Acute Rehab Functional Assessment See IP Rehab Daily Documentation Flowsheet for Functional Mobility/ADL Assessment   Balance   Balance other (describe)   Sitting Balance: Static good balance  (sitting EOB, no sway, no UE support)   Sitting Balance: Dynamic fair balance  (poor pelvic trunk dissociation on L)   Sit-to-Stand Balance poor balance  (heavily favors R side)   Standing Balance: Static poor balance  (leans heavily to R side)   Standing Balance: Dynamic poor balance  (heavy assist to stabilize LLE in stance)   Systems Impairment Contributing to Balance Disturbance neuromuscular   Identified Impairments Contributing to Balance Disturbance impaired coordination;impaired motor control;impaired postural control;decreased strength;decreased ROM   Balance Quick Add Sitting balance: Static;Sitting balance: dynamic;Sit to stand balance;Standing balance: static;Standing balance: dynamic;Systems impairment contributing to balance disturbance;Identified impairments contributing to balance disturbance   Sensory Examination   Sensory Perception other (describe)   Sensory Perception Quick Adds Light touch;Proprioception   Sensation Light Touch   LUE w/i normal limits   LLE w/i normal limits   RUE w/i normal limits   RLE w/i normal limits   Head/Neck w/i normal limits   Trunk w/i normal limits   Proprioception    LUE w/i normal limits   LLE w/i normal limits   RUE w/i normal limits   RLE w/i normal limits   Coordination   Coordination other (see comments)   Coordination Comments PT: limited by L hemiplegia   Muscle Tone   Muscle Tone other (describe)   Muscle Tone Comments PT: 0/4 L biceps and triceps. 1+/4 in L glut. L gastroc - 2 beat clonus   Modality Interventions   Planned Modality Interventions Microcurrent Electrical Stimulation   Planned Modality Interventions Comments PT: FES bike   General Therapy Interventions   Planned Therapy Interventions  balance training;bed mobility training;gait training;groups;manual therapy;neuromuscular re-education;orthotic fitting/training;ROM;strengthening;stretching;transfer training;wheelchair management/propulsion training;risk factor education;home program guidelines;progressive activity/exercise   Clinical Impression   Criteria for Skilled Therapeutic Intervention yes, treatment indicated   PT Diagnosis L hemiplegia 2/2 multiple ICHs   Influenced by the following impairments Strength, endurance, balance/postural control, coordination, tone   Functional limitations due to impairments Bed mob, transfers, gait, stairs   Clinical Presentation Evolving/Changing   Clinical Presentation Rationale PT: metastatic CA, multiple ICHs, poor home setup with stairs to access bed/bath, assist available from family   Clinical Decision Making (Complexity) High complexity   Therapy Frequency Daily  (90 min)   Predicted Duration of Therapy Intervention (days/wks) 21 days   Anticipated Equipment Needs at Discharge wheelchair custom;quad cane   Anticipated Discharge Disposition Home with Assist;Home with Home Therapy;Transitional Care Facility   Risk & Benefits of therapy have been explained Yes   Patient, Family & other staff in agreement with plan of care Yes   Clinical Impression Comments PT: see care plan note   Total Evaluation Time   Total Evaluation Time (Minutes) 30

## 2020-01-10 NOTE — PLAN OF CARE
OT: Jael completed. Pt is assist of 1 for transfer with cues and blocking of L knee. Pt with significant hemiparesis on L side although he does have some movement in the hand. Pt has supportive family, stairs within home and at this time will benefit from therapy to improve his independence with ADL and functional mobility. Discharge recommendations and LOS will be dependent on his medical course of action.

## 2020-01-10 NOTE — CONSULTS
01/10/20 1000   Living Arrangements   Lives With spouse;child(janell), dependent   Living Arrangements house   Able to Return to Prior Arrangements yes   Living Arrangement Comments Good family support-wife, parents, in-laws   Home Safety   Patient Feels Safe Living in Home? yes   Discharge Planning   Patient/Family Anticipates Transition to home with family;home with help/services   Disposition Comments HHC vs OP anticipated    Concerns to be Addressed coping/stress;mental health   Concerns Comments Anxious and tearful    Discharge Needs Assessment   Transportation Anticipated family or friend will provide     Social Work: Initial Assessment with Discharge Plan    Patient Name: Dallin Stevens  : 1985  Age: 34 year old  MRN: [id number]  Completed assessment with: Pt at bedside and chart review   Admitted to ARU: 2020    Presenting Information   Date of SW assessment: January 10, 2020  Health Care Directive: Pt reported that he has completed one, none listed in chart, will assess further   Primary Health Care Agent: Self  Secondary Health Care Agent: Pt wife Meg   Living Situation: Pt and wife live in a home with 2 children (3 and 6 y/o). Bathroom on the main level, bedroom and master bath on the second level. Walk-in shower. Pt reported that he is able to stay on the main level if needed.   Previous Functional Status: Pt reported independent PTA with no use of assistive device. Independent with IADLs.   DME available: Cane, no other DME at home.   Patient and family understanding of hospitalization: Pt tearful during assessment. Appeared quiet and was holding back emotion. SW offered support. Pt appeared A&O and scored 15/15 on BIMs.   Cultural/Language/Spiritual Considerations: herminia not listed. Per chart review, pt has elected many alternative and holistic approaches to treatment and symptom management. Maia was consulted. Tomi spoke with Maia and provided update after assessment, Maia plans  to come visit this afternoon.       Physical Health  Reason for admission: Intracerebral hemorrhage, Metastatic malignant melanoma    Provider Information   Primary Care Physician:Provide, Patient Unable To --Oncologist   : Pt denied     Mental Health/Chemical Dependency:   Diagnosis: Pt reported recent anxiety, denied hx. Pt not taking medication for anxiety but shared that he spoke to the MD about it since his admission. Pt reported good family emotional support. Pt did not report any skills that he is utilizing to manage his anxiety. Support and validation provided to pt. Leoma aware and will follow up this afternoon.   Alcohol/Tobacco/Narcotis: Denied   Support/Services in Place: Family and esthela support   Services Needed/Recommended: Health psych consult  Sexuality/Intimacy: Not discussed     Support System  Marital Status: Wife, Meg.  for 5 years. Wife works full-time from home. Supportive and good relationship, per pt report.   Family support: Two young children, 3 and 4 y/o. Pt's parents live in [place] and pt unclear if they will stay local at the time of his discharge. Reported good support from his parents. Good support from in-laws as well. Brother lives in [place].   Other support available: Reported friends and coworkers     Community Resources  Current in home services: None reported   Previous services: Pt denied     Financial/Employment/Education  Employment Status: Worked full-time at [employer] as a . Pt reported that his boss is aware of his hospitalization and that his wife has been providing updates.   Income Source: Salary   Education: Masters   Financial Concerns:  Denied   Insurance: [employer]BS of [place]       Discharge Plan   Patient and family discharge goal: Home with family A and HHC vs OP, pending progress   Provided Education on discharge plan: YES  Patient agreeable to discharge plan:  YES  Provided education and attained signature for Medicare IM and IRF Patient  Rights and Privacy Information provided to patient : N/A  Provided patient with [facility] Resources: No  Barriers to discharge: No barriers identified at this time     Discharge Recommendations   Disposition: See above   Transportation Needs: Family assist   Name of Transportation Company and Phone: N/A     Additional comments   15/15 on BIMs. Pt emotional during assessment, Maia notified. SW provided support and validation. Pt appeared A&O and quiet. Pt gave SW permission to speak with wife and said it would be best to connect with her when she is here. SW will look out for pt wife and plan to touch base with pt and his wife for additional support, resources and information early next week. Pt denied and immediate needs or concerns.     Please invite to Care Conference:  Pt wife and parents (need to confirm with pt)       SHMUEL Gallegos, [facility] Acute Rehab Unit   Phone: [phone]  I   Pager: [phone]

## 2020-01-10 NOTE — PHARMACY-MEDICATION REGIMEN REVIEW
Pharmacy Medication Regimen Review  Dallin Stevens is a 34 year old male who is currently in the Acute Rehab Unit.    Assessment: All medications have an appropriate indications, durations and no unnecessary use was found    Plan:   Continue current medications.   Attending provider will be sent this note for review.  If there are any emergent issues noted above, pharmacist will contact provider directly by phone.      Pharmacy will periodically review the resident's medication regimen for any PRN medications not administered in > 72 hours and discontinue them. The pharmacist will discuss gradual dose reductions of psychopharmacologic medications with interdisciplinary team on a regular basis.    Please contact pharmacy if the above does not answer specific medication questions/concerns.    Background:  A pharmacist has reviewed all medications and pertinent medical history today.  Medications were reviewed for appropriate use and any irregularities found are listed with recommendations.      Current Facility-Administered Medications:      acetaminophen (TYLENOL) tablet 650 mg, 650 mg, Oral, Q6H PRN, Trina Charles PA, 650 mg at 01/09/20 1729     dexamethasone (DECADRON) tablet 4 mg, 4 mg, Oral, Q6H DENIA, Trina Charles PA, 4 mg at 01/10/20 0936     hydrOXYzine (ATARAX) tablet 25-50 mg, 25-50 mg, Oral, TID PRN, Trina Charles PA, 50 mg at 01/09/20 1937     levETIRAcetam (KEPPRA) tablet 500 mg, 500 mg, Oral, BID, Trina Charles PA, 500 mg at 01/10/20 0926     LORazepam (ATIVAN) half-tab 0.25 mg, 0.25 mg, Oral, Q6H PRN, Trina Charles PA     melatonin tablet 1 mg, 1 mg, Oral, At Bedtime PRN, Trina Charles PA, 1 mg at 01/09/20 2113     ondansetron (ZOFRAN) tablet 4 mg, 4 mg, Oral, Q8H PRN, Trina Charles PA     pantoprazole (PROTONIX) EC tablet 40 mg, 40 mg, Oral, QAM AC, Trina Charles PA, 40 mg at 01/10/20 0642     polyethylene glycol (MIRALAX/GLYCOLAX) Packet 17 g, 17 g, Oral,  Daily PRN, Trina Charles PA  No current outpatient prescriptions on file.   PMH: metastatic melanoma diagnosed 3/2012, in 2017 had CT which revealed metastatic disease, confirmed by biopsy, received pembrolizumab between then opted for alternative/holistic treatment, including: (ketogenic diet, iv vitamin c and ozone therapy) .  In April 2019 diagnosed with brain metastasis, underwent craniotomy and tumor resection at that time. Transitioned to [facility] oncology and follows with Dr. Tesfaye last visit 12/7/2019, with mention of ongoing disease progression, brain metastasis and recommendations for gamma knife treatment, and starting pembrolizumab, reportedly patient wanted to continue alternative/holistic approach at that time.

## 2020-01-10 NOTE — PROGRESS NOTES
"  [facility]   Acute Rehabilitation Unit  Daily progress note    interval history  Dallin Stevens was seen and examined working with PT in hallway then upon completion of therapy in room.  He reports did not sleep very well last night, otherwise denies headache, dizziness, fevers, n/v/d, sob, and other concerns.  Informed patient  plans to visit for requested healing touch and or reiki, which he is looking forward to.  Dallin reports appetite ok denies other questions or concerns.     Was seen by neurosurgery last evening, who are tentatively planning for surgical intervention.    Functionally, undergoing therapy evals today.         medications    dexamethasone  4 mg Oral Q6H DENIA     levETIRAcetam  500 mg Oral BID     pantoprazole  40 mg Oral QAM AC        acetaminophen, hydrOXYzine, LORazepam, melatonin, ondansetron, polyethylene glycol     physical exam  /77 (BP Location: Right arm)   Pulse 70   Temp 96  F (35.6  C) (Oral)   Resp 16   Ht 1.803 m (5' 11\")   Wt 60.1 kg (132 lb 6.4 oz)   SpO2 100%   BMI 18.47 kg/m    Gen: awake alert nad flat affect  HEENT: mmm  Pulm: non labored on room air  Abd: soft non tender  Ext: without edema  Neuro/MSK: alert up with PT in hallway, trace activation at left hip and knee with ambulation,     labs  Lab Results   Component Value Date    WBC 8.6 01/10/2020     Lab Results   Component Value Date    RBC 4.48 01/10/2020     Lab Results   Component Value Date    HGB 13.3 01/10/2020     Lab Results   Component Value Date    HCT 40.7 01/10/2020     Lab Results   Component Value Date    MCV 91 01/10/2020     Lab Results   Component Value Date    MCH 29.7 01/10/2020     Lab Results   Component Value Date    MCHC 32.7 01/10/2020     Lab Results   Component Value Date    RDW 12.2 01/10/2020     Lab Results   Component Value Date     01/10/2020     Last Comprehensive Metabolic Panel:  Sodium   Date Value Ref Range Status "   01/10/2020 139 133 - 144 mmol/L Final     Potassium   Date Value Ref Range Status   01/10/2020 4.0 3.4 - 5.3 mmol/L Final     Chloride   Date Value Ref Range Status   01/10/2020 105 94 - 109 mmol/L Final     Carbon Dioxide   Date Value Ref Range Status   01/10/2020 29 20 - 32 mmol/L Final     Anion Gap   Date Value Ref Range Status   01/10/2020 5 3 - 14 mmol/L Final     Glucose   Date Value Ref Range Status   01/10/2020 122 (H) 70 - 99 mg/dL Final     Urea Nitrogen   Date Value Ref Range Status   01/10/2020 14 7 - 30 mg/dL Final     Creatinine   Date Value Ref Range Status   01/10/2020 0.67 0.66 - 1.25 mg/dL Final     GFR Estimate   Date Value Ref Range Status   01/10/2020 >90 >60 mL/min/[1.73_m2] Final     Comment:     Non  GFR Calc  Starting 12/18/2018, serum creatinine based estimated GFR (eGFR) will be   calculated using the Chronic Kidney Disease Epidemiology Collaboration   (CKD-EPI) equation.       Calcium   Date Value Ref Range Status   01/10/2020 9.2 8.5 - 10.1 mg/dL Final         Rehabilitation - continue comprehensive acute inpatient rehabilitation program with multidisciplinary approach including therapies, rehab nursing, and physiatry following. See interval history for updates.      assessment and plan    Mr. Dallin Stevens is a 34 year old man with known metastatic melanoma who presented with left sided weakness found to have acute right intracerebral hemorrhage with mild to moderate edema concerning for hemorrhagic melanoma metastasis.  Was managed non operatively per neurosurgery admitted to  ARU 1/9/2019 for ongoing rehabilitation and medical management.    Metastatic Melanoma-   ICH  Diagnosed with melanoma 3/2012, Mets to brain, supraclavicular lymph nodes, mediastinum, right hilum, and lungs.  S/p craniotomy and tumor resection 4/2019. Follows with Dr. Tesfaye at [facility], with known disease progression 12/2019. Presented 1/6/20 Left sided weakness, impaired balance, coordination,  "ongoing headaches. Most recent CT Head (STABLE) 1/9: intraparenchymal hemorrhage in parasagittal right frontal region, parenchymal hemorrhage in the medial right temporal occipital region and atrial region of the right lateral ventricle, small amount of associated intraventricular hemorrhage in the occipital horn on the right,Stable small  hyperdense focus left parietal area.   -continue decadron 4 mg q 6 hours  -keppra 500 mg bid  -seizure precautions  -hold AC- SCD  -keep SBP<140  -f/u rad onc at [facility]  -HOB > 30 at all times  -continue PT/OT  -f/u Dr. Tesfaye (oncology)  -prn tylenol for headache  -neurosurgery following- awaiting recs/plan     Anxiety-acute reports due to new neurological deficits.  try prn hydroxyzine ok for \"very low dose ativan\" per neurosurgery if needed  -ativan 0.25 mg prn  -atarax prn  - consult   -acupuncture if available     Mechanical fall  Rib/Back pain  -fall 1/8  reportedly landed on bottom hitting back and ribs on bed.  CT head 1/9 stable.   -tylenol prn  -fall precautions  -continue PT/OT        1. Adjustment to disability:   consult- reported anxiety  2. FEN: reg with preference for no processed sugar, no dairy, gluten free   3. Bowel: continent  4. Bladder: continent  5. DVT Prophylaxis: scd   6. GI Prophylaxis: ppi  7. Code: full- confirmed 1/9/2019   8. Disposition: tbd- per neurosurgey  9. ELOS: pending evals/ plan for neurosurgical intervention  10. Follow up Appointments on Discharge: neurosurg, neuro rads, onc        discussed with Dr. Kinsey PM&R Staff Physician    Trina Charles PA-C  Physical Medicine & Rehabilitation   Pager: [phone]    "

## 2020-01-10 NOTE — PLAN OF CARE
Dallin will need 21 days in order to return home with supervision support and  PT vs TCU pending progress with therapy. Unsure of plan with neurosurgery re: possible tumor resection. Will likely find out more from PMR/neurosurg team on Monday. Holding off FES bike until a more solid plan is in place re: possible surgery.    Mobility Status: sup<>sit modA for LLE assist, SPT minAx1 with skilled setup (recommend Ax2 with RN staff). Propelling  manual w/c 150+ ft SBA.    Primary Barriers: L hemiplegia, home setup, DME needs.    DME Needs: K4 vs K5 w/c vs NBQC. Pt may have to modify home (ramp for 2 JEFE, look and either rearranging main level or installing stair lift to access upper level for bed and bath).

## 2020-01-10 NOTE — PLAN OF CARE
[id number]    FOCUS/GOAL  Medication management, Pain management, and Medical management    ASSESSMENT, INTERVENTIONS AND CONTINUING PLAN FOR GOAL:  A&O, A1-2 w/ quad cane. Makes needs known, alarms on.  Pt reported headache during evening, PRN tylenol given.  Continent of B/B, LBM 1/9.  Pt ate snacks during the evening and did not order dinner. He declined bed bath and oral cares. Staff helped pt with urinal set up and emptying.

## 2020-01-10 NOTE — H&P
Post Admission Physician Evaluation:    I have compared Dallin Stevens's condition on admission to acute rehabilitation to that outlined in the preadmission screen. History and physical exam performed by SANDRA Alba, and myself.  No significant differences are identified and the patient remains appropriate for an inpatient rehabilitation facility level of care to manage medical issues and address functional impairments due to metastatic melanoma to brain.    Comorbid medical conditions being managed:   Melanoma, anxiety, cerebral edema    Prior functional level:   Previously was independent with ambulation, mobility, and all ADLs    Present function:   PT:  Bed Mobility   Supine to Sit Min assist   Transfer   Sit To Stand Min assist;Verbal cues;Tactile cues;Assist of 1   Stand To Sit Min assist;Assist of 1;Verbal cues;Tactile cues   Stand Pivot Rt Min assist;Assist of 2;Verbal cues;Tactile cues   Transfer Comments vc for hand placement   Neuro Re-Ed   Neuro Re-Ed Static Sitting Balance;Dynamic Sitting Balance;Static Standing Balance;Dynamic Standing Balance   Static Sitting Balance   Level of Assistance Mod A   Location Edge of bed     OT:  ADL   Grooming Wash/Dry hands;Wash/dry face   Wash/Dry Hands Min assist;Sitting   Wash/Dry Face Min assist;Sitting   Lower Body Dressing Socks   Socks Mod assist;Sitting;Increased effort;Increased time to complete  (R foot/assist for balance on eob)   Activity Tolerance   Endurance Good   Activity Tolerance Comments good   Balance   Sitting Balance Standby;Max assist   Neuro Re-Ed   Neuro Re-Ed Static Sitting;Dynamic Sitting   Neuro Static Sitting   Static Sitting SBA;Mod A;Time;Patient Response       Anticipated rehabilitation course:   Anticipate discharge home at a modified independent level for ambulation, mobility, and ADLs    Will benefit from intensive rehabilitation includin minutes each of PT and OT  Rehabilitation nursing  Close management by  physiatry    Prognosis: Fair    Estimated length of stay: 14 days    Daniel Kinsey MD  Department of Rehabilitation Medicine  Pager: [phone]

## 2020-01-10 NOTE — PROGRESS NOTES
"   01/10/20 1316   Visit Information   Visit Made By Staff    Type of Visit Initial;Staff consultation/triage   Visited Patient;Family   Interventions   Plan of Care Review   With patient/family/proxy   Basic Spiritual Interventions    introduction/orientation to Spiritual Health Services;Assessment of spiritual needs/resources;Reflective conversation;Life Review   Advanced Assessments/Interventions   Presenting Concerns/Issues Spiritual/Anabaptist/emotional support;Goals of care discernment;Grief and adjustment issues;Family dynamics;Self-concept disturbance;Challenged coping;Stress/self-care   Healing Modalities Provided Breath Work;Meditation/Mindfulness Practice   SPIRITUAL HEALTH SERVICES  SPIRITUAL  ASSESSMENT Progress Note  [facility] ([facility] 5R    PRIMARY FOCUS    Goals of Care    Emotional/spiritual/Anabaptist distress    Support for coping    ILLNESS CIRCUMSTANCES:   Reviewed documentation. Responded to referral based on Epic consult and text page.  Reflective conversation shared with Dallin and his wife, Meg, which integrated elements of illness and family narratives.    Context of Serious Illness/Sympton(s)- Meg shared that Dallin has been coping with the melanoma for 9 years. She said that the plan is for Dallin to have brain surgery next week, and that they are also working with an outfit that creates personalized vaccines that has a clinic in the [facility].  Dallin said, \"We have tried diets and many other things, all of which worked for a while, but it keeps coming back. Daliln works as a  for a medical device company. Meg works for [employer] at home. They just moved up here from [facility] [place] [facility] b/c the medical care is better. Dallin's brother lives a mile from them.  Dallin's folks are in [place] and Meg's parents are in [place].    Resources for Support - Kel have two kids, one is three yo and one is five yo.  Meg said that they have family " "and friends up here.    DISTRESS:     Emotional, Spiritual, Existential Distress - Dallin was tearful on and off during our conversation. We spoke about personal agency, and the power of naming what we don't have control over and what we do have control over, and what we value. We also talked about the power of being specific.    Roman Catholic Distress - n/a    Social/Cultural/Economic- They moved up here from [facility]note 3]. Changed schools for their five year old.  Lots of change and challenge.    SPIRITUAL/Bahai COPING):     Worship/Farzana - Yarsani.  Started attending [email or web address], but not really members yet.    Spiritual Practice(s) - Positive thinking. \"Everything happens for a reason\" was mentioned several times.    Emotional, Relational,Existential Connections- Dallin said that what helps him is Meg. Meg said, \"I am a basket case; I'm shocked that you said that.\"  Dallin said, \"It's her shift in perspective that helps me.\"     GOALS OF CARE:    Goals of Care - To find a way to address the underlying causes for the melanoma so that it doesn't keep returning.    Meaning/Sense-Making- \"Everything happens for a reason.\"    PLAN: Will return next week to offer Healing Touch/Reiki to Dallin on ARU.    Rev. Parth-O Ambar-Amanda  Staff   [phone]  * [facility] remains available 24/7 for emergent requests/referrals, either by having the switchboard page the on-call  or by entering an ASAP/STAT consult in Epic (this will also page the on-call ).*        "

## 2020-01-10 NOTE — PLAN OF CARE
FOCUS/GOAL  Bowel management, Bladder management, Pain management, Medical management, and Mobility    ASSESSMENT, INTERVENTIONS AND CONTINUING PLAN FOR GOAL:  Patient A&O x4. Assist of 1 pivot w/ quad cane. Continent of bowel and bladder. Able to have a BM during shift. PVRs done, all under 100 ml. No complaints of pain. Plan to talk w/ neurosurgeon on Monday about possible surgery. Wife would like to speak to provider again to clarify, provider paged. Awaiting more news. Continue w/ plan of care.

## 2020-01-11 NOTE — PLAN OF CARE
PT: Pt transferring with min A, bed mobility with mod A for L LE management; engaged in sit <> stands and L LE weight bearing at [facility]. Cont with POC.

## 2020-01-11 NOTE — PLAN OF CARE
Denies pain,headache or nausea. continent of bladder on the toilet, need assist with adjusting pants up/down. Atarax or ativan prior to decadron to decrease possible side effects anxiety, ate good at breakfast and lunch. Will continue POC

## 2020-01-11 NOTE — PLAN OF CARE
OT: Shower assessment completed. Assist of 1 for transfer using GB, MAX A for dressing, MOD A for bathing, MAX A for toileting and set up for grooming. Pt does appear to have some cognitive deficit with initiating task, orienting clothing and with orienting body during transfers with poor carry over of education. Also left inattention. Will continue to monitor.

## 2020-01-11 NOTE — PLAN OF CARE
FOCUS/GOAL  Bowel management, Bladder management, Pain management, Mobility, and Cognition/Memory/Judgment/Problem solving    ASSESSMENT, INTERVENTIONS AND CONTINUING PLAN FOR GOAL:    A&Ox4, Ax2 Pivot transfer to w/c. LBM 1/10/19, voiding in toilet without difficulty. Denies pain. Prn Atarax  and ativan given this shift with relief. Per PT, patient's left arm should be propped up on two pillows when in bed.     NOC: Patient voided spontaneously without difficulty. Denies pain and SOB, slept well.

## 2020-01-11 NOTE — PROGRESS NOTES
01/11/20 1315   Signing Clinician's Name / Credentials   Signing clinician's name / credentials Sin Cortez OTRL   Quick Adds   Rehab Discipline OT   Cognitive Assessments   Cognitive Assessments Completed MoCA   [place] Cognitive Assessment (MoCA) Total Score out of 30 23   Norms Score of 26 or above considered normal   Domains assessed: attention, executive functions, memory, language, visuoconstructional skills, conceptual thinking, calculations, orientation  (Pt presenting increased difficulty with visuospatial/executi)

## 2020-01-12 NOTE — PLAN OF CARE
FOCUS/GOAL  Bowel management, Bladder management, and Pain management    ASSESSMENT, INTERVENTIONS AND CONTINUING PLAN FOR GOAL:  A & Ox4, did not sleep much. Denied pain. Continent of bladder in toilet, had cont of BM this shift. Ax2, SPT, w/c transfer to bathroom. L-hemiparesis. Seizure pad on, PCD on, call light in reach, bed alarm on. HOB >30 at all time. Atarax and Ativan given at 0230.

## 2020-01-12 NOTE — PLAN OF CARE
PT: Utilized mirror for visual feedback for standing activities at august rail for improved midline, pt reported mirror to be useful. Pt continues to transfer with min EMMIE Kennedy with POC.

## 2020-01-12 NOTE — PLAN OF CARE
Pt is alert and oriented x 4 , no pain or discomfort. He needed assist of one person for transfer, stand and pivot. He had ativan with his dexamethasone. He would like to continue that as it gives him anxiety according to pt. We will continue with current plan of care.

## 2020-01-12 NOTE — PROGRESS NOTES
Patient is A&Ox4, able to make needs known. Denies pain. A1 stand pivot transfer. Continent of Bowel/ bladder. PRN atarax and ativan given x 1 with decadron, per request of pt. Continue with POC.

## 2020-01-13 PROBLEM — C71.1 MALIGNANT NEOPLASM OF FRONTAL LOBE OF BRAIN (H): Status: ACTIVE | Noted: 2020-01-01

## 2020-01-13 NOTE — PLAN OF CARE
Focused on amb and partial STSs from high mat, demo improved L quad and glut activation with STSs.    Need to continue to work on functional transfers and amb to increase activation in LLE.

## 2020-01-13 NOTE — PROGRESS NOTES
"  [facility]   Acute Rehabilitation Unit  Daily progress note    INTERVAL HISTORY  Dallin Stevens was seen and examined at bedside with no acute event over night. He asked for  his surgical date and it was discussed, tentatively 1/16/20. He denied difficulty breath, H/A, V/N, any discomfort.  Functionally, Pt is  Ax 1-2 and using a quad cane .      MEDICATIONS  Scheduled meds    dexamethasone  4 mg Oral Q6H DENIA     levETIRAcetam  500 mg Oral BID     pantoprazole  40 mg Oral QAM AC       PRN meds:  acetaminophen, hydrOXYzine, LORazepam, melatonin, ondansetron, polyethylene glycol      PHYSICAL EXAM  /73 (BP Location: Right arm)   Pulse 72   Temp 96.9  F (36.1  C) (Oral)   Resp 16   Ht 1.803 m (5' 11\")   Wt 60.1 kg (132 lb 6.4 oz)   SpO2 98%   BMI 18.47 kg/m    Gen: NAD  HEENT: NC/AT  Cardio: RRR  Pulm: non-labored, on room air, clear breath sounds in all lobes  Abd: soft, NT, BS x4  Ext: L extremities weakness, no edema  Neuro/MSK: Alert, following commands    LABS  No new lab    ASSESSMENT AND PLAN    Mr. Dallin Stevens is a 34 year old man with known metastatic melanoma who presented with left sided weakness found to have acute right intracerebral hemorrhage with mild to moderate edema concerning for hemorrhagic melanoma metastasis.  Was managed non operatively per neurosurgery admitted to  ARU 1/9/2019 for ongoing rehabilitation and medical management.     Metastatic Melanoma-   ICH  Diagnosed with melanoma 3/2012, Mets to brain, supraclavicular lymph nodes, mediastinum, right hilum, and lungs.  S/p craniotomy and tumor resection 4/2019. Follows with Dr. Tesfaye at [facility], with known disease progression 12/2019. Presented 1/6/20 Left sided weakness, impaired balance, coordination, ongoing headaches. Most recent CT Head (STABLE) 1/9: intraparenchymal hemorrhage in parasagittal right frontal region, parenchymal hemorrhage in the medial right temporal occipital region and " "atrial region of the right lateral ventricle, small amount of associated intraventricular hemorrhage in the occipital horn on the right,Stable small  hyperdense focus left parietal area.   -continue decadron 4 mg q 6 hours  -keppra 500 mg bid  -seizure precautions  -hold AC- SCD  -keep SBP<140  -f/u rad onc at [facility]  -HOB > 30 at all times  -continue PT/OT  -f/u Dr. Tesfaye (oncology)  -prn tylenol for headache  -neurosurgery following- awaiting recs/plan     Anxiety-acute reports due to new neurological deficits.  try prn hydroxyzine ok for \"very low dose ativan\" per neurosurgery if needed  -ativan 0.25 mg prn  -atarax prn  - consult   -acupuncture if available     Mechanical fall  Rib/Back pain  -fall 1/8  reportedly landed on bottom hitting back and ribs on bed.  CT head 1/9 stable.   -tylenol prn  -fall precautions  -continue PT/OT           1. Adjustment to disability:   consult- reported anxiety  2. FEN: reg with preference for no processed sugar, no dairy, gluten free   3. Bowel: continent  4. Bladder: continent  5. DVT Prophylaxis: scd   6. GI Prophylaxis: ppi  7. Code: full- confirmed 1/9/2019   8. Disposition: tbd- per neurosurgey  9. ELOS: pending evals/ plan for neurosurgical intervention  10. Follow up Appointments on Discharge: neurosurg, neuro rads, onc           Anna Irvin, CNP  Physical Medicine & Rehabilitation                  "

## 2020-01-13 NOTE — PLAN OF CARE
FOCUS/GOAL  Bowel management, Bladder management, Pain management, Mobility and Cognition/Memory/Judgment/Problem solving    ASSESSMENT, INTERVENTIONS AND CONTINUING PLAN FOR GOAL:  Pt is A&Ox4, denies any pain. Continent of b/b using toilet in room. Required max Ax1 to stand pivot transfer. Continues to request prn ativan to be given with decadron d/t decadron causing anxiety. Continue with POC.

## 2020-01-13 NOTE — PLAN OF CARE
OT: [employer] while seated reaching with RUE. Completed simple level: Mode A (self paced) x1 minute: 23 hits (2.3 sec avg time) and 35 hits (1.67 sec avg time). Norms =52+.   Requires increased time to attend to targets on L side.

## 2020-01-13 NOTE — PLAN OF CARE
Pt is alert and oriented x 4 , denies  pain or discomfort. He needed assist of one person for transfer, stand and pivot. He had atarax 50 mg  with his dexamethasone. He would like to continue that as it gives him anxiety according to pt, he alternates with ativan. Discharge plan is on 1/16/19 to neuro surgery. We will continue with current plan of care.

## 2020-01-13 NOTE — TELEPHONE ENCOUNTER
[facility] Call Center    Phone Message    May a detailed message be left on voicemail: yes    Reason for Call: Kassy calling to request a call back to get an authorization for a procedure occurring on 1/16/20. Kassy says that Dallin needs recertification for it, so that's why they need a verbal authorization. Kassy says you can call her back at [phone]. Kassy says if they need to leave a message it can just be stated they are calling in to put in an authorization for Dallin on 1/16/20. Please give Ksasy a call back at your earliest convenience.    Action Taken: Message routed to:  [facility]  Neuro Surg

## 2020-01-13 NOTE — PLAN OF CARE
Alert and oriented X 4. Able to make needs known. Call light in reach. Denies pain. C/O anxiety. Received Atarax 50 mg X 1.  Boosted and repositioned in bed with assist of 2.  Continent of bowel and bladder. Up to BR with  assist of 1, gait belt and stand pivot transfer, wheelchair based. Voiding without difficulty. Last BM on 1/11, Appears to be sleeping off and on during rounds. Bed alarm on for safety. Continue with plan of care.

## 2020-01-14 NOTE — PLAN OF CARE
FOCUS/GOAL  Bowel management, Bladder management, Pain management, Mobility and Cognition/Memory/Judgment/Problem solving    ASSESSMENT, INTERVENTIONS AND CONTINUING PLAN FOR GOAL:  Pt is A&Ox4, reports slight pain in his Lt ankle, leg was repositioned, Pt stated that provided some relief and declined any other interventions. Continent of b/b using toilet in room. Required Ax1 to stand pivot transfer. Continues to request prn ativan to be given with decadron d/t decadron causing anxiety. Pt both appears and states that he is more anxious this evening, declines additional interventions. Able to make needs known and utilize call light appropriately. Bed alarm on for safety.

## 2020-01-14 NOTE — PLAN OF CARE
Pt alert and orientated. Stand pivot AX1 w/c based. Continent of bowel and bladder.  L sided weakness.  Reg diet. Lactose/gluten free.  Ativan to be given before decadron.  Denies pain, SOB, or dizziness.  Plan is for surgery Thursday [place], pt appears to have increased anxiety since finding out.  Pt calls appropriately. Continue plan of care.

## 2020-01-14 NOTE — PLAN OF CARE
OT: good participation in OT sessions this AM tolerating L UE neuro re ed and manual therapies. Discussed IND day and discharging for pt's surgery on thrusday - wife inquired if pt would be returning to ARU after surgery. edu provided that to the best of writers knowledge that would be the plan to continue therapies and initiate FES w/pt. Both pt and wife were encouraged by the news and offered no other questions or concerns

## 2020-01-14 NOTE — PROGRESS NOTES
"  [facility]   Acute Rehabilitation Unit  Daily progress note    INTERVAL HISTORY  Dallin Stevens was seen and examined at bedside. Discussed with Pt that his craniotomy scheduled for 1/16/20 at 2: 25 Pm. Pt will be discharged 1/15/20 to neurosurgery  for per -op care and labs..  He denied any discomfort.  Observed ambulating follow with a wheelchair .    MEDICATIONS  Scheduled meds    dexamethasone  4 mg Oral Q6H DENIA     levETIRAcetam  500 mg Oral BID     pantoprazole  40 mg Oral QAM AC       PRN meds:  acetaminophen, hydrOXYzine, LORazepam, melatonin, ondansetron, polyethylene glycol      PHYSICAL EXAM  /73 (BP Location: Right arm)   Pulse 57   Temp 96.5  F (35.8  C) (Oral)   Resp 16   Ht 1.803 m (5' 11\")   Wt 60.1 kg (132 lb 6.4 oz)   SpO2 99%   BMI 18.47 kg/m    Gen: NAD, cooperative  HEENT: NC/AT  Cardio: RRR  Pulm: On room air, non-labored, clear breath sounds  Abd: BS x4, soft, NT  Ext: No edema or calf tenderness B/I, left upper and lower extremity weakness  Neuro/MSK: Following commands, answering questions appropriately, and alert    LABS  No new lab    ASSESSMENT AND PLAN    Mr. Dallin Stevens is a 34 year old man with known metastatic melanoma who presented with left sided weakness found to have acute right intracerebral hemorrhage with mild to moderate edema concerning for hemorrhagic melanoma metastasis.  Was managed non operatively per neurosurgery admitted to  ARU 1/9/2019 for ongoing rehabilitation and medical management.     Metastatic Melanoma-   ICH  Diagnosed with melanoma 3/2012, Mets to brain, supraclavicular lymph nodes, mediastinum, right hilum, and lungs.  S/p craniotomy and tumor resection 4/2019. Follows with Dr. Tesfaye at [facility], with known disease progression 12/2019. Presented 1/6/20 Left sided weakness, impaired balance, coordination, ongoing headaches. Most recent CT Head (STABLE) 1/9: intraparenchymal hemorrhage in parasagittal right " "frontal region, parenchymal hemorrhage in the medial right temporal occipital region and atrial region of the right lateral ventricle, small amount of associated intraventricular hemorrhage in the occipital horn on the right,Stable small  hyperdense focus left parietal area.   -continue decadron 4 mg q 6 hours  -keppra 500 mg bid  -seizure precautions  -hold AC- SCD  -keep SBP<140  -f/u rad onc at [facility]  -HOB > 30 at all times  -continue PT/OT  -f/u Dr. Tesfaye (oncology)  -prn tylenol for headache  -neurosurgery following- planning craniotomy for tumor resection on 1/16 but Pt will be D/C'd on 1/15 for pre-op work-up     Anxiety-acute reports due to new neurological deficits.  try prn hydroxyzine ok for \"very low dose ativan\" per neurosurgery if needed  -ativan 0.25 mg prn  -atarax prn  - consult   -acupuncture if available     Mechanical fall  Rib/Back pain  -fall 1/8  reportedly landed on bottom hitting back and ribs on bed.  CT head 1/9 stable.   -tylenol prn  -fall precautions  -continue PT/OT       1. Adjustment to disability:   consult- reported anxiety  2. FEN: reg with preference for no processed sugar, no dairy, gluten free   3. Bowel: continent  4. Bladder: continent  5. DVT Prophylaxis: scd   6. GI Prophylaxis: ppi  7. Code: full- confirmed 1/9/2019   8. Disposition: tbd- per neurosurgey  9. ELOS: pending evals/ plan for neurosurgical intervention  10. Follow up Appointments on Discharge: neurosurg, neuro rads, onc.      Anna Irvin, CNP  Physical Medicine & Rehabilitation                  "

## 2020-01-14 NOTE — PLAN OF CARE
Physical Therapy Discharge Summary    Reason for therapy discharge:    Discharged to hospital for follow up craniotomy.     Progress towards therapy goal(s). See goals on Care Plan in [facility] electronic health record for goal details.  Goals partially met.  Barriers to achieving goals:   discharge from facility.    Therapy recommendation(s):    Continued therapy is recommended.  Rationale/Recommendations:  continue PT post-op once cleared by neurosurgical team.

## 2020-01-14 NOTE — TELEPHONE ENCOUNTER
Called patient left a message to call the clinic regarding being discharge from the hospital. Wanted to check on them on how their transitioning home is  and if they had any questions on the medications given at discharge. Will try again later.   GEOFF Rao

## 2020-01-15 PROBLEM — C79.31 BRAIN METASTASIS: Status: ACTIVE | Noted: 2020-01-01

## 2020-01-15 NOTE — TELEPHONE ENCOUNTER
[facility] Call Center    Phone Message    May a detailed message be left on voicemail: yes    Reason for Call: Other: Meg calling because she has question on a tissue sample that is supposed to be taken and store a specific way. Meg states that nobody has gotten back to her yet and the surgery is tomorrow. The pt will be transported to the hospital where the surgery will be taken place later on today. Please call the Meg back as soon as possible to discuss in further detail.      Action Taken: Message routed to:  [facility] neurosurgery

## 2020-01-15 NOTE — PLAN OF CARE
Orientation    Pt is alert and dioriented to time, denies pain or discomfort, call light within pt's reach and able to make needs known     VS VSS.  Denies chills and fever  No shortness of breath and chest pain reported      Pain   Denies pain   Intake  Output Denies nausea, drinking well, good appetite  Voiding without difficulties in bathroom     Activity and      Equipment   Assist of one, pivot transfer    W/chair used      CMS and Neuro s   Intact, denies numbness and tingling in all extremities      Skin mepilex on elbows - skin protection, frequent repositioning encouraged     Plan    Continue with POC

## 2020-01-15 NOTE — PLAN OF CARE
Occupational Therapy Discharge Summary    Reason for therapy discharge:    Discharged to neuro surgery for tumor resection.      Progress towards therapy goal(s). See goals on Care Plan in [facility] electronic health record for goal details.  Pt has made progress towards goal during short rehab stay but continues to be limited by L sided hemiplegia. Pt plans to return for continued rehab after neuro surgery, will reassess pt's function and continue to progress to mod I.     Therapy recommendation(s):    Recommend continue skilled services at ARU after pt has completed neuro surgery. Pt is highly motivated and agreeable to therapy. Progressed towards goals during rehab stay including use of kevin techniques for ADLs, transfers with CGA, and compensatory strategies using RUE/RLE for standing tolerance and functional tasks. Recommend ongoing rehab for long term goal to return home and to work, rehab course pending status post neuro surgery

## 2020-01-15 NOTE — PLAN OF CARE
Pt has been alert and oriented x 4, denied pain. He needed assist of one person, stand and pivot. He is waiting for available bed on 6 A at the  for surgery on 1/16/20. Evening nurse needs to call for report when the bed is ready.

## 2020-01-15 NOTE — PLAN OF CARE
Pt with no pain during the day shift. Quiet in room and needing nothing. Requesting and Ativan with his decadron- given together per pt request. Up in wheel chair and back to bed with minimal assist. Family present for most of day shift. Vitals stable.

## 2020-01-15 NOTE — DISCHARGE SUMMARY
[facility]   Acute Rehabilitation Unit  Discharge summary     Date of Admission: 1/9/2020  Date of Discharge: 1/15/2020  Disposition: Hospital  Primary Care Physician: Provide, Patient Unable To  Attending physician: Valeri Kinsey MD  Other significant physician provider(s): Anna Irvin CNP      discharge diagnosis      Metastatic melanoma to brain    Melanoma, anxiety, cerebral edema      brief summary (per hpi)  Dallin Stevens is a 34 year old man with a past medical history of metastatic melanoma diagnosed 3/2012, in 2017 had CT which revealed metastatic disease, confirmed by biopsy, received pembrolizumab between then opted for alternative/holistic treatment, including: (ketogenic diet, iv vitamin c and ozone therapy) .  In April 2019 diagnosed with brain metastasis, underwent craniotomy and tumor resection at that time. Transitioned to [facility] oncology and follows with Dr. Tesfaye last visit 12/7/2019, with mention of ongoing disease progression, brain metastasis and recommendations for gamma knife treatment, and starting pembrolizumab, reportedly patient wanted to continue alternative/holistic approach at that time.    On 1/6/2020  Patient presented with left sided weakness, ongoing headaches, imaging with cranial lesions and right frontal hemorrhage with edema. Was seen and evaluated by neurosurgery with initiation of dexamethasone and keppra.  Other current medical management includes keeping head of bed greater than 30 degrees, use of atarax for anxiety, and keep sbp <140. Case was discussed with oncology and radiation oncology with plan for follow up as outpatient to discuss gamma knife with radiation oncology    REHABILITATION COURSE  Dallin Stevens was admitted to the [place] acute inpatient rehabilitation unit on 1/9/2020 where he participated in PT and OT for 3 hrs/day.  He was making progress and showed improving quadriceps and gluteal activation, but  "still had significant weakness of the left upper and lower extremities.  Treatment plan was discussed with neurosurgical team who is planning to take the patient to the OR for resection of tumors, and he will be discharged to the Neurosurgery service on 1/15/2020.    MEDICAL COURSE  Metastatic Melanoma-   ICH  Diagnosed with melanoma 3/2012, Mets to brain, supraclavicular lymph nodes, mediastinum, right hilum, and lungs.  S/p craniotomy and tumor resection 4/2019. Follows with Dr. Tesfaye at [facility], with known disease progression 12/2019. Presented 1/6/20 Left sided weakness, impaired balance, coordination, ongoing headaches. Most recent CT Head (STABLE) 1/9: intraparenchymal hemorrhage in parasagittal right frontal region, parenchymal hemorrhage in the medial right temporal occipital region and atrial region of the right lateral ventricle, small amount of associated intraventricular hemorrhage in the occipital horn on the right,Stable small  hyperdense focus left parietal area.   -continue decadron 4 mg q 6 hours  -keppra 500 mg bid  -seizure precautions  -hold AC- SCD  -keep SBP<140  -f/u rad onc at [facility]  -HOB > 30 at all times  -continue PT/OT  -f/u Dr. Tesfaye (oncology)  -prn tylenol for headache  -neurosurgery following- planning craniotomy for tumor resection on 1/16 but Pt will be D/C'd on 1/15 for pre-op work-up     Anxiety-acute reports due to new neurological deficits.  try prn hydroxyzine ok for \"very low dose ativan\" per neurosurgery if needed  -ativan 0.25 mg prn  -atarax prn  - consult   -acupuncture if available     Mechanical fall  Rib/Back pain  -fall 1/8  reportedly landed on bottom hitting back and ribs on bed.  CT head 1/9 stable.   -tylenol prn  -fall precautions  -continue PT/OT         dISCHARGE MEDICATIONS  Current Discharge Medication List      START taking these medications    Details   dexamethasone (DECADRON) 4 MG tablet Take 1 tablet (4 mg) by mouth every 6 hours  Refills: 0    " Associated Diagnoses: Metastatic malignant melanoma (H)      hydrOXYzine (ATARAX) 25 MG tablet Take 1-2 tablets (25-50 mg) by mouth 3 times daily as needed for anxiety    Associated Diagnoses: Adjustment disorder, unspecified type      levETIRAcetam (KEPPRA) 500 MG tablet Take 1 tablet (500 mg) by mouth 2 times daily    Associated Diagnoses: Metastatic malignant melanoma (H)      LORazepam (ATIVAN) 0.5 MG tablet Take 0.5 tablets (0.25 mg) by mouth every 6 hours as needed for anxiety    Associated Diagnoses: Adjustment disorder, unspecified type      melatonin 1 MG TABS tablet Take 1 tablet (1 mg) by mouth nightly as needed for sleep    Associated Diagnoses: Adjustment insomnia      ondansetron (ZOFRAN) 4 MG tablet Take 1 tablet (4 mg) by mouth every 8 hours as needed for nausea or vomiting    Associated Diagnoses: Metastatic malignant melanoma (H)      pantoprazole (PROTONIX) 40 MG EC tablet Take 1 tablet (40 mg) by mouth every morning (before breakfast)    Associated Diagnoses: Metastatic malignant melanoma (H)      polyethylene glycol (MIRALAX/GLYCOLAX) packet Take 17 g by mouth daily as needed for constipation    Associated Diagnoses: Constipation, unspecified constipation type         CONTINUE these medications which have CHANGED    Details   acetaminophen (TYLENOL) 325 MG tablet Take 2 tablets (650 mg) by mouth every 6 hours as needed for mild pain or fever (> 101 F)    Associated Diagnoses: Metastatic malignant melanoma (H)         STOP taking these medications       hydrOXYzine (VISTARIL) 25 MG capsule Comments:   Reason for Stopping:                 DISCHARGE INSTRUCTIONS AND FOLLOW UP  Discharge Procedure Orders   Reason for your hospital stay   Order Comments: left sided weakness found to have acute right intracerebral hemorrhage with mild to moderate edema concerning for hemorrhagic melanoma metastasis.     Activity - Up with nursing assistance     Order Specific Question Answer Comments   Is discharge  order? Yes      Full Code     Order Specific Question Answer Comments   Code status determined by: Discussion with patient/legal decision maker      Fall precautions     Fall precautions     Advance Diet as Tolerated   Order Comments: Follow this diet upon discharge: Regular Diet Adult;     Order Specific Question Answer Comments   Is discharge order? Yes           physical examination    Most recent Vital Signs:   Vitals:    01/13/20 1544 01/14/20 0639 01/14/20 1529 01/15/20 0646   BP: 118/73 123/73 120/68 115/75   BP Location: Right arm Right arm Right arm Right arm   Pulse: 86 57 82 55   Resp: 16 16 16 18   Temp: 96.6  F (35.9  C) 96.5  F (35.8  C) 97.1  F (36.2  C) 96.2  F (35.7  C)   TempSrc: Oral Oral Oral Oral   SpO2: 97% 99% 96% 97%   Weight:       Height:           Gen: NAD, pleasant and cooperative, flat affect  HEENT: NC/AT  Cardio: RRR, S1+S2, no m/r/g  Pulm: On room air, non-labored, clear breath sounds b/l  Abd: BS x4, soft, NT  Ext: No edema or calf tenderness B/I, left upper and lower extremity weakness  Neuro/MSK: Following commands, answering questions appropriately      35 minutes spent in discharge, including >50% in counseling and coordination of care, medication review and plan of care recommended on follow up.       It was our pleasure to care for Dallin Stevens during this hospitalization. Please do not hesitate to contact me should there be questions regarding the hospital course or discharge plan.        Daniel Kinsey MD  Department of Rehabilitation Medicine  Pager: [phone]

## 2020-01-15 NOTE — PLAN OF CARE
Pt is alert and oriented x4. Slept through majority of the night. Denied pain. No complaints of SoB or Chest pain. Regular diet (lactose, sugar and gluten free). Continent of bowel and bladder. Used the toilet in room x1 overnight. Transfers ax1 stand pivot to wheelchair. Skin is intact. Bed alarm on. Continue with POC    Transfer to Neurosurgery today 1/15/20

## 2020-01-16 NOTE — H&P
DATE OF SERVICE: 1/16/20      HISTORY OF PRESENT ILLNESS:   Dallin Stevens is a 34-year-old male with a history of metastatic melanoma diagnosed originally in 2012, originally presenting in the left flank.      The patient first was diagnosed with intracranial disease back in April 2019.  He then underwent a surgical resection by Dr. Jeremi Stoll at [facility] in [place], [place].  The patient did relatively well with surgery.  He presented at that time with a field cut.  Pathology was consistent with metastatic melanoma.      After the resection, he was found to have progressive intracranial disease.  He then established care with Dr. Tesfaye at the [facility].  The patient was seen in clinic initially back in 11/2019.  Discussion was had regarding consideration of radiation therapy; however, the patient opted to proceed with a holistic and alternative treatment.  The patient was stating that he would consider therapy if he were to become more symptomatic or had significant worsening of clinical status.      Ultimately, the patient presented to the [facility] Medicine Service at [facility] on 1/6/19 with concerns of acute and sudden left sided weakness.  The patient was found to have a significant left-sided weakness and hemorrhagic metastases, graded as 0/5 except for left .  The patient was discharged to the [place] acute rehabilitation unit.  Thereafter, [facility] was consulted for further evaluation of possible surgical resection.  He was offered surgical resection and is opting to proceed.  The patient has been managed conservatively with Decadron and Keppra in the meantime.  The patient denies any improvement in left sided strength and also denies clinical worsening.  He does not have numbness of the left side. His field cut has improved since his surgery in 04/2019.     PAST MEDICAL HISTORY:  Melanoma, metastatic      PAST SURGICAL HISTORY:  Right parietal  craniotomy performed in April 2019.      FAMILY HISTORY:  Reviewed and found to be noncontributory.      SOCIAL HISTORY:  The patient used to work at [employer].  The patient has 2 children.  The patient is .      PHYSICAL EXAMINATION:   Temp:  [96.2  F (35.7  C)-97.6  F (36.4  C)] 97  F (36.1  C)  Pulse:  [59-94] 94  Resp:  [16-17] 16  BP: (105-135)/(58-77) 130/77  SpO2:  [93 %-99 %] 98 %    Awake and alert.   Face is symmetric.  Extraocular muscles are intact.  Tongue protrusion and palate elevation symmetric.  No dysarthria.  Shoulder shrug is absent on the left side, intact on the right side.   Visual fields are intact to confrontation   Unable to test pronator drift secondary to significant weakness  5/5 strength in the right upper and lower extremities  0/5 strength in the left upper extremity with the exception of  strength which is about 3/5  0/5 strength in the left lower extremity.    Sensation remains intact in the bilateral upper and lower extremities.      LABORATORY DATA:     1.  CMP is within normal limits.     2.  Glucose is 151.     3.  CBC:  White cell count of 15, hemoglobin of 13, and platelet count of 260.   4.  INR is 1.05.   5.  PTT 25.        ASSESSMENT:  Metastatic melanoma with intracranial metastases.      PLAN:      1.  N.p.o.   2.  Medicine for preoperative evaluation.   3.  Plan for surgical resection of right frontal metastasis on 1/16/19 pending clearance   4.  Continue Decadron for significant cerebral edema.   5.  Consented and marked for blood transfusion as needed.   6.  Have red cells available in the operating room.     7.  EKG preop.   8.  Chest x-ray preop.    9.  CT stealth   10.  Blood culture and UA with reflex to culture per medicine      This patient was discussed with Dr. Nitish Laurent and Dr. Shefali Sanchez.       NITISH LAURENT MD, PHD      As dictated by ROSSY VILLALTA MD            D: 01/16/2020   T: 01/16/2020   MT: KRISTEN      Name:     JEANIE HERNANDEZ   MRN:       [id number]        Account:      [id number]   :      1985        Admitted:     01/15/2020                   Document: [id number]

## 2020-01-16 NOTE — PROGRESS NOTES
"H&P [id number]    Tamez \"Hunter\" MD Leslie   Neurosurgery, PGY-3    Please contact neurosurgery resident on call with questions.    Dial * * [phone], enter [phone] when prompted.     "

## 2020-01-16 NOTE — CONSULTS
[facility], [place]    Medicine Consult - [facility] Night Service        Date of Admission:  1/15/2020    Assessment & Plan   Dallin Stevens is a 34 year old male with a past medical history of metastatic melanoma diagnosed 3/2012, metastatic disease to brain since 2017, who presents from [facility] for scheduled neurosurgical intervention. Medicine consulted for pre-operative evaluation prior to scheduled craniotomy tomorrow, 1/16.        Metastatic Melanoma-   ICH  Diagnosed with melanoma 3/2012, Mets to brain, supraclavicular lymph nodes, mediastinum, right hilum, and lungs.  S/p craniotomy and tumor resection 4/2019. Follows with Dr. Tesfaye at [facility], with known disease progression 12/2019. Presented 1/6/20 Left sided weakness, impaired balance, coordination, ongoing headaches. Most recent CT Head (STABLE) 1/9: intraparenchymal hemorrhage in parasagittal right frontal region, parenchymal hemorrhage in the medial right temporal occipital region and atrial region of the right lateral ventricle, small amount of associated intraventricular hemorrhage in the occipital horn on the right,Stable small  hyperdense focus left parietal area. Has been on decadron and keppra while in rehab. Requiring hydroxyzine and small doses of ativan for subacute anxiety.   - agree with brain MRI tonight   - all other cares per primary, neurosurgery team    Pre-op evaluation for craniotomy and tumor resection  - ACS NSQIP score - average to below average risk of serious complications   - Revised cardiac risk index for pre-operative risk - score of 0; 3.9% risk of cardiac death within 30 days of surgery (equivalent to population risk)  - Basic labs already obtained by primary team are within normal limits.   -- Mild leukocytosis to 11.4 likely due to dexamethasone therapy. But would recommend obtaining CXR, blood culture, UA with reflex culture prior to procedure.   - Patient has no known underlying heart  disease and denies any symptoms of chest discomfort, dyspnea. Pre-op EKG sinus rhythm.      Code Status: Full code     Based on the above information and physical exam today, the patient is medically appropriate for the planned operation tomorrow.       The patient's care was discussed with the Attending Physician, Dr. Gonzalez. Medicine team will continue to follow peripherally.     Abida Montgomery MD  [facility], [place]  Pager: [phone]  Please see sticky note for cross cover information  ______________________________________________________________________    Chief Complaint   Weakness     History is obtained from the patient and electronic health record    History of Present Illness   Dallin Stevens is a 34 year old male with a past medical history of metastatic melanoma diagnosed 3/2012, metastatic disease to brain since 2017, who presents from [facility] for scheduled neurosurgical intervention.     Dallin Stevens is a 34 year old man with a past medical history of metastatic melanoma diagnosed 3/2012, in 2017 had CT which revealed metastatic disease, confirmed by biopsy, received pembrolizumab between then opted for alternative/holistic treatment, including: (ketogenic diet, iv vitamin c and ozone therapy) .  In April 2019 diagnosed with brain metastasis, underwent craniotomy and tumor resection at that time. Transitioned to [facility] oncology and follows with Dr. Tesfaye last visit 12/7/2019, with mention of ongoing disease progression, brain metastasis and recommendations for gamma knife treatment, and starting pembrolizumab, reportedly patient wanted to continue alternative/holistic approach at that time.    On 1/6/2020  Patient presented with left sided weakness, ongoing headaches, imaging with cranial lesions and right frontal hemorrhage with edema. Was seen and evaluated by neurosurgery with initiation of dexamethasone and keppra. Admitted to the [place]  acute inpatient rehabilitation unit on 1/9/2020 where he participated in PT and OT for 3 hrs/day.  Treatment plan was discussed with neurosurgical team who is planning to take the patient to the OR for resection of tumors. Admitted to medical service for pre-op evaluation with scheduled procedure tomorrow, 1/16.     Tonight, patient states he feels alright. Feeling a little anxious but medications are helping. He denies any cold/infectious symptoms. No chills, night sweats. No chest pain, shortness of breath. No n/v/d. No new pain.     Review of Systems    The 10 point Review of Systems is negative other than noted in the HPI.     Past Medical History    I have reviewed this patient's medical history and updated it with pertinent information if needed.   Past Medical History:   Diagnosis Date     Malignant melanoma (H)         Past Surgical History   I have reviewed this patient's surgical history and updated it with pertinent information if needed.  Past Surgical History:   Procedure Laterality Date     CRANIOTOMY, EXCISE TUMOR COMPLEX, COMBINED  04/2019        Social History   I have reviewed this patient's social history and updated it with pertinent information if needed. Dallin Stevens  reports that he has never smoked. He has never used smokeless tobacco.    Family History   Family history reviewed with patient and is noncontributory.    Prior to Admission Medications   Prior to Admission Medications   Prescriptions Last Dose Informant Patient Reported? Taking?   LORazepam (ATIVAN) 0.5 MG tablet   No No   Sig: Take 0.5 tablets (0.25 mg) by mouth every 6 hours as needed for anxiety   acetaminophen (TYLENOL) 325 MG tablet   No No   Sig: Take 2 tablets (650 mg) by mouth every 6 hours as needed for mild pain or fever (> 101 F)   dexamethasone (DECADRON) 4 MG tablet   No No   Sig: Take 1 tablet (4 mg) by mouth every 6 hours   hydrOXYzine (ATARAX) 25 MG tablet   No No   Sig: Take 1-2 tablets (25-50 mg) by mouth 3  times daily as needed for anxiety   levETIRAcetam (KEPPRA) 500 MG tablet   No No   Sig: Take 1 tablet (500 mg) by mouth 2 times daily   melatonin 1 MG TABS tablet   No No   Sig: Take 1 tablet (1 mg) by mouth nightly as needed for sleep   ondansetron (ZOFRAN) 4 MG tablet   No No   Sig: Take 1 tablet (4 mg) by mouth every 8 hours as needed for nausea or vomiting   pantoprazole (PROTONIX) 40 MG EC tablet   No No   Sig: Take 1 tablet (40 mg) by mouth every morning (before breakfast)   polyethylene glycol (MIRALAX/GLYCOLAX) packet   No No   Sig: Take 17 g by mouth daily as needed for constipation      Facility-Administered Medications: None     Allergies   No Known Allergies    Physical Exam   Vital Signs: Temp: 96.6  F (35.9  C) Temp src: Oral BP: 135/71 Pulse: 87   Resp: 16 SpO2: 99 % O2 Device: None (Room air)    Weight: 0 lbs 0 oz    General: AAOx3, NAD  HEENT: NC/AT, MMM, oropharynx clear, anicteric sclera, conjunctiva normal, no adenopathy  CV: RRR, normal S1S2, no murmur, clicks, rubs appreciated  Resp: Non-labored respirations, CTAB, good air movement, no wheezes, rhonchi  Abd: Soft, non-distended, non-tender, normoactive bs, no HSM, no masses appreciated  Extremities: wwp,  no pedal edema  Skin: No obvious rashes or lesions  Neuro: CN2-12 intact,  strength 5/5 bilaterally, otherwise limited ROM of LUE. 5/5 dorsi and plantar flexion on right, 2/5 plantarflexion on left.   Psych: Appropriate mood    Data   Data reviewed today: I reviewed all medications, new labs and imaging results over the last 24 hours. Brain MRI from [facility] pending. EKG obtained tonight sinus rhythm.     Recent Labs   Lab 01/15/20  2201 01/10/20  0705   WBC 11.5* 8.6   HGB 13.5 13.3   MCV 87 91    259   INR 1.05  --     139   POTASSIUM 4.2 4.0   CHLORIDE 103 105   CO2 27 29   BUN 26 14   CR 0.62* 0.67   ANIONGAP 4 5   ABUNDIO 9.0 9.2   * 122*   ALBUMIN 3.6  --    PROTTOTAL 7.1  --    BILITOTAL 0.2  --    ALKPHOS 98  --     ALT 26  --    AST 6  --

## 2020-01-16 NOTE — PLAN OF CARE
Status: Pt was admitted for increased L sided weakness, hx melanoma w/ brain mets. Pre op for R tumor resection  Vitals: VSS on RA  Neuros: A&O x4, denied N/T, R side 5/5, L side was 0-1/5  IV: PIV was SL  Resp/trach: Lung sounds were clear bilaterally  Diet: NPO for procedure  Bowel status: Bowel sounds were active  : Voiding spontaneously  Skin: Intact  Pain: Denied   Activity: Heavy assist x2 w/ GB and pivot  Social: Wife and family came to visit before he went down to pre op  Plan: Pt went down to pre op around 1300

## 2020-01-16 NOTE — PLAN OF CARE
Status: Pt admitted @2140 for increased left sided weakness, hx of metastatic melanoma to brain   Vitals: VSS on RA   Neuros: Alert & oriented x4, L 1/5, R 5/5  IV: PIV SL  Resp/trach: Denies SOB  Diet: Regular diet, NPO @midnight   Bowel status: BS+, 1/15  : Voiding spontaneously without difficulty   Skin: Intact  Pain: Denies   Activity: 2/GB pivot, repo q2hr   Social: Wife and brother at bedside for portion of shift, involved in cares   Plan: Continue to monitor and follow POC, chest xray completed, MRI completed

## 2020-01-16 NOTE — ANESTHESIA PROCEDURE NOTES
Arterial Line Procedure Note  Staff:     Anesthesiologist:  Konrad Kellogg MD  Location: In OR After Induction  Procedure Start/Stop Times:     patient identified      Correct Patient: Yes    Line Placement:     Procedure:  Arterial Line    Insertion Site:  Radial    Insertion laterality:  Left    Skin Prep: Chloraprep      Patient Prep: mask, sterile gloves, hat and hand hygiene      Local skin infiltration:  None    Ultrasound Guided?: No      Catheter size:  20 gauge, Quick cath    Cath secured with: other (comment)      Dressing:  Tegaderm    Complications:  None obvious    Arterial waveform: Yes

## 2020-01-16 NOTE — PLAN OF CARE
Status: Pt from acute rehab admitted w/ increased L sided weakness. Hx of melanoma w/ brain mets. Now pre-op for R crani for tumor resection.   Vitals: VSS, RA.   Neuros: A&Ox4, denies N/T, denies vision issues. RUE & RLE 5/5. LUE & LLE 0-1/5.   IV: PIV SL.   Resp/trach: LS CTA.   Diet: NPO since midnight.   Bowel status: BS+, no BM over shift.   : Voids w/o difficulty.   Skin: Intact. R head markered by MD Nelson.   Pain: Denies.   Activity: Pt not OOB over shift. Per report, pt is heavy 2, GB to pivot. Pt was able to sit at side of bed w/ assist of 1. T&R q2h.   Social: No visitors over shift.   Plan: MRI at 1pm to place fiducials. OR scheduled for 2:25pm. First of 2 surgical scrubs completed. Pt still needs one more & hair washed. Informed consent signed. Continue to monitor & follow POC.

## 2020-01-16 NOTE — PLAN OF CARE
Brief medicine note:  - Medicine consulted for pre-op evaluation prior to craniotomy later today for metastatic melanoma. Please refer to consult by Magdalene Gonzalez and Ulises for pre-op clearance. D/w Neurosurgery team, and no further medical concerns for medicine to follow. Signing off. Please feel free to call with questions.       Timmy Casillas PA-C  Internal Medicine Hospitalist   [facility]  [phone]

## 2020-01-16 NOTE — PLAN OF CARE
Pt A&Ox4. VSS. Denies pain. Denied SOB. Denies difficulty breathing. Denied chest pain. No neuro changes. Adequate oral hydration and good appetite. Pt has significant left side weakness due to diagnosis. Ax2 stand and pivot for transfer. Had BM around noon per pt and wife report. Active BS to all quadrants. Passing flatus per pt report. Urinating without difficulty using urinal and  bedside commode. Received PRN ativan for anxiety x1 this shift. Skin is CDI. Using call light to make needs known. Alarm on. Call light with in reach. Pt is being transported to  for surgery tomorrow. Wife at bedside and aware of what is going on.     Report given to the nurse who will be taking the pt on . Transport called and waiting.     Patient sent to  via ambulance at 2055

## 2020-01-16 NOTE — ANESTHESIA PREPROCEDURE EVALUATION
Anesthesia Pre-Procedure Evaluation    Patient: Dallin Stevens   MRN:     [id number] Gender:   male   Age:    34 year old :      1985        Preoperative Diagnosis: Malignant neoplasm of frontal lobe of brain (H) [C71.1]   Procedure(s):  stealth assisted Right craniotomy for tumor resection     Past Medical History:   Diagnosis Date     Malignant melanoma (H)       Past Surgical History:   Procedure Laterality Date     CRANIOTOMY, EXCISE TUMOR COMPLEX, COMBINED  2019          Anesthesia Evaluation     .             ROS/MED HX    ENT/Pulmonary:  - neg pulmonary ROS     Neurologic: Comment: hemorrhagic metastases from metastatic melanoma (2020) with resulting L side hemiparesis  S/p right parietal craniotomy performed in 2019      Cardiovascular:  - neg cardiovascular ROS   (+) ----. : . . . :. . Previous cardiac testing date:results:date: results:ECG reviewed date: results:Sinus rhythm date: results:          METS/Exercise Tolerance:     Hematologic:  - neg hematologic  ROS       Musculoskeletal:  - neg musculoskeletal ROS       GI/Hepatic:  - neg GI/hepatic ROS       Renal/Genitourinary:  - ROS Renal section negative       Endo:  - neg endo ROS       Psychiatric:         Infectious Disease:  - neg infectious disease ROS       Malignancy:   (+) Malignancy (malignant melanoma)           Other:    - neg other ROS                     PHYSICAL EXAM:   Mental Status/Neuro: A/A/O   Airway: Facies: Feasible  Mallampati: I  Mouth/Opening: Full  TM distance: > 6 cm  Neck ROM: Full   Respiratory: Auscultation: CTAB     Resp. Rate: Normal     Resp. Effort: Normal      CV: Rhythm: Regular  Rate: Age appropriate  Heart: Normal Sounds  Edema: None   Comments:      Dental: Normal Dentition                LABS:  CBC:   Lab Results   Component Value Date    WBC 11.5 (H) 01/15/2020    WBC 8.6 01/10/2020    HGB 13.5 01/15/2020    HGB 13.3 01/10/2020    HCT 40.0 01/15/2020    HCT 40.7 01/10/2020     01/15/2020     " 01/10/2020     BMP:   Lab Results   Component Value Date     01/15/2020     01/10/2020    POTASSIUM 4.2 01/15/2020    POTASSIUM 4.0 01/10/2020    CHLORIDE 103 01/15/2020    CHLORIDE 105 01/10/2020    CO2 27 01/15/2020    CO2 29 01/10/2020    BUN 26 01/15/2020    BUN 14 01/10/2020    CR 0.62 (L) 01/15/2020    CR 0.67 01/10/2020     (H) 01/15/2020     (H) 01/10/2020     COAGS:   Lab Results   Component Value Date    PTT 25 01/15/2020    INR 1.05 01/15/2020     POC:   Lab Results   Component Value Date     (H) 01/16/2020     OTHER:   Lab Results   Component Value Date    A1C 5.3 01/15/2020    ABUNDIO 9.0 01/15/2020    ALBUMIN 3.6 01/15/2020    PROTTOTAL 7.1 01/15/2020    ALT 26 01/15/2020    AST 6 01/15/2020    ALKPHOS 98 01/15/2020    BILITOTAL 0.2 01/15/2020        Preop Vitals    BP Readings from Last 3 Encounters:   01/16/20 126/81   01/15/20 105/58   12/05/19 138/77    Pulse Readings from Last 3 Encounters:   01/16/20 90   01/15/20 63   12/05/19 106      Resp Readings from Last 3 Encounters:   01/16/20 16   01/15/20 17   12/05/19 16    SpO2 Readings from Last 3 Encounters:   01/16/20 99%   01/15/20 99%   12/05/19 99%      Temp Readings from Last 1 Encounters:   01/16/20 36.3  C (97.4  F) (Oral)    Ht Readings from Last 1 Encounters:   01/09/20 1.803 m (5' 11\")      Wt Readings from Last 1 Encounters:   01/09/20 60.1 kg (132 lb 6.4 oz)    Estimated body mass index is 18.47 kg/m  as calculated from the following:    Height as of 1/9/20: 1.803 m (5' 11\").    Weight as of 1/9/20: 60.1 kg (132 lb 6.4 oz).     LDA:  Peripheral IV 01/15/20 Right;Posterior Lower forearm (Active)   Site Assessment WDL 1/16/2020  1:35 PM   Line Status Saline locked 1/16/2020  1:35 PM   Phlebitis Scale 0-->no symptoms 1/16/2020  1:35 PM   Infiltration Scale 0 1/16/2020  1:35 PM   Infiltration Site Treatment Method  None 1/16/2020  8:00 AM   Extravasation? No 1/16/2020  8:00 AM   Number of days: 1    "     Assessment:   ASA SCORE: 3    H&P: History and physical reviewed and following examination; no interval change.   Smoking Status:  Non-Smoker/Unknown   NPO Status: NPO Appropriate     Plan:   Anes. Type:  General   Pre-Medication: None   Induction:  IV (Standard)   Airway: ETT; Oral   Access/Monitoring: PIV; 2nd PIV; A-Line   Maintenance: Balanced     Postop Plan:   Postop Pain: Opioids  Postop Sedation/Airway: Not planned  Disposition: Inpatient/Admit     PONV Management:   Adult Risk Factors:, Non-Smoker, Postop Opioids   Prevention: Ondansetron, Dexamethasone     CONSENT: Direct conversation   Plan and risks discussed with: Patient   Blood Products: Consented (ALL Blood Products)                   Kolton Gibbons MD

## 2020-01-17 NOTE — ANESTHESIA CARE TRANSFER NOTE
Patient: Dallin Stevens    Procedure(s):  stealth assisted Right craniotomy for tumor resection    Diagnosis: Malignant neoplasm of frontal lobe of brain (H) [C71.1]  Diagnosis Additional Information: No value filed.    Anesthesia Type:   General     Note:  Airway :Nasal Cannula  Patient transferred to:PACU  Comments: VSS. Breathing spontaneously at a regular rate with adequate tidal volumes and maintaining O2 sats on 4L. Denies nausea, endorses 8/10 pain. No apparent complications from anesthesia.     Karly Haley MD [facility] pager [phone]  Anesthesia CA-1  Handoff Report: Identifed the Patient, Identified the Reponsible Provider, Reviewed the pertinent medical history, Discussed the surgical course, Reviewed Intra-OP anesthesia mangement and issues during anesthesia, Set expectations for post-procedure period and Allowed opportunity for questions and acknowledgement of understanding      Vitals: (Last set prior to Anesthesia Care Transfer)    CRNA VITALS  1/16/2020 1948 - 1/16/2020 2029 1/16/2020             Pulse:  102    SpO2:  99 %    Resp Rate (observed):  (!) 1                Electronically Signed By: Karly Haley MD  January 16, 2020  8:29 PM

## 2020-01-17 NOTE — ANESTHESIA POSTPROCEDURE EVALUATION
Anesthesia POST Procedure Evaluation    Patient: Dallin Stevens   MRN:     [id number] Gender:   male   Age:    34 year old :      1985        Preoperative Diagnosis: Malignant neoplasm of frontal lobe of brain (H) [C71.1]   Procedure(s):  stealth assisted Right craniotomy for tumor resection   Postop Comments: No value filed.       Anesthesia Type:  Not documented  General    Reportable Event: NO     PAIN: Uncomplicated   Sign Out status: Comfortable, Well controlled pain     PONV: No PONV   Sign Out status:  No Nausea or Vomiting     Neuro/Psych: Uneventful perioperative course   Sign Out Status: Preoperative baseline; Age appropriate mentation     Airway/Resp.: Uneventful perioperative course   Sign Out Status: Non labored breathing, age appropriate RR; Resp. Status within EXPECTED Parameters     CV: Uneventful perioperative course   Sign Out status: Appropriate BP and perfusion indices; Appropriate HR/Rhythm     Disposition:   Sign Out in:  ICU  Disposition:  ICU  Recovery Course: Recovery in ICU  Follow-Up: Not required           Last Anesthesia Record Vitals:  CRNA VITALS  2020 1948 - 2020             Pulse:  102    SpO2:  99 %    Resp Rate (observed):  (!) 1          Last PACU Vitals:  Vitals Value Taken Time   /79 2020  8:50 PM   Temp 36.4  C (97.5  F) 2020  8:20 PM   Pulse 85 2020  8:50 PM   Resp 12 2020  8:30 PM   SpO2 100 % 2020  8:52 PM   Temp src     NIBP     Pulse     SpO2     Resp     Temp     Ht Rate     Temp 2     Vitals shown include unvalidated device data.      Electronically Signed By: Beatriz Delgado MD, 2020, 8:53 PM

## 2020-01-17 NOTE — PLAN OF CARE
D/I/A: Patient ready for transfer to 6A per Neuro Surg MD. Neuro status improving. Remains anxious - atarax given. PERRLA intact; notable left sided weakness and limited movement, however improving. Right sided extremities w/o deficit. HR tachy w/ anxiety otherwise NSR. BP stable; a-line removed. Lungs clear, on room air. Lu to be removed post MRI, urine output adequate. Tolerating regular diet, taking adequate PO, bowel sounds active. Head incision w/ sutures intact; scant drainage noted from pin sites. Family informed of room switch. Report provided to receiving RN.  P: Transfer care to 6A post MRI.

## 2020-01-17 NOTE — PLAN OF CARE
Admitted/transferred from: PACU @ 2200 1/16  Reason for admission/transfer: ordered by provider/craniotomy  Patient status upon admission/transfer: drowsy but oriented, following commands  Interventions: bedside swallow and pain meds  Plan: Monitor overnight q 1 hr neuro checks  2 RN skin assessment: completed by Kylee  Result of skin assessment and interventions/actions: sacral mepilex in place, no acute concerns  Height, weight, drug calc weight: done  Patient belongings: phone and glasses at bedside  MDRO education (if applicable): swab sent    Acute events [id number]: none    Pt alert, intermittently drowsy, but opens eyes to voice and follows commands. Oriented x4. Moves right side with good strength. Left hand has moderate  but other arm movements weak/absent--signs of improvement such as slight shoulder shrug appreciable now. Left foot able to wiggle toes intermittently to command. Sensation intact.     Receiving oxycodone and tylenol prn for incisional pain (see eMAR).     /60s with no drips. Radial arterial line in place. Sinus rhythm 60-80 BPM.     Room air with capnography monitoring.     Passed bedside swallow and tolerating clear liquids and pills.    50 ml/ hr urine output via bhardwaj catheter.    Plan: remove arterial line and bhardwaj catheter, transfer to floor as ordered by primary team.

## 2020-01-17 NOTE — BRIEF OP NOTE
[facility], [place]    Brief Operative Note    Pre-operative diagnosis: Malignant neoplasm of frontal lobe of brain (H) [C71.1]  Post-operative diagnosis Same as pre-operative diagnosis    Procedure: Procedure(s):  stealth assisted Right craniotomy for tumor resection  Surgeon: Surgeon(s) and Role:     * Nitish Quintero MD - Primary     * Shefali Sanchez MD - Resident - Assisting  Anesthesia: General   Estimated blood loss: Less than 100 ml  Drains: None  Specimens:   ID Type Source Tests Collected by Time Destination   A : right frontal tumor (history of melanoma) Tissue Head SURGICAL PATHOLOGY EXAM Nitish Quintero MD 1/16/2020  6:01 PM    B : right frontal tumor (history of melanoma)#2 for Bionet Tissue Head SURGICAL PATHOLOGY EXAM Nitish Quintero MD 1/16/2020  6:37 PM    C : right frontal tumor (history of melanoma) PERMANENT Tissue Head SURGICAL PATHOLOGY EXAM Nitish Quintero MD 1/16/2020  7:05 PM      Findings:   None.  Complications: None.  Implants:   Implant Name Type Inv. Item Serial No.  Lot No. LRB No. Used   IMP BUR HOLE COVER 24MM LOW PROFILE .528 Metallic Hardware/[employer] IMP BUR HOLE COVER 24MM LOW PROFILE .528 0 SYNTHES-STRATEC 0 Right 3   GRAFT DURAGEN 3X3&quot; ID-[id number] Bone/Tissue/Biologic GRAFT DURAGEN 3X3&quot; ID-[id number]  [employer] [id number] Right 1   IMP SCR SYN MATRIX LOW PRO 1.5X04MM SELF DRILL [id number] Metallic Hardware/[employer] IMP SCR SYN MATRIX LOW PRO 1.5X04MM SELF DRILL [phone] [employer]STRATEC 0 Right 9

## 2020-01-17 NOTE — PLAN OF CARE
Pt arrived on 6A from MRI. Neuros were: A&O x4, R side strengths 5/5, L side strengths 1/5, L grib moderate, intermittently wiggles L toes, denies N/T, Head incision is WDL, open to air

## 2020-01-18 NOTE — PROGRESS NOTES
"S: transferred to floor yesterday     O:  Temp:  [97.3  F (36.3  C)-98.9  F (37.2  C)] 98.9  F (37.2  C)  Pulse:  [77-92] 78  Heart Rate:  [] 81  Resp:  [11-26] 16  BP: (112-124)/(70-83) 124/81  MAP:  [85 mmHg-95 mmHg] 85 mmHg  Arterial Line BP: (124-147)/(65-73) 124/65  SpO2:  [96 %-98 %] 98 %    Awake and alert.   CN II-XII intact except for CN XI with asymmetric shrugging   Unable to test pronator drift secondary to significant weakness  5/5 strength in the right upper and lower extremities  2/5 strength in biceps and triceps on left with the exception of  strength which is about 4/5  1/5 strength in the left hip flexor and 2/5 in dorsiflexion/plantarflexion on left   Sensation remains intact in the bilateral upper and lower extremities.     A: Metastatic melanoma with intracranial metastases s/p right frontal resection on 1/16/20.  Sight improvement in strength on 1/18      P:   - decadron for treatment of cerebral edema; taper to off over 4 days   - keppra bid   - sliding scale and protonix while on decadron  - regular diet   - PT/OT/PMR: pending final recommendations   - dispo: 6A   - Prophylaxis: SCDs; holding chemoprophylaxis    Tamez \"Hunter\" MD [place]   Neurosurgery, PGY-3    Please contact neurosurgery resident on call with questions.    Dial * * [phone], enter [phone] when prompted.     "

## 2020-01-18 NOTE — PLAN OF CARE
Status: POD 2 right frontal resection  for metastatic melanoma with intracranial metastases   Vitals: AVSS  Neuros: A&Ox4. Intact except left sided hemiplegia (1/5) and anxiety at times- PRN Atarax with decadron doses helpful and PRN Ativan along with essential oils effective  IV: PIV SL   Resp/trach: LS CTA  Diet: Regular diet - tolerating well  Bowel status: BS+x4- Had 1 sm/med formed BM today  : VDSP in bedside commode- no PVR  Skin: Anterior head incision intact with sutures - no drainage nor edema noted  Pain: Head and left foot pain well controlled with PRN Tylenol and Oxycodone along with repositioning  Activity: Pivot to bedside commode with Ax2 and gb  Social: No visitors this shift  Plan: Discharge back to ARU when medically stable. Will continue to monitor and follow with POC.

## 2020-01-18 NOTE — PLAN OF CARE
Shift [id number]    Status: Pt on 6a s/p R frontal resection 1/16 of metastatic melanoma mets.   Vitals: /81 (BP Location: Right arm)   Pulse 78   Temp 98.9  F (37.2  C) (Oral)   Resp 16   Wt 56 kg (123 lb 7.3 oz)   SpO2 98%   BMI 17.22 kg/m    Neuros: A&Ox4. L side 1, R side 5. Denies N/T.   IV: PIV SL.   Resp/trach: On RA.   Diet: Reg diet. Tolerating well.   Bowel status: BM x1 this shift.   : Voids spontaneously.   Skin: Blanchable redness to coccyx. Mepilex in place. Crani incision approximated, ROMIE.   Pain: Denies.   Activity: Up w/ 2/GB/Pivot. Used walker for longer distances.   Social: Wife at bedside. Other family members visited today. Supportive.   Plan: Discharge to ARU once stable. Continue to monitor and follow POC.

## 2020-01-18 NOTE — PLAN OF CARE
Lu pulled at about 1730. Attempted to sit up at bedside to void but unable to yet.  Patient having some anxiety this evening, MD's added PRN ativan. Plan to give this evening.

## 2020-01-19 NOTE — PROGRESS NOTES
01/19/20 1400   Quick Adds   Type of Visit Initial Occupational Therapy Evaluation   Living Environment   Lives With child(janell), dependent;spouse   Living Arrangements house   Home Accessibility stairs to enter home;stairs within home   Number of Stairs, Main Entrance 2   Stair Railings, Main Entrance none   Number of Stairs, Within Home, Primary other (see comments)  (14 stairs, rail on R)   Stair Railings, Within Home, Primary railing on right side (ascending)   Transportation Anticipated family or friend will provide   Living Environment Comment Pt's wife works from home, able to provide assist as needed. Pt and wife split IADL responsibilities at baseline. Pt has bathtub and walk-in shower, no AE/DME in place.   Self-Care   Usual Activity Tolerance excellent   Current Activity Tolerance moderate   Regular Exercise Yes   Activity/Exercise Type   (participating in therapies at ARU, weekly soccer, stretching)   Exercise Amount/Frequency 1 hr;3-5 times/wk   Equipment Currently Used at Home none   Activity/Exercise/Self-Care Comment IND at baseline   Functional Level   Ambulation 0-->independent   Transferring 0-->independent   Toileting 0-->independent   Bathing 0-->independent   Dressing 0-->independent   Eating 0-->independent   Communication 0-->understands/communicates without difficulty   Swallowing 0-->swallows foods/liquids without difficulty   Cognition 0 - no cognition issues reported   Fall history within last six months yes   Number of times patient has fallen within last six months 1   Which of the above functional risks had a recent onset or change? ambulation;transferring;toileting;bathing;dressing;eating;fall history   General Information   Onset of Illness/Injury or Date of Surgery - Date 01/15/20   Referring Physician Rosanna Bills APRN CNP   Patient/Family Goals Statement To return to ARU as soon as possible to continue recovery   Additional Occupational Profile Info/Pertinent  "History of Current Problem Per H&P: \"34-year-old male with a history of metastatic melanoma diagnosed originally in 2012, originally presenting in the left flank.\"   Precautions/Limitations spinal precautions;fall precautions  (craniotomy precautions)   Weight-Bearing Status - LUE full weight-bearing   Weight-Bearing Status - RUE full weight-bearing   Weight-Bearing Status - LLE full weight-bearing   Weight-Bearing Status - RLE full weight-bearing   General Observations Pt agreeable and eager to continue therapy to progress independence. Pt demonstrating good carry over from ARU   General Info Comments Activity: Up in chair, Ambulate with assist   Cognitive Status Examination   Orientation orientation to person, place and time   Level of Consciousness alert   Follows Commands (Cognition) WFL   Attention No deficits were identified   Organization/Problem Solving No deficits were identified   Executive Function No deficits were identified   Visual Perception   Visual Perception Comments Pt wears glasses at baseline. L innattention not noted during functional activities. Will further assess.   Sensory Examination   Sensory Comments   (Pt denies sensation changes. )   Pain Assessment   Patient Currently in Pain No   Range of Motion (ROM)   ROM Comment R UE- Full AROM/PROM; L UE- full finger flexion AROM, PROM to 130 deg in shoulder flexion, full PROM in other movements    Strength   Strength Comments 5/5 RUE, 2-3/5 finger flexion, 0/5 rest of LUE   Hand Strength   Hand Strength Comments Not formally assessed. Pt demonstrating light  when encouraged to use L hand as functional assist.   Coordination   Coordination Comments Pt is right handed. Unable to formally assess in L hand. Pt now able to touch thumb to all other digits.   Transfer Skill: Sit to Stand   Level of [place]: Sit/Stand minimum assist (75% patients effort)   Physical Assist/Nonphysical Assist: Sit/Stand 1 person assist  (VC for foot placement, " balance support)   Assistive Device for Transfer: Sit/Stand quad cane   Transfer Skill: Toilet Transfer   Level of [place]: Toilet minimum assist (75% patients effort)   Physical Assist/Nonphysical Assist: Toilet 1 person assist   Weight-Bearing Restrictions: Toilet full weight-bearing   Assistive Device straight cane;grab bars   Toilet Transfer Skill Comments   (CGA for stand > sit, Min A for sit > stand)   Lower Body Dressing   Level of [place]: Dress Lower Body stand-by assist  (to adjust both socks)   Toileting   Level of [place]: Toilet stand-by assist   Physical Assist/Nonphysical Assist: Toilet 1 person assist  (for balance while standing to don shorts)   Assistive Device grab bars;quad cane   Instrumental Activities of Daily Living (IADL)   IADL Comments Pt reporting sharing housework, driving, working, and    Activities of Daily Living Analysis   Impairments Contributing to Impaired Activities of Daily Living balance impaired;post surgical precautions;motor control impaired;coordination impaired;strength decreased   General Therapy Interventions   Planned Therapy Interventions ADL retraining;neuromuscular re-education;fine motor coordination training;strengthening;ROM;home program guidelines;progressive activity/exercise   Clinical Impression   Criteria for Skilled Therapeutic Interventions Met yes, treatment indicated   OT Diagnosis Pt below baseline for ADLs and functional mobility   Influenced by the following impairments   (strength, balance, coordination)   Assessment of Occupational Performance 5 or more Performance Deficits   Identified Performance Deficits Deficits in all ADLs and IADLs   Clinical Decision Making (Complexity) Moderate complexity   Therapy Frequency Daily   Predicted Duration of Therapy Intervention (days/wks) 1 week   Anticipated Equipment Needs at Discharge   (TBD)   Anticipated Discharge Disposition Acute Rehabilitation Facility   Risks and Benefits of  "Treatment have been explained. Yes   Patient, Family & other staff in agreement with plan of care Yes   Clinical Impression Comments Pt presents today with decreased ADL independence, impacted by deficits described above. Pt will benefit from continued skilled therapies to progress ADL performance and functional mobility.    [facility] AM-PAC  \"6 Clicks\" Daily Activity Inpatient Short Form   1. Putting on and taking off regular lower body clothing? 3 - A Little   2. Bathing (including washing, rinsing, drying)? 3 - A Little   3. Toileting, which includes using toilet, bedpan or urinal? 3 - A Little   4. Putting on and taking off regular upper body clothing? 3 - A Little   5. Taking care of personal grooming such as brushing teeth? 3 - A Little   6. Eating meals? 4 - None   Daily Activity Raw Score (Score out of 24.Lower scores equate to lower levels of function) 19   Total Evaluation Time   Total Evaluation Time (Minutes) 10     "

## 2020-01-19 NOTE — PROGRESS NOTES
S: exam continuing to improve, piter LUE     O:  Temp:  [97.4  F (36.3  C)-98.5  F (36.9  C)] 98  F (36.7  C)  Pulse:  [62-89] 89  Heart Rate:  [65] 65  Resp:  [16-18] 16  BP: (118-130)/(73-84) 125/74  SpO2:  [98 %-99 %] 99 %    Awake and alert.   CN II-XII intact except for CN XI with asymmetric shrugging   Unable to test pronator drift secondary to significant weakness  5/5 strength in the right upper and lower extremities  4/5 strength in biceps and triceps on left with the exception of  strength which is about 4/5  1/5 strength in the left hip flexor and 2/5 in dorsiflexion/plantarflexion on left   Sensation remains intact in the bilateral upper and lower extremities.     A: Metastatic melanoma with intracranial metastases s/p right frontal resection on 1/16/20. Strength improving, piter in LUE.    P:   - decadron for treatment of cerebral edema; taper to off over 4 days   - keppra bid   - sliding scale and protonix while on decadron  - regular diet   - PT/OT/PMR: ARU  - dispo: ARU  - Prophylaxis: SCDs; holding chemoprophylaxis  - follow-up radiation oncology next week    Segundo Vera MD  Neurosurgery Resident PGY2    Please contact neurosurgery resident on call with questions.    Dial * * [phone], enter [phone] when prompted.

## 2020-01-19 NOTE — PLAN OF CARE
6A OT    Discharge Planner OT   Patient plan for discharge: ARU as soon as possible  Current status: Evaluation completed, treatment initiated. Reinforced craniotomy precautions education. Min A for sit <> stand transfers for balance. Ambulated x15 ft x2 in room with Min A to place L foot and quad cane. IND with cleaning during toileting, CGA for balance for clothing management. Completed x10 reps of L UE AAROM exercises. Reinforced education in role of UE HEP and incorporating L UE as functional assist during activity. Pt verbalized understanding.   Barriers to return to prior living situation: Impaired L UE and L LE function, balance, medical status  Recommendations for discharge: ARU  Rationale for recommendations: Pt is below baseline for ADL independence and will benefit from continued multidisciplinary therapies to progress performance. Pt reports making progress with L LE strength with therapies at ARU and is highly motivated to continue working with therapies. Anticipate pt well able to tolerate 3 hrs therapy/day.        Entered by: Shira Bello 01/19/2020 5:08 PM

## 2020-01-19 NOTE — PROGRESS NOTES
01/19/20 1200   Quick Adds   Type of Visit Initial PT Evaluation   Living Environment   Lives With spouse;child(janell), dependent   Living Arrangements house   Home Accessibility stairs to enter home;stairs within home   Number of Stairs, Main Entrance 2   Stair Railings, Main Entrance none   Number of Stairs, Within Home, Primary other (see comments)  (14 stairs, R rail)   Stair Railings, Within Home, Primary railing on right side (ascending)   Transportation Anticipated family or friend will provide  (owns a [facility] full size truck and [employer])   Living Environment Comment Wife works from home and able to assist. Bed and full bath with walk-in shower upstairs   Self-Care   Usual Activity Tolerance excellent   Current Activity Tolerance moderate   Regular Exercise Yes   Activity/Exercise Type   (weekly soccer, like to bow hunt)   Equipment Currently Used at Home none   Activity/Exercise/Self-Care Comment Pt IND at baseline   Functional Level Prior   Ambulation 0-->independent   Transferring 0-->independent   Toileting 0-->independent   Bathing 0-->independent   Communication 0-->understands/communicates without difficulty   Swallowing 0-->swallows foods/liquids without difficulty   Cognition 0 - no cognition issues reported   Fall history within last six months yes   Number of times patient has fallen within last six months 1   Which of the above functional risks had a recent onset or change? ambulation;transferring;toileting;bathing;dressing   Prior Functional Level Comment PT: independent PTA   General Information   Onset of Illness/Injury or Date of Surgery - Date 01/15/20   Referring Physician Rosanna Bills, SERGEI CNP   Patient/Family Goals Statement Return to ARU   Pertinent History of Current Problem (include personal factors and/or comorbidities that impact the POC) Admitted from ARU. Metastatic melanoma with intracranial metastases s/p right frontal resection on 1/16/20. Strength improving, piter in  LUE.   Precautions/Limitations fall precautions  (crani)   General Observations Very eager to get back to therapy   Cognitive Status Examination   Orientation orientation to person, place and time   Level of Consciousness alert   Follows Commands and Answers Questions 100% of the time   Pain Assessment   Patient Currently in Pain No   Integumentary/Edema   Integumentary/Edema Comments Incision CDI   Posture    Posture Not impaired   Range of Motion (ROM)   ROM Comment LLE LUE AROM limited by weakness, no PROM deficits   Strength   Strength Comments RLE 5/5 throughout   MMT: Elbow/Forearm, Rehab Eval   Elbow Flexion - Left Side (1/5) trace, left   Elbow Extension - Left Side (2-/5) poor minus, left   MMT: Hand   Hand Muscle Testing Results Decreased L  strength, reported as improving   MMT: Wrist   Wrist Muscle Testing Results L wrist ext 1/5   MMT: Hip, Rehab Eval   Hip Flexion - Left Side (0/5) zero, left   MMT: Knee, Rehab Eval   Knee Flexion - Left Side (0/5) zero, left   Knee Extension - Left Side (0/5) zero, left   MMT: Ankle, Rehab Eval   Ankle Dorsiflexion - Left Side (0/5) zero, left   Ankle Plantarflexion - Left Side (0/5) zero, left   Bed Mobility   Bed Mobility Comments SBA supine>sit   Transfer Skills   Transfer Comments CGA sit<>Stand to R, min-A L   Gait   Gait Comments Min-A for balance, max-A to advance LLE with WBQC   Balance   Balance Comments Good static seated balance   Sensory Examination   Sensory Perception Comments LT intact throughout   General Therapy Interventions   Planned Therapy Interventions gait training;balance training;bed mobility training;strengthening;transfer training;progressive activity/exercise;home program guidelines;risk factor education   Clinical Impression   Criteria for Skilled Therapeutic Intervention yes, treatment indicated   PT Diagnosis L hemiplegia   Influenced by the following impairments Strength and endurance deficits, balance, coordination   Functional  "limitations due to impairments Strength, bed mobility, transfers, gait, stairs   Clinical Presentation Evolving/Changing   Clinical Presentation Rationale Metistatic CA with multiple ICHs, evolving presentation    Clinical Decision Making (Complexity) Moderate complexity   Therapy Frequency Daily   Predicted Duration of Therapy Intervention (days/wks) one week  (return to ARU)   Anticipated Discharge Disposition Acute Rehabilitation Facility   Risk & Benefits of therapy have been explained Yes   Patient, Family & other staff in agreement with plan of care Yes   Clinical Impression Comments Anticipate return to ARU within this week. Pt continues to have multidisciplinary needs   [facility]-P[facility] TM \"6 Clicks\"   2016, Trustees of [facility], under license to [employer].  All rights reserved.   6 Clicks Short Forms Basic Mobility Inpatient Short Form   [facility] AM-PAC  \"6 Clicks\" V.2 Basic Mobility Inpatient Short Form   1. Turning from your back to your side while in a flat bed without using bedrails? 4 - None   2. Moving from lying on your back to sitting on the side of a flat bed without using bedrails? 4 - None   3. Moving to and from a bed to a chair (including a wheelchair)? 3 - A Little   4. Standing up from a chair using your arms (e.g., wheelchair, or bedside chair)? 3 - A Little   5. To walk in hospital room? 3 - A Little   6. Climbing 3-5 steps with a railing? 2 - A Lot   Basic Mobility Raw Score (Score out of 24.Lower scores equate to lower levels of function) 19   Total Evaluation Time   Total Evaluation Time (Minutes) 12     "

## 2020-01-19 NOTE — PLAN OF CARE
PT 6A: PT orders acknowledged and appreciated. Pt unavailable at time of attempt. Will re-schedule and initiate as indicated. Thank you for your referral.

## 2020-01-19 NOTE — PLAN OF CARE
6A    Discharge Planner PT   Patient plan for discharge: Return to ARU  Current status: IND supine>sit with R roll. CGA stand/pivot to R, min-A to L. Ambulates 150' with WBQC, min-A for balance, max-A to advance LLE. Assist of one to pivot with nursing, pt demonstrates good technique from ARU stay.  Barriers to return to prior living situation: Medical, current mobility status  Recommendations for discharge: ARU  Rationale for recommendations: Pt continues to have multidisciplinary needs and motivated to return to ARU. Pt will need ongoing skilled PT intervention to improve independence and safety with functional mobility skills prior to returning home. Anticipate pt will tolerate 3+ hour of therapies per day.       Entered by: Moisés Lu 01/19/2020 1:24 PM

## 2020-01-19 NOTE — PLAN OF CARE
Status: POD3 right frontal resection  for metastatic melanoma with intracranial metastases   Vitals: AVSS  Neuros: A&Ox4. Intact except left sided hemiplegia (1/5) and anxiety at times- PRN Atarax with decadron doses helpful and PRN Ativan along with essential oils effective  IV: PIV SL   Resp/trach: LS CTA  Diet: Regular diet - tolerating well  Bowel status: BS+x4- last BM yesterday  : VDSP in bedside commode-  Skin: Anterior head incision intact with sutures - no drainage nor edema noted  Pain: Head and left foot pain well controlled with PRN Tylenol and Oxycodone along with repositioning  Activity: Pivot to bedside commode with Ax2 and gb  Social: No visitors this shift  Plan: Discharge back to ARU when medically stable. Will continue to monitor and follow with POC.

## 2020-01-19 NOTE — PLAN OF CARE
Status: POD 2 right frontal resection for metastatic melanoma with intracranial metastases.    Vitals: AVSS.   Neuros: A&Ox4. Intact except left sided hemiplegia (1/5). Intermittently anxious (pt reports Decadron puts him on edge). PRN Ativan and PRN Atarax administered with some relief.   IV: PIV SL   Resp/trach: WDL.   Diet: Regular diet - tolerating well.   Bowel status: BS+x4, had 1 BM this shift.   : voiding spontaneously in bedside commode.   Skin: Anterior head incision WDL. Mepilex on bottom.    Pain: Headache controlled with PRN Tylenol. Oxycodone PRN available.   Activity: Pivot to bedside commode with Ax2 and GB. Up in chair x2 this shift.   Social: Wife and brother at bedside for most of shift, very supportive.   Plan: Discharge back to ARU when medically stable. Will continue to monitor and follow with POC.

## 2020-01-19 NOTE — PLAN OF CARE
Neuro: A&Ox4. L sided hemiplegia, neuros otherwise intact  Cardiac: VSS.   Respiratory: Sating 98% on RA.  GI/: Adequate urine output. BM X1  Diet/appetite: Tolerating reg diet. Eating well.  Activity:  Assist of 2 w walker, GB to stand/pivot  Pain: Oxy x 1 for head fullness/pain. Atarax 1 hour prior to decadron, w some decrease in anxiety  Skin: Anterior head incision ROMIE, sutures, no drainage noted  LDA's: PIV SL    Plan: Continue with POC. Notify primary team with changes.

## 2020-01-20 NOTE — DISCHARGE SUMMARY
[facility] Discharge Summary and Instructions    Dallin Stevens MRN# [id number]   Age: 34 year old YOB: 1985     Date of Admission:  1/15/2020  Date of Discharge::  1/21/2020  Admitting Physician:  Nitish Quintero MD  Discharge Physician:  Nitish Quintero MD          Admission Diagnoses:   Pre Surgery Work up  Brain metastasis (H)          Discharge Diagnosis:   Pre Surgery Work up  Brain metastasis (H)          Procedures:   1/16/2020: Right Frontal Craniotomy for Tumor Resection; Stereotactic Neuro-Navigation; Micro-Dissection            Brief History of Illness:   Dallin Stevens is a delightful 34 year old male whose medical history includes Metastatic Malignant Melanoma, which was diagnosed in 2012. He was found to have intracranial metastatic disease in April 2019 and underwent Tumor Resection by Dr. Jeremi Stoll in [place], [place]. Surveillance imaging following resection demonstrated progressive intracranial disease. Mr. Stevens then established care with Dr. Tesfaye at the [facility]. Most recently the patient had been seen in clinic in November 2019, during which discussions were held regarding treatment with Radiation Therapy. The patient however, declined treatment at that time and elected to proceed with a more holistic treatment approach.     On 1/6/2020, Mr. Stevens presented to [facility] Medicine at [facility] with reports of acute onset of Left-Sided Weakness. He was ultimately transferred to [facility]. Neurosurgery was consulted to assess the possibility of surgical intervention. Surgical resection of the Right Frontal Metastases recommended. After the risks and benefits of the procedure had been discussed with the patient, the patient provided consent to proceed with operative intervention. Pre-operatively, the patient had been treatment with Dexamethasone for treatment of Vasogenic Edema and Keppra for prevention of  Seizure.            Hospital Course:   Dallin Stevens was admitted to the hospital on 1/15/2020. He underwent the above named operation on 1/16/2020 by Dr. Nitish Quintero. There were no regina-operative complications. Post-operatively, the patient was transferred to Unit 4A, Neuro-Surgical ICU for routine post-operative cares. The surgical pathology was consistent with Metastatic Malignant Melanoma. On post-operative day #1, the patient was evaluated with an MRI Brain with and without Contrast to assess the resection cavity. The MRI demonstrated resection of the right frontal lobe lesion with expected post-surgical changes. There were also 4 additional metastatic lesions in the brain. Radiation Oncology was consulted to discuss post-operative radiation therapy. The patient is scheduled to undergo 5 days of fractionated Stereotactic Radiotherapy beginning on 1/27/2020. He will have his Radiation Planning on 1/23/2020. Post-operatively, the patient was placed on a 4 day Decadron taper. He was also maintained on Keppra for seizure prophylaxis until post-operative day #7 (1/23/2020). On post-operative day #1, the patient was deemed medically and neurologically stable to transfer to Unit 6A, Neurosciences for the remainder of his hospitalization. Following his operation, he had notable gradual improvement of his Left-Sided strength. He had ongoing evaluations by PT and OT and Acute Rehabilitation was recommended upon discharge. However, given the fact that the patient has been scheduled for his radiation therapy and would need to come and go from Rehabilitation, discharge to Transitional Care Unit was pursued so Mr. Stevens could attend these treatments.     On 1/20/2020, the patient had met all discharge goals and was deemed medically and neurologically stable for discharge.     Examination:   /65 (BP Location: Right arm)   Pulse 62   Temp 97.9  F (36.6  C) (Oral)   Resp 16   Wt 56 kg (123 lb 7.3 oz)   SpO2 97%   BMI  17.22 kg/m      General Appearance: Sitting Up in Bed; In No Apparent Distress  Mental Status: Awake and alert; Oriented to Person, Place and Time   CN II-XII intact except for CN XI with asymmetric shrugging   5/5 strength in the right upper and lower extremities  4/5 strength in biceps and triceps on left with the exception of  strength which is about 4/5  1/5 strength in the left hip flexor and 2/5 in dorsiflexion/plantarflexion on left   Sensation remains intact in the bilateral upper and lower extremities.           Discharge Medications:     Current Discharge Medication List      START taking these medications    Details   oxyCODONE (ROXICODONE) 5 MG tablet Take 1-2 tablets (5-10 mg) by mouth every 6 hours as needed for moderate to severe pain  Refills: 0    Comments: Indication: Moderate to Severe Post-Operative Pain.  Associated Diagnoses: Malignant neoplasm of frontal lobe of brain (H)         CONTINUE these medications which have CHANGED    Details   acetaminophen (TYLENOL) 325 MG tablet Take 2 tablets (650 mg) by mouth every 4 hours as needed for mild pain, fever or headaches (> 101 F)    Comments: Indication: Mild Pain; Fever; Headache  Associated Diagnoses: Malignant neoplasm of frontal lobe of brain (H)         CONTINUE these medications which have NOT CHANGED    Details   LORazepam (ATIVAN) 0.5 MG tablet Take 0.5 tablets (0.25 mg) by mouth every 6 hours as needed for anxiety    Associated Diagnoses: Adjustment disorder, unspecified type      melatonin 1 MG TABS tablet Take 1 tablet (1 mg) by mouth nightly as needed for sleep    Associated Diagnoses: Adjustment insomnia      ondansetron (ZOFRAN) 4 MG tablet Take 1 tablet (4 mg) by mouth every 8 hours as needed for nausea or vomiting    Associated Diagnoses: Metastatic malignant melanoma (H)      polyethylene glycol (MIRALAX/GLYCOLAX) packet Take 17 g by mouth daily as needed for constipation    Associated Diagnoses: Constipation, unspecified  constipation type      hydrOXYzine (ATARAX) 25 MG tablet Take 1-2 tablets (25-50 mg) by mouth 3 times daily as needed for anxiety    Associated Diagnoses: Adjustment disorder, unspecified type      levETIRAcetam (KEPPRA) 500 MG tablet Take 1 tablet (500 mg) by mouth 2 times daily    Associated Diagnoses: Metastatic malignant melanoma (H)         STOP taking these medications       dexamethasone (DECADRON) 4 MG tablet Comments:   Reason for Stopping:         pantoprazole (PROTONIX) 40 MG EC tablet Comments:   Reason for Stopping:                     Discharge Instructions and Follow-Up:   Discharge diet: Regular Diet     Discharge activity: - Advance Activity As Tolerated    - Avoid Any Strenuous Activity, Lifting > 10 Lbs of Straining for the First 2 Weeks Following your Operation    - Continue Physical and Occupational Therapy      Discharge follow-up: 1/23/2020: Appointment with Dr. Jose Carlos Arshad in Radiation Oncology for Radiation Planning (CT and Mask) (Radiation Oncology)    1/27/2020 - 1/31/2020: Appointment with Dr. Arshad and Dr. Quintero for Radiation Therapy (Radiation Oncology)     Wound care: - Sutures are Absorbable and DO NOT need to be removed.  - OK to shower and get incision wet. Pat dry with clean towel.  - Monitor Incision for Redness, Swelling, Drainage and Warmth  - DO NOT submerge incision beneath water for 4-6 weeks.        Please call if you have:  1. increased pain, redness, drainage, swelling at your incision  2. fevers > 101.5 F degrees  3. with any questions or concerns.  You may reach the Neurosurgery clinic at [phone] during regular work hours. ER at [phone].    and ask for the Neurosurgery Resident on call at [phone], during off hours or weekends.         Discharge Disposition:   [facility]         Rosanna Bills, HALIMA-BC,CNRN  Department of Neurosurgery  Pager: [phone]

## 2020-01-20 NOTE — PROGRESS NOTES
"S: no events overnight     O:  Temp:  [97.4  F (36.3  C)-98.8  F (37.1  C)] 97.9  F (36.6  C)  Pulse:  [62-89] 62  Heart Rate:  [70-78] 70  Resp:  [16-18] 16  BP: (109-128)/(59-82) 109/65  SpO2:  [96 %-99 %] 97 %    Awake and alert.   CN II-XII intact except for CN XI with asymmetric shrugging   Unable to test pronator drift secondary to significant weakness  5/5 strength in the right upper and lower extremities  4/5 strength in biceps and triceps and  on left   1/5 strength in the left deltoid   1/5 strength in the left hip flexor and 2/5 in plantarflexion; otherwise 0/5 in left lower   Sensation remains intact in the bilateral upper and lower extremities.     A: Metastatic melanoma with intracranial metastases s/p right frontal resection on 1/16/20. Strength improving, piter in LUE. Medically ready for discharge     P:   - decadron for treatment of cerebral edema; taper to off over 4 days   - keppra bid   - sliding scale and protonix while on decadron  - regular diet   - PT/OT/PMR: ARU  - dispo: ARU  - Prophylaxis: SCDs; holding chemoprophylaxis  - follow-up radiation oncology next week    Dashawn \"Hunter\" MD [place]   Neurosurgery, PGY-3    Please contact neurosurgery resident on call with questions.    Dial * * [phone], enter [phone] when prompted.     "

## 2020-01-20 NOTE — PLAN OF CARE
Status: POD 4 right frontal resection for metastatic melanoma with intracranial metastases.  Head incision ROMIE with sutures, CDI.   Vitals: VSS on RA    Neuros: A&Ox4. LUE/LLE 1/5, LUE with weak grasp, LLE absent dorsi/plantar, RUE/RLE 5/5   IV: R. PIV SL.   Diet: Regular diet   Bowel status: No BM this shift   : Voids spontaneously without difficulty via bedside commode   Skin: Anterior head incision ROMIE with sutures, CDI.  Mepilex on bottom.    Pain: Headache controlled with PRN Tylenol.   Activity: Pivot to bedside commode with A2/GB.   Plan: Discharge back to ARU when medically stable. Continue to monitor and follow with POC.

## 2020-01-20 NOTE — PLAN OF CARE
Discharge Planner OT   Patient plan for discharge: TCU transitioning to ARU following radiation   Current status: Pt with improvement in LUE strengths (shoulder 2/5, elbow flex/ex, supination/pronation and wrist flex/ex all 3/5, grasp 4/5). Completed LUE neuro re-ed. To progress functional mobility and ADLs, Pt ambulating bed<>bath with fww, CGA and mod A to progress LLE. Completes toilet tx with min A, stood at sink to complete g/h with incorporating LUE with CGA to support dynamic balance.   Barriers to return to prior living situation: medical, L side weakness, L side inattention, level of assist ADLs/mobility, falls risk   Recommendations for discharge: ARU   Rationale for recommendations: Pt very motivated, making progress in OT sessions. Will benefit from intensive, multidisciplinary therapy to maximize IND with ADLs and functional mobility.       Entered by: Va Drew 01/20/2020 3:31 PM

## 2020-01-20 NOTE — PROGRESS NOTES
Social Work Services Progress Note    Hospital Day: 6  Date of Initial Social Work Evaluation:  Completed by ARU JOEL during pt's [facility] stay on 1/10/2020  Collaborated with:  Parris Bills NP Neuro Surgery, Admissions for [facility] and [facility]U (Smita), patient    Data:  Pt was hospitalized at [facility] in [place] from 1/6/2020 - 1/9/2020 at which time, pt was discharged to [facility].  Pt remained at [facility] until 1/15/2020 at which time, pt was discharge to [facility] for tumor resection.  Pt was evaluated by Physical Therapy and OT on 1/19/2020 and ARU is recommended.   SW received an updated (1/19/2020 3:50pm) from Admissions (Keri) at [facility] indicating that pt is appropriate for return to ARU.  However, pt cannot have chemo or radiation during his rehab stay and pt cannot attend outpt appts during his ARU stay (pt currently has scheduled appts on 1/23/2020 and daily from 1/27/2020 - 1/31/2020(Rad Onc, [facility]).    Intervention:  JOEL paged Neuro Surgery NP (Parris Bills).  Per Parris, pt will begin radiation this week.  Per Parris, they will radiate pt's surgical resection and [place].  Parris states that pt will need to attend his upcoming outpt gamma knife appt's.  JOEL phoned Admissions (Smita) at [facility] and updated.  Due to the above (outpt appts and radiation), pt would not be appropriate for return to ARU.  Smita states that she will review pt for TCU.   JOEL met with pt. JOEL inquired about pt's understanding of his treatment plan.  Pt states that his understanding is that he will be starting radiation this week.  JOEL updated pt in regards to JOEL conversations with  Rehab Admissions.  SW informed pt that ARU is not an opportunity due to need for radiation and follow up appts during rehab stay.   SW informed pt that [facility] is an opportunity as he would be able to attend outpt appts and received radiation at that level of care.      Assessment:  Pt voices agreement with  admit to  TCU (if accepted) and is hopeful that he would be able to transition to ARU when radiation is complete and outpt appt's can be delayed.      Plan:    Anticipated Disposition:  TCU placement with a goal of securing placement at [facility]    Barriers to d/c plan:  Medical readiness    Follow Up:  SW will continue to follow.    GYPSY Montano  Social Work, 6A  Phone:  [phone]  Pager:  [phone]  1/20/2020

## 2020-01-20 NOTE — PROGRESS NOTES
Social Work Services Discharge Note      Patient Name:  Dallin Stevens     Anticipated Discharge Date:  1/21/2020    Discharge Disposition:   [facility] ([phone])    Following MD:  Facility Assignment     Pre-Admission Screening (PAS) online form has been completed.  The Level of Care (LOC) is:  Determined  Confirmation Code is:  [id number].  Patient/caregiver informed of referral to [facility] Line for Pre-Admission Screening for skilled nursing facility (SNF) placement and to expect a phone call post discharge from SNF.     Additional Services/Equipment Arranged:  SW confirmed readiness for discharge with Parris Bills NP.  SW confirmed acceptance for admit and receipt of prior auth from insurance with [facility] Admissions (Smita). SW arranged for [facility] ([place] [phone]) to provide w/c transport at 9:30am.     Patient / Family response to discharge plan:  Pt and wife voice understanding of the discharge plan and agreement with the discharge plan.       Persons notified of above discharge plan:  Pt, wife, 6A nursing and Parris Bills NP.    Staff Discharge Instructions:  Please fax discharge orders and signed hard scripts for any controlled substances.  Please print a packet and send with patient.     CTS Handoff completed:  NO, as no PCP listed on Admissions Facesheet    Medicare Notice of Rights provided to the patient/family:  NO, per Admissions Facesheet, pt is not on Medicare.    GYPSY Montano  Social Work, 6A  Phone:  [phone]  Pager:  [phone]  1/20/2020

## 2020-01-20 NOTE — OP NOTE
Name:  Dallin Stevens  MRN:  [id number]  YOB: 1985  Date of Surgery:  January 16, 2020    Pre-operative Diagnosis: Right frontal metastasis  Post-operative Diagnosis: Same  Procedure:  1) Right frontal craniotomy for tumor resection  2) Stereotactic neuro-navigation  3) Micro-dissection    Anesthesia: GETA    Surgeon: Nitish Ragsdale MD, PhD  Assistant: Sahra Sanchez MD    Description of Procedure: After pre-operative laboratory value and informed consent were verified, the patient was brought into the operating room. The patient underwent general anesthesia and intubation. Antibiotic was administered.    The patient was placed in a supine position, with the head turned slightly to the left, exposing the right frontal cranium.  The Axcientalth reference frame was placed. The patient's anatomy was registered relative to the pre-operative MRI using the [place] system.    The region overlying the tumor was identified. An incision was planned based on the location of the tumor to facilitate an inter-hemispheric approach. The area encompassing the surgical incision was prepped and draped in a sterile manner.     Time out was performed. The incision was infiltrated with 1% lidocaine with dilute epinephrine. Hemostasis was achieved using a combination of cautery and Clementine clips. The incision was retracted using a cerebellar retractor. The region overlying the tumor was confirmed using the Stealth probe.    Vasu holes were placed at the lateral edge of the superior frontal sinus and 5 cm lateral to the midline.  The Vasu holes were connected into a craniotomy.  Dural bleeding was controlled. The epidural space was packed with Floseal.     The dura was opened in a C-shape manner, with the vascularized pedicle toward the midline. Meticulous attention was paid to avoid injury to any cortical vein.     The microscope was brought in to facilitate subsequent dissections. The cortical veins defined a  6 mm space through which a dissector was inserted to identify the tumor.  A biopsy was taken and sent to pathology.     Frozen pathology findings are consistent with metastatic melanoma.    The plane between the tumor capsule and the normal cerebrum was identified. This plane was developed. Through a combination of tumor debulking and plane development, the dissection as carried out until the entirety of the lesion was removed. Meticulous hemostasis was achieved after the tumor resection. The resection cavity was overlaid with surgicel.    After hemostasis, I turned my attention to the closure. The dura was closed with a 3 x 3 Duragen.  The craniotomy flap was secured using titanium plate/screw construct. The wound was amply irrigated with bacitracin containing saline. The galea was closed with 2'0 vicryl. The skin was closed with 3'0 Monocryl. Dermabond was applied.    I was present and performed the key portions of the procedure.    EBL: < 50 cc's    Specimens: resected tumor specimens    Disposition: ICU

## 2020-01-20 NOTE — PROGRESS NOTES
Social Work Services Progress Note    Hospital Day: 6  Date of Initial Social Work Evaluation:  Completed by ARU JOEL during pt's [facility] stay on 1/10/2020  Collaborated with:  Parris Bills NP Neuro Surgery, pt, wife,  [facility] Admissions (Smita)    Data:  SW is following pt for discharge planning.    Intervention:  SW attended am rounds report.  Per Parris Bills NP, pt is medically ready for discharge.  Pt will have an outpt appt with rad/onc on 1/22/2020 for planning.  Pt will have radiation daily from 1/27/2020 - 1/31/2020 and will then be done with radiation.  Admissions from [facility] (Smita) was present in rounds and indicates that she will review pt to determine whether or not pt is appropriate for [facility] and if so, Smita will seek prior auth from insurance.  SW has informed Smita that pt's stated goal is to transition back to ARU when radiation is complete.  JOEL met with pt and wife and updated in regards to the above information.      Assessment:  Pt and wife voice hope that pt will be approved for admit to [facility].      Plan:    Anticipated Disposition:  TCU placement    Barriers to d/c plan:  Await decision regarding acceptance from [facility] and if approved, await prior auth.    Follow Up:  SW will continue to follow for discharge planning.    GYPSY Montano  Social Work, 6A  Phone:  [phone]  Pager:  [phone]  1/20/2020    Addendum: JOEL spoke with [facility] Admissions (Smita) and pt has been assessed and approved for admit. Smita states that she will seek prior auth from insurance. JOEL updated pt and wife.    GYPSY Montano  [employer] Work, 6A  Phone:  [phone]  Pager:  [phone]  1/20/2020

## 2020-01-20 NOTE — PLAN OF CARE
Status: POD 3 right frontal resection for metastatic melanoma with intracranial metastases.    Vitals: AVSS.   Neuros: A&Ox4. Intact except left sided hemiplegia (1/5). Intermittently anxious (pt reports Decadron puts him on edge). PRN Ativan and PRN Atarax administered with some relief.   IV: PIV SL.   Resp/trach: WDL.   Diet: Regular diet - tolerating well.   Bowel status: BS+x4, had 1 BM this shift.   : voiding spontaneously in bedside commode.   Skin: Anterior head incision WDL. Mepilex on bottom.    Pain: Headache controlled with PRN Tylenol. Oxycodone PRN available.   Activity: Pivot to bedside commode with Ax2 and GB. Up in chair x2 this shift.   Social: Wife and brother at bedside for most of shift, very supportive.   Plan: Discharge back to ARU when medically stable. Will continue to monitor and follow with POC.

## 2020-01-21 NOTE — PLAN OF CARE
Occupational Therapy Discharge Summary    Reason for therapy discharge:    Discharged to transitional care facility.    Progress towards therapy goal(s). See goals on Care Plan in [facility] electronic health record for goal details.  Goals partially met.  Barriers to achieving goals:   discharge from facility.    Therapy recommendation(s):    Continued therapy is recommended.  Rationale/Recommendations:  To increase safety and IND with ADLs and functional mobility.

## 2020-01-21 NOTE — PLAN OF CARE
Status: POD #4 right frontal resection for metastatic melanoma with intracranial metastases.  Neuros: A&Ox4. LUE 2/5 and LLE 1/5 LUE grasp improved throughout the shift from mod-strong, LLE absent dorsi/plantar, RUE/RLE 5/5   IV: R. PIV SL.   Diet: Tolerating a reg diet w/good intake  Bowel status: 2 large BM this shift  : Voids spont via BSC  Skin: Anterior head incision ROMIE with sutures, CDI.  Mepilex on bottom.    Pain: C/o HA and pressure-controlled w/PRN tylenol   Activity: Pivot to bedside commode with A2/GB. Ambulated w/OT in the room  Plan: Discharge to TCU at 0930 tomorrow.

## 2020-01-21 NOTE — PLAN OF CARE
"Pt new admission to TCU today from 6A. Arrived ~1000 via wheelchair. Pt denied pain upon arrival but later c/o headache/pressure, treated with PRN tylenol which reduced pain for pt. VSS. Alert and oriented, calls appropriately. Denied nausea, SOB, cough, dizziness, blurry vision, numbness/tingling in extremities. Incision to head ROMIE, approximated and without s/s infection. Pt using a quad cane and pivot transfers, L sided weakness. Pt is changing positions frequently. Up in chair part of shift. Eating food brought by wife, appears good appetite. Pt is pleasant and conversant, wife at bedside. RN oriented pt to TCU, call light, folder including bill of rights and information and when to call for help. Pt understanding. Pt and wife hopeful to regain strength and do \"as much therapy as possible.\" Notebook provided to write questions etc. Call light in reach. Will continue with POC.   "

## 2020-01-21 NOTE — PLAN OF CARE
VSS. Denies pain. Neuros unchanged; LUE 2/5, LLE 1/5. Good po intake. Voiding spontaneously. Last BM 1/21. Up to commode/chair with pivot. Head incision ROMIE. Pt is discharging to  TCU at this time. Report called to receiving RN. [facility] providing transport at 0930, wife at bedside and belongings sent with her.

## 2020-01-21 NOTE — PLAN OF CARE
Physical Therapy Discharge Summary    Reason for therapy discharge:    Discharged to transitional care facility.    Progress towards therapy goal(s). See goals on Care Plan in [facility] electronic health record for goal details.  Goals partially met.  Barriers to achieving goals:   discharge from facility.    Therapy recommendation(s):    Continued therapy is recommended.  Rationale/Recommendations:  ARU recommended by last PT to see pt.

## 2020-01-21 NOTE — H&P
Transitional Care Unit  Extended Progress Note           Assessment and Plan:   Dallin Stevens is a 34 year old male with a hx of Metastatic Malignant Melanoma (dx 2012) who was admitted to [facility] for Right Frontal Craniotomy for tumor resection. Hospital course was uncomplicated. Transferred to TCU on 1/21/20 for ongoing aggressive rehabilitation.    Metastatic Malignant Melanoma s/p R frontal craniotomy for tumor resection (1/16). Dx with Melanoma in 2012. Found to have intracranial mets 4/2019 and underwent tumor resection by Dr. Jeremi Stoll ins [place]. Surveillance imaging demonstrated progressive disease. Pt then established care with Dr. Tesfaye at [facility], most recently seen in clinic 11/2019 at which time Radiation therapy was discussed, but pt ultimately declined treatment in favor of a more holistic treatment approach. Presented 1/6 to OSH with acute onset L-sided weakness. NSG consulted who recommended resection of R frontal mets, which he underwent 1/16/20 with Dr. Quintero. Post-op he received 4-day decadron taper and Keppra for seizure ppx. MRI brain w/ contrast demonstrating resection of R frontal lobe lesion as well as 4 additional metastatic lesions. Radiation Oncology consulted and discussed post-operative radiation therapy.   - Continue Keppra 500 mg BID through POD #7 (1/23/2020)  - F/u with Dr. Lemus in Radiation Oncology clinic 1/23/2020 for radiation planning  - Scheduled to undergo 5 days of sterotactic radiotherapy 1/27/2020-1/31/2020 with Dr. Arshad and Dr. Quintero   - PT/OT  - Continue Atarax TID prn and Ativan 0.5 mg Q6H prn for anxiety  - Continue Zofran Q8H prn for nausea  - Tylenol 650 q4h prn for pain/headache (first line) if no relief can use oxycodone 5-10mg q6h prn      Discussed with Dr. Phelps.     Diet and/or tube feedings: regular   Lines, tubes, drains: none  DVT/GI prophylaxis: Enoxaparin (Lovenox) SQ  Indications for psychotropic medications: Ativan prn for  anxiety  Pneumococcal Vaccination Status:   Code status discussed on admission: Full Code    Gabrielle Chacne PA-C  Hospitalist Service  [phone]         Consults:   PT/PT         History of Present Illness:   Dallin Stevens is a 34 year old male with a hx of Metastatic Malignant Melanoma (dx 2012) who was admitted to [facility] for Right Frontal Craniotomy for tumor resection. Hospital course was uncomplicated. Transferred to TCU on 1/21/20 for ongoing aggressive rehabilitation.    Currently, patient complains of L sided weakness that has slightly improved since his tumor resection. He denies headaches, chest pain, shortness of breath or difficulty breathing. He denies and history of heart or lung disease.          Physical Exam:   There were no vitals taken for this visit.    Constitutional: healthy, alert and no distress   Cardiovascular: RRR  Respiratory: Lungs CTAB  Head: horizontal incision c/d/i, no significant erythema or swelling surrounding  Abdomen: Abdomen soft, non distended, non tender to palpation  NEURO: L sided weakness  SKIN: warm and dry to touch  JOINT/EXTREMITIES: no peripheral edema         Past Medical History:     Past Medical History:   Diagnosis Date     Malignant melanoma (H)              Past Surgical History:      Past Surgical History:   Procedure Laterality Date     CRANIOTOMY, EXCISE TUMOR COMPLEX, COMBINED  04/2019     OPTICAL TRACKING SYSTEM CRANIOTOMY, EXCISE TUMOR, COMBINED Right 1/16/2020    Procedure: stealth assisted Right craniotomy for tumor resection;  Surgeon: Nitish Quintero MD;  Location:  OR             Family History:   No family history on file.          Social History:     Social History     Tobacco Use     Smoking status: Never Smoker     Smokeless tobacco: Never Used   Substance Use Topics     Alcohol use: Not on file        Living situation prior to admission: Home         Medications:   acetaminophen (TYLENOL) tablet 325-650 mg  [COMPLETED]  dexamethasone (DECADRON) tablet 1 mg  glucose gel 15-30 g    Or  dextrose 50 % injection 25-50 mL    Or  glucagon injection 1 mg  hydrALAZINE (APRESOLINE) injection 10-20 mg  hydrOXYzine (ATARAX) tablet 25-50 mg  insulin aspart (NovoLOG) inj (RAPID ACTING)  insulin aspart (NovoLOG) inj (RAPID ACTING)  labetalol (NORMODYNE/TRANDATE) syringe 10-40 mg  levETIRAcetam (KEPPRA) tablet 500 mg  lidocaine (LMX4) cream  lidocaine 1 % 1 mL  LORazepam (ATIVAN) tablet 0.5 mg  magnesium sulfate 2 g in NS intermittent infusion ([employer] or [facility])  magnesium sulfate 4 g in 100 mL sterile water (premade)  melatonin tablet 1 mg  naloxone (NARCAN) injection 0.1-0.4 mg  ondansetron (ZOFRAN-ODT) ODT tab 4-8 mg    Or  ondansetron (ZOFRAN) injection 4-8 mg  ondansetron (ZOFRAN) tablet 4-8 mg  oxyCODONE (ROXICODONE) tablet 5-10 mg  pantoprazole (PROTONIX) EC tablet 40 mg  polyethylene glycol (MIRALAX/GLYCOLAX) Packet 17 g  potassium chloride (KLOR-CON) Packet 20-40 mEq  potassium chloride 10 mEq in 100 mL intermittent infusion with 10 mg lidocaine  potassium chloride 10 mEq in 100 mL sterile water intermittent infusion (premix)  potassium chloride 20 mEq in 50 mL intermittent infusion  potassium chloride ER (K-DUR/KLOR-CON M) CR tablet 20-40 mEq  potassium phosphate 10 mmol in D5W 250 mL intermittent infusion  potassium phosphate 15 mmol in D5W 250 mL intermittent infusion  potassium phosphate 20 mmol in D5W 250 mL intermittent infusion  potassium phosphate 20 mmol in D5W 500 mL intermittent infusion  potassium phosphate 25 mmol in D5W 500 mL intermittent infusion  sodium chloride (PF) 0.9% PF flush 3 mL  sodium chloride (PF) 0.9% PF flush 3 mL    acetaminophen (TYLENOL) 325 MG tablet, Take 2 tablets (650 mg) by mouth every 4 hours as needed for mild pain, fever or headaches (> 101 F)  hydrOXYzine (ATARAX) 25 MG tablet, Take 1-2 tablets (25-50 mg) by mouth 3 times daily as needed for anxiety  levETIRAcetam (KEPPRA) 500 MG tablet, Take  1 tablet (500 mg) by mouth 2 times daily  LORazepam (ATIVAN) 0.5 MG tablet, Take 0.5 tablets (0.25 mg) by mouth every 6 hours as needed for anxiety  melatonin 1 MG TABS tablet, Take 1 tablet (1 mg) by mouth nightly as needed for sleep  ondansetron (ZOFRAN) 4 MG tablet, Take 1 tablet (4 mg) by mouth every 8 hours as needed for nausea or vomiting  oxyCODONE (ROXICODONE) 5 MG tablet, Take 1-2 tablets (5-10 mg) by mouth every 6 hours as needed for moderate to severe pain  polyethylene glycol (MIRALAX/GLYCOLAX) packet, Take 17 g by mouth daily as needed for constipation             Allergies:   No Known Allergies          Labs:     Lab Results   Component Value Date    WBC 12.8 (H) 01/21/2020    HGB 12.1 (L) 01/21/2020    HCT 36.4 (L) 01/21/2020     01/21/2020     01/17/2020    POTASSIUM 4.0 01/17/2020    CHLORIDE 103 01/17/2020    CO2 25 01/17/2020    BUN 31 (H) 01/17/2020    CR 0.70 01/17/2020     (H) 01/17/2020    AST 6 01/15/2020    ALT 26 01/15/2020    ALKPHOS 98 01/15/2020    BILITOTAL 0.2 01/15/2020    INR 1.03 01/21/2020

## 2020-01-21 NOTE — PLAN OF CARE
Status: POD #5 right frontal resection for metastatic melanoma with intracranial metastases.  Neuros: A&Ox4. LUE 2/5 and LLE 1/5 LUE. Mod LUE grasp. LLE absent dorsi/plantar, RUE/RLE 5/5   IV: no PIV  Diet: Regular diet  Bowel status: 1 large BM this shift  : Voiding via bedside commode  Skin: Anterior head incision ROMIE with sutures, CDI.  Pain: C/o HA and pressure-controlled w/PRN tylenol   Activity: Pivot to bedside commode with A2/GB.   Plan: Discharge to TCU at 0930 today.

## 2020-01-22 NOTE — PROGRESS NOTES
01/22/20 0600   Quick Adds   Quick Adds Certification   Type of Visit Initial Occupational Therapy Evaluation   Living Environment   Lives With child(janell), dependent;spouse  (3 and 5 y.o,)   Living Arrangements house   Home Accessibility stairs to enter home;stairs within home   Number of Stairs, Main Entrance 2   Stair Railings, Main Entrance none   Number of Stairs, Within Home, Primary other (see comments)  (14 stairs, rail on R side)   Stair Railings, Within Home, Primary railing on right side (ascending)   Transportation Anticipated family or friend will provide   Living Environment Comment patient wife works from home , able to provide assist as needed, Patient and wife split IADL's responsibilites at his baseline. Patient has a bathtub and walk in shower, no other AE/DME in place at home   Self-Care   Usual Activity Tolerance excellent   Current Activity Tolerance moderate   Regular Exercise Yes   Activity/Exercise Type other (see comments)  (weekly soccer, stretching, was participating in [employer])   Exercise Amount/Frequency 1 hr;3-5 times/wk   Equipment Currently Used at Home none  (now using quad cane)   Activity/Exercise/Self-Care Comment IND at his bseline bathroom upstairs with a shower - has a half bath on main level, bedroom on second level  tub/shower combo no chair no bars children at [facility] and day care during the day while wife works form home   Functional Level   Ambulation 0-->independent   Transferring 0-->independent   Toileting 0-->independent   Bathing 0-->independent   Dressing 0-->independent   Eating 0-->independent   Communication 0-->understands/communicates without difficulty   Swallowing 0-->swallows foods/liquids without difficulty   Cognition 0 - no cognition issues reported   Fall history within last six months yes   Number of times patient has fallen within last six months 1   Which of the above functional risks had a recent onset or change?  ambulation;transferring;toileting;bathing;dressing   Prior Functional Level Comment patient independent prior to admit was driving, works for [employer] as a  for medical supplies, assit with finanaces, sets up own meds       Present no   Language English   General Information   Onset of Illness/Injury or Date of Surgery - Date 01/15/20   Referring Physician Dr. Dennis Phelps, Dr. Meenu Ziegler   Patient/Family Goals Statement to have as much therapy as possible   Additional Occupational Profile Info/Pertinent History of Current Problem patient is a 34 y.o. male with history of metastatic melanoma diagnosed originally in 2012, orginally presenting in the left flank. Patient now s/p right frontal craniotomyfor tumor re-section 1/16/2020 - found to have intracranial mets 4/2019, now iw acute onset of left sided weakness, will have post op radiation therapy for 5 days 1/27/20 - 1/31/20   Precautions/Limitations fall precautions;spinal precautions;other (see comments)  (craniotomy precautions)   Weight-Bearing Status - LUE full weight-bearing   Weight-Bearing Status - RUE full weight-bearing   Weight-Bearing Status - LLE full weight-bearing   Weight-Bearing Status - RLE full weight-bearing   Heart Disease Risk Factors Medical history   General Observations sitting up in bed / pleasant   General Info Comments activity - up ad anai   Cognitive Status Examination   Orientation orientation to person, place and time   Level of Consciousness alert   Follows Commands (Cognition) WFL   Memory intact   Attention Quiet environment required   Organization/Problem Solving No deficits were identified   Executive Function No deficits were identified   Cognitive Comment will further assess, appears good historian   Visual Perception   Visual Perception Wears glasses   Visual Perception Comments wears glasses at his baseline - L inattention not noted during functiional tasks - will further assess    Sensory Examination   Sensory Comments denies any sensationn changes   Pain Assessment   Patient Currently in Pain Yes, see Vital Sign flowsheet  (headache)   Integumentary/Edema   Integumentary/Edema no deficits were identifed   Posture   Posture not impaired   Range of Motion (ROM)   ROM Comment R UE full AROM/PROM ; L UE - full finger flexion AROM, PROM to 130 deg in shoulder flexion, full PROM in otehr movements   Strength   Strength Comments -4/5 RUE, 2-3/5 finger flexion, 0/5 full L UE   Hand Strength   Left hand  (pounds) 45 pounds   Right hand  (pounds) 110 pounds   Hand Strength Comments able to demonstrate a light grasp when using hand as functianl assist, will further assess, R grasp WNL's   Muscle Tone Assessment   Muscle Tone Quick Adds No deficits were identified   Coordination   Left hand, nine hole peg test (seconds) unable to initiate   Right hand, nine hole peg test (seconds) 30 secs   Coordination Comments patient right hand dominant - RUE WNL's for finger/thumb opposiiton, slow with L UE - able to touch all other digits to thumb with cues   Mobility   Bed Mobility Bed mobility skill: Sit to supine;Bed mobility skill: Supine to sit   Bed Mobility Skill: Sit to Supine   Level of [place]: Sit/Supine stand-by assist   Physical Assist/Nonphysical Assist: Sit/Supine set-up required;supervision;verbal cues   Assistive Device: Sit/Supine bedrail   Bed Mobility Skill: Supine to Sit   Level of [place]: Supine/Sit stand-by assist   Physical Assist/Nonphysical Assist: Supine/Sit set-up required;supervision;verbal cues   Assistive Device: Supine/Sit bedrail   Transfer Skill: Bed to Chair/Chair to Bed   Level of [place]: Bed to Chair contact guard   Physical Assist/Nonphysical Assist: Bed to Chair set-up required;supervision;verbal cues;1 person assist   Weight-Bearing Restrictions full weight-bearing   Assistive Device - Transfer Skill Bed to Chair Chair to Bed Rehab Eval kevin walker    Transfer Skill: Sit to Stand   Level of [place]: Sit/Stand contact guard   Physical Assist/Nonphysical Assist: Sit/Stand set-up required;supervision;verbal cues;1 person assist   Transfer Skill: Sit to Stand full weight-bearing   Assistive Device for Transfer: Sit/Stand kevin walker   Transfer Skill: Toilet Transfer   Level of [place]: Toilet contact guard   Physical Assist/Nonphysical Assist: Toilet set-up required;supervision;verbal cues;1 person assist   Weight-Bearing Restrictions: Toilet full weight-bearing   Assistive Device kevin walker   Tub/Shower Transfer   Tub/Shower Transfer Comments NT- has a tub/shwoer combo on upper level with no chair or bars - will further assess   Balance   Balance Comments apepars steady with satic standing with support and mild LOB ntoed iwth dynamic movements    Bathing   Level of [place] contact guard   Physical Assist/Nonphysical Assist set-up required;supervision;verbal cues;1 person assist   Upper Body Dressing   Level of [place]: Dress Upper Body contact guard   Physical Assist/Nonphysical Assist: Dress Upper Body set-up required;supervision;verbal cues;1 person assist   Lower Body Dressing   Level of [place]: Dress Lower Body minimum assist (75% patients effort)   Physical Assist/Nonphysical Assist: Dress Lower Body set-up required;supervision;verbal cues;1 person assist   Toileting   Level of [place]: Toilet minimum assist (75% patients effort)   Physical Assist/Nonphysical Assist: Toilet set-up required;supervision;verbal cues;1 person assist   Grooming   Level of [place]: Grooming contact guard   Physical Assist/Nonphysical Assist: Grooming set-up required;supervision;verbal cues;1 person assist   Eating/Self Feeding   Level of [place]: Eating independent   Instrumental Activities of Daily Living (IADL)   Previous Responsibilities meal prep;medication management;finances;driving;work   Activities of Daily Living Analysis    Impairments Contributing to Impaired Activities of Daily Living balance impaired;coordination impaired;flexibility decreased;pain;post surgical precautions;ROM decreased;strength decreased   General Therapy Interventions   Planned Therapy Interventions ADL retraining;IADL retraining;balance training;bed mobility training;strengthening;stretching;transfer training;visual perception;home program guidelines;progressive activity/exercise;risk factor education   Clinical Impression   Criteria for Skilled Therapeutic Interventions Met yes, treatment indicated   OT Diagnosis impaired ADL/self cares   Influenced by the following impairments L sided weakness, decreased strength/endurance, decreaed balance, surgical precautions, motor control decreased   Assessment of Occupational Performance 3-5 Performance Deficits   Identified Performance Deficits bathing, dressing, grooming, toileting, home management   Clinical Decision Making (Complexity) Moderate complexity   Therapy Frequency 6x/week   Predicted Duration of Therapy Intervention (days/wks) 10 days   Anticipated Equipment Needs at Discharge bathing equipment   Anticipated Discharge Disposition Home;Home with Assist;Home with Home Therapy   Risks and Benefits of Treatment have been explained. Yes   Patient, Family & other staff in agreement with plan of care Yes   Clinical Impression Comments patietn would benefit form skilled Ot to address basic ADL/self cares and IADL's for a safe home discahrge plan with good family support    Therapy Certification   Start of Care Date 01/15/20   Certification date from 01/22/20   Certification date to 02/22/20   Medical Diagnosis see above for PMHX   Certification I certify the need for these services furnished under this plan of treatment and while under my care.  (Physician co-signature of this document indicates review and certification of the therapy plan).   Total Evaluation Time   Total Evaluation Time (Minutes) 30

## 2020-01-22 NOTE — H&P
Brief Medicine Attestation    I have reviewed the H&P dated 1/22 by Dr. Phelps.    Yadira Chance PA-C  Internal Medicine [facility] Hospitalist  Pager [phone]  January 22, 2020

## 2020-01-22 NOTE — PLAN OF CARE
Patient alert and oriented x 4. Patient had tylenol for headache. No complaints of chest pain, SOB, dizziness or any N/V noted. Frontal scalp incision intact, ROMIE, no signs and symptoms of infection noted. Patient assist of 1 with transfer and ambulation. Patient uses urinal and BSC , had a BM, staff empties. Denied pain and discomfort @ 0630. Patient uses call light approprietly. Will continue with current plan of care.

## 2020-01-22 NOTE — PLAN OF CARE
Pt is alert and oriented. Was able to pivot to BSC with assist of 1 with gaitbelt. Needs steadying assist and assist with pericares after BM. Denies shortness of breath/chest pain. Continent of bowel/bladder, had BM this shift. Head incision CDI, open to air. Skin assessed, no open areas seen. Requested PRN ativan and melatonin at bedtime. Denies pain. Continue POC.

## 2020-01-22 NOTE — PROGRESS NOTES
01/22/20 0805   Quick Adds   Quick Adds Certification   Type of Visit Initial PT Evaluation   Living Environment   Lives With spouse;child(janell), dependent   Living Arrangements house   Home Accessibility stairs to enter home;stairs within home   Number of Stairs, Main Entrance 2   Stair Railings, Main Entrance none   Number of Stairs, Within Home, Primary other (see comments)  (1 flight of 16 to bedroom and full bath; half bath on main)   Stair Railings, Within Home, Primary railing on right side (ascending)   Transportation Anticipated family or friend will provide   Living Environment Comment Wife works from home and able to assist. Bed and full bath with walk-in shower upstairs; can have bed on main level if needed   Self-Care   Usual Activity Tolerance excellent   Current Activity Tolerance moderate   Regular Exercise Yes   Activity/Exercise Type other (see comments)  (soccer, stretching, was participating [facility] [employer])   Exercise Amount/Frequency 1 hr;3-5 times/wk   Equipment Currently Used at Home none   Activity/Exercise/Self-Care Comment IND without AD   Functional Level Prior   Ambulation 0-->independent   Transferring 0-->independent   Toileting 0-->independent   Bathing 0-->independent   Communication 0-->understands/communicates without difficulty   Swallowing 0-->swallows foods/liquids without difficulty   Cognition 0 - no cognition issues reported   Fall history within last six months yes   Number of times patient has fallen within last six months 1   Which of the above functional risks had a recent onset or change? ambulation;transferring   Prior Functional Level Comment IND prior; work FT as  for [employer]   General Information   Onset of Illness/Injury or Date of Surgery - Date 01/16/20   Referring Physician Gabrielle Chance PA-C; Dr Meenu Ziegler   Patient/Family Goals Statement get home;    Pertinent History of Current Problem (include personal factors and/or  comorbidities that impact the POC) 33 yo male with Metastatic Malignant Melanoma, dx in 2012. intracranial metastatic disease in April 2019 underwent Tumor Resection; now s/p right frontal resection on 1/16/20   Precautions/Limitations other (see comments)  (craniotomy)   General Observations pt in w/c, wife wheeling him in halls. Agreeable to PT   General Info Comments plan for 5 days of radiation starting 1/27 then return to ARU   Cognitive Status Examination   Orientation orientation to person, place and time   Level of Consciousness alert   Follows Commands and Answers Questions 100% of the time   Personal Safety and Judgment intact   Memory intact   Pain Assessment   Patient Currently in Pain No   Integumentary/Edema   Integumentary/Edema Comments incision on skull   Posture    Posture Comments grossly upright   Range of Motion (ROM)   ROM Comment WNL LE   Strength   Strength Comments R LE 5/5; L hip flex/ext 2+/5, L knee ext 3-/5; DF 2-/5. Able to flex L elbow thru near full ROM,  weaker than R but able to give resistance   Bed Mobility   Bed Mobility Comments SBA/IND sit<>supine   Transfer Skills   Transfer Comments CGA sit<>stand   Gait   Gait Comments amb with WBQC 30 ft; CGA; step to pattern 50% of time with small step thru R 50%; pt able to advance L LE and clear toe but more difficult on carpet. No knee buckling   Balance   Balance Comments leans to R side to maintain standing balance   Sensory Examination   Sensory Perception Comments NT   Coordination   Coordination Comments decreased due to weakness   Muscle Tone   Muscle Tone no deficits were identified   General Therapy Interventions   Planned Therapy Interventions ADL retraining;bed mobility training;gait training;groups;manual therapy;neuromuscular re-education;strengthening;transfer training;home program guidelines;progressive activity/exercise   Clinical Impression   Criteria for Skilled Therapeutic Intervention yes, treatment indicated   PT  Diagnosis gait instability   Influenced by the following impairments L side weakness, decreased balance, decreased activity tolerance   Functional limitations due to impairments bed mobility, transfers, gait, stair negotiation below prior IND level without AD   Clinical Presentation Evolving/Changing   Clinical Presentation Rationale functional mobility assessment; participation limitations; radiation therapy   Clinical Decision Making (Complexity) Moderate complexity   Therapy Frequency 6x/week   Predicted Duration of Therapy Intervention (days/wks) 1/31/2020   Anticipated Equipment Needs at Discharge quad cane  (TBD on ARU)   Anticipated Discharge Disposition Acute Rehabilitation Facility  (return to ARU after radiation)   Risk & Benefits of therapy have been explained Yes   Patient, Family & other staff in agreement with plan of care Yes   Clinical Impression Comments pt s/p R frontal craniotomy 1/16 for metastatic malignant melanoma. Previously active and IND without AD. Will benefit from skilled PT to address identified impairments. Pt just started getting more functional use of L LE yesterday. Plan is to be on TCU for 5 days of radiation starting 1/27 then return to ARU   Therapy Certification   Start of care date 01/22/20   Certification date from 01/22/20   Certification date to 02/12/20   Medical Diagnosis metastaic malignant melanoma s/p craniotomy   Certification I certify the need for these services furnished under this plan of treatment and while under my care.  (Physician co-signature of this document indicates review and certification of the therapy plan).    Total Evaluation Time   Total Evaluation Time (Minutes) 15

## 2020-01-22 NOTE — PHARMACY-TCU REVIEW OF H&P
I have reviewed this patient's TCU admission History & Physical for medication related changes/recommendations identified by the admitting provider.  I am confirming that there are no recommendations requiring changes to medication orders are indicated at this time based on the provider recommendations in the H&P.    Sridhar Blackburn, YoanD, BCPS

## 2020-01-22 NOTE — H&P
Transitional Care Unit  Extended Progress Note           Assessment and Plan:   Dallin Stevens is a 34 year old male with a hx of Metastatic Malignant Melanoma (dx 2012) who was admitted to [facility] for Right Frontal Craniotomy for tumor resection. Hospital course was uncomplicated. Transferred to TCU on 1/21/20 for ongoing aggressive rehabilitation.    Metastatic Malignant Melanoma s/p R frontal craniotomy for tumor resection (1/16). Dx with Melanoma in 2012. Found to have intracranial mets 4/2019 and underwent tumor resection by Dr. Jeremi Stoll ins [place], [place]. Surveillance imaging demonstrated progressive disease. Pt then established care with Dr. Tesfaye at [facility], most recently seen in clinic 11/2019 at which time Radiation therapy was discussed, but pt ultimately declined treatment in favor of a more holistic treatment approach. Presented 1/6 to OSH with acute onset L-sided weakness. NSG consulted who recommended resection of R frontal mets, which he underwent 1/16/20 with Dr. Quintero. Post-op he received 4-day decadron taper and Keppra for seizure ppx. MRI brain w/ contrast demonstrating resection of R frontal lobe lesion as well as 4 additional metastatic lesions. Radiation Oncology consulted and discussed post-operative radiation therapy.   - Continue Keppra 500 mg BID through POD #7 (1/23/2020)  - F/u with Dr. Lemus in Radiation Oncology clinic 1/23/2020 for radiation planning  - Scheduled to undergo 5 days of sterotactic radiotherapy 1/27/2020-1/31/2020 with Dr. Arshad and Dr. Quintero   - PT/OT  - Continue Atarax TID prn and Ativan 0.5 mg Q6H prn for anxiety  - Continue Zofran Q8H prn for nausea  - Tylenol 650 q4h prn for pain/headache (first line) if no relief can use oxycodone 5-10mg q6h prn      Diet and/or tube feedings: regular   Lines, tubes, drains: none  DVT/GI prophylaxis: Enoxaparin (Lovenox) SQ  Indications for psychotropic medications: Ativan prn for anxiety  Pneumococcal Vaccination Status:   Code  "status discussed on admission: Full Code           Consults:   PT/PT         History of Present Illness:   Dallin Stevens is a 34 year old male with a hx of Metastatic Malignant Melanoma (dx 2012) who was admitted to [facility] for Right Frontal Craniotomy for tumor resection. Hospital course was uncomplicated. Transferred to TCU on 1/21/20 for ongoing aggressive rehabilitation.    Currently, patient complains of L sided weakness that has slightly improved since his tumor resection. He denies headaches, chest pain, shortness of breath or difficulty breathing. He denies and history of heart or lung disease.          Physical Exam:   Blood pressure 98/52, pulse 71, temperature 96.9  F (36.1  C), temperature source Oral, resp. rate 16, height 1.803 m (5' 11\"), weight 59.1 kg (130 lb 6.4 oz), SpO2 98 %.    Constitutional: healthy, alert and no distress   Cardiovascular: RRR  Respiratory: Lungs CTAB  Head: horizontal incision c/d/i, no significant erythema or swelling surrounding  Abdomen: Abdomen soft, non distended, non tender to palpation  NEURO: L sided weakness  SKIN: warm and dry to touch  JOINT/EXTREMITIES: no peripheral edema         Past Medical History:     Past Medical History:   Diagnosis Date     Malignant melanoma (H)              Past Surgical History:      Past Surgical History:   Procedure Laterality Date     CRANIOTOMY, EXCISE TUMOR COMPLEX, COMBINED  04/2019     OPTICAL TRACKING SYSTEM CRANIOTOMY, EXCISE TUMOR, COMBINED Right 1/16/2020    Procedure: stealth assisted Right craniotomy for tumor resection;  Surgeon: Nitish Quintero MD;  Location:  OR             Family History:   No family history on file.          Social History:     Social History     Tobacco Use     Smoking status: Never Smoker     Smokeless tobacco: Never Used   Substance Use Topics     Alcohol use: Not on file        Living situation prior to admission: Home         Medications:   No current facility-administered " medications on file prior to encounter.   acetaminophen (TYLENOL) 325 MG tablet, Take 2 tablets (650 mg) by mouth every 4 hours as needed for mild pain, fever or headaches (> 101 F)  hydrOXYzine (ATARAX) 25 MG tablet, Take 1-2 tablets (25-50 mg) by mouth 3 times daily as needed for anxiety  levETIRAcetam (KEPPRA) 500 MG tablet, Take 1 tablet (500 mg) by mouth 2 times daily  LORazepam (ATIVAN) 0.5 MG tablet, Take 0.5 tablets (0.25 mg) by mouth every 6 hours as needed for anxiety  melatonin 1 MG TABS tablet, Take 1 tablet (1 mg) by mouth nightly as needed for sleep  ondansetron (ZOFRAN) 4 MG tablet, Take 1 tablet (4 mg) by mouth every 8 hours as needed for nausea or vomiting  oxyCODONE (ROXICODONE) 5 MG tablet, Take 1-2 tablets (5-10 mg) by mouth every 6 hours as needed for moderate to severe pain  polyethylene glycol (MIRALAX/GLYCOLAX) packet, Take 17 g by mouth daily as needed for constipation             Allergies:   No Known Allergies          Labs:     Lab Results   Component Value Date    WBC 12.8 (H) 01/21/2020    HGB 12.1 (L) 01/21/2020    HCT 36.4 (L) 01/21/2020     01/21/2020     01/17/2020    POTASSIUM 4.0 01/17/2020    CHLORIDE 103 01/17/2020    CO2 25 01/17/2020    BUN 31 (H) 01/17/2020    CR 0.54 (L) 01/21/2020     (H) 01/17/2020    AST 6 01/15/2020    ALT 26 01/15/2020    ALKPHOS 98 01/15/2020    BILITOTAL 0.2 01/15/2020    INR 1.03 01/21/2020

## 2020-01-22 NOTE — PLAN OF CARE
Discharge Planner OT   Patient plan for discharge: home with wife, children and services  Current status: CGA toileting, CGA mobility with quad cane with cues, Min/CGA dressing, hygiene/grooming. Able to ambulate with cane to and from BR with cues and CGA, overlay placed on toilet for safety and located wheelchair for ease in locomotion on unit  Barriers to return to prior living situation: medical, L sided weakness, L side inattention, level of assist with ADL's/mobility, falls risk  Recommendations for discharge: will have radiation 1/27-1/31 per chart, home SN/HHA/PT/OT - may need some AE/AD for home use  Rationale for recommendations: patient would benefit from skilled Ot to address above areas - highly motivated to maximize his independence  with all ADL's and functional mobility       Entered by: Lea Ellis 01/22/2020 10:14 AM

## 2020-01-22 NOTE — PHARMACY-MEDICATION REGIMEN REVIEW
Pharmacy Medication Regimen Review  Dallin Stevens is a 34 year old male who is currently in the Transitional Care Unit.    Assessment: All medications have an appropriate indications, durations and no unnecessary use was found    Plan:   Continue current therapy  Attending provider will be sent this note for review.  If there are any emergent issues noted above, pharmacist will contact provider directly by phone.      Pharmacy will periodically review the resident's medication regimen for any PRN medications not administered in > 72 hours and discontinue them. The pharmacist will discuss gradual dose reductions of psychopharmacologic medications with interdisciplinary team on a regular basis.    Please contact pharmacy if the above does not answer specific medication questions/concerns.    Background:  A pharmacist has reviewed all medications and pertinent medical history today.  Medications were reviewed for appropriate use and any irregularities found are listed with recommendations.      Current Facility-Administered Medications:      [START ON 1/24/2020] - Skin Test Reading -, , Does not apply, Q21 Days, Budge, Gabrielle H, PA-C     acetaminophen (TYLENOL) Suppository 650 mg, 650 mg, Rectal, Q4H PRN, Budge, Gabrielle H, PA-C     acetaminophen (TYLENOL) tablet 650 mg, 650 mg, Oral, Q4H PRN, Budge, Gabrielle H, PA-C, 650 mg at 01/22/20 0835     enoxaparin ANTICOAGULANT (LOVENOX) injection 40 mg, 40 mg, Subcutaneous, Q24H, Budge, Gabrielle H, PA-C, 40 mg at 01/22/20 1048     hydrOXYzine (ATARAX) tablet 25-50 mg, 25-50 mg, Oral, TID PRN, Budge, Gabrielle H, PA-C     levETIRAcetam (KEPPRA) tablet 500 mg, 500 mg, Oral, BID, Budge, Gabrielle H, PA-C, 500 mg at 01/22/20 0835     LORazepam (ATIVAN) half-tab 0.25 mg, 0.25 mg, Oral, Q6H PRN, Budge, Gabrielle H, PA-C, 0.25 mg at 01/21/20 2038     melatonin tablet 1 mg, 1 mg, Oral, At Bedtime PRN, Budge, Gabrielle H, PA-C, 1 mg at 01/21/20 2038     naloxone (NARCAN) injection 0.1-0.4 mg,  0.1-0.4 mg, Intravenous, Q2 Min PRN, Dennis Phelps MD     Nurse may request from Pharmacy a change of form of medication (e.g. Liquid to tablet)., , Does not apply, Continuous PRN, Budge, Gabrielle H, PA-C     ondansetron (ZOFRAN) tablet 4 mg, 4 mg, Oral, Q8H PRN, Budge, Gabrielle H, PA-C     oxyCODONE (ROXICODONE) tablet 5-10 mg, 5-10 mg, Oral, Q6H PRN, Budge, Gabrielle H, PA-C     polyethylene glycol (MIRALAX/GLYCOLAX) Packet 17 g, 17 g, Oral, Daily PRN, Budge, Gabrielle H, PA-C     tuberculin injection 5 Units, 5 Units, Intradermal, Q21 Days, Budge, Gabrielle H, PA-C  No current outpatient prescriptions on file.   PMH:   Metastatic Malignant Melanoma, dx 2012.   intracranial metastatic disease in April 2019 S/p Tumor Resection     Sridhar Blackburn, YoanD, BCPS

## 2020-01-22 NOTE — PLAN OF CARE
Jael completed. Pt CGA for transfer and gait with WBQC 77 ft. Able to advance L LE for first time today--states strength came back last night. Plan to be on TCU for radiation 1/27-31 then return to ARU.

## 2020-01-22 NOTE — PLAN OF CARE
Pt VSS today. Alert and oriented. C/o headache/pressure, given PRN tylenol x2. BM today, continent of bowel and bladder. PT and OT eval done, see their notes. A1 with quad cane, per OT walked to BR. Changing positions often d/t stiffness per pt. Pt family visiting here today. Has early AM appt, requesting ativan prior, passed on to oncoming RN for night shift to give. Will continue with POC.

## 2020-01-23 NOTE — PLAN OF CARE
"Pt went to early AM appt and MRI today, stated \"went well.\" VSS upon return. C/o headache/pressure, given PRN tylenol. Is ambulating with A1 and quad cane, L sided weakness. Incision ROMIE and approximated. Continent of b/b, LBM 1/23. Wife at bedside assisting. Will continue with POC.   "

## 2020-01-23 NOTE — PROGRESS NOTES
Social Work: Initial Assessment with Discharge Plan    Patient Name: Dallin Stevens  : 1985  Age: 34 year old  MRN: [id number]  Completed assessment with: Patient  Admitted to TCU: 2020    Presenting Information   Date of SW assessment: 2020  Health Care Directive: Provided education  Primary Health Care Agent: Patient  Secondary Health Care Agent: Spouse, Meg, in absence of health care directive.   Living Situation: Lives in a home with his wife and 2 children, ages 3 and 5. Bathroom on main level, bedroom and master bath on second level. Walk in shower.   Previous Functional Status: Independent  DME available: See therapy notes  Patient and family understanding of hospitalization: Patient has a good understanding his hospitalization.  Cultural/Language/Spiritual Considerations: English speaking. [place] not listed; however, patient discussed holistic approaches to treatment and symptoms management. Patient would like  and other spiritual health services involved, specifically [facility]. SW will notify .   Abuse concerns: None indicated  BIMS: Pt scored 15 on BIMS indicating cognitively intact  PHQ-9: Pt scored 4 on PHQ-9 indicating minimal depressive symptoms  PAS: confirmation number- [id number]  Has there been a level II screen?  No  Were there any recommendations in the screen? No  If yes, will the recommendations we incorporated into the Plan of Care?  No  Physical Health  Reason for admission: Metastatic malignant melanoma    Provider Information   Primary Care Physician:Provide, Patient Unable To - Oncologist  : None    Mental Health:   Diagnosis: Anxiety  Current Support/Services: Medication management. Family support. [facility].  Previous Services: See above.  Services Needed/Recommended: Spiritual health services as requested by patient.     Substance Use:  Diagnosis: None reported  Current Support/Services: NA  Previous Services: NA  Services  Needed/Recommended: NA    Support System  Marital Status: Wife, Meg.  for 5 years. Meg works full-time from home. Supportive relationship.  Family support: Two young children, 3 and 5 years old. Patient's parents live in [place], [place]. Good support from in-laws. Brother lives in [place].  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 Reports his work is aware of his hospitalization.   Income Source: Wages and wife's wages  Education: Masters degree  Financial Concerns:  None indicated  Insurance: BCBS      Discharge Plan   Patient and family discharge goal: Patient's goal is to return home with family and home care therapy rather than outpatient therapy.  Provided Education on discharge plan: YES  Patient agreeable to discharge plan:  YES  A list of Medicare Certified Facilities was provided to the patient and/or family to encourage patient choice. Based on location and rating, patient would like referrals made to: NA  General information regarding anticipated insurance coverage and possible out of pocket cost was discussed. Patient and patient's family are aware patient may incur the cost of transportation to the facility, pending insurance payment: YES  Barriers to discharge: No barriers identified at this time.    Discharge Recommendations   Disposition: Home  Transportation Needs: Family will provide  Name of Transportation Company and Phone: NA    Additional comments   SW introduced self to patient. Will notify  for visit. Provided patient with care plan goals and orders. JOEL will continue to follow and assist as needed.    DELROY Juarez,[facility]  TCU    Phone: [phone]  Pager: [phone]

## 2020-01-23 NOTE — PLAN OF CARE
PT: improved gait pattern picking up L toe/clearing floor consistently on level and stairs, CGA; increased distance to 165 ft with WBQC. Pt had shoes on today--states not harder to walk.

## 2020-01-23 NOTE — PLAN OF CARE
Patient alert and oriented x 4.  No complaints of chest pain, SOB, dizziness or any N/V noted. Frontal scalp incision intact, ROMIE, no signs and symptoms of infection noted. Patient assist of 1 with transfer and ambulation. Patient uses urinal and BSC and staff empties. Denied pain and discomfort. Patient uses call light approprietly. Patient has MRI of brain today. Will continue with current plan of care.

## 2020-01-23 NOTE — PLAN OF CARE
Patient A&O times 4; VS with low BP; LS clear and BS+; c/o headache and pressure to head; c/o anxiety. Tylenol given times 2; and Ativan given times 2 during shift. Patient up with SBA and quad cane and ambulated to BR multiple times during shift; voiding good amounts; incision to head CD&I and ROMIE; weakness to left arm and leg; continue with patient care.

## 2020-01-24 PROBLEM — C79.31 MALIGNANT NEOPLASM METASTATIC TO BRAIN (H): Status: ACTIVE | Noted: 2020-01-01

## 2020-01-24 NOTE — PLAN OF CARE
OT: session focused on UB conditioning and instruction in UB calistenics HEP. Pt unable to tolerate full completion of HEP. Pt with good awareness of when rest break is required in order to maximize participation.

## 2020-01-24 NOTE — PLAN OF CARE
RN: Lovenox discontinued, pt ambulates in his room SBA and does ankle rotations, BLE for DVT prevention. Pt states gets headache from lovenox shots.  PA looked in pt ears and no sign of ruptured ear drum, no ear pain at this time, continue to monitor.  Head incision well approximate, sutures dissolved. Ativan dose increase, pt in agreement.

## 2020-01-24 NOTE — PROGRESS NOTES
Transitional Care Unit  Progress Note  Yadira Chance PA-C  1/24/2020          Assessment & Plan:   Dallin Stevens is a 34 year old male with a hx of Metastatic Malignant Melanoma (dx 2012) who was admitted to [facility] for Right Frontal Craniotomy for tumor resection. Hospital course was uncomplicated. Transferred to TCU on 1/21/20 for ongoing aggressive rehabilitation.      Metastatic Malignant Melanoma s/p R frontal craniotomy for tumor resection (1/16). Dx with Melanoma in 2012. Found to have intracranial mets 4/2019 and underwent tumor resection by Dr. Jeremi Stoll ins [place], [place]. Surveillance imaging demonstrated progressive disease. Pt then established care with Dr. Tesfaye at [facility], most recently seen in clinic 11/2019 at which time Radiation therapy was discussed, but pt ultimately declined treatment in favor of a more holistic treatment approach. Presented 1/6 to OSH with acute onset L-sided weakness. NSG consulted who recommended resection of R frontal mets, which he underwent 1/16/20 with Dr. Quintero. Post-op he received 4-day decadron taper and Keppra for seizure ppx. MRI brain w/ contrast demonstrating resection of R frontal lobe lesion as well as 4 additional metastatic lesions. Radiation Oncology consulted and discussed post-operative radiation therapy.   - Continue Keppra 500 mg BID through POD #7 (1/23/2020)  - F/u with Dr. Lemus in Radiation Oncology clinic 1/23/2020 for radiation planning  - Scheduled to undergo 5 days of sterotactic radiotherapy 1/27/2020-1/31/2020 with Dr. Arshad and Dr. Quintero   - PT/OT  - Continue Atarax TID prn and Ativan 0.5 mg Q6H prn for anxiety  - Continue Zofran Q8H prn for nausea  - Tylenol 650 q4h prn for pain/headache (first line) if no relief can use oxycodone 5-10mg q6h prn    Bilateral ear fullness. Ongoing for the past 3 days. No drainage. No associated ear pain, headaches, nasal congestion, fevers or chills. DDx includes eustacian tube dysfunction vs developing otitis  "media (although unlikely without associated URI symptoms and bilateral). Discussed with NSG who say this not a typical symptom post-op.   - Pt prefers to monitor symptoms for now, if increasingly bothersome could consider trial of benadryl PO      Diet and/or tube feedings: regular   Lines, tubes, drains: none  DVT/GI prophylaxis: Enoxaparin (Lovenox) SQ  Indications for psychotropic medications: Ativan prn for anxiety  Code status discussed on admission: Full Code    Gabrielle Chance PA-C  Hospitalist Service  [phone]         Consults:   PT/OT         Discharge Planning:   Plan to continue therapies on TCU while receiving Radiation. Therapies currently scheduled through 1/31.        Interval History:   Chief complaint of ear fullness. No pain or discharge. No other respiratory symptoms. Denies headache.            Physical Exam:   Blood pressure 122/73, pulse 80, temperature 98.4  F (36.9  C), temperature source Oral, resp. rate 18, height 1.803 m (5' 11\"), weight 59.1 kg (130 lb 6.4 oz), SpO2 98 %.  Constitutional: healthy, alert and no distress   HEENT: Bilateral TM's non erythematous, non edematous, No rupture  Cardiovascular: RRR  Respiratory: Lungs CTAB  Head: horizontal incision c/d/i, no significant erythema or swelling surrounding  Abdomen: Abdomen soft, non distended, non tender to palpation  NEURO: L sided weakness  SKIN: warm and dry to touch  JOINT/EXTREMITIES: no peripheral edema      "

## 2020-01-24 NOTE — PLAN OF CARE
PT: Pt is making continued progress with his ambulation with a cane, cga, and LLE /UE strengthening.  Pt was able to climb stairs with the rail just on the R (like at home) with cga, min cues for technique.  Cont toward goals.

## 2020-01-24 NOTE — PLAN OF CARE
Alert and oriented x4. No new issues overnight. Requested and received prn Tylenol for a headache and prn Ativan for anxiety. Slept well in between cares. Up with assist of 1 with ambulation with a cane. Calls appropriately for assistance. Continue POC.

## 2020-01-24 NOTE — PLAN OF CARE
A &Ox 4, able to communicate needs. Denies SOB, chest pain, nausea, or dizziness. Complained of headache,  head pressure pain and requested tylenol x2 w/ relief per pt. Requested ativan at HS. A1 w/ quad cane. L sided weakness present, see flowsheets. Incision ROMIE. Continent of bowel and bladder, LBM 1/23. Pt had shower this evening. Wife at bedside much of evening. Pleasant and cooperative. Will continue with plan of care.

## 2020-01-25 NOTE — PLAN OF CARE
Alert and oriented x 4. Denied having pain and discomfort until bedtime. At bedtime gave tylenol ativan and melatonin. Good appetite and fluid intake. Ambulated to the bathroom with SBA using a cane. Surgical incision CDI and ROMIE. Left sided weakness.

## 2020-01-25 NOTE — PROGRESS NOTES
01/25/20 1200   General Information   Patient Profile Review See Profile for full history and prior level of function   Daily Contact with Relatives or Friends Phone call;Visit  (wife and 2 kids)   Community Involvement   Community Involvement Currently employed  ([employer])   Drives Yes   Sees Hospital ? Yes   Music   Listens to Recorded Music Yes   Brought Own Equipment Yes   Media   Computer Will use tablet/phone   TV / Movies TV;Movie list   Impression   Open to Socializing with Others Independent   Barriers to Leisure No barriers / independent   Patient, family and / or staff in agreement with Plan of Care Yes   Treatment Plan   Interested in Unit [place]? No   Type of Intervention 1:1 intervention;Structured groups   Equipment and Supplies While on Unit None needed   Assessment Asssessment completed. Pt interested in hand massage and pet therapy while on unit. No other recreational needs at this time.

## 2020-01-25 NOTE — PLAN OF CARE
PT: Pt is participating well in therapy , with gradual gains in LUE, LLE strength and function.  Pt looks ok for Ghassan with QC in room during the day.  Pt instructed to call nursing at night for safety with cane.

## 2020-01-25 NOTE — PLAN OF CARE
Pt alert and oriented x4. Able to make needs known. Woke up around 0200, requested for PRN tylenol and ativan to help him sleep. Medications administered. Slept well for the rest of the night. Call light in reach. Continue w/ POC.

## 2020-01-26 NOTE — PLAN OF CARE
Pt alert and oriented x4. Slept well throughout the night. Appear sleeping during rounds. Denies pain, cp or SOB. Independently positioning. Call light in reach. Continue w/ POC.

## 2020-01-26 NOTE — PLAN OF CARE
Alert and oriented x 4. Went out to cafeteria with his wife for dinner using a w/c. C/o headache gave tylenol around 1600 and 2020. Surgical incision CDI and ROMIE. Voided good using urinal. At bedtime gave ativan melatonin. Assist of one in his room using a cane.

## 2020-01-26 NOTE — PLAN OF CARE
RN: up ad anai in room with quad cane without problems. Left side weakness remains unchanged.  Had visitors this shift.  Pt very pleasant at all times. Using prn tylenol and ativan x 1 for incision area discomfort and effective pain/well being control. Incision ROMIE and healing well, no adverse signs or sx. Pt prefers door closed to room.  Pt instructed staff SBA if pt not comfortable up alone and he is in agreement, says he likes SBA at night time when he gets up to bathroom.

## 2020-01-27 NOTE — PLAN OF CARE
OT Patient moving well with out assistive device in hallways with therapy and completing UB calisthenics at a good pace. On track for all goals and may discharge sooner if medically stable/ready. Spoke with SW and will talk to patient in regards to home care and sooner

## 2020-01-27 NOTE — LETTER
2020       RE: Dallin Stevens  [address] [facility] [facility] [address]     Dear Colleague,    Thank you for referring your patient, Dallin Stevens, to the [facility], [place], RADIATION ONCOLOGY. Please see a copy of my visit note below.    Gamma Knife Operative Note    MRN: [id number]  Name: Dallin Stevens  : 1985    Date of procedure: 2020    Surgeon:  Nitish Quintero MD PhD    Pre-operative Diagnosis:   Melanoma brain metastasis resection cavity and multiple brain metastasis  Post-operative Diagnosis:  Same    Procedure: Gamma Knife Radiosurgery    Indication: This is a 34  y.o. M with stage IV melanoma and multiple metastases to the brain. After case review in the brain tumor conference, the joint recommendation was to resect the dominant lesion anterior to the right motor strip (causing the patient's hemiparesis) followed by radiosurgery to the resection cavity as well as the other brain metastasis. Standard measurements were obtained for coordinate calculation to facilitate SRS delivery.    On the day of the procedure, informed consent was obtained. The previous MRI was reviewed. SRS was performed using face mask immobilization. Treatment parameters were verified by myself, the treating radiation oncologist, and the physicist.     Five lesions were treated.  There were 3 sub-centimeter lesions that were treated with 27 Gy in three fractions.  The resection cavity was treated with 25 Gy in 5 fractions. The large right periventricular lesion (measuring 3.2 cm in the largest diameter) was also treated with 25 Gy in 5 fractions.    The dose was delivered in a highly conformal fashion. The treatment was performed at the [facility] gamma knife center.     I was present and performed the key portions of this procedure.    Nitish Quintero MD PhD

## 2020-01-27 NOTE — PLAN OF CARE
Alert and oriented x 4. Denied having pain and discomfort. Good appetite and fluid intake. Independent in his room during the day and SBA at night. Gave ativan around 1600 per pt. Pt has radiation treatment tomorrow around 9 am and he is requesting to get ativan 1mg before his treatment left sticky not for the MD. Surgical incision CDI and ROMIE. At bedtime gave ativan, tylenol and melatonin per pt request.

## 2020-01-27 NOTE — LETTER
2020     RE: Dallin Stevens  [address] [address] [facility] [address]     Dear Colleague,    Thank you for referring your patient, Dallin Stevens, to the [facility], RADIATION ONCOLOGY. Please see a copy of my visit note below.    Name:     Dallin Stevens  :     1985           Medical Record # [id number]  Diagnosis: [ICD10] C79.31 Secondary malignant neoplasm of brain  Date: 2020    Gamma Knife Daily Treatment Procedure Note    Fraction  1 of 5  Treatment Summary:  Radiation Oncology - Course: 1 Protocol:   Treatment Site Current Dose Modality From To Elapsed Days Fx.  1a rt parasagital    500 cGy [place] 60  2020 0  1  1 c rt perivent    500 cGy [place] 60  2020 0             Fraction  1 of 3  Treatment Summary:  Radiation Oncology - Course: 1 Protocol:   Treatment Site Current Dose Modality From To Elapsed Days Fx.  1a' lt sup parietal 900 cGy [place] 60  2020 0 1  1 b' lt hippocamp 900 cGy [place] 60  2020 0 1  1 c' lt temporal 900 cGy [place] 60  2020 0     DESCRIPTION OF PROCEDURE:  On 2020 the patient was brought to the [facility] Gamma Knife IconTM suite at [facility] and treated with fractionated stereotactic radiotherapy.   The [place] Gamma Knife IconTM Plan software was used to create a highly conformal dose distribution using the number and size collimators detailed above.     TREATMENT:    The patient was brought to the Gamma Knife suite. A timeout was performed to confirm the correct patient and correct procedure.    Patient was identified by 2 methods.  The treatment site was verified.  The mask was placed and a cone beam CT was done and fused to the previously approved plan.      The physicist and I evaluated the fusion and confirmed the target coverage after auto-registration.   The treatment was delivered using the [place] Gamma Knife IconTM without complication.     The mask was removed and the patient was discharged home in stable condition.  The patient tolerated the treatment well and had no complications.      Elizabeth Schmidt MD

## 2020-01-27 NOTE — UTILIZATION REVIEW
1. Have you had pain or hurting at any time in the last 5 days?     [x]YES  []NO  []UNABLE TO ANSWER    2. How much of the time have you experienced pain or hurting over the last 5 days?    [x]ALMOST CONSTANTLY []FREQUENTLY []OCCASIONALLY []RARELY []UNABLE TO ANSWER    3. Over the past 5 days, has pain made it hard for you to sleep at night?     [x]YES  []NO  []UNABLE TO ANSWER    4. Over the past 5 days, have you limited your day-to-day activities because of pain?     [x]YES  []NO  []UNABLE TO ANSWER    5. Pain intensity (Numeric scale used first-if patient unable to answer, verbal scale to be used.)    Numeric Scale: Please rate your worst pain over the last 5 days on a zero to ten scale, with zero being no pain and ten being the worst pain you can imagine.     Number:          5/10    Verbal Scale: Please rate the intensity of your worst pain over the last 5 days.     []MILD []MODERATE []SEVERE []VERY SEVERE/HORRIBLE []UNABLE TO ANSWER

## 2020-01-27 NOTE — PLAN OF CARE
A &O x4, able to communicate needs. Denies SOB, chest pain, nausea, or dizziness. Complained of headache pain and pressure and requested tylenol x2 w/ relief per pt. SBA w/ cane at night, independent during the day. Regular diet. One time dose of Ativan 1mg given prior to radiation. Head incision ROMIE, no s/s of infection. L sided weakness. Plan to discharge tomorrow. Will continue with plan of care.

## 2020-01-27 NOTE — PLAN OF CARE
Occupational Therapy Discharge Summary    Reason for therapy discharge:    Discharged to home with home therapy.    Progress towards therapy goal(s). See goals on Care Plan in [facility] electronic health record for goal details.  Goals met    Therapy recommendation(s):    Continued therapy is recommended.  Rationale/Recommendations:  Discharging to home with home therapies - wife works from home and two small children at home Patient issued shower chair information and education. Patient may wait until home with therapies recommendation once home. Patient should continue with exercise program for endurance/strength that he has in his room. No other skilled needs identified at this time..  Continue home exercise program.

## 2020-01-27 NOTE — PLAN OF CARE
Pt alert and oriented x4. Slept well throughout the night. Appear sleeping during rounds. Denies pain, cp or SOB. Independently positioning. Would like Ativan 1mg before going to radiation therapy. HO paged, did not respond. Morning RN and charge RN notified to follow up. Call light in reach. Continue w/ POC.

## 2020-01-27 NOTE — PROGRESS NOTES
"CLINICAL NUTRITION SERVICES - ASSESSMENT NOTE     Nutrition Prescription    Malnutrition Status:    Severe malnutrition in the context of acute on chronic illness or injury.     Recommendations already ordered by Registered Dietitian (RD):  - Pt declined oral nutrition supplements.     Future/Additional Recommendations:  - Continue to monitor PO intake and weight trends     REASON FOR ASSESSMENT  Dallin Stevens is a/an 34 year old male assessed by the dietitian for LOS    Pt with hx of Metastatic Malignant Melanoma (dx 2012) who was admitted to [facility] for Right Frontal Craniotomy for tumor resection on 1/16/20. Transferred to TCU on 1/21/20 for ongoing aggressive rehabilitation.     NUTRITION HISTORY  -  Pt most recently seen in clinic 11/2019 at which time Radiation therapy was discussed, but pt ultimately declined treatment in favor of a more holistic treatment approach. However, pt currently undergoing sterotactic radiation therapy  - Pt reports eating a \"fairly strict diet,\" reporting he and his wife eat a lot of organic veggies, salads, chx, nuts, and veggie chips.  Pt states he does not avoid meat.  - Pt reports eating well PTA, eating meals TID plus snacks.     CURRENT NUTRITION ORDERS  Diet: Regular    Intake/Tolerance: 100% of meals per flowsheet. Pt reports his wife has been bringing in food from home to meet his preferences throughout admission. Pt reports eating meals TID + snacks, and feels as though he is meeting his nutritional needs.   - Pt reports dietary preferences that limit their menu options, however pt's spouse is bringing food from home.     LABS  Labs reviewed     MEDICATIONS  Medications reviewed    ANTHROPOMETRICS  Ht Readings from Last 1 Encounters:   01/21/20 1.803 m (5' 11\")   Most Recent Weight: 59.1 kg (130 lb 6.4 oz)  IBW: 78 kg (75% IBW)  BMI: 18 kg/m2 Underweight BMI <18.5  Weight History: Patient has lost 14 lbs (10%) over the last 1 month (12/05 - 1/21), overall lost ~13 " lbs (9%) over the last 6 months (8/17/19 - 1/21/20). Patient reports UBW of 145 lbs.  Wt Readings from Last 10 Encounters:   01/21/20 59.1 kg (130 lb 6.4 oz)   01/16/20 56 kg (123 lb 7.3 oz)   01/09/20 60.1 kg (132 lb 6.4 oz)   12/05/19 65.3 kg (144 lb)   11/15/19 67.5 kg (148 lb 12.8 oz)   Per care everywhere:   09/09/2019: 65 kg (143 lb 4.8 oz)   08/17/2019: 65.2 kg (143 lb 12.8 oz)  07/23/2019: 63.4 kg (139 lb 12.8 oz)   06/10/2019: 65.8 kg (145 lb)     Dosing Weight: 59 kg (acutal) - based on most recent body weight of 59.1 kg on 1/21/20.     ASSESSED NUTRITION NEEDS  Estimated Energy Needs: 1770 - 2065 kcals/day (30 - 35 kcals/kg)  Justification: Increased needs and Post-op  Estimated Protein Needs: 70 - 89 grams protein/day (1.2 - 1.5 grams of pro/kg)  Justification: Increased needs and Post-op  Estimated Fluid Needs: 1770 - 2065 mL/day (1 mL/kcal)   Justification: Maintenance or per provider pending fluid status    PHYSICAL FINDINGS  See malnutrition section below.    MALNUTRITION  % Intake: Unable to assess due to vague diet recall and little information in [employer]   % Weight Loss: > 10% in 6 months (severe)  Subcutaneous Fat Loss: Facial region:  severe and Upper arm:  severe  Muscle Loss: Temporal:  severe, Facial & jaw region:  severe, Scapular bone:  severe, Thoracic region (clavicle, acromium bone):  Moderate, Upper arm (bicep, tricep):  severe  Fluid Accumulation/Edema: None noted  Malnutrition Diagnosis: Severe malnutrition in the context of acute on chronic illness or injury.     NUTRITION DIAGNOSIS  Malnutrition related to increased protein and energy needs due to illness and recent surgery as evidenced by visible muscle and fat loss and clinically significant weight loss.       INTERVENTIONS  Implementation  Nutrition Education: Importance of continued PO intake to meet needs and support healing. Protein intake for maintenance of lean body mass.   Medical food supplement therapy - pt declined  offered supplements.      Goals  Patient to consume % of nutritionally adequate meal trays TID, or the equivalent with supplements/snacks.     Monitoring/Evaluation  Progress toward goals will be monitored and evaluated per protocol.    Latonia Mae  Dietetic Intern

## 2020-01-27 NOTE — PLAN OF CARE
PTA: -30 min this am as pt off unit at appt. Attempted back several times during PT session. Pt had not yet arrived back to unit during scheduled PT appt time. Plan to see pt for pm scheduled appt.

## 2020-01-27 NOTE — TELEPHONE ENCOUNTER
FUTURE VISIT INFORMATION      SURGERY INFORMATION:    Date: 20    Location: [facility] OR    Surgeon:  Nitish Quintero    Anesthesia Type:  General    Procedure: RIGHT MRI LASER ABLATION OF BRAIN METASTASIS WITH OPTICAL TRACKING SYSTEM    RECORDS REQUESTED FROM:       Primary Care Provider: None    Most recent EKG+ Tracin/15/20

## 2020-01-27 NOTE — DISCHARGE SUMMARY
[facility]  Hospitalist Discharge Summary       Date of Admission:  1/21/2020  Date of Discharge:  1/28/2020  Discharging Provider: Jadyn Centeno PA-C    Discharge Diagnoses   Metastatic Malignant Melanoma s/p R frontal craniotomy for tumor resection (1/16) currently undergoing sterotactic radiation therapy    Follow-ups Needed After Discharge   Follow-up Appointments     Adult [facility] Follow-up and recommended labs and tests      Please continue to follow up with your care teams as scheduled: oncology,   radiation oncology, neurosurgery    Appointments on [place] and/or [facility] (with [facility] or [facility]   provider or service). Call [phone] if you haven't heard regarding   these appointments within 7 days of discharge.             Discharge Disposition   Discharged to home with home health  Condition at discharge: Stable    Hospital Course   Dallin Stevens is a 34 year old male with a hx of Metastatic Malignant Melanoma (dx 2012) who was admitted to [facility] for Right Frontal Craniotomy for tumor resection. Hospital course was uncomplicated. Transferred to TCU on 1/21/20 for ongoing aggressive rehabilitation. Completed therapy goals on 1/27/20 and discharged home 1/28/2020.      Metastatic Malignant Melanoma s/p R frontal craniotomy for tumor resection (1/16). Dx with Melanoma in 2012. Found to have intracranial mets 4/2019 and underwent tumor resection by Dr. Jeremi Stoll ins [place], [place]. Surveillance imaging demonstrated progressive disease. Pt then established care with Dr. Tesfaye at [facility], most recently seen in clinic 11/2019 at which time Radiation therapy was discussed, but pt ultimately declined treatment in favor of a more holistic treatment approach. Presented 1/6 to OSH with acute onset L-sided weakness. NSG consulted who recommended resection of R frontal mets, which he underwent 1/16/20 with Dr. Quintero. Post-op he received 4-day decadron taper and Keppra (POD  0-7) for seizure ppx. MRI brain w/ contrast demonstrating resection of R frontal lobe lesion as well as 4 additional metastatic lesions. Radiation Oncology consulted and discussed post-operative radiation therapy, started on sterotactic radiotherapy 1/27 with plan to continue through 1/31/2020 with Dr Arshad and Dr Quintero. Pt is tolerating radiation well, but does have significant anxiety prior to sessions.   - Continue sterotactic radiotherapy through 1/31/2020 with Dr. Arshad and Dr. Quintero   - Cont prn ativan 0.5mg q6h (may use 1mg prior to radiation tx) and prn atarax for anxiety  - Cont prn zofran for nausea  - Cont prn tylenol for pain/headache  - Follow up with radiation oncology, oncology, and neurosurgery as scheduled on discharge    Consultations This Hospital Stay   PHYSICAL THERAPY ADULT IP CONSULT  OCCUPATIONAL THERAPY ADULT IP CONSULT    Code Status   Full Code    Time Spent on this Encounter   I, Jadyn Centeno PA-C, personally saw the patient today and spent greater than 30 minutes discharging this patient.       Jadyn Centeno PA-C  [facility]  ______________________________________________________________________    Physical Exam   Vital Signs: Temp: 97.1  F (36.2  C) Temp src: Oral BP: 113/71 Pulse: 93   Resp: 16 SpO2: 100 % O2 Device: None (Room air)    Weight: 130 lbs 6.4 oz  GENERAL: Alert and oriented x 3. Pleasant and cooperative.   HEENT: MMM.   CV: RRR.  RESPIRATORY: Effort normal on RA. Lungs CTAB.   GI: Abd soft nt/nd.   NEUROLOGICAL: A&O x 3, Clear speech.   EXTREMITIES: No peripheral edema. Intact bilateral pedal pulses. L sided weakness - but able to move LUE and LLE. Ambulating without assistance or use of cane/walker.   SKIN: Surgical incision on scalp healed well without any s/sx of infection.      Primary Care Physician   Patient Unable To Provide    Discharge Orders      Reason for your hospital stay    You were admitted to [facility] for  rehabilitation following hospitalization. You completed therapy goals on 1/27/20 and are ready to be discharged home 1/28/20.     Adult [facility] Follow-up and recommended labs and tests    Please continue to follow up with your care teams as scheduled: oncology, radiation oncology, neurosurgery    Appointments on [place] and/or [facility] (with [facility] or [facility] provider or service). Call [phone] if you haven't heard regarding these appointments within 7 days of discharge.     Activity    Your activity upon discharge: activity as tolerated, no driving while taking benzodiazepines or until cleared by neurosurgery and oncology     When to contact your care team    Please contact your primary care provider or seek medical care if you develop chest pain, shortness of breath, fever >101F, chills, abdominal pain, or if any other concerns arise.     Discharge Instructions    - Cont tylenol as needed for pain, headache. Do not take more than 4grams of tylenol in a 24hour timeframe  - Cont prn ativan or prn atarax as needed for anxiety before radiation     Full Code     Diet    Follow this diet upon discharge: regular adult diet       Significant Results and Procedures   Results for orders placed or performed during the hospital encounter of 01/23/20   MRI Brain w contrast    Narrative    Brain MRI with/without contrast primarily for the purposes of  stereotactic evaluation    History: Metastasis to brain    ICD-10: Metastasis to brain    Comparison: MR 1/17/2020    Technique: MR imaging performed with 3-dimensonally acquired axial  T1-weighted sequences performed with intravenous contrast.    Contrast: 6mL Gadavist    Findings: Limited imaging for stereotactic imaging demonstrates post  surgical changes of right frontoparietal craniotomy for resection of  right posterior frontal parasagittal mass no significant change in  contrast enhancement about the resection site. Grossly stable  appearance of multiple enhancing  metastatic lesions, the largest of  which is in the posterior medial right temporal lobe, measuring up to  3.2 cm (series 3, image 80). Additional lesions are seen within the  medial left temporal lobe, the left parietal lobe, as well as a  punctate lesion within the left temporal lobe (series 3, image 34). No  midline shift or hydrocephalus. The major intracranial arterial  structures are grossly patent.      Impression    Impression: Postsurgical changes of right frontal lobe lesion  resection with expected postoperative changes. Multiple metastatic  lesions are again seen, not significantly changed from 1/17/2020. No  new lesions are identified. Limited imaging primarily for the purposes  of stereotactic localization.     I have personally reviewed the examination and initial interpretation  and I agree with the findings.    MICHAEL JIN MD       Discharge Medications   Current Discharge Medication List      CONTINUE these medications which have CHANGED    Details   hydrOXYzine (ATARAX) 25 MG tablet Take 1-2 tablets (25-50 mg) by mouth 3 times daily as needed for anxiety  Qty: 20 tablet, Refills: 0    Associated Diagnoses: Adjustment disorder, unspecified type      LORazepam (ATIVAN) 0.5 MG tablet Take 1 tablet (0.5 mg) by mouth every 6 hours as needed for anxiety  Qty: 20 tablet, Refills: 0    Associated Diagnoses: Metastatic malignant melanoma (H)      ondansetron (ZOFRAN) 4 MG tablet Take 1 tablet (4 mg) by mouth every 8 hours as needed for nausea or vomiting  Qty: 30 tablet, Refills: 0    Associated Diagnoses: Metastatic malignant melanoma (H)         CONTINUE these medications which have NOT CHANGED    Details   melatonin 1 MG TABS tablet Take 1 tablet (1 mg) by mouth nightly as needed for sleep  Qty: 30 tablet, Refills: 0    Associated Diagnoses: Adjustment insomnia      acetaminophen (TYLENOL) 325 MG tablet Take 2 tablets (650 mg) by mouth every 4 hours as needed for mild pain, fever or headaches (> 101  F)    Comments: Indication: Mild Pain; Fever; Headache  Associated Diagnoses: Malignant neoplasm of frontal lobe of brain (H)      polyethylene glycol (MIRALAX/GLYCOLAX) packet Take 17 g by mouth daily as needed for constipation    Associated Diagnoses: Constipation, unspecified constipation type         STOP taking these medications       levETIRAcetam (KEPPRA) 500 MG tablet Comments:   Reason for Stopping:         oxyCODONE (ROXICODONE) 5 MG tablet Comments:   Reason for Stopping:             Allergies   No Known Allergies

## 2020-01-28 NOTE — TELEPHONE ENCOUNTER
Miroslava wants to know if Dr Tesfaye is Ok with signing home car orders.   Home care nurses would be in contact with Dr Tesfaye for orders when needed.  Pt is in TCU on [facility] but resides and has insurance in [place].   Miroslava needs a [place] provider to sign orders. Will consult with Dr Tesfaye and call Miroslava back  Ronnie Dent RN

## 2020-01-28 NOTE — PLAN OF CARE
RN: Pt left unit 0715 for radiation therapy, awaiting return to resume cares and discharge pt to home per plan of care.

## 2020-01-28 NOTE — PLAN OF CARE
Pt alert and oriented x4. Slept well throughout the night. Appear sleeping during rounds. Denies pain, cp or SOB. Independently positioning. PRN Ativan 1mg and tylenol administered @ around 0630 per pt request. Ready for his radiation appointment. Pt picked up @ 0715.

## 2020-01-28 NOTE — PLAN OF CARE
Pt VSS this evening. C/o headache, worsened after radiation. Treated with PRN tylenol x2 this shift. Given PRN ativan 0.5 mg x1 and melatonin at HS. Pt ambulating independently. Had brother visit for dinner, good intake. Pt to have radiation 1/28 AM, RN informed pt of  time and pt requesting 1 mg ativan prior to leaving, oncoming RN informed. Pt is eager to discharge. Will continue with POC until then.

## 2020-01-28 NOTE — PROGRESS NOTES
Meg Stevens, patient's wife called clinic inquiring about FMLA paperwork that is needed. Meg stated that nothing has been received. Meg stated that she had corresponded prior with clinic but writer could not find any documentation or paperwork. Consulted with Dr. Tesfaye who stated that he would be willing to sign Meg's FMLA paperwork. Called Meg and she stated that she needs to obtain the paperwork from her employer and she will have to resend it. She was given fax number of clinic. Sherice Sr RN,BSN,OCN

## 2020-01-28 NOTE — PROGRESS NOTES
Gamma Knife Operative Note    MRN: [id number]  Name: Dallin Stevens  : 1985    Date of procedure: 2020    Surgeon:  Nitish Quintero MD PhD    Pre-operative Diagnosis:   Melanoma brain metastasis resection cavity and multiple brain metastasis  Post-operative Diagnosis:  Same    Procedure: Gamma Knife Radiosurgery    Indication: This is a 34  y.o. M with stage IV melanoma and multiple metastases to the brain. After case review in the brain tumor conference, the joint recommendation was to resect the dominant lesion anterior to the right motor strip (causing the patient's hemiparesis) followed by radiosurgery to the resection cavity as well as the other brain metastasis. Standard measurements were obtained for coordinate calculation to facilitate SRS delivery.    On the day of the procedure, informed consent was obtained. The previous MRI was reviewed. SRS was performed using face mask immobilization. Treatment parameters were verified by myself, the treating radiation oncologist, and the physicist.     Five lesions were treated.  There were 3 sub-centimeter lesions that were treated with 27 Gy in three fractions.  The resection cavity was treated with 25 Gy in 5 fractions. The large right periventricular lesion (measuring 3.2 cm in the largest diameter) was also treated with 25 Gy in 5 fractions.    The dose was delivered in a highly conformal fashion. The treatment was performed at the [facility] gamma knife center.     I was present and performed the key portions of this procedure.    Nitish Quintero MD PhD

## 2020-01-28 NOTE — PROGRESS NOTES
[facility]  Referral received via Care Transitions team for home care services. Patient's service address is in our [facility] service area. Patient will receive services from [facility]. All patient demographics and orders will be sent to [facility]. Care team and patient notified. For any questions or concerns regarding home care services please call [phone].

## 2020-01-28 NOTE — PROGRESS NOTES
Name:     Dallin Stevens  :     1985           Medical Record # [id number]  Diagnosis: [ICD10] C79.31 Secondary malignant neoplasm of brain  Date: 2020    Gamma Knife Daily Treatment Procedure Note    Fraction  1 of 5  Treatment Summary:  Radiation Oncology - Course: 1 Protocol:   Treatment Site Current Dose Modality From To Elapsed Days Fx.  1a rt parasagital    500 cGy [place] 60  2020 0  1  1 c rt perivent    500 cGy [place] 60  2020 0             Fraction  1 of 3  Treatment Summary:  Radiation Oncology - Course: 1 Protocol:   Treatment Site Current Dose Modality From To Elapsed Days Fx.  1a' lt sup parietal 900 cGy [place] 60  2020 0 1  1 b' lt hippocamp 900 cGy [place] 60  2020 0 1  1 c' lt temporal 900 cGy [place] 60  2020 0     DESCRIPTION OF PROCEDURE:  On 2020 the patient was brought to the [facility] Gamma Knife IconTM suite at [facility] and treated with fractionated stereotactic radiotherapy.   The [facility] Gamma Knife IconTM Plan software was used to create a highly conformal dose distribution using the number and size collimators detailed above.     TREATMENT:    The patient was brought to the Gamma Knife suite. A timeout was performed to confirm the correct patient and correct procedure.    Patient was identified by 2 methods.  The treatment site was verified.  The mask was placed and a cone beam CT was done and fused to the previously approved plan.      The physicist and I evaluated the fusion and confirmed the target coverage after auto-registration.   The treatment was delivered using the [facility] Gamma Knife IconTM without complication.    The mask was removed and the patient was discharged home in stable condition.  The patient tolerated the treatment well and had no complications.      Elizabeth Schmidt MD

## 2020-01-28 NOTE — PLAN OF CARE
RN: Pt discharged to home with pt wife transporting.  Medications to be picked up at [facility] pharmacy, pt has ativan script/hard copy. Discharge orders reviewed and questions answered, pt and pt wife in agreement, and pt signed papers. Belongings with pt.  Home care to follow. Denies pain, alert and oriented x 4, discharged per wheel chair with staff pushing wheel chair.

## 2020-01-29 NOTE — TELEPHONE ENCOUNTER
[facility] called regarding a delay of start of care for this patient.  Start date will be 1/31/2020.  They will fax orders to be signed.

## 2020-01-29 NOTE — LETTER
2020     RE: Dallin Stevens  [id number] [facility] [address]     Dear Colleague,    Thank you for referring your patient, Dallin Stevens, to the [facility], RADIATION ONCOLOGY. Please see a copy of my visit note below.    Name:     Dallin Stevens  :     1985           Medical Record # [id number]  Diagnosis: [ICD10] C79.31 Secondary malignant neoplasm of brain  Date: 2020    Gamma Knife Daily Treatment Procedure Note    Fraction  3 of 5  Treatment Summary:  Radiation Oncology - Course: 1 Protocol:   Treatment Site Current Dose Modality From To Elapsed Days Fx.  1a rt parasagital    1500 cGy [place] 60  2020 2  3  1 c rt perivent    1500 cGy [place] 60  2020 2  3      Fraction  3 of 3  Treatment Summary:  Radiation Oncology - Course: 1 Protocol:   Treatment Site Current Dose Modality From To Elapsed Days Fx.  1a' lt sup parietal 2700 cGy [place] 60  2020 2 3  1 b' lt hippocamp 2700 cGy [place] 60  2020 2 3  1 c' lt temporal 2700 cGy [place] 60  2020 2 3    DESCRIPTION OF PROCEDURE:  On 2020 the patient was brought to the [facility] Gamma Knife IconTM suite at [facility] and treated with fractionated stereotactic radiotherapy.   The [facility] Gamma Knife IconTM Plan software was used to create a highly conformal dose distribution using the number and size collimators detailed above.     TREATMENT:    The patient was brought to the Gamma Knife suite. A timeout was performed to confirm the correct patient and correct procedure.    Patient was identified by 2 methods.  The treatment site was verified.  The mask was placed and a cone beam CT was done and fused to the previously approved plan.      The physicist and I evaluated the fusion and confirmed the target coverage after auto-registration.   The treatment was delivered using the [facility] Gamma Knife IconTM without complication.    The mask was removed and the patient was discharged home in stable condition.  The patient tolerated the treatment well and had no complications.      Zayda Portillo MD

## 2020-01-30 NOTE — NURSING NOTE
"Oncology Rooming Note    January 30, 2020 11:52 AM   Dallin Stevens is a 34 year old male who presents for:    Chief Complaint   Patient presents with     Oncology Clinic Visit      malignant melanoma     Initial Vitals: /62   Pulse 86   Temp 98.6  F (37  C) (Oral)   Resp 16   Ht 1.803 m (5' 11\")   Wt 59.7 kg (131 lb 9.6 oz)   SpO2 97%   BMI 18.35 kg/m   Estimated body mass index is 18.35 kg/m  as calculated from the following:    Height as of this encounter: 1.803 m (5' 11\").    Weight as of this encounter: 59.7 kg (131 lb 9.6 oz). Body surface area is 1.73 meters squared.  No Pain (0) Comment: Data Unavailable   No LMP for male patient.  Allergies reviewed: Yes  Medications reviewed: Yes    Medications: Medication refills not needed today.  Pharmacy name entered into Finalta:    [facility] [place], [place] - [address] [facility] [place], [place] - [address]  [id number] [place], [place] - [address] [facility] [address]    Clinical concerns: No new concerns or questions.       Kaitlin Molina CMA              "

## 2020-01-30 NOTE — PROGRESS NOTES
Neurosurgery Post-Op Followup  Jan 30, 2020    Date of Surgery: 1/16/20  Performed by: Dr. Nitish Quintero  PROCEDURES:   1.  Right Frontal Craniotomy for Tumor Resection; Stereotactic Neuro-Navigation; Micro-Dissection     Discharged on: POD #6 to TCU    HPI:  Dallin Stevens is a delightful 34 year old male whose medical history includes Metastatic Malignant Melanoma, which was diagnosed in 2012. He was found to have intracranial metastatic disease in April 2019 and underwent Tumor Resection by Dr. Jeremi Stoll in [place], [place]. Surveillance imaging following resection demonstrated progressive intracranial disease. Mr. Stevens then established care with Dr. Tesfaye at the [facility]. Most recently the patient had been seen in clinic in November 2019, during which discussions were held regarding treatment with Radiation Therapy. The patient however, declined treatment at that time and elected to proceed with a more holistic treatment approach.      On 1/6/2020, Mr. Stevens presented to [facility] at [facility] with reports of acute onset of Left-Sided Weakness. He was ultimately transferred to [facility]. Neurosurgery was consulted to assess the possibility of surgical intervention. Surgical resection of the Right Frontal Metastases recommended. After the risks and benefits of the procedure had been discussed with the patient, the patient provided consent to proceed with operative intervention. Pre-operatively, the patient had been treatment with Dexamethasone for treatment of Vasogenic Edema and Keppra for prevention of Seizure.     He underwent the above procedure and is here today for surgery followup.     Interim History:  Dallin presents today with his SO.    He has been doing well, better than prior to surgery. He has been recovering well and feels like he is making good progress. He is now able to walk without assistance. Energy levels are better though  "still has some mild weakness throughout all extremities, worse on the left side. He is eating and drinking well. Has mild HA with RT though resolves with APAP. No f/c. No difficulties breathing. Denies redness, swelling or discharge to the wound. Denies any seizures. Off the Keppra and steroids.     Pathology:  FINAL DIAGNOSIS:   A) Brain, right frontal tumor, biopsy:        - Metastatic malignant melanoma     B) Brain, right frontal tumor, excision:        - Metastatic malignant melanoma     C) Brain, right frontal tumor, excision:        - Metastatic malignant melanoma     Exam:  /62   Pulse 86   Temp 98.6  F (37  C) (Oral)   Resp 16   Ht 1.803 m (5' 11\")   Wt 59.7 kg (131 lb 9.6 oz)   SpO2 97%   BMI 18.35 kg/m    General: Alert, Oriented  HEENT: PERRLA, no sclera icterus  CV: RRR, no m/r/g  Pulm: CTA-B, no wheezes or rales  Ext: No pitting edema  Skin: No rashes or lesions seen on exposed skin, besides wound below  Neuro: No pronation or drift. + orbiting around L, finger to nose intact, gait slow    Wound: No redness, drainage or swelling. Well healed, intermittent scabbing.     Assessment and Plan:     Dx:  1. Metastatic Melanoma    2. S/p Craniotomy   3. Post operative State    Currently undergoing Gamma Knife (1/27 to 1/31); tomorrow is his last day. They are also considering doing a ROD procedure next month on his right sided lesion. Should continue to follow-up with oncologist, Dr. Tesfaye for further treatment options.     Wound  -keep wound open to air  -can swim/submerge wound when all scabbing disappears    Steroids  -currently tapered off    Seizures  -No known seizures. No need for AEDs at this time. Completed ppx Keppra    Pain  -Can continue to use APAP for pain/HA    Activity   -able to drive as long as not taking narcotics or had any seizures within 3 months  -activity restriction for 1 month postop, no lifting over 10 lbs   -able to resume normal activities, such as running/walking, " cleaning, bending    Followup: With Dr. Quintero for ROD Molina PA-C  [facility] [id number] [place] [address] [phone]

## 2020-01-30 NOTE — LETTER
2020       RE: Dallin Stevens  [address] [address] [facility] [address]     Dear Colleague,    Thank you for referring your patient, Dallin Stevens, to the [facility], RADIATION ONCOLOGY. Please see a copy of my visit note below.    Name:     Dallin Stevens  :     1985           Medical Record # [id number]  Diagnosis: [ICD10] C79.31 Secondary malignant neoplasm of brain  Date: 2020    Gamma Knife Daily Treatment Procedure Note    Fraction 4 of 5  Treatment Summary:  Radiation Oncology - Course: 1 Protocol:   Treatment Site Current Dose Modality From To Elapsed Days Fx.  1a rt parasagital  2,000 cGy [place] 60  2020   3  4  1 c rt perivent    2,000 cGy [place] 60  2020   3  4              DESCRIPTION OF PROCEDURE:  On 2020 the patient was brought to the [facility] Gamma Knife IconTM suite [facility] and treated with fractionated stereotactic radiotherapy.   The [facility] Gamma Knife IconTM Plan software was used to create a highly conformal dose distribution using the number and size collimators detailed above.     TREATMENT:    The patient was brought to the Gamma Knife suite. A timeout was performed to confirm the correct patient and correct procedure.    Patient was identified by 2 methods.  The treatment site was verified.    The mask was placed and a cone beam CT was done and fused to the previously approved plan.        The physicist and I evaluated the fusion and confirmed the target coverage after auto-registration.      The treatment was delivered using the [facility] Gamma Knife IconTM without complication.      The mask was removed and the patient was discharged home in stable condition.    The patient tolerated the treatment well and had no complications.      Elizabeth Schmidt MD      Again, thank you for allowing me to participate in the care of your patient.      Sincerely,    Elizabeth Schmidt MD

## 2020-01-30 NOTE — PROGRESS NOTES
Name:     Dallin Stevens  :     1985           Medical Record # [id number]  Diagnosis: [ICD10] C79.31 Secondary malignant neoplasm of brain  Date: 2020    Gamma Knife Daily Treatment Procedure Note    Fraction  3 of 5  Treatment Summary:  Radiation Oncology - Course: 1 Protocol:   Treatment Site Current Dose Modality From To Elapsed Days Fx.  1a rt parasagital    1500 cGy [place] 60  2020 2  3  1 c rt perivent    1500 cGy [place] 60  2020 2  3      Fraction  3 of 3  Treatment Summary:  Radiation Oncology - Course: 1 Protocol:   Treatment Site Current Dose Modality From To Elapsed Days Fx.  1a' lt sup parietal 2700 cGy [place] 60  2020 2 3  1 b' lt hippocamp 2700 cGy [place] 60  2020 2 3  1 c' lt temporal 2700 cGy [place] 60  2020 2 3    DESCRIPTION OF PROCEDURE:  On 2020 the patient was brought to the [facility] Gamma Knife IconTM suite at [facility] and treated with fractionated stereotactic radiotherapy.   The [facility] Gamma Knife IconTM Plan software was used to create a highly conformal dose distribution using the number and size collimators detailed above.     TREATMENT:    The patient was brought to the Gamma Knife suite. A timeout was performed to confirm the correct patient and correct procedure.    Patient was identified by 2 methods.  The treatment site was verified.  The mask was placed and a cone beam CT was done and fused to the previously approved plan.      The physicist and I evaluated the fusion and confirmed the target coverage after auto-registration.   The treatment was delivered using the [facility] Gamma Knife IconTM without complication.   The mask was removed and the patient was discharged home in stable condition.  The patient tolerated the treatment well and had no complications.      Zayda Portillo MD

## 2020-01-31 NOTE — LETTER
2020       RE: Dallin Stevens  [address] [facility] [address]     Dear Colleague,    Thank you for referring your patient, Dallin Stevens, to the [facility], RADIATION ONCOLOGY. Please see a copy of my visit note below.    Name:     Dallin Stevens  :     1985           Medical Record # [id number]  Diagnosis: [ICD10] C79.31 Secondary malignant neoplasm of brain  Date: 2020    Gamma Knife Daily Treatment Procedure Note    Fraction  5 of 5  Treatment Summary:  Radiation Oncology - Course: 1 Protocol:    Treatment Site Current Dose Modality From To Elapsed Days Fx.  1a rt parasagital  2,500 cGy [place] 60  2020   4  5  1c rt perivent    2,500 cGy [place] 60  2020   4  5              DESCRIPTION OF PROCEDURE:  On 2020 the patient was brought to the [facility] Gamma Knife IconTM suite at [facility] and treated with fractionated stereotactic radiotherapy.   The [facility] Gamma Knife IconTM Plan software was used to create a highly conformal dose distribution using the number and size collimators detailed above.     TREATMENT:    The patient was brought to the Gamma Knife suite. A timeout was performed to confirm the correct patient and correct procedure.    Patient was identified by 2 methods.  The treatment site was verified.    The mask was placed and a cone beam CT was done and fused to the previously approved plan.        The physicist and I evaluated the fusion and confirmed the target coverage after auto-registration.      The treatment was delivered using the [facility] Gamma Knife IconTM without complication.      The mask was removed and the patient was discharged home in stable condition.    The patient tolerated the treatment well and had no complications.      Jose Carlos Arshad MD

## 2020-01-31 NOTE — PROGRESS NOTES
RADIATION ONCOLOGY WEEKLY ON TREATMENT VISIT   Encounter Date: 2020    Patient Name: Dallin Stevens  MRN: [id number]  : 1985     Disease and Stage: Metastatic melanoma  Treatment Site: Partial brain  Current Dose/Planned Total Dose: 2500 / 2500 cGy  Daily Fraction Size: 500 cGy/day, 5 times/week  Concurrent Chemotherapy: No    Treatment Summary:    2020: Initiation of radiotherapy    2020: Completion of radiotherapy. Tolerated treatment well. No issues.    ED visits/Hospitalizations:  None    Missed Treatments:  None    Subjective: Mr. Stevens presents to clinic today for his weekly on-treatment visit. He has tolerated fractionated Gamma Knife radiosurgery to his intracranial metastases very well and he denies any side effects of therapy.    ROS:   Constitutional  Pain (0-10): 0  Fatigue: None    Nutrition  Diet: Regular    CNS  Headaches: None    Cardiopulmonary  Dyspnea: None  Chest pain: None    GI  Nausea/vomiting: None    Integumentary  Dermatitis: None    Objective:   General: Alert, oriented and in NAD  Pulmonary: No wheezing, stridor or respiratory distress  Skin: No alopecia or desquamation  Neuro: A/O x3. Normal gait.    Treatment-related toxicities (CTCAE v5.0):  None    Assessment:    Mr. Stevens is a 34 year old male with a metastatic melanoma with symptomatic CNS disease s/p right craniotomy and tumor resection of a large, right frontoparietal metastasis. He received Gamma Knife radiosurgery to the resection cavity as well as to his remaining intracranial disease. He tolerated treatment well with no radiotherapy-induced toxicities.    Plan:   Metastatic melanoma:    Complete radiotherapy today    ROD scheduled with Dr. Quintero for treatment of the right periventricular metastasis on 2020    Follow-up in radiation oncology clinic in mid-3/2020 with a repeat brain MRI    Pain management:    No symptoms requiring medical management    Fluids/Electrolytes/Nutrition:    Continue  diet as tolerated    Mosaiq chart and setup information reviewed  CBCT reviewed    Medication list reviewed     Jose Carlos Arshad MD/PhD    Dept of Radiation Oncology  [facility]

## 2020-02-02 NOTE — PROGRESS NOTES
Name:     Dallin Stevens  :     1985           Medical Record # [id number]  Diagnosis: [ICD10] C79.31 Secondary malignant neoplasm of brain  Date: 2020    Gamma Knife Daily Treatment Procedure Note    Fraction  5 of 5  Treatment Summary:  Radiation Oncology - Course: 1 Protocol:    Treatment Site Current Dose Modality From To Elapsed Days Fx.  1a rt parasagital  2,500 cGy [place] 60  2020   4  5  1c rt perivent    2,500 cGy [place] 60  2020   4  5              DESCRIPTION OF PROCEDURE:  On 2020 the patient was brought to the [facility] Gamma Knife IconTM suite at [facility] and treated with fractionated stereotactic radiotherapy.   The [facility] Gamma Knife IconTM Plan software was used to create a highly conformal dose distribution using the number and size collimators detailed above.     TREATMENT:    The patient was brought to the Gamma Knife suite. A timeout was performed to confirm the correct patient and correct procedure.    Patient was identified by 2 methods.  The treatment site was verified.    The mask was placed and a cone beam CT was done and fused to the previously approved plan.        The physicist and I evaluated the fusion and confirmed the target coverage after auto-registration.      The treatment was delivered using the [facility] Gamma Knife IconTM without complication.      The mask was removed and the patient was discharged home in stable condition.    The patient tolerated the treatment well and had no complications.      Jose Carlos Arshad MD

## 2020-02-03 PROBLEM — G93.6 VASOGENIC EDEMA (H): Status: ACTIVE | Noted: 2020-01-01

## 2020-02-03 PROBLEM — G93.9 BRAIN LESION: Status: ACTIVE | Noted: 2020-01-01

## 2020-02-03 PROBLEM — R59.1 LYMPHADENOPATHY: Status: ACTIVE | Noted: 2017-01-26

## 2020-02-03 PROBLEM — F41.9 ANXIETY: Status: ACTIVE | Noted: 2020-01-01

## 2020-02-03 PROBLEM — Z85.820 HISTORY OF MALIGNANT MELANOMA: Status: ACTIVE | Noted: 2019-04-24

## 2020-02-04 NOTE — LETTER
"    2/4/2020         RE: Dallin Stevens  [address] [facility] [address]        Dear Colleague,    Thank you for referring your patient, Dallin Stevens, to the [facility] CANCER [facility]. Please see a copy of my visit note below.    Oncology Rooming Note    February 4, 2020 1:06 PM   Dallin Stevens is a 34 year old male who presents for:    Chief Complaint   Patient presents with     Oncology Clinic Visit     Initial Vitals: /84   Pulse 86   Temp 99.3  F (37.4  C) (Temporal)   Resp 16   Wt 60.1 kg (132 lb 9.6 oz)   SpO2 99%   BMI 18.49 kg/m    Estimated body mass index is 18.49 kg/m  as calculated from the following:    Height as of 1/30/20: 1.803 m (5' 11\").    Weight as of this encounter: 60.1 kg (132 lb 9.6 oz). Body surface area is 1.74 meters squared.  No Pain (0) Comment: Data Unavailable   No LMP for male patient.  Allergies reviewed: Yes  Medications reviewed: Yes    Medications: Medication refills not needed today.  Pharmacy name entered into [facility]:    [facility] [place], [place] - [address] [facility] [place], [place] - [address]  [id number] - [place], [place] - [address]    Clinical concerns: Bloating since radiation Dr. Tesfaye was notified.      Shari J. Schoenberger, [facility]              EDUCATION CHEMOTHERAPY INFUSION    Discussed with patient and spouse information regarding Keytruda every 3 week infusions. Went over side affects of medications, as self care while on chemotherapy.   Informed patient and family when he should call the triage nurse at clinic or seek medical attention if you have chills and/or temperature greater than or equal to 100.5, uncontrolled nausea/vomiting, diarrhea, constipation, dizziness, shortness of breath, chest pain, heart palpitations, weakness or any other new or concerning symptoms, questions or concerns.  You can not have any live virus vaccines " prior to or during treatment or up to 6 months post infusion.  If you have an upcoming surgery, medical procedure or dental procedure during treatment, this should be discussed with your ordering physician and your surgeon/dentist.  If you are having any concerning symptom, if you are unsure if you should get your next infusion or wish to speak to a provider before your next infusion, please call your care coordinator or triage nurse at your clinic to notify them so we can adequately serve you.       Olamide Celeste RN        Visit Date:   02/04/2020      SUBJECTIVE:  Mr. Stevens is a 34-year-old gentleman with metastatic melanoma.  I had seen him in 12/2019.  His disease was progressing systemically and also in the brain.  He was recommended systemic treatment.  He did not want it.  He was recommended gamma knife to the brain lesion.      The patient was admitted on 01/06/2020 in the [facility] because of left-sided weakness.  Brain MRI on 01/06/2020 revealed a 3.3 cm hemorrhagic mass in the right frontal gyrus.  No other brain lesions.      The patient was subsequently transferred to [facility].  The patient had surgery on 01/16/2020.  He had right frontal craniotomy and resection of the tumor.  Pathology revealed metastatic melanoma.  He received partial brain radiation between 01/27/2020-01/31/2020, 2500 cGy in 5 fractions.  He is scheduled for laser ablation of brain metastasis in about 2 weeks' time.      The patient presents today with his wife to discuss regarding systemic treatment.  The patient said that he has improved significantly.  His left-sided weakness has improved significantly.  His walking is good.      No headache.  No dizziness.  No visual problems.  No chest pain.  No shortness of breath.  He has some irritation in the stomach.  Mild discomfort when he eats.  No vomiting.  Some nausea.  No urinary or bowel complaints.  No incontinence.  No bleeding.      PHYSICAL  EXAMINATION:   GENERAL:  The patient was alert and oriented x 3.   VITAL SIGNS:  Reviewed.  ECOG PS of 1.   The rest of the systems not examined.      LABORATORY DATA:  Reviewed.      ASSESSMENT:  A 34-year-old gentleman with metastatic melanoma, which is progressing.      PLAN:   1.  I had a long discussion with the patient and his wife.  Discussed regarding melanoma.  He has progressive disease.  He is symptomatic from brain metastasis.  He had resection and radiation and he is going to have laser treatment done.      I discussed regarding systemic treatment.  I told him without treatment, it will continue to progress.  He is agreeable for treatment.      Previously, he had been on pembrolizumab and his disease responded very well.  I would favor going back on pembrolizumab.  He is agreeable for it.  We will start him on single-agent pembrolizumab every 3 weeks.  Side effects including diarrhea, pneumonitis, hepatitis, nephritis, fatigue, skin rash and others were discussed.  We will plan on starting treatment in the next few days.      2.  Discussed with the patient regarding his abdominal discomfort.  It could simply be from recent steroid use.  He was on dexamethasone.  Advised him to take over-the-counter Pepcid or Prilosec.      3.  Discussed regarding imaging studies.  We will get a PET scan done in the next few days.      4.  The patient and his wife had multiple questions, which were all answered.  I will see him when he comes for cycle 2 of pembrolizumab.      TOTAL FACE-TO-FACE TIME SPENT:  40 minutes, more than 50% of the time spent in counseling and coordination of care.         JULIANNA DOMINGUEZ MD             D: 2020   T: 2020   MT:       Name:     JEANIE HERNANDEZ   MRN:      [id number]        Account:      [id number]   :      1985           Visit Date:   2020      Document: [id number]       Again, thank you for allowing me to participate in the care of your patient.         Sincerely,        Vero Tesfaye MD

## 2020-02-04 NOTE — PATIENT INSTRUCTIONS
1. Start pembrolizumab in next few days. Side effect of pembrolizumab.  Scheduled 2/11/20 / [facility]   2. PET scan in next few days. (can be scheduled at [facility] also). Scheduled 2/10/20 Ralf/ Patsy   3. See me or Cristofer Gomes  with cycle 2.  Patient will schedule more appointments later/ Patsy     AVS printed and given to patient/ Patsy

## 2020-02-04 NOTE — PROGRESS NOTES
"Oncology Rooming Note    February 4, 2020 1:06 PM   Dallin Stevens is a 34 year old male who presents for:    Chief Complaint   Patient presents with     Oncology Clinic Visit     Initial Vitals: /84   Pulse 86   Temp 99.3  F (37.4  C) (Temporal)   Resp 16   Wt 60.1 kg (132 lb 9.6 oz)   SpO2 99%   BMI 18.49 kg/m   Estimated body mass index is 18.49 kg/m  as calculated from the following:    Height as of 1/30/20: 1.803 m (5' 11\").    Weight as of this encounter: 60.1 kg (132 lb 9.6 oz). Body surface area is 1.74 meters squared.  No Pain (0) Comment: Data Unavailable   No LMP for male patient.  Allergies reviewed: Yes  Medications reviewed: Yes    Medications: Medication refills not needed today.  Pharmacy name entered into EPIC:    [facility] [place], [place] - [address] [facility] [place], [place] - [address]  [id number] - [place], [place] - [address] [facility] & [address]    Clinical concerns: Bloating since radiation Dr. Tesfaye was notified.      Shari J. Schoenberger, [facility]            "

## 2020-02-04 NOTE — TELEPHONE ENCOUNTER
Oncology Distress Screening Follow-up  Clinical Social Work  [facility]    Identified Concern and Score From Distress Screenin. How concerned are you about feeling anxious or very scared?   7Abnormal   due to bloating         Date of Distress Screenin2020    Intervention: SW contacted Dallin to discuss distress screen results. SW left a message and requested a call back.    Follow-up Required: 1) If Dallin does not return call SW will need to follow up in 2 days re: distress screen results.       DELROY Adan, LGSW  Direct Phone: [phone]  Pager:[phone]

## 2020-02-05 NOTE — PROGRESS NOTES
Visit Date:   02/04/2020     ONCOLOGY HISTORY: Mr. Stevens is a 34-year-old male with metastatic melanoma.    1.  He had superficial spreading melanoma in the left flank in 2012.   -On 02/03/2012, biopsy of left flank skin lesion revealed 1.7 mm superficial spreading melanoma without ulceration.  -On 02/23/2012, he had left flank wide local excision and left axillary sentinel node biopsy. One of two lymph node positive. He had stage III (pT2a pN1a M0) disease.    -On 03/02/2012, left axillary radical lymph node dissection was done. All 23 lymph nodes benign.  - He received 4 weeks of high-dose interferon between 04/02/2012 and 04/27/2012.      2.  On 01/25/2017, CT scan revealed metastatic disease involving lymph nodes in the right supraclavicular , mediastinum and right hilum. 3 small pulmonary nodules are present.  -On 01/26/2017, biopsy of right supraclavicular lymph node revealed metastatic melanoma.  BRAF positive.    -He received pembrolizumab between 02/08/2017 and 11/08/2017. Patient did not want any further treatment.  -PET scan on 06/28/2017 revealed significant improvement.  -PET scan on 11/29/2017 revealed new focus of mild FDG uptake within a 6 mm right paratracheal lymph node. No other evidence of hypermetabolic lymphadenopathy on this exam.   -PET scan on 10/02/2018 revealed new hypermetabolic lymphadenopathy in the supraclavicular region of the left neck and in the mediastinum.      3.  MRI of the brain on 04/24/2019 revealed intraparenchymal mass within the right parietal occipital lobe.    -He had a craniotomy and resection of a brain tumor on 04/26/2019.  Pathology was consistent with melanoma.     -The patient was recommended further treatment including postoperative radiation to the brain.  He did not want any other treatment.   -PET scan on 07/23/2019 revealed progression of disease. New hypermetabolic lymphadenopathy seen in the right supraclavicular fossa and anterior mediastinum.     4. MRI  brain on 11/25/2019 reveals metastasis in left posterior frontal lobe, right peritrigonal region and right posterior frontal parafalcine lesion.    -PET scan on 11/25/2019 reveals progression of his disease.  There are multiple new hypermetabolic subcutaneous nodules throughout the body.  No change in size of mediastinal lymphadenopathy.     5. Admitted on 01/06/2020 in the [facility] because of left-sided weakness.    -Brain MRI on 01/06/2020 revealed a 3.3 cm hemorrhagic mass in the right frontal gyrus and other brain metastasis.  -On 01/16/2020, he had right frontal craniotomy and resection of the tumor.  Pathology revealed metastatic melanoma. -He received gamma knife brain radiation between 01/27/2020 and 01/31/2020 to 5 brain lesions. 2500 cGy in 5 fractions.      SUBJECTIVE:  Mr. Stevens is a 34-year-old gentleman with metastatic melanoma.  I had seen him in 12/2019.  His disease was progressing systemically and also in the brain.  He was recommended systemic treatment.  He did not want it.  He was recommended gamma knife to the brain lesion.      The patient was admitted on 01/06/2020 in the [facility] because of left-sided weakness.  Brain MRI on 01/06/2020 revealed a 3.3 cm hemorrhagic mass in the right frontal gyrus and other brain metastasis.     The patient was subsequently transferred to [facility].  The patient had surgery on 01/16/2020.  He had right frontal craniotomy and resection of the tumor.  Pathology revealed metastatic melanoma.  He received gamma knife brain radiation between 01/27/2020-01/31/2020 to 5 brain lesions. 2500 cGy in 5 fractions.  He is scheduled for laser ablation of brain metastasis in about 2 weeks' time.      The patient presents today with his wife to discuss regarding systemic treatment.  The patient said that he has improved significantly.  His left-sided weakness has improved significantly.  His walking is good.      No headache.  No dizziness.   No visual problems.  No chest pain.  No shortness of breath.  He has some irritation in the stomach.  Mild discomfort when he eats.  No vomiting.  Some nausea.  No urinary or bowel complaints.  No incontinence.  No bleeding.      PHYSICAL EXAMINATION:   GENERAL:  The patient was alert and oriented x 3.   VITAL SIGNS:  Reviewed.  ECOG PS of 1.   The rest of the systems not examined.      LABORATORY DATA:  Reviewed.      ASSESSMENT:  A 34-year-old gentleman with metastatic melanoma, which is progressing.      PLAN:   1.  I had a long discussion with the patient and his wife.  Discussed regarding melanoma.  He has progressive disease.  He is symptomatic from brain metastasis.  He had resection and radiation and he is going to have laser treatment done.      I discussed regarding systemic treatment.  I told him without treatment, it will continue to progress.  He is agreeable for treatment.      Previously, he had been on pembrolizumab and his disease responded very well.  I would favor going back on pembrolizumab.  He is agreeable for it.  We will start him on single-agent pembrolizumab every 3 weeks.  Side effects including diarrhea, pneumonitis, hepatitis, nephritis, fatigue, skin rash and others were discussed.  We will plan on starting treatment in the next few days.      2.  Discussed with the patient regarding his abdominal discomfort.  It could simply be from recent steroid use.  He was on dexamethasone.  Advised him to take over-the-counter Pepcid or Prilosec.      3.  Discussed regarding imaging studies.  We will get a PET scan done in the next few days.      4.  The patient and his wife had multiple questions, which were all answered.  I will see him when he comes for cycle 2 of pembrolizumab.      TOTAL FACE-TO-FACE TIME SPENT:  40 minutes, more than 50% of the time spent in counseling and coordination of care.         JULIANNA DOMINGUEZ MD             D: 02/04/2020   T: 02/04/2020   MT:       Name:     DAVID  JEANIE   MRN:      [id number]        Account:      [id number]   :      1985           Visit Date:   2020      Document: [id number]

## 2020-02-06 NOTE — H&P
Pre-Operative H & P     CC:  Preoperative exam to assess for increased cardiopulmonary risk while undergoing surgery and anesthesia.    Date of Encounter: 2/6/2020  Primary Care Physician:  Provide, Patient Unable To  Reason for Visit: Malignant neoplasm metastatic to brain (H)     JOSE ALBERTO Stevens is a 35 y/o male who presents for pre-operative H&P in preparation for RIGHT MRI LASER ABLATION OF BRAIN METASTASIS WITH OPTICAL TRACKING SYSTEM with Nitish Quintero MD on 2/19/20 at [facility] for treatment of Malignant neoplasm metastatic to brain.     Mr. Stevens was diagnosed with melanoma in 2012. He was found to have intracranial mets 4/2019 and underwent tumor resection by Dr. Jeremi Stoll in [place], [place].  Surveillance imaging demonstrated progressive disease.  He then established care with Dr. Tesfaye at [facility], most recently seen in clinic 11/2019 at which time Radiation therapy was discussed, but pt ultimately declined treatment in favor of a more holistic treatment approach.  He was admitted on 01/06/2020 in the [facility] because of left-sided weakness.  Brain MRI on 01/06/2020 revealed a 3.3 cm hemorrhagic mass in the right frontal gyrus.  No other brain lesions.     He was subsequently transferred to [facility], where he underwent right frontal craniotomy and resection of the tumor on 01/16/2020.  Pathology revealed metastatic melanoma.  He received partial brain radiation between 01/27/2020-01/31/2020, 2500 cGy in 5 fractions.  His left-sided weakness has improved significantly.  His walking is good. He was then scheduled for laser ablation of brain metastasis. He has been counseled on the above procedure.     PMH is also significant for anxiety.     History was obtained from patient & chart review. He is accompanied by his wife.     Past Medical History  Past Medical History:   Diagnosis Date     Malignant melanoma (H)        Past Surgical  History  Past Surgical History:   Procedure Laterality Date     BIOPSY OF SKIN LESION       CRANIOTOMY, EXCISE TUMOR COMPLEX, COMBINED  04/2019     DENTAL SURGERY      wisdom teeth     LYMPH NODE BIOPSY      arm, excision     OPTICAL TRACKING SYSTEM CRANIOTOMY, EXCISE TUMOR, COMBINED Right 1/16/2020    Procedure: stealth assisted Right craniotomy for tumor resection;  Surgeon: Nitish Quintero MD;  Location: [facility] OR       Hx of Blood transfusions/reactions: no     Hx of abnormal bleeding or anti-platelet use: no    Menstrual history: No LMP for male patient.: N/A    Steroid use in the last year: yes, had Decadron x4 days last month    Personal or FH with difficulty with Anesthesia:  no    Prior to Admission Medications  Current Outpatient Medications   Medication Sig Dispense Refill     acetaminophen (TYLENOL) 325 MG tablet Take 2 tablets (650 mg) by mouth every 4 hours as needed for mild pain, fever or headaches (> 101 F)       hydrOXYzine (ATARAX) 25 MG tablet Take 1-2 tablets (25-50 mg) by mouth 3 times daily as needed for anxiety 20 tablet 0     LORazepam (ATIVAN) 0.5 MG tablet Take 1 tablet (0.5 mg) by mouth every 6 hours as needed for anxiety 20 tablet 0     melatonin 1 MG TABS tablet Take 1 tablet (1 mg) by mouth nightly as needed for sleep 30 tablet 0     ondansetron (ZOFRAN) 4 MG tablet Take 1 tablet (4 mg) by mouth every 8 hours as needed for nausea or vomiting 30 tablet 0     polyethylene glycol (MIRALAX/GLYCOLAX) packet Take 17 g by mouth daily as needed for constipation         Allergies  No Known Allergies    Social History  Social History     Socioeconomic History     Marital status:      Spouse name: Not on file     Number of children: Not on file     Years of education: Not on file     Highest education level: Not on file   Occupational History     Not on file   Social Needs     Financial resource strain: Not on file     Food insecurity:     Worry: Not on file     Inability: Not on file      Transportation needs:     Medical: Not on file     Non-medical: Not on file   Tobacco Use     Smoking status: Never Smoker     Smokeless tobacco: Never Used   Substance and Sexual Activity     Alcohol use: Not Currently     Drug use: Never     Sexual activity: Yes     Partners: Female   Lifestyle     Physical activity:     Days per week: Not on file     Minutes per session: Not on file     Stress: Not on file   Relationships     Social connections:     Talks on phone: Not on file     Gets together: Not on file     Attends Oriental orthodox service: Not on file     Active member of club or organization: Not on file     Attends meetings of clubs or organizations: Not on file     Relationship status: Not on file     Intimate partner violence:     Fear of current or ex partner: Not on file     Emotionally abused: Not on file     Physically abused: Not on file     Forced sexual activity: Not on file   Other Topics Concern     Not on file   Social History Narrative     Not on file       Family History  Family History   Problem Relation Age of Onset     Anesthesia Reaction No family hx of      Cardiovascular No family hx of      Deep Vein Thrombosis No family hx of        ROS/MED HX  The complete review of systems is negative other than noted in the HPI or here.  Patient denies recent illness, fever and respiratory infection during past month.    ENT/Pulmonary:  - neg pulmonary ROS    (-) tobacco use and asthma   Neurologic: Comment: Melanoma w/ brain mets. S/P right frontal craniotomy w/ resection of tumor on 01/16/2020. Left sided weakness due to R frontal hemorrhage improving.  (-) seizures and Neuropathy   Cardiovascular:  - neg cardiovascular ROS   (+) ----. : . . . :. . Previous cardiac testing date:results:date: results:ECG reviewed date:1/15/20 results: date: results:          METS/Exercise Tolerance: Comment: Was unable to walk last week due to LLE weakness, recovered, now able to walk one block. 3 - Able to walk 1-2  "blocks without stopping   Hematologic:  - neg hematologic  ROS      (-) History of Transfusion   Musculoskeletal:  - neg musculoskeletal ROS       GI/Hepatic:  - neg GI/hepatic ROS       Renal/Genitourinary:  - ROS Renal section negative       Endo: Comment: Had Decadron burst/taper last month     (-) Type II DM   Psychiatric:     (+) psychiatric history anxiety      Infectious Disease:  - neg infectious disease ROS       Malignancy:   (+) Malignancy History of Neuro and Other  Other CA Malignant melanoma dx 2012 Active status post Chemo Neuro CA Active status post Surgery.          Other:    (+) no H/O Chronic Pain,             PHYSICAL EXAM:   Mental Status/Neuro: A/A/O; Age Appropriate   Airway: Facies: Feasible  Mallampati: I  Mouth/Opening: Full  TM distance: > 6 cm  Neck ROM: Full   Respiratory: Auscultation: CTAB     Resp. Rate: Normal     Resp. Effort: Normal      CV: Rhythm: Regular  Rate: Age appropriate  Heart: Normal Sounds  Edema: None   Comments:      Dental: Normal Dentition              Preop Vitals    BP Readings from Last 3 Encounters:   02/06/20 108/74   02/04/20 123/84   01/30/20 111/62    Pulse Readings from Last 3 Encounters:   02/06/20 90   02/04/20 86   01/30/20 86      Resp Readings from Last 3 Encounters:   02/06/20 12   02/04/20 16   01/30/20 16    SpO2 Readings from Last 3 Encounters:   02/06/20 100%   02/04/20 99%   01/30/20 97%      Temp Readings from Last 1 Encounters:   02/06/20 98.5  F (36.9  C) (Oral)    Ht Readings from Last 1 Encounters:   02/06/20 1.803 m (5' 11\")      Wt Readings from Last 1 Encounters:   02/06/20 58.5 kg (129 lb)    Estimated body mass index is 17.99 kg/m  as calculated from the following:    Height as of this encounter: 1.803 m (5' 11\").    Weight as of this encounter: 58.5 kg (129 lb).     Temp: 98.5  F (36.9  C) Temp src: Oral BP: 108/74 Pulse: 90   Resp: 12 SpO2: 100 %         129 lbs 0 oz  5' 11\"   Body mass index is 17.99 kg/m .    Physical " Exam  Constitutional: Awake, alert, cooperative, no apparent distress, and appears stated age. Somewhat frail looking.  Eyes: Pupils equal, round and reactive to light, extra ocular muscles intact, sclera clear, conjunctiva normal.  HENT: Normocephalic, craniotomy incision well approximated & healing well w/ erythema/edema/drainage, oral pharynx with moist mucus membranes, good dentition. No goiter appreciated. No removable dental hardware.  Respiratory: Clear to auscultation bilaterally, no crackles or wheezing. No SOB when supine.  Cardiovascular: Regular rate and rhythm, normal S1 and S2, and no murmur noted.  Carotids +2, no bruits. No edema. Palpable pulses to radial, DP and PT arteries.   GI: Normal bowel sounds, soft, non-distended, non-tender, no masses palpated, no hepatomegaly.    Lymph/Hematologic: No cervical lymphadenopathy and no supraclavicular lymphadenopathy.  Genitourinary:  deferred  Skin: Warm and dry.  No rashes at anticipated surgical site.   Musculoskeletal: Full ROM of neck. There is no redness, warmth, or swelling of the joints. Gross motor strength is mildly diminished on left. Decreased mass.    Neurologic: Awake, alert, oriented to name, place and time. Cranial nerves II-XII are grossly intact. Gait is normal. Ambulates from chair to exam table, seats self, lies supine and sits back up w/o assistance.  Neuropsychiatric: Calm, cooperative. Flat affect. Answers questions appropriately, follows commands w/o difficulty.    PRIOR LABS/DIAGNOSTIC STUDIES:  All labs and imaging personally reviewed     EKG 1/15/20  Sinus rhythm  Normal ECG  No previous ECGs available  Ventricular rate 69 bpm     Brain MRI with/without contrast 1/23/20  Impression: Postsurgical changes of right frontal lobe lesion  resection with expected postoperative changes. Multiple metastatic  lesions are again seen, not significantly changed from 1/17/2020. No  new lesions are identified. Limited imaging primarily for the  purposes  of stereotactic localization.    LABS:  CBC:   Lab Results   Component Value Date    WBC 12.8 (H) 01/21/2020    WBC 12.5 (H) 01/20/2020    HGB 12.1 (L) 01/21/2020    HGB 11.8 (L) 01/20/2020    HCT 36.4 (L) 01/21/2020    HCT 35.7 (L) 01/20/2020     01/24/2020     01/21/2020     BMP:   Lab Results   Component Value Date     01/17/2020     01/15/2020    POTASSIUM 4.0 01/17/2020    POTASSIUM 4.2 01/15/2020    CHLORIDE 103 01/17/2020    CHLORIDE 103 01/15/2020    CO2 25 01/17/2020    CO2 27 01/15/2020    BUN 31 (H) 01/17/2020    BUN 26 01/15/2020    CR 0.70 01/24/2020    CR 0.54 (L) 01/21/2020     (H) 01/17/2020     (H) 01/15/2020     COAGS:   Lab Results   Component Value Date    PTT 25 01/21/2020    INR 1.03 01/21/2020     POC:   Lab Results   Component Value Date     (H) 01/21/2020     OTHER:   Lab Results   Component Value Date    A1C 5.3 01/15/2020    ABUNDIO 8.6 01/17/2020    PHOS 3.5 01/21/2020    MAG 2.1 01/21/2020    ALBUMIN 3.6 01/15/2020    PROTTOTAL 7.1 01/15/2020    ALT 26 01/15/2020    AST 6 01/15/2020    ALKPHOS 98 01/15/2020    BILITOTAL 0.2 01/15/2020        Labs today: (personally reviewed)  CBC  WBC 5.1   4.0 - 11.0 10e9/L Final 02/06/2020 11:21 AM 1740   RBC Count 3.98  Low   4.4 - 5.9 10e12/L Final 02/06/2020 11:21 AM 1740   Hemoglobin 11.7  Low   13.3 - 17.7 g/dL Final 02/06/2020 11:21 AM 1740   Hematocrit 35.3  Low   40.0 - 53.0 % Final 02/06/2020 11:21 AM 1740   MCV 89   78 - 100 fl Final 02/06/2020 11:21 AM 1740   MCH 29.4   26.5 - 33.0 pg Final 02/06/2020 11:21 AM 1740   MCHC 33.1   31.5 - 36.5 g/dL Final 02/06/2020 11:21 AM 1740   RDW 12.5   10.0 - 15.0 % Final 02/06/2020 11:21 AM 1740   Platelet Count 202   150 - 450 10e9/L Final 02/06/2020 11:21 AM 1740       Outside records reviewed from: Care Everywhere    ASSESSMENT and PLAN  Dallin Stevens is a 34 year old male scheduled to undergo RIGHT MRI LASER ABLATION OF BRAIN METASTASIS WITH OPTICAL  TRACKING SYSTEM with Nitish Quintero MD on 2/19/20 at [facility] for treatment of Malignant neoplasm metastatic to brain.     Pt has had prior anesthetic. Type: General and MAC    No history of anesthetic complications    He has the following specific operative considerations:   # PEDRO 1/8 = low risk  # VTE risk: 1.8%  # Risk of PONV score = 1.  If > 2, anti-emetic intervention recommended.  # Anesthesia considerations:  Refer to PAC assessment in anesthesia records    CARDIAC: METS 3,  Was unable to walk last week due to LLE weakness, recovered, now able to walk one block.     # RCRI : High risk surgery (>5% cardiac complication risk).  0.9% risk of major adverse cardiac event.     #  EKG 1/15/20:  normal    PULMONARY:     # Never smoked    # No asthma or inhaler use    GI: no GERD      ENDO: BMI 18.  Recommend careful positioning throughout the perioperative period to prevent tissue injury.      # No DM    HEME/IMMUNE:   # Diagnosed w/ malignant melanoma in 2012. Did well until he was found to have intracranial mets in 4/2019.  #  Anemia, Hgb 11.1 today    ORTHO: Full neck ROM, no TMJ    NEURO/PSYCH:     # Malignant melanoma w/ brain mets. S/P tumor resection in [place] in spring 2019. Developed left sided weakness last month, found to have right frontal gyrus hemorrhage, underwent right frontal craniotomy and resection of the tumor on 01/16/2020  @ [facility]. Now scheduled for laser ablation of metastasis.     Patient is optimized and is acceptable candidate for the proposed procedure. No further diagnostic evaluation is needed.    Arrival time, NPO, shower and medication instructions provided by nursing staff today.  Preparing For Your Surgery handout given.    Alma Gonzalez PA-C  Preoperative Assessment Center  [facility]  Clinic and Surgery Center  Phone: [phone]  Fax: [phone]

## 2020-02-06 NOTE — PROCEDURES
Radiotherapy Gammaknife  Treatment Summary          Date of Report: 2020     PATIENT: JEANIE STEVENS  MEDICAL RECORD NO: [id number]  : 1985     DIAGNOSIS: C79.31 Secondary malignant neoplasm of brain  Mr. Stevens is a 34 year old gentleman with a history of metastatic melanoma with CNS involvement. He has not   had prior CNS-directed radiotherapy.     He had a  right craniotomy and tumor resection of a large, symptomatic right frontoparietal   metastasis. He continues to have a large hemorrhagic lesion centered within the right posterior temporal lobe   as well as a few small lesions involving the bilateral hemispheres.   These  5 areas  were treated.           INTENT OF RADIOTHERAPY: Local Control     Details of the treatments summarized below are found in records kept in the Department of Radiation Oncology at [facility].     Total dose  25 Gy in 5 fractions using Gammaknife radiotherapy    Dose per Fraction:    500  and  800 Total Dose:      2500cGy         COMMENTS:             tolerated well             ED visits/hospitalizations:0  Missed treatments:0  Acute Toxicity Profile by CTC v5.0:0     PAIN MANAGEMENT:    na                      FOLLOW UP PLAN:           will have a follow up MRi in 3 months                   Staff Physician: Elizabeth Schmidt M.D.  Physicist: Damien PhD  Vero Tesfaye MD                                     Radiation Oncology:  [facility] [address] [place] [address]

## 2020-02-06 NOTE — PATIENT INSTRUCTIONS
Preparing for Your Surgery      Name:  Dallin Stevens   MRN:  [id number]   :  1985   Today's Date:  2020     Arriving for surgery:  Surgery date:  20  Arrival time:  5:30 am    Please come to:       [facility] [address]  [address] [place]  [address]    - ? parking is available in front of the hospital      -    Please proceed to [address] on the 3rd floor. [phone]?     - ?If you are in need of directions, wheelchair or escort please stop at the Information Desk in the lobby.  Inform the information person that you are here for surgery; a wheelchair and escort to [address] will be provided.?     -  Bring your ID and insurance card.    What can I eat or drink?  -  You may have solid food or milk products until 8 hours prior to your surgery. (Until 11:30 pm 20 )  -  You may have water, apple juice or 7up/Sprite until 2 hours prior to your surgery. (Until 5:30 am )    Which medicines can I take?       -  Do not bring your own medications to the hospital.        -  Follow Neurology Clinic instructions regarding Ibuprofen. If no instructions given, NO Ibuprofen the day prior to surgery.            -  Hold Aspirin, Aspirin products, Multivitamins, and Supplements one week prior to surgery.         -  Hold Naproxen (Aleve) 4 days prior to surgery.    -  Please take these medications the morning of surgery:  Acetaminophen (Tylenol) if needed  Hydroxyzine (Atarax) if needed  Lorazepam (Ativan) if needed  Ondansetron (Zofran) if needed    How do I prepare myself?  -  Take two showers: one the night before surgery; and one the morning of surgery.         Use Scrubcare or Hibiclens to wash from neck down.  You may use your own shampoo and conditioner. No other hair products.   -  Do NOT use lotion, powder, colognes, deodorant, or antiperspirant the day of your surgery.  -  Do NOT wear any jewelry.    Questions or Concerns:  If you have questions or concerns  prior to your surgery, call [phone]. (Mon - Fri   8 am- 5:30 pm)  Questions about surgery, contact your Surgeons office.  If you have any health changes prior to surgery, please notify your Surgeon.    AFTER YOUR SURGERY  Breathing exercises   Breathing exercises help you recover faster. Take deep breaths and let the air out slowly. This will:     Help you wake up after surgery.    Help prevent complications like pneumonia.  Preventing complications will help you go home sooner.   We may give you a breathing device (incentive spirometer) to encourage you to breathe deeply.   Nausea and vomiting   You may feel sick to your stomach after surgery; if so, let your nurse know.    Pain control:  After surgery, you may have pain. Our goal is to help you manage your pain. Pain medicine will help you feel comfortable enough to do activities that will help you heal.  These activities may include breathing exercises, walking and physical therapy.   To help your health care team treat your pain we will ask: 1) If you have pain  2) where it is located 3) describe your pain in your words  Methods of pain control include medications given by mouth, vein or by nerve block for some surgeries.  Sequential Compression Device (SCD) or Pneumo Boots:  You may need to wear SCD S on your legs or feet. These are wraps connected to a machine that pumps in air and releases it. The repeated pumping helps prevent blood clots from forming.

## 2020-02-06 NOTE — ANESTHESIA PREPROCEDURE EVALUATION
Anesthesia Pre-Procedure Evaluation    Patient: Dallin Stevens   MRN:     [id number] Gender:   male   Age:    34 year old :      1985        Preoperative Diagnosis: Malignant neoplasm metastatic to brain (H) [C79.31]   Procedure(s):  RIGHT MRI LASER ABLATION OF BRAIN METASTASIS WITH OPTICAL TRACKING SYSTEM     Past Medical History:   Diagnosis Date     Malignant melanoma (H)       Past Surgical History:   Procedure Laterality Date     BIOPSY OF SKIN LESION       CRANIOTOMY, EXCISE TUMOR COMPLEX, COMBINED  2019     DENTAL SURGERY      wisdom teeth     LYMPH NODE BIOPSY      arm, excision     OPTICAL TRACKING SYSTEM CRANIOTOMY, EXCISE TUMOR, COMBINED Right 2020    Procedure: stealth assisted Right craniotomy for tumor resection;  Surgeon: Nitish Quintero MD;  Location: [facility] OR          Anesthesia Evaluation     . Pt has had prior anesthetic. Type: General and MAC    No history of anesthetic complications          ROS/MED HX    ENT/Pulmonary:  - neg pulmonary ROS    (-) tobacco use and asthma   Neurologic: Comment: Melanoma w/ brain mets     (-) seizures and Neuropathy   Cardiovascular:  - neg cardiovascular ROS   (+) ----. : . . . :. . Previous cardiac testing date:results:date: results:ECG reviewed date:1/15/20 results: date: results:          METS/Exercise Tolerance: Comment: Was unable to walk last week due to LLE weakness, recovered, now able to walk one block 3 - Able to walk 1-2 blocks without stopping   Hematologic:  - neg hematologic  ROS      (-) History of Transfusion   Musculoskeletal:  - neg musculoskeletal ROS       GI/Hepatic:  - neg GI/hepatic ROS       Renal/Genitourinary:  - ROS Renal section negative       Endo: Comment: Had Decadron burst/taper last month     (-) Type II DM   Psychiatric:     (+) psychiatric history anxiety      Infectious Disease:  - neg infectious disease ROS       Malignancy:   (+) Malignancy History of Neuro and Other  Other CA Malignant melanoma dx   Active status post Chemo Neuro CA Active status post Surgery.          Other:    (+) no H/O Chronic Pain,                       PHYSICAL EXAM:   Mental Status/Neuro: A/A/O; Age Appropriate   Airway: Facies: Feasible  Mallampati: I  Mouth/Opening: Full  TM distance: > 6 cm  Neck ROM: Full   Respiratory: Auscultation: CTAB     Resp. Rate: Normal     Resp. Effort: Normal      CV: Rhythm: Regular  Rate: Age appropriate  Heart: Normal Sounds  Edema: None   Comments:      Dental: Normal Dentition                LABS:  CBC:   Lab Results   Component Value Date    WBC 12.8 (H) 01/21/2020    WBC 12.5 (H) 01/20/2020    HGB 12.1 (L) 01/21/2020    HGB 11.8 (L) 01/20/2020    HCT 36.4 (L) 01/21/2020    HCT 35.7 (L) 01/20/2020     01/24/2020     01/21/2020     BMP:   Lab Results   Component Value Date     01/17/2020     01/15/2020    POTASSIUM 4.0 01/17/2020    POTASSIUM 4.2 01/15/2020    CHLORIDE 103 01/17/2020    CHLORIDE 103 01/15/2020    CO2 25 01/17/2020    CO2 27 01/15/2020    BUN 31 (H) 01/17/2020    BUN 26 01/15/2020    CR 0.70 01/24/2020    CR 0.54 (L) 01/21/2020     (H) 01/17/2020     (H) 01/15/2020     COAGS:   Lab Results   Component Value Date    PTT 25 01/21/2020    INR 1.03 01/21/2020     POC:   Lab Results   Component Value Date     (H) 01/21/2020     OTHER:   Lab Results   Component Value Date    A1C 5.3 01/15/2020    ABUNDIO 8.6 01/17/2020    PHOS 3.5 01/21/2020    MAG 2.1 01/21/2020    ALBUMIN 3.6 01/15/2020    PROTTOTAL 7.1 01/15/2020    ALT 26 01/15/2020    AST 6 01/15/2020    ALKPHOS 98 01/15/2020    BILITOTAL 0.2 01/15/2020        Preop Vitals    BP Readings from Last 3 Encounters:   02/06/20 108/74   02/04/20 123/84   01/30/20 111/62    Pulse Readings from Last 3 Encounters:   02/06/20 90   02/04/20 86   01/30/20 86      Resp Readings from Last 3 Encounters:   02/06/20 12   02/04/20 16   01/30/20 16    SpO2 Readings from Last 3 Encounters:   02/06/20 100%  "  02/04/20 99%   01/30/20 97%      Temp Readings from Last 1 Encounters:   02/06/20 98.5  F (36.9  C) (Oral)    Ht Readings from Last 1 Encounters:   02/06/20 1.803 m (5' 11\")      Wt Readings from Last 1 Encounters:   02/06/20 58.5 kg (129 lb)    Estimated body mass index is 17.99 kg/m  as calculated from the following:    Height as of this encounter: 1.803 m (5' 11\").    Weight as of this encounter: 58.5 kg (129 lb).     LDA:        Assessment:   ASA SCORE: 3    H&P: History and physical reviewed and following examination; no interval change.   Smoking Status:  Non-Smoker/Unknown   NPO Status: NPO Appropriate     Plan:   Anes. Type:  General   Pre-Medication: Midazolam   Induction:  IV (Standard)   Airway: ETT; Oral   Access/Monitoring: PIV; 2nd PIV; A-Line   Maintenance: Balanced     Postop Plan:   Postop Pain: Opioids  Postop Sedation/Airway: Not planned  Disposition: ICU     PONV Management:   Adult Risk Factors:, Non-Smoker, Postop Opioids   Prevention: Ondansetron, Dexamethasone     CONSENT: Direct conversation   Plan and risks discussed with: Patient   Blood Products: Consented (ALL Blood Products)       Comments for Plan/Consent:  34 year old male with Malignant melanoma w/ brain mets, s/p tumor resection in 2019. He developed left sided weakness last month, found to have right frontal gyrus hemorrhage, underwent right frontal craniotomy and resection of the tumor on 01/16/2020 at [facility]. Patient is now scheduled for laser ablation of metastasis by Dr Quintero.   Patient is otherwise healthy. No known drug allergies. Full code.   Above anesthesia considerations and plan.                    PAC Discussion and Assessment    ASA Classification: 3  Case is suitable for: [place]  Anesthetic techniques and relevant risks discussed: GA  Invasive monitoring and risk discussed: No  Types:   Possibility and Risk of blood transfusion discussed: No  NPO instructions given:   Additional anesthetic preparation and risks " discussed:   Needs early admission to pre-op area:   Other:     PAC Resident/NP Anesthesia Assessment:  Dallin Stevens is a 34 year old male scheduled to undergo RIGHT MRI LASER ABLATION OF BRAIN METASTASIS WITH OPTICAL TRACKING SYSTEM with Nitish Quintero MD on 2/19/20 at [facility] for treatment of Malignant neoplasm metastatic to brain.     Pt has had prior anesthetic. Type: General and MAC    No history of anesthetic complications    He has the following specific operative considerations:   # PEDRO 1/8 = low risk  # VTE risk: 1.8%  # Risk of PONV score = 1.  If > 2, anti-emetic intervention recommended.  # Anesthesia considerations:  Refer to PAC assessment in anesthesia records    CARDIAC: METS 3,  Was unable to walk last week due to LLE weakness, recovered, now able to walk one block.     # RCRI : High risk surgery (>5% cardiac complication risk).  0.9% risk of major adverse cardiac event.     #  EKG 1/15/20:  normal    PULMONARY:     # Never smoked    # No asthma or inhaler use    GI: no GERD      ENDO: BMI 18.  Recommend careful positioning throughout the perioperative period to prevent tissue injury.      # No DM    HEME/IMMUNE:   # Diagnosed w/ malignant melanoma in 2012. Did well until he was found to have intracranial mets in 4/2019.    ORTHO: Full neck ROM, no TMJ    NEURO/PSYCH:     # Malignant melanoma w/ brain mets. S/P tumor resection in [place] in spring 2019. Developed left sided weakness last month, found to have right frontal gyrus hemorrhage, underwent right frontal craniotomy and resection of the tumor on 01/16/2020  @ [facility]. Now scheduled for laser ablation of metastasis.     Patient is optimized and is acceptable candidate for the proposed procedure. No further diagnostic evaluation is needed.      Reviewed and Signed by PAC Mid-Level Provider/Resident  Mid-Level Provider/Resident: Alma Gonzalez PA-C  Date: 2/6/20  Time: 1116    Attending Anesthesiologist  Anesthesia Assessment:        Anesthesiologist:   Date:   Time:   Pass/Fail:   Disposition:     PAC Pharmacist Assessment:        Pharmacist:   Date:   Time:    Alma Gonzalez PA-C

## 2020-02-10 NOTE — TELEPHONE ENCOUNTER
Spoke with Dallin and his wife, they seemed upset with my call. Did not want to schedule Palliative and they said they will discuss this matter with Dr. Tesfaye.

## 2020-02-10 NOTE — PROGRESS NOTES
Writer received a call from patient;s spouse, Meg, with a concern about communication. Patient had received a call about a palliative care referral and had not been informed about that and verbalized a concern about the communication.    Writer listened and validated the concerns.     Patient would like to go forward at this time with current treatment plan. No palliative care unless discussed and agreed upon.    Olamide Celeste RN

## 2020-02-11 NOTE — PROGRESS NOTES
Infusion Nursing Note:  Dallin Stevens presents today for C1D1 keytruda.    Patient seen by provider today: No   present during visit today: Not Applicable.    Note: N/A.    Intravenous Access:  Labs drawn without difficulty.  Peripheral IV placed.    Treatment Conditions:  Lab Results   Component Value Date    HGB 11.7 02/06/2020     Lab Results   Component Value Date    WBC 5.1 02/06/2020      Lab Results   Component Value Date    ANEU 10.0 01/21/2020     Lab Results   Component Value Date     02/06/2020      Lab Results   Component Value Date     02/11/2020                   Lab Results   Component Value Date    POTASSIUM 3.8 02/11/2020           Lab Results   Component Value Date    MAG 2.1 01/21/2020            Lab Results   Component Value Date    CR 0.72 02/11/2020                   Lab Results   Component Value Date    ABUNDIO 8.8 02/11/2020                Lab Results   Component Value Date    BILITOTAL 0.3 02/11/2020           Lab Results   Component Value Date    ALBUMIN 3.4 02/11/2020                    Lab Results   Component Value Date    ALT 17 02/11/2020           Lab Results   Component Value Date    AST 10 02/11/2020       Results reviewed, labs MET treatment parameters, ok to proceed with treatment.      Post Infusion Assessment:  Patient tolerated infusion without incident.  Site patent and intact, free from redness, edema or discomfort.  No evidence of extravasations.  Access discontinued per protocol.       Discharge Plan:   Prescription given for compazine and ativan to [facility].  Copy of AVS reviewed with patient and/or family.  Patient will return 3/3/ for next appointment.  Patient discharged in stable condition accompanied by: mother.  Departure Mode: Ambulatory.    Edward Salguero RN

## 2020-02-11 NOTE — TELEPHONE ENCOUNTER
Social Work Note: Telephone Call  Oncology Clinic    Data/Intervention:2020  Patient Name:  Dallin Stevens  /Age:  1985 (34 year old)    Reason for Call: Follow up on distress screen and C1D1 for chemo.    Assessment:  Dallin is a 34 year old gentleman who was seen at [facility] for C1D1 Keytruda. JOEL called Dallin to follow up and to to complete psychosocial assessment to determine any needs Dallin may have. SW left a message with Dallin requesting a call back.    Plan:  -2nd attempt at contacting Dallin  -JOEL will continue to follow for psychosocial needs/support  -JOEL will continue to collaborate with multidisciplinary care team    DELROY Adan, LGSW  2020  Pager: [phone]  Phone Number: [phone]

## 2020-02-12 NOTE — PROGRESS NOTES
EDUCATION CHEMOTHERAPY INFUSION    Discussed with patient and spouse information regarding Keytruda every 3 week infusions. Went over side affects of medications, as self care while on chemotherapy.   Informed patient and family when he should call the triage nurse at clinic or seek medical attention if you have chills and/or temperature greater than or equal to 100.5, uncontrolled nausea/vomiting, diarrhea, constipation, dizziness, shortness of breath, chest pain, heart palpitations, weakness or any other new or concerning symptoms, questions or concerns.  You can not have any live virus vaccines prior to or during treatment or up to 6 months post infusion.  If you have an upcoming surgery, medical procedure or dental procedure during treatment, this should be discussed with your ordering physician and your surgeon/dentist.  If you are having any concerning symptom, if you are unsure if you should get your next infusion or wish to speak to a provider before your next infusion, please call your care coordinator or triage nurse at your clinic to notify them so we can adequately serve you.       Olamide Celeste RN

## 2020-02-13 NOTE — PROGRESS NOTES
Name:     Dallin Stevens  :     1985           Medical Record # [id number]  Diagnosis: [ICD10] C79.31 Secondary malignant neoplasm of brain  Date: 2020    Gamma Knife Daily Treatment Procedure Note    Fraction 4 of 5  Treatment Summary:  Radiation Oncology - Course: 1 Protocol:   Treatment Site Current Dose Modality From To Elapsed Days Fx.  1a rt parasagital  2,000 cGy [place] 60  2020   3  4  1 c rt perivent    2,000 cGy [place] 60  2020   3  4              DESCRIPTION OF PROCEDURE:  On 2020 the patient was brought to the [place] Gamma Knife IconTM suite at [facility] and treated with fractionated stereotactic radiotherapy.   The [place] Gamma Knife IconTM Plan software was used to create a highly conformal dose distribution using the number and size collimators detailed above.     TREATMENT:    The patient was brought to the Gamma Knife suite. A timeout was performed to confirm the correct patient and correct procedure.    Patient was identified by 2 methods.  The treatment site was verified.    The mask was placed and a cone beam CT was done and fused to the previously approved plan.        The physicist and I evaluated the fusion and confirmed the target coverage after auto-registration.      The treatment was delivered using the [place] Gamma Knife IconTM without complication.      The mask was removed and the patient was discharged home in stable condition.    The patient tolerated the treatment well and had no complications.      Elizabeth Schmidt MD

## 2020-02-14 NOTE — TELEPHONE ENCOUNTER
Health Call Center    Phone Message    May a detailed message be left on voicemail: yes     Reason for Call: pt has not fully recovered from last surgery and pt has been sick. Pt wants to push the surgery with Dr. Nitish Quintero out 4-6 weeks more, please. Call pt to reschedule his surgery. Thank you.    Action Taken: Other:  neurosurgery scheduling    Travel Screening: Not Applicable

## 2020-02-16 NOTE — PROGRESS NOTES
Visit Date:   02/04/2020     ONCOLOGY HISTORY: Mr. Stevens is a 34-year-old male with metastatic melanoma.    1.  He had superficial spreading melanoma in the left flank in 2012.   -On 02/03/2012, biopsy of left flank skin lesion revealed 1.7 mm superficial spreading melanoma without ulceration.  -On 02/23/2012, he had left flank wide local excision and left axillary sentinel node biopsy. One of two lymph node positive. He had stage III (pT2a pN1a M0) disease.    -On 03/02/2012, left axillary radical lymph node dissection was done. All 23 lymph nodes benign.  - He received 4 weeks of high-dose interferon between 04/02/2012 and 04/27/2012.      2.  On 01/25/2017, CT scan revealed metastatic disease involving lymph nodes in the right supraclavicular , mediastinum and right hilum. 3 small pulmonary nodules are present.  -On 01/26/2017, biopsy of right supraclavicular lymph node revealed metastatic melanoma.  BRAF positive.    -He received pembrolizumab between 02/08/2017 and 11/08/2017. Patient did not want any further treatment.  -PET scan on 06/28/2017 revealed significant improvement.  -PET scan on 11/29/2017 revealed new focus of mild FDG uptake within a 6 mm right paratracheal lymph node. No other evidence of hypermetabolic lymphadenopathy on this exam.   -PET scan on 10/02/2018 revealed new hypermetabolic lymphadenopathy in the supraclavicular region of the left neck and in the mediastinum.      3.  MRI of the brain on 04/24/2019 revealed intraparenchymal mass within the right parietal occipital lobe.    -He had a craniotomy and resection of a brain tumor on 04/26/2019.  Pathology was consistent with melanoma.     -The patient was recommended further treatment including postoperative radiation to the brain.  He did not want any other treatment.   -PET scan on 07/23/2019 revealed progression of disease. New hypermetabolic lymphadenopathy seen in the right supraclavicular fossa and anterior mediastinum.     4. MRI  brain on 11/25/2019 reveals metastasis in left posterior frontal lobe, right peritrigonal region and right posterior frontal parafalcine lesion.    -PET scan on 11/25/2019 reveals progression of his disease.  There are multiple new hypermetabolic subcutaneous nodules throughout the body.  No change in size of mediastinal lymphadenopathy.     5. Admitted on 01/06/2020 in the [facility] because of left-sided weakness.    -Brain MRI on 01/06/2020 revealed a 3.3 cm hemorrhagic mass in the right frontal gyrus and other brain metastasis.  -On 01/16/2020, he had right frontal craniotomy and resection of the tumor.  Pathology revealed metastatic melanoma. -He received gamma knife brain radiation between 01/27/2020 and 01/31/2020 to 5 brain lesions. 2500 cGy in 5 fractions.      SUBJECTIVE:  Mr. Stevens is a 34-year-old gentleman with metastatic melanoma.  I had seen him in 12/2019.  His disease was progressing systemically and also in the brain.  He was recommended systemic treatment.  He did not want it.  He was recommended gamma knife to the brain lesion.      The patient was admitted on 01/06/2020 in the [facility] because of left-sided weakness.  Brain MRI on 01/06/2020 revealed a 3.3 cm hemorrhagic mass in the right frontal gyrus and other brain metastasis.     The patient was subsequently transferred to [facility].  The patient had surgery on 01/16/2020.  He had right frontal craniotomy and resection of the tumor.  Pathology revealed metastatic melanoma.  He received gamma knife brain radiation between 01/27/2020-01/31/2020 to 5 brain lesions. 2500 cGy in 5 fractions.  He is scheduled for laser ablation of brain metastasis in about 2 weeks' time.      The patient presents today with his wife to discuss regarding systemic treatment.  The patient said that he has improved significantly.  His left-sided weakness has improved significantly.  His walking is good.      No headache.  No dizziness.   No visual problems.  No chest pain.  No shortness of breath.  He has some irritation in the stomach.  Mild discomfort when he eats.  No vomiting.  Some nausea.  No urinary or bowel complaints.  No incontinence.  No bleeding.      PHYSICAL EXAMINATION:   GENERAL:  The patient was alert and oriented x 3.   VITAL SIGNS:  Reviewed.  ECOG PS of 1.   The rest of the systems not examined.      LABORATORY DATA:  Reviewed.      ASSESSMENT:  A 34-year-old gentleman with metastatic melanoma, which is progressing.      PLAN:   1.  I had a long discussion with the patient and his wife.  Discussed regarding melanoma.  He has progressive disease.  He is symptomatic from brain metastasis.  He had resection and radiation and he is going to have laser treatment done.      I discussed regarding systemic treatment.  I told him without treatment, it will continue to progress.  He is agreeable for treatment.      Previously, he had been on pembrolizumab and his disease responded very well.  I would favor going back on pembrolizumab.  He is agreeable for it.  We will start him on single-agent pembrolizumab every 3 weeks.  Side effects including diarrhea, pneumonitis, hepatitis, nephritis, fatigue, skin rash and others were discussed.  We will plan on starting treatment in the next few days.      2.  Discussed with the patient regarding his abdominal discomfort.  It could simply be from recent steroid use.  He was on dexamethasone.  Advised him to take over-the-counter Pepcid or Prilosec.      3.  Discussed regarding imaging studies.  We will get a PET scan done in the next few days.      4.  The patient and his wife had multiple questions, which were all answered.  I will see him when he comes for cycle 2 of pembrolizumab.      TOTAL FACE-TO-FACE TIME SPENT:  40 minutes, more than 50% of the time spent in counseling and coordination of care.         JULIANNA DOMINGUEZ MD             D: 02/04/2020   T: 02/04/2020   MT:       Name:     DAVID  JEANIE   MRN:      [id number]        Account:      [id number]   :      1985           Visit Date:   2020      Document: [id number]

## 2020-02-27 NOTE — PLAN OF CARE
ICU End of Shift Summary. See flowsheets for vital signs and detailed assessment.    Changes this shift: Pt arrived from PACU around 1430. Difficulty controlling head pain w/o narcotics--attempted Decadron, Tylenol, de-stimulation, and ice. New order for Oxy. Neuro exams intact. Two incisions w/ liquid bandage on right side of skull, no drainage. BP WNL w/ goal of SBP <140. Remains on RA. Voiding w/o difficulty. Requesting home anxiety meds, sx paged. Soft pressures while asleep 100s/50s, MAPs > 65.     Plan:  Monitor for neuro changes, CT @ 1800 & 0400.

## 2020-02-27 NOTE — ANESTHESIA PROCEDURE NOTES
Arterial Line Procedure Note  Staff:     Anesthesiologist:  Ernesto Trent MD    Resident/CRNA:  Magdalene Matos MD    Arterial line performed by resident/CRNA in presence of a teaching physician    Location: In OR After Induction  Procedure Start/Stop Times:     Correct Patient: Yes      Correct Position: Yes      Correct Site: Yes      Correct Procedure: Yes      Correct Laterality:  Yes    Site Marked:  Yes  Line Placement:     Procedure:  Arterial Line    Insertion Site:  Radial    Insertion laterality:  Left    Skin Prep: Chloraprep      Patient Prep: patient draped, mask, sterile gloves, hat and hand hygiene      Local skin infiltration:  None    Ultrasound Guided?: Yes      Artery evaluated via ultrasound confirming patency.   Using realtime imaging, the artery was punctured and the needle was observed entering the artery.      A permanent image is NOT entered into the patient's record.      Catheter size:  20 gauge, 12 cm    Cath secured with: suture      Dressing:  Tegaderm    Complications:  None obvious    Arterial waveform: Yes      IBP within 10% of NIBP: Yes

## 2020-02-27 NOTE — ANESTHESIA POSTPROCEDURE EVALUATION
Anesthesia POST Procedure Evaluation    Patient: Dallin Stevens   MRN:     [id number] Gender:   male   Age:    34 year old :      1985        Preoperative Diagnosis: Malignant neoplasm metastatic to brain (H) [C79.31]   Procedure(s):  INTRAOPERATIVE MRI/STEALTH ASSISTED RIGHT LASER ABLATION OF BRAIN METASTASIS   Postop Comments: No value filed.     Anesthesia Type: General       Disposition: ICU            Patient Location Title: Neuro ICU routine monitoring post-op.   Postop Pain Control: Uneventful            Sign Out: Well controlled pain   PONV: No   Neuro/Psych: Uneventful            Sign Out: Acceptable/Baseline Neuro Status   Airway/Respiratory: Uneventful            Sign Out: Acceptable/Baseline resp. status   CV/Hemodynamics: Uneventful            Sign Out: Acceptable CV/hemodynamic status   Other NRE: NONE   DID A NON-ROUTINE EVENT OCCUR? No         Last Anesthesia Record Vitals:  CRNA VITALS  2020 1218 - 2020 1318      2020             Ht Rate:  195    SpO2:  95 %          Last PACU Vitals:  Vitals Value Taken Time   /80 2020  1:20 PM   Temp 36  C (96.8  F) 2020  1:15 PM   Pulse 91 2020  1:20 PM   Resp 12 2020  1:15 PM   SpO2 100 % 2020  1:28 PM   Temp src     NIBP     Pulse     SpO2 95 % 2020 12:49 PM   Resp     Temp     Ht Rate 195 2020 12:49 PM   Temp 2     Vitals shown include unvalidated device data.      Electronically Signed By: Ernesto Trent MD, 2020, 1:29 PM

## 2020-02-27 NOTE — BRIEF OP NOTE
[facility] [place]    Brief Operative Note    Pre-operative diagnosis: Malignant neoplasm metastatic to brain (H) [C79.31]  Post-operative diagnosis Same as pre-operative diagnosis    Procedure: Procedure(s):  INTRAOPERATIVE MRI/STEALTH ASSISTED RIGHT LASER ABLATION OF BRAIN METASTASIS  Surgeon: Surgeon(s) and Role:     * Nitish Quintero MD - Primary     * Shefali Sanchez MD - Resident - Assisting  Anesthesia: General   Estimated blood loss: 25  Drains: None  Specimens: * No specimens in log *  Findings:   2 probe tracks. 2 separate burns. ~70% tumor coverage  Complications: None.  Implants: * No implants in log *       - no scan necessary unless declines  - ICU watch overnight  - 8 day taper, 4mg x2d, 3mg x2d, 2mg x2d, 1mg x2d  - 1 wk keppra  - likely discharge tomorrow    Shefali Sanchez, PGY7  Neurosurgery Chief Resident  Neuro-endovascular fellow    Please, for questions or concerns, do not hesitate to call Neurosurgery on call pager.    * * * [phone] then job code [id number]

## 2020-02-27 NOTE — PROGRESS NOTES
Admitted/transferred from: PACU  Reason for admission/transfer: Monitoring s/p ablation of brain tumor  Patient status upon admission/transfer: On 2L NC, following commands, vitally stable, c/o headache.  Interventions/Plan: Started IVF, steroids, neuro monitoring  2 RN skin assessment: completed by Lisa Perkins A  Result of skin assessment and interventions/actions: Protective mepi on coccyx, 2 incisions on right side of head w/ no drainage and closed w/ liquid bandage, small amount of blood in center of forehead w/ small puncture. No other skin issues.   Height, weight, drug calc weight: Done  Patient belongings (see Flowsheet - Adult Profile for details): Bag of clothes/items Dad placed in closet  MDRO education (if applicable): N/A

## 2020-02-28 NOTE — PROGRESS NOTES
02/28/20 0900   Quick Adds   Type of Visit Initial PT Evaluation  (Deb Atwood, PT, DPT)       Present no   Living Environment   Lives With spouse;child(janell), dependent  (3 and 5 year olds)   Living Arrangements house  (2 story with a basement )   Home Accessibility stairs to enter home;stairs within home   Number of Stairs, Main Entrance 2   Stair Railings, Main Entrance none   Number of Stairs, Within Home, Primary other (see comments)  (full flight )   Stair Railings, Within Home, Primary railing on right side (ascending)   Transportation Anticipated family or friend will provide   Living Environment Comment Pt lives in 2 story home with basement, bedroom is located on upper level. stall shower. SO works from home so she is available to A PRN.    Self-Care   Usual Activity Tolerance excellent   Current Activity Tolerance moderate   Regular Exercise Yes   Equipment Currently Used at Home none   Activity/Exercise/Self-Care Comment Pt was indep with all mobility at pre baseline (prior to initial sx)    Functional Level Prior   Ambulation 0-->independent   Transferring 0-->independent   Toileting 0-->independent   Bathing 0-->independent   Communication 0-->understands/communicates without difficulty   Swallowing 0-->swallows foods/liquids without difficulty   Cognition 0 - no cognition issues reported   Fall history within last six months yes   Number of times patient has fallen within last six months 1   Which of the above functional risks had a recent onset or change? fall history   Prior Functional Level Comment pt was indep with mobility, worked at [employer]   General Information   Onset of Illness/Injury or Date of Surgery - Date 02/27/20   Referring Physician Radha Medrano MD   Patient/Family Goals Statement return home   Pertinent History of Current Problem (include personal factors and/or comorbidities that impact the POC) 33 yo M with PMH melanoma metastatic to brain s/p OR 2/27 for  intraoperative MRI / stealth assisted right laser ablation of brain metastasis.   Precautions/Limitations other (see comments)  (crani precautions )   General Info Comments activity: early mobility guidelines    Cognitive Status Examination   Orientation orientation to person, place and time   Level of Consciousness alert   Follows Commands and Answers Questions 100% of the time   Personal Safety and Judgment intact   Memory intact   Pain Assessment   Patient Currently in Pain No   Integumentary/Edema   Integumentary/Edema Comments scar from prior sx on skull, small new incision on R   Posture    Posture Not impaired   Range of Motion (ROM)   ROM Comment WFL throughout   Strength   Strength Comments not formally assessed, at least 3/5 dereck UE and LEs    Bed Mobility   Bed Mobility Comments sit<>supine mod indep. HOB slightly elevated   Transfer Skills   Transfer Comments STS with SBA, minor use of UEs    Gait   Gait Comments ambulated with SBA. dec gsit speed and step length. no overt LOB. intermittent shuffling/staggering   Balance   Balance Comments not formally assessed, no overt LOB    Sensory Examination   Sensory Perception no deficits were identified   Coordination   Coordination no deficits were identified   Muscle Tone   Muscle Tone no deficits were identified   General Therapy Interventions   Planned Therapy Interventions ADL retraining;balance training;bed mobility training;gait training;strengthening;transfer training;progressive activity/exercise;home program guidelines;risk factor education   Clinical Impression   Criteria for Skilled Therapeutic Intervention yes, treatment indicated   PT Diagnosis dec functional mobility    Influenced by the following impairments deconditioiing, post op, fatigue, nausea    Functional limitations due to impairments gait, activity tolerance, balance,    Clinical Presentation Evolving/Changing   Clinical Presentation Rationale PMH, clinical judgement, current mobility     Clinical Decision Making (Complexity) Moderate complexity   Therapy Frequency 4x/week   Predicted Duration of Therapy Intervention (days/wks) 2/28/20   Anticipated Equipment Needs at Discharge   (TBD)   Anticipated Discharge Disposition Home with Assist   Risk & Benefits of therapy have been explained Yes   Patient, Family & other staff in agreement with plan of care Yes   Clinical Impression Comments PT eval completed, tx indicated.    Total Evaluation Time   Total Evaluation Time (Minutes) 8

## 2020-02-28 NOTE — PLAN OF CARE
Neuro: AxOx4. PERRL. Intact throughout. Up with SBA.    Cardiac: Sinus to sinus dalila. Normotensive. Afebrile.    Resp: clear lung sounds on room air.    GI/: Tolerating clear liquids. No BM this shift. Voids large amount in urinal.    Plan: Discharge later today?

## 2020-02-28 NOTE — DISCHARGE SUMMARY
"[facility] Discharge Summary and Instructions    Dallin Stevens MRN# [id number]   Age: 34 year old YOB: 1985     Date of Admission:  2/27/2020  Date of Discharge::  2/28/2020 11:00 AM  Admitting Physician:  Nitish Quintero MD  Discharge Physician:  Nitish Quintero MD          Admission Diagnoses:   Malignant neoplasm metastatic to brain (H) C79.31          Discharge Diagnosis:     Malignant neoplasm metastatic to brain (H) C79.31          Procedures:   INTRAOPERATIVE MRI/STEALTH ASSISTED RIGHT LASER ABLATION OF BRAIN METASTASIS           Brief History of Illness:   34-year-old gentleman with a history of melanoma diagnosed in 2012, status post intracranial metastasis resection in 4/2019, status post radiation therapy, status post right frontal craniotomy and resection of brain metastasis on 1/16/2020 for disease progression, was admitted to the hospital for laser ablation of right brain metastasis, which the patient had elected to undergo on 2/27/2020.           Hospital Course:   Patient underwent above-mentioned procedure on 2/27/2020. The operation was uncomplicated and he was admitted to the surgical ICU for routine post operative cares. On post operative day 1, he was ambulating, voiding without a bhardwaj, eating a regular diet, pain was well controlled and therefore he was discharged home. The patient was started on Keppra for one week for seizure prophylaxis and decadron to reduce post-operative swelling.     Exam:  Vital signs:  Temp: 97.2  F (36.2  C) Temp src: Axillary BP: 110/72 Pulse: 56 Heart Rate: 58 Resp: 17 SpO2: 100 % O2 Device: None (Room air) Oxygen Delivery: 1 LPM Height: 180.3 cm (5' 11\") Weight: 58.9 kg (129 lb 13.6 oz)  Estimated body mass index is 18.11 kg/m  as calculated from the following:    Height as of this encounter: 1.803 m (5' 11\").    Weight as of this encounter: 58.9 kg (129 lb 13.6 oz).    Alert, oriented x3  CN II-XII intact  5/5 strength on right " side, 4/5 on left side  Sensation intact to light touch         Discharge Medications:     Discharge Medication List as of 2/28/2020  9:42 AM      START taking these medications    Details   !! dexamethasone (DECADRON) 1 MG tablet Take 1 tablet (1 mg) by mouth every 12 hours for 2 days, Disp-4 tablet, R-0, E-Prescribe      !! dexamethasone (DECADRON) 1.5 MG tablet Take 2 tablets (3 mg) by mouth every 12 hours for 2 days, Disp-8 tablet, R-0, E-Prescribe      !! dexamethasone (DECADRON) 2 MG tablet Take 1 tablet (2 mg) by mouth every 12 hours for 2 days, Disp-4 tablet, R-0, E-Prescribe      !! dexamethasone (DECADRON) 4 MG tablet Take 1 tablet (4 mg) by mouth every 12 hours for 2 days, Disp-4 tablet, R-0, E-Prescribe      levETIRAcetam (KEPPRA) 500 MG tablet Take 1 tablet (500 mg) by mouth 2 times daily for 7 days, Disp-14 tablet, R-0, E-Prescribe      pantoprazole (PROTONIX) 40 mg IV push injection Inject 40 mg into the vein daily for 8 days, Disp-80 each, R-0, Local Print       !! - Potential duplicate medications found. Please discuss with provider.      CONTINUE these medications which have NOT CHANGED    Details   acetaminophen (TYLENOL) 325 MG tablet Take 2 tablets (650 mg) by mouth every 4 hours as needed for mild pain, fever or headaches (> 101 F), TransitionalIndication: Mild Pain; Fever; Headache      hydrOXYzine (ATARAX) 25 MG tablet Take 1-2 tablets (25-50 mg) by mouth 3 times daily as needed for anxiety, Disp-20 tablet, R-0, E-Prescribe      !! LORazepam (ATIVAN) 0.5 MG tablet Take 1 tablet (0.5 mg) by mouth every 4 hours as needed (Anxiety, Nausea/Vomiting or Sleep), Disp-30 tablet, R-2, Local Print      !! LORazepam (ATIVAN) 0.5 MG tablet Take 1 tablet (0.5 mg) by mouth every 6 hours as needed for anxiety, Disp-20 tablet, R-0, Local Print      melatonin 1 MG TABS tablet Take 1 tablet (1 mg) by mouth nightly as needed for sleep, Disp-30 tablet, R-0, E-Prescribe      ondansetron (ZOFRAN) 4 MG tablet Take  1 tablet (4 mg) by mouth every 8 hours as needed for nausea or vomiting, Disp-30 tablet, R-0, E-Prescribe      polyethylene glycol (MIRALAX/GLYCOLAX) packet Take 17 g by mouth daily as needed for constipation, Transitional      prochlorperazine (COMPAZINE) 10 MG tablet Take 1 tablet (10 mg) by mouth every 6 hours as needed (Nausea/Vomiting), Disp-30 tablet, R-2, E-Prescribe       !! - Potential duplicate medications found. Please discuss with provider.                  Discharge Instructions and Follow-Up:     Discharge diet: Regular   Discharge activity: You may advance activity as tolerated. No strenuous exercise or heay lifting greater than 10 lbs for 4 weeks or until seen and cleared in clinic.   Discharge follow-up: Follow-up with Dr. Nitish Quintero MD on 3/13/2020 at 3:30PM in Neurosurgery clinic.   Wound care: Ok to shower,however no scrubbing of the wound and no soaking of the wound, meaning no bathtubs or swimming pools. Pat dry only. Leave wound open to air.  Sutures are absorbable and do not need to be removed.     Please call if you have:  1. increased pain, redness, drainage, swelling at your incision  2. fevers > 101.5 F degrees  3. with any questions or concerns.  You may reach the Neurosurgery clinic at [phone] during regular work hours. ER at [phone].    and ask for the Neurosurgery Resident on call at [phone], during off hours or weekends.         Discharge Disposition:     Discharged to home

## 2020-02-28 NOTE — PLAN OF CARE
Discharge Planner PT   Patient plan for discharge: home     Current status: PT eval completed, tx indicated. Pt performs bed mobility and supine<>sit mod indep. Co some nausea. STS with supervision. Ambulated ~150' without AD, SBA. PT able to ramirez clothes with help for SO. AVSS on RA    Barriers to return to prior living situation: medical needs    Recommendations for discharge: home with prn support from SO     Rationale for recommendations: pt does not require A for mobility. Does report ongoing weakness, educated on remaining mobile while at home. Verbalized understanding, endorsed prior HEP from former rehab program.        Entered by: Deb Atwood 02/28/2020 9:33 AM

## 2020-02-28 NOTE — PLAN OF CARE
VSS. HR intermittently in 50s at rest. MD aware. Asymtomatic. Up with sba. R head Lap sites intact with dermabond. Denies any visual disturbances. Neuros intact. Moves all extremeties. Pain moderately managed with tylenol and oxycodone.  Reviewed discharge paperwork and medications. No further questions. Patient and wife able to provide teachback. Patient discharge home at 1045 am.

## 2020-02-28 NOTE — PLAN OF CARE
Discharge Planner OT   4A DEFER  Patient plan for discharge: home w family  Current status:  Deferring OT consult.  Acknowledge order placed for OT eval and treat.  Upon chart review and discussion with PT, no acute needs identified requiring skilled OT intervention.      Barriers to return to prior living situation: defer to PT  Recommendations for discharge: defer to PT  Rationale for recommendations: defer to PT       Entered by: Va Morris 02/28/2020 10:26 AM

## 2020-02-28 NOTE — PROGRESS NOTES
"Neurosurgery Progress Note    Subjective:  Doing well post-op, no acute events overnight    Objective:  /77   Pulse 55   Temp 97  F (36.1  C) (Axillary)   Resp 16   Ht 1.803 m (5' 11\")   Wt 58.9 kg (129 lb 13.6 oz)   SpO2 97%   BMI 18.11 kg/m    Alert, oriented x3  CN 2-12 intact  5/5 strength on right side, 4/5 on left side  Sensation intact to light touch      Assessment:  33 yo M with PMH melanoma metastatic to brain s/p OR 2/27 for intraoperative MRI / stealth assisted right laser ablation of brain metastasis.    Plan:  CTH post-op x2 completed  SBP < 140  Decadron 8 day taper for treatment of cerebral edema  keppra 1 week  Possible discharge home 2/28    Segundo Vera MD  Neurosurgery Resident PGY2    Please contact neurosurgery resident on call with questions.    Dial * * [phone], enter [phone] when prompted.       "

## 2020-02-29 NOTE — OP NOTE
Name: Dallin Stevens  MRN: [id number]  YOB: 1985  Date of Surgery: February 27, 2020     Pre-operative Diagnosis:  Right atrial brain metastasis  Post-operative Diagnosis:   Same  Procedure:    1) MRI guided laser ablation, right  2) Autoguide assisted neuro-navigation     Anesthesia:  GETA     Surgeon: Nitish Ragsdale MD, PhD  Assistant:   Shefali Sanchez MD     Indication:  34 M with a 2 cm melanoma brain metastasis s/p SRS who presents for a second stage laser thermal ablation with goal of improving local control     Description of Procedure: After pre-operative laboratory value and informed consent were verified, the patient was brought into the operating room. The patient underwent general anesthesia and intubation. Antibiotic was administered.    Based on the lesion, two optimal trajectories were identified, with goal of ablating 70% of the brain metastasis.        The patient was placed in a supine position, with the head turned to the left, exposing the right temporal cranium. This area was prepped in a sterile manner. Two planned entry points were located and made using the Stealth Autoguide. Laser ablation probes were then inserted to the prescribed depth.     Three ablations were performed in sequential depth in the first trajectory.  Two ablations were performed in sequential depth in the second trajectory.       The laser probe were removed after the ablation. The incision was irrigated and closed with Exofin.  A final intra-operative MRI was performed. No unexpected hemorrhage was observed on this MRI.     I was present and performed the key portions of the procedure.     EBL:   20 cc's    Specimens:   None     Disposition: ICU

## 2020-02-29 NOTE — PLAN OF CARE
Physical Therapy Discharge Summary    Reason for therapy discharge:    Discharged to home.    Progress towards therapy goal(s). See goals on Care Plan in [facility] electronic health record for goal details.  Goals not met.  Barriers to achieving goals:   discharge on same date as initial evaluation.    Therapy recommendation(s):    Continue home exercise program.

## 2020-03-02 NOTE — PROGRESS NOTES
Date: 3/3/2020 Status: [facility]   Time: 11:00 AM Length: 60   Visit Type: RETURN [657] EFRA: [id number]   Provider: Cristofer Gomes APRN CNP Department:  [facility]     Patient has clinic visit within 24-48 hours of Discharge so no post DC follow up call is needed

## 2020-03-03 NOTE — PROGRESS NOTES
Oncology/Hematology Visit Note  Mar 3, 2020    Reason for Visit: follow up of metastatic melanoma  With brain metastasis post resection and radiation previously treated on Keytruda and responded well.  Now progression of the disease he met with Dr. Tesfaye who recommended going back on Keytruda      Interval History:  Overall patient reports feeling well he had some headache with dexamethasone taper.  He denies seizures denies dizziness.  Denies any neurological symptoms.  Denies fever chills sweats cough shortness of breath chest pain    Review of Systems:  14 point ROS of systems including Constitutional, Eyes, Respiratory, Cardiovascular, Gastroenterology, Genitourinary, Integumentary, Muscularskeletal, Psychiatric were all negative except for pertinent positives noted in my HPI.      Current Outpatient Medications   Medication Sig Dispense Refill     acetaminophen (TYLENOL) 325 MG tablet Take 2 tablets (650 mg) by mouth every 4 hours as needed for mild pain, fever or headaches (> 101 F)       [START ON 3/5/2020] dexamethasone (DECADRON) 1 MG tablet Take 1 tablet (1 mg) by mouth every 12 hours for 2 days 4 tablet 0     dexamethasone (DECADRON) 1.5 MG tablet Take 2 tablets (3 mg) by mouth every 12 hours for 2 days 8 tablet 0     dexamethasone (DECADRON) 2 MG tablet Take 1 tablet (2 mg) by mouth every 12 hours for 2 days 4 tablet 0     hydrOXYzine (ATARAX) 25 MG tablet Take 1-2 tablets (25-50 mg) by mouth 3 times daily as needed for anxiety 20 tablet 0     levETIRAcetam (KEPPRA) 500 MG tablet Take 1 tablet (500 mg) by mouth 2 times daily for 7 days 14 tablet 0     LORazepam (ATIVAN) 0.5 MG tablet Take 1 tablet (0.5 mg) by mouth every 4 hours as needed (Anxiety, Nausea/Vomiting or Sleep) 30 tablet 2     LORazepam (ATIVAN) 0.5 MG tablet Take 1 tablet (0.5 mg) by mouth every 6 hours as needed for anxiety 20 tablet 0     melatonin 1 MG TABS tablet Take 1 tablet (1 mg) by mouth nightly as needed for sleep 30 tablet 0      "ondansetron (ZOFRAN) 4 MG tablet Take 1 tablet (4 mg) by mouth every 8 hours as needed for nausea or vomiting 30 tablet 0     oxyCODONE (ROXICODONE) 5 MG tablet Take 1 tablet (5 mg) by mouth every 6 hours as needed for pain 12 tablet 0     pantoprazole (PROTONIX) 40 mg IV push injection Inject 40 mg into the vein daily for 8 days 80 each 0     polyethylene glycol (MIRALAX/GLYCOLAX) packet Take 17 g by mouth daily as needed for constipation       prochlorperazine (COMPAZINE) 10 MG tablet Take 1 tablet (10 mg) by mouth every 6 hours as needed (Nausea/Vomiting) 30 tablet 2     dexamethasone (DECADRON) 4 MG tablet Take 1 tablet (4 mg) by mouth every 12 hours for 2 days 4 tablet 0       Physical Examination:  General: The patient is a pleasant male in no acute distress.  /69   Pulse 89   Temp 97.5  F (36.4  C) (Oral)   Resp 16   Ht 1.803 m (5' 11\")   Wt 57.4 kg (126 lb 9.6 oz)   SpO2 99%   BMI 17.66 kg/m    HEENT: EOMI, PERRL. Sclerae are anicteric. Oral mucosa is pink and moist with no lesions or thrush.   Lymph: Neck is supple with no lymphadenopathy in the cervical or supraclavicular areas.   Heart: Regular rate and rhythm.   Lungs: Clear to auscultation bilaterally.   GI: Bowel sounds present, soft, nontender with no palpable hepatosplenomegaly or masses.   Extremities: No lower extremity edema noted bilaterally.   Skin: No rashes, petechiae, or bruising noted on exposed skin.    Laboratory Data:  Results for orders placed or performed in visit on 03/03/20 (from the past 24 hour(s))   Comprehensive metabolic panel   Result Value Ref Range    Sodium 127 (L) 133 - 144 mmol/L    Potassium 3.8 3.4 - 5.3 mmol/L    Chloride 95 94 - 109 mmol/L    Carbon Dioxide 27 20 - 32 mmol/L    Anion Gap 5 3 - 14 mmol/L    Glucose 104 (H) 70 - 99 mg/dL    Urea Nitrogen 14 7 - 30 mg/dL    Creatinine 0.70 0.66 - 1.25 mg/dL    GFR Estimate >90 >60 mL/min/[1.73_m2]    GFR Estimate If Black >90 >60 mL/min/[1.73_m2]    Calcium " 8.9 8.5 - 10.1 mg/dL    Bilirubin Total 0.3 0.2 - 1.3 mg/dL    Albumin 3.4 3.4 - 5.0 g/dL    Protein Total 7.4 6.8 - 8.8 g/dL    Alkaline Phosphatase 73 40 - 150 U/L    ALT 11 0 - 70 U/L    AST 20 0 - 45 U/L   TSH with free T4 reflex   Result Value Ref Range    TSH 0.28 (L) 0.40 - 4.00 mU/L   T4 free   Result Value Ref Range    T4 Free 1.30 0.76 - 1.46 ng/dL   Sodium   Result Value Ref Range    Sodium 133 133 - 144 mmol/L         Assessment and Plan:    This is a 34-year-old male with      metastatic melanoma  With brain metastasis post resection and radiation previously treated on Keytruda and responded well.  Now progression of the disease he met with Dr. Tesfaye who recommended going back on Keytruda  Patient reports he has been tolerating Keytruda well in the past.  I reviewed his side effects of the Keytruda in detail with patient-patient agrees to proceed with treatment.  Patient advised to call our clinic with any side affects of the treatment or any changes in health condition  Labs reviewed okay to proceed with treatment    He was scheduled for PET scan on 03/02-for unknown reason it  was canceled  - reschedule  PET scan  Patient is scheduled with Dr. Tesfaye with next cycle of Keytruda          Brain metastasis  Patient is post resection with pathology showing metastatic melanoma. S/p gamma knife radiation 01/31/2020 and  laser ablation.   he is tapering down dexamethasone.  He has some headaches he will contact Dr Arshad   and see if he can stay on dexamethasone little longer  Patient advised to call our clinic or go to ER with any worsening of headache seizure dizziness, neurological deficits or any changes in health condition           SERGEI Bedoya [facility]     Chart documentation with Dragon Voice recognition Software. Although reviewed after completion, some words and grammatical errors may remain.

## 2020-03-03 NOTE — PROGRESS NOTES
"Oncology Rooming Note    March 3, 2020 10:56 AM   Dallin Stevens is a 34 year old male who presents for:    Chief Complaint   Patient presents with     Oncology Clinic Visit     Initial Vitals: /69   Pulse 89   Temp 97.5  F (36.4  C) (Oral)   Resp 16   Ht 1.803 m (5' 11\")   SpO2 99%   BMI 18.11 kg/m   Estimated body mass index is 18.11 kg/m  as calculated from the following:    Height as of this encounter: 1.803 m (5' 11\").    Weight as of 2/27/20: 58.9 kg (129 lb 13.6 oz). Body surface area is 1.72 meters squared.  Severe Pain (6) Comment: Data Unavailable   No LMP for male patient.  Allergies reviewed: Yes  Medications reviewed: Yes    Medications: MEDICATION REFILLS NEEDED TODAY. Provider was notified.  Pharmacy name entered into EPIC: [facility] - [place], [place] - [address] [place] [address]    Clinical concerns: Ativan refilled. Head hurts a lot.       Dena Ontiveros, [facility]            "

## 2020-03-03 NOTE — TELEPHONE ENCOUNTER
Cristofer Gomes would like to know why PET SCAN was canceled twice? I left message for the patient to call clinic. Note states on appt that spouse canceled and reschedule from 2/10/20 to 3/02/20. But 3/02/20 was canceled also. Let Cristofer Gomes know when patient calls back.

## 2020-03-03 NOTE — LETTER
"    3/3/2020         RE: Dallin Stevens  [address] [facility] [address]        Dear Colleague,    Thank you for referring your patient, Dallin Stevens, to the [facility]. Please see a copy of my visit note below.    Oncology Rooming Note    March 3, 2020 10:56 AM   Dallin Stevens is a 34 year old male who presents for:    Chief Complaint   Patient presents with     Oncology Clinic Visit     Initial Vitals: /69   Pulse 89   Temp 97.5  F (36.4  C) (Oral)   Resp 16   Ht 1.803 m (5' 11\")   SpO2 99%   BMI 18.11 kg/m    Estimated body mass index is 18.11 kg/m  as calculated from the following:    Height as of this encounter: 1.803 m (5' 11\").    Weight as of 2/27/20: 58.9 kg (129 lb 13.6 oz). Body surface area is 1.72 meters squared.  Severe Pain (6) Comment: Data Unavailable   No LMP for male patient.  Allergies reviewed: Yes  Medications reviewed: Yes    Medications: MEDICATION REFILLS NEEDED TODAY. Provider was notified.  Pharmacy name entered into [facility]: [facility] - [place], [place] - [address] [place] [address]    Clinical concerns: Ativan refilled. Head hurts a lot.       Dena Ontiveros, [facility]              Oncology/Hematology Visit Note  Mar 3, 2020    Reason for Visit: follow up of metastatic melanoma  With brain metastasis post resection and radiation previously treated on Keytruda and responded well.  Now progression of the disease he met with Dr. Tesfaye who recommended going back on Keytruda      Interval History:  Overall patient reports feeling well he had some headache with dexamethasone taper.  He denies seizures denies dizziness.  Denies any neurological symptoms.  Denies fever chills sweats cough shortness of breath chest pain    Review of Systems:  14 point ROS of systems including Constitutional, Eyes, Respiratory, Cardiovascular, Gastroenterology, Genitourinary, Integumentary, Muscularskeletal, Psychiatric were all negative except for pertinent positives " noted in my HPI.      Current Outpatient Medications   Medication Sig Dispense Refill     acetaminophen (TYLENOL) 325 MG tablet Take 2 tablets (650 mg) by mouth every 4 hours as needed for mild pain, fever or headaches (> 101 F)       [START ON 3/5/2020] dexamethasone (DECADRON) 1 MG tablet Take 1 tablet (1 mg) by mouth every 12 hours for 2 days 4 tablet 0     dexamethasone (DECADRON) 1.5 MG tablet Take 2 tablets (3 mg) by mouth every 12 hours for 2 days 8 tablet 0     dexamethasone (DECADRON) 2 MG tablet Take 1 tablet (2 mg) by mouth every 12 hours for 2 days 4 tablet 0     hydrOXYzine (ATARAX) 25 MG tablet Take 1-2 tablets (25-50 mg) by mouth 3 times daily as needed for anxiety 20 tablet 0     levETIRAcetam (KEPPRA) 500 MG tablet Take 1 tablet (500 mg) by mouth 2 times daily for 7 days 14 tablet 0     LORazepam (ATIVAN) 0.5 MG tablet Take 1 tablet (0.5 mg) by mouth every 4 hours as needed (Anxiety, Nausea/Vomiting or Sleep) 30 tablet 2     LORazepam (ATIVAN) 0.5 MG tablet Take 1 tablet (0.5 mg) by mouth every 6 hours as needed for anxiety 20 tablet 0     melatonin 1 MG TABS tablet Take 1 tablet (1 mg) by mouth nightly as needed for sleep 30 tablet 0     ondansetron (ZOFRAN) 4 MG tablet Take 1 tablet (4 mg) by mouth every 8 hours as needed for nausea or vomiting 30 tablet 0     oxyCODONE (ROXICODONE) 5 MG tablet Take 1 tablet (5 mg) by mouth every 6 hours as needed for pain 12 tablet 0     pantoprazole (PROTONIX) 40 mg IV push injection Inject 40 mg into the vein daily for 8 days 80 each 0     polyethylene glycol (MIRALAX/GLYCOLAX) packet Take 17 g by mouth daily as needed for constipation       prochlorperazine (COMPAZINE) 10 MG tablet Take 1 tablet (10 mg) by mouth every 6 hours as needed (Nausea/Vomiting) 30 tablet 2     dexamethasone (DECADRON) 4 MG tablet Take 1 tablet (4 mg) by mouth every 12 hours for 2 days 4 tablet 0       Physical Examination:  General: The patient is a pleasant male in no acute  "distress.  /69   Pulse 89   Temp 97.5  F (36.4  C) (Oral)   Resp 16   Ht 1.803 m (5' 11\")   Wt 57.4 kg (126 lb 9.6 oz)   SpO2 99%   BMI 17.66 kg/m     HEENT: EOMI, PERRL. Sclerae are anicteric. Oral mucosa is pink and moist with no lesions or thrush.   Lymph: Neck is supple with no lymphadenopathy in the cervical or supraclavicular areas.   Heart: Regular rate and rhythm.   Lungs: Clear to auscultation bilaterally.   GI: Bowel sounds present, soft, nontender with no palpable hepatosplenomegaly or masses.   Extremities: No lower extremity edema noted bilaterally.   Skin: No rashes, petechiae, or bruising noted on exposed skin.    Laboratory Data:  Results for orders placed or performed in visit on 03/03/20 (from the past 24 hour(s))   Comprehensive metabolic panel   Result Value Ref Range    Sodium 127 (L) 133 - 144 mmol/L    Potassium 3.8 3.4 - 5.3 mmol/L    Chloride 95 94 - 109 mmol/L    Carbon Dioxide 27 20 - 32 mmol/L    Anion Gap 5 3 - 14 mmol/L    Glucose 104 (H) 70 - 99 mg/dL    Urea Nitrogen 14 7 - 30 mg/dL    Creatinine 0.70 0.66 - 1.25 mg/dL    GFR Estimate >90 >60 mL/min/[1.73_m2]    GFR Estimate If Black >90 >60 mL/min/[1.73_m2]    Calcium 8.9 8.5 - 10.1 mg/dL    Bilirubin Total 0.3 0.2 - 1.3 mg/dL    Albumin 3.4 3.4 - 5.0 g/dL    Protein Total 7.4 6.8 - 8.8 g/dL    Alkaline Phosphatase 73 40 - 150 U/L    ALT 11 0 - 70 U/L    AST 20 0 - 45 U/L   TSH with free T4 reflex   Result Value Ref Range    TSH 0.28 (L) 0.40 - 4.00 mU/L   T4 free   Result Value Ref Range    T4 Free 1.30 0.76 - 1.46 ng/dL   Sodium   Result Value Ref Range    Sodium 133 133 - 144 mmol/L         Assessment and Plan:    This is a 34-year-old male with      metastatic melanoma  With brain metastasis post resection and radiation previously treated on Keytruda and responded well.  Now progression of the disease he met with Dr. Tesfaye who recommended going back on Keytruda  Patient reports he has been tolerating Keytruda well in " the past.  I reviewed his side effects of the Keytruda in detail with patient-patient agrees to proceed with treatment.  Patient advised to call our clinic with any side affects of the treatment or any changes in health condition  Labs reviewed okay to proceed with treatment    He was scheduled for PET scan on 03/02-for unknown reason it  was canceled  - reschedule  PET scan  Patient is scheduled with Dr. Tesfaye with next cycle of Keytruda          Brain metastasis  Patient is post resection with pathology showing metastatic melanoma. S/p gamma knife radiation 01/31/2020 and  laser ablation.   he is tapering down dexamethasone.  He has some headaches he will contact Dr Arshad   and see if he can stay on dexamethasone little longer  Patient advised to call our clinic or go to ER with any worsening of headache seizure dizziness, neurological deficits or any changes in health condition           SERGEI Bedoya CNP  [facility]     Chart documentation with Dragon Voice recognition Software. Although reviewed after completion, some words and grammatical errors may remain.          Again, thank you for allowing me to participate in the care of your patient.        Sincerely,        SERGEI Bedoya CNP

## 2020-03-06 NOTE — TELEPHONE ENCOUNTER
Patient has not completed the PET scan that was part of discharge on 2/04/20.  I called imaging and patient's wife rescheduled one PET appointment.  Then patient called on second PET appointment same day to cancel.

## 2020-03-14 NOTE — PROGRESS NOTES
Neurosurgery Clinic Note    Post-operative visit    The patient underwent laser thermal ablation of a right atrial brain metastasis on 2/27/2020. The procedure was performed without complications. The patient did well post-operatively and was discharged home on POD1.    He was scheduled to return to clinic today. However, because of concerns related to COVID-19, the visit was converted into a phone consult.     The patient has no new complaints, is afebrile, and otherwise doing well.    I have evaluated an uploaded image of the patient's incisions. There is no evidence of infection.    I look forward to the patient's scheduled MRI this upcoming week and will evaluate the study.

## 2020-03-17 NOTE — TELEPHONE ENCOUNTER
[facility] Call Center    Phone Message    May a detailed message be left on voicemail: yes     Reason for Call: Other: patient calling requesting the status on the [employer] forms that were faxed to Dr. Quintero from [facility]. Please call to discuss thank you.      Action Taken: Message routed to:  [facility] neurosurgery    Travel Screening: Not Applicable

## 2020-03-19 NOTE — TELEPHONE ENCOUNTER
Patient needs to reschedule PET scan.  It has been canceled twice with radiology. Patient has an upcoming appointment with Dr. Tesfaye on 3/25/20.

## 2020-03-20 NOTE — TELEPHONE ENCOUNTER
[facility] Call Center    Phone Message    May a detailed message be left on voicemail: yes     Reason for Call: Other: pt calling because she has yet to hear back in regards to the paperwork regarding her FMLA. Please call the pt back as soon as possible to discuss.      Action Taken: Message routed to:  [facility] neurosurgery    Travel Screening: Not Applicable

## 2020-03-24 NOTE — TELEPHONE ENCOUNTER
[facility] Call Center    Phone Message    May a detailed message be left on voicemail: no     Reason for Call: Form or Letter   Type or form/letter needing completion: [facility]  Provider: Dr. Quintero  Date form needed: 3/27/20, due date of form  Once completed: Info to send back is on the form; 3rd call from patient to complete this. Please call Pt back to confirm completion and/or to ask any questions.      Action Taken: Message routed to:  [facility]UROSURG ADULT [facility]    Travel Screening: Not Applicable

## 2020-03-25 PROBLEM — C71.1 MALIGNANT NEOPLASM OF FRONTAL LOBE OF BRAIN (H): Status: RESOLVED | Noted: 2020-01-01 | Resolved: 2020-01-01

## 2020-03-25 PROBLEM — G93.9 BRAIN LESION: Status: RESOLVED | Noted: 2020-01-01 | Resolved: 2020-01-01

## 2020-03-25 PROBLEM — G93.6 VASOGENIC EDEMA (H): Status: RESOLVED | Noted: 2020-01-01 | Resolved: 2020-01-01

## 2020-03-25 PROBLEM — I61.9 INTRACEREBRAL HEMORRHAGE (H): Status: RESOLVED | Noted: 2020-01-01 | Resolved: 2020-01-01

## 2020-03-25 NOTE — LETTER
3/25/2020         RE: Dallin Stevens  [address] [facility] [id number]        Dear Colleague,    Thank you for referring your patient, Dallin Stevens, to the [facility]. Please see a copy of my visit note below.    Visit Date:   03/25/2020      SUBJECTIVE:  Mr. Stevens is a 34-year-old gentleman with metastatic melanoma.  He was started on pembrolizumab on 02/11/2020.  He has received 2 cycles and tolerating it well.      He was scheduled for PET scan.  He has canceled it twice.      He received Gamma Knife treatment to brain lesion between 01/27 on 01/31.      Overall, he is doing good.  No headache.  No dizziness.  No ear pain, sore throat.  No neck pain.  No chest pain, no shortness of breath.  Sometimes he has abdominal bloating.  He has not had abdominal pain.  Occasionally, some heartburn.  No abdominal distention.  No urinary or bowel complaints.  No bleeding.  No fever, chills or night sweats.  He has not been falling.      PHYSICAL EXAMINATION:     Unchanged.  ECOG PS of 1.   NECK:  Supple.  There is some lymph node, mainly on the right side of the neck and right supraclavicular area.      LABORATORY DATA:  Reviewed.      ASSESSMENT:  A 34-year-old gentleman with recurrent melanoma.      PLAN:   1.  The patient is on pembrolizumab.  He is tolerating it well.  He will continue on it.  Side effects reviewed.   2.  Discussed regarding getting a scan.  He will be getting cycle 3 of pembrolizumab.  We will plan on getting a PET scan after cycle 4.  He is agreeable for it.   3.  He has some abdominal bloating.  He used to be on dexamethasone, which was stopped.  He could have some gastritis.  Advised him to take over-the-counter Pepcid or Prilosec.   4.  The patient will be seen back with next treatment.  He will call with any questions or concerns.         JULIANNA DOMINGUEZ MD             D: 03/25/2020   T: 03/25/2020   MT: WT      Name:     DALLIN STEVENS   MRN:      [id number]         Account:      [id number]   :      1985           Visit Date:   2020      Document: [id number]       Again, thank you for allowing me to participate in the care of your patient.        Sincerely,        Vero Tesafye MD

## 2020-03-25 NOTE — PATIENT INSTRUCTIONS
1. Continue pembrolizumab.  2. Follow-up with next treatment.    Patient is in [facility]

## 2020-03-25 NOTE — PROGRESS NOTES
Infusion Nursing Note:  Dallin Stevens presents today for Cycle 3 Day 1 Keytruda.    Patient seen by provider today: Yes: Dr. Tesfaye   present during visit today: Not Applicable.    Note: N/A.    Intravenous Access:  Peripheral IV placed.    Treatment Conditions:  Lab Results   Component Value Date     03/25/2020                   Lab Results   Component Value Date    POTASSIUM 3.7 03/25/2020           Lab Results   Component Value Date    MAG 2.3 02/28/2020            Lab Results   Component Value Date    CR 0.72 03/25/2020                   Lab Results   Component Value Date    ABUNDIO 8.7 03/25/2020                Lab Results   Component Value Date    BILITOTAL 0.3 03/25/2020           Lab Results   Component Value Date    ALBUMIN 3.0 03/25/2020                    Lab Results   Component Value Date    ALT 14 03/25/2020           Lab Results   Component Value Date    AST 10 03/25/2020       Results reviewed, labs MET treatment parameters, ok to proceed with treatment.      Post Infusion Assessment:  Patient tolerated infusion without incident.  Blood return noted pre and post infusion.  Site patent and intact, free from redness, edema or discomfort.  No evidence of extravasations.  Access discontinued per protocol.       Discharge Plan:   Patient declined prescription refills.  Discharge instructions reviewed with: Patient.  Patient verbalized understanding of discharge instructions and all questions answered.  AVS to patient via [employer].  Patient will return 4/15 for next appointment.   Patient discharged in stable condition accompanied by: self.  Departure Mode: Ambulatory.    Angle Johnson RN

## 2020-03-25 NOTE — PROGRESS NOTES
Visit Date:   03/25/2020     ONCOLOGY HISTORY: Mr. Stevens is a 34-year-old male with metastatic melanoma.    1.  He had superficial spreading melanoma in the left flank in 2012.   -On 02/03/2012, biopsy of left flank skin lesion revealed 1.7 mm superficial spreading melanoma without ulceration.  -On 02/23/2012, he had left flank wide local excision and left axillary sentinel node biopsy. One of two lymph node positive. He had stage III (pT2a pN1a M0) disease.    -On 03/02/2012, left axillary radical lymph node dissection was done. All 23 lymph nodes benign.  - He received 4 weeks of high-dose interferon between 04/02/2012 and 04/27/2012.      2.  On 01/25/2017, CTrevealed metastatic disease involving lymph nodes in right supraclavicular , mediastinum and right hilum. 3 small pulmonary nodules are present.  -On 01/26/2017, biopsy of right supraclavicular lymph node revealed metastatic melanoma.  BRAF positive.    -He received pembrolizumab between 02/08/2017 and 11/08/2017. Patient did not want any further treatment.  -PET scan on 06/28/2017 revealed significant improvement.  -PET scan on 11/29/2017 revealed new focus of mild FDG uptake within a 6 mm right paratracheal lymph node. No other evidence of hypermetabolic lymphadenopathy on this exam.   -PET scan on 10/02/2018 revealed new hypermetabolic lymphadenopathy in the supraclavicular region of the left neck and in the mediastinum.      3.  MRI of the brain on 04/24/2019 revealed intraparenchymal mass within the right parietal occipital lobe.    -He had a craniotomy and resection of a brain tumor on 04/26/2019.  Pathology was consistent with melanoma.     -The patient was recommended further treatment including postoperative radiation to the brain.  He did not want any other treatment. Refused radiation.  -PET scan on 07/23/2019 revealed progression of disease. New hypermetabolic lymphadenopathy seen in the right supraclavicular fossa and anterior  mediastinum.     4. MRI brain on 11/25/2019 reveals metastasis in left posterior frontal lobe, right peritrigonal region and right posterior frontal parafalcine lesion.    -PET scan on 11/25/2019 reveals progression of his disease.  There are multiple new hypermetabolic subcutaneous nodules throughout the body.  No change in size of mediastinal lymphadenopathy.      5. Admitted on 01/06/2020 in the [facility] system because of left-sided weakness.    -Brain MRI on 01/06/2020 revealed a 3.3 cm hemorrhagic mass in the right frontal gyrus and other brain metastasis.  -On 01/16/2020, he had right frontal craniotomy and resection of the tumor.  Pathology revealed metastatic melanoma.  -He received gamma knife brain radiation between 01/27/2020 and 01/31/2020 to 5 brain lesions. 2500 cGy in 5 fractions.     6.  Pembrolizumab started on 02/11/2020.      SUBJECTIVE:  Mr. Stevens is a 34-year-old gentleman with metastatic melanoma.  He was started on pembrolizumab on 02/11/2020.  He has received 2 cycles and tolerating it well.      He was scheduled for PET scan.  He has canceled it twice.      He received Gamma Knife treatment to brain lesion between 01/27 on 01/31.      Overall, he is doing good.  No headache.  No dizziness.  No ear pain or sore throat.  No neck pain.  No chest pain or no shortness of breath.  Sometimes he has abdominal bloating.  He has not had abdominal pain.  Occasionally, some heartburn.  No abdominal distention.  No urinary or bowel complaints.  No bleeding.  No fever, chills or night sweats.  He has not been falling.      PHYSICAL EXAMINATION:   GENERAL:  Alert and oriented x 3.   VITAL SIGNS:  Reviewed.  ECOG PS of 1.     EYES:  No icterus.   THROAT:  No ulcer. No thrush.   NECK:  Supple. Non-tender. There is some lymph node, mainly on the right side of the neck and right supraclavicular area.   AXILLAE:  No lymphadenopathy.   LUNGS:  Good air entry bilaterally.  No crackles or wheezing.   HEART:   Regular.  No murmur.   GI:  Soft.  Nontender. No mass.   EXTREMITIES:  No pedal edema.  No calf swelling or tenderness.   SKIN:  There are multiple firm nodules under the skin      LABORATORY DATA:  Reviewed.      ASSESSMENT:  A 34-year-old gentleman with recurrent melanoma.      PLAN:   1.  The patient is on pembrolizumab.  He is tolerating it well.  He will continue on it.  Side effects reviewed.   2.  Discussed regarding getting a scan.  He will be getting cycle 3 of pembrolizumab.  We will plan on getting a PET scan after cycle 4.  He is agreeable for it.   3.  He has some abdominal bloating.  He used to be on dexamethasone, which was stopped.  He could have some gastritis.  Advised him to take over-the-counter Pepcid or Prilosec.   4.  The patient will be seen back with next treatment.  He will call with any questions or concerns.         JULIANNA DOMINGUEZ MD             D: 2020   T: 2020   MT: WT      Name:     JEANIE HERNANDEZ   MRN:      [id number]        Account:      [id number]   :      1985           Visit Date:   2020      Document: [id number]

## 2020-03-30 NOTE — TELEPHONE ENCOUNTER
[facility] paperwork has been received. Will have it completed and signed by Dr. Tesfaye this week.    Olamide Celeste RN

## 2020-04-01 NOTE — TELEPHONE ENCOUNTER
Writer called patient to review disability form and have a few questions answered. Patient stated he would have to call me back but appreciated the call.    Wrier will wait for a return call.     Olamide Celeste RN

## 2020-04-01 NOTE — TELEPHONE ENCOUNTER
Patient called stating that she has been denied short term disability, they told her it was due to insufficient information, Patient is wondering when and if the forms were ever filled out and sent, please call to discuss thank you.

## 2020-04-03 NOTE — PROGRESS NOTES
FOLLOW-UP VISIT    Patient Name: Dallin Stevens      : 1985     Age: 34 year old        ______________________________________________________________________________     Chief Complaint   Patient presents with     Cancer     There were no vitals taken for this visit.     Date Radiation Completed: 2020  Gamma knife mask x3 completed 2020, mask x5 completed 2020    Currently on Keytruda    Pain  Denies    Labs  Other Labs: Yes:     Lab Results   Component Value Date    WBC 5.8 2020     Lab Results   Component Value Date    RBC 3.40 2020     Lab Results   Component Value Date    HGB 9.4 2020     Lab Results   Component Value Date    HCT 30.3 2020     No components found for: MCT  Lab Results   Component Value Date    MCV 89 2020     Lab Results   Component Value Date    MCH 27.6 2020     Lab Results   Component Value Date    MCHC 31.0 2020     Lab Results   Component Value Date    RDW 13.0 2020     Lab Results   Component Value Date     2020         Imaging  MRI: 3/27/2020          Other Appointments:     MD Name: Dr. Tesfaye Appointment Date: 4/15/2020   MD Name: Appointment Date:   MD Name: Appointment Date:   Other Appointment Notes:     Residual Radiation side effect: Fatigue    Additional Instructions:     Nurse face-to-face time: Level 3:  10 min face to face time      Dallin Stevens is a 34 year old male who is being evaluated via a billable telephone visit.

## 2020-04-03 NOTE — TELEPHONE ENCOUNTER
"Patient called with complaints of shortness of breath that is worse when lying down.  He states that \"it feels like I'm not getting enough oxygen.\"  Dallin reports it has been getting worse since his visit on 3/25.  Denies fever or chills.  States he has had a non-productive cough for months since radiation.  Currently getting Keytruda for metastatic melanoma.  He states this sensation makes him anxious and that he takes Ativan for occasionally.    Discussed with Cristofer Gomes NP who recommended patient go to the ED to be evaluated for concern of a pulmonary embolism.    Called patient back to relay recommendation from Cristofer.  Patient verbalized understanding, but reports that he would like to talk it over with his wife.    Edward Almanza, MAICOLN, RN, OCN  Oncology Care Coordinator  [facility]  "

## 2020-04-03 NOTE — PROGRESS NOTES
Visit Date:   03/25/2020     ONCOLOGY HISTORY: Mr. Stevens is a 34-year-old male with metastatic melanoma.    1.  He had superficial spreading melanoma in the left flank in 2012.   -On 02/03/2012, biopsy of left flank skin lesion revealed 1.7 mm superficial spreading melanoma without ulceration.  -On 02/23/2012, he had left flank wide local excision and left axillary sentinel node biopsy. One of two lymph node positive. He had stage III (pT2a pN1a M0) disease.    -On 03/02/2012, left axillary radical lymph node dissection was done. All 23 lymph nodes benign.  - He received 4 weeks of high-dose interferon between 04/02/2012 and 04/27/2012.      2.  On 01/25/2017, CTrevealed metastatic disease involving lymph nodes in right supraclavicular , mediastinum and right hilum. 3 small pulmonary nodules are present.  -On 01/26/2017, biopsy of right supraclavicular lymph node revealed metastatic melanoma.  BRAF positive.    -He received pembrolizumab between 02/08/2017 and 11/08/2017. Patient did not want any further treatment.  -PET scan on 06/28/2017 revealed significant improvement.  -PET scan on 11/29/2017 revealed new focus of mild FDG uptake within a 6 mm right paratracheal lymph node. No other evidence of hypermetabolic lymphadenopathy on this exam.   -PET scan on 10/02/2018 revealed new hypermetabolic lymphadenopathy in the supraclavicular region of the left neck and in the mediastinum.      3.  MRI of the brain on 04/24/2019 revealed intraparenchymal mass within the right parietal occipital lobe.    -He had a craniotomy and resection of a brain tumor on 04/26/2019.  Pathology was consistent with melanoma.     -The patient was recommended further treatment including postoperative radiation to the brain.  He did not want any other treatment. Refused radiation.  -PET scan on 07/23/2019 revealed progression of disease. New hypermetabolic lymphadenopathy seen in the right supraclavicular fossa and anterior  mediastinum.     4. MRI brain on 11/25/2019 reveals metastasis in left posterior frontal lobe, right peritrigonal region and right posterior frontal parafalcine lesion.    -PET scan on 11/25/2019 reveals progression of his disease.  There are multiple new hypermetabolic subcutaneous nodules throughout the body.  No change in size of mediastinal lymphadenopathy.      5. Admitted on 01/06/2020 in the [facility] system because of left-sided weakness.    -Brain MRI on 01/06/2020 revealed a 3.3 cm hemorrhagic mass in the right frontal gyrus and other brain metastasis.  -On 01/16/2020, he had right frontal craniotomy and resection of the tumor.  Pathology revealed metastatic melanoma.  -He received gamma knife brain radiation between 01/27/2020 and 01/31/2020 to 5 brain lesions. 2500 cGy in 5 fractions.     6.  Pembrolizumab started on 02/11/2020.      SUBJECTIVE:  Mr. Stevens is a 34-year-old gentleman with metastatic melanoma.  He was started on pembrolizumab on 02/11/2020.  He has received 2 cycles and tolerating it well.      He was scheduled for PET scan.  He has canceled it twice.      He received Gamma Knife treatment to brain lesion between 01/27 on 01/31.      Overall, he is doing good.  No headache.  No dizziness.  No ear pain or sore throat.  No neck pain.  No chest pain or no shortness of breath.  Sometimes he has abdominal bloating.  He has not had abdominal pain.  Occasionally, some heartburn.  No abdominal distention.  No urinary or bowel complaints.  No bleeding.  No fever, chills or night sweats.  He has not been falling.      PHYSICAL EXAMINATION:   GENERAL:  Alert and oriented x 3.   VITAL SIGNS:  Reviewed.  ECOG PS of 1.     EYES:  No icterus.   THROAT:  No ulcer. No thrush.   NECK:  Supple. Non-tender. There is some lymph node, mainly on the right side of the neck and right supraclavicular area.   AXILLAE:  No lymphadenopathy.   LUNGS:  Good air entry bilaterally.  No crackles or wheezing.   HEART:   Regular.  No murmur.   GI:  Soft.  Nontender. No mass.   EXTREMITIES:  No pedal edema.  No calf swelling or tenderness.   SKIN:  There are multiple firm nodules under the skin      LABORATORY DATA:  Reviewed.      ASSESSMENT:  A 34-year-old gentleman with recurrent melanoma.      PLAN:   1.  The patient is on pembrolizumab.  He is tolerating it well.  He will continue on it.  Side effects reviewed.   2.  Discussed regarding getting a scan.  He will be getting cycle 3 of pembrolizumab.  We will plan on getting a PET scan after cycle 4.  He is agreeable for it.   3.  He has some abdominal bloating.  He used to be on dexamethasone, which was stopped.  He could have some gastritis.  Advised him to take over-the-counter Pepcid or Prilosec.   4.  The patient will be seen back with next treatment.  He will call with any questions or concerns.         JULIANNA DOMINGUEZ MD             D: 2020   T: 2020   MT: WT      Name:     JEANIE HERNANDEZ   MRN:      [id number]        Account:      [id number]   :      1985           Visit Date:   2020      Document: [id number]

## 2020-04-03 NOTE — PROGRESS NOTES
"Dallin Stevens is a 34 year old male who is being evaluated via a billable telephone visit.      The patient has been notified of following:     \"This telephone visit will be conducted via a call between you and your physician/provider. We have found that certain health care needs can be provided without the need for a physical exam.  This service lets us provide the care you need with a short phone conversation.  If a prescription is necessary we can send it directly to your pharmacy.  If lab work is needed we can place an order for that and you can then stop by our lab to have the test done at a later time.    If during the course of the call the physician/provider feels a telephone visit is not appropriate, you will not be charged for this service.\"     Patient has given verbal consent for Telephone visit?  Yes    Dallin Stevens complains of    Chief Complaint   Patient presents with     Cancer       I have reviewed and updated the patient's Past Medical History, Social History, Family History and Medication List.    ALLERGIES  Patient has no known allergies.    Additional provider notes:   Mr. Stevens reports that he has been doing well following completion of his recent Gamma Knife SRS. He continues to have some mild to moderate fatigue however he specifically denies any headaches, vision changes, nausea/vomiting, motor weakness or sensory disturbances.    I reviewed the results of his recent restaging brain MRI from 3/7/2020 with him. Briefly, this study demonstrates a response to therapy with a decrease in size of the treated lesions with the exception of slight enlargement of the left parietal lobe metastasis albeit with the caveat that there has been a small amount of hemorrhage in this lesion likely contributing to the change in size. There is a new small 4 mm focus within the left frontal lobe which likely represents additional metastatic disease. Additionally, there is a slight increase in size of the temporal " horn of the right lateral ventricle concerning for a possible trapped ventricle.    I discussed with Mr. Stevens that I will present his case at our upcoming CNS tumor board on Monday, 4/6/2020, and will call him back with any treatment recommendations by the group. For now, I will plan to obtain repeat brain imaging in approximately 1 month.    Phone call start time: 0944  Phone call end time: 0954  Phone call duration: 10 minutes    Jose Carlos Arshad MD/PhD    Dept of Radiation Oncology  [facility]

## 2020-04-03 NOTE — TELEPHONE ENCOUNTER
Called patient back to inquire on his decision.  He said that after talking to his wife, he decided to not go to the ED at this time.  He now states his symptoms are not acutely getting worse and that he's had them for a few weeks.    Instructed him to go to the ED if he develops worsening symptoms, chest pain, or fever.  Encouraged Dallin to call ahead if he goes to the ED to alert them of his symptoms.  Dallin verbalized understanding.    Edward Almanza, MAICOLN, RN, OCN  Oncology Care Coordinator  [facility]

## 2020-04-03 NOTE — PROGRESS NOTES
HPI      Review of Systems   Constitutional: Positive for malaise/fatigue. Negative for chills, diaphoresis, fever and weight loss.   HENT: Negative.    Eyes: Negative.    Respiratory: Negative.    Cardiovascular: Negative.    Gastrointestinal: Negative.    Genitourinary: Negative.    Musculoskeletal: Negative.    Skin: Negative.    Neurological: Negative.    Endo/Heme/Allergies: Negative.    Psychiatric/Behavioral: Negative.

## 2020-04-06 NOTE — TELEPHONE ENCOUNTER
"Writer spoke with patient this morning. Patient is requesting something for \"dry\" cough that is keeping him up at night. This cough has been going for a couple of months. Patient denies having fever or shortness of breath at this time.States he did not go to urgent care or emergency department on Friday he wanted to see if symptoms improved.     Patient requesting cough medicine to help him sleep at night to be sent to [facility] in [place].    Writer will send a message to Dr. Tesfaye to advise.    Olamide Ceelste RN    "

## 2020-04-06 NOTE — TELEPHONE ENCOUNTER
Dr. Tesfaye advise that patient be evaluated in the ED.    Writer called and spoke with patient and provided the above recommendation. Patient states he may not go to the ED tonight. I reinforced that Dr. Tesfaye recommends ED for evaluation due to being immunosuppressed.    Patient verbalized understanding.    Olamide Celeste RN

## 2020-04-06 NOTE — TELEPHONE ENCOUNTER
Called patient regarding appointment with neuropsychology. Discussed reasons for the referral and patient verbalized an understanding.  No further questions or concerns.

## 2020-04-09 NOTE — TELEPHONE ENCOUNTER
I got a call from Dr. Angie Torres from [facility]. She is Radiation oncologist.    Patient is admitted to [facility].  He does have a mediastinal mass which will require radiation.  She wanted to know how aggressive we should be given patient's metastatic melanoma.    I called and spoke to patient's wife Meg.  She mentioned that patient is responding to pembrolizumab.  Skin nodules are improving.  Wife wants patient to get radiation.  I also discussed with wife regarding any living will if patient had.  Patient does not have any living will. I will discuss this when I see the patient in the clinic.    Pembrolizumab will be held during radiation.

## 2020-04-14 NOTE — TUMOR CONFERENCE
Tumor Conference Information  Tumor Conference:  Brain  Specialties Present:  Medical oncology, Pathology, Radiology, Radiation oncology, Surgery  Patient Status:  A current patient  Pathology:  Not Discussed  Treatment to Date:  Chemoradiation, Surgical intervention(s), Biopsy  Clinical Trial Eligibility:  Not discussed  Recommended Plan:  Referral to (see comment), Continue with current treatment plan (Comment: Current imaging shows new frontal lobe lesion. Referral to Neuropsych. Repeat scan in 1 month.)  Did the review exceed 30 minutes?:  did not           Documentation / Disclaimer Cancer Tumor Board Note  Cancer tumor board recommendations do not override what is determined to be reasonable care and treatment, which is dependent on the circumstances of a patient's case; the patient's medical, social, and personal concerns; and the clinical judgment of the oncologist [physician].

## 2020-04-14 NOTE — PROGRESS NOTES
Medical Assistant Note:  Dallin Stevens presents today for LAB DRAW.    Patient seen by provider today: No.   present during visit today: Not Applicable.    Concerns: No Concerns.    Procedure:  Lab draw site: RAC, Needle type: Butterfly, Gauge: 23.    Post Assessment:  Labs drawn without difficulty: Yes.    Discharge Plan:  Departure Mode: Wheelchair.    Face to Face Time: 4 min.    Shari Schoenberger, CMA

## 2020-04-14 NOTE — TELEPHONE ENCOUNTER
"Patient is currently scheduled for an appointment at [facility] in [place].  Called patient to review current visitor restrictions and complete COVID-19 Patient Infection/Travel Screening Tool.     Due to the recent public health concerns around COVID-19 and in an effort to keep our patients and staff safe and healthy, we are implementing a screening process for the patients that come to our clinic.      I am going to ask you a few questions, please answer yes or no.  Your honesty about any symptoms is critical, as it keeps patients and staff healthy.      Do you have a:  Fever (or reported chills)?  No  Cough?  Yes - NOT NEW  Shortness of breath?  Yes - FROM CANCER  Rash?  No    In the last month, have you been in contact with someone who was confirmed or suspected to have Coronavirus/COVID-19?  No    Have you traveled internationally in the last month?  No  If so, where?  N/A     I also wanted to let you know that to protect our patients from the flu and other common illnesses, [facility] enforce visitor restrictions year round, but due to the community spread of COVID-19 in [place], we are taking additional precautionary steps to ensure the health of our patients.  At this time, NO visitors are allowed on our hospital and clinic campuses.     Patient PASSED the screening assessment.    Patient instructed to come to the clinic as planned for their scheduled appointment and to call the clinic if any symptoms develop prior to their appointment.    \"COVID-19 is contagious and can be dangerous for our patients and staff.  Please send us a Brndstrt message or call our clinic before coming in if you feel any of the following symptoms: fever, cough, congestion, runny nose, sore throat, muscle aches and pains, or shortness of breath.  If you are already at our clinic, it is very important that you be honest about any symptoms you are experiencing to ensure your safety and that of other " "patients and staff who treat you.  If you do have symptoms, we will have a nurse and/or provider asses you to determine next steps.\"    Shari J. Schoenberger, CMA on 4/14/2020 at 10:28 AM      "

## 2020-04-15 NOTE — PROGRESS NOTES
I certify that this patient, Dallin Stevens has been under my care (or a nurse practitioner or physican's assistant working with me). This is the face-to-face encounter for oxygen medical necessity.      Dallin Stevens is now in a chronic stable state and continues to require supplemental oxygen. Patient has continued oxygen desaturation due to Pulmonary Neoplasm C34.90.    Alternative treatment(s) tried or considered and deemed clinically infective for treatment of Pulmonary Neoplasm C34.90 include inhalers.  If portability is ordered, is the patient mobile within the home? yes    **Patients who qualify for home O2 coverage under the CMS guidelines require ABG tests or O2 sat readings obtained closest to, but no earlier than 2 days prior to the discharge, as evidence of the need for home oxygen therapy. Testing must be performed while patient is in the chronic stable state. See notes for O2 sats.**      Visit Date:   04/15/2020     ONCOLOGY HISTORY: Mr. Stevens is a 34-year-old male with metastatic melanoma.    1.  He had superficial spreading melanoma in the left flank in 2012.   -On 02/03/2012, biopsy of left flank skin lesion revealed 1.7 mm superficial spreading melanoma without ulceration.  -On 02/23/2012, he had left flank wide local excision and left axillary sentinel node biopsy. One of two lymph node positive. He had stage III (pT2a pN1a M0) disease.    -On 03/02/2012, left axillary radical lymph node dissection was done. All 23 lymph nodes benign.  - He received 4 weeks of high-dose interferon between 04/02/2012 and 04/27/2012.      2.  On 01/25/2017, CTrevealed metastatic disease involving lymph nodes in right supraclavicular , mediastinum and right hilum. 3 small pulmonary nodules are present.  -On 01/26/2017, biopsy of right supraclavicular lymph node revealed metastatic melanoma.  BRAF positive.    -He received pembrolizumab between 02/08/2017 and 11/08/2017. Patient did not want any further  treatment.  -PET scan on 06/28/2017 revealed significant improvement.  -PET scan on 11/29/2017 revealed new focus of mild FDG uptake within a 6 mm right paratracheal lymph node. No other evidence of hypermetabolic lymphadenopathy on this exam.   -PET scan on 10/02/2018 revealed new hypermetabolic lymphadenopathy in the supraclavicular region of the left neck and in the mediastinum.      3.  MRI of the brain on 04/24/2019 revealed intraparenchymal mass within the right parietal occipital lobe.    -He had a craniotomy and resection of a brain tumor on 04/26/2019.  Pathology was consistent with melanoma.     -The patient was recommended further treatment including postoperative radiation to the brain.  He did not want any other treatment. Refused radiation.  -PET scan on 07/23/2019 revealed progression of disease. New hypermetabolic lymphadenopathy seen in the right supraclavicular fossa and anterior mediastinum.     4. MRI brain on 11/25/2019 reveals metastasis in left posterior frontal lobe, right peritrigonal region and right posterior frontal parafalcine lesion.    -PET scan on 11/25/2019 reveals progression of his disease.  There are multiple new hypermetabolic subcutaneous nodules throughout the body.  No change in size of mediastinal lymphadenopathy.      5. Admitted on 01/06/2020 in the [facility] because of left-sided weakness.    -Brain MRI on 01/06/2020 revealed a 3.3 cm hemorrhagic mass in the right frontal gyrus and other brain metastasis.  -On 01/16/2020, he had right frontal craniotomy and resection of the tumor.  Pathology revealed metastatic melanoma.  -He received gamma knife brain radiation between 01/27/2020 and 01/31/2020 to 5 brain lesions. 2500 cGy in 5 fractions.      6.  Pembrolizumab started on 02/11/2020.     7. CT chest, abdomen and pelvis on 04/06/2020 revealed massive conglomerate of mediastinal lymphadenopathy. There is moderate-sized pleural effusion and large pericardial  effusion.    -Patient had pericardial window.  Cytology from pericardial effusion and pericardial biopsy is negative for malignancy.      SUBJECTIVE:  Mr. Stevens is a 34-year-old gentleman with metastatic melanoma.  He is currently on pembrolizumab since 02/2020.      The patient was recently admitted to [facility] on 04/06/2020.  He was admitted because of shortness of breath.  CT chest, abdomen and pelvis on 04/06/2020 revealed massive conglomerate of mediastinal lymphadenopathy within right upper mediastinum where it measured 11.1 cm.  There was some narrowing of the trachea.  There is bulky right axillary lymphadenopathy.  There is near occlusion of right innominate vein and moderate-sized pleural effusion and large pericardial effusion.  Echocardiogram confirmed large circumferential pericardial effusion with cardiac tamponade.  The patient had pericardial window.  Cytology from pericardial effusion was negative for malignancy.  Pericardial biopsy is negative for malignancy.      He was seen by Radiation Oncology.  Radiation was recommended.  He did not want it.      The patient presents to the clinic to continue his pembrolizumab.  His wife was on the phone.  The patient wants to continue pembrolizumab.  The patient also has been doing some alternative treatment.  He mentioned that he will be getting a vaccine in the next few weeks.      The patient has fatigue.  No headache.  No dizziness.  No ear pain or sore throat.  No neck pain.  No chest pain.  No shortness of breath at rest, but he gets short of breath on minimal exertion.  He would like to get some oxygen.  No abdominal pain.  No vomiting.  Appetite is fair.  No urinary or bowel complaints.      The patient mentioned that tumor is responding.  He has multiple subcutaneous nodules from melanoma.  He says that they are getting smaller.  As per the wife also, they are shrinking.      PHYSICAL EXAMINATION:   GENERAL:  The patient is alert, oriented x  3.   VITAL SIGNS: Reviewed. ECOG PS of 1.   The rest of the systems not examined.      LABORATORY DATA:  Reviewed.      ASSESSMENT:   1.  A 34-year-old gentleman with metastatic melanoma.   2.  Shortness of breath secondary to pleural and pericardial effusion.   3.  Fatigue secondary to malignancy and anemia.   4.  Normocytic anemia.   5.  Hyponatremia from malignancy.      PLAN:   1.  I had a long discussion with the patient.  Discussed regarding his metastatic melanoma.      The patient was supposed to get a PET scan before he started his pembrolizumab.  He did not get it.  I explained to the patient that CT scan done recently in [facility] reveals progression of disease compared to previous scans, but clinically he is responding as his subcutaneous nodules have all gotten smaller.      At this time, we will continue him on pembrolizumab.  We will plan on getting a scan in about 2 months.      2.  The patient has metastatic disease.  Discussed with him regarding healthcare directive.  Information regarding honoring choices given.  The patient at this time wants to continue with all the treatment.  He wants to be FULL CODE as of now.      3.  The patient has been doing alternative treatment.  The patient mentioned that he will be getting some vaccine.  It comes from [place].  I am not sure of what this vaccine is and what is the efficacy of it.  It is not FDA approved in the [place].       4.  The patient gets short of breath on minimal exertion.  We will arrange for home oxygen.      5.  He is anemic.  We will continue to monitor it.  Transfusion will be given if hemoglobin is below 7 or he is symptomatic.      6.  He had a few questions, which were all answered.  We will see him with the next treatment.      TOTAL FACE-TO-FACE TIME SPENT:  40 minutes, more than 50% of time spent in counseling and coordination of care.         JULIANNA DOMINGUEZ MD             D: 04/15/2020   T: 04/15/2020   MT:  LS      Name:     JEANIE HERNANDEZ   MRN:      [id number]        Account:      [id number]   :      1985           Visit Date:   04/15/2020      Document: [id number]

## 2020-04-15 NOTE — PATIENT INSTRUCTIONS
1. Continue pembrolizumab.  2. Arrange for home oxygen.  3. CBC today.  4. Information regarding health care directive given.  5. Follow-up with next treatment.    Patient in [facility]

## 2020-04-15 NOTE — Clinical Note
"    4/15/2020         RE: Dallin Stevens  [address] [facility] [id number]        Dear Colleague,    Thank you for referring your patient, Dallin Stevens, to the [facility]. Please see a copy of my visit note below.    Oncology Rooming Note    April 15, 2020 9:35 AM   Dallin Stevens is a 34 year old male who presents for:    Chief Complaint   Patient presents with     Oncology Clinic Visit     Initial Vitals: There were no vitals taken for this visit. Estimated body mass index is 17.94 kg/m  as calculated from the following:    Height as of 3/25/20: 1.803 m (5' 11\").    Weight as of 3/25/20: 58.3 kg (128 lb 9.6 oz). There is no height or weight on file to calculate BSA.  Data Unavailable Comment: Data Unavailable   No LMP for male patient.  Allergies reviewed: Yes  Medications reviewed: Yes    Medications: Medication refills not needed today.  Pharmacy name entered into [facility]: [facility] - [place], [place] - [address] [place] [address]    Clinical concerns:  doctor was notified.      Angelique Botello MA              Patient complains of feeling short of breath requesting oxygen for at home. Patient at rest is 96%, wth walking patient 02 saturations dropped to 88%.  RN applied 2 liters of oxygen per nasal canula and oxygen increased to 99%.    Writer will have Dr. Tesfaye write an order oxygen.     Olamide Celeste, RN      I certify that this patient, Dallin Stevens has been under my care (or a nurse practitioner or physican's assistant working with me). This is the face-to-face encounter for oxygen medical necessity.      Dallin Stevens is now in a chronic stable state and continues to require supplemental oxygen. Patient has continued oxygen desaturation due to Pulmonary Neoplasm C34.90.    Alternative treatment(s) tried or considered and deemed clinically infective for treatment of Pulmonary Neoplasm C34.90 include inhalers.  If portability is ordered, is the patient mobile within the " home? yes    **Patients who qualify for home O2 coverage under the CMS guidelines require ABG tests or O2 sat readings obtained closest to, but no earlier than 2 days prior to the discharge, as evidence of the need for home oxygen therapy. Testing must be performed while patient is in the chronic stable state. See notes for O2 sats.**      Visit Date:   04/15/2020     ONCOLOGY HISTORY: Mr. Stevens is a 34-year-old male with metastatic melanoma.    1.  He had superficial spreading melanoma in the left flank in 2012.   -On 02/03/2012, biopsy of left flank skin lesion revealed 1.7 mm superficial spreading melanoma without ulceration.  -On 02/23/2012, he had left flank wide local excision and left axillary sentinel node biopsy. One of two lymph node positive. He had stage III (pT2a pN1a M0) disease.    -On 03/02/2012, left axillary radical lymph node dissection was done. All 23 lymph nodes benign.  - He received 4 weeks of high-dose interferon between 04/02/2012 and 04/27/2012.      2.  On 01/25/2017, CTrevealed metastatic disease involving lymph nodes in right supraclavicular , mediastinum and right hilum. 3 small pulmonary nodules are present.  -On 01/26/2017, biopsy of right supraclavicular lymph node revealed metastatic melanoma.  BRAF positive.    -He received pembrolizumab between 02/08/2017 and 11/08/2017. Patient did not want any further treatment.  -PET scan on 06/28/2017 revealed significant improvement.  -PET scan on 11/29/2017 revealed new focus of mild FDG uptake within a 6 mm right paratracheal lymph node. No other evidence of hypermetabolic lymphadenopathy on this exam.   -PET scan on 10/02/2018 revealed new hypermetabolic lymphadenopathy in the supraclavicular region of the left neck and in the mediastinum.      3.  MRI of the brain on 04/24/2019 revealed intraparenchymal mass within the right parietal occipital lobe.    -He had a craniotomy and resection of a brain tumor on 04/26/2019.  Pathology was  consistent with melanoma.     -The patient was recommended further treatment including postoperative radiation to the brain.  He did not want any other treatment. Refused radiation.  -PET scan on 07/23/2019 revealed progression of disease. New hypermetabolic lymphadenopathy seen in the right supraclavicular fossa and anterior mediastinum.     4. MRI brain on 11/25/2019 reveals metastasis in left posterior frontal lobe, right peritrigonal region and right posterior frontal parafalcine lesion.    -PET scan on 11/25/2019 reveals progression of his disease.  There are multiple new hypermetabolic subcutaneous nodules throughout the body.  No change in size of mediastinal lymphadenopathy.      5. Admitted on 01/06/2020 in the [facility] because of left-sided weakness.    -Brain MRI on 01/06/2020 revealed a 3.3 cm hemorrhagic mass in the right frontal gyrus and other brain metastasis.  -On 01/16/2020, he had right frontal craniotomy and resection of the tumor.  Pathology revealed metastatic melanoma.  -He received gamma knife brain radiation between 01/27/2020 and 01/31/2020 to 5 brain lesions. 2500 cGy in 5 fractions.      6.  Pembrolizumab started on 02/11/2020.     7. CT chest, abdomen and pelvis on 04/06/2020 revealed massive conglomerate of mediastinal lymphadenopathy. There is moderate-sized pleural effusion and large pericardial effusion.    -Patient had pericardial window.  Cytology from pericardial effusion and pericardial biopsy is negative for malignancy.      SUBJECTIVE:  Mr. Stevens is a 34-year-old gentleman with metastatic melanoma.  He is currently on pembrolizumab since 02/2020.      The patient was recently admitted to [facility] on 04/06/2020.  He was admitted because of shortness of breath.  CT chest, abdomen and pelvis on 04/06/2020 revealed massive conglomerate of mediastinal lymphadenopathy within right upper mediastinum where it measured 11.1 cm.  There was some narrowing of the  trachea.  There is bulky right axillary lymphadenopathy.  There is near occlusion of right innominate vein and moderate-sized pleural effusion and large pericardial effusion.  Echocardiogram confirmed large circumferential pericardial effusion with cardiac tamponade.  The patient had pericardial window.  Cytology from pericardial effusion was negative for malignancy.  Pericardial biopsy is negative for malignancy.      He was seen by Radiation Oncology.  Radiation was recommended.  He did not want it.      The patient presents to the clinic to continue his pembrolizumab.  His wife was on the phone.  The patient wants to continue pembrolizumab.  The patient also has been doing some alternative treatment.  He mentioned that he will be getting a vaccine in the next few weeks.      The patient has fatigue.  No headache.  No dizziness.  No ear pain or sore throat.  No neck pain.  No chest pain.  No shortness of breath at rest, but he gets short of breath on minimal exertion.  He would like to get some oxygen.  No abdominal pain.  No vomiting.  Appetite is fair.  No urinary or bowel complaints.      The patient mentioned that tumor is responding.  He has multiple subcutaneous nodules from melanoma.  He says that they are getting smaller.  As per the wife also, they are shrinking.      PHYSICAL EXAMINATION:   GENERAL:  The patient is alert, oriented x 3.   VITAL SIGNS: Reviewed. ECOG PS of 1.   The rest of the systems not examined.      LABORATORY DATA:  Reviewed.      ASSESSMENT:   1.  A 34-year-old gentleman with metastatic melanoma.   2.  Shortness of breath secondary to pleural and pericardial effusion.   3.  Fatigue secondary to malignancy and anemia.   4.  Normocytic anemia.   5.  Hyponatremia from malignancy.      PLAN:   1.  I had a long discussion with the patient.  Discussed regarding his metastatic melanoma.      The patient was supposed to get a PET scan before he started his pembrolizumab.  He did not get it.   I explained to the patient that CT scan done recently in [facility] reveals progression of disease compared to previous scans, but clinically he is responding as his subcutaneous nodules have all gotten smaller.      At this time, we will continue him on pembrolizumab.  We will plan on getting a scan in about 2 months.      2.  The patient has metastatic disease.  Discussed with him regarding healthcare directive.  Information regarding honoring choices given.  The patient at this time wants to continue with all the treatment.  He wants to be FULL CODE as of now.      3.  The patient has been doing alternative treatment.  The patient mentioned that he will be getting some vaccine.  It comes from [place].  I am not sure of what this vaccine is and what is the efficacy of it.  It is not FDA approved in the [place].       4.  The patient gets short of breath on minimal exertion.  We will arrange for home oxygen.      5.  He is anemic.  We will continue to monitor it.  Transfusion will be given if hemoglobin is below 7 or he is symptomatic.      6.  He had a few questions, which were all answered.  We will see him with the next treatment.      TOTAL FACE-TO-FACE TIME SPENT:  40 minutes, more than 50% of time spent in counseling and coordination of care.         JULIANNA DOMINGUEZ MD             D: 04/15/2020   T: 04/15/2020   MT: MARILYNN      Name:     JEANIE STEVENS   MRN:      [id number]        Account:      [id number]   :      1985           Visit Date:   04/15/2020      Document: [id number]        Visit Date:   04/15/2020      SUBJECTIVE:  Mr. Stevens is a 34-year-old gentleman with metastatic melanoma.  He is currently on pembrolizumab since 2020.      The patient was recently admitted to [facility] on 2020.  He was admitted because of shortness of breath.  CT chest, abdomen and pelvis on 2020 revealed massive conglomerate of mediastinal lymphadenopathy within right upper  mediastinum where it measured 11.1 cm.  There was some narrowing of the trachea.  There is bulky right axillary lymphadenopathy.  There is near occlusion of right innominate vein and moderate-sized pleural effusion and large pericardial effusion.  Echocardiogram confirmed large circumferential pericardial effusion with cardiac tamponade.  The patient had pericardial window.  Cytology from pericardial effusion was negative for malignancy.  Pericardial biopsy is negative for malignancy.      He was seen by Radiation Oncology.  Radiation was recommended.  He did not want it.      The patient presents to the clinic to continue his pembrolizumab.  His wife was on the phone.  The patient wants to continue pembrolizumab.  The patient also has been doing some alternative treatment.  He mentioned that he will be getting a vaccine in the next few weeks.      The patient has fatigue.  No headache.  No dizziness.  No ear pain, sore throat.  No neck pain.  No chest pain.  No shortness of breath at rest, but he gets short of breath on minimal exertion.  He would like to get some oxygen.  No abdominal pain.  No vomiting.  Appetite is fair.  No urinary or bowel complaints.      The patient mentioned that tumor is responding.  He has multiple subcutaneous nodules from melanoma.  He thinks they are getting smaller.  As per the wife also, they are shrinking.      PHYSICAL EXAMINATION:   GENERAL:  The patient is alert, oriented x 3.   VITAL SIGNS:  ECOG PS of 1.   The rest of the systems not examined.      LABORATORY DATA:  Reviewed.      ASSESSMENT:   1.  A 34-year-old gentleman with metastatic melanoma.   2.  Shortness of breath secondary to pleural and pericardial effusion.   3.  Fatigue secondary to malignancy and anemia.   4.  Normocytic anemia.   5.  Hyponatremia from malignancy.      PLAN:   1.  I had a long discussion with the patient.  Discussed regarding his metastatic melanoma.      The patient was supposed to get a PET scan  before he started his pembrolizumab.  He did not get it.  I explained to the patient that CT scan done recently in [facility] reveals progression of disease compared to previous scans, but clinically is responding as his subcutaneous nodules have all gotten smaller.      At this time, we will continue him on pembrolizumab.  We will plan on getting a scan in about 2 months.      2.  The patient has metastatic disease.  Discussed with him regarding healthcare directive.  Information regarding honoring choices given.  The patient at this time wants to continue with all the treatment.  He wants to be FULL CODE as of now.      3.  The patient has been doing alternative treatment.  The patient mentioned that he will be getting some vaccine.  It comes from [place].  I am not sure of what this vaccine is and what is the efficacy of it.  It is not FDA approved in the [place].       4.  The patient gets short of breath on exertion.  We will arrange for home oxygen.      5.  He is anemic.  We will continue to monitor it.  Transfusion will be given if hemoglobin is below 7 or he is symptomatic.      6.  He had a few questions, which were all answered.  We will see him with the next treatment.      TOTAL FACE-TO-FACE TIME SPENT:  40 minutes, more than 50% of time spent in counseling and coordination of care.         JULIANNA DOMINGUEZ MD             D: 04/15/2020   T: 04/15/2020   MT: MARILYNN      Name:     JEANIE HERNANDEZ   MRN:      [id number]        Account:      [id number]   :      1985           Visit Date:   04/15/2020      Document: [id number]       Again, thank you for allowing me to participate in the care of your patient.        Sincerely,        Julianna Dominguez MD

## 2020-04-15 NOTE — PROGRESS NOTES
Visit Date:   04/15/2020      ONCOLOGY HISTORY: Mr. Stevens is a 34-year-old male with metastatic melanoma.    1.  He had superficial spreading melanoma in the left flank in 2012.   -On 02/03/2012, biopsy of left flank skin lesion revealed 1.7 mm superficial spreading melanoma without ulceration.  -On 02/23/2012, he had left flank wide local excision and left axillary sentinel node biopsy. One of two lymph node positive. He had stage III (pT2a pN1a M0) disease.    -On 03/02/2012, left axillary radical lymph node dissection was done. All 23 lymph nodes benign.  - He received 4 weeks of high-dose interferon between 04/02/2012 and 04/27/2012.      2.  On 01/25/2017, CTrevealed metastatic disease involving lymph nodes in right supraclavicular , mediastinum and right hilum. 3 small pulmonary nodules are present.  -On 01/26/2017, biopsy of right supraclavicular lymph node revealed metastatic melanoma.  BRAF positive.    -He received pembrolizumab between 02/08/2017 and 11/08/2017. Patient did not want any further treatment.  -PET scan on 06/28/2017 revealed significant improvement.  -PET scan on 11/29/2017 revealed new focus of mild FDG uptake within a 6 mm right paratracheal lymph node. No other evidence of hypermetabolic lymphadenopathy on this exam.   -PET scan on 10/02/2018 revealed new hypermetabolic lymphadenopathy in the supraclavicular region of the left neck and in the mediastinum.      3.  MRI of the brain on 04/24/2019 revealed intraparenchymal mass within the right parietal occipital lobe.    -He had a craniotomy and resection of a brain tumor on 04/26/2019.  Pathology was consistent with melanoma.     -The patient was recommended further treatment including postoperative radiation to the brain.  He did not want any other treatment. Refused radiation.  -PET scan on 07/23/2019 revealed progression of disease. New hypermetabolic lymphadenopathy seen in the right supraclavicular fossa and anterior  mediastinum.     4. MRI brain on 11/25/2019 reveals metastasis in left posterior frontal lobe, right peritrigonal region and right posterior frontal parafalcine lesion.    -PET scan on 11/25/2019 reveals progression of his disease.  There are multiple new hypermetabolic subcutaneous nodules throughout the body.  No change in size of mediastinal lymphadenopathy.      5. Admitted on 01/06/2020 in the [facility] because of left-sided weakness.    -Brain MRI on 01/06/2020 revealed a 3.3 cm hemorrhagic mass in the right frontal gyrus and other brain metastasis.  -On 01/16/2020, he had right frontal craniotomy and resection of the tumor.  Pathology revealed metastatic melanoma.  -He received gamma knife brain radiation between 01/27/2020 and 01/31/2020 to 5 brain lesions. 2500 cGy in 5 fractions.      6.  Pembrolizumab started on 02/11/2020.      7. CT chest, abdomen and pelvis on 04/06/2020 revealed massive conglomerate of mediastinal lymphadenopathy. There is moderate-sized pleural effusion and large pericardial effusion.    -Patient had pericardial window.  Cytology from pericardial effusion and pericardial biopsy is negative for malignancy.      SUBJECTIVE:  Mr. Stevens is a 34-year-old gentleman with metastatic melanoma.  He is currently on pembrolizumab since 02/2020.      The patient was recently admitted to [facility] on 04/06/2020.  He was admitted because of shortness of breath.  CT chest, abdomen and pelvis on 04/06/2020 revealed massive conglomerate of mediastinal lymphadenopathy within right upper mediastinum where it measured 11.1 cm.  There was some narrowing of the trachea.  There is bulky right axillary lymphadenopathy.  There is near occlusion of right innominate vein and moderate-sized pleural effusion and large pericardial effusion.  Echocardiogram confirmed large circumferential pericardial effusion with cardiac tamponade.  The patient had pericardial window.  Cytology from pericardial  effusion was negative for malignancy.  Pericardial biopsy is negative for malignancy.      He was seen by Radiation Oncology.  Radiation was recommended.  He did not want it.      The patient presents to the clinic to continue his pembrolizumab.  His wife was on the phone.  The patient wants to continue pembrolizumab.  The patient also has been doing some alternative treatment.  He mentioned that he will be getting a vaccine in the next few weeks.      The patient has fatigue.  No headache.  No dizziness.  No ear pain or sore throat.  No neck pain.  No chest pain.  No shortness of breath at rest, but he gets short of breath on minimal exertion.  He would like to get some oxygen.  No abdominal pain.  No vomiting.  Appetite is fair.  No urinary or bowel complaints.      The patient mentioned that tumor is responding.  He has multiple subcutaneous nodules from melanoma.  He says that they are getting smaller.  As per the wife also, they are shrinking.      PHYSICAL EXAMINATION:   GENERAL:  The patient is alert, oriented x 3.   VITAL SIGNS: Reviewed. ECOG PS of 1.   The rest of the systems not examined.      LABORATORY DATA:  Reviewed.      ASSESSMENT:   1.  A 34-year-old gentleman with metastatic melanoma.   2.  Shortness of breath secondary to pleural and pericardial effusion.   3.  Fatigue secondary to malignancy and anemia.   4.  Normocytic anemia.   5.  Hyponatremia from malignancy.      PLAN:   1.  I had a long discussion with the patient.  Discussed regarding his metastatic melanoma.      The patient was supposed to get a PET scan before he started his pembrolizumab.  He did not get it.  I explained to the patient that CT scan done recently in [facility] reveals progression of disease compared to previous scans, but clinically he is responding as his subcutaneous nodules have all gotten smaller.      At this time, we will continue him on pembrolizumab.  We will plan on getting a scan in about 2 months.       2.  The patient has metastatic disease.  Discussed with him regarding healthcare directive.  Information regarding honoring choices given.  The patient at this time wants to continue with all the treatment.  He wants to be FULL CODE as of now.      3.  The patient has been doing alternative treatment.  The patient mentioned that he will be getting some vaccine.  It comes from [place].  I am not sure of what this vaccine is and what is the efficacy of it.  It is not FDA approved in the [place].       4.  The patient gets short of breath on minimal exertion.  We will arrange for home oxygen.      5.  He is anemic.  We will continue to monitor it.  Transfusion will be given if hemoglobin is below 7 or he is symptomatic.      6.  He had a few questions, which were all answered.  We will see him with the next treatment.      TOTAL FACE-TO-FACE TIME SPENT:  40 minutes, more than 50% of time spent in counseling and coordination of care.         JULIANNA DOMINGUEZ MD             D: 04/15/2020   T: 04/15/2020   MT: MARILYNN      Name:     JEANIE HERNANDEZ   MRN:      [id number]        Account:      [id number]   :      1985           Visit Date:   04/15/2020      Document: [id number]

## 2020-04-15 NOTE — PROGRESS NOTES
Infusion Nursing Note:  Dallin Stevens presents today for C4D1 keytruda.    Patient seen by provider today: Yes: Dr. Tesfaye    Note: CBC drawn today per Dr. Tesfaye.  Patient also to be set up for home O2 today, clinic nurses to coordinate. Patient tolerated infusion without issue.    Intravenous Access:  Peripheral IV placed.      Treatment Conditions:  Lab Results   Component Value Date    HGB 9.2 04/15/2020     Lab Results   Component Value Date    WBC 11.2 04/15/2020      Lab Results   Component Value Date    ANEU 9.6 04/15/2020     Lab Results   Component Value Date     04/15/2020      Lab Results   Component Value Date     04/14/2020                   Lab Results   Component Value Date    POTASSIUM 4.2 04/14/2020           Lab Results   Component Value Date    MAG 2.3 02/28/2020            Lab Results   Component Value Date    CR 0.67 04/14/2020                   Lab Results   Component Value Date    ABUNDIO 9.0 04/14/2020                Lab Results   Component Value Date    BILITOTAL 0.4 04/14/2020           Lab Results   Component Value Date    ALBUMIN 2.8 04/14/2020                    Lab Results   Component Value Date    ALT 11 04/14/2020           Lab Results   Component Value Date    AST 14 04/14/2020       Results reviewed, labs MET treatment parameters, ok to proceed with treatment.      Post Infusion Assessment:  Patient tolerated infusion without incident.  Blood return noted pre and post infusion.  Site patent and intact, free from redness, edema or discomfort.  No evidence of extravasations.  Access discontinued per protocol.    Discharge Plan:   Patient declined prescription refills.  Discharge instructions reviewed with: Patient.  Patient and/or family verbalized understanding of discharge instructions and all questions answered.  AVS to patient via [employer].  Patient will return 5/6/20 for next appointment.   Patient discharged in stable condition accompanied by: self.  Departure Mode:  Ambulatory.    Latonia Sheets, RN, RN

## 2020-04-15 NOTE — PROGRESS NOTES
"Oncology Rooming Note    April 15, 2020 9:35 AM   Dallin Stevens is a 34 year old male who presents for:    Chief Complaint   Patient presents with     Oncology Clinic Visit     Initial Vitals: There were no vitals taken for this visit. Estimated body mass index is 17.94 kg/m  as calculated from the following:    Height as of 3/25/20: 1.803 m (5' 11\").    Weight as of 3/25/20: 58.3 kg (128 lb 9.6 oz). There is no height or weight on file to calculate BSA.  Data Unavailable Comment: Data Unavailable   No LMP for male patient.  Allergies reviewed: Yes  Medications reviewed: Yes    Medications: Medication refills not needed today.  Pharmacy name entered into [facility]: [facility] - [place], [place] - [address] [place] [address]    Clinical concerns:  doctor was notified.      Angelique Botello MA            "

## 2020-04-15 NOTE — PROGRESS NOTES
Patient complains of feeling short of breath requesting oxygen for at home. Patient at rest is 96%, wth walking patient 02 saturations dropped to 88%.  RN applied 2 liters of oxygen per nasal canula and oxygen increased to 99%.    Writer will have Dr. Tesfaye write an order oxygen.     Olamide Celeste RN

## 2020-04-18 PROBLEM — E87.1 HYPONATREMIA: Status: ACTIVE | Noted: 2020-01-01

## 2020-04-18 PROBLEM — C43.9 METASTATIC MALIGNANT MELANOMA (H): Status: ACTIVE | Noted: 2019-01-01

## 2020-04-18 PROBLEM — J90 PLEURAL EFFUSION: Status: ACTIVE | Noted: 2020-01-01

## 2020-04-18 PROBLEM — R06.02 SHORTNESS OF BREATH: Status: ACTIVE | Noted: 2020-01-01

## 2020-04-18 PROBLEM — I31.39 PERICARDIAL EFFUSION: Status: ACTIVE | Noted: 2020-01-01

## 2020-04-18 NOTE — ED NOTES
[facility], [place]   ED Nurse to Floor Handoff     Dallin Stevens is a 34 year old male who speaks English and lives with a spouse,  in a home  They arrived in the ED by car from home    ED Chief Complaint: Fatigue and Shortness of Breath    ED Dx;   Final diagnoses:   Dyspnea, unspecified type   Pericardial effusion   Metastatic melanoma (H)   Hyponatremia         Needed?: No    Allergies: No Known Allergies.  Past Medical Hx:   Past Medical History:   Diagnosis Date     Malignant melanoma (H)       Baseline Mental status: WDL  Current Mental Status changes: at basesline    Infection present or suspected this encounter: cultures pending  Sepsis suspected: No  Isolation type: Special Precautions-COVID-19     Activity level - Baseline/Home:  Independent  Activity Level - Current:   Independent    Bariatric equipment needed?: No    In the ED these meds were given:   Medications   iopamidol (ISOVUE-370) solution 82 mL (has no administration in time range)   sodium chloride (PF) 0.9% PF flush 71 mL (has no administration in time range)   LORazepam (ATIVAN) tablet 0.5 mg (0.5 mg Oral Given 4/18/20 3261)       Drips running?  No    Home pump  No    Current LDAs  Peripheral IV 04/18/20 Right Upper forearm (Active)   Site Assessment WDL 04/18/20 1454   Line Status Saline locked 04/18/20 1454   Phlebitis Scale 0-->no symptoms 04/18/20 1454   Number of days: 0       Incision/Surgical Site 01/16/20 Right Head (Active)   Number of days: 93       Labs results:   Labs Ordered and Resulted from Time of ED Arrival Up to the Time of Departure from the ED   CBC WITH PLATELETS DIFFERENTIAL - Abnormal; Notable for the following components:       Result Value    RBC Count 3.55 (*)     Hemoglobin 8.9 (*)     Hematocrit 28.7 (*)     MCH 25.1 (*)     MCHC 31.0 (*)     RDW 15.6 (*)     Platelet Count 489 (*)     Absolute Neutrophil 9.3 (*)     Absolute Lymphocytes 0.7 (*)     All other components  "within normal limits   COMPREHENSIVE METABOLIC PANEL - Abnormal; Notable for the following components:    Sodium 124 (*)     Glucose 119 (*)     Creatinine 0.59 (*)     Albumin 2.7 (*)     Protein Total 6.7 (*)     All other components within normal limits   LIPASE - Abnormal; Notable for the following components:    Lipase 34 (*)     All other components within normal limits   LACTIC ACID WHOLE BLOOD   TROPONIN I   OSMOLALITY URINE   OSMOLALITY   UA MACROSCOPIC WITH REFLEX TO MICRO AND CULTURE   SODIUM RANDOM URINE   BLOOD CULTURE   BLOOD CULTURE       Imaging Studies:   Recent Results (from the past 24 hour(s))   XR Chest Port 1 View    Narrative    XR CHEST PORT 1 VW  4/18/2020 3:29 PM    History:  cough soa,. History of metastatic melanoma    Comparison: Chest radiograph dated 1/15/2020    Findings:   Upright AP/PA view of the chest. Cardiac silhouette is obscured. There  is a large mass in the right upper lobe. No pneumothorax. Moderate  bilateral pleural effusion.      Impression    IMPRESSION:  1.  Large right upper mass, metastatic versus primary malignancy. CT  chest is recommended for further evaluation.  2.  Moderate bilateral pleural effusion with associated atelectasis  and consolidation.    I have personally reviewed the examination and initial interpretation  and I agree with the findings.    POOL CALDWELL MD       Recent vital signs:   /78   Pulse 123   Temp 97.9  F (36.6  C) (Oral)   Resp 20   Ht 1.778 m (5' 10\")   Wt 61.2 kg (135 lb)   SpO2 94%   BMI 19.37 kg/m      [place] Coma Scale Score: 15 (04/18/20 1500)       Cardiac Rhythm: Tachycardia  Pt needs tele? Yes  Skin/wound Issues: midsternal scar; h/o melanoma    Code Status: Full Code    Pain control: pt had none    Nausea control: pt had none    Abnormal labs/tests/findings requiring intervention: Na 124; Cr 0.59; Hgb 8.9; Osm 258    Family present during ED course? No   Family Comments/Social Situation comments: NA    Tasks " needing completion: DANIEL Trotter, RN  [phone] [facility]

## 2020-04-18 NOTE — ED NOTES
Pt refused COVID NP swab. Pt stated he was tested last week and was negative and has been isolating at home since.

## 2020-04-18 NOTE — H&P
[facility], [place] -- History and Physical -- [facility]  Date of Admission:  4/18/2020 -- Date of Service (when I saw the patient): 04/18/20    ASSESSMENT & PLAN  Dallin Stevens is a 34 year old man with metastatic melanoma, on pembrolizumab, who was recently admitted 4/6 for shortness of breath from massive mediastinal lymphadenopathy with narrowing of the trachea, large pericardial effusion with tamponade s/p pericardial window, and moderate-sized pleural effusion, who presents again with worsening shortness of breath.    Shortness of breath  Pericardial effusion  Pleural effusion  Tracheal compression from mediastinal lymphadenopathy  Recently admitted to [facility] 4/6-4/9/2020 with shortness of breath. 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 Pericardial window done 4/7. Follow up echo 4/8 showed improvement in the pericardial effusion, but persistent large pleural effusion. Radiation was recommended but he declined this. Discharged on 4/9 after weaning to room air and after patient refused further admission. Now presents 4/18 with shortness of breath. Troponin 0.020, lactic acid 2.0. CXR showed the large RUL mass, and moderate bilateral pleural effusion. Bedside cardiac ultrasound in the ED by cardiology showed intact LV function and no tamponade, with moderate-sized pericardial effusion. The loculated pleural effusion was seen. COVID-19 testing negative on 4/7 during last admission. In the ED he was given oxygen and ativan po for anxiety.  CT CAP redemonstrates significant disease burden in the chest, as well as moderate-large bilateral pleural effusions, moderate pericardial effusion. Most likely the effusions are related to malignancy. There is a possibility that effusions are related to immune checkpoint therapy.   - Continue supplemental oxygen.  - Follow up blood cultures sent from ED  - Consult CAPS in AM for possible thoracentesis  - Consult oncology re: ? Could effusions be from checkpoint therapy  - COVID-19 testing ordered but refused by patient. Clinically seems to have other more likely explanations  - Add on procal    Tachycardia  With history of pericardial effusion, limited echo was done in the ED which did not show tamponade physiology. LV thrombus/implant may be contributing, as well as SVC occlusion.  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 LV thrombus vs implant also seen on CT.  - Hold off on anticoagulation given brain metastases as above  - Formal echo in AM regarding LV thrombus vs implant    Hyponatremia  Na 124 at admission. Appears to have slowly trended down over the last month, with a steeper drop over the last week. Further testing here confirms a hypotonic hyponatremia. Urine sodium and osmolality ordered. SIADH and cerebral salt wasting are considerations. DI seen with pembrolizumab would usually cause hypernatremia. Hypothyroidism and hypocortisolism (also possibly associated with immune checkpoint inhibitors) could also cause low Na.  - Follow Na q6h   - Check Cortisol, TSH,  FT4  - Start salt tablets    Metastatic melanoma  Follows with Dr. Vero Tesfaye. See outpatient note for further details. First diagnosed in 2012 with stage III disease from a left flank skin lesion, s/p high dose interferon therapy. He relapsed in 1/2017 with supraclavicular, mediastinal,  right hilar lymph nodes; pulmonary nodules, which were biopsy proven BRAF positive melanoma. He received pembrolizumab 2/8/17-11/8/17 with improvement on PET until 10/2018 when new LAD seen in left neck and mediastinum. He also developed right parietal occipital lobe brain mass 4/24/19, s/p resection 4/26/19, pathology c/w melanoma. He refused postoperative brain radiation, and further scans showed progression of disease. PET and MRI 11/25/19 showed several new brain metastases and further new subcutaneous nodules throughout the body. He did develop a hemorrhagic mass causing left sided weakness 1/6/2020, which was resected 1/16/20. He received gamma knife radiation 1/27/20-1/31/20, and started pembrolizumab 2/11/20. Despite this CT CAP 4/6/20 showed massive mediastinal lymphadenopathy, with moderate sized pleural effusion and large pericardial effusion causing tamponade. Pericardial biopsy and effusion cytology were negative for malignancy. Radiation was offered and he declined. He has been continued on pembrolizumab, last cycle given was on 4/15. While his mediastinal disease is larger than previous imaging, he did not get PET scan done before he started pembrolizumab so we do not know if he has responded. He has been encouraged that the subcutaneous nodules may been getting smaller. Per recent discussions with his primary oncologist, he wants to continue with all treatment, and is full code. He is scheduled to undergo a tumor vaccine through a provider in the [place] in the next few weeks. CT CAP here shows significant disease widespread through chest, right iliopsoas, and scattered LAD in abdomen, pelvis, and inguinal regions, in addition to cutaneous implants.  - Consult oncology    Anemia  Hgb 8.9 - follow CBC    FEN  - IVFs: none  - Diet: regular as tolerated  - Lytes repleted as per sliding scale     PPX  - DVT: mechanical only - in case of procedures  - Bowel: senna-colace, miralax  PRN    Lines/Drains: PIV  Consults: oncology  CODE: Full  DISPO: inpatient > 2 nights for work up and management of shortness of breath    CHIEF COMPLAINT/REASON FOR ADMIT: shortness of breath    HISTORY OF PRESENT ILLNESS  History obtained from chart review and discussion with the patient.     Dallin Stevens is a 34 year old man with metastatic melanoma, on pembrolizumab, who was recently admitted 4/6 for shortness of breath from massive mediastinal lymphadenopathy with narrowing of the trachea, large pericardial effusion with tamponade s/p pericardial window, and moderate-sized pleural effusion, who presents again with worsening shortness of breath.    Recently admitted to [facility] 4/6-4/9/2020 with shortness of breath. 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 Pericardial window done 4/7. Follow up echo 4/8 showed improvement in the pericardial effusion, but persistent large pleural effusion. Radiation was recommended but he declined this. Discharged on 4/9 after weaning to room air and after patient refused further admission. Now presents 4/18 with shortness of breath worsening over the last few days and interfering with his normal activities. Troponin 0.020, lactic acid 2.0. CXR showed the large RUL mass, and moderate bilateral pleural effusion. Bedside cardiac ultrasound in the ED by cardiology showed intact LV function and no tamponade, with moderate-sized pericardial effusion. The loculated pleural effusion was seen. COVID-19 testing negative on 4/7 during last admission.  In the ED he was given oxygen and ativan po for anxiety.    Currently he is feeling ok with the supplemental oxygen. He is weak but denies significant new pain or other symptoms.       PMH  Past Medical History:   Diagnosis Date     Malignant melanoma (H)        PSH  Past Surgical History:   Procedure Laterality Date     BIOPSY OF SKIN LESION       CRANIOTOMY, EXCISE TUMOR COMPLEX, COMBINED  04/2019     DENTAL SURGERY      wisdom teeth     LYMPH NODE BIOPSY      arm, excision     MRI CRANIOTOMY LASER ABLATION Right 2/27/2020    Procedure: INTRAOPERATIVE MRI/STEALTH ASSISTED RIGHT LASER ABLATION OF BRAIN METASTASIS;  Surgeon: Nitish Quintero MD;  Location: [facility] OR     OPTICAL TRACKING SYSTEM CRANIOTOMY, EXCISE TUMOR, COMBINED Right 1/16/2020    Procedure: stealth assisted Right craniotomy for tumor resection;  Surgeon: Nitish Quintero MD;  Location: [facility] OR       Prior to Admission MEDICATIONS  Prior to Admission Medications   Prescriptions Last Dose Informant Patient Reported? Taking?   LORazepam (ATIVAN) 0.5 MG tablet   No No   Sig: Take 1 tablet (0.5 mg) by mouth every 4 hours as needed (Anxiety, Nausea/Vomiting or Sleep)   acetaminophen (TYLENOL) 325 MG tablet   No No   Sig: Take 2 tablets (650 mg) by mouth every 4 hours as needed for mild pain, fever or headaches (> 101 F)   Patient not taking: Reported on 4/3/2020   hydrOXYzine (ATARAX) 25 MG tablet   No No   Sig: Take 1-2 tablets (25-50 mg) by mouth 3 times daily as needed for anxiety   Patient not taking: Reported on 4/3/2020   melatonin 1 MG TABS tablet   No No   Sig: Take 1 tablet (1 mg) by mouth nightly as needed for sleep   ondansetron (ZOFRAN) 4 MG tablet   No No   Sig: Take 1 tablet (4 mg) by mouth every 8 hours as needed for nausea or vomiting   Patient not taking: Reported on 4/3/2020   polyethylene glycol (MIRALAX/GLYCOLAX) packet   No No   Sig: Take 17 g by mouth daily as needed for constipation   Patient not taking: Reported on 4/3/2020    prochlorperazine (COMPAZINE) 10 MG tablet   No No   Sig: Take 1 tablet (10 mg) by mouth every 6 hours as needed (Nausea/Vomiting)   Patient not taking: Reported on 4/3/2020      Facility-Administered Medications: None     Allergies   No Known Allergies    Social History  Social History     Socioeconomic History     Marital status:      Spouse name: Not on file     Number of children: Not on file     Years of education: Not on file     Highest education level: Not on file   Occupational History     Not on file   Social Needs     Financial resource strain: Not on file     Food insecurity     Worry: Not on file     Inability: Not on file     Transportation needs     Medical: Not on file     Non-medical: Not on file   Tobacco Use     Smoking status: Never Smoker     Smokeless tobacco: Never Used   Substance and Sexual Activity     Alcohol use: Not Currently     Drug use: Never     Sexual activity: Yes     Partners: Female   Lifestyle     Physical activity     Days per week: Not on file     Minutes per session: Not on file     Stress: Not on file   Relationships     Social connections     Talks on phone: Not on file     Gets together: Not on file     Attends Shinto service: Not on file     Active member of club or organization: Not on file     Attends meetings of clubs or organizations: Not on file     Relationship status: Not on file     Intimate partner violence     Fear of current or ex partner: Not on file     Emotionally abused: Not on file     Physically abused: Not on file     Forced sexual activity: Not on file   Other Topics Concern     Not on file   Social History Narrative     Not on file       Family History  Family History   Problem Relation Age of Onset     Anesthesia Reaction No family hx of      Cardiovascular No family hx of      Deep Vein Thrombosis No family hx of      - Family history reviewed & no other pertinent oncologic/hematologic malignancy noted    ROS  Comprehensive ROS was  performed and was negative unless otherwise noted in above HPI.    Physical Exam  Temp:  [97.9  F (36.6  C)] 97.9  F (36.6  C)  Pulse:  [120-123] 121  Heart Rate:  [120-122] 120  Resp:  [20-28] 28  BP: (122-128)/(78-91) 127/91  SpO2:  [93 %-94 %] 94 %  135 lbs 0 oz    Constitutional: Awake, alert, cooperative, thin appearing and anxious, voice weak, work of breathing improved compared to ED.  Eyes: PERRL, EOMI, sclera clear, conjunctiva normal.  ENT: Normocephalic, without obvious abnormality, sinuses nontender on palpation, oral pharynx with moist mucus membranes  Respiratory: Slightly increased work of breathing, diffuse crackles, no wheezing.  Cardiovascular: tachycardic, S1S2 no murmur noted.  GI: + bowel sounds, soft, non-distended, non-tender, no masses palpated, no hepatosplenomegaly.  Skin: Multiple nodules seen   Musculoskeletal: No edema dereck LEs.  Neurologic: Awake, alert & oriented x3.  Cranial nerves II-XII are grossly intact.   Psych: appropriate affect    DATA  Results for orders placed or performed during the hospital encounter of 04/18/20 (from the past 24 hour(s))   POC US ECHO LIMITED    Impression    Limited Bedside Cardiac Ultrasound, performed and interpreted by me.   Indication: Shortness of Breath.  Parasternal long axis and parasternal short axis views were acquired.   Image quality was somewhat poor as the patient was unable to tolerate lying flat or on his left side.    Findings:    Global left ventricular function appears intact.    IMPRESSION: Patient appears to have a loculated pleural effusion with no tamponade     CBC with platelets differential   Result Value Ref Range    WBC 11.0 4.0 - 11.0 10e9/L    RBC Count 3.55 (L) 4.4 - 5.9 10e12/L    Hemoglobin 8.9 (L) 13.3 - 17.7 g/dL    Hematocrit 28.7 (L) 40.0 - 53.0 %    MCV 81 78 - 100 fl    MCH 25.1 (L) 26.5 - 33.0 pg    MCHC 31.0 (L) 31.5 - 36.5 g/dL    RDW 15.6 (H) 10.0 - 15.0 %    Platelet Count 489 (H) 150 - 450 10e9/L    Diff Method  Automated Method     % Neutrophils 84.6 %    % Lymphocytes 6.6 %    % Monocytes 7.3 %    % Eosinophils 0.3 %    % Basophils 0.5 %    % Immature Granulocytes 0.7 %    Nucleated RBCs 0 0 /100    Absolute Neutrophil 9.3 (H) 1.6 - 8.3 10e9/L    Absolute Lymphocytes 0.7 (L) 0.8 - 5.3 10e9/L    Absolute Monocytes 0.8 0.0 - 1.3 10e9/L    Absolute Eosinophils 0.0 0.0 - 0.7 10e9/L    Absolute Basophils 0.1 0.0 - 0.2 10e9/L    Abs Immature Granulocytes 0.1 0 - 0.4 10e9/L    Absolute Nucleated RBC 0.0    Comprehensive metabolic panel   Result Value Ref Range    Sodium 124 (L) 133 - 144 mmol/L    Potassium 4.2 3.4 - 5.3 mmol/L    Chloride 94 94 - 109 mmol/L    Carbon Dioxide 22 20 - 32 mmol/L    Anion Gap 9 3 - 14 mmol/L    Glucose 119 (H) 70 - 99 mg/dL    Urea Nitrogen 12 7 - 30 mg/dL    Creatinine 0.59 (L) 0.66 - 1.25 mg/dL    GFR Estimate >90 >60 mL/min/[1.73_m2]    GFR Estimate If Black >90 >60 mL/min/[1.73_m2]    Calcium 8.5 8.5 - 10.1 mg/dL    Bilirubin Total 0.5 0.2 - 1.3 mg/dL    Albumin 2.7 (L) 3.4 - 5.0 g/dL    Protein Total 6.7 (L) 6.8 - 8.8 g/dL    Alkaline Phosphatase 74 40 - 150 U/L    ALT 11 0 - 70 U/L    AST 14 0 - 45 U/L   Lipase   Result Value Ref Range    Lipase 34 (L) 73 - 393 U/L   Lactic acid whole blood   Result Value Ref Range    Lactic Acid 2.0 0.7 - 2.0 mmol/L   Troponin I   Result Value Ref Range    Troponin I ES 0.020 0.000 - 0.045 ug/L   Blood culture    Specimen: Arm, Right; Blood    Right Arm   Result Value Ref Range    Specimen Description Blood Right Arm     Culture Micro PENDING    Osmolality   Result Value Ref Range    Osmolality 258 (L) 275 - 295 mmol/kg   EKG 12 lead   Result Value Ref Range    Interpretation ECG Click View Image link to view waveform and result    XR Chest Port 1 View    Narrative    XR CHEST PORT 1 VW  4/18/2020 3:29 PM    History:  cough soa,. History of metastatic melanoma    Comparison: Chest radiograph dated 1/15/2020    Findings:   Upright AP/PA view of the chest.  Cardiac silhouette is obscured. There  is a large mass in the right upper lobe. No pneumothorax. Moderate  bilateral pleural effusion.      Impression    IMPRESSION:  1.  Large right upper mass, metastatic versus primary malignancy. CT  chest is recommended for further evaluation.  2.  Moderate bilateral pleural effusion with associated atelectasis  and consolidation.    I have personally reviewed the examination and initial interpretation  and I agree with the findings.    POOL CALDWELL MD       PCP & Hematologist/Oncologist  Physician No Ref-Primary    Russel Tang MD  Hematology/Oncology  April 18, 2020

## 2020-04-18 NOTE — ED NOTES
Addendum: The patient is hyponatremic and I treated him with a 500 ml NS bolus IV.     Clarification of MDM:  The patient is interested in pursuing alternative treatments, including a vaccine available in the [place], but has not yet received the vaccine due to the Coronavirus pandemic and travel limitations.     Preliminary read on CT chest, abdomen and pelvis shows:   Prelim:  In this patient with history of metastatic melanoma:  1a. Extensive thoracic metastatic burden with moderate to large  bilateral pleural effusions and a large pleural-based mass in the  right upper lobe.   1b. Moderate pericardial effusion and left ventricular thrombus versus  implant.  1c. Extensive mediastinal and axillary lymphadenopathy.  1d. Occlusive bland thrombus versus tumor involving the right  innominate vein and SVC.  1e. Small pulmonary and endobronchial nodules concerning for  metastatic implants.  2a. Necrotic appearing soft tissue nodules involving the right  iliopsoas region. Additional scattered lymphadenopathy throughout the  abdomen and pelvis and inguinal regions.  2b. Bilateral adrenal implants  2c. No evidence of metastatic disease to the liver or bones.  3. Numerous cutaneous implants.     [Urgent Result: Extensive metastatic disease to the thorax, abdomen, and pelvis with extensive mediastinal involvement including the innominate vein, SVC, and pericardium. Possible left ventricular thrombus versus implant.]    Lucy Juarez MD   .     Lucy Juarez MD  04/18/20 [id number]

## 2020-04-18 NOTE — PROGRESS NOTES
Brief cardiology echo consult note:    Mr. Stevens is a 33 y/o M w/ metastatic melanoma with recent admission at [facility] for pericardia effusion s/p pericardiocentesis and pericardial window presenting with SOB.    ED asked cardiology for STAT TTE to assess for pericardial effusion and any evidence of tamponade physiology.    Limited TTE showed moderate-sized loculated pericardial effusion localized to apex and posterior wall. There was no evidence of RA/RV diastolic collapse and IVC was <2.1 cm. This study did not demonstrate evidence of tamponade physiology.    Teofilo Avilez M.D  Cardiology Fellow

## 2020-04-18 NOTE — ED PROVIDER NOTES
[place] EMERGENCY DEPARTMENT ([facility])  4/18/20    History     Chief Complaint   Patient presents with     Fatigue     Shortness of Breath     The history is provided by the patient and medical records.     Dallin Stevens is a 34 year old male with a past medical history significant for metastatic melanoma (currently on pembrolizumab since 02/2020), pericardial effusion with tamponade and near- occlusion of the right innominate vein who presents here to the Emergency Department due to fatigue and shortness of breath. Patient reports that his shortness of breath and fatigue have been ongoing for the past 1 and a half weeks. He has had an ongoing for the past 2 months. He believes his symptoms are due to his melanoma. He denies fevers, nausea, vomiting or chest pain. Patient reports that he lives with his wife and has been isolating himself at home. He denies known COVID-19 exposure.  The patient complains of worsening shortness of breath and severe anxiety.  He denies fevers.    Social: Patient lives with his wife. Has been isolating himself    I have reviewed the Medications, Allergies, Past Medical and Surgical History, and Social History in the [employer] system.    PAST MEDICAL HISTORY:   Past Medical History:   Diagnosis Date     Malignant melanoma (H)        PAST SURGICAL HISTORY:   Past Surgical History:   Procedure Laterality Date     BIOPSY OF SKIN LESION       CRANIOTOMY, EXCISE TUMOR COMPLEX, COMBINED  04/2019     DENTAL SURGERY      wisdom teeth     LYMPH NODE BIOPSY      arm, excision     MRI CRANIOTOMY LASER ABLATION Right 2/27/2020    Procedure: INTRAOPERATIVE MRI/STEALTH ASSISTED RIGHT LASER ABLATION OF BRAIN METASTASIS;  Surgeon: Nitish Quintero MD;  Location:  OR     OPTICAL TRACKING SYSTEM CRANIOTOMY, EXCISE TUMOR, COMBINED Right 1/16/2020    Procedure: stealth assisted Right craniotomy for tumor resection;  Surgeon: Nitish Quintero MD;  Location: [facility] OR       Past medical  history, past surgical history, medications, and allergies were reviewed with the patient. Additional pertinent items: None    FAMILY HISTORY:   Family History   Problem Relation Age of Onset     Anesthesia Reaction No family hx of      Cardiovascular No family hx of      Deep Vein Thrombosis No family hx of        SOCIAL HISTORY:   Social History     Tobacco Use     Smoking status: Never Smoker     Smokeless tobacco: Never Used   Substance Use Topics     Alcohol use: Not Currently     Social history was reviewed with the patient. Additional pertinent items: None      Patient's Medications   New Prescriptions    No medications on file   Previous Medications    ACETAMINOPHEN (TYLENOL) 325 MG TABLET    Take 2 tablets (650 mg) by mouth every 4 hours as needed for mild pain, fever or headaches (> 101 F)    HYDROXYZINE (ATARAX) 25 MG TABLET    Take 1-2 tablets (25-50 mg) by mouth 3 times daily as needed for anxiety    LORAZEPAM (ATIVAN) 0.5 MG TABLET    Take 1 tablet (0.5 mg) by mouth every 4 hours as needed (Anxiety, Nausea/Vomiting or Sleep)    MELATONIN 1 MG TABS TABLET    Take 1 tablet (1 mg) by mouth nightly as needed for sleep    ONDANSETRON (ZOFRAN) 4 MG TABLET    Take 1 tablet (4 mg) by mouth every 8 hours as needed for nausea or vomiting    POLYETHYLENE GLYCOL (MIRALAX/GLYCOLAX) PACKET    Take 17 g by mouth daily as needed for constipation    PROCHLORPERAZINE (COMPAZINE) 10 MG TABLET    Take 1 tablet (10 mg) by mouth every 6 hours as needed (Nausea/Vomiting)   Modified Medications    No medications on file   Discontinued Medications    No medications on file        No Known Allergies     Review of Systems   Constitutional: Positive for fatigue. Negative for fever.   Respiratory: Positive for cough and shortness of breath.    Cardiovascular: Negative for chest pain.   Gastrointestinal: Negative for abdominal pain, nausea and vomiting.   Skin:        Multiple skin lesions   Psychiatric/Behavioral: The patient is  "nervous/anxious.    All other systems reviewed and are negative.    Physical Exam   BP: 122/88  Pulse: 121  Heart Rate: 122  Temp: 97.9  F (36.6  C)  Resp: 20  Height: 177.8 cm (5' 10\")  Weight: 61.2 kg (135 lb)  SpO2: 93 %      Physical Exam  Physical Exam   Constitutional: Chronically ill-appearing, thin, anxious, moderate respiratory distress, dyspneic  HENT:   Head: Normocephalic and atraumatic.   Eyes: Conjunctivae are normal. Pupils are equal, round, and reactive to light.    pharynx has no erythema or exudate, mucous membranes are dry, voice is high and crackly.  Neck:   no adenopathy, no bony tenderness  Cardiovascular:tachycardic without murmurs or gallops  Pulmonary/Chest: Diffuse crackles all lung fields, moderate retractions, tachypnea., in moderate distress.  GI: Soft with good bowel sounds.  Non-tender, non-distended, with no guarding, no rebound, no peritoneal signs.   Back:  No bony or CVA tenderness   Musculoskeletal:  no edema   Skin: Skin is warm and dry.  multiple mass lesions to his body.   Neurological: alert and oriented to person, place, and time. Nonfocal exam  Psychiatric: anxious mood and affect.    ED Course   2:34 PM  The patient was seen and examined by Lucy Juarez MD in Room ED32.        Procedures  Results for orders placed during the hospital encounter of 04/18/20   POC US ECHO LIMITED    Impression Limited Bedside Cardiac Ultrasound, performed and interpreted by me.   Indication: Shortness of Breath.  Parasternal long axis and parasternal short axis views were acquired.   Image quality was somewhat poor as the patient was unable to tolerate lying flat or on his left side.    Findings:    Global left ventricular function appears intact.    IMPRESSION: Patient appears to have a loculated pleural effusion with no tamponade               EKG Interpretation:      Interpreted by Lucy Juarez MD  Time reviewed: 1520 pm  Symptoms at time of EKG: see hpi   Rhythm: sinus " tachycardia  Rate: 120-130  Axis: Normal  Ectopy: none  Conduction: Low voltage QRS  ST Segments/ T Waves: No acute ischemic changes  Q Waves: none  Comparison to prior: 1/20/2020: NSR rate of 69 bpm    Clinical Impression: Sinus tachycardia, rate of 121 bpm with low voltage QRS and nonspecific ST-T wave changes                            Results for orders placed or performed during the hospital encounter of 04/18/20   XR Chest Port 1 View     Status: None    Narrative    XR CHEST PORT 1 VW  4/18/2020 3:29 PM    History:  cough soa,. History of metastatic melanoma    Comparison: Chest radiograph dated 1/15/2020    Findings:   Upright AP/PA view of the chest. Cardiac silhouette is obscured. There  is a large mass in the right upper lobe. No pneumothorax. Moderate  bilateral pleural effusion.      Impression    IMPRESSION:  1.  Large right upper mass, metastatic versus primary malignancy. CT  chest is recommended for further evaluation.  2.  Moderate bilateral pleural effusion with associated atelectasis  and consolidation.    I have personally reviewed the examination and initial interpretation  and I agree with the findings.    POOL CALDWELL MD   POC US ECHO LIMITED     Status: None    Impression    Limited Bedside Cardiac Ultrasound, performed and interpreted by me.   Indication: Shortness of Breath.  Parasternal long axis and parasternal short axis views were acquired.   Image quality was somewhat poor as the patient was unable to tolerate lying flat or on his left side.    Findings:    Global left ventricular function appears intact.    IMPRESSION: Patient appears to have a loculated pleural effusion with no tamponade     CBC with platelets differential     Status: Abnormal   Result Value Ref Range    WBC 11.0 4.0 - 11.0 10e9/L    RBC Count 3.55 (L) 4.4 - 5.9 10e12/L    Hemoglobin 8.9 (L) 13.3 - 17.7 g/dL    Hematocrit 28.7 (L) 40.0 - 53.0 %    MCV 81 78 - 100 fl    MCH 25.1 (L) 26.5 - 33.0 pg    MCHC 31.0 (L)  31.5 - 36.5 g/dL    RDW 15.6 (H) 10.0 - 15.0 %    Platelet Count 489 (H) 150 - 450 10e9/L    Diff Method Automated Method     % Neutrophils 84.6 %    % Lymphocytes 6.6 %    % Monocytes 7.3 %    % Eosinophils 0.3 %    % Basophils 0.5 %    % Immature Granulocytes 0.7 %    Nucleated RBCs 0 0 /100    Absolute Neutrophil 9.3 (H) 1.6 - 8.3 10e9/L    Absolute Lymphocytes 0.7 (L) 0.8 - 5.3 10e9/L    Absolute Monocytes 0.8 0.0 - 1.3 10e9/L    Absolute Eosinophils 0.0 0.0 - 0.7 10e9/L    Absolute Basophils 0.1 0.0 - 0.2 10e9/L    Abs Immature Granulocytes 0.1 0 - 0.4 10e9/L    Absolute Nucleated RBC 0.0    Comprehensive metabolic panel     Status: Abnormal   Result Value Ref Range    Sodium 124 (L) 133 - 144 mmol/L    Potassium 4.2 3.4 - 5.3 mmol/L    Chloride 94 94 - 109 mmol/L    Carbon Dioxide 22 20 - 32 mmol/L    Anion Gap 9 3 - 14 mmol/L    Glucose 119 (H) 70 - 99 mg/dL    Urea Nitrogen 12 7 - 30 mg/dL    Creatinine 0.59 (L) 0.66 - 1.25 mg/dL    GFR Estimate >90 >60 mL/min/[1.73_m2]    GFR Estimate If Black >90 >60 mL/min/[1.73_m2]    Calcium 8.5 8.5 - 10.1 mg/dL    Bilirubin Total 0.5 0.2 - 1.3 mg/dL    Albumin 2.7 (L) 3.4 - 5.0 g/dL    Protein Total 6.7 (L) 6.8 - 8.8 g/dL    Alkaline Phosphatase 74 40 - 150 U/L    ALT 11 0 - 70 U/L    AST 14 0 - 45 U/L   Lipase     Status: Abnormal   Result Value Ref Range    Lipase 34 (L) 73 - 393 U/L   Lactic acid whole blood     Status: None   Result Value Ref Range    Lactic Acid 2.0 0.7 - 2.0 mmol/L   Troponin I     Status: None   Result Value Ref Range    Troponin I ES 0.020 0.000 - 0.045 ug/L   EKG 12 lead     Status: None (Preliminary result)   Result Value Ref Range    Interpretation ECG Click View Image link to view waveform and result    Blood culture     Status: None (Preliminary result)    Specimen: Arm, Right; Blood    Right Arm   Result Value Ref Range    Specimen Description Blood Right Arm     Culture Micro PENDING       Results for orders placed or performed during  the hospital encounter of 04/18/20 (from the past 24 hour(s))   POC US ECHO LIMITED    Impression    Limited Bedside Cardiac Ultrasound, performed and interpreted by me.   Indication: Shortness of Breath.  Parasternal long axis and parasternal short axis views were acquired.   Image quality was somewhat poor as the patient was unable to tolerate lying flat or on his left side.    Findings:    Global left ventricular function appears intact.    IMPRESSION: Patient appears to have a loculated pleural effusion with no tamponade     CBC with platelets differential   Result Value Ref Range    WBC 11.0 4.0 - 11.0 10e9/L    RBC Count 3.55 (L) 4.4 - 5.9 10e12/L    Hemoglobin 8.9 (L) 13.3 - 17.7 g/dL    Hematocrit 28.7 (L) 40.0 - 53.0 %    MCV 81 78 - 100 fl    MCH 25.1 (L) 26.5 - 33.0 pg    MCHC 31.0 (L) 31.5 - 36.5 g/dL    RDW 15.6 (H) 10.0 - 15.0 %    Platelet Count 489 (H) 150 - 450 10e9/L    Diff Method Automated Method     % Neutrophils 84.6 %    % Lymphocytes 6.6 %    % Monocytes 7.3 %    % Eosinophils 0.3 %    % Basophils 0.5 %    % Immature Granulocytes 0.7 %    Nucleated RBCs 0 0 /100    Absolute Neutrophil 9.3 (H) 1.6 - 8.3 10e9/L    Absolute Lymphocytes 0.7 (L) 0.8 - 5.3 10e9/L    Absolute Monocytes 0.8 0.0 - 1.3 10e9/L    Absolute Eosinophils 0.0 0.0 - 0.7 10e9/L    Absolute Basophils 0.1 0.0 - 0.2 10e9/L    Abs Immature Granulocytes 0.1 0 - 0.4 10e9/L    Absolute Nucleated RBC 0.0    Comprehensive metabolic panel   Result Value Ref Range    Sodium 124 (L) 133 - 144 mmol/L    Potassium 4.2 3.4 - 5.3 mmol/L    Chloride 94 94 - 109 mmol/L    Carbon Dioxide 22 20 - 32 mmol/L    Anion Gap 9 3 - 14 mmol/L    Glucose 119 (H) 70 - 99 mg/dL    Urea Nitrogen 12 7 - 30 mg/dL    Creatinine 0.59 (L) 0.66 - 1.25 mg/dL    GFR Estimate >90 >60 mL/min/[1.73_m2]    GFR Estimate If Black >90 >60 mL/min/[1.73_m2]    Calcium 8.5 8.5 - 10.1 mg/dL    Bilirubin Total 0.5 0.2 - 1.3 mg/dL    Albumin 2.7 (L) 3.4 - 5.0 g/dL    Protein  Total 6.7 (L) 6.8 - 8.8 g/dL    Alkaline Phosphatase 74 40 - 150 U/L    ALT 11 0 - 70 U/L    AST 14 0 - 45 U/L   Lipase   Result Value Ref Range    Lipase 34 (L) 73 - 393 U/L   Lactic acid whole blood   Result Value Ref Range    Lactic Acid 2.0 0.7 - 2.0 mmol/L   Troponin I   Result Value Ref Range    Troponin I ES 0.020 0.000 - 0.045 ug/L   Blood culture    Specimen: Arm, Right; Blood    Right Arm   Result Value Ref Range    Specimen Description Blood Right Arm     Culture Micro PENDING    EKG 12 lead   Result Value Ref Range    Interpretation ECG Click View Image link to view waveform and result    XR Chest Port 1 View    Narrative    XR CHEST PORT 1 VW  4/18/2020 3:29 PM    History:  cough soa,. History of metastatic melanoma    Comparison: Chest radiograph dated 1/15/2020    Findings:   Upright AP/PA view of the chest. Cardiac silhouette is obscured. There  is a large mass in the right upper lobe. No pneumothorax. Moderate  bilateral pleural effusion.      Impression    IMPRESSION:  1.  Large right upper mass, metastatic versus primary malignancy. CT  chest is recommended for further evaluation.  2.  Moderate bilateral pleural effusion with associated atelectasis  and consolidation.    I have personally reviewed the examination and initial interpretation  and I agree with the findings.    POOL CALDWELL MD     Medications   LORazepam (ATIVAN) tablet 0.5 mg (0.5 mg Oral Given 4/18/20 1511)   iopamidol (ISOVUE-370) solution 82 mL (82 mLs Intravenous Given 4/18/20 1624)   sodium chloride (PF) 0.9% PF flush 71 mL (71 mLs Intravenous Given 4/18/20 1625)        Consults  Cardiology: Responded (04/18/20 1507)    Assessments & Plan (with Medical Decision Making)   I have reviewed the nursing notes.  Emergency Department course:  The patient was seen and examined at 1435 pm.     Per chart review patient was recently admitted at [facility] from 4/6-4/9 due to shortness of breath. CT Chest/Abomen/Pelvis at that  time showed a massive conglomerate of mediastinal lymphadenopathy within the right upper mediastinum that was measured at 11.1 cm. There was some narrowing of the trachea noted as well as bulky right axillary lymphadenopathy. Patient also had near occlusion of the right innominate vein and moderate sized pleural effusion and large pericardial effusion with a cardiac tamponade. Patient had an Echocardiogram done that confirmed the large cicumferential pericardial effusion with the cardiac tamponade. Cytology/pericardial biopsy ruled out malignancy for the pericardial effusion. Radiation Oncology recommended Radiation for which the patient declined.     Patient was then seen in Oncology clinic on 4/15 for continuation of his pembrolizumab. At that time patient was noting some fatigue and shortness of breath with exertion.    ECHO done at [facility] on 4/8/2020 showed:    1.Left ventricle ejection fraction is normal. The estimated left   ventricular ejection fraction is 65%.    2.Normal right ventricular size and systolic function.    3.No hemodynamically significant valvular heart abnormalities.    4.Medium sized pericardial effusion adjacent to the left ventricle.   Effusion is fibrinous. There is low probability of cardiac tamponade.    5.Incidental note made of large probable left-sided pleural effusion.    6.When compared to the previous study dated 4/7/2020, effusion overall   appears about the same size, however right sided chamber collapse,   excessive interventricular septal motion and significant respiratory   variation are not seen on current study.  COVID 19 was negative on April 7.  The patient was recently seen by oncology who notes that he is pursuing a alternate treatments and vaccine that come from the [place] and are not FDA approved in the [place].    The patient is quite dyspneic today.  I attempted to perform a point-of-care echocardiogram but he was unable to tolerate lying flat or  lying on his left side.  He also appears quite anxious.  I treated him with Ativan p.o. for anxiety and he was placed on oxygen by nasal cannula..    I also spoke with the cardiac neurology fellow to obtain an official echocardiogram.    Portable CXR shows : 1.  Large right upper mass, metastatic versus primary malignancy. CT  chest is recommended for further evaluation.  2.  Moderate bilateral pleural effusion with associated atelectasis.    Preliminary read of echocardiogram shows a loculated pericardial effusion which is mainly posterior.  No appreciable tamponade.  No right ventricular collapse.  IVC is 2 cm.  Laboratory studies show hyponatremia, with a sodium of 124mildly elevated glucose of 119.  Lactate is borderline at 2.0.  Troponin I is 0.020.    CBC shows a WBC of 11.  He is anemic, with a hemoglobin of 8.9.  He also has a thrombocytosis, with a platelet count of 489,000.  Blood cultures x2 were ordered.    CT of the chest, abdomen and pelvis is pending at the time of this dictation.    The patient is a 34-year-old male with a history of metastatic melanoma with pericardial effusions and a history of tamponade who presents with worsening shortness of breath and cough.  Echocardiogram shows a loculated pericardial effusion but no tamponade currently.  He is hyponatremic.  He will be admitted to a stepdown bed under the care of the medicine service.  I spoke with Dr. You of medicine regarding admission.  He requested that I obtain a UA,  serum and urine osmolality,  and a urine sodium.  In addition, a COVID test will be repeated on this patient.  He had a negative COVID study on April 7.  I also spoke with Oncology fellow regarding the admission. The patient's oncologist is Dr. Tesfaye.  Addendum: The patient states he has already had 1 COVID test that was negative.  He does not want to undergo another test.  He states he has been inside the entire time for the past 3 weeks.        I have reviewed the  findings, diagnosis, plan and need for follow up with the patient.    New Prescriptions    No medications on file       Final diagnoses:   Dyspnea, unspecified type   Pericardial effusion   Metastatic melanoma (H)   Hyponatremia     IManuel, am serving as a trained medical scribe to document services personally performed by Lucy Juarez MD, based on the provider's statements to me.   Lucy PINEDA MD, was physically present and have reviewed and verified the accuracy of this note documented by Manuel Dietrich.  This note was created in part by the use of Dragon voice recognition dictation system. Inadvertent grammatical errors and typographical errors may still exist.  Lucy Juarez MD    4/18/2020   [facility], [place], EMERGENCY DEPARTMENT     Lucy Juarez MD  04/18/20 [id number]

## 2020-04-19 NOTE — CONSULTS
"    INTERVENTIONAL RADIOLOGY CONSULTATION    Name: Dallin Stevens  Age: 34 year old   Referring Physician: Dr. Mckenzie ref. provider found   REASON FOR REFERRAL: b/l effusions     HPI: 33 yo M pt with widely metastatic melanoma and large, b/l, presumably malignant pleural effusions. HAving sig SOB but stable at this time.    PAST MEDICAL HISTORY:   Past Medical History:   Diagnosis Date     Malignant melanoma (H)        PAST SURGICAL HISTORY:   Past Surgical History:   Procedure Laterality Date     BIOPSY OF SKIN LESION       CRANIOTOMY, EXCISE TUMOR COMPLEX, COMBINED  04/2019     DENTAL SURGERY      wisdom teeth     LYMPH NODE BIOPSY      arm, excision     MRI CRANIOTOMY LASER ABLATION Right 2/27/2020    Procedure: INTRAOPERATIVE MRI/STEALTH ASSISTED RIGHT LASER ABLATION OF BRAIN METASTASIS;  Surgeon: Nitish Quintero MD;  Location: [facility] OR     OPTICAL TRACKING SYSTEM CRANIOTOMY, EXCISE TUMOR, COMBINED Right 1/16/2020    Procedure: stealth assisted Right craniotomy for tumor resection;  Surgeon: Nitish Quintero MD;  Location: [facility] OR         Physical Examination:   VITALS:   /79 (BP Location: Right arm)   Pulse 116   Temp 97.9  F (36.6  C) (Oral)   Resp 24   Ht 1.778 m (5' 10\")   Wt 59.7 kg (131 lb 11.2 oz)   SpO2 100%   BMI 18.90 kg/m      Labs:    BMP RESULTS:  Lab Results   Component Value Date     (L) 04/19/2020    POTASSIUM 4.1 04/19/2020    CHLORIDE 95 04/19/2020    CO2 22 04/19/2020    ANIONGAP 9 04/19/2020     (H) 04/19/2020    BUN 12 04/19/2020    CR 0.59 (L) 04/19/2020    GFRESTIMATED >90 04/19/2020    GFRESTBLACK >90 04/19/2020    ABUNDIO 9.0 04/19/2020        CBC RESULTS:  Lab Results   Component Value Date    WBC 12.2 (H) 04/19/2020    RBC 3.73 (L) 04/19/2020    HGB 9.1 (L) 04/19/2020    HCT 30.1 (L) 04/19/2020    MCV 81 04/19/2020    MCH 24.4 (L) 04/19/2020    MCHC 30.2 (L) 04/19/2020    RDW 15.5 (H) 04/19/2020     (H) 04/19/2020       INR/PTT:  Lab Results   Component " Value Date    INR 1.12 04/19/2020    PTT 32 02/27/2020       Diagnostic studies:   I have personally reviewed the most recent imaging studies including CT form yesterday showing large R and very large Left simple appearing effusions.    Assessment/Plan:  We will place b/l chest tubes today.   Recommend considering revision to b/l pleurX catheters in the future, given overall prognosis    CC  Patient Care Team:  No Ref-Primary, Physician as PCP - General  Lisa Harrell MD as Referring Physician (Oncology)  Vero Tesfaye MD as MD (Hematology & Oncology)  [facility], [facility] ([facility] ([facility]), ([place]))

## 2020-04-19 NOTE — PROVIDER NOTIFICATION
Notified Dr. Blunt via text page that patient is back from IR after bilat chest tube placement. L-CT has put out over 1L serosanguinous fluid. This writer clamped CT as this is typically done if more than 1L output in an hour. Asked for CT orders, suction, water seal, etc. Ambulated from transport cart to bed. Vitally stable post procedure. , /79, RR 24, temp 97.8 oral. Continue to monitor.

## 2020-04-19 NOTE — PROGRESS NOTES
Admission          4/18/2020  2:26 PM  -----------------------------------------------------------  Reason for admission:  Primary team notified of pt arrival.  Admitted from: ED  Via: stretcher  Belongings: Placed in closet; valuables sent home with family  Admission Profile: complete  Teaching: orientation to unit and call light- call light within reach, call don't fall, use of console, meal times, when to call for the RN, and enforced importance of safety   Access: R PIV SL  Telemetry:Placed on pt  Ht./Wt.: complete  2 RN Skin Assessment Completed By: Zhanna CARBAJAL RN and Nickie CARBAJAL RN. Scabs noted to L lower leg. Blanchable redness to coccyx.   Pt status: A&Ox4. VSS. Denies pain.     Temp:  [97.3  F (36.3  C)-97.9  F (36.6  C)] 97.3  F (36.3  C)  Pulse:  [119-125] 121  Heart Rate:  [120-125] 121  Resp:  [20-37] 22  BP: (118-130)/(73-93) 123/90  SpO2:  [90 %-98 %] 94 %

## 2020-04-19 NOTE — PHARMACY-ADMISSION MEDICATION HISTORY
Admission medication history interview status for the 4/18/2020 admission is complete. See Epic admission navigator for allergy information, pharmacy, prior to admission medications and immunization status.     Medication history interview sources:  the patient and outside pharmacy fill records ([facility]).     Changes made to PTA medication list (reason)  Added: None.   Deleted:     Hydroxyzine 25mg; 1-2 tabs TID as needed for anxiety - patient only takes lorazepam for anxiety control    Ondansetron 4mg every 8 hour as needed - patient does not take this medication anymore    Miralax daily as needed - patient does not take this medication    Prochlorperazine 10mg every 6 hrs as needed -patient does not take this medication anymore  Changed: None.     Additional medication history information (including reliability of information, actions taken by pharmacist): The patient is a reliable historian who states he only takes the 3 medications listed below. The patient denies taking any other vitamins, herbal supplements, prescription medications, or over the counter medications.     Prior to Admission medications    Medication Sig Last Dose Taking? Auth Provider   acetaminophen (TYLENOL) 325 MG tablet Take 2 tablets (650 mg) by mouth every 4 hours as needed for mild pain, fever or headaches (> 101 F) Past Week at PM Yes Rosanna Bills APRN CNP   melatonin 1 MG TABS tablet Take 1 tablet (1 mg) by mouth nightly as needed for sleep Past Week at PM Yes Jadyn Centeno PA-C   LORazepam (ATIVAN) 0.5 MG tablet Take 1 tablet (0.5 mg) by mouth every 4 hours as needed (Anxiety, Nausea/Vomiting or Sleep) 4/17/2020 at PM  Cristofer Gomes APRN CNP     Medication history completed by:  Madison Randhawa  Student Pharmacist  [facility]

## 2020-04-19 NOTE — PLAN OF CARE
Assumed care of patient at 0700. A&Ox4, flat affect. Intermittent anxiety managed with prn ativan x1. Generalized body pain managed with prn tylenol x2. Afebrile. ST, rates 100-120s. Lungs clear with dim bases on 2-3L via NC for comfort. Sats are ok on RA but prefers a little O2. Pt had bilateral CT placed this afternoon. Both with serosanguinous drainage to -20 suction. L-CT clamped for 1 hour post procedure due to >1000ml output. Unclamped at 1435, monitor close and reclamp if output >500ml again. R-CT with small amount of drainage. Seems to be resting easier since return from IR. Up independently in room. Poor PO intake other than some snacks brought from home. Encouraged pt to order some late lunch or dinner as he will be NPO after MN for surgery in AM. Marginal UOP r/t PO intake. Encouraged more water as well. Meg, wife, called and updated on plan while thoracic surg resident obtaining consent for procedure. Plan for pericardial window and bilat pleurex cath placement in OR with thoracic surgery in AM. Pt expressed anxiety related to continued plan changes and escalating procedure ideas from MD. Supported and advocated for patient as able. Expressed wishes for wife to be called if any plans change or are added to. Continue with current plan of care and notify MD of any questions or concerns.

## 2020-04-19 NOTE — CONSULTS
[facility], [place]   Hematology/Oncology Consult Note    Dallin Stevens MRN# [id number]   Age: 34 year old YOB: 1985          Reason for Consult:   Metastatic melanoma on pembrolizumab         Assessment and Plan:   Dallin Stevens is a 34 year old male with metastatic melanoma, on pembrolizumab since 2/2020 (see oncological history outlined below), who is admitted for worsening shortness of breath in the setting of bilateral pleural effusions. He recently had an admission to [facility] for shortness of breath and was found to have these effusions along with a large pericardial effusion causing tamponade requiring a pericardial window placement, 4/6 to 4/9/2020. He reports that he was feeling better at time of discharge but the SOB got worse at home so he presented to the ED. Primary team is planning for thoracentesis today potentially. 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 On imaging he does have evidence of narrowing of the distal trachea and right mainstem bronchus from the tumor masses, so if the thoracentesis fails to relieve his shortness of breath, consideration could be given to an interventional pulmonary consult to see if stenting would be of any utility.  Patient plans to travel to [place] for vaccine therapy as soon as the travel restrictions are lifted. We do not strongly suspect that these effusions are related to the immune therapy, but it is possible. Seems more likely they are related to the malignancy but regardless, drainage is required for symptomatic relief.     Problem list:   # Metastatic melanoma on pembrolizumab  # Shortness of breath  # Bilateral pleural effusions  # pericardial effusion, recent tamponade s/p pericardial window  # Bulky mediastinal adenopathy  # Multiple cutaneous and soft tissue  nodules    Summary of Recommendations:    Agree with IR consult for thoracentesis    If this does not relieve his shortness of breath, recommend interventional pulmonary consultation to see if endobronchial stenting is indicated    Agree with formal ECHO to reevaluate the pericardial effusion    Thank you for involving us in the care of this patient. We will continue to follow during the hospitalization.    Patient was seen and plan of care developed with Dr. Whaley.           History of Present Illness:   History obtained from chart review and confirmed with patient.    Dallin Stevens is a 34 year old male with metastatic melanoma on pembrolizumab, with a recent admission for shortness of breath, found to have bilateral pleural effusions and a pericardial effusion causing tamponade s/p pericardial window, also with extensive disease burden (mediastinal adenopathy, SVC occlusion/thrombus, LV thrombus/implant, multiple cutaneous nodules, adrenal nodules, soft tissue nodules) who is admitted for worsening shortness of breath and increased oxygen requirement.    Oncological history - from Dr. Nagel clinic note  1.  He had superficial spreading melanoma in the left flank in 2012.   -On 02/03/2012, biopsy of left flank skin lesion revealed 1.7 mm superficial spreading melanoma without ulceration.  -On 02/23/2012, he had left flank wide local excision and left axillary sentinel node biopsy. One of two lymph node positive. He had stage III (pT2a pN1a M0) disease.    -On 03/02/2012, left axillary radical lymph node dissection was done. All 23 lymph nodes benign.  - He received 4 weeks of high-dose interferon between 04/02/2012 and 04/27/2012.      2.  On 01/25/2017, CTrevealed metastatic disease involving lymph nodes in right supraclavicular , mediastinum and right hilum. 3 small pulmonary nodules are present.  -On 01/26/2017, biopsy of right supraclavicular lymph node revealed metastatic melanoma.  BRAF positive.    -He  received pembrolizumab between 02/08/2017 and 11/08/2017. Patient did not want any further treatment.  -PET scan on 06/28/2017 revealed significant improvement.  -PET scan on 11/29/2017 revealed new focus of mild FDG uptake within a 6 mm right paratracheal lymph node. No other evidence of hypermetabolic lymphadenopathy on this exam.   -PET scan on 10/02/2018 revealed new hypermetabolic lymphadenopathy in the supraclavicular region of the left neck and in the mediastinum.      3.  MRI of the brain on 04/24/2019 revealed intraparenchymal mass within the right parietal occipital lobe.    -He had a craniotomy and resection of a brain tumor on 04/26/2019.  Pathology was consistent with melanoma.     -The patient was recommended further treatment including postoperative radiation to the brain.  He did not want any other treatment. Refused radiation.  -PET scan on 07/23/2019 revealed progression of disease. New hypermetabolic lymphadenopathy seen in the right supraclavicular fossa and anterior mediastinum.     4. MRI brain on 11/25/2019 reveals metastasis in left posterior frontal lobe, right peritrigonal region and right posterior frontal parafalcine lesion.    -PET scan on 11/25/2019 reveals progression of his disease.  There are multiple new hypermetabolic subcutaneous nodules throughout the body.  No change in size of mediastinal lymphadenopathy.      5. Admitted on 01/06/2020 in the [facility] because of left-sided weakness.    -Brain MRI on 01/06/2020 revealed a 3.3 cm hemorrhagic mass in the right frontal gyrus and other brain metastasis.  -On 01/16/2020, he had right frontal craniotomy and resection of the tumor.  Pathology revealed metastatic melanoma.  -He received gamma knife brain radiation between 01/27/2020 and 01/31/2020 to 5 brain lesions. 2500 cGy in 5 fractions.      6.  Pembrolizumab started on 02/11/2020.      7. CT chest, abdomen and pelvis on 04/06/2020 revealed massive conglomerate of  mediastinal lymphadenopathy. There is moderate-sized pleural effusion and large pericardial effusion.    -Patient had pericardial window.  Cytology from pericardial effusion and pericardial biopsy is negative for malignancy.          Review of Systems:   A comprehensive ROS was performed with the patient and was found to be negative with the exception of that noted in the HPI above.       Past Medical History:     Past Medical History:   Diagnosis Date     Malignant melanoma (H)             Past Surgical History:     Past Surgical History:   Procedure Laterality Date     BIOPSY OF SKIN LESION       CRANIOTOMY, EXCISE TUMOR COMPLEX, COMBINED  04/2019     DENTAL SURGERY      wisdom teeth     LYMPH NODE BIOPSY      arm, excision     MRI CRANIOTOMY LASER ABLATION Right 2/27/2020    Procedure: INTRAOPERATIVE MRI/STEALTH ASSISTED RIGHT LASER ABLATION OF BRAIN METASTASIS;  Surgeon: Nitish Quintero MD;  Location: [facility] OR     OPTICAL TRACKING SYSTEM CRANIOTOMY, EXCISE TUMOR, COMBINED Right 1/16/2020    Procedure: stealth assisted Right craniotomy for tumor resection;  Surgeon: Nitish Quintero MD;  Location: [facility] OR            Social History:     Social History     Socioeconomic History     Marital status:      Spouse name: Not on file     Number of children: Not on file     Years of education: Not on file     Highest education level: Not on file   Occupational History     Not on file   Social Needs     Financial resource strain: Not on file     Food insecurity     Worry: Not on file     Inability: Not on file     Transportation needs     Medical: Not on file     Non-medical: Not on file   Tobacco Use     Smoking status: Never Smoker     Smokeless tobacco: Never Used   Substance and Sexual Activity     Alcohol use: Not Currently     Drug use: Never     Sexual activity: Yes     Partners: Female   Lifestyle     Physical activity     Days per week: Not on file     Minutes per session: Not on file     Stress:  Not on file   Relationships     Social connections     Talks on phone: Not on file     Gets together: Not on file     Attends Taoist service: Not on file     Active member of club or organization: Not on file     Attends meetings of clubs or organizations: Not on file     Relationship status: Not on file     Intimate partner violence     Fear of current or ex partner: Not on file     Emotionally abused: Not on file     Physically abused: Not on file     Forced sexual activity: Not on file   Other Topics Concern     Not on file   Social History Narrative     Not on file            Family History:     Family History   Problem Relation Age of Onset     Anesthesia Reaction No family hx of      Cardiovascular No family hx of      Deep Vein Thrombosis No family hx of             Allergies:   No Known Allergies         Medications:     Medications Prior to Admission   Medication Sig Dispense Refill Last Dose     acetaminophen (TYLENOL) 325 MG tablet Take 2 tablets (650 mg) by mouth every 4 hours as needed for mild pain, fever or headaches (> 101 F) (Patient not taking: Reported on 4/3/2020)        hydrOXYzine (ATARAX) 25 MG tablet Take 1-2 tablets (25-50 mg) by mouth 3 times daily as needed for anxiety (Patient not taking: Reported on 4/3/2020) 20 tablet 0      LORazepam (ATIVAN) 0.5 MG tablet Take 1 tablet (0.5 mg) by mouth every 4 hours as needed (Anxiety, Nausea/Vomiting or Sleep) 30 tablet 2      melatonin 1 MG TABS tablet Take 1 tablet (1 mg) by mouth nightly as needed for sleep 30 tablet 0      ondansetron (ZOFRAN) 4 MG tablet Take 1 tablet (4 mg) by mouth every 8 hours as needed for nausea or vomiting (Patient not taking: Reported on 4/3/2020) 30 tablet 0      polyethylene glycol (MIRALAX/GLYCOLAX) packet Take 17 g by mouth daily as needed for constipation (Patient not taking: Reported on 4/3/2020)        prochlorperazine (COMPAZINE) 10 MG tablet Take 1 tablet (10 mg) by mouth every 6 hours as needed  "(Nausea/Vomiting) (Patient not taking: Reported on 4/3/2020) 30 tablet 2             Physical Exam:   /84 (BP Location: Right arm)   Pulse 116   Temp 98  F (36.7  C) (Oral)   Resp 28   Ht 1.778 m (5' 10\")   Wt 59.7 kg (131 lb 11.2 oz)   SpO2 94%   BMI 18.90 kg/m    Vitals:    04/18/20 1429 04/18/20 1925 04/19/20 0325   Weight: 61.2 kg (135 lb) 59.7 kg (131 lb 11.2 oz) 59.7 kg (131 lb 11.2 oz)     General: Appears ill, sitting in bed, in no acute distress, on supplemental oxygen  Heme/Lymph: No overt bleeding.  Skin: numerous scattered cutaneous nodules   HEENT: NCAT. EOMI, anicteric sclera.   Respiratory: Decreased breath sounds bilaterally  Cardiovascular: tachycardic  Gastrointestinal:  Abdomen soft, non-distended, and non-tender.   Extremities: No extremity edema.   Neurologic: A&O x 3,         Data:   I have personally reviewed the following labs/imaging:  CBC  Recent Labs   Lab 04/19/20  0438 04/18/20  1448 04/15/20  1020   WBC 12.2* 11.0 11.2*   RBC 3.73* 3.55* 3.60*   HGB 9.1* 8.9* 9.2*   HCT 30.1* 28.7* 28.9*   MCV 81 81 80   MCH 24.4* 25.1* 25.6*   MCHC 30.2* 31.0* 31.8   RDW 15.5* 15.6* 15.6*   * 489* 434     CMP  Recent Labs   Lab 04/19/20  0438 04/19/20  0008 04/18/20  1448 04/14/20  1025   * 126* 124* 128*   POTASSIUM 4.1  --  4.2 4.2   CHLORIDE 95  --  94 97   CO2 22  --  22 23   ANIONGAP 9  --  9 8   *  --  119* 116*   BUN 12  --  12 11   CR 0.59*  --  0.59* 0.67   GFRESTIMATED >90  --  >90 >90   GFRESTBLACK >90  --  >90 >90   ABUNDIO 9.0  --  8.5 9.0   MAG 2.2  --  2.0  --    PHOS 4.1  --  3.6  --    PROTTOTAL  --   --  6.7* 6.9   ALBUMIN  --   --  2.7* 2.8*   BILITOTAL  --   --  0.5 0.4   ALKPHOS  --   --  74 82   AST  --   --  14 14   ALT  --   --  11 11     INR  Recent Labs   Lab 04/19/20  0438   INR 1.12       CT chest/abdomen/pelvis  IMPRESSION: In this patient with history of metastatic melanoma:  1a. Extensive thoracic metastatic burden with moderate to " large  bilateral pleural effusions and a large pleural-based mass in the  right upper lobe.   1b. Moderate pericardial effusion and left ventricular thrombus versus  implant.  1c. Extensive mediastinal and axillary lymphadenopathy.  1d. Occlusive bland thrombus versus tumor involving the both  innominate veins and SVC.  1e. Small pulmonary and endobronchial nodules concerning for  metastatic implants.  2a. Necrotic appearing soft tissue nodules involving the right  iliopsoas region. Additional scattered lymphadenopathy throughout the  abdomen and pelvis and inguinal regions.  2b. Bilateral adrenal implants  2c. No evidence of metastatic disease to the liver or bones.  3. Numerous cutaneous implants.

## 2020-04-19 NOTE — PROCEDURES
[facility], [place]    Procedure: IR Procedure Note    Date/Time: 4/19/2020 2:03 PM  Performed by: Ollie Barr MD  Authorized by: Ollie Barr MD     UNIVERSAL PROTOCOL   Site Marked: NA  Prior Images Obtained and Reviewed:  Yes  Required items: Required blood products, implants, devices and special equipment available    Patient identity confirmed:  Verbally with patient, arm band, provided demographic data and hospital-assigned identification number  Patient was reevaluated immediately before administering moderate or deep sedation or anesthesia  Confirmation Checklist:  Patient's identity using two indicators, relevant allergies, procedure was appropriate and matched the consent or emergent situation and correct equipment/implants were available  Time out: Immediately prior to the procedure a time out was called    Universal Protocol: the Joint Commission Universal Protocol was followed    Preparation: Patient was prepped and draped in usual sterile fashion           ANESTHESIA    Anesthesia: Local infiltration  Local Anesthetic:  Lidocaine 1% without epinephrine      SEDATION    Patient Sedated: No    See dictated procedure note for full details.  Findings: B/l 10 [place] non locking pigtail chest tubes placed  10cc fluid per side sent for labs    Specimens: none    Complications: None    Condition: Stable    Plan: Return to floor  Otherwise per orders    PROCEDURE   Patient Tolerance:  Patient tolerated the procedure well with no immediate complications    Length of time physician/provider present for 1:1 monitoring during sedation: 0

## 2020-04-19 NOTE — PROVIDER NOTIFICATION
Notified cross cover MD regarding patient having increased O2 needs - RA to 5L at 90%, very anxious and having difficulty breathing due to pain. Also an increase of Na from 124 to 128.     Chest xray ordered and IV dilaudid ordered PRN. Will continue current plan of care and update MDs with any changes.

## 2020-04-19 NOTE — PLAN OF CARE
D AVSS with sat's 93% on oxygen set to 2L/min via nasal cannula. Oxygen turned up to 4L/min per patient's request overnight. Voiced no c/o pain or nausea but did request Ativan x 2 for anxiety. Voiding good amounts of hayes/clear urine into urinal at bedside. Patient is anxious to have thoracentesis done so he can be discharged to home.   I Vital's, assessment and med's per order.   A Resting in bed with call light in reach.  P Continue to monitor and update MD with changes.

## 2020-04-19 NOTE — PROGRESS NOTES
Sepsis Evaluation Progress Note    I was called to see Dallin Stevens due to abnormal vital signs triggering the Sepsis SIRS screening alert. He is not known to have an infection.     Physical Exam   Vital Signs:  Temp: 98  F (36.7  C) Temp src: Oral BP: 122/80 Pulse: 115 Heart Rate: 124 Resp: 24 SpO2: 93 % O2 Device: Nasal cannula Oxygen Delivery: 4 LPM    Lab:  Lactic Acid   Date Value Ref Range Status   04/18/2020 2.0 0.7 - 2.0 mmol/L Final     Lactate for Sepsis Protocol   Date Value Ref Range Status   04/19/2020 2.2 (H) 0.7 - 2.0 mmol/L Final     Comment:     Value above critical limit       The patient is at baseline mental status.     The rest of their physical exam is significant for slightly increased work of breathing, diffuse crackles    Assessment & Plan   NO EVIDENCE OF SEPSIS at this time.  Vital sign, physical exam, and lab findings are likely due to malignancy, pleural and pericardial effusions, tracheal compression.    Disposition: The patient will remain on the current unit. We will continue to monitor this patient closely..  Jhonny Swenson MD    Sepsis Criteria   Sepsis: 2+ SIRS criteria due to infection  Severe Sepsis: Sepsis AND 1+ new sign of acute organ dysfunction (Note: lactate >2 is organ dysfunction)  Septic Shock: Sepsis AND hypotension despite volume resuscitation with 30 ml/kg crystalloid

## 2020-04-19 NOTE — CONSULTS
Cardiothoracic Surgery Consultation Note  [facility]    Dallin Stevens MRN# [id number]   Age: 34 year old YOB: 1985     Date of Admission:  4/18/2020    Reason for consult: Pericardial effusion       Requesting physician: Jose Carlos Blunt MD       Level of consult: Consult, follow and place orders           Assessment:   34 year-old male with metastatic melanoma (currently on pembrolizumab) and massive right mediastinal mass with compression of innominate vein and trachea, large pericardial effusion causing recent cardiac tamponade s/p subxiphoid pericardial window at OSH on 4/7/2020, and bilateral pleural effusions. Admitted on 4/18/2020 with worsening shortness of breath and recurrent pericardial effusion. S/p bilateral pigtail chest tubes by IR today 4/19. Discussed surgical options with patient and patient's wife; they are agreeable to proceed.         Recommendations:     - Plan for OR tomorrow for bilateral thoracoscopy and bilateral PleurX catheter placement  - NPO at midnight  - Consent in chart; wife updated via phone  - Pre-op orders signed & held     Patient discussed with Dr. Mckinley, Thoracic Fellow, who will discuss with staff.    Marlena Ramos MD (PGY-2)  General Surgery Resident  Thoracic Surgery  [id number]         History of Present Illness:   CC: Shortness of breath     History is obtained from the patient and chart review    34 year-old male with metastatic melanoma (s/p resection for brain mets; currently on pembrolizumab) and massive right mediastinal mass with invasion of SVC (no signs of SVC syndrome) and compression of innominate vein and trachea, large pericardial effusion causing recent cardiac tamponade s/p subxiphoid pericardial window at OSH on 4/7/2020, and bilateral pleural effusions. Admitted on 4/18/2020 with worsening shortness of breath and recurrent pericardial effusion. S/p bilateral pigtail chest tubes by IR today 4/19. He states that he feels  like his breathing has improved some after the IR procedure. He currently feels extremely tired as he was up all night and couldn't sleep well. He also feels anxious and wants to go home.           Past Medical History:     Past Medical History:   Diagnosis Date     Malignant melanoma (H)              Past Surgical History:     Past Surgical History:   Procedure Laterality Date     BIOPSY OF SKIN LESION       CRANIOTOMY, EXCISE TUMOR COMPLEX, COMBINED  04/2019     DENTAL SURGERY      wisdom teeth     LYMPH NODE BIOPSY      arm, excision     MRI CRANIOTOMY LASER ABLATION Right 2/27/2020    Procedure: INTRAOPERATIVE MRI/STEALTH ASSISTED RIGHT LASER ABLATION OF BRAIN METASTASIS;  Surgeon: Nitish Quintero MD;  Location: [facility] OR     OPTICAL TRACKING SYSTEM CRANIOTOMY, EXCISE TUMOR, COMBINED Right 1/16/2020    Procedure: stealth assisted Right craniotomy for tumor resection;  Surgeon: Nitish Quintero MD;  Location:  OR   4/7/2020 - subxiphoid pericardial window          Social History:     Social History     Socioeconomic History     Marital status:      Spouse name: Not on file     Number of children: Not on file     Years of education: Not on file     Highest education level: Not on file   Occupational History     Not on file   Social Needs     Financial resource strain: Not on file     Food insecurity     Worry: Not on file     Inability: Not on file     Transportation needs     Medical: Not on file     Non-medical: Not on file   Tobacco Use     Smoking status: Never Smoker     Smokeless tobacco: Never Used   Substance and Sexual Activity     Alcohol use: Not Currently     Drug use: Never     Sexual activity: Yes     Partners: Female   Lifestyle     Physical activity     Days per week: Not on file     Minutes per session: Not on file     Stress: Not on file   Relationships     Social connections     Talks on phone: Not on file     Gets together: Not on file     Attends Muslim service: Not on  file     Active member of club or organization: Not on file     Attends meetings of clubs or organizations: Not on file     Relationship status: Not on file     Intimate partner violence     Fear of current or ex partner: Not on file     Emotionally abused: Not on file     Physically abused: Not on file     Forced sexual activity: Not on file   Other Topics Concern     Not on file   Social History Narrative     Not on file             Family History:     Family History   Problem Relation Age of Onset     Anesthesia Reaction No family hx of      Cardiovascular No family hx of      Deep Vein Thrombosis No family hx of              Allergies:    No Known Allergies          Medications:   No current facility-administered medications on file prior to encounter.   acetaminophen (TYLENOL) 325 MG tablet, Take 2 tablets (650 mg) by mouth every 4 hours as needed for mild pain, fever or headaches (> 101 F) (Patient not taking: Reported on 4/3/2020)  hydrOXYzine (ATARAX) 25 MG tablet, Take 1-2 tablets (25-50 mg) by mouth 3 times daily as needed for anxiety (Patient not taking: Reported on 4/3/2020)  LORazepam (ATIVAN) 0.5 MG tablet, Take 1 tablet (0.5 mg) by mouth every 4 hours as needed (Anxiety, Nausea/Vomiting or Sleep)  melatonin 1 MG TABS tablet, Take 1 tablet (1 mg) by mouth nightly as needed for sleep  ondansetron (ZOFRAN) 4 MG tablet, Take 1 tablet (4 mg) by mouth every 8 hours as needed for nausea or vomiting (Patient not taking: Reported on 4/3/2020)  polyethylene glycol (MIRALAX/GLYCOLAX) packet, Take 17 g by mouth daily as needed for constipation (Patient not taking: Reported on 4/3/2020)  prochlorperazine (COMPAZINE) 10 MG tablet, Take 1 tablet (10 mg) by mouth every 6 hours as needed (Nausea/Vomiting) (Patient not taking: Reported on 4/3/2020)              Review of Systems:      All other review of systems negative, except for what is mentioned above        Physical Exam:   /79 (BP Location: Right arm)    "Pulse 116   Temp 97.9  F (36.6  C) (Oral)   Resp 24   Ht 1.778 m (5' 10\")   Wt 59.7 kg (131 lb 11.2 oz)   SpO2 100%   BMI 18.90 kg/m      General: Resting comfortably in bed, easily awakens to voice and touch, no acute distress but appears fatigued  Resp: non-labored breathing but tachypneic on 3L NC  Bilateral pigtail chest tubes to suction with serosangunious drainage, no airleak noted  Cardiac: sinus tachycardia HR 110s  Abdomen: Soft, nontender, nondistended.  Extremities: No LE edema or obvious joint abnormalities  Skin: Warm and dry, no jaundice or rash            Data:     Results for orders placed or performed during the hospital encounter of 04/18/20 (from the past 24 hour(s))   XR Chest Port 1 View    Narrative    XR CHEST PORT 1 VW  4/18/2020 3:29 PM    History:  cough soa,. History of metastatic melanoma    Comparison: Chest radiograph dated 1/15/2020    Findings:   Upright AP/PA view of the chest. Cardiac silhouette is obscured. There  is a large mass in the right upper lobe. No pneumothorax. Moderate  bilateral pleural effusion.      Impression    IMPRESSION:  1.  Large right upper mass, metastatic versus primary malignancy. CT  chest is recommended for further evaluation.  2.  Moderate bilateral pleural effusion with associated atelectasis  and consolidation.    I have personally reviewed the examination and initial interpretation  and I agree with the findings.    POOL CALDWELL MD   CT Chest/Abdomen/Pelvis w Contrast   Result Value Ref Range    Radiologist flags (Urgent)      Extensive metastatic disease to the thorax, abdomen,    Narrative    EXAMINATION: CT CHEST/ABDOMEN/PELVIS W CONTRAST  4/18/2020 4:35 PM      CLINICAL HISTORY: malignant melanoma, soa, cough    COMPARISON: Chest radiograph, same date. PET/CT from 11/25/2019.        PROCEDURE COMMENTS: CT of the chest, abdomen, and pelvis was performed  with Isovue 370 intravenous contrast. Axial MIP  images of the chest,  and coronal and " sagittal reformatted images of the chest, abdomen, and  pelvis obtained.    FINDINGS:    Support devices: None.    Chest:  Large bilateral pleural effusions with associated large right apical  and medial pleural-based heterogeneous mass extending into the  mediastinum with mass effect on the distal trachea and right mainstem  bronchus. Right middle lobe bronchus is effaced secondary to an  adjacent 2 cm right hilar lymph node or nodule seen on series 3, image  134-139 with resultant partial collapse of the right middle lobe. The  trachea and central airways are otherwise patent. There is an  endobronchial soft tissue nodule seen on series 4, image 98 in the  left mainstem bronchus. The masses do not appear to invade the  mediastinal arteries although the SVC and innominate veins appear to  be occluded, with tumor or clot. The pleural-based mass measures  approximately 8.8 x 12 x 11 cm. Extensive mediastinal, and bilateral  hilar lymphadenopathy as well as numerous chest wall implants and  right axillary lymphadenopathy, for example the 4.6 x 2.5 cm  conglomerate right axillary lymph nodes. There are subcentimeter  rounded right peribronchial nodules in the central portions of the  right middle lobe as seen on series 4, image 95. Additional peripheral  nodules are seen laterally in the base of the right middle lobe as  seen on series 4, image 153-158.    There is a moderate pericardial effusion with pericardial tumor  involvement. Question intraventricular thrombus or mass within the  left ventricle.    Abdomen/pelvis:  The liver and biliary system, spleen, and pancreas appear normal in  appearance without evidence of metastasis.  Bilateral adrenal masses. The renal cortex is homogeneous with  hypoattenuating lesions favoring cysts. Motion limits the  visualization of the lower abdomen and pelvis. There are no abnormally  dilated or thickened loops of small bowel or colon. Urinary bladder is  unremarkable.    There  is no free air or free fluid. Centrally necrotic soft tissue  masses within the pelvis, for example a 4.4 x 4.0 cm right iliacus  mass seen on series 3, image 426. There are numerous peritoneal and  retroperitoneal enlarged lymph nodes, for example the 1.9 cm left  iliac lymph node seen on series 3, image 428. Bilateral inguinal  lymphadenopathy, for example of the 3.1 x 2.0 cm right inguinal lymph  node.    Bones:   No aggressive appearing bony abnormalities. Lacy sclerotic appearance  near the lesser trochanter.      Impression    IMPRESSION: In this patient with history of metastatic melanoma:  1a. Extensive thoracic metastatic burden with moderate to large  bilateral pleural effusions and a large pleural-based mass in the  right upper lobe.   1b. Moderate pericardial effusion and left ventricular thrombus versus  implant.  1c. Extensive mediastinal and axillary lymphadenopathy.  1d. Occlusive bland thrombus versus tumor involving the both  innominate veins and SVC.  1e. Small pulmonary and endobronchial nodules concerning for  metastatic implants.  2a. Necrotic appearing soft tissue nodules involving the right  iliopsoas region. Additional scattered lymphadenopathy throughout the  abdomen and pelvis and inguinal regions.  2b. Bilateral adrenal implants  2c. No evidence of metastatic disease to the liver or bones.  3. Numerous cutaneous implants.       [Urgent Result: Extensive metastatic disease to the thorax, abdomen,  and pelvis with extensive mediastinal involvement including the  innominate veins, SVC, and pericardium. Possible left ventricular  thrombus versus implant.]    Finding was identified on 4/18/2020 4:37 PM.     Dr. Escobar was contacted by Dr. Jack at 4/18/2020 6:02 PM and  verbalized understanding of the urgent finding.     I have personally reviewed the examination and initial interpretation  and I agree with the findings.    POOL CALDWELL MD   Blood culture    Specimen: Arm, Left; Blood    Left  Arm   Result Value Ref Range    Specimen Description Blood Left Arm     Culture Micro No growth after 20 hours    Osmolality urine   Result Value Ref Range    Urine Osmolality 371 100 - 1,200 mmol/kg   UA reflex to Microscopic and Culture    Specimen: Urine clean catch   Result Value Ref Range    Color Urine Yellow     Appearance Urine Slightly Cloudy     Glucose Urine Negative NEG^Negative mg/dL    Bilirubin Urine Negative NEG^Negative    Ketones Urine Negative NEG^Negative mg/dL    Specific Gravity Urine 1.019 1.003 - 1.035    Blood Urine Negative NEG^Negative    pH Urine 7.0 5.0 - 7.0 pH    Protein Albumin Urine Negative NEG^Negative mg/dL    Urobilinogen mg/dL Normal 0.0 - 2.0 mg/dL    Nitrite Urine Negative NEG^Negative    Leukocyte Esterase Urine Negative NEG^Negative    Source Urine     RBC Urine 1 0 - 2 /HPF    WBC Urine 0 0 - 5 /HPF    Mucous Urine Present (A) NEG^Negative /LPF    Amorphous Crystals Few (A) NEG^Negative /HPF   Sodium random urine   Result Value Ref Range    Sodium Urine mmol/L 39 mmol/L   Oncology Adult IP Consult: Patient to be seen: Routine within 24 hrs; Call back #: see sticky note; metastatic melanoma, significant disease burden; Consultant may enter orders: No    Narrative    Lorrie Marino MD     4/19/2020 11:07 AM  [facility], [place]   Hematology/Oncology Consult Note    Dallin Stevens MRN# [id number]   Age: 34 year old YOB: 1985          Reason for Consult:   Metastatic melanoma on pembrolizumab         Assessment and Plan:   Dallin Stevens is a 34 year old male with metastatic melanoma, on   pembrolizumab since 2/2020 (see oncological history outlined   below), who is admitted for worsening shortness of breath in the   setting of bilateral pleural effusions. He recently had an   admission to [facility] for shortness of breath and was found to   have these effusions along with a large pericardial effusion   causing tamponade requiring a  pericardial window placement, 4/6   to 4/9/2020. He reports that he was feeling better at time of   discharge but the SOB got worse at home so he presented to the   ED. Primary team is planning for thoracentesis today potentially.   Imaging does show that the SVC is invaded by the tumor, but he   does not have any facial swelling, dilated veins or upper   extremity swelling to suggest florid SVC syndrome. On imaging he   does have evidence of narrowing of the distal trachea and right   mainstem bronchus from the tumor masses, so if the thoracentesis   fails to relieve his shortness of breath, consideration could be   given to an interventional pulmonary consult to see if stenting   would be of any utility.  [place] soon as the travel restrictions   are lifted. We do not strongly suspect that these effusions are   related to the immune therapy, but it is possible. Seems more   likely they are related to the malignancy but regardless,   drainage is required for symptomatic relief.     Problem list:   # Metastatic melanoma on pembrolizumab  # Shortness of breath  # Bilateral pleural effusions  # pericardial effusion, recent tamponade s/p pericardial window  # Bulky mediastinal adenopathy  # Multiple cutaneous and soft tissue nodules    Summary of Recommendations:    Agree with IR consult for thoracentesis    If this does not relieve his shortness of breath, recommend   interventional pulmonary consultation to see if endobronchial   stenting is indicated    Agree with formal ECHO to reevaluate the pericardial effusion    Thank you for involving us in the care of this patient. We will   continue to follow during the hospitalization.    Patient was seen and plan of care developed with Dr. Whaley.           History of Present Illness:   History obtained from chart review and confirmed with patient.    Dallin Stevens is a 34 year old male with metastatic melanoma  on   pembrolizumab, with a recent admission for shortness of breath,   found to have bilateral pleural effusions and a pericardial   effusion causing tamponade s/p pericardial window, also with   extensive disease burden (mediastinal adenopathy, SVC   occlusion/thrombus, LV thrombus/implant, multiple cutaneous   nodules, adrenal nodules, soft tissue nodules) who is admitted   for worsening shortness of breath and increased oxygen   requirement.    Oncological history - from Dr. Nagel clinic note  1. ? He had superficial spreading melanoma in the left flank? in   2012.   -On? 02/03/2012,? biopsy of left flank skin lesion revealed? 1.7   mm superficial spreading melanoma without ulceration.  -On 02/23/2012, he had left flank wide local excision? and left   axillary sentinel node biopsy. One of two lymph node   positive.? He had stage III (pT2a pN1a M0)? disease. ?   -On 03/02/2012, left axillary radical lymph node dissection? was   done.? All 23 lymph nodes benign.  -? He received 4 weeks of high-dose interferon between 04/02/2012   and 04/27/2012.   ?   2. ? On 01/25/2017, CTrevealed metastatic disease involving lymph   nodes in right supraclavicular? ,? mediastinum and right hilum. 3   small pulmonary nodules are present.  -On? 01/26/2017,? biopsy? of? right supraclavicular lymph node   revealed metastatic melanoma. ? BRAF positive.? ?   -He received? pembrolizumab between 02/08/2017 and   11/08/2017.? Patient did not want any further treatment.  -PET scan on 06/28/2017 revealed significant improvement.  -PET scan on 11/29/2017 revealed new focus of mild FDG uptake   within a 6 mm right paratracheal lymph node. No other evidence of   hypermetabolic lymphadenopathy on this exam.?   -PET scan on 10/02/2018 revealed new hypermetabolic   lymphadenopathy in the supraclavicular region of the left neck   and in the mediastinum.?   ?   3. ? MRI of the brain on? 04/24/2019 revealed intraparenchymal   mass within the right  parietal occipital lobe. ?   -He had a craniotomy and? resection of a brain tumor? on   04/26/2019. ? Pathology was consistent with melanoma. ? ?   -The patient was recommended further treatment including   postoperative radiation to the brain. ? He did not want any other   treatment. Refused radiation.  -PET scan on 07/23/2019 revealed? progression of disease. New   hypermetabolic lymphadenopathy seen in the right supraclavicular   fossa and anterior mediastinum.  ?   4.? MRI brain on 11/25/2019 reveals? metastasis? in left   posterior frontal lobe,? right peritrigonal region and? right   posterior frontal parafalcine lesion.? ?   -PET scan on 11/25/2019 reveals progression of his disease.   ? There are multiple new hypermetabolic subcutaneous nodules   throughout the body. ? No change in size of mediastinal   lymphadenopathy.   ?   5. Admitted on 01/06/2020 in the [facility] because of   left-sided weakness. ?   -Brain MRI on 01/06/2020 revealed a 3.3 cm hemorrhagic mass in   the right frontal gyrus and other brain metastasis.  -On 01/16/2020, he had right frontal craniotomy and resection of   the tumor. ? Pathology revealed metastatic melanoma.  -He received gamma knife brain radiation? between 01/27/2020 and   01/31/2020 to 5 brain lesions. 2500 cGy in 5 fractions.   ?   6. ? Pembrolizumab started on 02/11/2020.   ?   7. CT chest, abdomen and pelvis on 04/06/2020 revealed massive   conglomerate of mediastinal lymphadenopathy. There is   moderate-sized pleural effusion and large pericardial effusion.   ?   -Patient had pericardial window. ? Cytology from pericardial   effusion and pericardial biopsy is negative for malignancy.          Review of Systems:   A comprehensive ROS was performed with the patient and was found   to be negative with the exception of that noted in the HPI above.       Past Medical History:     Past Medical History:   Diagnosis Date   ?  Malignant melanoma (H)             Past  Surgical History:     Past Surgical History:   Procedure Laterality Date   ?  BIOPSY OF SKIN LESION     ?  CRANIOTOMY, EXCISE TUMOR COMPLEX, COMBINED  04/2019   ?  DENTAL SURGERY      wisdom teeth   ?  LYMPH NODE BIOPSY      arm, excision   ?  MRI CRANIOTOMY LASER ABLATION Right 2/27/2020    Procedure: INTRAOPERATIVE MRI/STEALTH ASSISTED RIGHT LASER   ABLATION OF BRAIN METASTASIS;  Surgeon: Nitish Quintero MD;  Location: [facility] OR   ?  OPTICAL TRACKING SYSTEM CRANIOTOMY, EXCISE TUMOR, COMBINED   Right 1/16/2020    Procedure: stealth assisted Right craniotomy for tumor   resection;  Surgeon: Nitish Quintero MD;  Location: [facility] OR              Social History:     Social History     Socioeconomic History   ?  Marital status:      Spouse name: Not on file   ?  Number of children: Not on file   ?  Years of education: Not on file   ?  Highest education level: Not on file   Occupational History   ?  Not on file   Social Needs   ?  Financial resource strain: Not on file   ?  Food insecurity     Worry: Not on file     Inability: Not on file   ?  Transportation needs     Medical: Not on file     Non-medical: Not on file   Tobacco Use   ?  Smoking status: Never Smoker   ?  Smokeless tobacco: Never Used   Substance and Sexual Activity   ?  Alcohol use: Not Currently   ?  Drug use: Never   ?  Sexual activity: Yes     Partners: Female   Lifestyle   ?  Physical activity     Days per week: Not on file     Minutes per session: Not on file   ?  Stress: Not on file   Relationships   ?  Social connections     Talks on phone: Not on file     Gets together: Not on file     Attends Religion service: Not on file     Active member of club or organization: Not on file     Attends meetings of clubs or organizations: Not on file     Relationship status: Not on file   ?  Intimate partner violence     Fear of current or ex partner: Not on file     Emotionally abused: Not on file     Physically abused: Not on file     Forced  "sexual activity: Not on file   Other Topics Concern   ?  Not on file   Social History Narrative   ?  Not on file            Family History:     Family History   Problem Relation Age of Onset   ?  Anesthesia Reaction No family hx of    ?  Cardiovascular No family hx of    ?  Deep Vein Thrombosis No family hx of             Allergies:   No Known Allergies         Medications:     Medications Prior to Admission   Medication Sig Dispense Refill Last Dose   ?  acetaminophen (TYLENOL) 325 MG tablet Take 2 tablets (650 mg)   by mouth every 4 hours as needed for mild pain, fever or   headaches (> 101 F) (Patient not taking: Reported on 4/3/2020)        ?  hydrOXYzine (ATARAX) 25 MG tablet Take 1-2 tablets (25-50 mg)   by mouth 3 times daily as needed for anxiety (Patient not taking:   Reported on 4/3/2020) 20 tablet 0    ?  LORazepam (ATIVAN) 0.5 MG tablet Take 1 tablet (0.5 mg) by   mouth every 4 hours as needed (Anxiety, Nausea/Vomiting or Sleep)   30 tablet 2    ?  melatonin 1 MG TABS tablet Take 1 tablet (1 mg) by mouth   nightly as needed for sleep 30 tablet 0    ?  ondansetron (ZOFRAN) 4 MG tablet Take 1 tablet (4 mg) by mouth   every 8 hours as needed for nausea or vomiting (Patient not   taking: Reported on 4/3/2020) 30 tablet 0    ?  polyethylene glycol (MIRALAX/GLYCOLAX) packet Take 17 g by   mouth daily as needed for constipation (Patient not taking:   Reported on 4/3/2020)      ?  prochlorperazine (COMPAZINE) 10 MG tablet Take 1 tablet (10   mg) by mouth every 6 hours as needed (Nausea/Vomiting) (Patient   not taking: Reported on 4/3/2020) 30 tablet 2             Physical Exam:   /84 (BP Location: Right arm)   Pulse 116   Temp 98  F   (36.7  C) (Oral)   Resp 28   Ht 1.778 m (5' 10\")   Wt 59.7 kg   (131 lb 11.2 oz)   SpO2 94%   BMI 18.90 kg/m    Vitals:    04/18/20 1429 04/18/20 1925 04/19/20 0325   Weight: 61.2 kg (135 lb) 59.7 kg (131 lb 11.2 oz) 59.7 kg (131 lb   11.2 oz)     General: Appears ill, " sitting in bed, in no acute distress, on   supplemental oxygen  Heme/Lymph: No overt bleeding.  Skin: numerous scattered cutaneous nodules   HEENT: NCAT. EOMI, anicteric sclera.   Respiratory: Decreased breath sounds bilaterally  Cardiovascular: tachycardic  Gastrointestinal:  Abdomen soft, non-distended, and non-tender.   Extremities: No extremity edema.   Neurologic: A&O x 3,         Data:   I have personally reviewed the following labs/imaging:  CBC  Recent Labs   Lab 04/19/20  0438 04/18/20  1448 04/15/20  1020   WBC 12.2* 11.0 11.2*   RBC 3.73* 3.55* 3.60*   HGB 9.1* 8.9* 9.2*   HCT 30.1* 28.7* 28.9*   MCV 81 81 80   MCH 24.4* 25.1* 25.6*   MCHC 30.2* 31.0* 31.8   RDW 15.5* 15.6* 15.6*   * 489* 434     CMP  Recent Labs   Lab 04/19/20  0438 04/19/20  0008 04/18/20  1448 04/14/20  1025   * 126* 124* 128*   POTASSIUM 4.1  --  4.2 4.2   CHLORIDE 95  --  94 97   CO2 22  --  22 23   ANIONGAP 9  --  9 8   *  --  119* 116*   BUN 12  --  12 11   CR 0.59*  --  0.59* 0.67   GFRESTIMATED >90  --  >90 >90   GFRESTBLACK >90  --  >90 >90   ABUNDIO 9.0  --  8.5 9.0   MAG 2.2  --  2.0  --    PHOS 4.1  --  3.6  --    PROTTOTAL  --   --  6.7* 6.9   ALBUMIN  --   --  2.7* 2.8*   BILITOTAL  --   --  0.5 0.4   ALKPHOS  --   --  74 82   AST  --   --  14 14   ALT  --   --  11 11     INR  Recent Labs   Lab 04/19/20  0438   INR 1.12       CT chest/abdomen/pelvis  IMPRESSION: In this patient with history of metastatic melanoma:  1a. Extensive thoracic metastatic burden with moderate to large  bilateral pleural effusions and a large pleural-based mass in the  right upper lobe.   1b. Moderate pericardial effusion and left ventricular thrombus   versus  implant.  1c. Extensive mediastinal and axillary lymphadenopathy.  1d. Occlusive bland thrombus versus tumor involving the both  innominate veins and SVC.  1e. Small pulmonary and endobronchial nodules concerning for  metastatic implants.  2a. Necrotic appearing soft tissue  nodules involving the right  iliopsoas region. Additional scattered lymphadenopathy throughout   the  abdomen and pelvis and inguinal regions.  2b. Bilateral adrenal implants  2c. No evidence of metastatic disease to the liver or bones.  3. Numerous cutaneous implants.   Lactic acid level STAT   Result Value Ref Range    Lactate for Sepsis Protocol 2.2 (H) 0.7 - 2.0 mmol/L   Sodium   Result Value Ref Range    Sodium 126 (L) 133 - 144 mmol/L   INR   Result Value Ref Range    INR 1.12 0.86 - 1.14   CBC with platelets differential   Result Value Ref Range    WBC 12.2 (H) 4.0 - 11.0 10e9/L    RBC Count 3.73 (L) 4.4 - 5.9 10e12/L    Hemoglobin 9.1 (L) 13.3 - 17.7 g/dL    Hematocrit 30.1 (L) 40.0 - 53.0 %    MCV 81 78 - 100 fl    MCH 24.4 (L) 26.5 - 33.0 pg    MCHC 30.2 (L) 31.5 - 36.5 g/dL    RDW 15.5 (H) 10.0 - 15.0 %    Platelet Count 490 (H) 150 - 450 10e9/L    Diff Method Automated Method     % Neutrophils 86.2 %    % Lymphocytes 5.1 %    % Monocytes 7.0 %    % Eosinophils 0.5 %    % Basophils 0.5 %    % Immature Granulocytes 0.7 %    Nucleated RBCs 0 0 /100    Absolute Neutrophil 10.6 (H) 1.6 - 8.3 10e9/L    Absolute Lymphocytes 0.6 (L) 0.8 - 5.3 10e9/L    Absolute Monocytes 0.9 0.0 - 1.3 10e9/L    Absolute Eosinophils 0.1 0.0 - 0.7 10e9/L    Absolute Basophils 0.1 0.0 - 0.2 10e9/L    Abs Immature Granulocytes 0.1 0 - 0.4 10e9/L    Absolute Nucleated RBC 0.0    Basic metabolic panel   Result Value Ref Range    Sodium 125 (L) 133 - 144 mmol/L    Potassium 4.1 3.4 - 5.3 mmol/L    Chloride 95 94 - 109 mmol/L    Carbon Dioxide 22 20 - 32 mmol/L    Anion Gap 9 3 - 14 mmol/L    Glucose 127 (H) 70 - 99 mg/dL    Urea Nitrogen 12 7 - 30 mg/dL    Creatinine 0.59 (L) 0.66 - 1.25 mg/dL    GFR Estimate >90 >60 mL/min/[1.73_m2]    GFR Estimate If Black >90 >60 mL/min/[1.73_m2]    Calcium 9.0 8.5 - 10.1 mg/dL   Magnesium   Result Value Ref Range    Magnesium 2.2 1.6 - 2.3 mg/dL   Phosphorus   Result Value Ref Range    Phosphorus  "4.1 2.5 - 4.5 mg/dL   Cortisol   Result Value Ref Range    Cortisol Serum 30.2 (H) 4 - 22 ug/dL   Interventional Radiology Adult/Peds IP Consult: Patient to be seen: URGENT within 4 hours; Call back #: [phone]; thoracenteses and placement of chest tuebs; Requesting provider? Attending physician    Robin Rodriguez MD     4/19/2020  2:58 PM      INTERVENTIONAL RADIOLOGY CONSULTATION    Name: Dallin Stevens  Age: 34 year old   Referring Physician: Dr. Mckenzie ref. provider found   REASON FOR REFERRAL: b/l effusions     HPI: 33 yo M pt with widely metastatic melanoma and large, b/l,   presumably malignant pleural effusions. HAving sig SOB but stable   at this time.    PAST MEDICAL HISTORY:   Past Medical History:   Diagnosis Date   ?  Malignant melanoma (H)        PAST SURGICAL HISTORY:   Past Surgical History:   Procedure Laterality Date   ?  BIOPSY OF SKIN LESION     ?  CRANIOTOMY, EXCISE TUMOR COMPLEX, COMBINED  04/2019   ?  DENTAL SURGERY      wisdom teeth   ?  LYMPH NODE BIOPSY      arm, excision   ?  MRI CRANIOTOMY LASER ABLATION Right 2/27/2020    Procedure: INTRAOPERATIVE MRI/STEALTH ASSISTED RIGHT LASER   ABLATION OF BRAIN METASTASIS;  Surgeon: Nitish Quintero MD;  Location: [facility] OR   ?  OPTICAL TRACKING SYSTEM CRANIOTOMY, EXCISE TUMOR, COMBINED   Right 1/16/2020    Procedure: stealth assisted Right craniotomy for tumor   resection;  Surgeon: Nitish Quintero MD;  Location: [facility] OR           Physical Examination:   VITALS:   /79 (BP Location: Right arm)   Pulse 116   Temp 97.9  F   (36.6  C) (Oral)   Resp 24   Ht 1.778 m (5' 10\")   Wt 59.7 kg   (131 lb 11.2 oz)   SpO2 100%   BMI 18.90 kg/m      Labs:    BMP RESULTS:  Lab Results   Component Value Date     (L) 04/19/2020    POTASSIUM 4.1 04/19/2020    CHLORIDE 95 04/19/2020    CO2 22 04/19/2020    ANIONGAP 9 04/19/2020     (H) 04/19/2020    BUN 12 04/19/2020    CR 0.59 (L) 04/19/2020    GFRESTIMATED >90 " 04/19/2020    GFRESTBLACK >90 04/19/2020    ABUNDIO 9.0 04/19/2020        CBC RESULTS:  Lab Results   Component Value Date    WBC 12.2 (H) 04/19/2020    RBC 3.73 (L) 04/19/2020    HGB 9.1 (L) 04/19/2020    HCT 30.1 (L) 04/19/2020    MCV 81 04/19/2020    MCH 24.4 (L) 04/19/2020    MCHC 30.2 (L) 04/19/2020    RDW 15.5 (H) 04/19/2020     (H) 04/19/2020       INR/PTT:  Lab Results   Component Value Date    INR 1.12 04/19/2020    PTT 32 02/27/2020       Diagnostic studies:   I have personally reviewed the most recent imaging studies   including CT form yesterday showing large R and very large Left   simple appearing effusions.    Assessment/Plan:  We will place b/l chest tubes today.   Recommend considering revision to b/l pleurX catheters in the   future, given overall prognosis    CC  Patient Care Team:  No Ref-Primary, Physician as PCP - Lisa White MD as Referring Physician (Oncology)  Vero Tesfaye MD as MD (Hematology & Oncology)  [facility] ([facility] ([facility] ([place]         Sodium   Result Value Ref Range    Sodium 124 (L) 133 - 144 mmol/L   Interventional Radiology Adult/Peds IP Consult: Patient to be seen: Routine within 24 hours; Call back #: [phone]; stenting of right mainstem bronchus; Requesting provider? Attending physician; Name: Robin Mohr MD     4/19/2020  2:58 PM      INTERVENTIONAL RADIOLOGY CONSULTATION    Name: Dallin Stevens  Age: 34 year old   Referring Physician: Dr. Mckenzie ref. provider found   REASON FOR REFERRAL: b/l effusions     HPI: 35 yo M pt with widely metastatic melanoma and large, b/l,   presumably malignant pleural effusions. HAving sig SOB but stable   at this time.    PAST MEDICAL HISTORY:   Past Medical History:   Diagnosis Date   ?  Malignant melanoma (H)        PAST SURGICAL HISTORY:   Past Surgical History:   Procedure Laterality Date   ?  BIOPSY OF SKIN LESION     ?  CRANIOTOMY, EXCISE TUMOR COMPLEX,  "COMBINED  04/2019   ?  DENTAL SURGERY      wisdom teeth   ?  LYMPH NODE BIOPSY      arm, excision   ?  MRI CRANIOTOMY LASER ABLATION Right 2/27/2020    Procedure: INTRAOPERATIVE MRI/STEALTH ASSISTED RIGHT LASER   ABLATION OF BRAIN METASTASIS;  Surgeon: Nitish Quintero MD;  Location: [facility] OR   ?  OPTICAL TRACKING SYSTEM CRANIOTOMY, EXCISE TUMOR, COMBINED   Right 1/16/2020    Procedure: stealth assisted Right craniotomy for tumor   resection;  Surgeon: Nitish Quintero MD;  Location: [facility] OR           Physical Examination:   VITALS:   /79 (BP Location: Right arm)   Pulse 116   Temp 97.9  F   (36.6  C) (Oral)   Resp 24   Ht 1.778 m (5' 10\")   Wt 59.7 kg   (131 lb 11.2 oz)   SpO2 100%   BMI 18.90 kg/m      Labs:    BMP RESULTS:  Lab Results   Component Value Date     (L) 04/19/2020    POTASSIUM 4.1 04/19/2020    CHLORIDE 95 04/19/2020    CO2 22 04/19/2020    ANIONGAP 9 04/19/2020     (H) 04/19/2020    BUN 12 04/19/2020    CR 0.59 (L) 04/19/2020    GFRESTIMATED >90 04/19/2020    GFRESTBLACK >90 04/19/2020    ABUNDIO 9.0 04/19/2020        CBC RESULTS:  Lab Results   Component Value Date    WBC 12.2 (H) 04/19/2020    RBC 3.73 (L) 04/19/2020    HGB 9.1 (L) 04/19/2020    HCT 30.1 (L) 04/19/2020    MCV 81 04/19/2020    MCH 24.4 (L) 04/19/2020    MCHC 30.2 (L) 04/19/2020    RDW 15.5 (H) 04/19/2020     (H) 04/19/2020       INR/PTT:  Lab Results   Component Value Date    INR 1.12 04/19/2020    PTT 32 02/27/2020       Diagnostic studies:   I have personally reviewed the most recent imaging studies   including CT form yesterday showing large R and very large Left   simple appearing effusions.    Assessment/Plan:  We will place b/l chest tubes today.   Recommend considering revision to b/l pleurX catheters in the   future, given overall prognosis    CC  Patient Care Team:  No Ref-Primary, Physician as PCP - General  Rezadeh, Hamied, MD as Referring Physician (Oncology)  Vero Tesfaye, " MD as MD (Hematology & Oncology)  Care, [facility] ([facility] ([facility] ([place]         Echo Limited    Narrative    [id number]  [id number]  [id number]  [id number]^NANCY^HERIBERTO^ZEUS MATHIAS           [facility],[place]  Echocardiography Laboratory  [address] [place] [id number]     Name: JEANIE HERNANDEZ  MRN: [id number]  : 1985  Study Date: 2020 08:39 AM  Age: 34 yrs  Gender: Male  Patient Location: [facility]  Reason For Study: Pericardial Effusion  Ordering Physician: HERIBERTO CRUZ  Performed By: Malick Angeles     BSA: 1.8 m2  Height: 70 in  Weight: 135 lb  HR: 115  BP: 120/84 mmHg  _____________________________________________________________________________  __        Procedure  Limited Portable Echo Adult. The final echo results were communicated to Dr. Blunt..  _____________________________________________________________________________  __        Interpretation Summary  Limited echo performed with patient sitting upright.     Normal biventricular function.     There is extensive septations within the pericardial space indicative of  organizing pericardial effusion . There is a large mass within the pericardial  space abutting the lateral aspect of the heart. Given the patient's history of  metastatic melanoma this likely represents pericardial metastasis.     A bilateral pleural effusion is present.  _____________________________________________________________________________  __        Left Ventricle  Left ventricular size is normal. Left ventricular wall thickness is normal.  Global and regional left ventricular function is normal with an EF of 60-65%.  There is no thrombus.     Right Ventricle  The right ventricle is normal size. Global right ventricular function is  normal.     Atria  Both atria appear normal.     Mitral Valve  The mitral valve is normal.        Aortic Valve  Aortic valve is normal in structure and function.     Tricuspid Valve  The tricuspid valve is  normal.     Pulmonic Valve  The valve leaflets are not well visualized.     Vessels  Dilation of the inferior vena cava is present with normal respiratory  variation in diameter. IVC diameter and respiratory changes fall into an  intermediate range suggesting an RA pressure of 8 mmHg. IVC diameter >2.1 cm  collapsing <50% with sniff suggests a high RA pressure estimated at 15 mmHg or  greater.     Pericardium  There is extensive septations within the pericardial space indicative of  organizing pericardial effusion . There is a large mass within the pericardial  space abutting the lateral aspect of the heart. Given the patient's history of  metastatic melanoma this likely represents pericardial metastasis.        Miscellaneous  A bilateral pleural effusion is present.  _____________________________________________________________________________  __  MMode/2D Measurements & Calculations     IVSd: 1.4 cm  LVIDd: 3.4 cm  LVPWd: 1.5 cm  LV mass(C)d: 176.7 grams  LV mass(C)dI: 100.1 grams/m2  RWT: 0.86           _____________________________________________________________________________  __           Report approved by: Zora Delcid 04/19/2020 12:11 PM      Cell count with differential fluid   Result Value Ref Range    Body Fluid Analysis Source Pleural fluid     Color Fluid Yellow     Appearance Fluid Clear     WBC Fluid 2226 /uL    % Neutrophils Fluid 54 %    % Lymphocytes Fluid 14 %    % Other Cells Fluid 32 %   IR Procedure Note    Narrative    Ollie Barr MD     4/19/2020  2:04 PM  [facility] [place]    Procedure: IR Procedure Note    Date/Time: 4/19/2020 2:03 PM  Performed by: Ollie Barr MD  Authorized by: Ollie Barr MD     UNIVERSAL PROTOCOL   Site Marked: NA  Prior Images Obtained and Reviewed:  Yes  Required items: Required blood products, implants, devices and special   equipment available    Patient identity confirmed:  Verbally with patient, arm  band, provided   demographic data and hospital-assigned identification number  Patient was reevaluated immediately before administering moderate or deep   sedation or anesthesia  Confirmation Checklist:  Patient's identity using two indicators, relevant   allergies, procedure was appropriate and matched the consent or emergent   situation and correct equipment/implants were available  Time out: Immediately prior to the procedure a time out was called    Universal Protocol: the Joint Commission Universal Protocol was followed    Preparation: Patient was prepped and draped in usual sterile fashion           ANESTHESIA    Anesthesia: Local infiltration  Local Anesthetic:  Lidocaine 1% without epinephrine      SEDATION    Patient Sedated: No    See dictated procedure note for full details.  Findings: B/l 10 Greek non locking pigtail chest tubes placed  10cc fluid per side sent for labs    Specimens: none    Complications: None    Condition: Stable    Plan: Return to floor  Otherwise per orders    PROCEDURE   Patient Tolerance:  Patient tolerated the procedure well with no immediate   complications    Length of time physician/provider present for 1:1 monitoring during   sedation: 0

## 2020-04-19 NOTE — PROGRESS NOTES
Patient Name: Dallin Stevens  Medical Record Number: [id number]  Today's Date: 4/19/2020    Procedure: Bilateral chest tube placement.  Proceduralist: Dr. Robin Woodruff,Dr.John Kramer    Patient in room:  1252  Procedure Start: 1308  Procedure end: 1323  Patient out of room:  1330    Report given to: 6B RN    Other Notes: Pt arrived to IR room 4 from [address] [place] [address]. Consent reviewed. Pt denies any questions or concerns regarding procedure. Pt positioned supine and monitored per protocol. Pt tolerated procedure without any noted complications. Pt transferred back to [facility].

## 2020-04-20 NOTE — PROVIDER NOTIFICATION
MD notified via web-based paging. Patient called RN and stated that he was trying to get the lady in the corner to help him. Bathroom door was open and light on. Patient had also taken off his gown. Patient not very aware of his bilateral chest tubes either. However, patient A&Ox4.    MD ordered VBG, will continue to monitor and follow POC.

## 2020-04-20 NOTE — PLAN OF CARE
Patient is A&Ox4. Vitally has been on up to 10L oximyzer, Dr Tang notified and plan to continue to watch. Has been dealing with high anxiety this shift, with difficulty breathing and pain at chest tube sites. Managing this with ativan and tylenol, MD ordered dilaudid IV PRN to be available if the patient were to need it, declined for now. Voiding per urinal. Tolerating a regular diet. Bilateral chest tubes to -20 suction, did tolerate wheelchair ride in hallway off suction. Will continue current plan of care and update MDs with any changes.

## 2020-04-20 NOTE — ANESTHESIA CARE TRANSFER NOTE
Patient: Dallin Stevens    Procedure(s):  BILATERAL INSERTION OF PLEURX CATHETER    Diagnosis: Pleural effusion, bilateral [J90]  Diagnosis Additional Information: No value filed.    Anesthesia Type:   No value filed.     Note:  Airway :Face Mask  Patient transferred to:PACU  Comments: Patient transferred to PACU in stable condition, breathing spontaneously on face mask, VSS.Handoff Report: Identifed the Patient, Identified the Reponsible Provider, Reviewed the pertinent medical history, Discussed the surgical course, Reviewed Intra-OP anesthesia mangement and issues during anesthesia, Set expectations for post-procedure period and Allowed opportunity for questions and acknowledgement of understanding      Vitals: (Last set prior to Anesthesia Care Transfer)    CRNA VITALS  4/20/2020 1129 - 4/20/2020 1159      4/20/2020             Pulse:  114    SpO2:  96 %                Electronically Signed By: SERGEI Admason CRNA  April 20, 2020  11:59 AM

## 2020-04-20 NOTE — BRIEF OP NOTE
[facility], [place]    Brief Operative Note    Pre-operative diagnosis: Pleural effusion, bilateral [J90]  Post-operative diagnosis Same as pre-operative diagnosis    Procedure: Procedure(s):  BILATERAL INSERTION OF PLEURX CATHETER  Surgeon: Surgeon(s) and Role:     * Uday Bhakta MD - Primary     * Kalyan Mckinley MD - Fellow - Assisting  Anesthesia: Combined MAC with Local   Estimated blood loss: Minimal  Drains: Bilateral pleurX catheter insertion  Specimens: * No specimens in log *  Findings:   Bilateral pleurX catheters inserted, chest tubes removed.  Complications: None.  Implants: * No implants in log *

## 2020-04-20 NOTE — ANESTHESIA POSTPROCEDURE EVALUATION
Anesthesia POST Procedure Evaluation    Patient: Dallin Stevens   MRN:     [id number] Gender:   male   Age:    34 year old :      1985        Preoperative Diagnosis: Pleural effusion, bilateral [J90]   Procedure(s):  BILATERAL INSERTION OF PLEURX CATHETER   Postop Comments: No value filed.     Anesthesia Type: No value filed.       Disposition: Admission   Postop Pain Control: Uneventful            Sign Out: Well controlled pain   PONV: No   Neuro/Psych: Uneventful            Sign Out: Acceptable/Baseline neuro status   Airway/Respiratory: Uneventful            Sign Out: Acceptable/Baseline resp. status   CV/Hemodynamics: Uneventful            Sign Out: Acceptable CV status   Other NRE: NONE   DID A NON-ROUTINE EVENT OCCUR? No         Last Anesthesia Record Vitals:  CRNA VITALS  2020 1128 - 2020 1228      2020             NIBP:  91/56    Pulse:  111    Temp:  36.1  C (96.9  F)    SpO2:  96 %    Resp Rate (observed):  20    EKG:  Sinus tachycardia          Last PACU Vitals:  Vitals Value Taken Time   BP 94/68 2020  2:45 PM   Temp 36.8  C (98.2  F) 2020  2:15 PM   Pulse 107 2020  2:45 PM   Resp 26 2020  2:15 PM   SpO2 97 % 2020  2:50 PM   Temp src     NIBP 91/56 2020 12:02 PM   Pulse 111 2020 12:02 PM   SpO2 96 % 2020 12:02 PM   Resp     Temp 36.1  C (96.9  F) 2020 12:02 PM   Ht Rate     Temp 2     Vitals shown include unvalidated device data.      Electronically Signed By: James Romero MD, 2020, 2:51 PM

## 2020-04-20 NOTE — OP NOTE
Procedure Date: 04/20/2020      PREOPERATIVE DIAGNOSES:   1.  Malignant bilateral pleural effusion.   2.  Stage IV malignant melanoma with large mediastinal tumor burden.   3.  Hypoxic respiratory failure.      POSTOPERATIVE DIAGNOSES:     1.  Malignant bilateral pleural effusion.   2.  Stage IV malignant melanoma with large mediastinal tumor burden.   3.  Hypoxic respiratory failure.      PROCEDURES PERFORMED:  Bilateral PleurX catheter placement.      ANESTHESIA:  Local with MAC.      SURGEON:  Uday Larson MD      ASSISTANT:  Kalyan Mckinley MD, Cardiothoracic Surgery fellow      COMPLICATIONS:  None.      ESTIMATED BLOOD LOSS:  Minimal.      SPECIMENS:  None.      IMPLANTS:  Bilateral PleurX catheter.      DESCRIPTION OF PROCEDURE IN DETAIL:  The patient was taken to the operating room and placed in a semi-Puentes position since he was unable to tolerate the decubitus position.  He was connected to continuous pulse and cardiac monitoring.  Adequate procedural sedation was achieved.  The anterior chest and abdomen were prepped with ChloraPrep and draped in normal sterile fashion.  A formal timeout was carried out confirming the name of the patient and correct procedure.  He had SCDs in place and functioning prior to induction of anesthesia.  He received antibiotics within 30 minutes of incision.      After the timeout was complete, we started by injecting a mixture of dexmedetomidine, dexamethasone and bupivacaine per our protocol.  This was injected in the intercostal space.  The subcutaneous tunnel and the skin exit incision on both sides.  On the right side, we accessed the right pleural space using a needle in Seldinger technique.  This was in the 6th intercostal space.  We then advanced a wire into the right pleural space.  A skin incision was made in the epigastrium.  The PleurX catheter was then brought to the field and tunneled from the epigastric incision towards the intrathoracic incision.  The  subcutaneous cuff of the PleurX tube was positioned 1 cm away from the skin incision.  Next, we advanced a dilator introducer over the wire.  The dilator was removed as well as the wire and the PleurX catheter was advanced over the peel-away sheath.  The peel-away sheath was then removed.  Both incisions were then irrigated.  Hemostasis was confirmed.  The thoracic incision was closed with absorbable suture.  We placed a silk suture to secure the tube at the epigastric incision.  Exofin was applied to the skin incision.  We then placed a catheter on the contralateral side in a similar fashion.      This was placed in the 7th intercostal space.  We accessed the pleural space with a needle and a wire was advanced.  We then made an incision in the epigastrium and tunneled the PleurX catheter.  A dilator introducer was then advanced over the wire and the catheter was then advanced over the peel-away sheath.  We had excellent tidaling from both pleural catheters at the end of the case.  Both were connected to Pleur-Evac and hemostasis was confirmed.  Exofin was applied to the skin incisions.  We placed a sterile dressing on both sides.  The patient tolerated the procedure well.  He was then transferred to PACU for recovery without any problems.  All instrument and sponge counts were correct at the end of the case.         VIKAS GREEN MD             D: 2020   T: 2020   MT: CRISTIAN      Name:     JEANIE HERNANDEZ   MRN:      [id number]        Account:        [id number]   :      1985           Procedure Date: 2020      Document: [id number]

## 2020-04-20 NOTE — PROGRESS NOTES
"PACU RN NOTE:  Assigned as pt nurse while pt recovering in PACU post bilateral insertion of PLEURX Catheters R/T bilt pleural effusions.   @ 1211 Dr. Romero called and notified that pt blood pressures are low. 500 LR bolus ordered and administered.   @ 1230 Dr. Mckinley txt paged the following:  \"Dallin Stevens in PACU has Xray available for review. Pt hypotensive current pressure 87/53 (62) currently receiving 500ml LR bolus per anesthesia. Responsive to fluid. Isra NO *[id number]\"  Dr. Haroon called back @ 1245 stated that XR looks good and pt may go back to floor. No new orders at this time.   Pt tolerating ice chips without difficulty   Dr. Romero stated he would place sign out  @ 1315 Dr. Blunt quick paged the following:   \"Dallin Stevens in PACU currently has no continuous IV fluids. Pt did have to receive a 500ml LR bolus for hypotension. Current pressure 100/69. Isra NO *[id number]\"  Dr. Blunt called back and ordered continuous IV infusion of LR @ 75 ml/hr. Aware that pt received 500 ml LR bolus while in PACU for hypotension.   In pt chart after pt transferred out of PACU to complete documentation  "

## 2020-04-20 NOTE — PROGRESS NOTES
Hematology / Oncology  Daily Progress Note   Date of Service: 04/20/2020  Patient: Dallin Stevens  MRN: [id number]  Admission Date: 4/18/2020  Hospital Day # 2  Cancer Diagnosis: metastatic melanoma  Primary Outpatient Oncologist: Dr. Tesfaye  Current Treatment Plan: Pembrolizumab     Assessment & Plan:   Dallin Stevens is a 34 year old man with extensive metastatic melanoma, on pembrolizumab, who was recently admitted 4/6 for shortness of breath from massive mediastinal lymphadenopathy with narrowing of the trachea, large pericardial effusion with tamponade s/p pericardial window, and moderate-sized pleural effusion, who presents again with worsening shortness of breath, pericardial effusion, and mod-large bilateral pleural effusions. S/p bilateral pleurX placement 4/20. Patient was felt to be too unstable and risks would be too high to intervene on the pericardial effusion per Thoracic Oncology.     Recommendations/Plan for Today:   - S/p bilateral pleurX placement. Patient reports feeling better after procedure  - per Dr Tesfaye's note, his tumor was positive for BRAF mutation (back in 2017), not sure if it is BRAF [id number], could not find the report in the chart. But given he has BRAF mutation, and he was never treated with BRAF targeted treatment, we discussed with patient of considering start TKI targeting BRAF mutation. Patient stated he would think about it. He is still planing for the vaccine therapy.     # Metastatic melanoma (brain, subcutaneous nodules throughout the chest/abdomen/pelvis, possible pericardial metastasis)  # Pericardial effusion (no evidence of tamponade), tachycardia  # Pleural effusion  # Tracheal compression from mediastinal lymphadenopathy  See below for summarized oncologic history. Recently admitted to [facility] 4/6-4/9/2020 with shortness of breath. CT CAP at at that time showed massive conglomerate of mediastinal lymphadenopathy, with some narrowing of the trachea; bulky  right axillary lymphadenopathy; occlusion of the SVC secondary to tumor invasion; occlusive thrombus in the left innominate vein secondary to metastasis; near occlusion of the right innominate vein; bilateral moderate-sized pleural effusions; large pericardial effusion which on echo showed tamponade. Pericardial window done 4/7, cytology from the pericardial effusion is negative and the pericardial biopsy is negative for malignancy. Follow up echo 4/8 showed improvement in the pericardial effusion, but persistent large pleural effusion. Radiation was recommended but he declined this. Discharged on 4/9 after weaning to room air and after patient refused further admission. Now presents 4/18 with shortness of breath. Troponin 0.020, lactic acid 2.0. CXR showed the large RUL mass, and moderate bilateral pleural effusion. Bedside cardiac ultrasound in the ED by cardiology showed intact LV function and no tamponade, with moderate-sized pericardial effusion. The loculated pleural effusion was seen. COVID-19 testing negative on 4/7 during last admission. CT CAP redemonstrates significant disease burden in the chest, as well as moderate-large bilateral pleural effusions, moderate pericardial effusion. Echo on admission with organizing pericardial effusion; There is a large mass within the pericardial space abutting the lateral aspect of the heart. Given the patient's history of metastatic melanoma this likely represents pericardial metastasis. Most likely the effusions are related to malignancy. There is a possibility that effusions are related to immune checkpoint therapy, however seems more likely they are related to the malignancy. Patient would like to return home once his shortness of breath improves.   - Consulted IR. Bilateral pigtail chest catheters placed 4/19. Drained 3.9L on 4/19  - Consulted Thoracic Surgery. Bilateral pleurX chest tubes placed 4/20. He was unable to tolerate anesthesia (his blood pressure  started to drop ? 80s/50s), and they did not recommend any further interventions for the pericardial effusion (risks >> benefits)  - Follow up with Dr. Tesfaye as scheduled 5/6/20, due for Keytruda. 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 Na 124 on admission. Hypotonic hyponatremia. DI seen with pembrolizumab would usually cause hypernatremia. TSH 1.62, Free T4 1.61, Serum Cortisol 30.2. 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 Continue salt tabs 2g BID, continue to monitor  # Anemia. Hgb 8.9 - follow CBC, transfuse if Hgb ?  7.0      Oncologic History:  Follows with Dr. Vero Tesfaye. See outpatient note for further details. First diagnosed in 2012 with stage III disease from a left flank skin lesion, s/p high dose interferon therapy. He relapsed in 1/2017 with supraclavicular, mediastinal, right hilar lymph nodes; pulmonary nodules, which were biopsy proven BRAF positive melanoma. He received pembrolizumab 2/8/17-11/8/17 with improvement on PET until 10/2018 when new LAD seen in left neck and mediastinum. He also developed right parietal occipital lobe brain mass 4/24/19, s/p resection 4/26/19, pathology c/w melanoma. He refused postoperative brain radiation, and further scans showed progression of disease. PET and MRI 11/25/19 showed several new brain metastases and further new subcutaneous nodules throughout the body. He did develop a hemorrhagic mass causing left sided weakness 1/6/2020, which was resected 1/16/20. He received gamma knife radiation 1/27/20-1/31/20, and started pembrolizumab 2/11/20. Despite this CT CAP 4/6/20 showed massive mediastinal lymphadenopathy,  with moderate sized pleural effusion and large pericardial effusion causing tamponade. Pericardial biopsy and effusion cytology were negative for malignancy. Radiation was offered and he declined. He has been continued on pembrolizumab, last cycle given was on 4/15. While his mediastinal disease is larger than previous imaging, he did not get PET scan done before he started pembrolizumab so we do not know if he has responded. He has been encouraged that the subcutaneous nodules may been getting smaller. Brain MRI 3/27/20 showed a new small 4 mm focus within the left frontal lobe which likely represents additional metastatic disease; Additionally, there is a slight increase in size of the temporal horn of the right lateral ventricle concerning for a possible trapped ventricle. He was presented at tumor conference 4/6/20- plan is to continue current treatment and repeat scan in May 2020. Per recent discussions with his primary oncologist, he wants to continue with all treatment, and is full code. He is scheduled to undergo a tumor vaccine through a provider in the [place] in the next few weeks. CT CAP here shows significant disease widespread through chest, right iliopsoas, and scattered LAD in abdomen, pelvis, and inguinal regions, in addition to cutaneous implants.        Patient was seen and plan of care was discussed with attending physician Dr. Whaley.    Thank you for the opportunity to partake in this patients plan of care. Please do not hesitate to page with questions. We will continue to follow.     Leslie Mason  Hem/Onco Fellow     Rhonda De La Vega PA-C  Hematology/Oncology  Pager # [phone]  Phone # [phone]   ___________________________________________________________________    Subjective & Interval History:    Patient was seen and examed after bilateral pleurX tube placement.   He reports he is breathing better, pain in the tube sites are manageable. No fever/chills.         Physical Exam:   "  Blood pressure 90/61, pulse 110, temperature 97.3  F (36.3  C), temperature source Tympanic, resp. rate 24, height 1.778 m (5' 10\"), weight 59.7 kg (131 lb 11.2 oz), SpO2 98 %.    General: lying in bed, no acute distress, frail looking  HEENT: sclera anicteric, EOMI, MMM  Neck: supple, normal ROM  CV: RRR, normal S1/S2, no m/r/g  Resp: CTAB, s/p b/l pleurX tube placement  GI: soft, non-tender, non-distended, bowel sounds present and normoactive  MSK: warm and well-perfused, normal tone  Skin: no rashes on limited exam, no jaundice  Neuro: Alert and interactive, moves all extremities equally, no focal deficits    Labs & Studies: I personally reviewed the following studies:  ROUTINE LABS (Last four results):  CMP  Recent Labs   Lab 04/20/20  1012 04/20/20  0336 04/19/20  2202 04/19/20  1551  04/19/20  0438  04/18/20  1448 04/14/20  1025   * 126* 129* 128*   < > 125*   < > 124* 128*   POTASSIUM  --  4.5  --   --   --  4.1  --  4.2 4.2   CHLORIDE  --  96  --   --   --  95  --  94 97   CO2  --  23  --   --   --  22  --  22 23   ANIONGAP  --  8  --   --   --  9  --  9 8   GLC  --  135*  --   --   --  127*  --  119* 116*   BUN  --  18  --   --   --  12  --  12 11   CR  --  0.70  --   --   --  0.59*  --  0.59* 0.67   GFRESTIMATED  --  >90  --   --   --  >90  --  >90 >90   GFRESTBLACK  --  >90  --   --   --  >90  --  >90 >90   ABUNDIO  --  8.8  --   --   --  9.0  --  8.5 9.0   MAG  --  2.2  --   --   --  2.2  --  2.0  --    PHOS  --  5.2*  --   --   --  4.1  --  3.6  --    PROTTOTAL  --  6.3*  --   --   --   --   --  6.7* 6.9   ALBUMIN  --  2.5*  --   --   --   --   --  2.7* 2.8*   BILITOTAL  --  0.5  --   --   --   --   --  0.5 0.4   ALKPHOS  --  72  --   --   --   --   --  74 82   AST  --  12  --   --   --   --   --  14 14   ALT  --  11  --   --   --   --   --  11 11    < > = values in this interval not displayed.     CBC  Recent Labs   Lab 04/20/20  0336 04/19/20  0438 04/18/20  1448 04/15/20  1020   WBC 15.5* 12.2* " 11.0 11.2*   RBC 4.13* 3.73* 3.55* 3.60*   HGB 10.3* 9.1* 8.9* 9.2*   HCT 33.4* 30.1* 28.7* 28.9*   MCV 81 81 81 80   MCH 24.9* 24.4* 25.1* 25.6*   MCHC 30.8* 30.2* 31.0* 31.8   RDW 15.8* 15.5* 15.6* 15.6*   * 490* 489* 434     INR  Recent Labs   Lab 04/19/20  0438   INR 1.12     IMAGING    CXR 4/20/20  IMPRESSION: Prominent bibasilar an retrocardiac opacities comparing  similar, likely related to atelectasis or edema given recent pleural  effusions. Bilateral thoracostomy tube placement. Large right upper  lung and mediastinal masses unchanged in short interval.    CXR 4/19/20                                                                                                                       IMPRESSION: Decreased bilateral moderate to large pleural effusions  with presumed reexpansion pulmonary edema in the bases.    CT C/A/P w/ 4/18/20  IMPRESSION: In this patient with history of metastatic melanoma:  1a. Extensive thoracic metastatic burden with moderate to large  bilateral pleural effusions and a large pleural-based mass in the  right upper lobe.   1b. Moderate pericardial effusion and left ventricular thrombus versus  implant.  1c. Extensive mediastinal and axillary lymphadenopathy.  1d. Occlusive bland thrombus versus tumor involving the both  innominate veins and SVC.  1e. Small pulmonary and endobronchial nodules concerning for  metastatic implants.  2a. Necrotic appearing soft tissue nodules involving the right  iliopsoas region. Additional scattered lymphadenopathy throughout the  abdomen and pelvis and inguinal regions.  2b. Bilateral adrenal implants  2c. No evidence of metastatic disease to the liver or bones.  3. Numerous cutaneous implants.    CXR 4/18/20  IMPRESSION:  1.  Large right upper mass, metastatic versus primary malignancy. CT  chest is recommended for further evaluation.  2.  Moderate bilateral pleural effusion with associated atelectasis  and consolidation.    MRI Brain w/o and w/  3/27/20  Impression: In this patient with a history of multiple intracranial  metastatic melanoma:  1. Response to treatment within the right posterior frontal lobe,  right posterior medial temporal lobe, and left  medial temporal  lesions.   2. Progression of left parietal and temporal lesions (series 16 image  95).  3. Subcentimeter enhancing focus within the left frontal lobe,  slightly increased compared to prior study, and new compared to  1/17/2020  4. New susceptibility artifact within the left parietal lobe may  represent either a hemorrhagic metastasis or microhemorrhage.  5. Slight increase in size of the temporal horn of right lateral  ventricle, concerning for trapped ventricle. Elsewhere, the  ventricular size and configuration is within normal limits.    PET 11/25/19                                                               IMPRESSION:   1.  Worsening metastatic disease with multiple new FDG avid  subcutaneous nodules seen throughout the lower neck, chest, abdomen  and pelvis as described above.  2.  No significant interval change in size of mediastinal  lymphadenopathy with decreased radiotracer uptake compared with prior  exam on 7/23/2019.  3.  Subcentimeter lesion in the left posterior frontal lobe better  seen on prior MRI and too small for PET characterization.  4.  Focal radiotracer uptake adjacent to the cecum without clear CT  correlate lesion. Evaluation is limited due to lack of intravenous  contrast. Attention on follow-up exam is recommended      Medications list for reference:  Current Facility-Administered Medications   Medication     [START ON 4/23/2020] acetaminophen (TYLENOL) tablet 650 mg     [Auto Hold] acetaminophen (TYLENOL) tablet 650 mg     acetaminophen (TYLENOL) tablet 975 mg     bupivacaine (MARCAINE) 28.5 mL, dexamethasone PF (DECADRON) 10 mg OR mixture     glucose gel 15-30 g    Or     dextrose 50 % injection 25-50 mL    Or     glucagon injection 1 mg     [Auto Hold]  guaiFENesin-dextromethorphan (ROBITUSSIN DM) 100-10 MG/5ML syrup 5 mL     [Auto Hold] HYDROmorphone (PF) (DILAUDID) injection 0.3-0.5 mg     lactated ringers BOLUS 500 mL     [Auto Hold] lidocaine (LMX4) cream     [Auto Hold] lidocaine 1 % 0.1-1 mL     lidocaine 1 %     [Auto Hold] LORazepam (ATIVAN) tablet 0.5-1 mg    Or     [Auto Hold] LORazepam (ATIVAN) injection 0.5-1 mg     [Auto Hold] magnesium sulfate 4 g in 100 mL sterile water (premade)     Medication Instruction     [Auto Hold] melatonin tablet 5 mg     morphine (PF) injection 2-4 mg     naloxone (NARCAN) injection 0.1-0.4 mg     [Auto Hold] naloxone (NARCAN) injection 0.1-0.4 mg     [Auto Hold] ondansetron (ZOFRAN) injection 8 mg    Or     [Auto Hold] ondansetron (ZOFRAN-ODT) ODT tab 8 mg    Or     [Auto Hold] ondansetron (ZOFRAN) tablet 8 mg     oxyCODONE (ROXICODONE) tablet 5-10 mg     [Auto Hold] polyethylene glycol (MIRALAX) Packet 17 g     [Auto Hold] potassium chloride (KLOR-CON) Packet 20-40 mEq     [Auto Hold] potassium chloride 10 mEq in 100 mL intermittent infusion with 10 mg lidocaine     [Auto Hold] potassium chloride 10 mEq in 100 mL sterile water intermittent infusion (premix)     [Auto Hold] potassium chloride 20 mEq in 50 mL intermittent infusion     [Auto Hold] potassium chloride ER (KLOR-CON M) CR tablet 20-40 mEq     [Auto Hold] potassium phosphate 15 mmol in D5W 250 mL intermittent infusion     [Auto Hold] potassium phosphate 20 mmol in D5W 250 mL intermittent infusion     [Auto Hold] potassium phosphate 20 mmol in D5W 500 mL intermittent infusion     [Auto Hold] potassium phosphate 25 mmol in D5W 500 mL intermittent infusion     [Auto Hold] prochlorperazine (COMPAZINE) tablet 5 mg    Or     [Auto Hold] prochlorperazine (COMPAZINE) injection 5 mg     [Auto Hold] senna-docusate (SENOKOT-S/PERICOLACE) 8.6-50 MG per tablet 1 tablet    Or     [Auto Hold] senna-docusate (SENOKOT-S/PERICOLACE) 8.6-50 MG per tablet 2 tablet     [Auto Hold]  sodium chloride (PF) 0.9% PF flush 3 mL     [Auto Hold] sodium chloride (PF) 0.9% PF flush 3 mL     [Auto Hold] sodium chloride tablet 2 g

## 2020-04-20 NOTE — PROGRESS NOTES
Sepsis Evaluation Progress Note    I was called to see Dallin Stevens due to abnormal vital signs triggering the Sepsis SIRS screening alert. He is not known to have an infection.     Physical Exam   Vital Signs:  Temp: 97.4  F (36.3  C) Temp src: Axillary BP: 111/73 Pulse: 115 Heart Rate: 120 Resp: 20 SpO2: 97 % O2 Device: Oxymizer cannula Oxygen Delivery: 10 LPM    Lab:  Lactic Acid   Date Value Ref Range Status   04/18/2020 2.0 0.7 - 2.0 mmol/L Final     Lactate for Sepsis Protocol   Date Value Ref Range Status   04/20/2020 2.6 (H) 0.7 - 2.0 mmol/L Final     Comment:     Critical result called to and read back by JOE MAZARIEGOS RN.       The patient is at baseline mental status.     The rest of their physical exam is significant for clear lungs to auscultation but w/ weak inspiratory effort, chest tubes x2 w/o air leak, no tidaling    Assessment & Plan   NO EVIDENCE OF SEPSIS at this time.  Vital sign, physical exam, and lab findings are likely due to metastatic melanoma with pericardial and pleural effusions.    Disposition: The patient will remain on the current unit. We will continue to monitor this patient closely..  Jhonny Swenson MD    Sepsis Criteria   Sepsis: 2+ SIRS criteria due to infection  Severe Sepsis: Sepsis AND 1+ new sign of acute organ dysfunction (Note: lactate >2 is organ dysfunction)  Septic Shock: Sepsis AND hypotension despite volume resuscitation with 30 ml/kg crystalloid

## 2020-04-20 NOTE — PLAN OF CARE
"/71 (BP Location: Right arm)   Pulse 108   Temp 98.2  F (36.8  C) (Oral)   Resp 18   Ht 1.778 m (5' 10\")   Wt 59.7 kg (131 lb 11.2 oz)   SpO2 96%   BMI 18.90 kg/m       Pt returned from PACU around 1340. S/p bilateral pleurX catheters inserted and chest tubes removed.   Neuro: Drowsy the majority of the day. Arousable to voice. Hoarse/whisper voice, but improved slightly after OR.   Cardiac: Soft BPs, asymptomatic. Improving with MIVF. Sinus tach with -110. Denies CP  Respiratory: On 3L oxygen via NC. PleurX drains to -20 suction. Denies SOB  GI/: Due to void. No BM this shift.   Diet/Appetite: Clear liquid diet. ADAT. Denies nausea.   Skin: No new skin deficits noted.   LDA: PIV x2. LR infusing at 75 mL/hr. Bilateral pleurx drains.   Activity: Up with assist of 1.   Pain: Acceptable with current regimen. Scheduled tylenol given   Plan: Continue to monitor and follow POC. Notify MD with any changes or concerns    "

## 2020-04-20 NOTE — PROVIDER NOTIFICATION
MD notified via web-based paging. Patient triggered sepsis protocol, critical lactic acid at 2.6, previous 2.2.    Awaiting any new orders. Will continue to monitor and follow POC.

## 2020-04-20 NOTE — PROGRESS NOTES
[facility]    Medicine Progress Note - Hospitalist Service, Gold 11       Date of Admission:  4/18/2020  Assessment & Plan    This is a young male who has had metastatic melanoma for the past 8 years and who now presents with massive bilateral pleural effusions and a possible pericardial effusion/mass in the pericardium. He had the first pericardial window done at [facility] in [place]. He has been to  this afternoon for bilateral thoracenteses but will need to go to the OR tomorrow AM for further surgical therapy. He will get bilateral pleurex catheters. He needs the pericardium evaluated for effusion or mass.          Diet: Regular Diet Adult  NPO per Anesthesia Guidelines for Procedure/Surgery Except for: Meds    DVT Prophylaxis: Pneumatic Compression Devices  Lu Catheter: not present  Code Status: Full Code      Disposition Plan   Expected discharge: 4 - 7 days, recommended to prior living arrangement once adequate drainage of the chest.  Entered: Jose Carlos Blunt MD 04/19/2020, 9:43 PM       The patient's care was discussed with the Patient.    Jose Carlos Blunt MD  Hospitalist Service, [address]  Pager: [phone]  Please see sticky note for cross cover information  ______________________________________________________________________    Interval History   Patient denied fever chills sweats. He admitted to testing for coronavirus last week and this was negative. He has been with his family in [place] and no where else. He worked at [employer] in [place]. He has no nausea or vomiting. He has noted nodules all over. No chest. His breathing has been difficult  .    Data reviewed today: I reviewed all medications, new labs and imaging results over the last 24 hours. I personally reviewed the chest x-ray image(s) showing massive bilateral plerual effusions.    Physical Exam   Vital Signs: Temp: 97.3   F (36.3  C) Temp src: Axillary BP: 116/76 Pulse: 116 Heart Rate: 121 Resp: 24 SpO2: 90 % O2 Device: Nasal cannula Oxygen Delivery: 5 LPM  Weight: 131 lbs 11.21 oz  General Appearance: A young worried man in NAD  Respiratory: decreased throughout  Cardiovascular: RR  GI: BS= soft and non-tender  Skin: numerous nodules covered his basck  Other: A anxious young white male in moderate distress     Data   Recent Labs   Lab 04/19/20  1551 04/19/20  1018 04/19/20  0438  04/18/20  1448 04/15/20  1020 04/14/20  1025   WBC  --   --  12.2*  --  11.0 11.2*  --    HGB  --   --  9.1*  --  8.9* 9.2*  --    MCV  --   --  81  --  81 80  --    PLT  --   --  490*  --  489* 434  --    INR  --   --  1.12  --   --   --   --    * 124* 125*   < > 124*  --  128*   POTASSIUM  --   --  4.1  --  4.2  --  4.2   CHLORIDE  --   --  95  --  94  --  97   CO2  --   --  22  --  22  --  23   BUN  --   --  12  --  12  --  11   CR  --   --  0.59*  --  0.59*  --  0.67   ANIONGAP  --   --  9  --  9  --  8   ABUNDIO  --   --  9.0  --  8.5  --  9.0   GLC  --   --  127*  --  119*  --  116*   ALBUMIN  --   --   --   --  2.7*  --  2.8*   PROTTOTAL  --   --   --   --  6.7*  --  6.9   BILITOTAL  --   --   --   --  0.5  --  0.4   ALKPHOS  --   --   --   --  74  --  82   ALT  --   --   --   --  11  --  11   AST  --   --   --   --  14  --  14   LIPASE  --   --   --   --  34*  --   --    TROPI  --   --   --   --  0.020  --   --     < > = values in this interval not displayed.

## 2020-04-20 NOTE — ANESTHESIA PREPROCEDURE EVALUATION
Anesthesia Pre-Procedure Evaluation    Patient: Dallin Stevens   MRN:     [id number] Gender:   male   Age:    34 year old :      1985        Preoperative Diagnosis: Pleural effusion, bilateral [J90]   Procedure(s):  BILATERAL INSERTION OF PLEURX CATHETER     LABS:  CBC:   Lab Results   Component Value Date    WBC 15.5 (H) 2020    WBC 12.2 (H) 2020    HGB 10.3 (L) 2020    HGB 9.1 (L) 2020    HCT 33.4 (L) 2020    HCT 30.1 (L) 2020     (H) 2020     (H) 2020     BMP:   Lab Results   Component Value Date     (L) 2020     (L) 2020    POTASSIUM 4.5 2020    POTASSIUM 4.1 2020    CHLORIDE 96 2020    CHLORIDE 95 2020    CO2 23 2020    CO2 22 2020    BUN 18 2020    BUN 12 2020    CR 0.70 2020    CR 0.59 (L) 2020     (H) 2020     (H) 2020     COAGS:   Lab Results   Component Value Date    PTT 32 2020    INR 1.12 2020     POC:   Lab Results   Component Value Date     (H) 2020     OTHER:   Lab Results   Component Value Date    LACT 2.0 2020    A1C 5.3 01/15/2020    ABUNDIO 8.8 2020    PHOS 5.2 (H) 2020    MAG 2.2 2020    ALBUMIN 2.5 (L) 2020    PROTTOTAL 6.3 (L) 2020    ALT 11 2020    AST 12 2020    ALKPHOS 72 2020    BILITOTAL 0.5 2020    LIPASE 34 (L) 2020    TSH 1.62 2020    T4 1.61 (H) 2020        Preop Vitals    BP Readings from Last 3 Encounters:   20 108/70   04/15/20 137/84   20 107/75    Pulse Readings from Last 3 Encounters:   20 122   04/15/20 130   20 110      Resp Readings from Last 3 Encounters:   20 22   04/15/20 14   20 16    SpO2 Readings from Last 3 Encounters:   20 99%   04/15/20 96%   20 94%      Temp Readings from Last 1 Encounters:   20 36.2  C (97.2  F) (Oral)    Ht Readings from  "Last 1 Encounters:   04/18/20 1.778 m (5' 10\")      Wt Readings from Last 1 Encounters:   04/19/20 59.7 kg (131 lb 11.2 oz)    Estimated body mass index is 18.9 kg/m  as calculated from the following:    Height as of this encounter: 1.778 m (5' 10\").    Weight as of this encounter: 59.7 kg (131 lb 11.2 oz).     LDA:  Peripheral IV 04/18/20 Right Upper forearm (Active)   Site Assessment WDL 04/20/20 0800   Line Status Saline locked 04/20/20 0800   Phlebitis Scale 0-->no symptoms 04/20/20 0800   Infiltration Scale 0 04/20/20 0800   Infiltration Site Treatment Method  None 04/20/20 0400   Extravasation? No 04/20/20 0800   Number of days: 2       Chest Tube 1 Right Pleural 15.5 Montenegrin (Active)   Number of days: 0       Chest Tube 2 Left Pleural 15.5 Montenegrin (Active)   Number of days: 0        Past Medical History:   Diagnosis Date     Malignant melanoma (H)       Past Surgical History:   Procedure Laterality Date     BIOPSY OF SKIN LESION       CRANIOTOMY, EXCISE TUMOR COMPLEX, COMBINED  04/2019     DENTAL SURGERY      wisdom teeth     LYMPH NODE BIOPSY      arm, excision     MRI CRANIOTOMY LASER ABLATION Right 2/27/2020    Procedure: INTRAOPERATIVE MRI/STEALTH ASSISTED RIGHT LASER ABLATION OF BRAIN METASTASIS;  Surgeon: Nitish Quintero MD;  Location:  OR     OPTICAL TRACKING SYSTEM CRANIOTOMY, EXCISE TUMOR, COMBINED Right 1/16/2020    Procedure: stealth assisted Right craniotomy for tumor resection;  Surgeon: Nitish Quintero MD;  Location:  OR      No Known Allergies          JZG FV AN PHYSICAL EXAM    Assessment:   ASA SCORE: 4    H&P: History and physical reviewed and following examination; no interval change.   Smoking Status:  Non-Smoker/Unknown        Plan:   Anes. Type:  MAC   Pre-Medication: None   Induction:  IV (Standard)   Airway: Native Airway   Access/Monitoring: PIV   Maintenance: Balanced     Postop Plan:   Postop Pain: Opioids  Postop Sedation/Airway: Not planned     PONV Management: "   Adult Risk Factors:, Non-Smoker, Postop Opioids     CONSENT: Direct conversation   Plan and risks discussed with: Patient   Blood Products: Consent Deferred (Minimal Blood Loss)                   James oRmero MD

## 2020-04-20 NOTE — PLAN OF CARE
[id number]:  Neuro: A&Ox4, slightly groggy with PRN Ativan. Anxious about procedure today. Flat affect. Hoarse voice.   Cardiac: -120s. BPs 110s/70s.  Resp: 7L oxymizer satting high 90s, using O2 for comfort. Bilateral CT -20 suction.   GI/: Low UO d/t low PO intake. LBM: 4/19  Diet/Appetite: NPO since midnight for pericardial window and pleurex drains.  Skin: Scars, bruising, drains.  Access: R) PIV SL  Drains: 2 CTs  Activity: SBA  Pain: PRN Tylenol x1, PRN Ativan x1 for anxiety.  Plan for pericardial window and pleurex drains today.     Will continue with plan of care and notify MD of any changes.

## 2020-04-21 NOTE — TELEPHONE ENCOUNTER
ONCOLOGY INTAKE: Records Information      APPT INFORMATION:  Referring provider:  Rhonda De La Vega PA-C  Referring provider s clinic:  [facility]  Reason for visit/diagnosis: metastatic melanoma   Has patient been notified of appointment date and time?: Yes    RECORDS INFORMATION:  Were the records received with the referral (via [employer])? No    Has patient been seen for any external appt for this diagnosis? No    If yes, where? N/a    Has patient had any imaging or procedures outside of [facility] for this condition? No      If Yes, where? N/a    ADDITIONAL INFORMATION:  IB request

## 2020-04-21 NOTE — TELEPHONE ENCOUNTER
Spoke with patient to go over 4/30 appt details with lab and Dr. Burnett. Pt will call back to go over info as Pt was in the middle of hospital discharge

## 2020-04-21 NOTE — PROGRESS NOTES
Called by RN ~4:30am, patient with increased O2 requirements (previosuly 1-2L -> increased to 6L NC). No increased RR or WOB otherwise, HR and BP stable compared to earlier in the day.    On 4/20 had bilateral PleurX catheters placed for bilateral malignancy pleural effusions. Pt supposed to go for pericardiocentesis as well (has mediastinal/pericardial mass with effusion) but did not tolerate anasthesia, only had chest tubes placed. Post procedure CXR with persisetnt bibasilar opacities, no pneumothoraces noted.     Given increased hypoxia, repeat stat CXR ordered/     ~6:30 am called by Radiology: CXR 4/21: moderate R. Pneumothorax, questionable trace left apical pneumothorax, no change in L>R bibasilar opacities, lung masses    Pt remains stable, -110, BP 97/67 (c/w prior baselines). RR 20-28, SpO2 98% on 6L NC. Pt without increased WOB, feels asymptomatic.    Paged Thoracic Surgery to let them know about increasing hypoxia and pneumothorax given that they placed chest tubes 4/20. Will sign out to day team.     Penny Daniel MD  [facility] Hospitalist  Text Page

## 2020-04-21 NOTE — PROGRESS NOTES
[id number]  Neuro: A&Ox4.   Cardiac: ST. Soft BP post-op, increased 100s/70s with MIVF.   Respiratory: Sating >99% on 3L NC. 2 pleurX to -20 sxn.  GI/: Due to void post-op.  Diet/appetite: Advanced to regular diet. Eating well.  Activity:  Assist of 1, up to chair.  Pain: At acceptable level with scheduled tylenol and prn ativan x1.   Skin: Prophylactic mepilex to sacrum.  LDA's: 2 PIV. 2 CT removal sites on lower back.     Plan: Continue with POC. Notify primary team with changes.

## 2020-04-21 NOTE — PROGRESS NOTES
Care Coordinator Progress Note    Admission Date/Time:  4/18/2020  Attending MD:  Juanpablo Cali MD    Data  Chart reviewed, discussed with interdisciplinary team.   Patient was admitted for:    Dyspnea, unspecified type  Pericardial effusion  Metastatic melanoma (H)  Hyponatremia.    Concerns with insurance coverage for discharge needs: None identified  Current Living Situation: Patient lives with Wife  Support System: WifeMeg is Primary Caregiver  Services Involved:  Home Medical #[phone]: Oxygen  Transportation at Discharge: Family  Transportation to Medical Appointments: Family  Barriers to Discharge: None identified    Coordination of Care and Referrals: Provided patient/family with options for Home Care from Medicare.gov with ratings       Assessment  Pt with Stage 4 Melanoma with metastasis admitted with Bilateral pleural effusions, s/p Bilateral Pleurx drain placement. Pt is discharging to home today, will require pleurx drain kits and further drain teaching. I have met with Pt to assist with discharge planning.  Pt lives with his Wife and states she will be managing drain cares. Home Care discussed which Pt is agreeable to. Pt states he has had [facility] in past and prefers [facility]. I have made a referral to  Home Care and added Skilled nursing visits to the discharge orders.  Rx for Pleurx Drain Kits faxed to [facility], fax #[phone].  Several Pleurx Drain Kits will be sent home with Pt at discharge.     Plan  Anticipated Discharge Date:  4/21/20  Anticipated Discharge Plan:  Discharge to home with intermittent Home Care visits provided by  Home Care    Shelbi Haas RN   6B care coordinator #[phone]

## 2020-04-21 NOTE — PROGRESS NOTES
[facility]    Medicine Progress Note - Hospitalist Service, Gold 11       Date of Admission:  4/18/2020  Assessment & Plan    This is a young male who has had metastatic melanoma for the past 8 years and who now presents with massive bilateral pleural effusions and a possible pericardial effusion/mass in the pericardium. He had the first pericardial window done at [facility] in [place]. He has been to  this afternoon for bilateral thoracenteses but will need to go to the OR tomorrow AM for further surgical therapy. He will get bilateral pleurex catheters.     Today he went to the OR and had the chest tubes changed to pleurex catheters and was not able to tolerate anesthesia. He had the chest tubes done but was not harrison to tolerated a pericardotomy. He was brought back to the room I stable condition. He is wide awake and alert.     He is well aware of the severity of the disease as is his wife. They were disturbed at my bringing up the code status. He has an oncologist in [place] named Brien who is directing hsi therapy.           Diet: Advance Diet as Tolerated: Regular Diet Adult    DVT Prophylaxis: Pneumatic Compression Devices  Ul Catheter: not present  Code Status: Full Code      Disposition Plan   Expected discharge: 4 - 7 days, recommended to prior living arrangement once adequate drainage of the chest.  Entered: Jose Carlos Blunt MD 04/20/2020, 10:16 PM       The patient's care was discussed with the Patient.    Jose Carlos Blunt MD  Hospitalist Service, [address]  Pager: [phone]  Please see sticky note for cross cover information  ______________________________________________________________________    Interval History   Patient denied fever chills sweats. He admitted to testing for coronavirus last week and this was negative. He has been with his family in [place] and no where else. He  worked at [employer] in [place]. He has no nausea or vomiting. He has noted nodules all over. No chest. His breathing has been difficult  .    Data reviewed today: I reviewed all medications, new labs and imaging results over the last 24 hours. I personally reviewed the chest x-ray image(s) showing massive bilateral plerual effusions.    Physical Exam   Vital Signs: Temp: 98.2  F (36.8  C) Temp src: Oral BP: 99/70 Pulse: 108 Heart Rate: 116 Resp: 20 SpO2: 95 % O2 Device: Nasal cannula Oxygen Delivery: 2 LPM  Weight: 131 lbs 11.21 oz  General Appearance: A young worried man in NAD  Respiratory: decreased throughout  Cardiovascular: RR  GI: BS= soft and non-tender  Skin: numerous nodules covered his basck  Other: A anxious young white male in moderate distress     Data   Recent Labs   Lab 04/20/20  1619 04/20/20  1012 04/20/20  0336  04/19/20  0438  04/18/20  1448   WBC  --   --  15.5*  --  12.2*  --  11.0   HGB  --   --  10.3*  --  9.1*  --  8.9*   MCV  --   --  81  --  81  --  81   PLT  --   --  510*  --  490*  --  489*   INR  --   --   --   --  1.12  --   --    * 128* 126*   < > 125*   < > 124*   POTASSIUM  --   --  4.5  --  4.1  --  4.2   CHLORIDE  --   --  96  --  95  --  94   CO2  --   --  23  --  22  --  22   BUN  --   --  18  --  12  --  12   CR  --   --  0.70  --  0.59*  --  0.59*   ANIONGAP  --   --  8  --  9  --  9   ABUNDIO  --   --  8.8  --  9.0  --  8.5   GLC  --   --  135*  --  127*  --  119*   ALBUMIN  --   --  2.5*  --   --   --  2.7*   PROTTOTAL  --   --  6.3*  --   --   --  6.7*   BILITOTAL  --   --  0.5  --   --   --  0.5   ALKPHOS  --   --  72  --   --   --  74   ALT  --   --  11  --   --   --  11   AST  --   --  12  --   --   --  14   LIPASE  --   --   --   --   --   --  34*   TROPI  --   --   --   --   --   --  0.020    < > = values in this interval not displayed.

## 2020-04-21 NOTE — PROGRESS NOTES
"THORACIC & FOREGUT SURGERY     S:  Pt O2sa declined early this AM, requirements went up to 6L nc, patient was asymptomatic.  Pt seen at bedside resting comfortably, no complaints, states he feels well even \"better than yesterday\". Small stable pneumothorax on CXR this AM. Weaning down on O2 this AM.    O:  BP 99/64 (BP Location: Right arm)   Pulse 116   Temp 98.2  F (36.8  C) (Oral)   Resp 26   Ht 1.778 m (5' 10\")   Wt 59.7 kg (131 lb 11.2 oz)   SpO2 97%   BMI 18.90 kg/m      A&Ox3, NAD  Breathing non-labored, on 4L nc  RRR  Soft, NDNT  Distal extremities appear well perfused  Cachectic     A/P: Dallin Stevens is a 34 year old male with hx of stage IV malignant melanoma with large mediastinal tumor burden and bilateral pelural effusion now POD# 1 s/p bilateral PleurX catheter placement.   - Cares per primary  - PleurX catheters placed to w/s this AM  - Will need PleurX catheter education today, after which his catheters can be disconnected from the pleuravacs and capped, further mgmt per oncology  - Thoracic surgery will sign off, please call with questions/concerns    Discussed with staff    Casey Parsons PA-C  Thoracic and Foregut Surgery    "

## 2020-04-21 NOTE — PLAN OF CARE
"Shift: 9079-5717  VS: /74 (BP Location: Right arm)   Pulse 107   Temp 97.3  F (36.3  C) (Oral)   Resp 20   Ht 1.778 m (5' 10\")   Wt 59.7 kg (131 lb 11.2 oz)   SpO2 99%   BMI 18.90 kg/m    Pain: discomfort in pleurX tube sites, controlled w/ scheduled tylenol.  Neuro: A&O x4. Pt forgetful & intermittently anxious. Prn ativan given x1 this AM for anxiety.  Cardiac: On tele sinus tach 100-110s. AVSS.  Respiratory: Satting >92% on RA, JORDAN. Coarse crackles in LLL, lung sounds diminished in bases bilaterally.  GI/Diet/Appetite: Regular diet, appetite poor. Last BM this AM.  : Voiding not saving.  LDA's: R PIVs (x2) SL.  Skin: Bilat back dressing covered, CDI. Bilat pleurX sites covered, dressings changed this AM.  Activity: Up SBA.  Tests/Procedures: Repeat CXR,  patient learning on PleurX drainage completed this shift.   Pertinent Labs/Lab Collection: , team aware, no new orders at this time.     Plan: Pending discharge plan. Will continue w/ POC.    "

## 2020-04-21 NOTE — PROGRESS NOTES
Hematology / Oncology  Daily Progress Note   Date of Service: 04/21/2020  Patient: Dallin Stevens  MRN: [id number]  Admission Date: 4/18/2020  Hospital Day # 3  Cancer Diagnosis: metastatic melanoma  Primary Outpatient Oncologist: Dr. Tesfaye  Current Treatment Plan: Pembrolizumab     Assessment & Plan:   Dallin Stevens is a 34 year old man with extensive metastatic melanoma, on pembrolizumab, who was recently admitted 4/6 for shortness of breath from massive mediastinal lymphadenopathy with narrowing of the trachea, large pericardial effusion with tamponade s/p pericardial window, and moderate-sized pleural effusion, who presents again with worsening shortness of breath, pericardial effusion, and mod-large bilateral pleural effusions. S/p bilateral pleurX placement 4/20 with symptomatic improvement. Patient was felt to be too unstable and risks would be too high to intervene on the pericardial effusion per Thoracic Oncology.     Recommendations/Plan for Today:   - S/p bilateral pleurX placement. Patient reports feeling better after procedure and would like to discharge. OK to discharge from an Oncology standpoint after he receives chest tube education  - Note stable pneumothorax per Thoracic surgery. Requiring 2L nasal cannula. He already has home O2  - per Dr Tesfaye's note, his tumor was positive for BRAF mutation (back in 2017), not sure if it is BRAF V600E, could not find the report in the chart. But given he has BRAF mutation, and he was never treated with BRAF targeted treatment, we discussed with patient of considering start TKI targeting BRAF mutation. Patient stated he would think about it. He is still planing for the vaccine therapy. He requested a consult with Dr. Burnett, requested. Will keep appt with Dr. Tesfaye (also due for Keytruda) as scheduled 5/6/20 but could cancel if patient goes on BRAF inhibitor therapy.     # Metastatic melanoma (brain, subcutaneous nodules throughout the  chest/abdomen/pelvis, possible pericardial metastasis)  # Pericardial effusion (no evidence of tamponade), tachycardia  # Pleural effusion  # Tracheal compression from mediastinal lymphadenopathy  See below for summarized oncologic history. Recently admitted to [facility] 4/6-4/9/2020 with shortness of breath. CT CAP at at that time showed massive conglomerate of mediastinal lymphadenopathy, with some narrowing of the trachea; bulky right axillary lymphadenopathy; occlusion of the SVC secondary to tumor invasion; occlusive thrombus in the left innominate vein secondary to metastasis; near occlusion of the right innominate vein; bilateral moderate-sized pleural effusions; large pericardial effusion which on echo showed tamponade. Pericardial window done 4/7, cytology from the pericardial effusion is negative and the pericardial biopsy is negative for malignancy. Follow up echo 4/8 showed improvement in the pericardial effusion, but persistent large pleural effusion. Radiation was recommended but he declined this. Discharged on 4/9 after weaning to room air and after patient refused further admission. Now presents 4/18 with shortness of breath. Troponin 0.020, lactic acid 2.0. CXR showed the large RUL mass, and moderate bilateral pleural effusion. Bedside cardiac ultrasound in the ED by cardiology showed intact LV function and no tamponade, with moderate-sized pericardial effusion. The loculated pleural effusion was seen. COVID-19 testing negative on 4/7 during last admission. CT CAP redemonstrates significant disease burden in the chest, as well as moderate-large bilateral pleural effusions, moderate pericardial effusion. Echo on admission with organizing pericardial effusion; There is a large mass within the pericardial space abutting the lateral aspect of the heart. Given the patient's history of metastatic melanoma this likely represents pericardial metastasis. Most likely the effusions are related to  malignancy. There is a possibility that effusions are related to immune checkpoint therapy, however seems more likely they are related to the malignancy. Patient would like to return home once his shortness of breath improves.   - Consulted IR. Bilateral pigtail chest catheters placed 4/19. Drained 3.9L on 4/19  - Consulted Thoracic Surgery. Bilateral pleurX chest tubes placed 4/20. He was unable to tolerate anesthesia (his blood pressure started to drop ? 80s/50s), and they did not recommend any further interventions for the pericardial effusion (risks >> benefits)  - Could consider BRAF inhibitor. Scheduled appt with Dr. Burnett per patient request for 2nd opinion. Patient is going to discuss this with his wife, he is not yet decided if he wants to try this therapy instead  - Follow up with Dr. Tesfaye as scheduled 5/6/20, due for Keytruda. Keep this appt for now. Will repeat PET in ~2 months  - MRI brain + follow up Radiation Oncology 5/27/20    # Pneumothorax  He had bilateral pleurX placement 4/20 with good output, -243 ml from the right chest and -530 ml from the left chest through midnight on 4/20. Noted increased O2 needs overnight, patient was asymptomatic. CXR with moderate right pneumothorax and possible trace left pneumothorax, both are stable.   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 Na 124 on admission. Hypotonic hyponatremia. DI seen with pembrolizumab would usually cause hypernatremia. TSH 1.62, Free T4 1.61, Serum Cortisol 30.2. 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 Continue salt tabs 2g BID, continue to monitor  # Anemia. Hgb 8.9 - follow CBC,  transfuse if Hgb ?  7.0      Oncologic History:  Follows with Dr. Vero Tesfaye. See outpatient note for further details. First diagnosed in 2012 with stage III disease from a left flank skin lesion, s/p high dose interferon therapy. He relapsed in 1/2017 with supraclavicular, mediastinal, right hilar lymph nodes; pulmonary nodules, which were biopsy proven BRAF positive melanoma. He received pembrolizumab 2/8/17-11/8/17 with improvement on PET until 10/2018 when new LAD seen in left neck and mediastinum. He also developed right parietal occipital lobe brain mass 4/24/19, s/p resection 4/26/19, pathology c/w melanoma. He refused postoperative brain radiation, and further scans showed progression of disease. PET and MRI 11/25/19 showed several new brain metastases and further new subcutaneous nodules throughout the body. He did develop a hemorrhagic mass causing left sided weakness 1/6/2020, which was resected 1/16/20. He received gamma knife radiation 1/27/20-1/31/20, and started pembrolizumab 2/11/20. Despite this CT CAP 4/6/20 showed massive mediastinal lymphadenopathy, with moderate sized pleural effusion and large pericardial effusion causing tamponade. Pericardial biopsy and effusion cytology were negative for malignancy. Radiation was offered and he declined. He has been continued on pembrolizumab, last cycle given was on 4/15. While his mediastinal disease is larger than previous imaging, he did not get PET scan done before he started pembrolizumab so we do not know if he has responded. He has been encouraged that the subcutaneous nodules may been getting smaller. Brain MRI 3/27/20 showed a new small 4 mm focus within the left frontal lobe which likely represents additional metastatic disease; Additionally, there is a slight increase in size of the temporal horn of the right lateral ventricle concerning for a possible trapped ventricle. He was presented at tumor conference 4/6/20- plan is to continue current  "treatment and repeat scan in May 2020. Per recent discussions with his primary oncologist, he wants to continue with all treatment, and is full code. He is scheduled to undergo a tumor vaccine through a provider in the [place] in the next few weeks. This is going to be shipped to [place]. CT CAP here shows significant disease widespread through chest, right iliopsoas, and scattered LAD in abdomen, pelvis, and inguinal regions, in addition to cutaneous implants.        Patient was seen and plan of care was discussed with attending physician Dr. Whaley.    Thank you for the opportunity to partake in this patients plan of care. Please do not hesitate to page with questions. We will continue to follow.     Rhonda De La Vega PA-C  Hematology/Oncology  Pager # [phone]  Phone # [phone]   ___________________________________________________________________    Subjective & Interval History:    Overnight his O2 needs increased 2L ? 6L nasal cannula. He did not feel more short of breath, denies chest pain. This morning his O2 needs are back down to 2L nasal cannula. He feels great and would like to go home. He has home O2. He is going to take more time to decide if he wants to try the BRAF inhibitor. He asked to see Dr. Whaley in clinic but given that he has melanoma, we referred him to Dr. Burnett. The patient is still hoping he gets the vaccine from the [facility] but the borders are closed for shipments.        Physical Exam:    Blood pressure 101/74, pulse 107, temperature 97.3  F (36.3  C), temperature source Oral, resp. rate 20, height 1.778 m (5' 10\"), weight 59.7 kg (131 lb 11.2 oz), SpO2 99 %.    General: lying in bed, no acute distress, frail looking  HEENT: sclera anicteric, EOMI, MMM  Neck: supple, normal ROM  CV: RRR, normal S1/S2, no m/r/g  Resp: CTAB, s/p b/l pleurX tube placement on water seal with serosanguinous fluid  GI: soft, non-tender, non-distended, bowel sounds present and " normoactive  MSK: warm and well-perfused, normal tone  Skin: no rashes on limited exam, no jaundice  Neuro: Alert and interactive, moves all extremities equally, no focal deficits    Labs & Studies: I personally reviewed the following studies:  ROUTINE LABS (Last four results):  CMP  Recent Labs   Lab 04/21/20  1031 04/21/20  0451 04/20/20  2155 04/20/20  1619  04/20/20  0336  04/19/20  0438  04/18/20  1448   * 128* 128* 128*   < > 126*   < > 125*   < > 124*   POTASSIUM  --  4.3  --   --   --  4.5  --  4.1  --  4.2   CHLORIDE  --  98  --   --   --  96  --  95  --  94   CO2  --  22  --   --   --  23  --  22  --  22   ANIONGAP  --  8  --   --   --  8  --  9  --  9   GLC  --  118*  --   --   --  135*  --  127*  --  119*   BUN  --  17  --   --   --  18  --  12  --  12   CR  --  0.58*  --   --   --  0.70  --  0.59*  --  0.59*   GFRESTIMATED  --  >90  --   --   --  >90  --  >90  --  >90   GFRESTBLACK  --  >90  --   --   --  >90  --  >90  --  >90   ABUNDIO  --  8.6  --   --   --  8.8  --  9.0  --  8.5   MAG  --  2.1  --   --   --  2.2  --  2.2  --  2.0   PHOS  --   --   --   --   --  5.2*  --  4.1  --  3.6   PROTTOTAL  --   --   --   --   --  6.3*  --   --   --  6.7*   ALBUMIN  --   --   --   --   --  2.5*  --   --   --  2.7*   BILITOTAL  --   --   --   --   --  0.5  --   --   --  0.5   ALKPHOS  --   --   --   --   --  72  --   --   --  74   AST  --   --   --   --   --  12  --   --   --  14   ALT  --   --   --   --   --  11  --   --   --  11    < > = values in this interval not displayed.     CBC  Recent Labs   Lab 04/21/20  0451 04/20/20  0336 04/19/20  0438 04/18/20  1448   WBC 12.5* 15.5* 12.2* 11.0   RBC 3.29* 4.13* 3.73* 3.55*   HGB 8.1* 10.3* 9.1* 8.9*   HCT 27.0* 33.4* 30.1* 28.7*   MCV 82 81 81 81   MCH 24.6* 24.9* 24.4* 25.1*   MCHC 30.0* 30.8* 30.2* 31.0*   RDW 15.9* 15.8* 15.5* 15.6*    510* 490* 489*     INR  Recent Labs   Lab 04/19/20  0438   INR 1.12     IMAGING    CXR 4/20/20  IMPRESSION: Prominent  bibasilar an retrocardiac opacities comparing  similar, likely related to atelectasis or edema given recent pleural  effusions. Bilateral thoracostomy tube placement. Large right upper  lung and mediastinal masses unchanged in short interval.    CXR 4/19/20                                                                                                                       IMPRESSION: Decreased bilateral moderate to large pleural effusions  with presumed reexpansion pulmonary edema in the bases.    CT C/A/P w/ 4/18/20  IMPRESSION: In this patient with history of metastatic melanoma:  1a. Extensive thoracic metastatic burden with moderate to large  bilateral pleural effusions and a large pleural-based mass in the  right upper lobe.   1b. Moderate pericardial effusion and left ventricular thrombus versus  implant.  1c. Extensive mediastinal and axillary lymphadenopathy.  1d. Occlusive bland thrombus versus tumor involving the both  innominate veins and SVC.  1e. Small pulmonary and endobronchial nodules concerning for  metastatic implants.  2a. Necrotic appearing soft tissue nodules involving the right  iliopsoas region. Additional scattered lymphadenopathy throughout the  abdomen and pelvis and inguinal regions.  2b. Bilateral adrenal implants  2c. No evidence of metastatic disease to the liver or bones.  3. Numerous cutaneous implants.    CXR 4/18/20  IMPRESSION:  1.  Large right upper mass, metastatic versus primary malignancy. CT  chest is recommended for further evaluation.  2.  Moderate bilateral pleural effusion with associated atelectasis  and consolidation.    MRI Brain w/o and w/ 3/27/20  Impression: In this patient with a history of multiple intracranial  metastatic melanoma:  1. Response to treatment within the right posterior frontal lobe,  right posterior medial temporal lobe, and left  medial temporal  lesions.   2. Progression of left parietal and temporal lesions (series 16 image  95).  3.  Subcentimeter enhancing focus within the left frontal lobe,  slightly increased compared to prior study, and new compared to  1/17/2020  4. New susceptibility artifact within the left parietal lobe may  represent either a hemorrhagic metastasis or microhemorrhage.  5. Slight increase in size of the temporal horn of right lateral  ventricle, concerning for trapped ventricle. Elsewhere, the  ventricular size and configuration is within normal limits.    PET 11/25/19                                                               IMPRESSION:   1.  Worsening metastatic disease with multiple new FDG avid  subcutaneous nodules seen throughout the lower neck, chest, abdomen  and pelvis as described above.  2.  No significant interval change in size of mediastinal  lymphadenopathy with decreased radiotracer uptake compared with prior  exam on 7/23/2019.  3.  Subcentimeter lesion in the left posterior frontal lobe better  seen on prior MRI and too small for PET characterization.  4.  Focal radiotracer uptake adjacent to the cecum without clear CT  correlate lesion. Evaluation is limited due to lack of intravenous  contrast. Attention on follow-up exam is recommended      Medications list for reference:  Current Facility-Administered Medications   Medication     [START ON 4/23/2020] acetaminophen (TYLENOL) tablet 650 mg     acetaminophen (TYLENOL) tablet 650 mg     acetaminophen (TYLENOL) tablet 975 mg     glucose gel 15-30 g    Or     dextrose 50 % injection 25-50 mL    Or     glucagon injection 1 mg     guaiFENesin-dextromethorphan (ROBITUSSIN DM) 100-10 MG/5ML syrup 5 mL     HYDROmorphone (PF) (DILAUDID) injection 0.3-0.5 mg     lidocaine (LMX4) cream     lidocaine 1 % 0.1-1 mL     LORazepam (ATIVAN) tablet 0.5-1 mg    Or     LORazepam (ATIVAN) injection 0.5-1 mg     magnesium sulfate 4 g in 100 mL sterile water (premade)     Medication Instruction     melatonin tablet 5 mg     naloxone (NARCAN) injection 0.1-0.4 mg      ondansetron (ZOFRAN) injection 8 mg    Or     ondansetron (ZOFRAN-ODT) ODT tab 8 mg    Or     ondansetron (ZOFRAN) tablet 8 mg     oxyCODONE (ROXICODONE) tablet 5-10 mg     polyethylene glycol (MIRALAX) Packet 17 g     potassium chloride (KLOR-CON) Packet 20-40 mEq     potassium chloride 10 mEq in 100 mL intermittent infusion with 10 mg lidocaine     potassium chloride 10 mEq in 100 mL sterile water intermittent infusion (premix)     potassium chloride 20 mEq in 50 mL intermittent infusion     potassium chloride ER (KLOR-CON M) CR tablet 20-40 mEq     potassium phosphate 15 mmol in D5W 250 mL intermittent infusion     potassium phosphate 20 mmol in D5W 250 mL intermittent infusion     potassium phosphate 20 mmol in D5W 500 mL intermittent infusion     potassium phosphate 25 mmol in D5W 500 mL intermittent infusion     prochlorperazine (COMPAZINE) tablet 5 mg    Or     prochlorperazine (COMPAZINE) injection 5 mg     senna-docusate (SENOKOT-S/PERICOLACE) 8.6-50 MG per tablet 1 tablet    Or     senna-docusate (SENOKOT-S/PERICOLACE) 8.6-50 MG per tablet 2 tablet     sodium chloride (PF) 0.9% PF flush 3 mL     sodium chloride (PF) 0.9% PF flush 3 mL     sodium chloride tablet 2 g

## 2020-04-21 NOTE — PLAN OF CARE
Neuro: A&Ox4. Forgetful. Ativan prn.   Cardiac: -110s. SBPs 90s. Afebrile.    Respiratory: Sating >92% on 1-6L NC.   GI/: Adequate urine output. BM X1  Diet/appetite: Tolerating regular diet.   Activity:  Up with SBA.   Pain: At acceptable level on current regimen.   Skin: 2 back dressings CDI. PleurX drain sites on abd CDI.   LDA's: PIV x2. Pleurx drain x2 to -20mmHg    MD notified at 0545 for increased O2 requirements. Previously requiring 1-2L overnight, now 94% on 6L. L sided LS coarse. Stat CXR ordered. Also notifed about 2g hgb drop from 4/20 am labs.     Plan: Continue with POC. Notify primary team with changes.

## 2020-04-21 NOTE — PROGRESS NOTES
[facility]   Referral received via care team for home care services. Patient's service address is in our [facility] service area. Patient will receive services from [facility]. All patient demographics and orders will be sent to [facility]. Care team and patient notified. For any questions or concerns regarding home care services please call [phone].     Lucia Anderson RN   [facility] Liaison   [phone]

## 2020-04-21 NOTE — PROGRESS NOTES
Dallin was seen in the [facility] for instructions on the drainage of his pleurx cathter. He was able to watch the video which went over the instructions and was then shown the procedure. He was able to verbalize the instructions but declined TB and stated that his wife would be doing the cares for the drainage and dressing changes. I did notify the nurse on the unit that he would need drainage kits before discharging to home. He was given a folder with written instructions and the video for his wife to follow.

## 2020-04-22 NOTE — PROGRESS NOTES
[facility]: Post-Discharge Note  SITUATION                                                      Admission:    Admission Date: 04/18/20   Reason for Admission: metastatic melanoma  Discharge:   Discharge Date: 04/21/20  Discharge Diagnosis: metastatic melanoma    BACKGROUND                                                      Dallin Stevens is a 34 year old man with extensive metastatic melanoma, on pembrolizumab, who was recently admitted 4/6 for shortness of breath from massive mediastinal lymphadenopathy with narrowing of the trachea, large pericardial effusion with tamponade s/p pericardial window, and moderate-sized pleural effusion, who presents again with worsening shortness of breath, pericardial effusion, and mod-large bilateral pleural effusions. S/p bilateral pleurX placement 4/20 with symptomatic improvement. Patient was felt to be too unstable and risks would be too high to intervene on the pericardial effusion per Thoracic Oncology    ASSESSMENT      Discharge Assessment  Patient reports symptoms are: Improved  Does the patient have all of their medications?: Yes  Does patient know what their new medications are for?: Yes  Does patient have a follow-up appointment scheduled?: Yes  Does patient have any other questions or concerns?: No    Post-op  Did the patient have surgery or a procedure: No  Fever: No  Chills: No  Eating & Drinking: eating and drinking without complaints/concerns  PO Intake: soft foods  Urinary Status: voiding without complaint/concerns        PLAN                                                      Outpatient Plan:      Future Appointments   Date Time Provider Department Center   4/30/2020  3:00 PM  LAB [facility]   4/30/2020  3:45 PM Benita Freitas MD [facility]   5/6/2020 10:00 AM Vero Tesfaye MD [facility]   5/6/2020 10:30 AM  INFUSION CHAIR [facility]   5/27/2020  9:00 AM [facility]   5/27/2020 10:00 AM  Jose Carlos Arshad MD [facility]           Breanna Cerna, CMA

## 2020-04-22 NOTE — TELEPHONE ENCOUNTER
More from [facility] care calling needing clarification on the patients 2 chest tubes.    She went out there today and drained the tubes which caused him a lot of pain.    She would like to know the frequency of the draining and may have some other questions.    Rubina- [phone]    Will route to Rin, RNCC and Dr. Tesfaye

## 2020-04-22 NOTE — TELEPHONE ENCOUNTER
Action    Action Taken 4/22/20:     -[employer] Tracking ([facility], Path): [id number]  1:32 PM    -Images from HE resolved, and now viewable to PACS  2:10 PM     RECORDS STATUS - ALL OTHER DIAGNOSIS      RECORDS RECEIVED FROM: [facility]/ - Internal referral, updated imaging & Path requested from  [facility], reports in CE.    DATE RECEIVED:    NOTES STATUS DETAILS   OFFICE NOTE from referring provider     OFFICE NOTE from medical oncologist     DISCHARGE SUMMARY from hospital     DISCHARGE REPORT from the ER     OPERATIVE REPORT     MEDICATION LIST     CLINICAL TRIAL TREATMENTS TO DATE     LABS     PATHOLOGY REPORTS [facility], Report in CE, Requested Slides 4/22 4/7/20: [id number] & [id number]   ANYTHING RELATED TO DIAGNOSIS     GENONOMIC TESTING     TYPE:     IMAGING (NEED IMAGES & REPORT)     XR Requested 4/22 4/7/20   CT SCANS Requested 4/22 4/7/20   MRI     MAMMO     ULTRASOUND Requested 4/22 4/6/20   PET

## 2020-04-22 NOTE — PROGRESS NOTES
DISCHARGE                         4/21/2020  5:46 PM  ----------------------------------------------------------------------------  Discharged to: Home  Via: private transportation  Accompanied by: Family  Discharge Instructions:  medications, follow up appointments, when to call the MD, aftercare instructions.  Prescriptions: To be filled by discharge pharmacy; medication list reviewed & sent with pt  Follow Up Appointments: arranged; information given  Belongings: All sent with pt  IV: d/c'd  Telemetry: d/c'd  Pt exhibits understanding of above discharge instructions; all questions answered.    Reviewed Pleurex drain care, Pt received education at pt learning center, information packet sent home with pt, no further questions. Home care agency called to confirm they will see him tomorrow.     Discharge Paperwork: Signed, copied, and sent home with patient.

## 2020-04-22 NOTE — TELEPHONE ENCOUNTER
Writer spoke with More HCRN regarding orders for chest tube. More was able to update me that she drained 350 ml's with more fluid needing to be drained, but it was causing too much pain. Pain subsided when the chest tube was clamped.     Writer supported an order to drain chest tube daily 250cc's or more as tolerated. Wife to learn how to drain the chest tubes.    More verbalized understanding .    Olamide Celsete RN

## 2020-04-23 NOTE — DISCHARGE SUMMARY
[facility], [place]  Hospitalist Discharge Summary      Date of Admission:  4/18/2020  Date of Discharge:  4/21/2020  5:46 PM  Discharging Provider: Juanpablo Cali MD  Discharge Team: Hospitalist Service, [place]     Discharge Diagnoses   # Metastatic melanoma (brain, subcutaneous nodules throughout the chest/abdomen/pelvis, possible pericardial metastasis)  # Hypoxic respiratory failure secondary to pleural effusion and melanoma   # Recurrent pleural effusion   # Pericardial effusion (no evidence of tamponade), tachycardia  # Tracheal compression from mediastinal   # Pneumothorax   # 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  No follow up labs or test are needed.    Follow up with Oncology (you will get a call)       Appointments on [place] and/or [facility] (with [facility] or [facility]   provider or service). Call [phone] if you haven't heard regarding   these appointments within 7 days of discharge.             Unresulted Labs Ordered in the Past 30 Days of this Admission     Date and Time Order Name Status Description    4/18/2020 2136 Fluid Culture Aerobic Bacterial Preliminary     4/18/2020 1621 Blood culture Preliminary     4/18/2020 1621 Blood culture Preliminary       These results will be followed up by Ordering physician     Discharge Disposition   Discharged to home  Condition at discharge: Stable    Hospital Course   Dallin Stevens is a 34 year old man with extensive metastatic melanoma, on pembrolizumab, who was recently admitted 4/6 for shortness of breath from massive mediastinal lymphadenopathy with narrowing of the trachea, large  "pericardial effusion with tamponade s/p pericardial window, and moderate-sized pleural effusion, who presents again with worsening shortness of breath, pericardial effusion, and mod-large bilateral pleural effusions. Given frequent recurrence of his pleural effusion PleurX was recommended and Thoracic Surgery was consulted who placed bilateral pleurX catheters on 4/20 with symptomatic improvement. Patient was felt to be too unstable and risks would be too high to intervene on the pericardial effusion per Thoracic. Post procedure he was noted to have R sided pneumothorax on xray which was stable on repeat xray. Case was discussed with Thoracic surgery who did not think it was significant enough to keep patient in hospital and was ok to discharge. Patient subsequently discharged to follow up with his PCP and OSH oncology. [facility] will call patient for follow up appointment.  Of note, patient does have R innominate and SVC thromboses which are possibly thrombus and AC was defferred given brain metastasis per Oncology recommendation.        Consultations This Hospital Stay   ONCOLOGY ADULT IP CONSULT  INTERNAL MEDICINE PROCEDURE TEAM ADULT IP CONSULT [facility] - THORACENTESIS  INTERVENTIONAL RADIOLOGY ADULT/PEDS IP CONSULT  INTERVENTIONAL RADIOLOGY ADULT/PEDS IP CONSULT  THORACIC SURGERY ADULT IP CONSULT  MEDICATION HISTORY IP PHARMACY CONSULT    Code Status   Full Code    Time Spent on this Encounter   I, Juanpablo Cali MD, personally saw the patient today and spent greater than 30 minutes discharging this patient.       Juanpablo Cali MD  General acute hospital, [place]  ______________________________________________________________________    Physical Exam   /74 (BP Location: Right arm)   Pulse 107   Temp 97.3  F (36.3  C) (Oral)   Resp 20   Ht 1.778 m (5' 10\")   Wt 59.7 kg (131 lb 11.2 oz)   SpO2 99%   BMI 18.90 kg/m    Constitutional: Awake, alert, cooperative, no apparent " distress  Respiratory: Chest tube present, bilateral crackles, poor air movement   Cardiovascular: Regular rate and rhythm  GI: Normal bowel sounds, soft, non-distended, non-tender  Skin/Integumen: No rashes, no cyanosis         Primary Care Physician   Vero Tesfaye    Discharge Orders      Home care nursing referral      Reason for your hospital stay    You were hospitalized for pain and shortness of breath from fluid accumulation in lungs which improved with drainage     Adult [facility] Follow-up and recommended labs and tests    Follow up with primary care provider, Vero Tesfaye, within 7 days for hospital follow- up.  No follow up labs or test are needed.    Follow up with Oncology (you will get a call)       Appointments on [place] and/or [facility] (with [facility] or [facility] provider or service). Call [phone] if you haven't heard regarding these appointments within 7 days of discharge.     Activity    Your activity upon discharge: activity as tolerated     When to contact your care team    Please contact your care team if worsening shortness of breath and drain stops draining     Tubes and drains    You are going home with the following tubes or drains: PleurX drain.  Tube cares per hospital or home care instructions     MD face to face encounter    Documentation of Face to Face and Certification for Home Health Services    I certify that patient: Dallin Stevens is under my care and that I, or a nurse practitioner or physician's assistant working with me, had a face-to-face encounter that meets the physician face-to-face encounter requirements with this patient on: 4/21/20    This encounter with the patient was in whole, or in part, for the following medical condition, which is the primary reason for home health care: Malignant pleural effusions  I certify that, based on my findings, the following services are medically necessary home health services: Nursing      Further, I certify that my clinical  findings support that this patient is homebound, absences from home require considerable and taxing effort and are for medical reasons or [employer] services or infrequently or of short duration when for other reasons.    Based on the above findings. I certify that this patient is confined to the home and needs intermittent skilled nursing care, physical therapy and/or speech therapy.  The patient is under my care, and I have initiated the establishment of the plan of care.  This patient will be followed by a physician who will periodically review the plan of care.  Physician/Provider to provide follow up care: Vero Tesfaye    Attending hospital physician (the Medicare certified [place] provider):  Physician Signature: See electronic signature associated with these discharge orders.  Date: 4/21/2020     Full Code     Diet    Follow this diet upon discharge: Orders Placed This Encounter      Advance Diet as Tolerated: Regular Diet Adult       Significant Results and Procedures   Most Recent 3 CBC's:  Recent Labs   Lab Test 04/21/20  0451 04/20/20  0336 04/19/20  0438   WBC 12.5* 15.5* 12.2*   HGB 8.1* 10.3* 9.1*   MCV 82 81 81    510* 490*     Most Recent 3 BMP's:  Recent Labs   Lab Test 04/21/20  1031 04/21/20  0451 04/20/20  2155  04/20/20  0336  04/19/20  0438   * 128* 128*   < > 126*   < > 125*   POTASSIUM  --  4.3  --   --  4.5  --  4.1   CHLORIDE  --  98  --   --  96  --  95   CO2  --  22  --   --  23  --  22   BUN  --  17  --   --  18  --  12   CR  --  0.58*  --   --  0.70  --  0.59*   ANIONGAP  --  8  --   --  8  --  9   ABUNDIO  --  8.6  --   --  8.8  --  9.0   GLC  --  118*  --   --  135*  --  127*    < > = values in this interval not displayed.     Most Recent 2 LFT's:  Recent Labs   Lab Test 04/20/20  0336 04/18/20  1448   AST 12 14   ALT 11 11   ALKPHOS 72 74   BILITOTAL 0.5 0.5   ,   Results for orders placed or performed during the hospital encounter of 04/18/20   XR Chest Port 1 View     Narrative    XR CHEST PORT 1 VW  4/18/2020 3:29 PM    History:  cough soa,. History of metastatic melanoma    Comparison: Chest radiograph dated 1/15/2020    Findings:   Upright AP/PA view of the chest. Cardiac silhouette is obscured. There  is a large mass in the right upper lobe. No pneumothorax. Moderate  bilateral pleural effusion.      Impression    IMPRESSION:  1.  Large right upper mass, metastatic versus primary malignancy. CT  chest is recommended for further evaluation.  2.  Moderate bilateral pleural effusion with associated atelectasis  and consolidation.    I have personally reviewed the examination and initial interpretation  and I agree with the findings.    POOL CALDWELL MD   POC US ECHO LIMITED    Impression    Limited Bedside Cardiac Ultrasound, performed and interpreted by me.   Indication: Shortness of Breath.  Parasternal long axis and parasternal short axis views were acquired.   Image quality was somewhat poor as the patient was unable to tolerate lying flat or on his left side.    Findings:    Global left ventricular function appears intact.    IMPRESSION: Patient appears to have a loculated pleural effusion with no tamponade     CT Chest/Abdomen/Pelvis w Contrast     Value    Radiologist flags (Urgent)     Extensive metastatic disease to the thorax, abdomen,    Narrative    EXAMINATION: CT CHEST/ABDOMEN/PELVIS W CONTRAST  4/18/2020 4:35 PM      CLINICAL HISTORY: malignant melanoma, soa, cough    COMPARISON: Chest radiograph, same date. PET/CT from 11/25/2019.        PROCEDURE COMMENTS: CT of the chest, abdomen, and pelvis was performed  with Isovue 370 intravenous contrast. Axial MIP  images of the chest,  and coronal and sagittal reformatted images of the chest, abdomen, and  pelvis obtained.    FINDINGS:    Support devices: None.    Chest:  Large bilateral pleural effusions with associated large right apical  and medial pleural-based heterogeneous mass extending into the  mediastinum with  mass effect on the distal trachea and right mainstem  bronchus. Right middle lobe bronchus is effaced secondary to an  adjacent 2 cm right hilar lymph node or nodule seen on series 3, image  134-139 with resultant partial collapse of the right middle lobe. The  trachea and central airways are otherwise patent. There is an  endobronchial soft tissue nodule seen on series 4, image 98 in the  left mainstem bronchus. The masses do not appear to invade the  mediastinal arteries although the SVC and innominate veins appear to  be occluded, with tumor or clot. The pleural-based mass measures  approximately 8.8 x 12 x 11 cm. Extensive mediastinal, and bilateral  hilar lymphadenopathy as well as numerous chest wall implants and  right axillary lymphadenopathy, for example the 4.6 x 2.5 cm  conglomerate right axillary lymph nodes. There are subcentimeter  rounded right peribronchial nodules in the central portions of the  right middle lobe as seen on series 4, image 95. Additional peripheral  nodules are seen laterally in the base of the right middle lobe as  seen on series 4, image 153-158.    There is a moderate pericardial effusion with pericardial tumor  involvement. Question intraventricular thrombus or mass within the  left ventricle.    Abdomen/pelvis:  The liver and biliary system, spleen, and pancreas appear normal in  appearance without evidence of metastasis.  Bilateral adrenal masses. The renal cortex is homogeneous with  hypoattenuating lesions favoring cysts. Motion limits the  visualization of the lower abdomen and pelvis. There are no abnormally  dilated or thickened loops of small bowel or colon. Urinary bladder is  unremarkable.    There is no free air or free fluid. Centrally necrotic soft tissue  masses within the pelvis, for example a 4.4 x 4.0 cm right iliacus  mass seen on series 3, image 426. There are numerous peritoneal and  retroperitoneal enlarged lymph nodes, for example the 1.9 cm left  iliac  lymph node seen on series 3, image 428. Bilateral inguinal  lymphadenopathy, for example of the 3.1 x 2.0 cm right inguinal lymph  node.    Bones:   No aggressive appearing bony abnormalities. Lacy sclerotic appearance  near the lesser trochanter.      Impression    IMPRESSION: In this patient with history of metastatic melanoma:  1a. Extensive thoracic metastatic burden with moderate to large  bilateral pleural effusions and a large pleural-based mass in the  right upper lobe.   1b. Moderate pericardial effusion and left ventricular thrombus versus  implant.  1c. Extensive mediastinal and axillary lymphadenopathy.  1d. Occlusive bland thrombus versus tumor involving the both  innominate veins and SVC.  1e. Small pulmonary and endobronchial nodules concerning for  metastatic implants.  2a. Necrotic appearing soft tissue nodules involving the right  iliopsoas region. Additional scattered lymphadenopathy throughout the  abdomen and pelvis and inguinal regions.  2b. Bilateral adrenal implants  2c. No evidence of metastatic disease to the liver or bones.  3. Numerous cutaneous implants.       [Urgent Result: Extensive metastatic disease to the thorax, abdomen,  and pelvis with extensive mediastinal involvement including the  innominate veins, SVC, and pericardium. Possible left ventricular  thrombus versus implant.]    Finding was identified on 4/18/2020 4:37 PM.     Dr. Escobar was contacted by Dr. Jack at 4/18/2020 6:02 PM and  verbalized understanding of the urgent finding.     I have personally reviewed the examination and initial interpretation  and I agree with the findings.    POOL CALDWELL MD   IR Chest Tube Place Non Tunneled Bilateral    Narrative    Procedures:   1. Left-sided ultrasound guided chest tube placement  2. Right-sided ultrasound-guided chest tube placement    Clinical indication: Bilateral malignant pleural effusions    Comparison studies: CT chest abdomen pelvis 4/18/2020    PROCEDURE:         Staff Radiologist: Robin Woodruff MD    Fellow: Ollie Barr MD    Consent: verbal and written informed consent obtained prior to  procedure.    Procedure details: Patient placed in sitting position. Back prepped  and draped in standard sterile fashion. 1% lidocaine used for local  anesthesia. Using ultrasound guidance, a 5 Bulgarian centesis catheter  was advanced into the left pleural space. This was exchanged over a  Bentson wire for a 10 Bulgarian fascial dilator and then a 10 Bulgarian  nonlocking pigtail catheter, which was left in place as a left-sided  chest tube. 10 cc fluid sent for laboratory analysis. The drain was  sutured in place, and a sterile dressing was applied.     Using ultrasound guidance, a 5 Bulgarian centesis catheter was advanced  into the right pleural space. This was exchanged over a Bentson wire  for a 10 Bulgarian fascial dilator and then a 10 Bulgarian nonlocking  pigtail catheter, which was left in place as a right-sided chest tube.  10 cc fluid sent for laboratory analysis. The drain was sutured in  place, and a sterile dressing was applied. The patient was transferred  in stable condition, having tolerated the procedure without immediate  consultation.     Medications: No sedation    Complications: None.      Impression    IMPRESSION:     Bilateral 10 Bulgarian nonlocking pigtail was left as chest tubes  bilaterally as above.    PLAN:    Return to floor    .   XR Chest Port 1 View    Narrative    Chest one view portable upright    HISTORY: Chest tubes, increased hypoxia    COMPARISON STUDY: 4/18/2020    FINDINGS: Large upper lung mass and mediastinal adenopathy again  noted. Right supraclavicular adenopathy. Cardiac silhouette is  nonenlarged. Decreased moderate to large bilateral pleural effusions  with bilateral chest tubes in place with new airspace opacities in  both bases left greater than right.      Impression    IMPRESSION: Decreased bilateral moderate to large pleural effusions  with  presumed reexpansion pulmonary edema in the bases.    POOL CALDWELL MD   XR Chest Port 1 View    Narrative    Portable chest    INDICATION: Status post bilateral Pleurx catheter insertion    COMPARISON: 4/19/2020 and chest CT dated 4/18/2020    FINDINGS: No significant change in short interval in the right upper  lung and mediastinal masses. Left heart border is somewhat obscured by  dense opacification in the left lower lung. Bilateral thoracostomy  tubes are present. No definite pneumothorax. Left costophrenic angle  is clipped. Bibasilar opacities appear similar. Left axillary surgical  clips are again noted. Bony structures otherwise appear grossly  intact.      Impression    IMPRESSION: Prominent bibasilar an retrocardiac opacities comparing  similar, likely related to atelectasis or edema given recent pleural  effusions. Bilateral thoracostomy tube placement. Large right upper  lung and mediastinal masses unchanged in short interval.    THERESA KONG MD   XR Chest Port 1 View    Narrative    Exam: XR CHEST PORT 1 VW, 4/21/2020 6:03 AM    Indication: increasing O2 requirements    Comparison: 4/20/2020    Findings:   Portable radiograph of the chest. Bibasilar chest tubes in place.  Cardiac silhouette is unchanged. No significant change in the right  upper lung field or left lower lung field masses. Streaky left greater  than right bibasilar atelectasis. Moderate right sided pneumothorax.  Questionably a trace left apical pneumothorax. No pleural effusion.  Visualized upper abdomen is unremarkable. No acute osseous abdomen.      Impression    Impression:   1. Moderate right pneumothorax and questionably a trace left apical  pneumothorax. Bibasilar chest tubes remain in place.  2. Unchanged left greater than right bibasilar airspace opacities.  3. Unchanged right upper lung and left lower lung masses.    Above findings were discussed with Dr. Daniel by Dr. Kim at 6:20 AM  on 4/21/2020.    I have  personally reviewed the examination and initial interpretation  and I agree with the findings.    BRIGHT CHEN MD   XR Chest Port 1 View    Narrative    XR CHEST PORT 1 VW  2020 1:22 PM      HISTORY: re-evaluate pneumothorax    COMPARISON: CT chest abdomen pelvis 2020, chest x-ray 2020  at 0555 hours    TECHNIQUE: Upright frontal view of the chest    FINDINGS: Stable large mass in the right mediastinum protruding into  the right upper lung zone. Moderate size right-sided pneumothorax  which is unchanged. Stable trace left apical pneumothorax.  Consolidation in the left base and streaky opacity in the right lung  base. Cardiac size is unchanged. Trachea is midline. Upper abdomen is  unremarkable. No suspicious osseous lesion. Surgical clips in left  axilla.      Impression    IMPRESSION:   1. Unchanged moderate right-sided pneumothorax with likely trace left  apical pneumothorax. Chest tubes are stable in place.  2. Unchanged bibasilar opacities and right mediastinal mass.    I have personally reviewed the examination and initial interpretation  and I agree with the findings.    POOL CALDWELL MD   Echo Limited    Narrative    [id number]  [id number]  [id number]  [id number]^NANCY^HERIBERTO^ZEUS MATHIAS           [facility],[place]  Echocardiography Laboratory  [address] [place] [address]     Name: JEANIE HERNANDEZ  MRN: [id number]  : 1985  Study Date: 2020 08:39 AM  Age: 34 yrs  Gender: Male  Patient Location: [facility]  Reason For Study: Pericardial Effusion  Ordering Physician: HERIBERTO CRUZ  Performed By: Malick Angeles     BSA: 1.8 m2  Height: 70 in  Weight: 135 lb  HR: 115  BP: 120/84 mmHg  _____________________________________________________________________________  __        Procedure  Limited Portable Echo Adult. The final echo results were communicated to Dr. Blunt..  _____________________________________________________________________________  __         Interpretation Summary  Limited echo performed with patient sitting upright.     Normal biventricular function.     There is extensive septations within the pericardial space indicative of  organizing pericardial effusion . There is a large mass within the pericardial  space abutting the lateral aspect of the heart. Given the patient's history of  metastatic melanoma this likely represents pericardial metastasis.     A bilateral pleural effusion is present.  _____________________________________________________________________________  __        Left Ventricle  Left ventricular size is normal. Left ventricular wall thickness is normal.  Global and regional left ventricular function is normal with an EF of 60-65%.  There is no thrombus.     Right Ventricle  The right ventricle is normal size. Global right ventricular function is  normal.     Atria  Both atria appear normal.     Mitral Valve  The mitral valve is normal.        Aortic Valve  Aortic valve is normal in structure and function.     Tricuspid Valve  The tricuspid valve is normal.     Pulmonic Valve  The valve leaflets are not well visualized.     Vessels  Dilation of the inferior vena cava is present with normal respiratory  variation in diameter. IVC diameter and respiratory changes fall into an  intermediate range suggesting an RA pressure of 8 mmHg. IVC diameter >2.1 cm  collapsing <50% with sniff suggests a high RA pressure estimated at 15 mmHg or  greater.     Pericardium  There is extensive septations within the pericardial space indicative of  organizing pericardial effusion . There is a large mass within the pericardial  space abutting the lateral aspect of the heart. Given the patient's history of  metastatic melanoma this likely represents pericardial metastasis.        Miscellaneous  A bilateral pleural effusion is present.  _____________________________________________________________________________  __  MMode/2D Measurements & Calculations      IVSd: 1.4 cm  LVIDd: 3.4 cm  LVPWd: 1.5 cm  LV mass(C)d: 176.7 grams  LV mass(C)dI: 100.1 grams/m2  RWT: 0.86           _____________________________________________________________________________  __           Report approved by: Zora Delcid 04/19/2020 12:11 PM            Discharge Medications   Discharge Medication List as of 4/21/2020  5:21 PM      START taking these medications    Details   guaiFENesin-dextromethorphan (ROBITUSSIN DM) 100-10 MG/5ML syrup Take 5 mLs by mouth every 4 hours as needed for cough, Disp-1 Bottle,R-0, E-Prescribe      oxyCODONE (ROXICODONE) 5 MG tablet Take 1 tablet (5 mg) by mouth every 6 hours as needed for pain, Disp-20 tablet,R-0, Local Print         CONTINUE these medications which have NOT CHANGED    Details   acetaminophen (TYLENOL) 325 MG tablet Take 2 tablets (650 mg) by mouth every 4 hours as needed for mild pain, fever or headaches (> 101 F), TransitionalIndication: Mild Pain; Fever; Headache      LORazepam (ATIVAN) 0.5 MG tablet Take 1 tablet (0.5 mg) by mouth every 4 hours as needed (Anxiety, Nausea/Vomiting or Sleep), Disp-30 tablet,R-2, E-Prescribe      melatonin 1 MG TABS tablet Take 1 tablet (1 mg) by mouth nightly as needed for sleep, Disp-30 tablet,R-0, E-Prescribe         STOP taking these medications       polyethylene glycol (MIRALAX/GLYCOLAX) packet Comments:   Reason for Stopping:             Allergies   No Known Allergies

## 2020-04-23 NOTE — PROGRESS NOTES
Writer received a call from patient's wife, Meg, regarding recent hospitalization and a suggestion of starting a BRAF inhibitor.     Dr. Tesfaye is aware and will call the patient.    Olamide Celeste RN

## 2020-04-24 NOTE — PROGRESS NOTES
Pt's wife calling for an update on Rx. Was told by Rin that Dr. Tesfaye would be calling them. Will route to Dr. Tesfaye for update.     Elisabeth iMms, BSN, RN, PHN

## 2020-04-24 NOTE — PROGRESS NOTES
Patient calling again for an update. Advised Dr. Tesfaye is not here in clinic today but will route for review.

## 2020-04-25 NOTE — TELEPHONE ENCOUNTER
Patient was recently admitted to [facility]. CT reveals extensive metastatic melanoma. Patient is currently on pembrolizumab. Clinically progressing.     Spoke to his wife. Discussed regarding switching treatment to combination of BRAF inhibitor and MEK inhibitor. She will discuss it with her  and call next week.

## 2020-04-27 NOTE — PROGRESS NOTES
"Oral Chemotherapy Monitoring Program    Primary Oncologist: Brien  Primary Oncology Clinic: [facility]  Cancer Diagnosis: Metastatic melanoma    Drug: Mekinist 2mg daily + Tafinlar 150mg twice daily  Start Date: When available  Dose is appropriate for patients: Yes  Expected duration of therapy: Until disease progression or unacceptable toxicity    Drug Interaction Assessment: None    Lab Monitoring Plan  Monthly - CBC, CMP, Phos  Check BP every 2 weeks X1 month, then monthly.  LVEF 1 month after therapy initiation, and then at 2- to 3-month intervals     Subjective/Objective:  Dallin Stevens is a 34 year old male contacted by phone for an initial visit for oral chemotherapy education.    ORAL CHEMOTHERAPY 4/27/2020   Drug Name Tafinlar (Dabrafenib)/ Mekinist (Trametinib)   Current Dosage (No Data)   Current Schedule (No Data)   Cycle Details Continuous       Last PHQ-2 Score on record:   PHQ-2 ( 1999 Pfizer) 2/6/2020   Q1: Little interest or pleasure in doing things 0   Q2: Feeling down, depressed or hopeless 0   PHQ-2 Score 0           Vitals:  BP:   BP Readings from Last 1 Encounters:   04/21/20 101/74     Wt Readings from Last 1 Encounters:   04/19/20 59.7 kg (131 lb 11.2 oz)     Estimated body surface area is 1.72 meters squared as calculated from the following:    Height as of 4/18/20: 1.778 m (5' 10\").    Weight as of 4/19/20: 59.7 kg (131 lb 11.2 oz).      Labs:  _  Result Component Current Result Ref Range   Sodium 128 (L) (4/21/2020) 133 - 144 mmol/L     _  Result Component Current Result Ref Range   Potassium 4.3 (4/21/2020) 3.4 - 5.3 mmol/L     _  Result Component Current Result Ref Range   Calcium 8.6 (4/21/2020) 8.5 - 10.1 mg/dL     _  Result Component Current Result Ref Range   Magnesium 2.1 (4/21/2020) 1.6 - 2.3 mg/dL     _  Result Component Current Result Ref Range   Phosphorus 5.2 (H) (4/20/2020) 2.5 - 4.5 mg/dL     _  Result Component Current Result Ref Range   Albumin 2.5 (L) (4/20/2020) 3.4 - 5.0 " g/dL     _  Result Component Current Result Ref Range   Urea Nitrogen 17 (4/21/2020) 7 - 30 mg/dL     _  Result Component Current Result Ref Range   Creatinine 0.58 (L) (4/21/2020) 0.66 - 1.25 mg/dL       _  Result Component Current Result Ref Range   AST 12 (4/20/2020) 0 - 45 U/L     _  Result Component Current Result Ref Range   ALT 11 (4/20/2020) 0 - 70 U/L     _  Result Component Current Result Ref Range   Bilirubin Total 0.5 (4/20/2020) 0.2 - 1.3 mg/dL       _  Result Component Current Result Ref Range   WBC 12.5 (H) (4/21/2020) 4.0 - 11.0 10e9/L     _  Result Component Current Result Ref Range   Hemoglobin 8.1 (L) (4/21/2020) 13.3 - 17.7 g/dL     _  Result Component Current Result Ref Range   Platelet Count 425 (4/21/2020) 150 - 450 10e9/L     _  Result Component Current Result Ref Range   Absolute Neutrophil 11.4 (H) (4/21/2020) 1.6 - 8.3 10e9/L           Assessment:  Patient is appropriate to start therapy. Baseline labs were done in the hospital on 4/20 and 4/21. LVEF was 60-65% on 4/19.     Plan:  Basic chemotherapy teaching was reviewed with the patient including indication, start date of therapy, dose, administration, adverse effects, missed doses, food and drug interactions, monitoring, side effect management, office contact information, and safe handling. Written materials were provided and all questions answered.    Follow-Up:  1 week after starting.     Sheng Romano PharmD.

## 2020-04-27 NOTE — PROGRESS NOTES
Writer received a call from patient's wife, Meg, requesting an update about changing treatment plan for Dallin. They are ready to move forward with any recommendation.    They are requesting a return call with an update on status.     Writer will follow up with Dr. Tesfaye.     Olamide Celeste RN

## 2020-04-27 NOTE — TELEPHONE ENCOUNTER
Patient already had a prior auth on file, which is approved till 10/02/2021.     Prior Authorization Approval    Authorization Effective Date:    Authorization Expiration Date: 10/2/2021  Medication: Mekinist and Tafinlar_APPROVED  Approved Dose/Quantity:   Reference #:     Insurance Company: [employer] - Phone [phone] Fax [phone]  Expected CoPay:       CoPay Card Available: Yes    Foundation Assistance Needed: n/a  Which Pharmacy is filling the prescription (Not needed for infusion/clinic administered): [place], [place] - [address] [place] [address]

## 2020-04-28 NOTE — PROGRESS NOTES
"Writer received a call from HCRN, Divya, [phone] regarding concerns of patient having a right swollen and painful arm and low blood pressure 90's/60's and yesterday a b/p 88/62. Patient is taking Tylenol only for pain and not on a blood pressure medication.     Dr. Tesfaye is aware of the above and request that the patient be evaluated.     Writer spoke with patient with Dr. Tesfaye's recommendation to be seen today by him or his colleague. Patient states that the right arm swelling and discomfort has improved today and it is not new. Tylenol has been effective for him as well. The blood pressure he was told is low because of the cancer and \"until I treat the cancer it will not improve\". He is drinking adequate fluids stating his using is not concentrated and has had 30 oz of water so far today.     Patient is starting a new oral regimen to be delivered by [facility] pharmacy tomorrow, and has a video visit with Dr. Diallo  And has a HCRN stop by his place daily to monitor.     Patient declines ED visit at this time.     I encouraged patient to be evaluated I the ED with worsening of symptoms or call wit new symptoms.     Patient verbalizes understanding.    Olamide Celeste RN          "

## 2020-04-28 NOTE — PROGRESS NOTES
"Per Dr. Tesfaye,     \"Spoke to wife. She is going to discuss treatment option with her  and call us next week.     Vero Tesfaye MD \"  "

## 2020-04-29 NOTE — TELEPHONE ENCOUNTER
Action 04/29/20 7:47 AM - Danitza   Action Taken  Pathology received from [facility] and sent to be reviewed with filled out pathology form.

## 2020-04-30 NOTE — PROGRESS NOTES
"Dallin Stevens is a 34 year old male who is being evaluated via a billable video visit.      The patient has been notified of following:     \"This video visit will be conducted via a call between you and your physician/provider. We have found that certain health care needs can be provided without the need for an in-person physical exam.  This service lets us provide the care you need with a video conversation.  If a prescription is necessary we can send it directly to your pharmacy.  If lab work is needed we can place an order for that and you can then stop by our lab to have the test done at a later time.    Video visits are billed at different rates depending on your insurance coverage.  Please reach out to your insurance provider with any questions.    If during the course of the call the physician/provider feels a video visit is not appropriate, you will not be charged for this service.\"    Patient has given verbal consent for Video visit? Yes    How would you like to obtain your AVS? MyChart    Patient would like the video invitation sent by: Send to e-mail at: [email or web address]    Will anyone else be joining your video visit? No          Secondary Video Option (Doximity), send text message to:  [phone]    I have reviewed and updated the patient's allergies and medication list.    Concerns: Patient has continued extremity swelling, including his feet and hands.      Refills: None      Willie Hollins, EMT       [facility]  MEDICAL ONCOLOGY CONSULT  Apr 30, 2020    CHIEF COMPLAINT: Metastatic BRAF-mutated melanoma    Melanoma History  1.  2/03/2012, biopsy of left flank skin lesion revealed 1.7 mm superficial spreading melanoma without ulceration.  2. 2/23/2012, he had left flank wide local excision and left axillary sentinel node biopsy. One of two lymph node positive. He had stage III (pT2a pN1a M0) disease.  3. 3/02/2012, left axillary radical lymph node dissection was done. All 23 lymph " nodes benign.  4. 4/02 to 4/27/2012, he received 4 weeks of high-dose interferon  5. 1/25/2017, CT scan revealed metastatic disease involving lymph nodes in the right supraclavicular, mediastinum and right hilum, and 3 small pulmonary nodules are present.  6. 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    7. 2/08/2017 to 11/08/2017, he received pembrolizumab  8. 6/28/2017, PET scan revealed significant improvement.  9. 11/29/2017, PET showed new focus of mild FDG uptake within a 6 mm right paratracheal lymph node.  10. 10/02/2018, PET scan revealed new hypermetabolic lymphadenopathy in the supraclavicular region of the left neck and in the mediastinum.   11. 4/24/2019, MRI brain revealed intraparenchymal mass within the right parietal occipital lobe.    12. 4/26/2019, he had craniotomy and resection and pathology confirmed metastatic melanoma. No post-op radiation was delivered.  13. 7/23/2019, PET scan showed progression of disease. New hypermetabolic lymphadenopathy seen in the right supraclavicular fossa and anterior mediastinum.  14. 11/25/2019, MRI brain reveals metastasis in the left posterior frontal lobe, right peritrigonal region and right posterior frontal parafalcine lesion. PET scan showed progression with multiple new hypermetabolic subcutaneous nodules throughout the body.  No change in size of mediastinal lymphadenopathy.   15. 1/06/2020, admitted in the [facility] because of left-sided weakness. MRI Brain revealed a 3.3 cm hemorrhagic mass in the right frontal gyrus and other brain metastases.  16. 1/16/2020, he had right frontal craniotomy and resection. Pathology revealed metastatic melanoma.  17. 1/27/2020 to 1/31/2020, he received gamma knife radiation to 5 brain lesions, 2500 cGy in 5 fractions.  18.2/11/2020, he started treatment with  pembrolizumab and received 4 cycles through 4/15/2020.  19. 4/18/2020, he presents with severe dyspnea, worsened mediastinal adenopathy, large pericardial effusion with tamponade, recurrent moderate pleural effusions. Bilateral pleurX catheters placed 4/20/20.    HISTORY OF PRESENT ILLNESS  Dallin Stevens is a 34 year old male with BRAF mutated metastatic melanoma to lymph nodes, lung, and brain. Since November has been seeing Dr[facility] Tes[facility]aye [facility]. He is recently status-post gamma radiation to brain in January 2020 followed by 4 cycle (3 months) Keytruda with no evidence of progression on his most recent CT-scans in early April.     He started BRAF/MEK inhibitors yesterday and has not noted any major difficulties or tolerability issues. He denies any fevers or chills. He is no longer requiring steroids after GKR. Notes no change in recent neurologic function. No new seizures. Overall, feels he is improving. He notes he is eating 3 meals a day and has a formed 1 bm per day. He is not requiring pain medications.      Results for orders placed or performed in visit on 04/30/20   Phosphorus     Status: None   Result Value Ref Range    Phosphorus 3.5 2.5 - 4.5 mg/dL   Hemoglobin     Status: Abnormal   Result Value Ref Range    Hemoglobin 8.8 (L) 13.3 - 17.7 g/dL   Comprehensive metabolic panel     Status: Abnormal   Result Value Ref Range    Sodium 129 (L) 133 - 144 mmol/L    Potassium 4.2 3.4 - 5.3 mmol/L    Chloride 97 94 - 109 mmol/L    Carbon Dioxide 24 20 - 32 mmol/L    Anion Gap 8 3 - 14 mmol/L    Glucose 122 (H) 70 - 99 mg/dL    Urea Nitrogen 17 7 - 30 mg/dL    Creatinine 0.59 (L) 0.66 - 1.25 mg/dL    GFR Estimate >90 >60 mL/min/[1.73_m2]    GFR Estimate If Black >90 >60 mL/min/[1.73_m2]    Calcium 8.2 (L) 8.5 - 10.1 mg/dL    Bilirubin Total 0.4 0.2 - 1.3 mg/dL    Albumin 2.2 (L) 3.4 - 5.0 g/dL    Protein Total 5.9 (L) 6.8 - 8.8 g/dL    Alkaline Phosphatase 103 40 - 150 U/L    ALT 15 0 - 70 U/L    AST 28 0 - 45  U/L         REVIEW OF SYSTEMS  A 12-point ROS negative except as in HPI    Current Outpatient Medications   Medication Sig Dispense Refill     acetaminophen (TYLENOL) 325 MG tablet Take 2 tablets (650 mg) by mouth every 4 hours as needed for mild pain, fever or headaches (> 101 F)       dabrafenib (TAFINLAR) 75 MG capsule Take 2 capsules (150 mg) by mouth 2 times daily Take at least 1 hr before or 2 hrs after a meal. 120 capsule 0     guaiFENesin-dextromethorphan (ROBITUSSIN DM) 100-10 MG/5ML syrup Take 5 mLs by mouth every 4 hours as needed for cough 1 Bottle 0     melatonin 1 MG TABS tablet Take 1 tablet (1 mg) by mouth nightly as needed for sleep 30 tablet 0     oxyCODONE (ROXICODONE) 5 MG tablet Take 1 tablet (5 mg) by mouth every 6 hours as needed for pain 20 tablet 0     prochlorperazine (COMPAZINE) 10 MG tablet Take 1 tablet (10 mg) by mouth every 6 hours as needed (Nausea/Vomiting) 30 tablet 2     trametinib (MEKINIST) 2 MG tablet Take 1 tablet (2 mg) by mouth daily Take 1 hr before or 2 hrs after a meal. STORE and DISPENSE from original container. 30 tablet 0       No Known Allergies  There is no immunization history for the selected administration types on file for this patient.    Past Medical History:   Diagnosis Date     Malignant melanoma (H)        Past Surgical History:   Procedure Laterality Date     BIOPSY OF SKIN LESION       CRANIOTOMY, EXCISE TUMOR COMPLEX, COMBINED  04/2019     DENTAL SURGERY      wisdom teeth     IR CHEST TUBE PLACEMENT NON-TUNNELED BILATERAL  4/19/2020     LYMPH NODE BIOPSY      arm, excision     MRI CRANIOTOMY LASER ABLATION Right 2/27/2020    Procedure: INTRAOPERATIVE MRI/STEALTH ASSISTED RIGHT LASER ABLATION OF BRAIN METASTASIS;  Surgeon: Nitish Quintero MD;  Location: [facility] OR     OPTICAL TRACKING SYSTEM CRANIOTOMY, EXCISE TUMOR, COMBINED Right 1/16/2020    Procedure: stealth assisted Right craniotomy for tumor resection;  Surgeon: Nitish Quintero MD;   Location: [facility] OR     THORACOSCOPY Bilateral 4/20/2020    Procedure: BILATERAL INSERTION OF PLEURX CATHETER;  Surgeon: Uday Bhakta MD;  Location: [facility] OR       SOCIAL HISTORY  History   Smoking Status     Never Smoker   Smokeless Tobacco     Never Used    Social History    Substance and Sexual Activity      Alcohol use: Not Currently     History   Drug Use Unknown       FAMILY HISTORY  Family History   Problem Relation Age of Onset     Anesthesia Reaction No family hx of      Cardiovascular No family hx of      Deep Vein Thrombosis No family hx of        PHYSICAL EXAMINATION  There were no vitals taken for this visit.  Wt Readings from Last 2 Encounters:   04/19/20 59.7 kg (131 lb 11.2 oz)   04/15/20 59.9 kg (132 lb)   General: Well appearing young man, wearing ball cap, seated next to wife in living room  Head: Wearing baseball cap, otherwise normocephalic  Eyes: No obvious icterus  ENT: Oropharynx clear, speaking clearly  Pulm: Good effort on room air, no apparent dyspnea, speaking in full sentences  Neuro: Cranial nerves are grossly intact, there may be mild facial asymmetry  Skin: No rashes on visible skin    Last 24 hours, drain output, per patient report:   Left Pleurx 350 ml/day  Right Pleurx 70/75 ml/day    IMAGING  CT-CAP, 4/18/2020  IMPRESSION: In this patient with history of metastatic melanoma:  1a. Extensive thoracic metastatic burden with moderate to large  bilateral pleural effusions and a large pleural-based mass in the  right upper lobe.   1b. Moderate pericardial effusion and left ventricular thrombus versus  implant.  1c. Extensive mediastinal and axillary lymphadenopathy.  1d. Occlusive bland thrombus versus tumor involving the both  innominate veins and SVC.  1e. Small pulmonary and endobronchial nodules concerning for  metastatic implants.  2a. Necrotic appearing soft tissue nodules involving the right  iliopsoas region. Additional scattered lymphadenopathy throughout the  abdomen and  pelvis and inguinal regions.  2b. Bilateral adrenal implants  2c. No evidence of metastatic disease to the liver or bones.  3. Numerous cutaneous implants.       ASSESSMENT AND PLAN  #1 Metastatic BRAF-[id number] mutated melanoma, to lymph nodes, adrenal glands,  lung, brain  Patient most recently progressed on pembrolizumab. He restarted started dabrafenib and trametinib yesterday, which I recommended he continue. Patient and wife seek transfer care to a melanoma specialist. I indicated I could work with Dr. Tesfaye going forward, but they would like to establish at the [facility] with an eye towards future trials.  -Research Perseus PCI  -Continue dabrafenib and trametinib, orders originally placed by Dr. Tesfaye  -Add a CT-CAP to planned MRI brain in late May  -Will plan to see back after scans, likely 5/28 as addon    #2 Bilateral pleural effusions, s/p bilateral PleurX catheters  Currently draining predominantly on the left. Plan to continue with daily drainage and expect decrease in output over next few days/weeks on BRAF/MEK inhibition.    #3 Anemia, normocytic, likely multifactorial  Suspect anemia of chronic disease and possible nutritional deficiency. Will obtain Iron studies, ferritin level and consider iron supplementation if needed.  -Ferritin, TIBC, %saturation, Iron, B12: Labs to be obtained by home health nursing team    Benita Pillai M.D.   of Medicine  Hematology, Oncology and Transplantation        Video-Visit Details    Type of service:  Video Visit    Video Start Time: 4:40  Video End Time: 5:34    Originating Location (pt. Location): Home    Distant Location (provider location):  [facility] CANCER [facility]     Platform used for Video Visit: [employer]

## 2020-05-06 NOTE — TELEPHONE ENCOUNTER
Fax received for Short Term Disability paperwork.  Called Dallin on 5/5 afternoon to discuss.  Paperwork signed by Dr. Tesfaye.  Faxed to [facility] Absence Management today.    Original signed STD form placed in HIMS to be scanned into patient's chart.    Edward Almanza, MAICOLN, RN, OCN  Oncology Care Coordinator  [facility]

## 2020-05-07 NOTE — PROGRESS NOTES
Oral Chemotherapy Monitoring Program    Primary Oncologist: Dr. Tesfaye --> Dr. Burnett going forward  [place] [facility]  Cancer Diagnosis: metastatic melanoma    Therapy History:  Started trametinib 2mg daily + dabrafenib 150mg (2 x75mg) BID on 4/29/20 (confirmed start date with patient on 5/7/20)    Drug Interaction Assessment: none    Lab Monitoring Plan  Monthly CBC, CMP, Phos  Subjective/Objective:  Dallin Stevens is a 34 year old male contacted by phone for a follow-up visit for oral chemotherapy.  Called Dallin to verify that he received supply of trametinib and dabrafenib. Patient reports receiving supply on 4/29 and starting that day. Patient notified pharmacist that he is transferring care to Dr. Burnett going forward. Patient states that he is tolerating well so far. He notes dry skin and feeling more dried out in general. He states that he is moisturizing and hydrating. This is tolerable at this time. He feels that his fluid retention is getting better--previously had more of this. He notes minimal diarrhea that has mainly been controlled with some diet modifications. In general patient feels that treatment is going well so far. Patient confirms that he has not missed any doses and that he is taking both on an empty stomach. He also notes that he is taking the trametinib 2mg once daily and the dabrafenib 2 x75mg capsules(150mg)  twice daily.    ORAL CHEMOTHERAPY 4/27/2020 5/7/2020   Drug Name Tafinlar (Dabrafenib)/ Mekinist (Trametinib) Tafinlar (Dabrafenib)/ Mekinist (Trametinib)   Current Dosage (No Data) (No Data)   Current Schedule (No Data) (No Data)   Cycle Details Continuous Continuous   Start Date of Last Cycle - 4/29/2020   Planned next cycle start date - 5/29/2020   Doses missed in last 2 weeks - 0   Adherence Assessment - Adherent   Adverse Effects - Other (see note for details)   Other (see note for details) - (No Data)   Pharmacist intervention? - Yes   Intervention(s) -  "Patient education   Is the dose as ordered appropriate for the patient? - Yes   Is the patient currently in pain? - No       Last PHQ-2 Score on record:   PHQ-2 ( 1999 Pfizer) 2/6/2020   Q1: Little interest or pleasure in doing things 0   Q2: Feeling down, depressed or hopeless 0   PHQ-2 Score 0       Patient does not report depression symptoms.      Vitals:  BP:   BP Readings from Last 1 Encounters:   04/21/20 101/74     Wt Readings from Last 1 Encounters:   04/19/20 59.7 kg (131 lb 11.2 oz)     Estimated body surface area is 1.72 meters squared as calculated from the following:    Height as of 4/18/20: 1.778 m (5' 10\").    Weight as of 4/19/20: 59.7 kg (131 lb 11.2 oz).    Labs:  _  Result Component Current Result Ref Range   Sodium 129 (L) (4/30/2020) 133 - 144 mmol/L     _  Result Component Current Result Ref Range   Potassium 4.2 (4/30/2020) 3.4 - 5.3 mmol/L     _  Result Component Current Result Ref Range   Calcium 8.2 (L) (4/30/2020) 8.5 - 10.1 mg/dL     _  Result Component Current Result Ref Range   Magnesium 2.1 (4/21/2020) 1.6 - 2.3 mg/dL     _  Result Component Current Result Ref Range   Phosphorus 3.5 (4/30/2020) 2.5 - 4.5 mg/dL     _  Result Component Current Result Ref Range   Albumin 2.2 (L) (4/30/2020) 3.4 - 5.0 g/dL     _  Result Component Current Result Ref Range   Urea Nitrogen 17 (4/30/2020) 7 - 30 mg/dL     _  Result Component Current Result Ref Range   Creatinine 0.59 (L) (4/30/2020) 0.66 - 1.25 mg/dL       _  Result Component Current Result Ref Range   AST 28 (4/30/2020) 0 - 45 U/L     _  Result Component Current Result Ref Range   ALT 15 (4/30/2020) 0 - 70 U/L     _  Result Component Current Result Ref Range   Bilirubin Total 0.4 (4/30/2020) 0.2 - 1.3 mg/dL       _  Result Component Current Result Ref Range   WBC 12.5 (H) (4/21/2020) 4.0 - 11.0 10e9/L     _  Result Component Current Result Ref Range   Hemoglobin 8.8 (L) (4/30/2020) 13.3 - 17.7 g/dL     _  Result Component Current Result Ref " Range   Platelet Count 425 (4/21/2020) 150 - 450 10e9/L     _  Result Component Current Result Ref Range   Absolute Neutrophil 11.4 (H) (4/21/2020) 1.6 - 8.3 10e9/L           Labs not reviewed at this time.     Assessment:  Patient is tolerating new treatment well despite a little dryness.     Plan:  Continue trametinib and dabrafenib at this time. Patient is transferring care to Dr. Burnett at the [facility]. [facility] pharmacy will reach out to [facility] oral chemo team to transfer patient to their oral chemo list going forward. Patient encouraged to call at any time with any concerns or questions.     Follow-Up:  3 weeks with repeat labs and check in.     Refill Due:  5/29/20.    Josie Hollins PharmD  May 7, 2020

## 2020-05-14 NOTE — PROGRESS NOTES
Called patient.  Updated him on appt. Schedule on 5/28 to review scans with Dr. Burnett.  Discussed how treatment on taf/mek was going.  Stated he was getting low grade temps.   degrees.  Double checked with Lea Patel PA-C and advised patient to treat with tylenol or ibuprofen.  No other infectious symptoms.  Advised patient to call with any other concerning issues.    Yoselin Gonzalez MBA, MSN, RN, ONC  RN Care Coordinator  [facility]

## 2020-05-18 NOTE — TELEPHONE ENCOUNTER
Dallin called clinic stating that ne needs his extension on his short term disability completed and sent. Dallin would like a call back verifying that it is complete and sent.

## 2020-05-18 NOTE — TELEPHONE ENCOUNTER
Writer has completed form and attached office note and medication list . Dr. Tesfaye to sign and forms to be faxed to [phone].    Olamide Celeste RN

## 2020-05-21 NOTE — PROGRESS NOTES
Spoke with patient today.  He is hypotensive at 70-80/50-60, febrile yesterday at 100.6.  Feels dizzy and light-headed.  Advised patient to go to [place] [facility] ED right away.  His wife will bring him.  Called ED and gave report.  Discussed fluids, sepsis work-up.  Will monitor via Epic.    Yoselin Gonzalez MBA, MSN, RN, ONC  RN Care Coordinator  [facility]

## 2020-05-21 NOTE — ED PROVIDER NOTES
[place] EMERGENCY DEPARTMENT ([facility])  May 21, 2020  History     Chief Complaint   Patient presents with     Fever     The history is provided by the patient and medical records.     Dallin Stevens is a 34 year old male with a history of metastatic malignant melanoma who presents to the Emergency Department with a fever, hypotension, and presyncopal episode.  She reports that over the past few days he has been having intermittent fevers with a T-max of 100.6 Fahrenheit.  Here in the ED patient had a temperature of 102.1 Fahrenheit in triage.  Patient states earlier today he had a presyncopal episode and checked his blood pressure and this was low.  He was hypotensive at 70-80s/50-60s.  He is noting dizziness and lightheadedness.  States he has a cough but this has been ongoing since his diagnosis of cancer.  Patient denies nausea, vomiting, diarrhea, dysuria, or hematuria.  Patient is not currently on chemotherapy.  Patient was advised to present here to the ED by his oncology team for fluids and sepsis work-up.    PAST MEDICAL HISTORY:   Past Medical History:   Diagnosis Date     Malignant melanoma (H)        PAST SURGICAL HISTORY:   Past Surgical History:   Procedure Laterality Date     BIOPSY OF SKIN LESION       CRANIOTOMY, EXCISE TUMOR COMPLEX, COMBINED  04/2019     DENTAL SURGERY      wisdom teeth     IR CHEST TUBE PLACEMENT NON-TUNNELED BILATERAL  4/19/2020     LYMPH NODE BIOPSY      arm, excision     MRI CRANIOTOMY LASER ABLATION Right 2/27/2020    Procedure: INTRAOPERATIVE MRI/STEALTH ASSISTED RIGHT LASER ABLATION OF BRAIN METASTASIS;  Surgeon: Nitish Quintero MD;  Location: [facility] OR     OPTICAL TRACKING SYSTEM CRANIOTOMY, EXCISE TUMOR, COMBINED Right 1/16/2020    Procedure: stealth assisted Right craniotomy for tumor resection;  Surgeon: Nitish Quintero MD;  Location: [facility] OR     THORACOSCOPY Bilateral 4/20/2020    Procedure: BILATERAL INSERTION OF PLEURX CATHETER;  Surgeon: Ryan  Uday Larson MD;  Location: [facility] OR       Past medical history, past surgical history, medications, and allergies were reviewed with the patient. Additional pertinent items: None    FAMILY HISTORY:   Family History   Problem Relation Age of Onset     Anesthesia Reaction No family hx of      Cardiovascular No family hx of      Deep Vein Thrombosis No family hx of        SOCIAL HISTORY:   Social History     Tobacco Use     Smoking status: Never Smoker     Smokeless tobacco: Never Used   Substance Use Topics     Alcohol use: Not Currently     Social history was reviewed with the patient. 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  Qty: 120 capsule, Refills: 0    Associated Diagnoses: Metastatic malignant melanoma (H); Malignant melanoma of skin of trunk, except scrotum (H)      ferrous sulfate (FEROSUL) 325 (65 Fe) MG tablet Take 1 tablet (325 mg) by mouth daily (with breakfast)  Qty: 30 tablet, Refills: 4    Associated Diagnoses: Metastatic malignant melanoma (H)      guaiFENesin-dextromethorphan (ROBITUSSIN DM) 100-10 MG/5ML syrup Take 5 mLs by mouth every 4 hours as needed for cough  Qty: 1 Bottle, Refills: 0    Associated Diagnoses: Metastatic malignant melanoma (H)      melatonin 1 MG TABS tablet Take 1 tablet (1 mg) by mouth nightly as needed for sleep  Qty: 30 tablet, Refills: 0    Associated Diagnoses: Adjustment insomnia      oxyCODONE (ROXICODONE) 5 MG tablet Take 1 tablet (5 mg) by mouth every 6 hours as needed for  pain  Qty: 20 tablet, Refills: 0    Associated Diagnoses: Metastatic malignant melanoma (H)      prochlorperazine (COMPAZINE) 10 MG tablet Take 1 tablet (10 mg) by mouth every 6 hours as needed (Nausea/Vomiting)  Qty: 30 tablet, Refills: 2    Associated Diagnoses: Metastatic malignant melanoma (H); Malignant melanoma of skin of trunk, except scrotum (H)      trametinib (MEKINIST) 2 MG tablet Take 1 tablet (2 mg) by mouth daily Take 1 hr before or 2 hrs after a meal. STORE and DISPENSE from original container.  Qty: 30 tablet, Refills: 0    Associated Diagnoses: Metastatic malignant melanoma (H); Malignant melanoma of skin of trunk, except scrotum (H)              No Known Allergies     Review of Systems   Constitutional: Positive for fever.   Respiratory: Positive for cough.    Gastrointestinal: Negative for diarrhea, nausea and vomiting.   Genitourinary: Negative for dysuria and hematuria.   Neurological: Positive for dizziness and light-headedness. Negative for syncope.        Positive for pre-syncope   All other systems reviewed and are negative.    Physical Exam   BP: 106/67  Pulse: 120  Heart Rate: 103  Temp: 99.6  F (37.6  C)  Resp: 18  Weight: 54.6 kg (120 lb 4.8 oz)  SpO2: 96 %      Physical Exam  Vitals signs and nursing note reviewed.   Constitutional:       General: He is not in acute distress.     Appearance: He is underweight. He is ill-appearing. He is not toxic-appearing or diaphoretic.      Comments: Patient stated in appearance and appears washed out.  He is otherwise mentating normally and protecting his airway without difficulty.   HENT:      Head: Normocephalic and atraumatic.      Mouth/Throat:      Lips: Pink.      Mouth: Mucous membranes are moist.      Pharynx: Oropharynx is clear. No oropharyngeal exudate.   Eyes:      General: Lids are normal. No scleral icterus.     Extraocular Movements: Extraocular movements intact.      Right eye: No nystagmus.      Left eye: No nystagmus.       Conjunctiva/sclera: Conjunctivae normal.      Pupils: Pupils are equal, round, and reactive to light.   Neck:      Musculoskeletal: Normal range of motion and neck supple. No erythema or neck rigidity.      Thyroid: No thyromegaly.      Vascular: No JVD.      Trachea: No tracheal deviation.   Cardiovascular:      Rate and Rhythm: Regular rhythm. Tachycardia present.      Pulses: Normal pulses.      Heart sounds: Normal heart sounds. No murmur. No friction rub. No gallop.    Pulmonary:      Effort: Pulmonary effort is normal. No respiratory distress.      Breath sounds: Normal breath sounds.   Abdominal:      General: Bowel sounds are normal. There is no distension.      Palpations: Abdomen is soft. There is no mass.      Tenderness: There is no abdominal tenderness. There is no guarding or rebound.   Musculoskeletal: Normal range of motion.         General: No tenderness.      Right lower leg: No edema.      Left lower leg: No edema.   Lymphadenopathy:      Cervical: No cervical adenopathy.   Skin:     General: Skin is warm and dry.      Capillary Refill: Capillary refill takes less than 2 seconds.      Coloration: Skin is not pale.      Findings: No erythema or rash.   Neurological:      Mental Status: He is alert and oriented to person, place, and time.      Cranial Nerves: No cranial nerve deficit.      Sensory: No sensory deficit.      Motor: Motor function is intact.   Psychiatric:         Mood and Affect: Mood and affect normal.         Speech: Speech normal.         Behavior: Behavior normal.         ED Course   4:55 PM  The patient was seen and examined by Louie Andrade MD in Room ED36.        Procedures        Labs Ordered and Resulted from Time of ED Arrival Up to the Time of Departure from the ED   COMPREHENSIVE METABOLIC PANEL - Abnormal; Notable for the following components:       Result Value    Sodium 127 (*)     Glucose 114 (*)     Calcium 8.2 (*)     Albumin 2.8 (*)     Protein Total 6.3 (*)      All other components within normal limits   CBC WITH PLATELETS DIFFERENTIAL - Abnormal; Notable for the following components:    WBC 3.4 (*)     RBC Count 3.78 (*)     Hemoglobin 9.5 (*)     Hematocrit 31.3 (*)     MCH 25.1 (*)     MCHC 30.4 (*)     RDW 18.1 (*)     Absolute Lymphocytes 0.7 (*)     All other components within normal limits   ROUTINE UA WITH MICROSCOPIC - Abnormal; Notable for the following components:    Blood Urine Trace (*)     pH Urine 8.0 (*)     RBC Urine 10 (*)     Mucous Urine Present (*)     All other components within normal limits   LACTIC ACID WHOLE BLOOD   COVID-19 VIRUS (CORONAVIRUS) BY PCR   SARS-COV-2 (COVID-19) VIRUS RT-PCR   PERIPHERAL IV CATHETER   PULSE OXIMETRY NURSING   CARDIAC CONTINUOUS MONITORING   NURSING DRAW AND HOLD        XR Chest Port 1 View   Final Result   IMPRESSION: Bilateral chest tubes with resolution of pneumothoraces.   Decreased size of right sided mediastinal/paramediastinal mass.   Resolution of left lower lobe consolidation.      THERESA KONG MD                 Assessments & Plan (with Medical Decision Making)     This patient presented to the emergency department with complaints of hypotension and fever.  He has not been hypotensive here in the emergency department but has been tachycardic and was febrile to 102.1.  He currently is being treated for metastatic melanoma.  I can identify no obvious source of infection on exam.  Chest x-ray appears somewhat improved from previous.  He does have Pleurx catheters in place but otherwise no port or other potential source of infection.  I did consult with the heme-onc fellow and patient will be started on empiric antibiotics and admitted to the hospital.  No signs of severe sepsis or septic shock.  Lactate is within normal limits.  He is otherwise been hemodynamically stable while in the emergency department.    I have reviewed the nursing notes.    I have reviewed the findings, diagnosis, plan and need for  follow up with the patient.    Current Discharge Medication List          Final diagnoses:   Fever, unspecified fever cause     IManuel, am serving as a trained medical scribe to document services personally performed by Louie Andrade MD, based on the provider's statements to me.   Loiue PINEDA MD, was physically present and have reviewed and verified the accuracy of this note documented by Manuel Dietrich.    5/21/2020   [facility], [place], EMERGENCY DEPARTMENT     Louie Andrade MD  05/23/20 0701

## 2020-05-21 NOTE — PROGRESS NOTES
Patient is getting labs through his nursing home care agency next week in preparation for his visit with Dr. Burnett.  Labs will be resulted in care everywhere.    Yoselin Gonzalez MBA, MSN, RN, ONC  RN Care Coordinator  [facility]

## 2020-05-22 PROBLEM — R50.9 FEVER: Status: ACTIVE | Noted: 2020-01-01

## 2020-05-22 NOTE — ED NOTES
[facility], [place]   ED Nurse to Floor Handoff     Dallin Stevens is a 34 year old male who speaks English and lives with a spouse,  in a home  They arrived in the ED by car from home    ED Chief Complaint: Fever    ED Dx;   Final diagnoses:   Fever, unspecified fever cause         Needed?: No    Allergies: No Known Allergies.  Past Medical Hx:   Past Medical History:   Diagnosis Date     Malignant melanoma (H)       Baseline Mental status: WDL  Current Mental Status changes: at basesline    Infection present or suspected this encounter: cultures pending  Sepsis suspected: No  Isolation type: Special Precautions-COVID-19     Activity level - Baseline/Home:  Independent  Activity Level - Current:   Stand with Assist    Bariatric equipment needed?: No    In the ED these meds were given:   Medications   0.9% sodium chloride BOLUS (0 mLs Intravenous Stopped 5/21/20 1859)   lactated ringers BOLUS 1,000 mL (0 mLs Intravenous Stopped 5/21/20 2110)       Drips running?  No    Home pump  No    Current LDAs  Peripheral IV 05/21/20 Right Upper forearm (Active)   Number of days: 0       Chest Tube 1 Right Pleural 15.5 Mexican (Active)   Number of days: 31       Chest Tube 2 Left Pleural 15.5 Mexican (Active)   Number of days: 31       Incision/Surgical Site 01/16/20 Right Head (Active)   Number of days: 126       Labs results:   Labs Ordered and Resulted from Time of ED Arrival Up to the Time of Departure from the ED   COMPREHENSIVE METABOLIC PANEL - Abnormal; Notable for the following components:       Result Value    Sodium 127 (*)     Glucose 114 (*)     Calcium 8.2 (*)     Albumin 2.8 (*)     Protein Total 6.3 (*)     All other components within normal limits   CBC WITH PLATELETS DIFFERENTIAL - Abnormal; Notable for the following components:    WBC 3.4 (*)     RBC Count 3.78 (*)     Hemoglobin 9.5 (*)     Hematocrit 31.3 (*)     MCH 25.1 (*)     MCHC 30.4 (*)     RDW 18.1 (*)      Absolute Lymphocytes 0.7 (*)     All other components within normal limits   ROUTINE UA WITH MICROSCOPIC - Abnormal; Notable for the following components:    Blood Urine Trace (*)     pH Urine 8.0 (*)     RBC Urine 10 (*)     Mucous Urine Present (*)     All other components within normal limits   LACTIC ACID WHOLE BLOOD   COVID-19 VIRUS (CORONAVIRUS) BY PCR   SARS-COV-2 (COVID-19) VIRUS RT-PCR   PERIPHERAL IV CATHETER   PULSE OXIMETRY NURSING   CARDIAC CONTINUOUS MONITORING   NURSING DRAW AND HOLD   URINE CULTURE AEROBIC BACTERIAL   BLOOD CULTURE   BLOOD CULTURE       Imaging Studies:   Recent Results (from the past 24 hour(s))   XR Chest Port 1 View    Narrative    Portable chest    INDICATION: Cough, fever    COMPARISON: 4/21/2020    FINDINGS: Previous right sided mediastinal mass has decreased in size.  The previous left lower lobe consolidation there is also resolved.  Left chest tube is again resolution of the previous left pneumothorax.  The previous moderate right pneumothorax also appears to have  resolved.      Impression    IMPRESSION: Bilateral chest tubes with resolution of pneumothoraces.  Decreased size of right sided mediastinal/paramediastinal mass.  Resolution of left lower lobe consolidation.    THERESA KONG MD       Recent vital signs:   /78   Pulse 106   Temp 102.1  F (38.9  C) (Oral)   Resp 14   SpO2 96%     [place] Coma Scale Score: 15 (05/21/20 1605)       Cardiac Rhythm: Tachycardia  Pt needs tele? No  Skin/wound Issues: None    Code Status: Full Code    Pain control: pt had none    Nausea control: pt had none    Abnormal labs/tests/findings requiring intervention: see labs    Family present during ED course? No   Family Comments/Social Situation comments:     Tasks needing completion: None    Latonia Lange, RN    [phone] [facility]

## 2020-05-22 NOTE — PLAN OF CARE
Admitted from ED overnight. Skin assessment and admissions complete.     A x O. Vitals Q4H. Receiving IVABX. Denies pain and n/v. Regular diet. Voiding adequately, urinal at bedside. Independent. Resting between cares. Continue POC.

## 2020-05-22 NOTE — CONSULTS
Oncology  Consult Note   Date of Service: 05/22/2020    Patient: Dallin Stevens  MRN: [id number]  Admission Date: 5/21/2020  Hospital Day # 0  Cancer Diagnosis: Melanoma metastatic (Mets to brain, chest, right iliopsoas, and scattered LAD in abdomen, pelvis, and inguinal regions)  Primary Outpatient Oncologist: Dr Pillai // Dr Tesfaye   Current Treatment Plan: dabrafenib and trametinib (Day 1 = 4/29/20)      Reason for Consult: Metastatic Melanoma on active treatment       History of Present Illness:    Dallin Stevens is a 34 year old male with past medical hx of extensive metastatic melanoma (currently on dabrafenib and trametinib), HENRIETTA, anemia of chronic disease, pericardial effusion with tamponade s/p pericardial window, and malignant pleural effusions (s/p bilateral pleurX catheters placed 4/20/20). Now admitted from [facility] ED for fevers (Tmax 102.1) and hypotension.      Oncologic History: (Per Dr. Burnett's 4/30/20 note)  1.  2/03/2012, biopsy of left flank skin lesion revealed 1.7 mm superficial spreading melanoma without ulceration.  2. 2/23/2012, he had left flank wide local excision and left axillary sentinel node biopsy. One of two lymph node positive. He had stage III (pT2a pN1a M0) disease.  3. 3/02/2012, left axillary radical lymph node dissection was done. All 23 lymph nodes benign.  4. 4/02 to 4/27/2012, he received 4 weeks of high-dose interferon  5. 1/25/2017, CT scan revealed metastatic disease involving lymph nodes in the right supraclavicular, mediastinum and right hilum, and 3 small pulmonary nodules are present.  6. 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    7. 2/08/2017 to 11/08/2017, he received pembrolizumab  8. 6/28/2017, PET scan revealed significant improvement.  9. 11/29/2017, PET showed new focus of mild FDG uptake within a 6 mm right  paratracheal lymph node.  10. 10/02/2018, PET scan revealed new hypermetabolic lymphadenopathy in the supraclavicular region of the left neck and in the mediastinum.   11. 4/24/2019, MRI brain revealed intraparenchymal mass within the right parietal occipital lobe.    12. 4/26/2019, he had craniotomy and resection and pathology confirmed metastatic melanoma. No post-op radiation was delivered.  13. 7/23/2019, PET scan showed progression of disease. New hypermetabolic lymphadenopathy seen in the right supraclavicular fossa and anterior mediastinum.  14. 11/25/2019, MRI brain reveals metastasis in the left posterior frontal lobe, right peritrigonal region and right posterior frontal parafalcine lesion. PET scan showed progression with multiple new hypermetabolic subcutaneous nodules throughout the body.  No change in size of mediastinal lymphadenopathy.   15. 1/06/2020, admitted in the [facility] system because of left-sided weakness. MRI Brain revealed a 3.3 cm hemorrhagic mass in the right frontal gyrus and other brain metastases.  16. 1/16/2020, he had right frontal craniotomy and resection. Pathology revealed metastatic melanoma.  17. 1/27/2020 to 1/31/2020, he received gamma knife radiation to 5 brain lesions, 2500 cGy in 5 fractions.  18.2/11/2020, he re started treatment with pembrolizumab and received 4 cycles through 4/15/2020.  19. 4/18/2020, severe dyspnea, worsened mediastinal adenopathy, large pericardial effusion with tamponade, recurrent moderate pleural effusions. Bilateral pleurX catheters placed 4/20/20.   20. 4/29/20: started BRAF/MEK inhibitors (dabrafenib and trametinib)   21. 5/21/20 admitted to [facility] ED with sepsis       Review of Systems:  A comprehensive ROS was performed and found to be negative or non-contributory with the exception of that noted in the HPI above.    Past Medical History:  Past Medical History:   Diagnosis Date     Malignant melanoma (H)        Past Surgical History:  Past  Surgical History:   Procedure Laterality Date     BIOPSY OF SKIN LESION       CRANIOTOMY, EXCISE TUMOR COMPLEX, COMBINED  04/2019     DENTAL SURGERY      wisdom teeth     IR CHEST TUBE PLACEMENT NON-TUNNELED BILATERAL  4/19/2020     LYMPH NODE BIOPSY      arm, excision     MRI CRANIOTOMY LASER ABLATION Right 2/27/2020    Procedure: INTRAOPERATIVE MRI/STEALTH ASSISTED RIGHT LASER ABLATION OF BRAIN METASTASIS;  Surgeon: Nitish Quintero MD;  Location: [facility] OR     OPTICAL TRACKING SYSTEM CRANIOTOMY, EXCISE TUMOR, COMBINED Right 1/16/2020    Procedure: stealth assisted Right craniotomy for tumor resection;  Surgeon: Nitish Quintero MD;  Location: [facility] OR     THORACOSCOPY Bilateral 4/20/2020    Procedure: BILATERAL INSERTION OF PLEURX CATHETER;  Surgeon: Uday Bhakta MD;  Location: [facility] OR       Social History:  Social History     Socioeconomic History     Marital status:      Spouse name: None     Number of children: None     Years of education: None     Highest education level: None   Occupational History     None   Social Needs     Financial resource strain: None     Food insecurity     Worry: None     Inability: None     Transportation needs     Medical: None     Non-medical: None   Tobacco Use     Smoking status: Never Smoker     Smokeless tobacco: Never Used   Substance and Sexual Activity     Alcohol use: Not Currently     Drug use: Never     Sexual activity: Yes     Partners: Female   Lifestyle     Physical activity     Days per week: None     Minutes per session: None     Stress: None   Relationships     Social connections     Talks on phone: None     Gets together: None     Attends [employer] service: None     Active member of club or organization: None     Attends meetings of clubs or organizations: None     Relationship status: None     Intimate partner violence     Fear of current or ex partner: None     Emotionally abused: None     Physically abused: None     Forced sexual  activity: None   Other Topics Concern     None   Social History Narrative     None        Family History  Family History   Problem Relation Age of Onset     Anesthesia Reaction No family hx of      Cardiovascular No family hx of      Deep Vein Thrombosis No family hx of        Outpatient Medications:  No current facility-administered medications on file prior to encounter.   acetaminophen (TYLENOL) 325 MG tablet, Take 2 tablets (650 mg) by mouth every 4 hours as needed for mild pain, fever or headaches (> 101 F)  dabrafenib (TAFINLAR) 75 MG capsule, Take 2 capsules (150 mg) by mouth 2 times daily Take at least 1 hr before or 2 hrs after a meal.  ferrous sulfate (FEROSUL) 325 (65 Fe) MG tablet, Take 1 tablet (325 mg) by mouth daily (with breakfast)  guaiFENesin-dextromethorphan (ROBITUSSIN DM) 100-10 MG/5ML syrup, Take 5 mLs by mouth every 4 hours as needed for cough  melatonin 1 MG TABS tablet, Take 1 tablet (1 mg) by mouth nightly as needed for sleep  oxyCODONE (ROXICODONE) 5 MG tablet, Take 1 tablet (5 mg) by mouth every 6 hours as needed for pain  prochlorperazine (COMPAZINE) 10 MG tablet, Take 1 tablet (10 mg) by mouth every 6 hours as needed (Nausea/Vomiting)  trametinib (MEKINIST) 2 MG tablet, Take 1 tablet (2 mg) by mouth daily Take 1 hr before or 2 hrs after a meal. STORE and DISPENSE from original container.         Physical Exam:    Blood pressure 96/60, pulse 94, temperature 99.6  F (37.6  C), temperature source Oral, resp. rate 16, weight 54.6 kg (120 lb 4.8 oz), SpO2 98 %.  General: alert and cooperative, lying in bed, no acute distress   HEENT: sclera anicteric, EOMI, MMM  Neck: supple, normal ROM  CV: tachy tare, regular rhythm, no murmurs  Resp: CTAB, normal respiratory effort on ambient air  LN: R axillary, R supraclav and paratracheal non tender LN   GI: soft, non-tender, non-distended, bowel sounds present and normoactive  MSK: warm and well-perfused, normal tone  Skin: diffuse xerosis, no rashes  or jaundice   Neuro: Alert and interactive, moves all extremities equally, no focal deficits    Labs & Studies: I personally reviewed the following studies:  ROUTINE LABS (Last four results):  CMP  Recent Labs   Lab 05/21/20  1729   *   POTASSIUM 4.0   CHLORIDE 97   CO2 24   ANIONGAP 6   *   BUN 10   CR 0.69   GFRESTIMATED >90   GFRESTBLACK >90   ABUNDIO 8.2*   PROTTOTAL 6.3*   ALBUMIN 2.8*   BILITOTAL 0.3   ALKPHOS 107   AST 28   ALT 27     CBC  Recent Labs   Lab 05/21/20  1729   WBC 3.4*   RBC 3.78*   HGB 9.5*   HCT 31.3*   MCV 83   MCH 25.1*   MCHC 30.4*   RDW 18.1*        INRNo lab results found in last 7 days.    Assessment & Plan:   Dallin Stevens is a 34 year old male with past medical hx of extensive metastatic melanoma (currently on dabrafenib and trametinib), HENRIETTA, anemia of chronic disease, pericardial effusion with tamponade s/p pericardial window, and malignant pleural effusions (s/p bilateral pleurX catheters placed 4/20/20). Now admitted from [facility] ED for fevers (Tmax 102.1), hypotension (70-80s/50-60s) and presyncopal episode.    # Melanoma metastatic (Mets to brain, chest, right iliopsoas, and scattered LAD in abdomen, pelvis, and inguinal regions)  # Malignant Pleural Effusions (s/p bilateral pleurX)   See above for summarized oncologic history. In short, now admitted from [facility] ED with sepsis for infectious work up. Tmax on admission 102.1, hypotensive 70-80/50-60, fluid resuscitated. BCx NGTD, COVID 19 negative, Urine bland with UCx pending, LA 0.7, CXR with resolved prior pneumothoraces and decreased size of R mediastinal mass. Started on daily Ceftriaxone on admission. Pending stool enteric panel and pleural fluid studies.   - HOLD dabrafenib and trametinib inpatient, will likely need to dose reduce if contributing to fevers and hypotension.   - CT CAP and MRI initially planned next week, recommend to obtain inpatient to eval disease response to current chemo regimen     #  Hyponatremia   - Ongoing since March 2020, recommend further work up with urine sodium, urine/serum osm.     # Anemia of chronic disease   - Hgb baseline 8-10, transfuse if Hgb ?  7.0. MCV normal, 83.   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 Please do not hesitate to page with any questions or concerns.    Patient was seen and plan of care was discussed with attending physician Dr. Burnett.    Mine Barnett PA-C   Hematology/Oncology   Pager: [phone]

## 2020-05-22 NOTE — PROGRESS NOTES
[facility], [facility] Progress Note - Hospitalist Service, Gold 11       Date of Admission:  5/21/2020  Assessment & Plan      Dallin Stevens is a 34 year old male with past medical hx of metastatic melanoma (on dabrafenib and trametinib), HENRIETTA, anemia of chronic disease, pericardial effusion with tamponade s/p pericardial window, and malignant pleural effusions (s/p bilateral pleurX catheters placed 4/20/20) admitted with subacute fevers and pre-syncopal episode.     # BRAF-V600E mutated melanoma metastatic to the brain and chest  Follows with Dr Burnett, currently on on dabrafenib and trametinib.  Please see oncology consult notes for details of his malignancy and chemotherapy history.   - holding BRAF/MEK inhibitors (dabrafenib and trametinib) for now   - oncology following   - obtain MRI brain, CT CAP today     # Subacute fevers   # Presyncopal episode   No specific foci on exam, have been up to 101 at home (here 102), recurrent for the last few weeks.  Work-up completed: UA bland, COVID 19 negative, CXR stable. Will complete full infectious battery but may be related to his chemotherapy.   BP usually ~100s/50s. Orthostasis may have been related to hypovolemia after diarrhea (piter with tachycardic he noted) vs vagal episode, may have been worsened by the ativan he took for anxiety immediately when he saw these vitals/felt unwell.   - Blood cultures x2 pending  - UCx pending  - obtain pleural fluid cultures bilaterally   - stool EDE and ova/parasites   - low suspicion for sinusitis, more consistent with allergies (patient declines treatment)   - continue ceftriaxone daily for now, hope to discontinue if negative workup   - orthostatics, will consider evaluation for adrenal insufficiency    - EKG, start tele, will consider repeat echo     # Hyponatremia  Chronic since early March, unclear if it has been evaluated in the past. Renal function stable, liver function stable.   -  urine osm, serum osm, urine sodium, TSH     --- Other issues---  # Cancer related pain  - Home tylenol, oxycodone 5 mg po q6h prn     # Nausea  - Home compazine    # Anemia of chronic disease   Continue PTA iron     # leukopenia and thrombocytopenia   Expected in context of chemo per oncology, continue to trend        Diet: Regular Diet Adult    DVT Prophylaxis: VTE Prophylaxis contraindicated due to brain mets   Lu Catheter: not present  Code Status: Full Code           Disposition Plan   Expected discharge: 1-2 days, recommended to prior living arrangement once antibiotic plan established and blood pressure stable   Entered: Diana House MD 05/22/2020, 6:16 PM       The patient's care was discussed with the Bedside Nurse, Patient and oncology Consultant    Diana House MD  Hospitalist Service, [address], [place]  Pager: [phone]  Please see sticky note for cross cover information  ______________________________________________________________________    Interval History    No acute events overnight, nursing notes reviewed.     Feeling overall unwell, episode of pre-syncope after stooling (htough not significant diarrhea) was really scary. Thinks fevers are related to chemo, doesn't otherwise localize any new symptoms or pains. Breathing comfortable, though feels like he needs to have the pleurex drained. Mild allergies/nasal congestion, declines intervention at this time. States decent PO intake, good fluids. No redness at pleurex catheter sites last change.     5-pt ROS otherwise negative       Data reviewed today: I reviewed all medications, new labs and imaging results over the last 24 hours.     Physical Exam   Vital Signs: Temp: 99.8  F (37.7  C) Temp src: Oral BP: 94/61 Pulse: 94 Heart Rate: 110 Resp: 16 SpO2: 97 % O2 Device: None (Room air)    Weight: 120 lbs 4.8 oz   Gen: alert, interactive and pleasant, lying in bed in no acute distress  HEENT: Normocephalic,  sclera clear, oropharynx with mildly dry mucous membranes  Resp: diminished in bases bilaterally, easy work of breathing on room air. pleurex catheters bilaterally dressed.    CV: tachycardic but regular rhythm, ?split S2, no murmurs noted   Abd: soft, scaphoid, non-tender, nondistended  Ext: no lower extremity edema, warm and well-perfused with brisk capillary refill   Neuro: Alert and oriented x4, grossly non-focal, moving all extremities equally  Skin: no rashes noted, dry         Data

## 2020-05-22 NOTE — H&P
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    --ONC--  # BRAF-V600E mutated melanoma metastatic to the brain and chest  Follows with Dr Burnett. Per Dr Burnett's note on 4/30/20:  1.  2/03/2012, biopsy of left flank skin lesion revealed 1.7 mm superficial spreading melanoma without ulceration.  2. 2/23/2012, he had left flank wide local excision and left axillary sentinel node biopsy. One of two lymph node positive. He had stage III (pT2a pN1a M0) disease.  3. 3/02/2012, left axillary radical lymph node dissection was done. All 23 lymph nodes benign.  4. 4/02 to 4/27/2012, he received 4 weeks of high-dose interferon  5. 1/25/2017, CT scan revealed metastatic disease involving lymph nodes in the right supraclavicular, mediastinum and right hilum, and 3 small pulmonary nodules are present.  6. 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    7. 2/08/2017 to 11/08/2017, he received pembrolizumab  8. 6/28/2017, PET scan revealed significant improvement.  9. 11/29/2017, PET showed new focus of mild FDG uptake within a 6 mm right paratracheal lymph node.  10. 10/02/2018, PET scan revealed new hypermetabolic lymphadenopathy in the supraclavicular region of the left neck and in the mediastinum.   11. 4/24/2019, MRI brain revealed intraparenchymal mass within the right parietal occipital lobe.    12. 4/26/2019, he had craniotomy and resection and pathology confirmed metastatic melanoma. No post-op radiation was delivered.  13. 7/23/2019, PET scan showed progression of disease. New  hypermetabolic lymphadenopathy seen in the right supraclavicular fossa and anterior mediastinum.  14. 11/25/2019, MRI brain reveals metastasis in the left posterior frontal lobe, right peritrigonal region and right posterior frontal parafalcine lesion. PET scan showed progression with multiple new hypermetabolic subcutaneous nodules throughout the body.  No change in size of mediastinal lymphadenopathy.   15. 1/06/2020, admitted in the [facility] system because of left-sided weakness. MRI Brain revealed a 3.3 cm hemorrhagic mass in the right frontal gyrus and other brain metastases.  16. 1/16/2020, he had right frontal craniotomy and resection. Pathology revealed metastatic melanoma.  17. 1/27/2020 to 1/31/2020, he received gamma knife radiation to 5 brain lesions, 2500 cGy in 5 fractions.  18.2/11/2020, he started treatment with pembrolizumab and received 4 cycles through 4/15/2020.  19. 4/18/2020, he presents with severe dyspnea, worsened mediastinal adenopathy, large pericardial effusion with tamponade, recurrent moderate pleural effusions. Bilateral pleurX catheters placed 4/20/20.   - Oncology consult in AM  - Holding home MEK inhibitor and BRAF inhibitor for now (pt did not take last night, either)    --ID--  # Fevers, malignancy vs drug-induced (BRAF/MEK inhibitors) vs infection  - UA bland, UC pending  - Blood cultures x2 pending  - COVID 19 negative  - CXR clear  - Ceftriaxone 2g iv q24h    --NEURO--  # Cancer related pain  - Home tylenol, oxycodone 5 mg po q6h prn    # Nausea  - Home compazine    --CVS--  No issues    --PULM--  # Malignant pleural effusions with bilateral pleurex catheters  - Can drain daily or as needed, nothing needed at this time    --GI--  No issues    --RENAL--  # Hyponatremia  Chronic since early March  - Normal saline IVF    --ENDO--  No issues    F: Normal saline @ 100 mL/hr  E: Replete as needed  N: Regular diet    VTE: Lovenox  Dispo: Admit to gold 11, inpatient status.    This  case was discussed with the hematology/oncology fellow on call, who recommended treatment with ceftriaxone per Dr Whatley.     --  Jhonny Swenson MD PhD  Hematology, Oncology, and Bone Marrow Transplantation Service, [facility], [place]  Pager: [phone]  Please see sticky note for cross cover information    Code Status   Full Code    Primary Care Physician   Vero Tesfaye    Chief Complaint   Fevers    History is obtained from the patient    History of Present Illness   Dallin Stevens is a 34 year old male with a past medical history of melanoma metastatic to the brain and chest that is admitted for fevers of unknown etiology. Dallin tells me that he has had intermittent fevers for weeks, but the highest he measured it was 100.6 F today (had been 99F previously). He came into the ED where his temperature was 102.1 F. Dallin also says that he felt light headed and checked his blood pressure, which was 70s/50s. He has a chronic cough which is unchanged. He attributes his fevers to the MEK inhibitor and BRAF inhibitor that he takes. His pleurex catheters have been getting drained by a nurse visiting him at home, and he tells me that they have been draining 50 ml's on each side each day and that the fluid is straw colored, or not concerning. He does say that yesterday his stool volume increased and he thinks that may have been why he became lightheaded and hypotensive; he drank some salt water at that time which helped. No known COVID-19 contacts or other ill contacts. No nausea, vomiting, abdominal pain, new headaches, shortness of breath, chest pain, constipation/diarrhea, new weakness/numbness/tingling.    In the ED he had a CXR which was clear, blood cultures were obtained, his COVID-19 test was negative, and a urinalysis was bland w/ reflex to UC.    Past Medical History    I have reviewed this patient's medical history and updated it with pertinent information if  needed.   Past Medical History:   Diagnosis Date     Malignant melanoma (H)        Past Surgical History   I have reviewed this patient's surgical history and updated it with pertinent information if needed.  Past Surgical History:   Procedure Laterality Date     BIOPSY OF SKIN LESION       CRANIOTOMY, EXCISE TUMOR COMPLEX, COMBINED  04/2019     DENTAL SURGERY      wisdom teeth     IR CHEST TUBE PLACEMENT NON-TUNNELED BILATERAL  4/19/2020     LYMPH NODE BIOPSY      arm, excision     MRI CRANIOTOMY LASER ABLATION Right 2/27/2020    Procedure: INTRAOPERATIVE MRI/STEALTH ASSISTED RIGHT LASER ABLATION OF BRAIN METASTASIS;  Surgeon: Nitish Quintero MD;  Location: [facility] OR     OPTICAL TRACKING SYSTEM CRANIOTOMY, EXCISE TUMOR, COMBINED Right 1/16/2020    Procedure: stealth assisted Right craniotomy for tumor resection;  Surgeon: Nitish Quintero MD;  Location: [facility] OR     THORACOSCOPY Bilateral 4/20/2020    Procedure: BILATERAL INSERTION OF PLEURX CATHETER;  Surgeon: Uday Bhakta MD;  Location: [facility] OR       Prior to Admission Medications   Prior to Admission Medications   Prescriptions Last Dose Informant Patient Reported? Taking?   acetaminophen (TYLENOL) 325 MG tablet  Self No No   Sig: Take 2 tablets (650 mg) by mouth every 4 hours as needed for mild pain, fever or headaches (> 101 F)   dabrafenib (TAFINLAR) 75 MG capsule   No No   Sig: Take 2 capsules (150 mg) by mouth 2 times daily Take at least 1 hr before or 2 hrs after a meal.   ferrous sulfate (FEROSUL) 325 (65 Fe) MG tablet   No No   Sig: Take 1 tablet (325 mg) by mouth daily (with breakfast)   guaiFENesin-dextromethorphan (ROBITUSSIN DM) 100-10 MG/5ML syrup   No No   Sig: Take 5 mLs by mouth every 4 hours as needed for cough   melatonin 1 MG TABS tablet  Self No No   Sig: Take 1 tablet (1 mg) by mouth nightly as needed for sleep   oxyCODONE (ROXICODONE) 5 MG tablet   No No   Sig: Take 1 tablet (5 mg) by mouth every 6 hours as needed for pain    prochlorperazine (COMPAZINE) 10 MG tablet   No No   Sig: Take 1 tablet (10 mg) by mouth every 6 hours as needed (Nausea/Vomiting)   trametinib (MEKINIST) 2 MG tablet   No No   Sig: Take 1 tablet (2 mg) by mouth daily Take 1 hr before or 2 hrs after a meal. STORE and DISPENSE from original container.      Facility-Administered Medications: None     Allergies   No Known Allergies    Social History   I have reviewed this patient's social history and updated it with pertinent information if needed. Dallin Stevens  reports that he has never smoked. He has never used smokeless tobacco. He reports previous alcohol use. He reports that he does not use drugs.    Family History   I have reviewed this patient's family history and updated it with pertinent information if needed.   Family History   Problem Relation Age of Onset     Anesthesia Reaction No family hx of      Cardiovascular No family hx of      Deep Vein Thrombosis No family hx of        Review of Systems   The 10 point Review of Systems is negative other than noted in the HPI or here.     Physical Exam   Temp: 99.5  F (37.5  C) Temp src: Oral BP: 109/68 Pulse: 102 Heart Rate: 101 Resp: 14 SpO2: 98 % O2 Device: None (Room air)    Vital Signs with Ranges  Temp:  [99.5  F (37.5  C)-102.1  F (38.9  C)] 99.5  F (37.5  C)  Pulse:  [101-120] 102  Heart Rate:  [101-114] 101  Resp:  [14-30] 14  BP: (106-119)/(67-78) 109/68  SpO2:  [96 %-100 %] 98 %  0 lbs 0 oz    Constitutional: Well appearing in NAD  Eyes: No scleral icterus, conjugate gaze  ENT: Wearing a mask  Respiratory: CTAB. Pleurex catheters in place, c/d/i  Cardiovascular: RRR, no murmurs  GI: Soft/non-tender/non-distended no r/g  Skin: No rashes  Musculoskeletal: No pretibial pitting edema, no deformities  Neurologic: A/ox3, CN II-XII intact, moving all 4 extremities  Neuropsychiatric: Affect is appropriate    Data   Results for orders placed or performed during the hospital encounter of 05/21/20 (from the past 24  hour(s))   XR Chest Port 1 View    Narrative    Portable chest    INDICATION: Cough, fever    COMPARISON: 4/21/2020    FINDINGS: Previous right sided mediastinal mass has decreased in size.  The previous left lower lobe consolidation there is also resolved.  Left chest tube is again resolution of the previous left pneumothorax.  The previous moderate right pneumothorax also appears to have  resolved.      Impression    IMPRESSION: Bilateral chest tubes with resolution of pneumothoraces.  Decreased size of right sided mediastinal/paramediastinal mass.  Resolution of left lower lobe consolidation.    THERESA KONG MD   Comprehensive metabolic panel   Result Value Ref Range    Sodium 127 (L) 133 - 144 mmol/L    Potassium 4.0 3.4 - 5.3 mmol/L    Chloride 97 94 - 109 mmol/L    Carbon Dioxide 24 20 - 32 mmol/L    Anion Gap 6 3 - 14 mmol/L    Glucose 114 (H) 70 - 99 mg/dL    Urea Nitrogen 10 7 - 30 mg/dL    Creatinine 0.69 0.66 - 1.25 mg/dL    GFR Estimate >90 >60 mL/min/[1.73_m2]    GFR Estimate If Black >90 >60 mL/min/[1.73_m2]    Calcium 8.2 (L) 8.5 - 10.1 mg/dL    Bilirubin Total 0.3 0.2 - 1.3 mg/dL    Albumin 2.8 (L) 3.4 - 5.0 g/dL    Protein Total 6.3 (L) 6.8 - 8.8 g/dL    Alkaline Phosphatase 107 40 - 150 U/L    ALT 27 0 - 70 U/L    AST 28 0 - 45 U/L   CBC with platelets differential   Result Value Ref Range    WBC 3.4 (L) 4.0 - 11.0 10e9/L    RBC Count 3.78 (L) 4.4 - 5.9 10e12/L    Hemoglobin 9.5 (L) 13.3 - 17.7 g/dL    Hematocrit 31.3 (L) 40.0 - 53.0 %    MCV 83 78 - 100 fl    MCH 25.1 (L) 26.5 - 33.0 pg    MCHC 30.4 (L) 31.5 - 36.5 g/dL    RDW 18.1 (H) 10.0 - 15.0 %    Platelet Count 229 150 - 450 10e9/L    Diff Method Automated Method     % Neutrophils 74.3 %    % Lymphocytes 19.8 %    % Monocytes 3.2 %    % Eosinophils 0.0 %    % Basophils 1.2 %    % Immature Granulocytes 1.5 %    Nucleated RBCs 0 0 /100    Absolute Neutrophil 2.6 1.6 - 8.3 10e9/L    Absolute Lymphocytes 0.7 (L) 0.8 - 5.3 10e9/L     Absolute Monocytes 0.1 0.0 - 1.3 10e9/L    Absolute Eosinophils 0.0 0.0 - 0.7 10e9/L    Absolute Basophils 0.0 0.0 - 0.2 10e9/L    Abs Immature Granulocytes 0.1 0 - 0.4 10e9/L    Absolute Nucleated RBC 0.0    Lactic acid whole blood   Result Value Ref Range    Lactic Acid 0.9 0.7 - 2.0 mmol/L   Blood culture    Specimen: Arm, Forearm Only, Right; Blood    Right Arm   Result Value Ref Range    Specimen Description Blood Right Arm     Culture Micro PENDING    UA with Microscopic   Result Value Ref Range    Color Urine Yellow     Appearance Urine Slightly Cloudy     Glucose Urine Negative NEG^Negative mg/dL    Bilirubin Urine Negative NEG^Negative    Ketones Urine Negative NEG^Negative mg/dL    Specific Gravity Urine 1.009 1.003 - 1.035    Blood Urine Trace (A) NEG^Negative    pH Urine 8.0 (H) 5.0 - 7.0 pH    Protein Albumin Urine Negative NEG^Negative mg/dL    Urobilinogen mg/dL Normal 0.0 - 2.0 mg/dL    Nitrite Urine Negative NEG^Negative    Leukocyte Esterase Urine Negative NEG^Negative    Source Nasopharyngeal     WBC Urine 2 0 - 5 /HPF    RBC Urine 10 (H) 0 - 2 /HPF    Transitional Epi <1 0 - 1 /HPF    Mucous Urine Present (A) NEG^Negative /LPF    Hyaline Casts 1 0 - 2 /LPF   Urine Culture    Specimen: Urine clean catch; Catheterized Urine   Result Value Ref Range    Specimen Description Catheterized Urine     Special Requests Specimen received in preservative     Culture Micro PENDING    Symptomatic COVID-19 Virus (Coronavirus) by PCR    Specimen: Nasopharyngeal   Result Value Ref Range    COVID-19 Virus PCR to [facility] - Source Catheterized Urine     COVID-19 Virus PCR to [facility] - Result       Test received-See reflex to IDDL test SARS CoV2 (COVID-19) Virus RT-PCR   SARS-CoV-2 COVID-19 Virus (Coronavirus) RT-PCR Nasopharyngeal    Specimen: Nasopharyngeal   Result Value Ref Range    SARS-CoV-2 Virus Specimen Source Catheterized Urine     SARS-CoV-2 PCR Result NEGATIVE     SARS-CoV-2 PCR Comment       This  automated, real-time RT-PCR assay by [employer] on the [employer] has   been given Emergency Use Authorization (EUA) for the in vitro qualitative detection of RNA   from the SARS-CoV2 virus in nasopharyngeal swabs in viral transport medium from patients   with signs and symptoms of infection who are suspected of COVID-19. The performance is   unknown in asymptomatic patients.     Blood culture    Specimen: Urine clean catch; Blood    Left Arm   Result Value Ref Range    Specimen Description Blood Left Arm     Culture Micro PENDING

## 2020-05-22 NOTE — PROGRESS NOTES
[facility]   Patient is currently open to home care services with [facility]. Patient is currently receiving RN services. If appropriate provide orders in [facility] to resume home care. For any questions or concerns regarding home care services please call [phone].    Patsy Ward RN  [facility] Liaison  [phone]

## 2020-05-22 NOTE — PLAN OF CARE
7C OT - Defer    Per interdisciplinary collaboration with PT, patient demonstrates IND with mobility and transfers. Pt is IND with ADLs at baseline, and is reporting no difficulties at this time. Pt's wife available to provide support for IADLs, as needed. Pt with no acute OT needs at this time, will complete orders. Please re-order OT if new needs identified.     Discharge Planner OT   Patient plan for discharge: Home with assist as needed.  Current status: IND with ADLs  Barriers to return to prior living situation: medical status  Recommendations for discharge: Home with assist for IADLs, as needed  Rationale for recommendations: Pt at baseline for ADL independence.        Entered by: Shira Bello 05/22/2020 10:15 AM

## 2020-05-22 NOTE — PLAN OF CARE
Admitted/transferred from: ED  2 RN full   skin assessment completed by Germaine Allen, MARYBEL and Cory Granados.  Skin assessment finding: issues found sacrum is red from sitting    Interventions/actions: skin interventions offer a pillow, reposition      Will continue to monitor.

## 2020-05-22 NOTE — PLAN OF CARE
7C PT Defer: at time of contact patient demonstrates IND bed mobility, transfers, and gait. Patient denies falls at home and reports full IND at baseline. Patient with no acute PT needs at this time.     PT -   Discharge Planner PT   Patient plan for discharge: home  Current status: IND for all mobility  Barriers to return to prior living situation: medical status  Recommendations for discharge: home  Rationale for recommendations: patient IND for all mobility and at baseline  Entered by: Royal Camacho 05/22/2020 8:27 AM

## 2020-05-23 NOTE — PROGRESS NOTES
Oncology  Consult Note   Date of Service: 05/23/2020    Patient: Dallin Stevens  MRN: [id number]  Admission Date: 5/21/2020  Hospital Day # 1  Cancer Diagnosis: Melanoma metastatic (Mets to brain, chest, right iliopsoas, and scattered LAD in abdomen, pelvis, and inguinal regions)  Primary Outpatient Oncologist: Dr Pillai // Dr Tesfaye   Current Treatment Plan: dabrafenib and trametinib (Day 1 = 4/29/20)          Interval hx:    He got CT CAP and MRI yesterday.  MRI does show a new but small metastatic lesion in the left frontal lobe with no significant edema around it.  All the other areas of metastasis are either stable or decreased in size.  CT chest abdomen pelvis also showing mixed response, with improvement in the chest and the mediastinum,  some progression possibly in the liver and adrenal glands.    Otherwise he feels okay today.  No new symptoms or concerns no overnight events.  No fever overnight his sodium went up early to fast yesterday and he was started on D5 water.          Oncologic History: (Per Dr. Burnett's 4/30/20 note)  1.  2/03/2012, biopsy of left flank skin lesion revealed 1.7 mm superficial spreading melanoma without ulceration.  2. 2/23/2012, he had left flank wide local excision and left axillary sentinel node biopsy. One of two lymph node positive. He had stage III (pT2a pN1a M0) disease.  3. 3/02/2012, left axillary radical lymph node dissection was done. All 23 lymph nodes benign.  4. 4/02 to 4/27/2012, he received 4 weeks of high-dose interferon  5. 1/25/2017, CT scan revealed metastatic disease involving lymph nodes in the right supraclavicular, mediastinum and right hilum, and 3 small pulmonary nodules are present.  6. 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    7. 2/08/2017 to 11/08/2017, he received  pembrolizumab  8. 6/28/2017, PET scan revealed significant improvement.  9. 11/29/2017, PET showed new focus of mild FDG uptake within a 6 mm right paratracheal lymph node.  10. 10/02/2018, PET scan revealed new hypermetabolic lymphadenopathy in the supraclavicular region of the left neck and in the mediastinum.   11. 4/24/2019, MRI brain revealed intraparenchymal mass within the right parietal occipital lobe.    12. 4/26/2019, he had craniotomy and resection and pathology confirmed metastatic melanoma. No post-op radiation was delivered.  13. 7/23/2019, PET scan showed progression of disease. New hypermetabolic lymphadenopathy seen in the right supraclavicular fossa and anterior mediastinum.  14. 11/25/2019, MRI brain reveals metastasis in the left posterior frontal lobe, right peritrigonal region and right posterior frontal parafalcine lesion. PET scan showed progression with multiple new hypermetabolic subcutaneous nodules throughout the body.  No change in size of mediastinal lymphadenopathy.   15. 1/06/2020, admitted in the [facility] because of left-sided weakness. MRI Brain revealed a 3.3 cm hemorrhagic mass in the right frontal gyrus and other brain metastases.  16. 1/16/2020, he had right frontal craniotomy and resection. Pathology revealed metastatic melanoma.  17. 1/27/2020 to 1/31/2020, he received gamma knife radiation to 5 brain lesions, 2500 cGy in 5 fractions.  18.2/11/2020, he re started treatment with pembrolizumab and received 4 cycles through 4/15/2020.  19. 4/18/2020, severe dyspnea, worsened mediastinal adenopathy, large pericardial effusion with tamponade, recurrent moderate pleural effusions. Bilateral pleurX catheters placed 4/20/20.   20. 4/29/20: started BRAF/MEK inhibitors (dabrafenib and trametinib)   21. 5/21/20 admitted to [facility] ED with sepsis       Review of Systems:  A comprehensive ROS was performed and found to be negative or non-contributory with the exception of that noted  in the HPI above.    Outpatient Medications:  No current facility-administered medications on file prior to encounter.   acetaminophen (TYLENOL) 325 MG tablet, Take 2 tablets (650 mg) by mouth every 4 hours as needed for mild pain, fever or headaches (> 101 F)  dabrafenib (TAFINLAR) 75 MG capsule, Take 2 capsules (150 mg) by mouth 2 times daily Take at least 1 hr before or 2 hrs after a meal.  ferrous sulfate (FEROSUL) 325 (65 Fe) MG tablet, Take 1 tablet (325 mg) by mouth daily (with breakfast)  guaiFENesin-dextromethorphan (ROBITUSSIN DM) 100-10 MG/5ML syrup, Take 5 mLs by mouth every 4 hours as needed for cough  melatonin 1 MG TABS tablet, Take 1 tablet (1 mg) by mouth nightly as needed for sleep  oxyCODONE (ROXICODONE) 5 MG tablet, Take 1 tablet (5 mg) by mouth every 6 hours as needed for pain  prochlorperazine (COMPAZINE) 10 MG tablet, Take 1 tablet (10 mg) by mouth every 6 hours as needed (Nausea/Vomiting)  trametinib (MEKINIST) 2 MG tablet, Take 1 tablet (2 mg) by mouth daily Take 1 hr before or 2 hrs after a meal. STORE and DISPENSE from original container.         Physical Exam:    Blood pressure 98/62, pulse 97, temperature 98.2  F (36.8  C), temperature source Oral, resp. rate 18, weight 55 kg (121 lb 4.8 oz), SpO2 97 %.  General: alert and cooperative, lying in bed, no acute distress   HEENT: sclera anicteric, EOMI, MMM  Neck: supple, normal ROM  CV: tachy tare, regular rhythm, no murmurs  Resp: CTAB, normal respiratory effort on ambient air  LN: R axillary, R supraclav and paratracheal non tender LN   GI: soft, non-tender, non-distended, bowel sounds present and normoactive  MSK: warm and well-perfused, normal tone  Skin: diffuse xerosis, no rashes or jaundice   Neuro: Alert and interactive, moves all extremities equally, no focal deficits    Labs & Studies: I personally reviewed the following studies:  ROUTINE LABS (Last four results):  CMP  Recent Labs   Lab 05/23/20  0754 05/22/20  9383  05/21/20  1729     --  127*   POTASSIUM 3.6  --  4.0   CHLORIDE 110*  --  97   CO2 23  --  24   ANIONGAP 5  --  6   *  --  114*   BUN 10  --  10   CR 0.60* 0.63* 0.69   GFRESTIMATED >90 >90 >90   GFRESTBLACK >90 >90 >90   ABUNDIO 8.0*  --  8.2*   PROTTOTAL  --   --  6.3*   ALBUMIN  --   --  2.8*   BILITOTAL  --   --  0.3   ALKPHOS  --   --  107   AST  --   --  28   ALT  --   --  27     CBC  Recent Labs   Lab 05/23/20  0754 05/22/20  0748 05/21/20  1729   WBC 1.9*  --  3.4*   RBC 3.37*  --  3.78*   HGB 8.5*  --  9.5*   HCT 28.3*  --  31.3*   MCV 84  --  83   MCH 25.2*  --  25.1*   MCHC 30.0*  --  30.4*   RDW 18.1*  --  18.1*    202 229     INRNo lab results found in last 7 days.    Assessment & Plan:   Dallin Stevens is a 34 year old male with past medical hx of extensive metastatic melanoma (currently on dabrafenib and trametinib), HENRIETTA, anemia of chronic disease, pericardial effusion with tamponade s/p pericardial window, and malignant pleural effusions (s/p bilateral pleurX catheters placed 4/20/20). Now admitted from [facility] ED for fevers (Tmax 102.1), hypotension (70-80s/50-60s) and presyncopal episode.    # Melanoma metastatic (Mets to brain, chest, right iliopsoas, and scattered LAD in abdomen, pelvis, and inguinal regions)  # Malignant Pleural Effusions (s/p bilateral pleurX)   See above for summarized oncologic history. In short, now admitted from [facility] ED with sepsis for infectious work up. Tmax on admission 102.1, hypotensive 70-80/50-60, fluid resuscitated. BCx NGTD, COVID 19 negative, Urine bland with UCx pending, LA 0.7, CXR with resolved prior pneumothoraces and decreased size of R mediastinal mass. Started on daily Ceftriaxone on admission. Pending stool enteric panel and pleural fluid studies.     MRI does show a new but small metastatic lesion in the left frontal lobe with no significant edema around it.  All the other areas of metastasis are either stable or decreased in size.  CT chest  abdomen pelvis also showing mixed response, with improvement in the chest and the mediastinum,  some progression possibly in the liver and adrenal glands.But he has been on BRAF and MEK inhibitor for <4 weeks. He is asymptomatic from the new brain mets.       - HOLD dabrafenib and trametinib inpatient, we will dose reduce both the drugs and resume it on discharge   -Has appt with Dr. Arshad 5/27 , will discuss about possibility of Gamma Knife to the new brain mets   -Has appt with Dr. Burnett 5/28  -Agree with work up for adrenal insufficeincy given enlarged adrenal galnds  -The previously scheduled Ct CAP and Brain MRI on 5/27 will be cancelled      # Hyponatremia ,  - hyponatremia Ongoing since March 2020, now on D5 due to rapid increase in Na from 127 to 139. Recommend work up for adrenal insufficiency (related to mets to adrenal glands)      # Anemia of chronic disease and neutropenia  Cytopenia is probably related to treatment. No fever since admission.    - Hgb baseline 8-10, transfuse if Hgb ?  7.0. MCV normal, 83.   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 Burnett on 5/28      We will continue to follow this patient. Please do not hesitate to page with any questions or concerns.    Patient was seen and plan of care was discussed with attending physician Dr. Burnett.    Ihsan Freeman MD  Hematology Oncology fellow  [phone]

## 2020-05-23 NOTE — PLAN OF CARE
Discharge orders placed. PIV removed. Reviewed discharge instructions and follow-up with patient. All questions answered, pt verbalizes understanding. No new medications ordered. Pt's wife picking him up. Belongings sent home.

## 2020-05-23 NOTE — CONSULTS
Nephrology Initial Consult  May 23, 2020      Dallin Stevens MRN:[id number] YOB: 1985  Date of Admission:5/21/2020  Primary care provider: Vero Tesfaye  Requesting physician: Diana House MD    ASSESSMENT AND RECOMMENDATIONS:     Chronic Hyponatremia - improved  Euvolemic state  BP within goal    Patient's sodium level corrected at an appropriate time interval over 36-38 hrs .  His chronic hyponatremia is likely secondary to multifactorial : BRAF inhibitor use , hypovolemia .  This has been occurring since feb 2020   He has been gently fluid resuscitated , following which his ADH is appropriately shut down . Patient encouraged to be liberal with salt intake .   Discussed with Primary team : Advised the following  1. Stop d5w infusion  2.Patient has risk for hypovolemia and related hyponatremia . Liberalize salt intake . Encourage broth , soups .   3. Repeat BMP prior to clinic follow up next week . If Sodium starts falling again , consider Sodium chloride tablet 1 gm daily , to be continued until Sodium level > 135  And then stopped.   4. Patient has risk for hypovolemia and related hyponatremia .       Recommendations were communicated to primary team via note and verbal communication    Seen and discussed with Dr. Robbi Hansen MD   Renal fellow   [phone]    I have seen and examined the patient and reviewed all current labs and findings. I concur with the assessment and plan as it is outlined. Any changes to the note made by me are in bold. Elle Culp MD MS FNKF    REASON FOR CONSULT: sodium level abnormality and concern for overcorrection    HISTORY OF PRESENT ILLNESS:  Admitting provider and nursing notes reviewed  Dallin Stevens is a 34 year old  male with past medical hx of  extensive metastatic melanoma (currently on dabrafenib and trametinib), HENRIETTA, anemia of chronic disease, pericardial effusion with tamponade s/p pericardial window, and malignant pleural effusions (s/p  bilateral pleurX catheters placed 4/20/20). Patient admitted from [facility] ED for fevers (Tmax 102.1) and hypotension with presyncopal episode.  Tmax on admission 102.1, hypotensive 70-80/50-60.   Has received about 2.5 Litres of NS so far   Has been making good UOP : 1480 ml till MN and another 1425 ml since MN   BCx NGTD  COVID 19 negative  UA WBC 2, RBC 10 Sp [place] 8  CXR : R mediastinal mass size decreased.  LA normal     Started on Rocephin  Holding chemo ( dabrafenib and trametinib )     Nephrology consulted for Hyponatremia  Which is overcorrected .  Patient has chronic hyponatremia ranging 124 -128. He started having hyponatremia since Feb 2020.  On 5/21 Na was 127 at 5.29 pm  . On 5/23 sodium is 139  At 754am . Primary team has started him on D5W at 200 ml/hr started today morning at around 9 am   Osm 283 -- serum protein 6.8  Alb 2.8  . Glucose 112 . Chloride 110   Uosm 202  U sodium 56  TSH 1.68  Cortisol 30.2     He feels fine now  Denies any CP/SOB/Cough/abdomen pain/nausea/vomiting.No dysuria/hematuria.  No confusion/headache/focal weakness        PAST MEDICAL HISTORY:  Reviewed with patient on 05/23/2020     Past Medical History:   Diagnosis Date     Malignant melanoma (H)        Past Surgical History:   Procedure Laterality Date     BIOPSY OF SKIN LESION       CRANIOTOMY, EXCISE TUMOR COMPLEX, COMBINED  04/2019     DENTAL SURGERY      wisdom teeth     IR CHEST TUBE PLACEMENT NON-TUNNELED BILATERAL  4/19/2020     LYMPH NODE BIOPSY      arm, excision     MRI CRANIOTOMY LASER ABLATION Right 2/27/2020    Procedure: INTRAOPERATIVE MRI/STEALTH ASSISTED RIGHT LASER ABLATION OF BRAIN METASTASIS;  Surgeon: Nitish Quintero MD;  Location: [facility] OR     OPTICAL TRACKING SYSTEM CRANIOTOMY, EXCISE TUMOR, COMBINED Right 1/16/2020    Procedure: stealth assisted Right craniotomy for tumor resection;  Surgeon: Nitish Quintero MD;  Location: [facility] OR     THORACOSCOPY Bilateral 4/20/2020    Procedure: BILATERAL  INSERTION OF PLEURX CATHETER;  Surgeon: Uday Bhakta MD;  Location: [facility] OR        MEDICATIONS:  PTA Meds  Prior to Admission medications    Medication Sig Last Dose Taking? Auth Provider   acetaminophen (TYLENOL) 325 MG tablet Take 2 tablets (650 mg) by mouth every 4 hours as needed for mild pain, fever or headaches (> 101 F)   Rosanna Bills APRN CNP   dabrafenib (TAFINLAR) 75 MG capsule Take 2 capsules (150 mg) by mouth 2 times daily Take at least 1 hr before or 2 hrs after a meal.   Vero Tesfaye MD   ferrous sulfate (FEROSUL) 325 (65 Fe) MG tablet Take 1 tablet (325 mg) by mouth daily (with breakfast)   Benita Freitas MD   guaiFENesin-dextromethorphan (ROBITUSSIN DM) 100-10 MG/5ML syrup Take 5 mLs by mouth every 4 hours as needed for cough   Juanpablo Cali MD   melatonin 1 MG TABS tablet Take 1 tablet (1 mg) by mouth nightly as needed for sleep   Jadyn Centeno PA-C   oxyCODONE (ROXICODONE) 5 MG tablet Take 1 tablet (5 mg) by mouth every 6 hours as needed for pain   Juanpablo Cali MD   prochlorperazine (COMPAZINE) 10 MG tablet Take 1 tablet (10 mg) by mouth every 6 hours as needed (Nausea/Vomiting)   Vero Tesfaye MD   trametinib (MEKINIST) 2 MG tablet Take 1 tablet (2 mg) by mouth daily Take 1 hr before or 2 hrs after a meal. STORE and DISPENSE from original container.   Vero Tesfaye MD      Current Meds    cefTRIAXone  2 g Intravenous Q24H     ferrous sulfate  325 mg Oral Daily with breakfast     Infusion Meds    D5W 200 mL/hr at 05/23/20 0858     - MEDICATION INSTRUCTIONS -         ALLERGIES:    No Known Allergies    REVIEW OF SYSTEMS:  A comprehensive of systems was negative except as noted above.    SOCIAL HISTORY:   Social History     Socioeconomic History     Marital status:      Spouse name: Not on file     Number of children: Not on file     Years of education: Not on file     Highest education level: Not on file   Occupational History      Not on file   Social Needs     Financial resource strain: Not on file     Food insecurity     Worry: Not on file     Inability: Not on file     Transportation needs     Medical: Not on file     Non-medical: Not on file   Tobacco Use     Smoking status: Never Smoker     Smokeless tobacco: Never Used   Substance and Sexual Activity     Alcohol use: Not Currently     Drug use: Never     Sexual activity: Yes     Partners: Female   Lifestyle     Physical activity     Days per week: Not on file     Minutes per session: Not on file     Stress: Not on file   Relationships     Social connections     Talks on phone: Not on file     Gets together: Not on file     Attends Bahai service: Not on file     Active member of club or organization: Not on file     Attends meetings of clubs or organizations: Not on file     Relationship status: Not on file     Intimate partner violence     Fear of current or ex partner: Not on file     Emotionally abused: Not on file     Physically abused: Not on file     Forced sexual activity: Not on file   Other Topics Concern     Not on file   Social History Narrative     Not on file     Reviewed with patient    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    FAMILY MEDICAL HISTORY:   Family History   Problem Relation Age of Onset     Anesthesia Reaction No family hx of      Cardiovascular No family hx of      Deep Vein Thrombosis No family hx of      Reviewed with patient     PHYSICAL EXAM:   Temp  Av.1  F (37.3  C)  Min: 96  F (35.6  C)  Max: 102.1  F (38.9  C)      Pulse  Av.7  Min: 94  Max: 120 Resp  Av.8  Min: 14  Max: 30  SpO2  Av.7 %  Min: 94 %  Max: 100 %       /61 (BP Location: Right arm)   Pulse 94   Temp 96  F (35.6  C) (Oral)   Resp 16   Wt 54.6 kg (120 lb 4.8 oz)   SpO2 99%   BMI 17.26 kg/m     Date 20 07 - 20 0659   Shift 2014-5222 2353-9813 4901-1048 24 Hour Total   INTAKE   I.V. 196   196   Shift Total(mL/kg) 196(3.59)   196(3.59)    OUTPUT   Urine 300   300   Shift Total(mL/kg) 300(5.5)   300(5.5)   Weight (kg) 54.57 54.57 54.57 54.57      Admit Weight: 54.6 kg (120 lb 4.8 oz)     GENERAL APPEARANCE: no distress, he is  awake  EYES: no scleral icterus, pupils  no  Endo: no goiter, no moon facies  Lymphatics: no cervical or supraclavicular LAD  Pulmonary: lungs clear to auscultation with equal breath sounds bilaterally, no clubbing  CV: regular rhythm, normal rate, no rub   - JVD none   - Edema none  GI: soft, nontender, normal bowel sounds  MS: no evidence of inflammation in joints, no muscle tenderness  : no bhardwaj  SKIN: no rash, warm, dry, no cyanosis  NEURO: face symmetric, no asterixis     LABS:   CMP  Recent Labs   Lab 05/23/20  0754 05/22/20  0748 05/21/20  1729     --  127*   POTASSIUM 3.6  --  4.0   CHLORIDE 110*  --  97   CO2 23  --  24   ANIONGAP 5  --  6   *  --  114*   BUN 10  --  10   CR 0.60* 0.63* 0.69   GFRESTIMATED >90 >90 >90   GFRESTBLACK >90 >90 >90   ABUNDIO 8.0*  --  8.2*   PROTTOTAL  --   --  6.3*   ALBUMIN  --   --  2.8*   BILITOTAL  --   --  0.3   ALKPHOS  --   --  107   AST  --   --  28   ALT  --   --  27     CBC  Recent Labs   Lab 05/23/20  0754 05/22/20  0748 05/21/20  1729   HGB 8.5*  --  9.5*   WBC 1.9*  --  3.4*   RBC 3.37*  --  3.78*   HCT 28.3*  --  31.3*   MCV 84  --  83   MCH 25.2*  --  25.1*   MCHC 30.0*  --  30.4*   RDW 18.1*  --  18.1*    202 229     INRNo lab results found in last 7 days.  ABGNo lab results found in last 7 days.   URINE STUDIES  Recent Labs   Lab Test 05/21/20  1732 04/18/20  1700 01/16/20  2205   COLOR Yellow Yellow Yellow   APPEARANCE Slightly Cloudy Slightly Cloudy Slightly Cloudy   URINEGLC Negative Negative Negative   URINEBILI Negative Negative Negative   URINEKETONE Negative Negative Negative   SG 1.009 1.019 1.027   UBLD Trace* Negative Negative   URINEPH 8.0* 7.0 7.0   PROTEIN Negative Negative Negative   NITRITE Negative Negative Negative   LEUKEST Negative  Negative Negative   RBCU 10* 1 2   WBCU 2 0 0     No lab results found.  PTH  No lab results found.  IRON STUDIES  No lab results found.    IMAGING:  All imaging studies reviewed by me.     Isac Hansen MD

## 2020-05-23 NOTE — DISCHARGE SUMMARY
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 Can consider a referral for   palliative care.         1. CBC and BMP on Monday 5/25 - will be followed by oncology     Unresulted Labs Ordered in the Past 30 Days of this Admission     Date and Time Order Name Status Description    5/22/2020 0938 Anaerobic bacterial culture Preliminary     5/22/2020 0938 Fluid Culture Aerobic Bacterial Preliminary     5/22/2020 0938 Anaerobic bacterial culture Preliminary     5/22/2020 0938 Fluid Culture Aerobic Bacterial Preliminary     5/22/2020 0817 Ova and Parasite Exam Routine In process     5/21/2020 1702 Blood culture Preliminary     5/21/2020 1702 Blood culture Preliminary       These results will be followed up by oncology    Discharge Disposition   Discharged to home  Condition at discharge: Stable      Hospital Course     Dallin Stevens is a 34 year old male with past medical hx of metastatic melanoma (on dabrafenib and trametinib), HENRIETTA, anemia of chronic disease, pericardial effusion with tamponade s/p pericardial window, and malignant pleural effusions (s/p bilateral pleurX catheters placed 4/20/20) admitted with subacute fevers and pre-syncopal episode.      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  Please see oncology consult notes for details of his malignancy and chemotherapy history.   Patient  with 2 weeks of low-grade fevers PTA, no specific foci on history or exam, have been up to 101 at home (here 102). He was initially started on ceftriaxone empirically. Work-up included UA (bland), COVID 19 negative, EDE, CXR stable and CT CAP/MRI brain negative for infectious-looking changes. Pending studies include blood and bilateral pleural fluid cultures, stool O&P.  Overall the most likely scenario is these fevers are related to his chemotherapy. MRI brain showed a new left frontal lesion but PET-CT showed a very good initial response to dabrafanib/trametinib with most lesions decreasing in size and a few visceral lesions showing stable to mild increase in size.   At this point, due to neutropenia, fevers, and other cell line effects his BRAF/MEK inhibitors (dabrafenib and trametinib) are on hold for now. After extensive discussion with the patient, he declines any continued antibiotics for neutropenic fever prophylaxis, and return precautions were discussed in detail.   He will have a repeat CBC + D in 2 days (5/25) to monitor changes, and oncology will determine dosing and timing of restarting his chemo agents in close follow-up.      # Presyncopal episode   # Chronic hyponatremia, hypovolemic   BP usually ~100s/50s. Orthostasis may have been related to hypovolemia after diarrhea (piter with tachycardic he noted) vs vagal episode, may have been worsened by the ativan he took for anxiety immediately when he saw these vitals/felt unwell. TSH and adrenal studies normal.   His hyponatremia is chronic since early March, unclear if it has been evaluated in the past. Renal function stable, liver function stable. Nephrology weighed in after his rapid correction on NS, who suspect that he is chronically volume and solute depleted, and his chemo may affect his kidneys ability to concentrate urine. He should liberalize his salt intake.  He will have a repeat BMP 5/25, if sodium starts falling again can consider sodium  chloride tablets 1 gm daily, to be continued until sodium level > 135 and then stopped.       There were no other changes to his PTA regimen.     Consultations This Hospital Stay   PHYSICAL THERAPY ADULT IP CONSULT  OCCUPATIONAL THERAPY ADULT IP CONSULT  ONCOLOGY ADULT IP CONSULT  NEPHROLOGY GENERAL ADULT IP CONSULT    Code Status   Full Code    Time Spent on this Encounter   I, Diana House MD, personally saw the patient today and spent greater than 30 minutes discharging this patient.       Diana House MD  [facility], [place]  ______________________________________________________________________    Physical Exam   Vital Signs: Temp: 98.2  F (36.8  C) Temp src: Oral BP: 98/62 Pulse: 97 Heart Rate: 99 Resp: 18 SpO2: 97 % O2 Device: None (Room air)    Weight: 121 lbs 4.8 oz  Gen: alert, interactive and pleasant but tearful, lying in bed in no acute distress  HEENT: Normocephalic, sclera clear, oropharynx with mildly dry mucous membranes  Resp: diminished in bases bilaterally, easy work of breathing on room air. pleurex catheters bilaterally dressed.    CV: tachycardic but regular rhythm, ?split S2, no murmurs noted   Abd: soft, scaphoid, non-tender, nondistended  Ext: no lower extremity edema, warm and well-perfused with brisk capillary refill   Neuro: Alert and oriented x4, grossly non-focal, moving all extremities equally  Skin: no rashes noted, dry         Primary Care Physician   Vero Tesfaye    Discharge Orders      Basic metabolic panel     CBC with platelets differential    Last Lab Result: Hemoglobin (g/dL)       Date                     Value                 05/23/2020               8.5 (L)          ----------     Home care nursing referral      Reason for your hospital stay    Dear Dallin Stevens,     You were hospitalized at [facility] for evaluation of persistent fevers. It is most likely that these are related to your chemotherapy, which  will be held for the next few days due to side effects. You were also evaluated by the kidney specialists for low salt levels.      We are suggesting the following medication changes: none   In terms of your diet, it is recommended that you increase your salt intake to a minimum of 3 grams per day.     Please get the following tests done:  - CBC and BMP to evaluate your neutrophil count and sodium level on Monday 5/25. Based on these results Dr. Burnett and oncology will instruct you on restarting your chemotherapy drugs.     Please keep your appointments with oncology as established.     Since you would prefer to stay off antibiotics, it will be really important to monitor for fevers and any recurrence of low blood pressure, or other signs of infection.   Call the [facility] [place] [facility] [place] at [phone] for temperature greater than or equal to 100.4, uncontrolled nausea/vomiting, diarrhea or constipation, unrelieved pain, bleeding not relieved with pressure, dizziness, chest pain, shortness of breath, loss of consciousness, and any new or concerning symptoms, or return to the ED for severe symptoms.    It was a pleasure meeting with you today. Thank you for allowing me and my team the privilege of caring for you.   Take care!  Diana House MD  Department of Medicine  [facility]     Follow Up and recommended labs and tests    Follow up with oncology as established. Can consider a referral for palliative care.     Activity    Your activity upon discharge: activity as tolerated     Full Code     Diet    Follow this diet upon discharge: regular, at least 3 grams salt        Discharge Medications   Current Discharge Medication List      CONTINUE these medications which have NOT CHANGED    Details   acetaminophen (TYLENOL) 325 MG tablet Take 2 tablets (650 mg) by mouth every 4 hours as needed for mild pain, fever or headaches (> 101 F)    Comments: Indication: Mild Pain; Fever;  Headache  Associated Diagnoses: Malignant neoplasm of frontal lobe of brain (H)      ferrous sulfate (FEROSUL) 325 (65 Fe) MG tablet Take 1 tablet (325 mg) by mouth daily (with breakfast)  Qty: 30 tablet, Refills: 4    Associated Diagnoses: Metastatic malignant melanoma (H)      guaiFENesin-dextromethorphan (ROBITUSSIN DM) 100-10 MG/5ML syrup Take 5 mLs by mouth every 4 hours as needed for cough  Qty: 1 Bottle, Refills: 0    Associated Diagnoses: Metastatic malignant melanoma (H)      melatonin 1 MG TABS tablet Take 1 tablet (1 mg) by mouth nightly as needed for sleep  Qty: 30 tablet, Refills: 0    Associated Diagnoses: Adjustment insomnia      oxyCODONE (ROXICODONE) 5 MG tablet Take 1 tablet (5 mg) by mouth every 6 hours as needed for pain  Qty: 20 tablet, Refills: 0    Associated Diagnoses: Metastatic malignant melanoma (H)      prochlorperazine (COMPAZINE) 10 MG tablet Take 1 tablet (10 mg) by mouth every 6 hours as needed (Nausea/Vomiting)  Qty: 30 tablet, Refills: 2    Associated Diagnoses: Metastatic malignant melanoma (H); Malignant melanoma of skin of trunk, except scrotum (H)         STOP taking these medications       dabrafenib (TAFINLAR) 75 MG capsule Comments:   Reason for Stopping:         trametinib (MEKINIST) 2 MG tablet Comments:   Reason for Stopping:             Allergies   No Known Allergies

## 2020-05-23 NOTE — PLAN OF CARE
Afebrile, tachycardic at times, OVSS on RA. Alert and oriented x4, calls appropriately, up ad anai. Denied pain. Pleural catheters C/D/I. Denies SOB, intermittent cough, LS clear. Voids spontaneously, still need urine sample. Denies any BM overnight, LBM yesterday. On regular diet, denies nausea/vomiting. PIV infusing MIVF. Continue with POC.

## 2020-05-25 NOTE — PROGRESS NOTES
"Dallin Stevens is a 34 year old male who is being evaluated via a billable telephone visit.      The patient has been notified of following:     \"This telephone visit will be conducted via a call between you and your physician/provider. We have found that certain health care needs can be provided without the need for a physical exam.  This service lets us provide the care you need with a short phone conversation.  If a prescription is necessary we can send it directly to your pharmacy.  If lab work is needed we can place an order for that and you can then stop by our lab to have the test done at a later time.    Telephone visits are billed at different rates depending on your insurance coverage. During this emergency period, for some insurers they may be billed the same as an in-person visit.  Please reach out to your insurance provider with any questions.    If during the course of the call the physician/provider feels a telephone visit is not appropriate, you will not be charged for this service.\"    Patient has given verbal consent for Telephone visit?  Yes    How would you like to obtain your AVS? [place]           Department of Radiation Oncology  [address] [address]  [phone]       Radiation Oncology Follow-up Visit  May 27, 2020      Dallin Stevens  MRN: [id number]   : 1985     DISEASE TREATED:   Metastatic malignant melanoma with multiple intracranial lesions    RADIATION THERAPY DELIVERED:   1. Rt frontoparietal and right posterotemporal lesions to 2500 cGy in 5 fractions via GK SRS from 2020 to 2020  2. Left superior parietal, left hippocampal and left temporal lesions to 2700 cGy in 3 fractions via GK SRS from 2020 to 2020              SYSTEMIC THERAPY:  None    INTERVAL SINCE COMPLETION OF RADIATION THERAPY:   4 months    SUBJECTIVE:   Dallin Stevens is a 34 year old male with a PMH significant for widely metastatic " stage IV malignant melanoma with multiple intracranial metastases. He underwent resection of a large right frontotemporal metastasis on 2020 followed by Gamma Knife SRS to the resection cavity and several other intracranial metastases as described above. A post-treatment brain MRI from 3/27/2020 demonstrated a possible new 4 mm left frontal lobe metastasis with otherwise no convincing evidence of disease progression within the previously treatment lesions.    Mr. Stevens was contacted by telephone today for a routine post-treatment follow-up visit. He was recently hospitalized from 2020 - 2020 with subacute fevers, hypotension and a presyncopal episode. His restaging imaging was obtained as an inpatient with a 2020 CT CAP demonstrating a mixed response to therapy with some decrease in size of his mediastinal adenopathy but suspicion for new pulmonary, hepatic and osseous metastases. A brain MRI was also obtained which demonstrated a new 0.7 mm T2 hyperintense focus in the left frontal lobe and an increase in size of the left frontal lesion now measuring 8 mm (previously 4 mm). The scan, however, did not include thin-slice volumetric post-contrast imaging.    On exam today, Mr. Stevens reports that he is feeling moderately better following his recent hospitalization. He describes near-resolution of his previous mild daily headaches and he specifically denies any LOC, seizures, motor or sensory deficits, weakness, falls or nausea/vomiting. He has a good appetite and is eating a regular diet without difficulty. His remaining ROS are likewise negative.    LABS AND IMAGIN2020 CT CAP:  Mixed response to therapy with decrease in size of mediastinal adenopathy but new pulmonary, hepatic and osseous lesions concerning for metastases.    IMPRESSION:   Mr. Stevens is a 34 year old male with metastatic malignant melanoma with multiple intracranial lesions. He is now 4 months out after completing  fractionated SRS and has no residual side effects from his prior radiotherapy. His brain imaging does demonstrate an enlarging left frontal metastases with otherwise stable treated disease.    PLAN:   1. Follow-up in radiation oncology clinic in 6 weeks with a repeat brain MRI  2. Continue follow-up with medical oncology as scheduled for ongoing systemic disease management    Jose Carlos Arshad MD/PhD    Dept of Radiation Oncology  [facility]     Telephone call start time: 1528  Telephone call end time: 1535  Telephone call duration: 7 minutes

## 2020-05-26 NOTE — PROGRESS NOTES
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    ASSESSMENT      Discharge Assessment  Patient reports symptoms are: Unchanged  Does the patient have all of their medications?: Yes  Does patient know what their new medications are for?: Not applicable  Does patient have a follow-up appointment scheduled?: Yes  Does patient have any other questions or concerns?: No    Post-op  Did the patient have surgery or a procedure: No  Fever: No  Chills: No  Eating & Drinking: eating and drinking without complaints/concerns  PO Intake: regular diet(At least 3 grams of sodium per day)  Bowel Function: normal  Urinary Status: voiding without complaint/concerns    PLAN                                                      Outpatient Plan:      CBC and BMP on Monday 5/25 - will be followed by oncology     Follow up with oncology as established. Can consider a referral for   palliative care.       Future Appointments   Date Time Provider Department Center   5/27/2020  8:00 AM UC LAB [facility]   5/27/2020  8:20 AM UCCT1 [facility]   5/27/2020  9:00 AM [facility]   5/27/2020  3:00 PM Jose Carlos Arshad MD [facility]   5/28/2020  2:15 PM Elfego Burnett  MD [facility]           Val Simmons, CMA

## 2020-05-27 NOTE — NURSING NOTE
"Dallin Stevens is a 34 year old male who is being evaluated via a billable telephone visit.      The patient has been notified of following:     \"This telephone visit will be conducted via a call between you and your physician/provider. We have found that certain health care needs can be provided without the need for a physical exam.  This service lets us provide the care you need with a short phone conversation.  If a prescription is necessary we can send it directly to your pharmacy.  If lab work is needed we can place an order for that and you can then stop by our lab to have the test done at a later time.    Telephone visits are billed at different rates depending on your insurance coverage. During this emergency period, for some insurers they may be billed the same as an in-person visit.  Please reach out to your insurance provider with any questions.    If during the course of the call the physician/provider feels a telephone visit is not appropriate, you will not be charged for this service.\"    Patient has given verbal consent for Telephone visit?  Yes    What phone number would you like to be contacted at? [phone]    How would you like to obtain your AVS? MyChart    FOLLOW-UP VISIT    Patient Name: Dallin Stevens      : 1985     Age: 34 year old        ______________________________________________________________________________     Chief Complaint   Patient presents with     Cancer     Follow up for radiation, gamma knife     There were no vitals taken for this visit.     Date Radiation Completed: Gamma knife mask x3 completed 2020, mask x5 completed 2020    Pain  Denies    Labs  Other Labs: No    Imaging  MRI 2020 + CT chest/abd/pelvi    Other Appointments: No    MD Name: Dr. Burnett Appointment Date: tomorrow   MD Name: Appointment Date:   MD Name: Appointment Date:   Other Appointment Notes:     Residual Radiation side effect: Felt pretty awful after the gamma knife but feeling " better now. Energy coming back slowly.     Additional Instructions:     Nurse face-to-face time: Level 2:  5 min phone time

## 2020-05-27 NOTE — LETTER
"    2020         RE: Dallin Stevens  [address] [facility] [address]      Dallin Stevens is a 34 year old male who is being evaluated via a billable telephone visit.      The patient has been notified of following:     \"This telephone visit will be conducted via a call between you and your physician/provider. We have found that certain health care needs can be provided without the need for a physical exam.  This service lets us provide the care you need with a short phone conversation.  If a prescription is necessary we can send it directly to your pharmacy.  If lab work is needed we can place an order for that and you can then stop by our lab to have the test done at a later time.    Telephone visits are billed at different rates depending on your insurance coverage. During this emergency period, for some insurers they may be billed the same as an in-person visit.  Please reach out to your insurance provider with any questions.    If during the course of the call the physician/provider feels a telephone visit is not appropriate, you will not be charged for this service.\"    Patient has given verbal consent for Telephone visit?  Yes    How would you like to obtain your AVS? [place]           Department of Radiation Oncology  [address] [address] [phone]       Radiation Oncology Follow-up Visit  May 27, 2020      Dallin Stevens  MRN: [id number]   : 1985     DISEASE TREATED:   Metastatic malignant melanoma with multiple intracranial lesions    RADIATION THERAPY DELIVERED:   1. Rt frontoparietal and right posterotemporal lesions to 2500 cGy in 5 fractions via GK SRS from 2020 to 2020  2. Left superior parietal, left hippocampal and left temporal lesions to 2700 cGy in 3 fractions via GK SRS from 2020 to 2020              SYSTEMIC THERAPY:  None    INTERVAL SINCE COMPLETION OF RADIATION THERAPY:   4 " months    SUBJECTIVE:   Dallin Stevens is a 34 year old male with a PMH significant for widely metastatic stage IV malignant melanoma with multiple intracranial metastases. He underwent resection of a large right frontotemporal metastasis on 2020 followed by Gamma Knife SRS to the resection cavity and several other intracranial metastases as described above. A post-treatment brain MRI from 3/27/2020 demonstrated a possible new 4 mm left frontal lobe metastasis with otherwise no convincing evidence of disease progression within the previously treatment lesions.    Mr. Stevens was contacted by telephone today for a routine post-treatment follow-up visit. He was recently hospitalized from 2020 - 2020 with subacute fevers, hypotension and a presyncopal episode. His restaging imaging was obtained as an inpatient with a 2020 CT CAP demonstrating a mixed response to therapy with some decrease in size of his mediastinal adenopathy but suspicion for new pulmonary, hepatic and osseous metastases. A brain MRI was also obtained which demonstrated a new 0.7 mm T2 hyperintense focus in the left frontal lobe and an increase in size of the left frontal lesion now measuring 8 mm (previously 4 mm). The scan, however, did not include thin-slice volumetric post-contrast imaging.    On exam today, Mr. Stevens reports that he is feeling moderately better following his recent hospitalization. He describes near-resolution of his previous mild daily headaches and he specifically denies any LOC, seizures, motor or sensory deficits, weakness, falls or nausea/vomiting. He has a good appetite and is eating a regular diet without difficulty. His remaining ROS are likewise negative.    LABS AND IMAGIN2020 CT CAP:  Mixed response to therapy with decrease in size of mediastinal adenopathy but new pulmonary, hepatic and osseous lesions concerning for metastases.    IMPRESSION:   Mr. Stevens is a 34 year old male with metastatic  malignant melanoma with multiple intracranial lesions. He is now 4 months out after completing fractionated SRS and has no residual side effects from his prior radiotherapy. His brain imaging does demonstrate an enlarging left frontal metastases with otherwise stable treated disease.    PLAN:   1. Follow-up in radiation oncology clinic in 6 weeks with a repeat brain MRI  2. Continue follow-up with medical oncology as scheduled for ongoing systemic disease management    Jose Carlos Arshad MD/PhD    Dept of Radiation Oncology  [facility]     Telephone call start time: 1528  Telephone call end time: 1535  Telephone call duration: 7 minutes        Jose Carlos Arshad MD

## 2020-05-28 NOTE — LETTER
"    5/28/2020         RE: Dallin Stevens  [address] [facility] [facility] [id number]        Dear Colleague,    Thank you for referring your patient, Dallin Stevens, to the [facility]. Please see a copy of my visit note below.    Dallin Stevens is a 34 year old male who is being evaluated via a billable video visit.      The patient has been notified of following:     \"This video visit will be conducted via a call between you and your physician/provider. We have found that certain health care needs can be provided without the need for an in-person physical exam.  This service lets us provide the care you need with a video conversation.  If a prescription is necessary we can send it directly to your pharmacy.  If lab work is needed we can place an order for that and you can then stop by our lab to have the test done at a later time.    Video visits are billed at different rates depending on your insurance coverage.  Please reach out to your insurance provider with any questions.    If during the course of the call the physician/provider feels a video visit is not appropriate, you will not be charged for this service.\"    Patient has given verbal consent for Video visit? Yes    How would you like to obtain your AVS? MyChart    Patient would like the video invitation sent by: Send to e-mail at: [email or web address]    Will anyone else be joining your video visit? No          Secondary Video Option (Doximity), send text message to:  [phone]    I have reviewed and updated the patient's allergies and medication list.    Concerns: Patient has no new concerns.    Refills: None    LORENZO Crenshaw      Video-Visit Details    Type of service:  Video Visit    Video Start Time: 2:33  Video End Time: 2:50    Originating Location (pt. Location): Home    Distant Location (provider location):  [facility]     Platform used for Video Visit: Well        [facility]  MEDICAL " ONCOLOGY PROGRESS NOTE  May 28, 2020    CHIEF COMPLAINT: Metastatic BRAF-mutated melanoma    Melanoma History  1.  2/03/2012, biopsy of left flank skin lesion revealed 1.7 mm superficial spreading melanoma without ulceration.  2. 2/23/2012, he had left flank wide local excision and left axillary sentinel node biopsy. One of two lymph node positive. He had stage III (pT2a pN1a M0) disease.  3. 3/02/2012, left axillary radical lymph node dissection was done. All 23 lymph nodes benign.  4. 4/02 to 4/27/2012, he received 4 weeks of high-dose interferon  5. 1/25/2017, CT scan revealed metastatic disease involving lymph nodes in the right supraclavicular, mediastinum and right hilum, and 3 small pulmonary nodules are present.  6. 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    7. 2/08/2017 to 11/08/2017, he received pembrolizumab  8. 6/28/2017, PET scan revealed significant improvement.  9. 11/29/2017, PET showed new focus of mild FDG uptake within a 6 mm right paratracheal lymph node.  10. 10/02/2018, PET scan revealed new hypermetabolic lymphadenopathy in the supraclavicular region of the left neck and in the mediastinum.   11. 4/24/2019, MRI brain revealed intraparenchymal mass within the right parietal occipital lobe.    12. 4/26/2019, he had craniotomy and resection and pathology confirmed metastatic melanoma. No post-op radiation was delivered.  13. 7/23/2019, PET scan showed progression of disease. New hypermetabolic lymphadenopathy seen in the right supraclavicular fossa and anterior mediastinum.  14. 11/25/2019, MRI brain reveals metastasis in the left posterior frontal lobe, right peritrigonal region and right posterior frontal parafalcine lesion. PET scan showed progression with multiple new hypermetabolic subcutaneous nodules throughout the body.  No change in size  of mediastinal lymphadenopathy.   15. 1/06/2020, admitted in the [facility] because of left-sided weakness. MRI Brain revealed a 3.3 cm hemorrhagic mass in the right frontal gyrus and other brain metastases.  16. 1/16/2020, he had right frontal craniotomy and resection. Pathology revealed metastatic melanoma.  17. 1/27/2020 to 1/31/2020, he received gamma knife radiation to 5 brain lesions, 2500 cGy in 5 fractions.  18.2/11/2020, he started treatment with pembrolizumab and received 4 cycles through 4/15/2020.  19. 4/18/2020, he presents with severe dyspnea, worsened mediastinal adenopathy, large pericardial effusion with tamponade, recurrent moderate pleural effusions. Bilateral pleurX catheters placed 4/20/20.  20. 4/29/2020, he starts treatment with Dabrafenib and Trametinib.  21. 5/22/2020, CT-CAP shows evidence of mixed initial response to BRAF inhibitor therapy.    HISTORY OF PRESENT ILLNESS  Dallin Stevens is a 34 year old male with BRAF mutated metastatic melanoma to lymph nodes, lung, and brain. He started BRAF/MEK inhibitors on 4/29 and developed fevers over about 3 weeks. He has history of low blood pressures (80s/50s), and last week he noted lightheadedness during an episode of fevers. He presented to the ED on 5/21 and he was admitted due to concerns for sepsis. BRAF/MEK inhibition was held. During admission his fever was felt secondary to Tafinlar/Mekinist medication, and he was found to be leukopenic with WBC 1.9 (ANC 0.9).  CT-CAP early during admission showed a reduction in the size of the mediastinal metastases and adenopathy. Small pulmonary and hepatic metastases are seen, and decreasing size of subcutaneous lesions seen. There were enlarging adrenal metastases and retroperitoneal metastases.     Ultimately, he discharged on 5/23. He was not found to be adrenally insufficient, despite known adrenal metastasis. He was not started on anything for blood pressure support. The decision was made  to reduce the dose of therapy, and he received the medications in the mail today.    He tells me he has started to notice the right supraclavicular lymph node is growing again, so he is happy to start treatment today. He otherwise denies any other major issues.    No results found for any visits on 05/28/20.      REVIEW OF SYSTEMS  A 12-point ROS negative except as in HPI    Current Outpatient Medications   Medication Sig Dispense Refill     acetaminophen (TYLENOL) 325 MG tablet Take 2 tablets (650 mg) by mouth every 4 hours as needed for mild pain, fever or headaches (> 101 F)       dabrafenib (TAFINLAR) 50 MG capsule Take 2 capsules (100 mg) by mouth 2 times daily Take at least 1 hr before or 2 hrs after a meal. 120 capsule 0     ferrous sulfate (FEROSUL) 325 (65 Fe) MG tablet Take 1 tablet (325 mg) by mouth daily (with breakfast) 30 tablet 4     melatonin 1 MG TABS tablet Take 1 tablet (1 mg) by mouth nightly as needed for sleep 30 tablet 0     trametinib (MEKINIST) 0.5 MG tablet Take 3 tablets (1.5 mg) by mouth daily Take 1 hr before or 2 hrs after a meal. STORE and DISPENSE from original container. 90 tablet 0     Vitamin D, Cholecalciferol, 25 MCG (1000 UT) CAPS          No Known Allergies  There is no immunization history for the selected administration types on file for this patient.    Past Medical History:   Diagnosis Date     Malignant melanoma (H)        Past Surgical History:   Procedure Laterality Date     BIOPSY OF SKIN LESION       CRANIOTOMY, EXCISE TUMOR COMPLEX, COMBINED  04/2019     DENTAL SURGERY      wisdom teeth     IR CHEST TUBE PLACEMENT NON-TUNNELED BILATERAL  4/19/2020     LYMPH NODE BIOPSY      arm, excision     MRI CRANIOTOMY LASER ABLATION Right 2/27/2020    Procedure: INTRAOPERATIVE MRI/STEALTH ASSISTED RIGHT LASER ABLATION OF BRAIN METASTASIS;  Surgeon: Nitish Quintero MD;  Location: [facility] OR     OPTICAL TRACKING SYSTEM CRANIOTOMY, EXCISE TUMOR, COMBINED Right 1/16/2020     Procedure: stealth assisted Right craniotomy for tumor resection;  Surgeon: Nitish Quintero MD;  Location: [facility] OR     THORACOSCOPY Bilateral 4/20/2020    Procedure: BILATERAL INSERTION OF PLEURX CATHETER;  Surgeon: Uday Bhakta MD;  Location: [facility] OR       SOCIAL HISTORY  History   Smoking Status     Never Smoker   Smokeless Tobacco     Never Used    Social History    Substance and Sexual Activity      Alcohol use: Not Currently     History   Drug Use Unknown       FAMILY HISTORY  Family History   Problem Relation Age of Onset     Anesthesia Reaction No family hx of      Cardiovascular No family hx of      Deep Vein Thrombosis No family hx of        PHYSICAL EXAMINATION  There were no vitals taken for this visit.  Wt Readings from Last 2 Encounters:   05/23/20 55 kg (121 lb 4.8 oz)   04/19/20 59.7 kg (131 lb 11.2 oz)   General: Well appearing young man, wearing ball cap, seated next to wife in living room  Head: Wearing baseball cap, otherwise normocephalic  Eyes: No obvious icterus  ENT: Oropharynx clear, speaking clearly  Pulm: Good effort on room air, no apparent dyspnea, speaking in full sentences  Neuro: Cranial nerves are grossly intact, there may be mild facial asymmetry  Skin: No rashes on visible skin    Last 24 hours, drain output, per patient report:   Left Pleurx 350 ml/day  Right Pleurx 70/75 ml/day    IMAGING  CT-CAP, 5/22/2020  IMPRESSION:   In this patient with a history of metastatic melanoma, there is  evidence of a mixed response to therapy:  1. The large mediastinal masses have significantly decreased in size,  with decreased corresponding mass effect on adjacent structures.  2. Small unchanged focus of residual tumor versus thrombus within the  left atrium.  3. Decreased mediastinal, axillary, and inguinal lymphadenopathy.  4. Several newly visualized pulmonary nodules suspicious for  metastases. Decreased size of an endobronchial lesion in the medial  right upper lobe.  5. Newly  visualized hepatic hypodensities, likely representing  metastases.  6. Enlarging right and stable left adrenal gland metastases.  7. Decreased size of numerous subcutaneous metastases. Increased size  of right retroperitoneal metastases.  8. New subcentimeter sclerotic focus in the right scapula, possibly  representing metastasis.  9. Decreased small pericardial effusion with pericardial  thickening/enhancement, potentially representing pericarditis.  10. Decreased nonocclusive thrombus in the right internal jugular  vein, brachiocephalic vein, left brachiocephalic vein, and SVC.  Unchanged nonvisualization of the medial right splenic vein.  11. Decreased small pleural effusions with Pleurx catheter is in  place. Small right pneumothorax.      ASSESSMENT AND PLAN  #1 Metastatic BRAF-V660E mutated melanoma, to lymph nodes, adrenal glands,  lung, brain  #2 Bilateral pleural effusions, s/p bilateral PleurX catheters, improved  #3 Neutropenia, associated with dabrafenib/trametinib, grade 3  #4 Anemia, normocytic, multifactorial  Patient most recently progressed on single agent pembrolizumab. He was started on dabrafenib and trametinib, but due to toxicity he required dose reduction. His symptoms have resolved to baseline today, and we will resume therapy (cycle 2) with 25% dose reduction.  -Research Perseus PCI  -Continue dabrafenib 100 mg twice daily and trametinib 1.5 mg daily  -Plan to see back in 4-6 weeks with labs to assess tolerability/initial response    #5 Chronic Hypotension  #6 Adrenal metastasis, concern for impending primary adrenal insufficiency  -continue with focus on nutrition and hydration  -when seated, stand slowly prior to walking to avoid hypotension and falls  -consider midodrine for blood pressure support  -low threshold to repeat adrenal testing (8 AM serum cortisol and plasma ACTH, and a high-dose 250 mcg ACTH stimulation test)  -If adrenal insufficiency is confirmed and ACTH levels are normal  or high (ie, primary adrenal insufficiency is the diagnosis), further evaluation for mineralocorticoid deficiency should be performed (plasma renin activity [PRA] or renin concentration and serum aldosterone)      Benita Pillai M.D.   of Medicine  Hematology, Oncology and Transplantation

## 2020-05-28 NOTE — PROGRESS NOTES
"Dallin Stevens is a 34 year old male who is being evaluated via a billable video visit.      The patient has been notified of following:     \"This video visit will be conducted via a call between you and your physician/provider. We have found that certain health care needs can be provided without the need for an in-person physical exam.  This service lets us provide the care you need with a video conversation.  If a prescription is necessary we can send it directly to your pharmacy.  If lab work is needed we can place an order for that and you can then stop by our lab to have the test done at a later time.    Video visits are billed at different rates depending on your insurance coverage.  Please reach out to your insurance provider with any questions.    If during the course of the call the physician/provider feels a video visit is not appropriate, you will not be charged for this service.\"    Patient has given verbal consent for Video visit? Yes    How would you like to obtain your AVS? MyChart    Patient would like the video invitation sent by: Send to e-mail at: [email or web address]    Will anyone else be joining your video visit? No          Secondary Video Option (Doximity), send text message to:  [phone]    I have reviewed and updated the patient's allergies and medication list.    Concerns: Patient has no new concerns.    Refills: None    LORENZO Crenshaw      Video-Visit Details    Type of service:  Video Visit    Video Start Time: 2:33  Video End Time: 2:50    Originating Location (pt. Location): Home    Distant Location (provider location):  [facility]     Platform used for Video Visit: [facility]        [facility]  MEDICAL ONCOLOGY PROGRESS NOTE  May 28, 2020    CHIEF COMPLAINT: Metastatic BRAF-mutated melanoma    Melanoma History  1.  2/03/2012, biopsy of left flank skin lesion revealed 1.7 mm superficial spreading melanoma without ulceration.  2. 2/23/2012, he had " left flank wide local excision and left axillary sentinel node biopsy. One of two lymph node positive. He had stage III (pT2a pN1a M0) disease.  3. 3/02/2012, left axillary radical lymph node dissection was done. All 23 lymph nodes benign.  4. 4/02 to 4/27/2012, he received 4 weeks of high-dose interferon  5. 1/25/2017, CT scan revealed metastatic disease involving lymph nodes in the right supraclavicular, mediastinum and right hilum, and 3 small pulmonary nodules are present.  6. 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    7. 2/08/2017 to 11/08/2017, he received pembrolizumab  8. 6/28/2017, PET scan revealed significant improvement.  9. 11/29/2017, PET showed new focus of mild FDG uptake within a 6 mm right paratracheal lymph node.  10. 10/02/2018, PET scan revealed new hypermetabolic lymphadenopathy in the supraclavicular region of the left neck and in the mediastinum.   11. 4/24/2019, MRI brain revealed intraparenchymal mass within the right parietal occipital lobe.    12. 4/26/2019, he had craniotomy and resection and pathology confirmed metastatic melanoma. No post-op radiation was delivered.  13. 7/23/2019, PET scan showed progression of disease. New hypermetabolic lymphadenopathy seen in the right supraclavicular fossa and anterior mediastinum.  14. 11/25/2019, MRI brain reveals metastasis in the left posterior frontal lobe, right peritrigonal region and right posterior frontal parafalcine lesion. PET scan showed progression with multiple new hypermetabolic subcutaneous nodules throughout the body.  No change in size of mediastinal lymphadenopathy.   15. 1/06/2020, admitted in the [facility] because of left-sided weakness. MRI Brain revealed a 3.3 cm hemorrhagic mass in the right frontal gyrus and other brain metastases.  16. 1/16/2020, he had right  frontal craniotomy and resection. Pathology revealed metastatic melanoma.  17. 1/27/2020 to 1/31/2020, he received gamma knife radiation to 5 brain lesions, 2500 cGy in 5 fractions.  18.2/11/2020, he started treatment with pembrolizumab and received 4 cycles through 4/15/2020.  19. 4/18/2020, he presents with severe dyspnea, worsened mediastinal adenopathy, large pericardial effusion with tamponade, recurrent moderate pleural effusions. Bilateral pleurX catheters placed 4/20/20.  20. 4/29/2020, he starts treatment with Dabrafenib and Trametinib.  21. 5/22/2020, CT-CAP shows evidence of mixed initial response to BRAF inhibitor therapy.    HISTORY OF PRESENT ILLNESS  Dallin Stevens is a 34 year old male with BRAF mutated metastatic melanoma to lymph nodes, lung, and brain. He started BRAF/MEK inhibitors on 4/29 and developed fevers over about 3 weeks. He has history of low blood pressures (80s/50s), and last week he noted lightheadedness during an episode of fevers. He presented to the ED on 5/21 and he was admitted due to concerns for sepsis. BRAF/MEK inhibition was held. During admission his fever was felt secondary to Tafinlar/Mekinist medication, and he was found to be leukopenic with WBC 1.9 (ANC 0.9).  CT-CAP early during admission showed a reduction in the size of the mediastinal metastases and adenopathy. Small pulmonary and hepatic metastases are seen, and decreasing size of subcutaneous lesions seen. There were enlarging adrenal metastases and retroperitoneal metastases.     Ultimately, he discharged on 5/23. He was not found to be adrenally insufficient, despite known adrenal metastasis. He was not started on anything for blood pressure support. The decision was made to reduce the dose of therapy, and he received the medications in the mail today.    He tells me he has started to notice the right supraclavicular lymph node is growing again, so he is happy to start treatment today. He otherwise denies any  other major issues.    No results found for any visits on 05/28/20.      REVIEW OF SYSTEMS  A 12-point ROS negative except as in HPI    Current Outpatient Medications   Medication Sig Dispense Refill     acetaminophen (TYLENOL) 325 MG tablet Take 2 tablets (650 mg) by mouth every 4 hours as needed for mild pain, fever or headaches (> 101 F)       dabrafenib (TAFINLAR) 50 MG capsule Take 2 capsules (100 mg) by mouth 2 times daily Take at least 1 hr before or 2 hrs after a meal. 120 capsule 0     ferrous sulfate (FEROSUL) 325 (65 Fe) MG tablet Take 1 tablet (325 mg) by mouth daily (with breakfast) 30 tablet 4     melatonin 1 MG TABS tablet Take 1 tablet (1 mg) by mouth nightly as needed for sleep 30 tablet 0     trametinib (MEKINIST) 0.5 MG tablet Take 3 tablets (1.5 mg) by mouth daily Take 1 hr before or 2 hrs after a meal. STORE and DISPENSE from original container. 90 tablet 0     Vitamin D, Cholecalciferol, 25 MCG (1000 UT) CAPS          No Known Allergies  There is no immunization history for the selected administration types on file for this patient.    Past Medical History:   Diagnosis Date     Malignant melanoma (H)        Past Surgical History:   Procedure Laterality Date     BIOPSY OF SKIN LESION       CRANIOTOMY, EXCISE TUMOR COMPLEX, COMBINED  04/2019     DENTAL SURGERY      wisdom teeth     IR CHEST TUBE PLACEMENT NON-TUNNELED BILATERAL  4/19/2020     LYMPH NODE BIOPSY      arm, excision     MRI CRANIOTOMY LASER ABLATION Right 2/27/2020    Procedure: INTRAOPERATIVE MRI/STEALTH ASSISTED RIGHT LASER ABLATION OF BRAIN METASTASIS;  Surgeon: Nitish Quintero MD;  Location: [facility] OR     OPTICAL TRACKING SYSTEM CRANIOTOMY, EXCISE TUMOR, COMBINED Right 1/16/2020    Procedure: stealth assisted Right craniotomy for tumor resection;  Surgeon: Nitish Quintero MD;  Location: [facility] OR     THORACOSCOPY Bilateral 4/20/2020    Procedure: BILATERAL INSERTION OF PLEURX CATHETER;  Surgeon: Uday Bhakta,  MD;  Location: [facility] OR       SOCIAL HISTORY  History   Smoking Status     Never Smoker   Smokeless Tobacco     Never Used    Social History    Substance and Sexual Activity      Alcohol use: Not Currently     History   Drug Use Unknown       FAMILY HISTORY  Family History   Problem Relation Age of Onset     Anesthesia Reaction No family hx of      Cardiovascular No family hx of      Deep Vein Thrombosis No family hx of        PHYSICAL EXAMINATION  There were no vitals taken for this visit.  Wt Readings from Last 2 Encounters:   05/23/20 55 kg (121 lb 4.8 oz)   04/19/20 59.7 kg (131 lb 11.2 oz)   General: Well appearing young man, wearing ball cap, seated next to wife in living room  Head: Wearing baseball cap, otherwise normocephalic  Eyes: No obvious icterus  ENT: Oropharynx clear, speaking clearly  Pulm: Good effort on room air, no apparent dyspnea, speaking in full sentences  Neuro: Cranial nerves are grossly intact, there may be mild facial asymmetry  Skin: No rashes on visible skin    Last 24 hours, drain output, per patient report:   Left Pleurx 350 ml/day  Right Pleurx 70/75 ml/day    IMAGING  CT-CAP, 5/22/2020  IMPRESSION:   In this patient with a history of metastatic melanoma, there is  evidence of a mixed response to therapy:  1. The large mediastinal masses have significantly decreased in size,  with decreased corresponding mass effect on adjacent structures.  2. Small unchanged focus of residual tumor versus thrombus within the  left atrium.  3. Decreased mediastinal, axillary, and inguinal lymphadenopathy.  4. Several newly visualized pulmonary nodules suspicious for  metastases. Decreased size of an endobronchial lesion in the medial  right upper lobe.  5. Newly visualized hepatic hypodensities, likely representing  metastases.  6. Enlarging right and stable left adrenal gland metastases.  7. Decreased size of numerous subcutaneous metastases. Increased size  of right retroperitoneal metastases.  8. New  subcentimeter sclerotic focus in the right scapula, possibly  representing metastasis.  9. Decreased small pericardial effusion with pericardial  thickening/enhancement, potentially representing pericarditis.  10. Decreased nonocclusive thrombus in the right internal jugular  vein, brachiocephalic vein, left brachiocephalic vein, and SVC.  Unchanged nonvisualization of the medial right splenic vein.  11. Decreased small pleural effusions with Pleurx catheter is in  place. Small right pneumothorax.      ASSESSMENT AND PLAN  #1 Metastatic BRAF-V660E mutated melanoma, to lymph nodes, adrenal glands,  lung, brain  #2 Bilateral pleural effusions, s/p bilateral PleurX catheters, improved  #3 Neutropenia, associated with dabrafenib/trametinib, grade 3  #4 Anemia, normocytic, multifactorial  Patient most recently progressed on single agent pembrolizumab. He was started on dabrafenib and trametinib, but due to toxicity he required dose reduction. His symptoms have resolved to baseline today, and we will resume therapy (cycle 2) with 25% dose reduction.  -Research Perseus PCI  -Continue dabrafenib 100 mg twice daily and trametinib 1.5 mg daily  -Plan to see back in 4-6 weeks with labs to assess tolerability/initial response    #5 Chronic Hypotension  #6 Adrenal metastasis, concern for impending primary adrenal insufficiency  -continue with focus on nutrition and hydration  -when seated, stand slowly prior to walking to avoid hypotension and falls  -consider midodrine for blood pressure support  -low threshold to repeat adrenal testing (8 AM serum cortisol and plasma ACTH, and a high-dose 250 mcg ACTH stimulation test)  -If adrenal insufficiency is confirmed and ACTH levels are normal or high (ie, primary adrenal insufficiency is the diagnosis), further evaluation for mineralocorticoid deficiency should be performed (plasma renin activity [PRA] or renin concentration and serum aldosterone)      Benita Pillai  M.D.   of Medicine  Hematology, Oncology and Transplantation

## 2020-05-29 NOTE — ORAL ONC MGMT
Oral Chemotherapy Monitoring Program     Primary Oncologist: Dr. Tesfaye --> Dr. Burnett going forward  Primary Oncology Clinic: [place] --> [facility] – [place], [place]  Cancer Diagnosis: metastatic melanoma     Therapy History:    Cycle 1: Started Mekinist (Trametinib) 2mg daily +  Tafinlar (Dabrafenib) 150mg (2 x75mg) BID on 4/29/20 (confirmed start date with patient on 5/7/20)    Admitted 5/21 - 5/23.    Cycle 2: Dose reduced started on 5/28 PM. trametinib 1.5 mg daily + dabrafenib 100 mg twice daily (5/28/20).      Drug Interaction Assessment: none     Lab Monitoring Plan  Monthly CBC, CMP, Phos    Subjective/Objective:  Dallin Stevens is a 34 year old male contacted by phone for a follow-up visit for oral chemotherapy.  I spoke with Dallin about resuming his Taf/Mek at a reduced dose. He resumed trametinib at 1.5 mg daily + dabrafenib 100 mg twice daily. He had no questions about the medications. We discussed the dose and directions and he was understanding of the new dose reduction.     ORAL CHEMOTHERAPY 4/27/2020 5/7/2020   Drug Name Tafinlar (Dabrafenib)/ Mekinist (Trametinib) Tafinlar (Dabrafenib)/ Mekinist (Trametinib)   Current Dosage (No Data) (No Data)   Current Schedule (No Data) (No Data)   Cycle Details Continuous Continuous   Start Date of Last Cycle - 4/29/2020   Planned next cycle start date - 5/29/2020   Doses missed in last 2 weeks - 0   Adherence Assessment - Adherent   Adverse Effects - Other (see note for details)   Other (see note for details) - (No Data)   Pharmacist intervention? - Yes   Intervention(s) - Patient education   Is the dose as ordered appropriate for the patient? - Yes   Is the patient currently in pain? - No       Last PHQ-2 Score on record:   PHQ-2 ( 1999 [employer]) 2/6/2020   Q1: Little interest or pleasure in doing things 0   Q2: Feeling down, depressed or hopeless 0   PHQ-2 Score 0     Vitals:  BP:   BP Readings from Last 1 Encounters:   05/23/20 98/62     Wt Readings from Last 1 Encounters:   05/23/20 55  "kg (121 lb 4.8 oz)     Estimated body surface area is 1.65 meters squared as calculated from the following:    Height as of 4/18/20: 1.778 m (5' 10\").    Weight as of 5/23/20: 55 kg (121 lb 4.8 oz).    Labs:  _  Result Component Current Result Ref Range   Sodium 137 (5/23/2020) 133 - 144 mmol/L     _  Result Component Current Result Ref Range   Potassium 3.6 (5/23/2020) 3.4 - 5.3 mmol/L     _  Result Component Current Result Ref Range   Calcium 8.0 (L) (5/23/2020) 8.5 - 10.1 mg/dL     No results found for Mag within last 30 days.     _  Result Component Current Result Ref Range   Phosphorus 3.5 (4/30/2020) 2.5 - 4.5 mg/dL     _  Result Component Current Result Ref Range   Albumin 2.8 (L) (5/21/2020) 3.4 - 5.0 g/dL     _  Result Component Current Result Ref Range   Urea Nitrogen 10 (5/23/2020) 7 - 30 mg/dL     _  Result Component Current Result Ref Range   Creatinine 0.60 (L) (5/23/2020) 0.66 - 1.25 mg/dL       _  Result Component Current Result Ref Range   AST 28 (5/21/2020) 0 - 45 U/L     _  Result Component Current Result Ref Range   ALT 27 (5/21/2020) 0 - 70 U/L     _  Result Component Current Result Ref Range   Bilirubin Total 0.3 (5/21/2020) 0.2 - 1.3 mg/dL       _  Result Component Current Result Ref Range   WBC 1.9 (L) (5/23/2020) 4.0 - 11.0 10e9/L     _  Result Component Current Result Ref Range   Hemoglobin 8.5 (L) (5/23/2020) 13.3 - 17.7 g/dL     _  Result Component Current Result Ref Range   Platelet Count 192 (5/23/2020) 150 - 450 10e9/L     _  Result Component Current Result Ref Range   Absolute Neutrophil 0.9 (L) (5/23/2020) 1.6 - 8.3 10e9/L     Assessment:  Dallin has just resumed his dosing of Taf/Mek on 5/28 PM. Continue to monitor for side effects.     Plan:  Continue reduced dose =  trametinib 1.5 mg daily + dabrafenib 100 mg twice daily    Follow-Up:  Will need labs and provider appt around 6/22.    Refill Due:  6/17 for 6/26    Oral Chemotherapy Monitoring Program   Jabari Villareal PharmD  [facility]  [phone]  May 29, 2020

## 2020-06-22 NOTE — PROGRESS NOTES
Labs ordered for tomorrow through [facility].  Results will be faxed to [phone].  Patient has upcoming appt. With Dr. Burnett this week.      Yoselin Gonzalez MBA, MSN, RN, ONC  RN Care Coordinator  [facility]

## 2020-06-25 NOTE — LETTER
"    6/25/2020         RE: Dallin Stevens  [id number] [address] [address]        Dear Colleague,    Thank you for referring your patient, Dallin Stevens, to the [facility]. Please see a copy of my visit note below.    Dallin Stevens is a 34 year old male who is being evaluated via a billable video visit.      The patient has been notified of following:     \"This video visit will be conducted via a call between you and your physician/provider. We have found that certain health care needs can be provided without the need for an in-person physical exam.  This service lets us provide the care you need with a video conversation.  If a prescription is necessary we can send it directly to your pharmacy.  If lab work is needed we can place an order for that and you can then stop by our lab to have the test done at a later time.    Video visits are billed at different rates depending on your insurance coverage.  Please reach out to your insurance provider with any questions.    If during the course of the call the physician/provider feels a video visit is not appropriate, you will not be charged for this service.\"    Patient has given verbal consent for Video visit? Yes    Will anyone else be joining your video visit? No       Vitals - Patient Reported  Systolic (Patient Reported): 100  Diastolic (Patient Reported): 60  Height (Patient Reported): 177.8 cm (5' 10\")  Temperature (Patient Reported): 97.9  F (36.6  C)    [email or web address] send invite to through [email or web address] No LPN    Video-Visit Details    Type of service:  Video Visit    Video Start Time: 11:23 AM  Video End Time: 11: 45 AM    Originating Location (pt. Location): Home    Distant Location (provider location):  [facility]     Platform used for Video Visit: [email or web address]        [facility]  MEDICAL ONCOLOGY PROGRESS NOTE  Jun 25, 2020    CHIEF COMPLAINT: Metastatic BRAF-mutated " melanoma    Melanoma History  1.  2/03/2012, biopsy of left flank skin lesion revealed 1.7 mm superficial spreading melanoma without ulceration.  2. 2/23/2012, he had left flank wide local excision and left axillary sentinel node biopsy. One of two lymph node positive. He had stage III (pT2a pN1a M0) disease.  3. 3/02/2012, left axillary radical lymph node dissection was done. All 23 lymph nodes benign.  4. 4/02 to 4/27/2012, he received 4 weeks of high-dose interferon  5. 1/25/2017, CT scan revealed metastatic disease involving lymph nodes in the right supraclavicular, mediastinum and right hilum, and 3 small pulmonary nodules are present.  6. 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    7. 2/08/2017 to 11/08/2017, he received pembrolizumab  8. 6/28/2017, PET scan revealed significant improvement.  9. 11/29/2017, PET showed new focus of mild FDG uptake within a 6 mm right paratracheal lymph node.  10. 10/02/2018, PET scan revealed new hypermetabolic lymphadenopathy in the supraclavicular region of the left neck and in the mediastinum.   11. 4/24/2019, MRI brain revealed intraparenchymal mass within the right parietal occipital lobe. 12. 4/26/2019, he had craniotomy and resection and pathology confirmed metastatic melanoma. No post-op radiation was delivered.  13. 7/23/2019, PET scan showed progression of disease. New hypermetabolic lymphadenopathy seen in the right supraclavicular fossa and anterior mediastinum.  14. 11/25/2019, MRI brain reveals metastasis in the left posterior frontal lobe, right peritrigonal region and right posterior frontal parafalcine lesion. PET scan showed progression with multiple new hypermetabolic subcutaneous nodules throughout the body.  No change in size of mediastinal lymphadenopathy.   15. 1/06/2020, admitted in the [facility]  because of left-sided weakness. MRI Brain revealed a 3.3 cm hemorrhagic mass in the right frontal gyrus and other brain metastases.  16. 1/16/2020, he had right frontal craniotomy and resection. Pathology revealed metastatic melanoma.  17. 1/27/2020 to 1/31/2020, he received gamma knife radiation to 5 brain lesions, 2500 cGy in 5 fractions.  18.2/11/2020, he started treatment with pembrolizumab and received 4 cycles through 4/15/2020.  19. 4/18/2020, he presents with severe dyspnea, worsened mediastinal adenopathy, large pericardial effusion with tamponade, recurrent moderate pleural effusions. Bilateral pleurX catheters placed 4/20/20.  20. 4/29/2020, he starts treatment with Dabrafenib and Trametinib.  21. 5/22/2020, CT-CAP shows evidence of mixed initial response to BRAF inhibitor therapy, with a reduction in the size of the mediastinal metastases and adenopathy, small pulmonary and hepatic metastases, and decreasing size of subcutaneous lesions. Adrenal metastases and retroperitoneal metastases showed enlargement.       HISTORY OF PRESENT ILLNESS  Dallin Stevens is a 34 year old male with BRAF mutated metastatic melanoma to lymph nodes, lung, and brain. He started BRAF/MEK inhibitors on 4/29, but required dose reduction for fevers and cytopenias. He resumed treatment on 5/28 and presents today in order to assess tolerability of the medication at the new doses.    He feels overall the fevers are better. He has had only 1 fever with chills this past weekend. Tells me he has started to notice the right supraclavicular lymph node is shrinking a bit. He is eating better and he is getting some muscle definition. Weight is at least stable. He is trying to do a bit more stretching, moving around, and lifting some weights. He feels like he has some more energy now as well. No rashes. No nausea, vomiting, or diarrhea.    CBC shows mild improvement with WBC 2.6, Hgb 9.5, PLTs 138. BMP shows Sodium 131. Creatinine normal at  0.63. He had MRI brain, which shows no new lesions. None of the previously treated lesions are enlarging. Overall, stable size of known enhancing, hemorrhagic metastases.      REVIEW OF SYSTEMS  A 12-point ROS negative except as in HPI    Current Outpatient Medications   Medication Sig Dispense Refill     acetaminophen (TYLENOL) 325 MG tablet Take 2 tablets (650 mg) by mouth every 4 hours as needed for mild pain, fever or headaches (> 101 F)       dabrafenib (TAFINLAR) 50 MG capsule Take 2 capsules (100 mg) by mouth 2 times daily Take at least 1 hr before or 2 hrs after a meal. 120 capsule 0     ferrous sulfate (FEROSUL) 325 (65 Fe) MG tablet Take 1 tablet (325 mg) by mouth daily (with breakfast) 30 tablet 4     LORazepam (ATIVAN) 0.5 MG tablet Take 1 tablet (0.5 mg) by mouth every 6 hours as needed for anxiety 30 tablet 1     melatonin 1 MG TABS tablet Take 1 tablet (1 mg) by mouth nightly as needed for sleep 30 tablet 0     trametinib (MEKINIST) 0.5 MG tablet Take 3 tablets (1.5 mg) by mouth daily Take 1 hr before or 2 hrs after a meal. STORE and DISPENSE from original container. 90 tablet 0     Vitamin D, Cholecalciferol, 25 MCG (1000 UT) CAPS        dabrafenib (TAFINLAR) 50 MG capsule Take 2 capsules (100 mg) by mouth 2 times daily Take at least 1 hr before or 2 hrs after a meal. 120 capsule 0     trametinib (MEKINIST) 0.5 MG tablet Take 3 tablets (1.5 mg) by mouth daily Take 1 hr before or 2 hrs after a meal. STORE and DISPENSE from original container. (Patient not taking: Reported on 6/25/2020) 90 tablet 0       No Known Allergies  There is no immunization history for the selected administration types on file for this patient.    Past Medical History:   Diagnosis Date     Malignant melanoma (H)        Past Surgical History:   Procedure Laterality Date     BIOPSY OF SKIN LESION       CRANIOTOMY, EXCISE TUMOR COMPLEX, COMBINED  04/2019     DENTAL SURGERY      wisdom teeth     IR CHEST TUBE PLACEMENT NON-TUNNELED  BILATERAL  4/19/2020     LYMPH NODE BIOPSY      arm, excision     MRI CRANIOTOMY LASER ABLATION Right 2/27/2020    Procedure: INTRAOPERATIVE MRI/STEALTH ASSISTED RIGHT LASER ABLATION OF BRAIN METASTASIS;  Surgeon: Nitish Quintero MD;  Location: [facility] OR     OPTICAL TRACKING SYSTEM CRANIOTOMY, EXCISE TUMOR, COMBINED Right 1/16/2020    Procedure: stealth assisted Right craniotomy for tumor resection;  Surgeon: Nitish Quintero MD;  Location: [facility] OR     THORACOSCOPY Bilateral 4/20/2020    Procedure: BILATERAL INSERTION OF PLEURX CATHETER;  Surgeon: Uday Bhakta MD;  Location: [facility] OR       SOCIAL HISTORY  History   Smoking Status     Never Smoker   Smokeless Tobacco     Never Used    Social History    Substance and Sexual Activity      Alcohol use: Not Currently     History   Drug Use Unknown       FAMILY HISTORY  Family History   Problem Relation Age of Onset     Anesthesia Reaction No family hx of      Cardiovascular No family hx of      Deep Vein Thrombosis No family hx of        PHYSICAL EXAMINATION  There were no vitals taken for this visit.  Wt Readings from Last 2 Encounters:   05/23/20 55 kg (121 lb 4.8 oz)   04/19/20 59.7 kg (131 lb 11.2 oz)   General: Well appearing young man, seated next to wife in living room  Head: Normocephalic  Eyes: No icterus  ENT: Oropharynx clear, speaking clearly  Pulm: Good effort on room air, no apparent dyspnea, speaking in full sentences  Neuro: Cranial nerves are grossly intact, there may be mild facial asymmetry  Skin: No rashes on visible skin    Last 24 hours, drain output, per patient report:   Left Pleurx 0 ml/day  Right Pleurx  ml/day    The rest of a comprehensive physical examination is deferred due to PHE (public health emergency) video visit restrictions.    IMAGING  MRI-Brain, 6/24/2020  Impression:   1. In this patient with known metastatic melanoma, there is no new or  enlarging enhancing lesion to suggest progression of  metastatic  disease. Stable size and appearance of known enhancing, hemorrhagic  metastases.  2. Enhancing focus within the right occipital lobe does not appear to  represent metastasis. Finding may represent artifact. Follow-up  imaging in 1-2 months is recommended for further evaluation.      CT-CAP, 5/22/2020  IMPRESSION:   In this patient with a history of metastatic melanoma, there is  evidence of a mixed response to therapy:  1. The large mediastinal masses have significantly decreased in size,  with decreased corresponding mass effect on adjacent structures.  2. Small unchanged focus of residual tumor versus thrombus within the  left atrium.  3. Decreased mediastinal, axillary, and inguinal lymphadenopathy.  4. Several newly visualized pulmonary nodules suspicious for  metastases. Decreased size of an endobronchial lesion in the medial  right upper lobe.  5. Newly visualized hepatic hypodensities, likely representing  metastases.  6. Enlarging right and stable left adrenal gland metastases.  7. Decreased size of numerous subcutaneous metastases. Increased size  of right retroperitoneal metastases.  8. New subcentimeter sclerotic focus in the right scapula, possibly  representing metastasis.  9. Decreased small pericardial effusion with pericardial  thickening/enhancement, potentially representing pericarditis.  10. Decreased nonocclusive thrombus in the right internal jugular  vein, brachiocephalic vein, left brachiocephalic vein, and SVC.  Unchanged nonvisualization of the medial right splenic vein.  11. Decreased small pleural effusions with Pleurx catheter is in  place. Small right pneumothorax.      ASSESSMENT AND PLAN  #1 Metastatic [id number] mutated melanoma, to lymph nodes, adrenal glands,  lung, brain  #2 Bilateral pleural effusions, s/p bilateral PleurX catheters, improved  #3 Neutropenia, associated with max doses dabrafenib/trametinib, grade 3, improving with dose reduction  #4 Anemia, normocytic,  multifactorial  Patient most recently progressed on single agent pembrolizumab, so he was started on dabrafenib and trametinib. Due to toxicity he required 25% dose reduction. He feels better, but hasn't really seen decrease in cutaneous disease.  -Continue dabrafenib 100 mg twice daily and trametinib 1.5 mg daily  -Continue to follow CBC and peripheral blood counts for possibility of additional dose reductions  -Plan to start nivolumab within the next 1-2 weeks. Will obtain CT-CAP for restaging prior to return to see me and infusion to follow.      #5 Chronic Hypotension  #6 Adrenal metastasis, concern for impending primary adrenal insufficiency  Continue to monitor. Patient to focus on nutrition and hydration.  -when seated, stand slowly prior to walking to avoid hypotension and falls  -consider midodrine for blood pressure support  -low threshold to repeat adrenal testing (8 AM serum cortisol and plasma ACTH, and a high-dose 250 mcg ACTH stimulation test)  -If adrenal insufficiency is confirmed and ACTH levels are normal or high (ie, primary adrenal insufficiency is the diagnosis), further evaluation for mineralocorticoid deficiency should be performed (plasma renin activity [PRA] or renin concentration and serum aldosterone)      Benita Pillai M.D.   of Medicine  Hematology, Oncology and Transplantation

## 2020-06-25 NOTE — PROGRESS NOTES
"Dallin Stevens is a 34 year old male who is being evaluated via a billable video visit.      The patient has been notified of following:     \"This video visit will be conducted via a call between you and your physician/provider. We have found that certain health care needs can be provided without the need for an in-person physical exam.  This service lets us provide the care you need with a video conversation.  If a prescription is necessary we can send it directly to your pharmacy.  If lab work is needed we can place an order for that and you can then stop by our lab to have the test done at a later time.    Video visits are billed at different rates depending on your insurance coverage.  Please reach out to your insurance provider with any questions.    If during the course of the call the physician/provider feels a video visit is not appropriate, you will not be charged for this service.\"    Patient has given verbal consent for Video visit? Yes    Will anyone else be joining your video visit? No       Vitals - Patient Reported  Systolic (Patient Reported): 100  Diastolic (Patient Reported): 60  Height (Patient Reported): 177.8 cm (5' 10\")  Temperature (Patient Reported): 97.9  F (36.6  C)    [email or web address] send invite to through BARRIE No LPN    Video-Visit Details    Type of service:  Video Visit    Video Start Time: 11:23 AM  Video End Time: 11: 45 AM    Originating Location (pt. Location): Home    Distant Location (provider location):  [facility]CER [facility]     Platform used for Video Visit: Karmen        [facility]  MEDICAL ONCOLOGY PROGRESS NOTE  Jun 25, 2020    CHIEF COMPLAINT: Metastatic BRAF-mutated melanoma    Melanoma History  1.  2/03/2012, biopsy of left flank skin lesion revealed 1.7 mm superficial spreading melanoma without ulceration.  2. 2/23/2012, he had left flank wide local excision and left axillary sentinel node biopsy. One of two lymph node " positive. He had stage III (pT2a pN1a M0) disease.  3. 3/02/2012, left axillary radical lymph node dissection was done. All 23 lymph nodes benign.  4. 4/02 to 4/27/2012, he received 4 weeks of high-dose interferon  5. 1/25/2017, CT scan revealed metastatic disease involving lymph nodes in the right supraclavicular, mediastinum and right hilum, and 3 small pulmonary nodules are present.  6. 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    7. 2/08/2017 to 11/08/2017, he received pembrolizumab  8. 6/28/2017, PET scan revealed significant improvement.  9. 11/29/2017, PET showed new focus of mild FDG uptake within a 6 mm right paratracheal lymph node.  10. 10/02/2018, PET scan revealed new hypermetabolic lymphadenopathy in the supraclavicular region of the left neck and in the mediastinum.   11. 4/24/2019, MRI brain revealed intraparenchymal mass within the right parietal occipital lobe. 12. 4/26/2019, he had craniotomy and resection and pathology confirmed metastatic melanoma. No post-op radiation was delivered.  13. 7/23/2019, PET scan showed progression of disease. New hypermetabolic lymphadenopathy seen in the right supraclavicular fossa and anterior mediastinum.  14. 11/25/2019, MRI brain reveals metastasis in the left posterior frontal lobe, right peritrigonal region and right posterior frontal parafalcine lesion. PET scan showed progression with multiple new hypermetabolic subcutaneous nodules throughout the body.  No change in size of mediastinal lymphadenopathy.   15. 1/06/2020, admitted in the [facility] because of left-sided weakness. MRI Brain revealed a 3.3 cm hemorrhagic mass in the right frontal gyrus and other brain metastases.  16. 1/16/2020, he had right frontal craniotomy and resection. Pathology revealed metastatic melanoma.  17. 1/27/2020 to  1/31/2020, he received gamma knife radiation to 5 brain lesions, 2500 cGy in 5 fractions.  18.2/11/2020, he started treatment with pembrolizumab and received 4 cycles through 4/15/2020.  19. 4/18/2020, he presents with severe dyspnea, worsened mediastinal adenopathy, large pericardial effusion with tamponade, recurrent moderate pleural effusions. Bilateral pleurX catheters placed 4/20/20.  20. 4/29/2020, he starts treatment with Dabrafenib and Trametinib.  21. 5/22/2020, CT-CAP shows evidence of mixed initial response to BRAF inhibitor therapy, with a reduction in the size of the mediastinal metastases and adenopathy, small pulmonary and hepatic metastases, and decreasing size of subcutaneous lesions. Adrenal metastases and retroperitoneal metastases showed enlargement.       HISTORY OF PRESENT ILLNESS  Dallin Stevens is a 34 year old male with BRAF mutated metastatic melanoma to lymph nodes, lung, and brain. He started BRAF/MEK inhibitors on 4/29, but required dose reduction for fevers and cytopenias. He resumed treatment on 5/28 and presents today in order to assess tolerability of the medication at the new doses.    He feels overall the fevers are better. He has had only 1 fever with chills this past weekend. Tells me he has started to notice the right supraclavicular lymph node is shrinking a bit. He is eating better and he is getting some muscle definition. Weight is at least stable. He is trying to do a bit more stretching, moving around, and lifting some weights. He feels like he has some more energy now as well. No rashes. No nausea, vomiting, or diarrhea.    CBC shows mild improvement with WBC 2.6, Hgb 9.5, PLTs 138. BMP shows Sodium 131. Creatinine normal at 0.63. He had MRI brain, which shows no new lesions. None of the previously treated lesions are enlarging. Overall, stable size of known enhancing, hemorrhagic metastases.      REVIEW OF SYSTEMS  A 12-point ROS negative except as in HPI    Current  Outpatient Medications   Medication Sig Dispense Refill     acetaminophen (TYLENOL) 325 MG tablet Take 2 tablets (650 mg) by mouth every 4 hours as needed for mild pain, fever or headaches (> 101 F)       dabrafenib (TAFINLAR) 50 MG capsule Take 2 capsules (100 mg) by mouth 2 times daily Take at least 1 hr before or 2 hrs after a meal. 120 capsule 0     ferrous sulfate (FEROSUL) 325 (65 Fe) MG tablet Take 1 tablet (325 mg) by mouth daily (with breakfast) 30 tablet 4     LORazepam (ATIVAN) 0.5 MG tablet Take 1 tablet (0.5 mg) by mouth every 6 hours as needed for anxiety 30 tablet 1     melatonin 1 MG TABS tablet Take 1 tablet (1 mg) by mouth nightly as needed for sleep 30 tablet 0     trametinib (MEKINIST) 0.5 MG tablet Take 3 tablets (1.5 mg) by mouth daily Take 1 hr before or 2 hrs after a meal. STORE and DISPENSE from original container. 90 tablet 0     Vitamin D, Cholecalciferol, 25 MCG (1000 UT) CAPS        dabrafenib (TAFINLAR) 50 MG capsule Take 2 capsules (100 mg) by mouth 2 times daily Take at least 1 hr before or 2 hrs after a meal. 120 capsule 0     trametinib (MEKINIST) 0.5 MG tablet Take 3 tablets (1.5 mg) by mouth daily Take 1 hr before or 2 hrs after a meal. STORE and DISPENSE from original container. (Patient not taking: Reported on 6/25/2020) 90 tablet 0       No Known Allergies  There is no immunization history for the selected administration types on file for this patient.    Past Medical History:   Diagnosis Date     Malignant melanoma (H)        Past Surgical History:   Procedure Laterality Date     BIOPSY OF SKIN LESION       CRANIOTOMY, EXCISE TUMOR COMPLEX, COMBINED  04/2019     DENTAL SURGERY      wisdom teeth     IR CHEST TUBE PLACEMENT NON-TUNNELED BILATERAL  4/19/2020     LYMPH NODE BIOPSY      arm, excision     MRI CRANIOTOMY LASER ABLATION Right 2/27/2020    Procedure: INTRAOPERATIVE MRI/STEALTH ASSISTED RIGHT LASER ABLATION OF BRAIN METASTASIS;  Surgeon: Nitish Quintero MD;   Location: [facility] OR     OPTICAL TRACKING SYSTEM CRANIOTOMY, EXCISE TUMOR, COMBINED Right 1/16/2020    Procedure: stealth assisted Right craniotomy for tumor resection;  Surgeon: Nitish Quintero MD;  Location: [facility] OR     THORACOSCOPY Bilateral 4/20/2020    Procedure: BILATERAL INSERTION OF PLEURX CATHETER;  Surgeon: Uday Bhakta MD;  Location: [facility] OR       SOCIAL HISTORY  History   Smoking Status     Never Smoker   Smokeless Tobacco     Never Used    Social History    Substance and Sexual Activity      Alcohol use: Not Currently     History   Drug Use Unknown       FAMILY HISTORY  Family History   Problem Relation Age of Onset     Anesthesia Reaction No family hx of      Cardiovascular No family hx of      Deep Vein Thrombosis No family hx of        PHYSICAL EXAMINATION  There were no vitals taken for this visit.  Wt Readings from Last 2 Encounters:   05/23/20 55 kg (121 lb 4.8 oz)   04/19/20 59.7 kg (131 lb 11.2 oz)   General: Well appearing young man, seated next to wife in living room  Head: Normocephalic  Eyes: No icterus  ENT: Oropharynx clear, speaking clearly  Pulm: Good effort on room air, no apparent dyspnea, speaking in full sentences  Neuro: Cranial nerves are grossly intact, there may be mild facial asymmetry  Skin: No rashes on visible skin    Last 24 hours, drain output, per patient report:   Left Pleurx 0 ml/day  Right Pleurx  ml/day    The rest of a comprehensive physical examination is deferred due to PHE (public health emergency) video visit restrictions.    IMAGING  MRI-Brain, 6/24/2020  Impression:   1. In this patient with known metastatic melanoma, there is no new or  enlarging enhancing lesion to suggest progression of metastatic  disease. Stable size and appearance of known enhancing, hemorrhagic  metastases.  2. Enhancing focus within the right occipital lobe does not appear to  represent metastasis. Finding may represent artifact. Follow-up  imaging in 1-2 months is  recommended for further evaluation.      CT-CAP, 5/22/2020  IMPRESSION:   In this patient with a history of metastatic melanoma, there is  evidence of a mixed response to therapy:  1. The large mediastinal masses have significantly decreased in size,  with decreased corresponding mass effect on adjacent structures.  2. Small unchanged focus of residual tumor versus thrombus within the  left atrium.  3. Decreased mediastinal, axillary, and inguinal lymphadenopathy.  4. Several newly visualized pulmonary nodules suspicious for  metastases. Decreased size of an endobronchial lesion in the medial  right upper lobe.  5. Newly visualized hepatic hypodensities, likely representing  metastases.  6. Enlarging right and stable left adrenal gland metastases.  7. Decreased size of numerous subcutaneous metastases. Increased size  of right retroperitoneal metastases.  8. New subcentimeter sclerotic focus in the right scapula, possibly  representing metastasis.  9. Decreased small pericardial effusion with pericardial  thickening/enhancement, potentially representing pericarditis.  10. Decreased nonocclusive thrombus in the right internal jugular  vein, brachiocephalic vein, left brachiocephalic vein, and SVC.  Unchanged nonvisualization of the medial right splenic vein.  11. Decreased small pleural effusions with Pleurx catheter is in  place. Small right pneumothorax.      ASSESSMENT AND PLAN  #1 Metastatic [id number] mutated melanoma, to lymph nodes, adrenal glands,  lung, brain  #2 Bilateral pleural effusions, s/p bilateral PleurX catheters, improved  #3 Neutropenia, associated with max doses dabrafenib/trametinib, grade 3, improving with dose reduction  #4 Anemia, normocytic, multifactorial  Patient most recently progressed on single agent pembrolizumab, so he was started on dabrafenib and trametinib. Due to toxicity he required 25% dose reduction. He feels better, but hasn't really seen decrease in cutaneous  disease.  -Continue dabrafenib 100 mg twice daily and trametinib 1.5 mg daily  -Continue to follow CBC and peripheral blood counts for possibility of additional dose reductions  -Plan to start nivolumab within the next 1-2 weeks. Will obtain CT-CAP for restaging prior to return to see me and infusion to follow.      #5 Chronic Hypotension  #6 Adrenal metastasis, concern for impending primary adrenal insufficiency  Continue to monitor. Patient to focus on nutrition and hydration.  -when seated, stand slowly prior to walking to avoid hypotension and falls  -consider midodrine for blood pressure support  -low threshold to repeat adrenal testing (8 AM serum cortisol and plasma ACTH, and a high-dose 250 mcg ACTH stimulation test)  -If adrenal insufficiency is confirmed and ACTH levels are normal or high (ie, primary adrenal insufficiency is the diagnosis), further evaluation for mineralocorticoid deficiency should be performed (plasma renin activity [PRA] or renin concentration and serum aldosterone)      Benita Pillai M.D.   of Medicine  Hematology, Oncology and Transplantation

## 2020-07-07 NOTE — ORAL ONC MGMT
Oral Chemotherapy Monitoring Program    Primary Oncologist: Dr. Burnett  Primary Oncology Clinic: [facility]  Cancer Diagnosis: Metastatic Melanoma     Therapy History:  C1D1: 4/29/2020  Mekinist 1.5 mg daily + Tafinlar 100 mg twice daily    Lab Monitoring Plan  CBC and CMP monthly  Subjective/Objective:  Dallin Stevens is a 35 year old male by phone for a follow-up visit for oral chemotherapy. He is doing well on therapy with no reported side effects. He reported a little stomach bloating but it was more discomfort rather than pain.     ORAL CHEMOTHERAPY 4/27/2020 5/7/2020 5/29/2020 7/7/2020   Drug Name Tafinlar (Dabrafenib)/ Mekinist (Trametinib) Tafinlar (Dabrafenib)/ Mekinist (Trametinib) Tafinlar (Dabrafenib)/ Mekinist (Trametinib) Tafinlar (Dabrafenib)/ Mekinist (Trametinib)   Current Dosage (No Data) (No Data) (No Data) Other   Current Schedule (No Data) (No Data) Daily Daily   Cycle Details Continuous Continuous Continuous Continuous   Start Date of Last Cycle - 4/29/2020 5/28/2020 -   Planned next cycle start date - 5/29/2020 6/26/2020 -   Doses missed in last 2 weeks - 0 0 0   Adherence Assessment - Adherent Adherent Adherent   Adverse Effects - Other (see note for details) - No AE identified during assessment   Other (see note for details) - (No Data) - -   Pharmacist intervention? - Yes - -   Intervention(s) - Patient education - -   Any new drug interactions? - - No -   Is the dose as ordered appropriate for the patient? - Yes Yes -   Is the patient currently in pain? - No - -       Labs:  _  Result Component Current Result Ref Range   Sodium 135 (7/1/2020) 133 - 144 mmol/L     _  Result Component Current Result Ref Range   Potassium 4.0 (7/1/2020) 3.4 - 5.3 mmol/L     _  Result Component Current Result Ref Range   Calcium 8.8 (7/1/2020) 8.5 - 10.1 mg/dL     No results found for Mag within last 30 days.     _  Result Component Current Result Ref Range   Phosphorus 3.3 (7/1/2020) 2.5 - 4.5 mg/dL      _  Result Component Current Result Ref Range   Albumin 3.5 (7/1/2020) 3.4 - 5.0 g/dL     _  Result Component Current Result Ref Range   Urea Nitrogen 11 (7/1/2020) 7 - 30 mg/dL     _  Result Component Current Result Ref Range   Creatinine 0.66 (7/1/2020) 0.66 - 1.25 mg/dL       _  Result Component Current Result Ref Range   AST 15 (7/1/2020) 0 - 45 U/L     _  Result Component Current Result Ref Range   ALT 24 (7/1/2020) 0 - 70 U/L     _  Result Component Current Result Ref Range   Bilirubin Total 0.3 (7/1/2020) 0.2 - 1.3 mg/dL       _  Result Component Current Result Ref Range   WBC 4.6 (7/1/2020) 4.0 - 11.0 10e9/L     _  Result Component Current Result Ref Range   Hemoglobin 10.7 (L) (7/1/2020) 13.3 - 17.7 g/dL     _  Result Component Current Result Ref Range   Platelet Count 274 (7/1/2020) 150 - 450 10e9/L     _  Result Component Current Result Ref Range   Absolute Neutrophil 3.3 (7/1/2020) 1.6 - 8.3 10e9/L       Assessment:  Dallin is doing well on Mekinist and Tafinlar.     Plan:  Continue as directed.    Follow-Up:  Review labs in early August.      Clint Samuels  Pharmacy Intern  Oral Chemotherapy Monitoring Program  [facility]  [phone]

## 2020-07-09 NOTE — LETTER
7/9/2020         RE: Dallin Stevens  [address] [address] [facility] [place] [phone]        Dear Colleague,    Thank you for referring your patient, Dallin Stevens, to the [facility]. Please see a copy of my visit note below.    [facility]  MEDICAL ONCOLOGY PROGRESS NOTE  Jul 9, 2020    CHIEF COMPLAINT: Metastatic BRAF-mutated melanoma    Melanoma History  1.  2/03/2012, biopsy of left flank skin lesion revealed 1.7 mm superficial spreading melanoma without ulceration.  2. 2/23/2012, he had left flank wide local excision and left axillary sentinel node biopsy. One of two lymph node positive. He had stage III (pT2a pN1a M0) disease.  3. 3/02/2012, left axillary radical lymph node dissection was done. All 23 lymph nodes benign.  4. 4/02 to 4/27/2012, he received 4 weeks of high-dose interferon  5. 1/25/2017, CT scan revealed metastatic disease involving lymph nodes in the right supraclavicular, mediastinum and right hilum, and 3 small pulmonary nodules are present.  6. 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    7. 2/08/2017 to 11/08/2017, he received pembrolizumab  8. 6/28/2017, PET scan revealed significant improvement.  9. 11/29/2017, PET showed new focus of mild FDG uptake within a 6 mm right paratracheal lymph node.  10. 10/02/2018, PET scan revealed new hypermetabolic lymphadenopathy in the supraclavicular region of the left neck and in the mediastinum.   11. 4/24/2019, MRI brain revealed intraparenchymal mass within the right parietal occipital lobe. 12. 4/26/2019, he had craniotomy and resection and pathology confirmed metastatic melanoma. No post-op radiation was delivered.  13. 7/23/2019, PET scan showed progression of disease. New hypermetabolic lymphadenopathy seen in the right supraclavicular fossa and anterior  mediastinum.  14. 11/25/2019, MRI brain reveals metastasis in the left posterior frontal lobe, right peritrigonal region and right posterior frontal parafalcine lesion. PET scan showed progression with multiple new hypermetabolic subcutaneous nodules throughout the body.  No change in size of mediastinal lymphadenopathy.   15. 1/06/2020, admitted in the [facility] because of left-sided weakness. MRI Brain revealed a 3.3 cm hemorrhagic mass in the right frontal gyrus and other brain metastases.  16. 1/16/2020, he had right frontal craniotomy and resection. Pathology revealed metastatic melanoma.  17. 1/27/2020 to 1/31/2020, he received gamma knife radiation to 5 brain lesions, 2500 cGy in 5 fractions.  18.2/11/2020, he started treatment with pembrolizumab and received 4 cycles through 4/15/2020.  19. 4/18/2020, he presents with severe dyspnea, worsened mediastinal adenopathy, large pericardial effusion with tamponade, recurrent moderate pleural effusions. Bilateral pleurX catheters placed 4/20/20.  20. 4/29/2020, he starts treatment with Dabrafenib and Trametinib.  21. 5/22/2020, CT-CAP shows evidence of mixed initial response to BRAF inhibitor therapy, with a reduction in the size of the mediastinal metastases and adenopathy, small pulmonary and hepatic metastases, and decreasing size of subcutaneous lesions. Adrenal metastases and retroperitoneal metastases showed enlargement.       HISTORY OF PRESENT ILLNESS  Dallin Stevens is a 35 year old male with BRAF mutated metastatic melanoma to lymph nodes, lung, and brain. He started BRAF/MEK inhibitors on 4/29, but required dose reduction for fevers and cytopenias. He started Iron sulfate 1 tab daily. He tolerates treatment well.    He feels overall the fevers are better. Energy level is improving, he is running around and playing soccer with his 5 years old.  started to notice the right supraclavicular lymph node is shrinking a bit. He is eating better and he is  getting some muscle definition. Weight is stable.  He feels like he has some more energy now as well. No rashes. No nausea, vomiting, or diarrhea.    He feels most of the skin lesions are smaller or no longer palpable. CT-CAP shows mediastinal large adenopathy is stable to slightly increased in size. The adrenal metastases are both larger. The right axillary and inguinal adenopathy, hepatic lesions, left buttock subcutaneous lesions are smaller, and pleural effusions have resolved. Labs are improved with hemoglobin 10.7 (MCV 87), WBC 4.6, and platelet count 274.    ECOG status is 1.    REVIEW OF SYSTEMS  A 12-point ROS negative except as in HPI    Current Outpatient Medications   Medication Sig Dispense Refill     acetaminophen (TYLENOL) 325 MG tablet Take 2 tablets (650 mg) by mouth every 4 hours as needed for mild pain, fever or headaches (> 101 F)       dabrafenib (TAFINLAR) 50 MG capsule Take 2 capsules (100 mg) by mouth 2 times daily Take at least 1 hr before or 2 hrs after a meal. 120 capsule 0     ferrous sulfate (FEROSUL) 325 (65 Fe) MG tablet Take 1 tablet (325 mg) by mouth daily (with breakfast) 30 tablet 4     LORazepam (ATIVAN) 0.5 MG tablet Take 1 tablet (0.5 mg) by mouth every 6 hours as needed for anxiety 30 tablet 1     melatonin 1 MG TABS tablet Take 1 tablet (1 mg) by mouth nightly as needed for sleep 30 tablet 0     trametinib (MEKINIST) 0.5 MG tablet Take 3 tablets (1.5 mg) by mouth daily Take 1 hr before or 2 hrs after a meal. STORE and DISPENSE from original container. 90 tablet 0     Vitamin D, Cholecalciferol, 25 MCG (1000 UT) CAPS          No Known Allergies  Immunization History   Administered Date(s) Administered     Flu, Unspecified 11/15/2019     Influenza Quad, Recombinant, p-free (RIV4) 09/25/2019       Past Medical History:   Diagnosis Date     Malignant melanoma (H)        Past Surgical History:   Procedure Laterality Date     BIOPSY OF SKIN LESION       CRANIOTOMY, EXCISE TUMOR  COMPLEX, COMBINED  04/2019     DENTAL SURGERY      wisdom teeth     IR CHEST TUBE PLACEMENT NON-TUNNELED BILATERAL  4/19/2020     LYMPH NODE BIOPSY      arm, excision     MRI CRANIOTOMY LASER ABLATION Right 2/27/2020    Procedure: INTRAOPERATIVE MRI/STEALTH ASSISTED RIGHT LASER ABLATION OF BRAIN METASTASIS;  Surgeon: Nitish Quintero MD;  Location: [facility] OR     OPTICAL TRACKING SYSTEM CRANIOTOMY, EXCISE TUMOR, COMBINED Right 1/16/2020    Procedure: stealth assisted Right craniotomy for tumor resection;  Surgeon: Nitish Quintero MD;  Location: [facility] OR     THORACOSCOPY Bilateral 4/20/2020    Procedure: BILATERAL INSERTION OF PLEURX CATHETER;  Surgeon: Uday Bhakta MD;  Location: [facility] OR       SOCIAL HISTORY  History   Smoking Status     Never Smoker   Smokeless Tobacco     Never Used    Social History    Substance and Sexual Activity      Alcohol use: Not Currently     History   Drug Use Unknown       FAMILY HISTORY  Family History   Problem Relation Age of Onset     Anesthesia Reaction No family hx of      Cardiovascular No family hx of      Deep Vein Thrombosis No family hx of        PHYSICAL EXAMINATION  There were no vitals taken for this visit.  Wt Readings from Last 2 Encounters:   05/23/20 55 kg (121 lb 4.8 oz)   04/19/20 59.7 kg (131 lb 11.2 oz)   General: Well appearing young man, seated next to wife in living room  Head: Normocephalic  Eyes: No icterus  ENT: Oropharynx clear, speaking clearly  Pulm: Good effort on room air, no apparent dyspnea, speaking in full sentences  Neuro: Cranial nerves are grossly intact, there may be mild facial asymmetry  Skin: No rashes on visible skin    Last 24 hours, drain output, per patient report:   Left Pleurx 0 ml/day  Right Pleurx 150 ml/day    The rest of a comprehensive physical examination is deferred due to PHE (public health emergency) video visit restrictions.    IMAGING  MRI-Brain, 6/24/2020  Impression:   1. In this patient with known metastatic  melanoma, there is no new or  enlarging enhancing lesion to suggest progression of metastatic  disease. Stable size and appearance of known enhancing, hemorrhagic  metastases.  2. Enhancing focus within the right occipital lobe does not appear to  represent metastasis. Finding may represent artifact. Follow-up  imaging in 1-2 months is recommended for further evaluation.    CT-CAP, 7/1/2020  IMPRESSION:  In this patient with history of metastatic melanoma, there is  progressive disease with a few lesions improved:  1. Increased in size: Mediastinal large adenopathy, adrenal  metastases, right subcutaneous chest wall lesion.  2. Decrease in size: Right axillary and inguinal adenopathy, hepatic  lesions, left buttock subcutaneous lesion  3. Resolved pleural effusions - may have achieved auto-pleurodesis  secondary to Pleurx catheters.  Small right pneumothorax has resolved from prior.  4. Persistent Thrombus - no significant change, nonocclusive thrombus  in the right internal jugular vein, right brachiocephalic vein, left  brachiocephalic vein, and SVC. Left atrium.      ASSESSMENT AND PLAN  #1 Metastatic BRAF-V660E mutated melanoma, to lymph nodes, adrenal glands,  lung, brain  #2 Bilateral pleural effusions, s/p bilateral PleurX catheters, improved  #3 Neutropenia, associated with max doses dabrafenib/trametinib, grade 3, improving with dose reduction  #4 Anemia, normocytic, multifactorial  Patient most recently progressed on single agent pembrolizumab, so he was started on dabrafenib and trametinib. Due to toxicity he required 25% dose reduction. He has had a mixed response to treatment, but he is feeling better clinically. We are still waiting to be able to start immunotherapy to help control his disease.  -Continue dabrafenib 100 mg twice daily and trametinib 1.5 mg daily  -RTC 1 month  -Plan to start immunotherapy ASAP    #5 Chronic Hypotension  #6 Adrenal metastasis, concern for impending primary adrenal  insufficiency  Continue to monitor. Patient to focus on nutrition and hydration.  -when seated, stand slowly prior to walking to avoid hypotension and falls  -consider midodrine for blood pressure support  -low threshold to repeat adrenal testing (8 AM serum cortisol and plasma ACTH, and a high-dose 250 mcg ACTH stimulation test)  -If adrenal insufficiency is confirmed and ACTH levels are normal or high (ie, primary adrenal insufficiency is the diagnosis), further evaluation for mineralocorticoid deficiency should be performed (plasma renin activity [PRA] or renin concentration and serum aldosterone)      Benita Pillai M.D.   of Medicine  Hematology, Oncology and Transplantation       Secondary Video Option (O-CODES), send text message to:  [phone]    I have reviewed and updated the patient's allergies and medication list. Patient was asked if they had any patient reported vital signs to present, if yes, please see documented vitals.  Patient was also asked for their current weight and height, if presented, documented in vitals.    Concerns: Patient has no new concerns.    Refills: None    LORENZO Crenshaw    Video-Visit Details    Type of service:  Video Visit    Video Start Time: 7:45 AM  Video End Time: 8:21 AM    Originating Location (pt. Location): Home    Distant Location (provider location):  [facility]     Platform used for Video Visit: Well        Again, thank you for allowing me to participate in the care of your patient.        Sincerely,        Benita Burnett MD

## 2020-07-09 NOTE — PROGRESS NOTES
[facility]  MEDICAL ONCOLOGY PROGRESS NOTE  Jul 9, 2020    CHIEF COMPLAINT: Metastatic BRAF-mutated melanoma    Melanoma History  1.  2/03/2012, biopsy of left flank skin lesion revealed 1.7 mm superficial spreading melanoma without ulceration.  2. 2/23/2012, he had left flank wide local excision and left axillary sentinel node biopsy. One of two lymph node positive. He had stage III (pT2a pN1a M0) disease.  3. 3/02/2012, left axillary radical lymph node dissection was done. All 23 lymph nodes benign.  4. 4/02 to 4/27/2012, he received 4 weeks of high-dose interferon  5. 1/25/2017, CT scan revealed metastatic disease involving lymph nodes in the right supraclavicular, mediastinum and right hilum, and 3 small pulmonary nodules are present.  6. 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    7. 2/08/2017 to 11/08/2017, he received pembrolizumab  8. 6/28/2017, PET scan revealed significant improvement.  9. 11/29/2017, PET showed new focus of mild FDG uptake within a 6 mm right paratracheal lymph node.  10. 10/02/2018, PET scan revealed new hypermetabolic lymphadenopathy in the supraclavicular region of the left neck and in the mediastinum.   11. 4/24/2019, MRI brain revealed intraparenchymal mass within the right parietal occipital lobe. 12. 4/26/2019, he had craniotomy and resection and pathology confirmed metastatic melanoma. No post-op radiation was delivered.  13. 7/23/2019, PET scan showed progression of disease. New hypermetabolic lymphadenopathy seen in the right supraclavicular fossa and anterior mediastinum.  14. 11/25/2019, MRI brain reveals metastasis in the left posterior frontal lobe, right peritrigonal region and right posterior frontal parafalcine lesion. PET scan showed progression with multiple new hypermetabolic subcutaneous nodules throughout  the body.  No change in size of mediastinal lymphadenopathy.   15. 1/06/2020, admitted in the [facility] system because of left-sided weakness. MRI Brain revealed a 3.3 cm hemorrhagic mass in the right frontal gyrus and other brain metastases.  16. 1/16/2020, he had right frontal craniotomy and resection. Pathology revealed metastatic melanoma.  17. 1/27/2020 to 1/31/2020, he received gamma knife radiation to 5 brain lesions, 2500 cGy in 5 fractions.  18.2/11/2020, he started treatment with pembrolizumab and received 4 cycles through 4/15/2020.  19. 4/18/2020, he presents with severe dyspnea, worsened mediastinal adenopathy, large pericardial effusion with tamponade, recurrent moderate pleural effusions. Bilateral pleurX catheters placed 4/20/20.  20. 4/29/2020, he starts treatment with Dabrafenib and Trametinib.  21. 5/22/2020, CT-CAP shows evidence of mixed initial response to BRAF inhibitor therapy, with a reduction in the size of the mediastinal metastases and adenopathy, small pulmonary and hepatic metastases, and decreasing size of subcutaneous lesions. Adrenal metastases and retroperitoneal metastases showed enlargement.       HISTORY OF PRESENT ILLNESS  Dallin Stevens is a 35 year old male with BRAF mutated metastatic melanoma to lymph nodes, lung, and brain. He started BRAF/MEK inhibitors on 4/29, but required dose reduction for fevers and cytopenias. He started Iron sulfate 1 tab daily. He tolerates treatment well.    He feels overall the fevers are better. Energy level is improving, he is running around and playing soccer with his 5 years old.  started to notice the right supraclavicular lymph node is shrinking a bit. He is eating better and he is getting some muscle definition. Weight is stable.  He feels like he has some more energy now as well. No rashes. No nausea, vomiting, or diarrhea.    He feels most of the skin lesions are smaller or no longer palpable. CT-CAP shows mediastinal large adenopathy is  stable to slightly increased in size. The adrenal metastases are both larger. The right axillary and inguinal adenopathy, hepatic lesions, left buttock subcutaneous lesions are smaller, and pleural effusions have resolved. Labs are improved with hemoglobin 10.7 (MCV 87), WBC 4.6, and platelet count 274.    ECOG status is 1.    REVIEW OF SYSTEMS  A 12-point ROS negative except as in HPI    Current Outpatient Medications   Medication Sig Dispense Refill     acetaminophen (TYLENOL) 325 MG tablet Take 2 tablets (650 mg) by mouth every 4 hours as needed for mild pain, fever or headaches (> 101 F)       dabrafenib (TAFINLAR) 50 MG capsule Take 2 capsules (100 mg) by mouth 2 times daily Take at least 1 hr before or 2 hrs after a meal. 120 capsule 0     ferrous sulfate (FEROSUL) 325 (65 Fe) MG tablet Take 1 tablet (325 mg) by mouth daily (with breakfast) 30 tablet 4     LORazepam (ATIVAN) 0.5 MG tablet Take 1 tablet (0.5 mg) by mouth every 6 hours as needed for anxiety 30 tablet 1     melatonin 1 MG TABS tablet Take 1 tablet (1 mg) by mouth nightly as needed for sleep 30 tablet 0     trametinib (MEKINIST) 0.5 MG tablet Take 3 tablets (1.5 mg) by mouth daily Take 1 hr before or 2 hrs after a meal. STORE and DISPENSE from original container. 90 tablet 0     Vitamin D, Cholecalciferol, 25 MCG (1000 UT) CAPS          No Known Allergies  Immunization History   Administered Date(s) Administered     Flu, Unspecified 11/15/2019     Influenza Quad, Recombinant, p-free (RIV4) 09/25/2019       Past Medical History:   Diagnosis Date     Malignant melanoma (H)        Past Surgical History:   Procedure Laterality Date     BIOPSY OF SKIN LESION       CRANIOTOMY, EXCISE TUMOR COMPLEX, COMBINED  04/2019     DENTAL SURGERY      wisdom teeth     IR CHEST TUBE PLACEMENT NON-TUNNELED BILATERAL  4/19/2020     LYMPH NODE BIOPSY      arm, excision     MRI CRANIOTOMY LASER ABLATION Right 2/27/2020    Procedure: INTRAOPERATIVE MRI/STEALTH ASSISTED  RIGHT LASER ABLATION OF BRAIN METASTASIS;  Surgeon: Nitish Quintero MD;  Location: [facility] OR     OPTICAL TRACKING SYSTEM CRANIOTOMY, EXCISE TUMOR, COMBINED Right 1/16/2020    Procedure: stealth assisted Right craniotomy for tumor resection;  Surgeon: Nitish Quintero MD;  Location: [facility] OR     THORACOSCOPY Bilateral 4/20/2020    Procedure: BILATERAL INSERTION OF PLEURX CATHETER;  Surgeon: Uday Bhakta MD;  Location: [facility] OR       SOCIAL HISTORY  History   Smoking Status     Never Smoker   Smokeless Tobacco     Never Used    Social History    Substance and Sexual Activity      Alcohol use: Not Currently     History   Drug Use Unknown       FAMILY HISTORY  Family History   Problem Relation Age of Onset     Anesthesia Reaction No family hx of      Cardiovascular No family hx of      Deep Vein Thrombosis No family hx of        PHYSICAL EXAMINATION  There were no vitals taken for this visit.  Wt Readings from Last 2 Encounters:   05/23/20 55 kg (121 lb 4.8 oz)   04/19/20 59.7 kg (131 lb 11.2 oz)   General: Well appearing young man, seated next to wife in living room  Head: Normocephalic  Eyes: No icterus  ENT: Oropharynx clear, speaking clearly  Pulm: Good effort on room air, no apparent dyspnea, speaking in full sentences  Neuro: Cranial nerves are grossly intact, there may be mild facial asymmetry  Skin: No rashes on visible skin    Last 24 hours, drain output, per patient report:   Left Pleurx 0 ml/day  Right Pleurx 150 ml/day    The rest of a comprehensive physical examination is deferred due to PHE (public health emergency) video visit restrictions.    IMAGING  MRI-Brain, 6/24/2020  Impression:   1. In this patient with known metastatic melanoma, there is no new or  enlarging enhancing lesion to suggest progression of metastatic  disease. Stable size and appearance of known enhancing, hemorrhagic  metastases.  2. Enhancing focus within the right occipital lobe does not appear to  represent  metastasis. Finding may represent artifact. Follow-up  imaging in 1-2 months is recommended for further evaluation.    CT-CAP, 7/1/2020  IMPRESSION:  In this patient with history of metastatic melanoma, there is  progressive disease with a few lesions improved:  1. Increased in size: Mediastinal large adenopathy, adrenal  metastases, right subcutaneous chest wall lesion.  2. Decrease in size: Right axillary and inguinal adenopathy, hepatic  lesions, left buttock subcutaneous lesion  3. Resolved pleural effusions - may have achieved auto-pleurodesis  secondary to Pleurx catheters.  Small right pneumothorax has resolved from prior.  4. Persistent Thrombus - no significant change, nonocclusive thrombus  in the right internal jugular vein, right brachiocephalic vein, left  brachiocephalic vein, and SVC. Left atrium.      ASSESSMENT AND PLAN  #1 Metastatic BRAF-V660E mutated melanoma, to lymph nodes, adrenal glands,  lung, brain  #2 Bilateral pleural effusions, s/p bilateral PleurX catheters, improved  #3 Neutropenia, associated with max doses dabrafenib/trametinib, grade 3, improving with dose reduction  #4 Anemia, normocytic, multifactorial  Patient most recently progressed on single agent pembrolizumab, so he was started on dabrafenib and trametinib. Due to toxicity he required 25% dose reduction. He has had a mixed response to treatment, but he is feeling better clinically. We are still waiting to be able to start immunotherapy to help control his disease.  -Continue dabrafenib 100 mg twice daily and trametinib 1.5 mg daily  -RTC 1 month  -Plan to start immunotherapy ASAP    #5 Chronic Hypotension  #6 Adrenal metastasis, concern for impending primary adrenal insufficiency  Continue to monitor. Patient to focus on nutrition and hydration.  -when seated, stand slowly prior to walking to avoid hypotension and falls  -consider midodrine for blood pressure support  -low threshold to repeat adrenal testing (8 AM serum  cortisol and plasma ACTH, and a high-dose 250 mcg ACTH stimulation test)  -If adrenal insufficiency is confirmed and ACTH levels are normal or high (ie, primary adrenal insufficiency is the diagnosis), further evaluation for mineralocorticoid deficiency should be performed (plasma renin activity [PRA] or renin concentration and serum aldosterone)      Benita Pillai M.D.   of Medicine  Hematology, Oncology and Transplantation        Patient has given verbal consent for Video visit? Yes    How would you like to obtain your AVS? MyChart     Patient would like the video invitation sent by: Text to cell phone: In chart     Will anyone else be joining your video visit? No          Secondary Video Option ([employer]), send text message to:  [phone]    I have reviewed and updated the patient's allergies and medication list. Patient was asked if they had any patient reported vital signs to present, if yes, please see documented vitals.  Patient was also asked for their current weight and height, if presented, documented in vitals.    Concerns: Patient has no new concerns.    Refills: None    LORENZO Crenshaw    Video-Visit Details    Type of service:  Video Visit    Video Start Time: 7:45 AM  Video End Time: 8:21 AM    Originating Location (pt. Location): Home    Distant Location (provider location):  [facility] CANCER [facility]     Platform used for Video Visit: [employer]

## 2020-07-14 NOTE — NURSING NOTE
Chief Complaint   Patient presents with     Blood Draw     Labs drawn via /PIV placed by RN in lab. VS taken.     Zabrina Henson RN

## 2020-07-14 NOTE — PATIENT INSTRUCTIONS
Contact Numbers    [facility] Main Line/Triage and after hours / weekends / holidays: [phone]      Please call the triage or after hours line if you experience a temperature greater than or equal to 100.5, shaking chills, have uncontrolled nausea, vomiting and/or diarrhea, dizziness, shortness of breath, chest pain, bleeding, unexplained bruising, or if you have any other new/concerning symptoms, questions or concerns.      If you are having any concerning symptoms or wish to speak to a provider before your next infusion visit, please call your care coordinator or triage to notify them so we can adequately serve you.     If you need a refill on a narcotic prescription or other medication, please call before your infusion appointment.       July 2020 Sunday Monday Tuesday Wednesday Thursday Friday Saturday                  1    CT CHEST ABDOMEN PELVIS WWO   2:00 PM   (20 min.)   [facility]   [facility] CT    LAB   2:30 PM   (15 min.)    LAB   [facility] 2     3     4       5     6  Happy Birthday!     7     8     9    VIDEO VISIT RETURN   7:30 AM   (30 min.)   Benita Freitas MD   [facility] 10     11       12     13     14    [facility] DRAW   8:45 AM   (15 min.)    [facility]   [facility] Draw    [facility] INFUS[facility] 60   9:30 AM   (60 min.)    ONCOLOGY INFUSION   [facility] 15     16     17     18       19     20     21     22     23     24     25       26     27     28     29     30     31                      August 2020 Sunday Monday Tuesday Wednesday Thursday Friday Saturday                                 1       2     3     4     5    [facility]   1:15 PM   (15 min.)    [facility] DRAW   [facility] Draw 6    VIDEO VISIT RETURN   2:00 PM   (30 min.)   Benita Freitas MD   [facility] 7     8       9     10     11     12     13     14     15       16     17     18      19     20     21     22       23     24     25     26     27     28     29       30     31                                              Lab Results:  Recent Results (from the past 12 hour(s))   TSH with free T4 reflex    Collection Time: 07/14/20  9:00 AM   Result Value Ref Range    TSH 0.51 0.40 - 4.00 mU/L   Comprehensive metabolic panel    Collection Time: 07/14/20  9:00 AM   Result Value Ref Range    Sodium 128 (L) 133 - 144 mmol/L    Potassium 3.8 3.4 - 5.3 mmol/L    Chloride 95 94 - 109 mmol/L    Carbon Dioxide 27 20 - 32 mmol/L    Anion Gap 6 3 - 14 mmol/L    Glucose 89 70 - 99 mg/dL    Urea Nitrogen 7 7 - 30 mg/dL    Creatinine 0.56 (L) 0.66 - 1.25 mg/dL    GFR Estimate >90 >60 mL/min/[1.73_m2]    GFR Estimate If Black >90 >60 mL/min/[1.73_m2]    Calcium 8.5 8.5 - 10.1 mg/dL    Bilirubin Total 0.4 0.2 - 1.3 mg/dL    Albumin 3.2 (L) 3.4 - 5.0 g/dL    Protein Total 6.8 6.8 - 8.8 g/dL    Alkaline Phosphatase 117 40 - 150 U/L    ALT 17 0 - 70 U/L    AST 16 0 - 45 U/L

## 2020-07-14 NOTE — PROGRESS NOTES
Infusion Nursing Note:  Dallin Stevens presents today for Cycle 3 Day 1 Opdivo (dose #1).    Patient seen by provider today: No   present during visit today: Not Applicable.    Note: Patient presents to infusion today stating he feels well. He denies any symptoms, concerns, or pain today. Patient is receiving Opdivo for the first time today. Dr. Burnett reviewed medication with patient previously. Medication, infusion, and side effects reviewed with patient. He denies any questions or concerns at this time.     Intravenous Access:  Peripheral IV placed.    Treatment Conditions:  Lab Results   Component Value Date     07/14/2020                   Lab Results   Component Value Date    POTASSIUM 3.8 07/14/2020           Lab Results   Component Value Date    MAG 2.1 04/21/2020            Lab Results   Component Value Date    CR 0.56 07/14/2020                   Lab Results   Component Value Date    ABUNDIO 8.5 07/14/2020                Lab Results   Component Value Date    BILITOTAL 0.4 07/14/2020           Lab Results   Component Value Date    ALBUMIN 3.2 07/14/2020                    Lab Results   Component Value Date    ALT 17 07/14/2020           Lab Results   Component Value Date    AST 16 07/14/2020       Results reviewed, labs MET treatment parameters, ok to proceed with treatment.      Post Infusion Assessment:  Patient tolerated infusion without incident.  Blood return noted pre and post infusion.  Site patent and intact, free from redness, edema or discomfort.  No evidence of extravasations.  Access discontinued per protocol.       Discharge Plan:   Patient declined prescription refills.  Discharge instructions reviewed with: Patient.  Patient and/or family verbalized understanding of discharge instructions and all questions answered.  Copy of AVS reviewed with patient and/or family.  Patient will return 8/6/20 for next appointment.  Patient discharged in stable condition accompanied by:  self.  Departure Mode: Ambulatory.    Tricia Mena RN

## 2020-07-15 PROBLEM — R55 SYNCOPE: Status: ACTIVE | Noted: 2020-01-01

## 2020-07-15 NOTE — ED PROVIDER NOTES
"ED Provider Note  [facility]      History     Chief Complaint   Patient presents with     Neurologic Problem     The history is provided by the patient, the spouse, the EMS personnel and medical records.     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     Patient reportedly returned home today after cycle 3, day 1 of Opdivo and had been in bed when he experienced an unwitnessed fall (syncope vs seizure). Patient reports he was standing when he felt \"dizzy\" briefly before he fell, and then woke on the floor.  Patient describes this dizziness as the sensation that he was going to pass out. He denies a room spinning sensation. He says it was just a brief prodrome (\"not minutes\", faster than that, but did have some warning to the fall.) Patients wife told EMS that she found the patient on the ground having what she believes was seizure like activity for approximately 2 minutes (No prior history of seizure, unclear if seizure vs. Syncope w/ jerking movements). When EMS arrived patient was alert and oriented w/o post-ictal confusion, etc.     Patient denies any previous seizures, but does report a similar feeling in the past where he became dizzy and felt as if he was about to pass out. At that time patient was able to sit down and drink water and had no loss of consciousness. Patient does not believe that he is dehydrated. Patient denies fever, blood in stool, diarrhea, vomiting, urinary symptoms, rash, headache, numbness, tingling, weakness, neck pain, or back pain. Patient denies infectious symptoms. " Patient denies a history of DVT/PE. Patient denies history of heart failure but did have a pericardial effusion a few months ago that was thought to be due to his cancer. Patient denies known COVID-19 exposure.     No other symptoms or complaints at this time. Please see ROS for further details.      Past Medical History  Past Medical History:   Diagnosis Date     Malignant melanoma (H)      Past Surgical History:   Procedure Laterality Date     BIOPSY OF SKIN LESION       CRANIOTOMY, EXCISE TUMOR COMPLEX, COMBINED  04/2019     DENTAL SURGERY      wisdom teeth     IR CHEST TUBE PLACEMENT NON-TUNNELED BILATERAL  4/19/2020     LYMPH NODE BIOPSY      arm, excision     MRI CRANIOTOMY LASER ABLATION Right 2/27/2020    Procedure: INTRAOPERATIVE MRI/STEALTH ASSISTED RIGHT LASER ABLATION OF BRAIN METASTASIS;  Surgeon: Nitish Quintero MD;  Location: [facility] OR     OPTICAL TRACKING SYSTEM CRANIOTOMY, EXCISE TUMOR, COMBINED Right 1/16/2020    Procedure: stealth assisted Right craniotomy for tumor resection;  Surgeon: Nitish Quintero MD;  Location: [facility] OR     THORACOSCOPY Bilateral 4/20/2020    Procedure: BILATERAL INSERTION OF PLEURX CATHETER;  Surgeon: Uday Bhakta MD;  Location: [facility] OR     acetaminophen (TYLENOL) 325 MG tablet  dabrafenib (TAFINLAR) 50 MG capsule  ferrous sulfate (FEROSUL) 325 (65 Fe) MG tablet  LORazepam (ATIVAN) 0.5 MG tablet  melatonin 1 MG TABS tablet  trametinib (MEKINIST) 0.5 MG tablet  Vitamin D, Cholecalciferol, 25 MCG (1000 UT) CAPS      No Known Allergies  Past medical history, past surgical history, medications, and allergies were reviewed with the patient.     Family History  Family History   Problem Relation Age of Onset     Anesthesia Reaction No family hx of      Cardiovascular No family hx of      Deep Vein Thrombosis No family hx of      Family history was reviewed with the patient.     Social History  Social History     Tobacco Use     Smoking status: Never Smoker      "Smokeless tobacco: Never Used   Substance Use Topics     Alcohol use: Not Currently     Drug use: Never      Social history was reviewed with the patient.     Review of Systems   Constitutional: Negative for fever.   HENT: Negative for sore throat and trouble swallowing.    Respiratory: Negative for cough and shortness of breath.    Cardiovascular: Negative for chest pain and palpitations.   Gastrointestinal: Negative for blood in stool, diarrhea and vomiting.   Genitourinary: Negative for decreased urine volume, difficulty urinating, dysuria, frequency, hematuria and urgency.   Musculoskeletal: Negative for back pain and neck pain.   Skin: Negative for color change, rash and wound.   Allergic/Immunologic: Positive for immunocompromised state.   Neurological: Positive for dizziness, seizures (vs syncope) and syncope (vs seizure). Negative for tremors, weakness, numbness and headaches.        Positive for syncope vs seizure   Psychiatric/Behavioral: Negative for suicidal ideas.   All other systems reviewed and are negative.        Physical Exam   BP: 119/83  Pulse: 105  Temp: 98.1  F (36.7  C)  Resp: 18  Height: 177.8 cm (5' 10\")  Weight: 59 kg (130 lb)  SpO2: 98 %  Physical Exam  CONSTITUTIONAL: Well-developed and well-nourished. Awake and alert. Non-toxic appearance. No acute distress.   HENT:   - Head: Normocephalic and atraumatic.   - Ears: Hearing and external ear grossly normal.   - Nose: Nose normal. No rhinorrhea. No epistaxis.   - Mouth/Throat: MMM  EYES: Conjunctivae and lids are normal. No scleral icterus. PERRL.  NECK: Normal phonation normal. Neck supple.  No tracheal deviation, no stridor. No edema or erythema noted. No midline tenderness. After c-spine cleared, no meningeal findings.   CARDIOVASCULAR: Normal rate, regular rhythm and no appreciable abnormal heart sounds.  PULMONARY/CHEST: Normal work of breathing. No accessory muscle usage or stridor. No respiratory distress.  No appreciable abnormal " breath sounds.  ABDOMEN: Soft, non-distended. No tenderness. No rigidity, rebound or guarding.   MUSCULOSKELETAL: Extremities warm and seemingly well perfused. No edema or calf tenderness.  NEUROLOGIC: Awake, alert. Not disoriented. He displays no atrophy and no tremor. Normal tone. No seizure activity. GCS 15. CNs intact.   SKIN: Skin is warm and dry. No rash noted. No diaphoresis. No pallor.   PSYCHIATRIC: Normal mood and affect. Speech and behavior normal. Thought processes linear. Cognition and memory are normal.    ED Course     ED Course as of Jul 15 0415   Tue Jul 14, 2020 2233 Fluids held now that see hyponatremia, will continue to monitor.       2315 Awaiting call back from Cards for ED consult regarding pericardial effusion. They report given pt hemodynamically stable they can wait until morning for formal now that we've done one and seen fluid, but as hemodynamically stable would not  as sx's sound more like seizure than syncope given duraction of episode.       2336 Lactate 1.3 per lab on recheck (not ordered, but they sent that result up when we had requested the osmolality, they confirmed was for this patient.)       9:26 PM  The patient was seen and examined by Marizol Snowden MD in Room ED12.       POCUS Echo:      Results for orders placed during the hospital encounter of 07/14/20   POC US ECHO LIMITED    Impression Limited Bedside Cardiac Ultrasound, performed and interpreted by me.   Indication: Syncope, hx of previous pericardial effusion.  Parasternal long axis, parasternal short axis and apical 4 chamber views were acquired.   Somewhat limited (Some drains in place, pt in c-spine precaution)    Findings:    No obvious signficant/gross LV disfunction, does have some evidence of relatively pericardial effusion w/ septation w/o clear evidence of tamponade physiology    IMPRESSION: Small pericardial effusion w/o clear evidence of tamponade physiology            EKG Interpretation:       Interpreted by Marizol Snowden MD  Time reviewed: 2138  Symptoms at time of EKG: None/fatigued   Rhythm: sinus tachycardia  Rate:   Axis: Normal  Ectopy: none  ST Segments/ T Waves: Non-specific ST-T wave changes  Clinical Impression: Sinus tach, nonspecific ST-twaves     Assessments & Plan (with Medical Decision Making)   IMPRESSION: 35 year old male w/ PMH notable for metastatic melanoma to lymph nodes, lung, and brain (BRAF mutated), anemia of chronic disease, pericardial effusion with tamponade (s/p pericardial window),  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     Clinically, patient appears fatigued, but no apparent focal deficits or acute cardiopulmonary findings on exam. Otherwise on examination, pt is thin, but no other acute emergent findings appreciated.     Ddx includes, but not limited to, hypovolemia, anemia or other orthostatic type of syncopal episode (especially given patient has had similar presentations previously)), electrolyte abnormality/metabolic derangement, seizure episode, other cardiogenic or neurologic event, metastasis to brain, recurrent or worsening pericardial effusion (though normotensive and think unlikely to be tamponade based on the remaining rest of the clinical picture), et al.     PLAN: Head/neck imaging, laboratory studies, echo, admission, Neuro/Cards/Onc consults as needed (either in ED or as part of admission).   - Risks/benefits of pursuing imaging reviewed and accepted.     RESULTS:  - Labs: Na 124, lactate 2.7 Hgb 9.9 osmolality pending  --- Review of medications show that patient's oral chemotherapy agents, especially in combination, do put the patient at risk of hyponatremia  - Urine: No UTI, urine sodium and osmolality pending  - Imaging: Written preliminary reports reviewed:  ---  No acute traumatic findings or abnormalities seen on head or C-spine CT.  --- Limited POCUS/bedside echocardiogram for valuation for possible pericardial effusion, did show a small amount of pericardial effusion but without obvious tamponade physiology    INTERVENTIONS:   - IVF (stopped after found hyponatremia)    RE-EVALUATION:  - Lab reports repeat lactate 1.3  - Pt otherwise continues to do well here in the ED, no acute issues or apparent concerning changes in vitals or clinical appearance.    DISCUSSIONS:  - w/ Cardiology: REviewed case asked them to review our Echo images. Ok to wait until morning for formal echo per their report, unless clinically worsens or vitals worse, in which case they're happy to do tonight  - w/ IM & Hem-Onc: Reviewed patient/presentation, current state of workup/any pending studies. They will admit for further evaluation/management, F/U pending studies as needed, coordinate w/ consulting services as needed. No additional requests/recommendations for workup/management for in the ED at this time.   - w/ Patient: I have reviewed the available findings, plan with the patient and his loved one/guest. They expressed understanding and agreement with this plan. All questions answered to the best of our ability at this time.     DISPOSITION/PLANNING:  - IMPRESSION: Syncope vs. Seizure (did have prodrome of near-syncope w/ similar pre-syncopal sx's in the past, but episode did last ~2min w/ jerking movements)  --- Hyponatremia (125)  --- Brain metastases w/ edema (similar to previous)  --- Pericardial effusion (w/ hx of pericardial metastases and effusion w/ tamponade but with appropriate hemodynamics at the moment)  - DISPOSITION: Admit to IM/Hem-Onc for further evaluation/management  - PENDING:   - RECOMMENDATIONS: Formal echocardiogram, Cardiology consult, Neuro consult, consider steroids, Hyponatremia workup/Onc discussion of chemo agents, et al.        ______________________________________________________________________     Nolvia PINEDA, am serving as a trained medical scribe to document services personally performed by Marizol Snowden MD, based on the provider's statements to me.     I, Marizol Snowden MD, was physically present and have reviewed and verified the accuracy of this note documented by Nolvia Roger.    --  Marizol Snowden MD   Emergency Medicine   [facility], [place], EMERGENCY DEPARTMENT  7/14/2020     Marizol Snowden MD  07/16/20 [id number]

## 2020-07-15 NOTE — PROGRESS NOTES
Time of notification: 8:15 AM  Provider notified:GOLD Filomena ATTENDING [[id number]]    8:00AM -  6:00PM    MIKA OREILLY - STAFF [ Msg Id [id number] ]     Patient status:Dallin Stevens [age] Pt would like to know what plan is with chemo, concerned d/t already missing dose. Pleurex drain orders? Plan? Lizy HAIR RN [id number]  Temp:  [98.1  F (36.7  C)-99.2  F (37.3  C)] 99.2  F (37.3  C)  Pulse:  [] 87  Heart Rate:  [] 97  Resp:  [14-28] 24  BP: ()/(64-83) 99/64  SpO2:  [97 %-100 %] 98 %  Orders received: awaiting orders

## 2020-07-15 NOTE — PROGRESS NOTES
Time of notification: 3:58 PM  Provider notified:Attending: Reinier Rodriguez MD, pgr [id number]   Patient status:Coreg, albuterol administered .Do you want chest xray. Last /102  Temp:  [98.1  F (36.7  C)-99.2  F (37.3  C)] 99.2  F (37.3  C)  Pulse:  [] 87  Heart Rate:  [] 107  Resp:  [14-28] 24  BP: ()/(64-83) 108/68  SpO2:  [97 %-100 %] 98 %  Orders received: awaiting orders

## 2020-07-15 NOTE — ED TRIAGE NOTES
"Pt presented via ALS EMS following unwitnessed fall at home from his bed.     Pt was A/O x4 on arrival, skin was normal/ warm/ dry, radial pulse was strong.     Pt admitted \"teeny\" bit of a headache.     EMS reported pt had had IV treatment today, had been at home in bed when he experienced unwitnessed fall. EMS reported pt's wife had found him seizing and stated pt's wife had estimated approx 2min seizure. EMS reported pt had been A/O x4 at their arrival.     EMS reported they had been unable to c-spine immobilize pt due to pt complaints of feeling \"suffocated\". Pt arrived with head supported by blanket roll. CMS intact x4.   "

## 2020-07-15 NOTE — PROGRESS NOTES
Time of notification: 10:19 AM  Provider notified:Attending: Reinier Rodriguez MD, pgr [id number]   Patient status:R pleurex drain output 150cc dark hayes. Stopped d/t discomfort per pt darker and more output, Left drain due Friday the 17th not today. please adjust orders  Temp:  [98.1  F (36.7  C)-99.2  F (37.3  C)] 99.2  F (37.3  C)  Pulse:  [] 87  Heart Rate:  [] 97  Resp:  [14-28] 24  BP: ()/(64-83) 99/64  SpO2:  [97 %-100 %] 98 %  Orders received: awaiting order

## 2020-07-15 NOTE — H&P
"      [facility], [place]    History and Physical - Hospitalist Service, Gold 11       Date of Admission:  7/14/2020    Assessment & Plan   Dallin Stevens is a 35 year old male with PMH for metastatic melanoma to lymph nodes, adrenal glands, lung, and brain, bilateral pleural effusions s/p bilateral pleurx catheters, chronic hypotension 2/2 adrenal mets, anemia, and h/o pericardial effusion c/b tamponade s/p pericardial window who presents after an unwitnessed fall at home.    #Likely syncopal event  Patient presenting after unwitnessed fall at home with description of prodromal episode prior to event of feeling dizzy and like he was going to \"pass out\". No post ictal phase, incontinence or tongue biting c/w seizure. Likely description of jerking is syncopal convulsions. CT head w/out worsening of known brain lesions and w/out evidence of hemorrhage. EKG w/out obvious pre-excitation syndromes. At present, would favor that syncopal event is 2/2 to known hypotensive issues with adrenal metastatic disease versus orthostatic hypotension related syncope in setting of hyponatremia, however cannot exclude cardiac etiology as arrhythmia also could have precipitated episode. Started on IVF in ER (got about 300cc bolus). Will plan to monitor on cardiac telemetry, obtain orthostatics and monitor for recurrence of symptoms  -telemetry  -fall precautions  -s/p fluid bolus in ER  -now tolerating oral intake/hydration  -repeat troponin given time course to initial presentation does not fully exclude this    #Hyponatremia  Patient with hyponatremia to 125 from prior normal 2 weeks ago. Reports not feeling dehydrated. K wnl making adrenal insufficiency unlikely etiology. Is known side effect of darafenib and trametinib being linked with hyponatremia. Also on nivolumab which has been linked with hyponatremia as well.  Could also consider SIADH, particularly in setting of known lung mets. From review of " chart, appears to have bene quite chronic issue for patient and has been considered for possible need for midodrine in the past. Will f/u urine studies obtained in ED and reassess in AM after fluid bolus. May need to consider addition of salt tabs based on prior w/u.   -trend BMP  -s/p IVF in ED  -f/u urine osms    #Lactic acidosis  Unclear etiology. No infectious symptoms. Possibly related to known malignancy and/or dehydration versus transient BP drop given known issues with chronic hypotension. Could also consider 2/2 seizure, but would favor as unlikely given lack of other symptoms. Per report, has improved with IVF (issues with lab report appearing in Epic).   -reorder lactate    Chronic medical conditions  #H/o pericardial effusion c/b tamponade s/p pericardial window  -telemetry  -can consider formal echo in AM (per ED provider, small effusion on POCUS)    #metastatic melanoma to lymph nodes, adrenal glands, lung, and brain  #Chronic cerebral edema  On multidrug therapy. Follows with Dr. Elfego Burnett. Pending clinical course and hyponatremia, may need to consider onc consult for discussion regarding ongoing melanoma treatment strategy given these drugs are linked strongly with hyponatremia    #chronic normocytic anemia  In setting of known malignancy. Hgb near baseline. On iron PTA  -continue iron supplement       Diet: regular  DVT Prophylaxis: Low Risk/Ambulatory with no VTE prophylaxis indicated  Lu Catheter: not present  Code Status: Full         Disposition Plan   Expected discharge: 2 - 3 days, recommended to prior living arrangement once syncope assessed, hyponatremia improving.  Entered: Shayy Cox MD 07/14/2020, 11:00 PM       Shayy Cox MD  [facility], [place]  Pager: [phone]  Please see sticky note for cross cover information  ______________________________________________________________________    Chief Complaint   Syncope vs seizure    History is  obtained from the patient and patient's significant other    History of Present Illness   Dallin Stevens is a 35 year old male with PMH for metastatic melanoma to lymph nodes, adrenal glands, lung, and brain, bilateral pleural effusions s/p bilateral pleurx catheters, chronic hypotension 2/2 adrenal mets, anemia, and h/o pericardial effusion c/b tamponade s/p pericardial window who presents after an unwitnessed fall at home.    By report, patient had opdivo therapy today and had gotten home where he went to bed. Per patient, states he had gotten up and was standing and felt dizzy, then woke up on the floor. STates that he had the sensation that he may pass out. Patient's wife reported that when she found him that he seemed to be having some seizure like activity for about 2 mintues. On EMS arrival, they noted patient to be alert and oriented and without post-ictal confusion.    Per patient and wife, they deny any past history of seizures but has had recurrent issues similar to above where he has felt dizzy and if he was going to pass out. States that this has been alleviated by sitting down and drinking water in the past.     ROS negative for fever, chills, N/V/D, chest pain, headache, SOB, or new pain.     Review of Systems    The 10 point Review of Systems is negative other than noted in the HPI or here.     Past Medical History    I have reviewed this patient's medical history and updated it with pertinent information if needed.   Past Medical History:   Diagnosis Date     Malignant melanoma (H)        Past Surgical History   I have reviewed this patient's surgical history and updated it with pertinent information if needed.  Past Surgical History:   Procedure Laterality Date     BIOPSY OF SKIN LESION       CRANIOTOMY, EXCISE TUMOR COMPLEX, COMBINED  04/2019     DENTAL SURGERY      wisdom teeth     IR CHEST TUBE PLACEMENT NON-TUNNELED BILATERAL  4/19/2020     LYMPH NODE BIOPSY      arm, excision     MRI CRANIOTOMY  LASER ABLATION Right 2/27/2020    Procedure: INTRAOPERATIVE MRI/STEALTH ASSISTED RIGHT LASER ABLATION OF BRAIN METASTASIS;  Surgeon: Nitish Quintero MD;  Location: [facility] OR     OPTICAL TRACKING SYSTEM CRANIOTOMY, EXCISE TUMOR, COMBINED Right 1/16/2020    Procedure: stealth assisted Right craniotomy for tumor resection;  Surgeon: Nitish Quintero MD;  Location: [facility] OR     THORACOSCOPY Bilateral 4/20/2020    Procedure: BILATERAL INSERTION OF PLEURX CATHETER;  Surgeon: Uday Bhakta MD;  Location: [facility] OR       Social History   I have reviewed this patient's social history and updated it with pertinent information if needed.      Family History     No significant family history, including no history of: seizure    Prior to Admission Medications   Prior to Admission Medications   Prescriptions Last Dose Informant Patient Reported? Taking?   LORazepam (ATIVAN) 0.5 MG tablet   No No   Sig: Take 1 tablet (0.5 mg) by mouth every 6 hours as needed for anxiety   Vitamin D, Cholecalciferol, 25 MCG (1000 UT) CAPS   Yes No   acetaminophen (TYLENOL) 325 MG tablet  Self No No   Sig: Take 2 tablets (650 mg) by mouth every 4 hours as needed for mild pain, fever or headaches (> 101 F)   dabrafenib (TAFINLAR) 50 MG capsule   No No   Sig: Take 2 capsules (100 mg) by mouth 2 times daily Take at least 1 hr before or 2 hrs after a meal.   dabrafenib (TAFINLAR) 50 MG capsule   No No   Sig: Take 2 capsules (100 mg) by mouth 2 times daily Take at least 1 hr before or 2 hrs after a meal.   ferrous sulfate (FEROSUL) 325 (65 Fe) MG tablet   No No   Sig: Take 1 tablet (325 mg) by mouth daily (with breakfast)   melatonin 1 MG TABS tablet  Self No No   Sig: Take 1 tablet (1 mg) by mouth nightly as needed for sleep   trametinib (MEKINIST) 0.5 MG tablet   No No   Sig: Take 3 tablets (1.5 mg) by mouth daily Take 1 hr before or 2 hrs after a meal. STORE and DISPENSE from original container.   trametinib (MEKINIST) 0.5 MG tablet   No  No   Sig: Take 3 tablets (1.5 mg) by mouth daily Take 1 hr before or 2 hrs after a meal. STORE and DISPENSE from original container.      Facility-Administered Medications: None     Allergies   No Known Allergies    Physical Exam   Vital Signs: Temp: 98.1  F (36.7  C) Temp src: Oral BP: 119/83 Pulse: 105   Resp: 18 SpO2: 98 % O2 Device: None (Room air)    Weight: 130 lbs 0 oz    General Appearance: frail appearing man in NAD, polite and conversational  HEENT: at/nc, eomi, mmm, no JVD, +lymphadenopathy  Respiratory: ctab on RA, nonlabored, pleurx sites intact  Cardiovascular: rrr, s1, s2, no murmur  GI: thin, soft, nt  Skin: warm and dry, no rash on exposed skin  Musculoskeletal: thin extremities, no LE edema, moving all 4  Neurologic: alert, interactive, speech fluent, grossly nonfocal    Data   Data reviewed today: I reviewed all medications, new labs and imaging results over the last 24 hours. I personally reviewed lab and imaging data    Recent Labs   Lab 07/14/20  2120 07/14/20  0900   WBC 4.6  --    HGB 9.9*  --    MCV 83  --      --    INR 1.04  --    * 128*   POTASSIUM 4.3 3.8   CHLORIDE 95 95   CO2 24 27   BUN 9 7   CR 0.60* 0.56*   ANIONGAP 6 6   ABUNDIO 8.4* 8.5   GLC 98 89   ALBUMIN 3.1* 3.2*   PROTTOTAL 6.5* 6.8   BILITOTAL 0.4 0.4   ALKPHOS 111 117   ALT 18 17   AST 20 16   TROPI 0.031  --      Recent Results (from the past 24 hour(s))   CT Head w/o Contrast    Narrative    EXAM: CT HEAD W/O CONTRAST  LOCATION: [facility]  DATE/TIME: 7/14/2020 10:13 PM    INDICATION: Fall, head injury, syncope versus seizure. History of melanoma with metastatic disease.  COMPARISON: Brain MRI dated 06/24/2020  TECHNIQUE: Routine without IV contrast. Multiplanar reformats. Dose reduction techniques were used.    FINDINGS:  INTRACRANIAL CONTENTS: No acute intracranial hemorrhage. The multifocal intracranial metastatic disease is better appreciated on the comparison MRI, however, lesions in the left  parietal, bilateral frontal, and right temporal occipital region appear   grossly similar to the prior MRI. The surrounding edema is grossly stable. No midline shift or effacement of basal cisterns. Unchanged enlargement of the right temporal horn which may reflect mild entrapment. No CT evidence of acute infarct.    VISUALIZED ORBITS/SINUSES/MASTOIDS: No intraorbital abnormality. No paranasal sinus mucosal disease. No middle ear or mastoid effusion.    BONES/SOFT TISSUES: Redemonstrated right parietal and right parieto-occipital craniotomy changes. No acute displaced fracture.      Impression    IMPRESSION:  1.  No acute intracranial process.  2.  Given differences in technique, no significant interval change from 06/24/2020. Redemonstrated intracranial metastatic disease with surrounding edema.   Cervical spine CT w/o contrast    Narrative    EXAM: CT CERVICAL SPINE W/O CONTRAST  LOCATION: [facility]  DATE/TIME: 7/14/2020 10:13 PM    INDICATION: Trauma. Neck injury. History of metastatic melanoma.  COMPARISON: Chest CT dated 07/01/2020  TECHNIQUE: Routine without IV contrast. Multiplanar reformats. Dose reduction techniques were used.    FINDINGS:  VERTEBRA: Normal vertebral body heights and alignment. No fracture or posttraumatic subluxation. No suspicious osseous lesions.    CANAL/FORAMINA: No canal or neural foraminal stenosis.    PARASPINAL: No acute soft tissue abnormality. Redemonstrated soft tissue mass1 in the right supraclavicular region and mediastinum, better evaluated on the prior chest CT.      Impression    IMPRESSION:  1.  No evidence of an acute osseous abnormality of the cervical spine.

## 2020-07-15 NOTE — PROGRESS NOTES
[facility]   Patient is currently open to home care services with [facility]. Patient is currently receiving RN services. If appropriate provide orders in [facility] to resume home care. For any questions or concerns regarding home care services please call [phone].    Patsy Ward RN  [facility] Liaison  [phone]

## 2020-07-15 NOTE — PLAN OF CARE
Neuro: A&Ox4.   Cardiac: SR. VSS.   Respiratory: Sating 95% on RA.  GI/: Adequate urine output. BM X1  Diet/appetite: Tolerating Regular diet.   Activity:  SBA, up to chair and in halls.  Pain: At acceptable level on current regimen.   Skin: No new deficits noted.  LDA's: 1 PIV SL.    Plan: Continue with POC. Notify primary team with changes. Goal is to discharge tomorrow. Patient reports he wants to get home as soon as possible. Chemo has been held since 7/14 night. Patient would like to get back on schedule with this medication as soon as possible. Oncology to review 7/16 AM.

## 2020-07-15 NOTE — PROGRESS NOTES
Transfer  Transferred from: ED  Via:bed  Reason for transfer:Pt appropriate for 6B  Family: Aware of transfer and came with pt to room  Belongings: Received with pt  Chart: Received with pt  Code Status verified on armband: yes  2 RN Skin Assessment Completed By: Concetta DUGAN  Bed surface reassessed with algorithm and charted: yes  New bed surface ordered: no    Report received from: MARYBEL Hare  Pt status: on unit

## 2020-07-15 NOTE — PLAN OF CARE
"Neuro: A&Ox4.   Cardiac: SR 80-90s. /64   Pulse 87   Temp 98.6  F (37  C) (Oral)   Resp 28   Ht 1.778 m (5' 10\")   Wt 59 kg (130 lb)   SpO2 100%   BMI 18.65 kg/m      Respiratory: Sating  on 2L NC  GI/: Adequate urine output. Pt reports normal stooling; last stool 7/13  Diet/appetite: Tolerating reg diet.   Activity:  SBA up to chair and in halls.  Pain: Complains of headache, but refusing tylenol  Skin: No new deficits noted.  LDA's: PIV x1 SL'd     Plan: Continue with POC. Notify primary team with changes.   "

## 2020-07-15 NOTE — CONSULTS
Oncology  Consult Note   Date of Service: 07/15/2020    Patient: Dallin Stevens  MRN: [id number]  Admission Date: 7/14/2020  Hospital Day # 0  Cancer Diagnosis: Metastatic [id number] mutated melanoma, to lymph nodes, adrenal glands, lung, brain  Primary Outpatient Oncologist: Dr. Elfego Burnett  Current Treatment Plan: Dabrafenib 100 mg twice daily, Trametinib 1.5 mg daily, Nivolumab (first dose 7/14)    Reason for Consult: hx of metastatic melanoma    History of Present Illness:    Dallin Stevens is a 35 year old male with PMH for metastatic melanoma to lymph nodes, adrenal glands, lung, and brain, bilateral pleural effusions s/p bilateral pleurx catheters, chronic hypotension 2/2 adrenal mets, anemia, and h/o pericardial effusion c/b tamponade s/p pericardial window who presents after an unwitnessed fall at home.    Per note review:  Patient did receive Opdivo (nivolumab) infusion on 7/14. He went home after his infusion and went to bed. Reports that he had awoken, stood up and then felt dizzy. He then woke up on the floor. He did feel like he was going to pass out prior to waking up on the floor. Patient's wife reported that when she found him that he seemed to be having some seizure like activity for about 2 mintues. On EMS arrival, they noted patient to be alert and oriented and without post-ictal confusion. Per patient and wife, they deny any past history of seizures. Does have a history of recurrent issues where he has felt dizzy and as if he was going to pass out. States that this has been alleviated by sitting down and drinking water in the past. At time of admission patient denied fever, chills, N/V/D, chest pain, headache, SOB, or new pain.     Upon visit this AM, patient reports feeling okay. When asked about falling incident yesterday, he states that he did not feel all that dizzy prior to falling and did not really remember the event. Did not feel prolonged sense of fatigue, grogginess after. States  wife found him after the fall and he thinks that she noticed more of tense muscles rather than jerking activity. He denies recent fevers, headaches, light-headed feeling, abdominal pain, nausea, vomiting, diarrhea, swelling. No chest pain or SOB. Does occasionally have PVCs but this is not new or changed from patient baseline. Tells me he did miss evening dose of oral chemo last night and expresses worry over this. Reports with last admission had to hold chemo for around one week and felt disease really progressed because of this. Tells me has been eating and drinking fairly well at home and did not feel he was all that dehydrated prior to coming in. Energy level has been okay, has been up and moving around the house per usual.     Oncologic History:  2/03/12: Bx of left flank skin lesion revealed 1.7 mm superficial spreading melanoma without ulceration.  2/23/12: Left flank wide local excision and left axillary sentinel node biopsy. One of two lymph node positive.    - Stage III (pT2a pN1a M0) disease.  3/02/12: Left axillary radical lymph node dissection was done. All 23 lymph nodes benign.  4/02 to 4/27/12: 4 weeks of high-dose interferon    1/25/17: CT scan revealed metastatic disease involving lymph nodes in the right supraclavicular, mediastinum and right hilum, and 3 small pulmonary nodules  1/26/17: Bx of right supraclavicular lymph node revealed metastatic melanoma, and molecular testing confirmed a mutation in BRAF codon 600 was detected: [id number], BRAF-V600E positive.    2/08/17 to 11/08/17: Pembrolizumab  6/28/17: PET scan revealed significant improvement.  11/29/17: PET showed new focus of mild FDG uptake within a 6 mm right paratracheal lymph node.  10/02/18: PET scan revealed new hypermetabolic lymphadenopathy in the supraclavicular region of the left neck and in the mediastinum.   4/24/19: MRI brain revealed intraparenchymal mass within the right parietal occipital lobe.  4/26/19: Craniotomy and  resection and pathology confirmed metastatic melanoma. No post-op radiation was delivered.  7/23/19: PET scan showed progression of disease. New hypermetabolic lymphadenopathy seen in the right supraclavicular fossa and anterior mediastinum.  11/25/19: MRI brain reveals metastasis in the left posterior frontal lobe, right peritrigonal region and right posterior frontal parafalcine lesion. PET scan showed progression with multiple new hypermetabolic subcutaneous nodules throughout the body.  No change in size of mediastinal lymphadenopathy.   1/06/20: Admitted in the [facility] because of left-sided weakness. MRI Brain revealed a 3.3 cm hemorrhagic mass in the right frontal gyrus and other brain metastases.  1/16/20: Right frontal craniotomy and resection. Pathology revealed metastatic melanoma.  1/27/20 to 1/31/20: Gamma knife radiation to 5 brain lesions, 2500 cGy in 5 fractions.  2/11/20 to 4/15/20: Restarted treatment with pembrolizumab and received 4 cycles through   4/18/20: Presented with severe dyspnea, worsened mediastinal adenopathy, large pericardial effusion with tamponade, recurrent moderate pleural effusions.   4/20/20: Bilateral pleurX catheters placed   4/29/20: Started treatment with Dabrafenib and Trametinib.  5/22/20: CT-CAP shows evidence of mixed initial response to BRAF inhibitor therapy, with a reduction in the size of the mediastinal metastases and adenopathy, small pulmonary and hepatic metastases, and decreasing size of subcutaneous lesions. Adrenal metastases and retroperitoneal metastases showed enlargement.   5/28/20: Resumed treatment with Dabrafenib and Trametinib.  6/24/20: MRI brain stable with no new or enlarging lesion to suggest progression of metastatic disease  7/1/20: Progressive disease with a few lesions improved. Increased in size: Mediastinal large adenopathy, adrenal metastases, right subcutaneous chest wall lesion. Resolved pleural effusions. Persistent Thrombus.    7/14/20: First dose Nivolumab        Review of Systems:  A comprehensive ROS was performed and found to be negative or non-contributory with the exception of that noted in the HPI above.    Past Medical History:  Past Medical History:   Diagnosis Date     Malignant melanoma (H)        Past Surgical History:  Past Surgical History:   Procedure Laterality Date     BIOPSY OF SKIN LESION       CRANIOTOMY, EXCISE TUMOR COMPLEX, COMBINED  04/2019     DENTAL SURGERY      wisdom teeth     IR CHEST TUBE PLACEMENT NON-TUNNELED BILATERAL  4/19/2020     LYMPH NODE BIOPSY      arm, excision     MRI CRANIOTOMY LASER ABLATION Right 2/27/2020    Procedure: INTRAOPERATIVE MRI/STEALTH ASSISTED RIGHT LASER ABLATION OF BRAIN METASTASIS;  Surgeon: Nitish Quintero MD;  Location: [facility] OR     OPTICAL TRACKING SYSTEM CRANIOTOMY, EXCISE TUMOR, COMBINED Right 1/16/2020    Procedure: stealth assisted Right craniotomy for tumor resection;  Surgeon: Nitish Quintero MD;  Location: [facility] OR     THORACOSCOPY Bilateral 4/20/2020    Procedure: BILATERAL INSERTION OF PLEURX CATHETER;  Surgeon: Uday Bhakta MD;  Location: [facility] OR       Social History:  Social History     Socioeconomic History     Marital status:      Spouse name: Not on file     Number of children: Not on file     Years of education: Not on file     Highest education level: Not on file   Occupational History     Not on file   Social Needs     Financial resource strain: Not on file     Food insecurity     Worry: Not on file     Inability: Not on file     Transportation needs     Medical: Not on file     Non-medical: Not on file   Tobacco Use     Smoking status: Never Smoker     Smokeless tobacco: Never Used   Substance and Sexual Activity     Alcohol use: Not Currently     Drug use: Never     Sexual activity: Yes     Partners: Female   Lifestyle     Physical activity     Days per week: Not on file     Minutes per session: Not on file     Stress: Not on file    Relationships     Social connections     Talks on phone: Not on file     Gets together: Not on file     Attends [employer] service: Not on file     Active member of club or organization: Not on file     Attends meetings of clubs or organizations: Not on file     Relationship status: Not on file     Intimate partner violence     Fear of current or ex partner: Not on file     Emotionally abused: Not on file     Physically abused: Not on file     Forced sexual activity: Not on file   Other Topics Concern     Not on file   Social History Narrative     Not on file        Family History  Family History   Problem Relation Age of Onset     Anesthesia Reaction No family hx of      Cardiovascular No family hx of      Deep Vein Thrombosis No family hx of        Outpatient Medications:  [COMPLETED] nivolumab (OPDIVO) 480 mg in sodium chloride 0.9 % 158 mL    dabrafenib (TAFINLAR) 50 MG capsule, Take 2 capsules (100 mg) by mouth 2 times daily Take at least 1 hr before or 2 hrs after a meal.  ferrous sulfate (FEROSUL) 325 (65 Fe) MG tablet, Take 1 tablet (325 mg) by mouth daily (with breakfast)  LORazepam (ATIVAN) 0.5 MG tablet, Take 1 tablet (0.5 mg) by mouth every 6 hours as needed for anxiety  melatonin 1 MG TABS tablet, Take 1 tablet (1 mg) by mouth nightly as needed for sleep  trametinib (MEKINIST) 0.5 MG tablet, Take 3 tablets (1.5 mg) by mouth daily Take 1 hr before or 2 hrs after a meal. STORE and DISPENSE from original container.  Vitamin D, Cholecalciferol, 25 MCG (1000 UT) CAPS,   acetaminophen (TYLENOL) 325 MG tablet, Take 2 tablets (650 mg) by mouth every 4 hours as needed for mild pain, fever or headaches (> 101 F)  dabrafenib (TAFINLAR) 50 MG capsule, Take 2 capsules (100 mg) by mouth 2 times daily Take at least 1 hr before or 2 hrs after a meal.  [] trametinib (MEKINIST) 0.5 MG tablet, Take 3 tablets (1.5 mg) by mouth daily Take 1 hr before or 2 hrs after a meal. STORE and DISPENSE from original  "container.    Physical Exam:    Blood pressure 103/64, pulse 87, temperature 98.6  F (37  C), temperature source Oral, resp. rate 28, height 1.778 m (5' 10\"), weight 58.2 kg (128 lb 6.4 oz), SpO2 100 %.    General: lying in bed, no acute distress, appears euvolemic/hypovolemic  HEENT: sclera anicteric, EOMI, MMM  Neck: supple, normal ROM  CV: RRR, normal S1/S2, no m/r/g  Resp: CTAB, no wheezing/crackles, normal respiratory effort on ambient air  GI: soft, non-tender, non-distended, bowel sounds present   MSK: warm and well-perfused, normal tone, no edema  Skin: no rashes on limited exam, no jaundice  Neuro: Alert and interactive, moves all extremities equally, no focal deficits    Labs & Studies: I personally reviewed the following studies:  ROUTINE LABS (Last four results):  CMP  Recent Labs   Lab 07/15/20  0519 07/14/20  2337 07/14/20  2120 07/14/20  0900    128* 125* 128*   POTASSIUM 4.0 3.9 4.3 3.8   CHLORIDE 105  --  95 95   CO2 22  --  24 27   ANIONGAP 7  --  6 6   * 100* 98 89   BUN 8  --  9 7   CR 0.59*  --  0.60* 0.56*   GFRESTIMATED >90  --  >90 >90   GFRESTBLACK >90  --  >90 >90   ABUNDIO 8.6  --  8.4* 8.5   PROTTOTAL  --   --  6.5* 6.8   ALBUMIN  --   --  3.1* 3.2*   BILITOTAL  --   --  0.4 0.4   ALKPHOS  --   --  111 117   AST  --   --  20 16   ALT  --   --  18 17     CBC  Recent Labs   Lab 07/15/20  0519 07/14/20  2337 07/14/20  2120   WBC 4.2  --  4.6   RBC 3.91*  --  3.75*   HGB 10.2* 10.2* 9.9*   HCT 32.7*  --  31.0*   MCV 84  --  83   MCH 26.1*  --  26.4*   MCHC 31.2*  --  31.9   RDW 17.2*  --  17.0*     --  203     INR  Recent Labs   Lab 07/14/20  2120   INR 1.04       Assessment & Plan:   Dallin Stevens is a 35 year old male with PMH for metastatic melanoma to lymph nodes, adrenal glands, lung, and brain, bilateral pleural effusions s/p bilateral pleurx catheters, chronic hypotension 2/2 adrenal mets, anemia, and h/o pericardial effusion c/b tamponade s/p pericardial window who " presents after an unwitnessed fall at home.     # Metastatic BRAF-V660E mutated melanoma, to lymph nodes, adrenal glands,  lung, brain  # Bilateral pleural effusions, s/p bilateral PleurX catheters, improved  # H/o pericardial effusion c/b tamponade s/p pericardial window  Follows with Dr. Elfego Burnett.  Patient most recently progressed on single agent pembrolizumab, so he was started on dabrafenib and trametinib. Due to toxicity he required 25% dose reduction. Had CT-CAP on 7/1 with showed some progression of disease (mediastinal adenopathy, adrenal mets, R subcutaneous chest wall lesion) and plan was to start immunotherapy asap with Nivolumab (first dose 7/14/20).   - Continue to HOLD debrafenib 100mg twice daily and trametinib 1.5mg daily until able safely continue after further work-up for spell with LOC.   - Started nivolumab yesterday, 7/14     # Anemia, normocytic, multifactorial  - Likely associated with max doses dabrafenib/trametinib  - In setting of known malignancy. Hgb near baseline.   - Continue iron supplement    # Loss of Consciousness Event  Patient presented after unwitnessed fall at home. Came on abruptly and without much warning. Did not feel overtly dizzy or lightheaded before episode. Wife found him after and reported jerking motion; however, patient states it was more consistent with tense muscles. Denies HA, CP, SOB, irregular heart beats at time.    - CT head in ED w/o worsening of known brain lesions and w/out evidence of hemorrhage. Stable from prior.   - EKG w/o obvious pre-excitation syndromes.   - Agree with primary team regarding cardiac telemetry, orthostatics, echo, eeg, fall precautions  - At this point, we feel echo will be low-yield as unlikely related to pericardial effusion with recent pericardial window 4/2020. However will rule out. Echo to rule out seizure activity.   - Most likely related to dehydration and orthostatic episode as patient does have longstanding hx     #  Hyponatremia  Patient with hyponatremia to 125 which was normal 2 weeks ago. This has been a chronic issue in past. Improved to 134 today following fluids in ED and s/p 1L LR last evening upon admission to floor. Urine osm 104, urine sodium 34. Patient reports not feeling dehydrated.    - Discussed with staff and unlikely to be related to dabrafenib/trametinib. Likely more related to dehydration and possibly SIADH.   - Monitor BMP     # Lactic acidosis  Unclear etiology. Likely related to dehydration, malignancy, recent chemo infusion. Lactate improved to 0.9 following fluids in ED, 1L LR on admission to floor. No current infectious symptoms.   - Continue to monitor     # Chronic Hypotension  # Adrenal metastasis  - There is a concern for impending primary adrenal insufficiency  - Per Dr Burnett, continue to focus on nutrition and hydration. Has considered Midodrine for BP support, has not started. He would have low threshold to repeat adrenal testing, agree.     Recommendations:   - Continue to HOLD debrafenib 100mg twice daily and trametinib 1.5mg daily until able safely continue after further work-up for spell with LOC.   - Agree with further work-up per primary team ? cardiac telemetry, orthostatics, echo, eeg, fall precautions. At this point, we feel echo will be low-yield as unlikely related to pericardial effusion with recent pericardial window 4/2020. However will rule out. Eeg to rule out seizure activity. Most likely related to dehydration and orthostatic episode as patient does have longstanding hx.   - Hyponatremia likely related to dehydration and possibly SIADH. Na improved from 128 ? 134 following fluid replacement. Discussed with staff and less likely to be related to chemotherapy. Continue to follow BMP.   - Per Dr. Burnett, has low threshold to repeat adrenal testing. Agree with primary team regarding to eval for adrenal insufficiency.       We will continue to follow this patient. Please do not  hesitate to page with any questions or concerns.    Patient was seen and plan of care was discussed with attending physician Dr. Eid.    Ambreen Szymanski PA-C   Hematology/Oncology   Pager: [phone]

## 2020-07-15 NOTE — ED NOTES
Bed: ED12  Expected date: 7/14/20  Expected time: 8:25 PM  Means of arrival:   Comments:  [facility]  35 y M  Seizure, hx of brain and lung cancer,   [place]ellow

## 2020-07-15 NOTE — PLAN OF CARE
6B PT deferred  Discharge Planner PT   Patient plan for discharge: Home  Current status: PT orders acknowledged and appreciated. Pt independent with bed mobility, transfers, and gait in hallway. No dizziness or LOB noted, fall yesterday related to syncope rather than balance deficit. No acute PT needs identified, please re-consult if needs arise or LOS prolongs.   Barriers to return to prior living situation: None per PT  Recommendations for discharge: Home with family  Rationale for recommendations: See above       Entered by: Katherine Evans 07/15/2020 11:47 AM

## 2020-07-15 NOTE — ED NOTES
[facility], [place]   ED Nurse to Floor Handoff     Dallin Stevens is a 35 year old male who speaks English and lives with a spouse,  in a home  They arrived in the ED by ambulance from emergency room    ED Chief Complaint: Neurologic Problem    ED Dx;   Final diagnoses:   Hyponatremia   Loss of consciousness (H)         Needed?: No    Allergies: No Known Allergies.  Past Medical Hx:   Past Medical History:   Diagnosis Date     Malignant melanoma (H)       Baseline Mental status: WDL  Current Mental Status changes: at basesline    Infection present or suspected this encounter: no  Sepsis suspected: No  Isolation type: No active isolations     Activity level - Baseline/Home:  Independent  Activity Level - Current:   Stand with Assist    Bariatric equipment needed?: No    In the ED these meds were given:   Medications   lactated ringers BOLUS 1,000 mL (0 mLs Intravenous Stopped 7/14/20 2235)       Drips running?  No    Home pump  No    Current LDAs  Peripheral IV 07/14/20 Left Wrist (Active)   Number of days: 1       Chest Tube 1 Right Pleural 15.5 Japanese (Active)   Number of days: 86       Chest Tube 2 Left Pleural 15.5 Japanese (Active)   Number of days: 86       Incision/Surgical Site 01/16/20 Right Head (Active)   Number of days: 181       Labs results:   Labs Ordered and Resulted from Time of ED Arrival Up to the Time of Departure from the ED   CBC WITH PLATELETS DIFFERENTIAL - Abnormal; Notable for the following components:       Result Value    RBC Count 3.75 (*)     Hemoglobin 9.9 (*)     Hematocrit 31.0 (*)     MCH 26.4 (*)     RDW 17.0 (*)     Absolute Lymphocytes 0.6 (*)     All other components within normal limits   COMPREHENSIVE METABOLIC PANEL - Abnormal; Notable for the following components:    Sodium 125 (*)     Creatinine 0.60 (*)     Calcium 8.4 (*)     Albumin 3.1 (*)     Protein Total 6.5 (*)     All other components within normal limits   LACTIC ACID WHOLE  BLOOD - Abnormal; Notable for the following components:    Lactic Acid 2.7 (*)     All other components within normal limits   ROUTINE UA WITH MICROSCOPIC - Abnormal; Notable for the following components:    Ketones Urine 10 (*)     pH Urine 8.0 (*)     Mucous Urine Present (*)     Amorphous Crystals Few (*)     All other components within normal limits   OSMOLALITY - Abnormal; Notable for the following components:    Osmolality 257 (*)     All other components within normal limits   INR   TROPONIN I   TSH WITH FREE T4 REFLEX   SODIUM RANDOM URINE   OSMOLALITY URINE   COVID-19 VIRUS (CORONAVIRUS) BY PCR   ISTAT CG8 GAS ELEC ICA GLUC JOANNA NURSE POCT       Imaging Studies:   Recent Results (from the past 24 hour(s))   CT Head w/o Contrast    Narrative    EXAM: CT HEAD W/O CONTRAST  LOCATION: [facility]  DATE/TIME: 7/14/2020 10:13 PM    INDICATION: Fall, head injury, syncope versus seizure. History of melanoma with metastatic disease.  COMPARISON: Brain MRI dated 06/24/2020  TECHNIQUE: Routine without IV contrast. Multiplanar reformats. Dose reduction techniques were used.    FINDINGS:  INTRACRANIAL CONTENTS: No acute intracranial hemorrhage. The multifocal intracranial metastatic disease is better appreciated on the comparison MRI, however, lesions in the left parietal, bilateral frontal, and right temporal occipital region appear   grossly similar to the prior MRI. The surrounding edema is grossly stable. No midline shift or effacement of basal cisterns. Unchanged enlargement of the right temporal horn which may reflect mild entrapment. No CT evidence of acute infarct.    VISUALIZED ORBITS/SINUSES/MASTOIDS: No intraorbital abnormality. No paranasal sinus mucosal disease. No middle ear or mastoid effusion.    BONES/SOFT TISSUES: Redemonstrated right parietal and right parieto-occipital craniotomy changes. No acute displaced fracture.      Impression    IMPRESSION:  1.  No acute intracranial process.  2.   "Given differences in technique, no significant interval change from 06/24/2020. Redemonstrated intracranial metastatic disease with surrounding edema.   Cervical spine CT w/o contrast    Narrative    EXAM: CT CERVICAL SPINE W/O CONTRAST  LOCATION: [facility]  DATE/TIME: 7/14/2020 10:13 PM    INDICATION: Trauma. Neck injury. History of metastatic melanoma.  COMPARISON: Chest CT dated 07/01/2020  TECHNIQUE: Routine without IV contrast. Multiplanar reformats. Dose reduction techniques were used.    FINDINGS:  VERTEBRA: Normal vertebral body heights and alignment. No fracture or posttraumatic subluxation. No suspicious osseous lesions.    CANAL/FORAMINA: No canal or neural foraminal stenosis.    PARASPINAL: No acute soft tissue abnormality. Redemonstrated soft tissue mass1 in the right supraclavicular region and mediastinum, better evaluated on the prior chest CT.      Impression    IMPRESSION:  1.  No evidence of an acute osseous abnormality of the cervical spine.       Recent vital signs:   /72   Pulse 101   Temp 98.1  F (36.7  C) (Oral)   Resp 16   Ht 1.778 m (5' 10\")   Wt 59 kg (130 lb)   SpO2 98%   BMI 18.65 kg/m      Navin Coma Scale Score: 15 (07/14/20 2110)       Cardiac Rhythm: Other  Pt needs tele? No  Skin/wound Issues: None    Code Status: Full Code    Pain control: good    Nausea control: good    Abnormal labs/tests/findings requiring intervention:     Family present during ED course? Yes   Family Comments/Social Situation comments:     Tasks needing completion: None    Payam Yuen RN  [facility] --   [phone] [place] ED  [phone] [facility] ED      "

## 2020-07-15 NOTE — PROGRESS NOTES
"[facility], [facility] Progress Note - Hospitalist Service, Gold 11       Date of Admission:  7/14/2020  Assessment & Plan    Dallin Stevens is a 35 year old male with a past medical history of metastatic melanoma to lymph nodes, adrenal glands, lung and brain w/ bilateral pleural effusions s/p bilateral pleurx catheters in 04/2020,  h/o pericardial effusion c/b tamponade s/p pericardial window in 04/2020, chronic hypotension 2/2 adrenal mets, chronic anemia who was admitted 07/14/2020 after an unwitnessed fall at home.     #Spell w/ LOC: Unwitnessed fall at home, notes lightheadedness, pre syncope prior to event. No palpitations, focal neuro changes, smell/taste change, dyspnea prior to event, occurred ~1 min after going from sitting-->standing. Wife found pt, was \"stiff\" and concern for convulsions. CT head w/o acute intracranial process, redemonstrated intracranial metastatic disease w/ surrounding edema. Hgb stable. Most concerning for syncope 2/2 orthostatic hypotension vs known hypotensive issues w/ adrenal metastatic disease w/ syncopal convusions. Also considering seizure (risk factors include known brain mets w/ edema- no bowel/bladder incontinence or tongue laceration), arrhthymia (less likely, mild trop bump overnight but now normalized), pericardial effusion (less likely given bedside echo). Improved today, no further episodes.   -Continue tele  -Obtain ECHO  -Obtain EEG  -Continue fall precautions  -Orthostatic VS  -PT consult  -Encouraged fluids & regular diet     #Hyponatremia, improved: Sodium of 134 this AM- 128 on presentation to ED. Notes moderate PO intake yesterday. Appears euvolemic to mildly hypovolemic on exam. No e/o hypervolemia. Urine osm 104, urine sodium 34. Considering meds (chemo agents), SIADH (known lung mets), mineralocorticoid deficiency.   -Trend BMP  -Add on FeNa  -Oral chemo on hold per onc  -Pending w/u may consider salt tabs prior to " discharge     #Metastatic melanoma to lymph nodes, adrenal glands, lung, and brain: Follows with Elfego Burnett. On multidrug therapy, s/p cycle 3 day 1 of Opdivo 07/14.   -Onc consulted, plan to hold oral chemo for now  -Pleurx catheter orders placed (catheter placed 04/20/20 for recurrent moderate pleural effusions, left drained q4 days, right drained every other day)    Chronic/Stable/Resolved medical conditions  #H/o pericardial effusion c/b tamponade s/p pericardial window: ECHO as above. Continue Tele. Consider Cards consult pending ECHO.    #Chronic normocytic anemia: In setting of known malignancy. Hgb near baseline. On iron PTA. Will continue iron supplement.  #Lactic acidosis, resolved: LA to 2.7 on admission, improved to 0.9 w/ IVF in ED. Considering seizure, BP drop, dehydration, malignancy as culprits for initial elevation. Monitor PRN.        Diet: Combination Diet Regular Diet Adult    DVT Prophylaxis: Pneumatic Compression Devices and Ambulate every shift  Lu Catheter: not present  Code Status: Full Code           Disposition Plan   Expected discharge: Tomorrow, recommended to prior living arrangement once EEG completed, ECHO completed, syncope w/u completed, stable VS.  Entered: Ambreen Franklin PA-C 07/15/2020, 8:28 AM       The patient's care was discussed with the Attending Physician, Dr. Rodriguez, Bedside Nurse, Care Coordinator/, Patient and Oncology Consultant.    Ambreen Franklin PA-C  Hospitalist Service, [address], [place]  Pager: [phone]  Please see sticky note for cross cover information  ______________________________________________________________________    Interval History   The patient notes he is feeling ok. No further lightheadedness or syncopal episodes. Has been up to bathroom.    Frustrated with course. Feels like he keeps having set backs. Tolerated infusion fine yesterday, notes he was playing with kids and  getting them ready for bed prior to event. Wife coming in soon.     Data reviewed today: I reviewed all medications, new labs and imaging results over the last 24 hours. I personally reviewed no images or EKG's today.    Physical Exam   Vital Signs: Temp: 99.2  F (37.3  C) Temp src: Oral BP: 99/64 Pulse: 87 Heart Rate: 97 Resp: 24 SpO2: 98 % O2 Device: Nasal cannula Oxygen Delivery: 2 LPM  Weight: 128 lbs 6.4 oz  GENERAL: Alert and oriented x 3. Lying comfortably in bed.   HEENT: Anicteric sclera. Mucous membranes moist and without lesions.   CV: RRR. S1, S2. No murmurs appreciated.   RESPIRATORY: Effort normal on RA. Lungs CTAB with no wheezing, rales, rhonchi.   GI: Abdomen soft and non distended, bowel sounds present. No tenderness, rebound, guarding.   MUSCULOSKELETAL: No joint swelling or tenderness. Moves all extremities.   NEUROLOGICAL: No focal deficits.   EXTREMITIES: No peripheral edema. Intact bilateral pedal pulses.   SKIN: No jaundice. No rashes.       Data   Reviewed BMP, Trop, glucose, CBC

## 2020-07-15 NOTE — PROGRESS NOTES
Time of notification: 12:39 PM  Provider notified:Attending: Reinier Rodriguez MD, pgr [phone]   Patient status:FYI EEG stopped by, there is no equipment available- they will come when something opens up.  Temp:  [98.1  F (36.7  C)-99.2  F (37.3  C)] 99.2  F (37.3  C)  Pulse:  [] 87  Heart Rate:  [] 107  Resp:  [14-28] 24  BP: ()/(64-83) 108/68  SpO2:  [97 %-100 %] 98 %  Orders received:  Awaiting orders

## 2020-07-16 NOTE — PROGRESS NOTES
Care Coordinator - Discharge Planning    Admission Date/Time:  7/14/2020  Attending MD:  Reinier Rodriguez MD     Data  Date of initial CC assessment:  7/16/2020  Chart reviewed, discussed with interdisciplinary team.   Patient was admitted for:   1. Metastatic malignant melanoma (H)    2. Hyponatremia    3. Loss of consciousness (H)    4. Brain metastasis (H)    5. Pericardial effusion    6. Malignant melanoma of skin of trunk, except scrotum (H)         Assessment   Concerns with insurance coverage for discharge needs: None.  Current Living Situation: Patient lives with spouse.  Support System: Supportive and Involved  Services Involved: Home Care  Transportation at Discharge: Car and Family or friend will provide  Transportation to Medical Appointments:    - Name of caregiver: Spouse,   Barriers to Discharge: Medical Stability      Coordination of Care and Referrals:     Patient admitted after an unwitnessed fall at home. Per team, patient is stable to discharge home today.     PT is recommending home with assist at discharge.    Per chart review, patient is open with [facility] and receives RN services. Resumption orders placed. HC liaison updated. RNCC will follow as needed.     Plan  Anticipated Discharge Date:  7/16/2020  Anticipated Discharge Plan:  Home with resumption of home care services, with clinic follow-up as recommended.     CTS Handoff completed:  YES    Viri Newell, RN, BSN, PHN  Care Coordinator   P: [phone], [facility]

## 2020-07-16 NOTE — PROGRESS NOTES
Hematology / Oncology  Daily Progress Note   Date of Service: 07/16/2020  Patient: Dallin Stevens  MRN: [id number]  Admission Date: 7/14/2020  Hospital Day # 1  Cancer Diagnosis: Metastatic BRAF-[id number] mutated melanoma, to lymph nodes, adrenal glands, lung, brain  Primary Outpatient Oncologist: Dr. Elfego Burnett  Current Treatment Plan: Dabrafenib 100 mg twice daily, Trametinib 1.5 mg daily, Nivolumab (first dose 7/14)    Recommendations:   - Agree with discharge per primary team   - OK to resume oral chemo this evening upon discharge ? dabrafenib 100mg twice daily, trametinib 1.5mg daily.   - No seizures on eeg, echo showing trivial pericardial effusion improved from prior (4/2020). LOC event likely related to dehydration/ongoing orthostatic hypotension.   - R upper extremity US given hx of persistent non-occlusive thrombus in RIJ, R brachiocephalic vein, L brachiocephalic vein, SVC. No evidence of enlargement on exam. Ok to continue to hold anticoagulation at this time. Could consider in future if MRI brain stable and no hemorrhage seen (scheduled outpatient 7/24).  - Per patient, L pleur-x has not drained for roughly one month. R pleur-x draining 50-100mL every other day. Touching base with clinic thoracic surgery RNCC to arrange L pleur-x removal as outpatient. Do recommend decreasing frequency of R pleur-x drainage. Discussed with patient and wife.   - GUERRERO visit 7/24 for hospital follow-up, home care labs and to discuss results from MRI brain/utility of anticoagulation  - Primary oncologist (Dr. Burnett) updated.     Assessment & Plan:   Dallin Stevens is a 35 year old male with PMH for metastatic melanoma to lymph nodes, adrenal glands, lung, and brain, bilateral pleural effusions s/p bilateral pleurx catheters, chronic hypotension 2/2 adrenal mets, anemia, and h/o pericardial effusion c/b tamponade s/p pericardial window who presents after an unwitnessed fall at home.    # Metastatic BRAF-V660E mutated  melanoma, to lymph nodes, adrenal glands,  lung, brain  # Bilateral pleural effusions, s/p bilateral PleurX catheters, improved  # H/o pericardial effusion c/b tamponade s/p pericardial window  # H/o persistent non-occlusive thrombus in RIJ, R brachiocephalic vein, L brachiocephalic vein, SVC  Follows with Dr. Elfego Burnett.  Patient most recently progressed on single agent pembrolizumab, so he was started on dabrafenib and trametinib. Due to toxicity he required 25% dose reduction. Had CT-CAP on 7/1 with showed some progression of disease (mediastinal adenopathy, adrenal mets, R subcutaneous chest wall lesion) and plan was to start immunotherapy asap with Nivolumab (first dose 7/14/20).   - Started nivolumab 7/14   - OK to resume oral chemo this evening upon discharge ? dabrafenib 100mg twice daily, trametinib 1.5mg daily.   - Per patient, L pleur-x has not drained for roughly one month. R pleur-x draining 50-100mL every other day. Touching base with clinic thoracic surgery RNCC to arrange L pleur-x removal as outpatient. Do recommend decreasing frequency of R pleur-x drainage. Discussed with patient and wife.   - R upper extremity US given hx of persistent non-occlusive thrombus in RIJ, R brachiocephalic vein, L brachiocephalic vein, SVC. No evidence of enlargement on exam. Ok to continue to hold anticoagulation at this time. Could consider in future if MRI brain stable and no hemorrhage seen (scheduled outpatient 7/24).  - GUERRERO visit 7/24 for hospital follow-up and to discuss results from MRI brain/discuss utility of anticoagulation     # Anemia, normocytic, multifactorial  - Likely associated with max doses dabrafenib/trametinib  - In setting of known malignancy. Hgb near baseline.   - Continue iron supplement     # Loss of Consciousness Event  Patient presented after unwitnessed fall at home. Came on abruptly and without much warning. Did not feel overtly dizzy or lightheaded before episode. Wife found him  after and reported jerking motion; however, patient states it was more consistent with tense muscles. Denies HA, CP, SOB, irregular heart beats at time.    - CT head in ED w/o worsening of known brain lesions and w/out evidence of hemorrhage. Stable from prior.   - EKG w/o obvious pre-excitation syndromes.   - Agree with primary team regarding cardiac telemetry, orthostatics, echo, eeg, fall precautions  - No seizures on eeg, echo showing trivial pericardial effusion improved from prior (4/2020). LOC event likely related to dehydration/ongoing orthostatic hypotension.      # Hyponatremia  Patient with hyponatremia to 125 which was normal 2 weeks ago. This has been a chronic issue in past. Improved to 134 today following fluids in ED and s/p 1L LR last evening upon admission to floor. Urine osm 104, urine sodium 34. Patient reports not feeling dehydrated.    - Discussed with staff and unlikely to be related to dabrafenib/trametinib. Likely more related to dehydration and possibly SIADH.   - Monitor BMP, management per primary team     # Lactic acidosis  Unclear etiology. Likely related to dehydration, malignancy, recent chemo infusion. Lactate improved to 0.9 following fluids in ED, 1L LR on admission to floor. No current infectious symptoms.   - Continue to monitor     # Chronic Hypotension  # Adrenal metastasis  - There is a concern for impending primary adrenal insufficiency  - Per Dr Burnett, continue to focus on nutrition and hydration. Has considered Midodrine for BP support, has not started. He would have low threshold to repeat adrenal testing, agree.   - Agree with management per primary team    Patient was seen and plan of care was discussed with attending physician Dr. Eid.    Thank you for the opportunity to partake in this patients plan of care. Please do not hesitate to page with questions. We will continue to follow.     Ambreen Szymanski PA-C   Hematology/Oncology   Pager: [phone]  "  ___________________________________________________________________    Subjective & Interval History:    No acute events noted overnight. Patient feeling good today and would really like to go home. Denies any headaches, CP, SOB, dizziness, lightheaded. Up several times to walk yesterday without dizziness/lightheaded feeling.     Wife present at bedside.     Physical Exam:    Blood pressure 111/68, pulse 109, temperature 96.7  F (35.9  C), temperature source Oral, resp. rate 20, height 1.778 m (5' 10\"), weight 55.2 kg (121 lb 9.6 oz), SpO2 96 %.    General: upright in bed, no acute distress  HEENT: sclera anicteric, EOMI, MMM  Neck: supple, normal ROM  CV: RRR, normal S1/S2, no m/r/g  Resp: CTAB, no wheezing/crackles, normal respiratory effort on ambient air  MSK: warm and well-perfused, normal tone  Skin: no rashes on limited exam, no jaundice  Neuro: Alert and interactive, moves all extremities equally, no focal deficits    Labs & Studies: I personally reviewed the following studies:  ROUTINE LABS (Last four results):  CMP  Recent Labs   Lab 07/16/20  0522 07/15/20  1530 07/15/20  0519 07/14/20  2337 07/14/20  2120 07/14/20  0900   * 131* 134 128* 125* 128*   POTASSIUM 3.7  --  4.0 3.9 4.3 3.8   CHLORIDE 100  --  105  --  95 95   CO2 21  --  22  --  24 27   ANIONGAP 9  --  7  --  6 6   GLC 86  --  100* 100* 98 89   BUN 9  --  8  --  9 7   CR 0.60* 0.64* 0.59*  --  0.60* 0.56*   GFRESTIMATED >90 >90 >90  --  >90 >90   GFRESTBLACK >90 >90 >90  --  >90 >90   ABUNDIO 8.8  --  8.6  --  8.4* 8.5   PROTTOTAL 6.7*  --   --   --  6.5* 6.8   ALBUMIN 3.1*  --   --   --  3.1* 3.2*   BILITOTAL 0.5  --   --   --  0.4 0.4   ALKPHOS 111  --   --   --  111 117   AST 21  --   --   --  20 16   ALT 17  --   --   --  18 17     CBC  Recent Labs   Lab 07/16/20  0522 07/15/20  0519 07/14/20  6087 07/14/20  2120   WBC 5.4 4.2  --  4.6   RBC 4.03* 3.91*  --  3.75*   HGB 10.6* 10.2* 10.2* 9.9*   HCT 34.5* 32.7*  --  31.0*   MCV 86 84  " --  83   MCH 26.3* 26.1*  --  26.4*   MCHC 30.7* 31.2*  --  31.9   RDW 17.2* 17.2*  --  17.0*    228  --  203     INR  Recent Labs   Lab 07/14/20  2120   INR 1.04       Medications list for reference:  Current Facility-Administered Medications   Medication     acetaminophen (TYLENOL) tablet 650 mg     ferrous sulfate (FEROSUL) tablet 325 mg     lidocaine (LMX4) cream     lidocaine 1 % 0.1-1 mL     LORazepam (ATIVAN) tablet 0.5 mg     melatonin tablet 1 mg     naloxone (NARCAN) injection 0.1-0.4 mg     ondansetron (ZOFRAN-ODT) ODT tab 4 mg    Or     ondansetron (ZOFRAN) injection 4 mg     senna-docusate (SENOKOT-S/PERICOLACE) 8.6-50 MG per tablet 1 tablet    Or     senna-docusate (SENOKOT-S/PERICOLACE) 8.6-50 MG per tablet 2 tablet     sodium chloride (PF) 0.9% PF flush 3 mL     sodium chloride (PF) 0.9% PF flush 3 mL

## 2020-07-16 NOTE — PLAN OF CARE
"[id number]    /69 (BP Location: Right arm)   Pulse 87   Temp 99.3  F (37.4  C) (Oral)   Resp 20   Ht 1.778 m (5' 10\")   Wt 58.2 kg (128 lb 6.4 oz)   SpO2 97%   BMI 18.42 kg/m      Reason for admission: Admitted 07/14/2020 after an unwitnessed fall at home  Activity: SBA. Not OOB this shift  Pain: Pt reporting generalized pain/soreness from fall/syncope episode PTA. Given PRN Tylenol x1 with min effect.   Neuro: AxOx4. Neuros intact.   Cardiac: NSR on tele  Respiratory: Non labored breathing on RA. O2 sats WDL.   GI/: Abdomen soft, nt, nd, +BS. Reports last BM yesterday. Voiding in bedside urinal.   Diet: Tolerating regular diet.   Lines: PIV intact, SL, site WDL  Wounds: Pleurex dressings x2 CDI. No new deficits noted.   Labs/imaging: Reviewed. See chart. Awaiting cortisol stim test results.      Continue to monitor and follow POC    "

## 2020-07-16 NOTE — PLAN OF CARE
AVSS. Sinus tachycardia in 100s. C/o generalized pain, better with cold packs. Denies nausea. Tolerating regular diet. Up ad anai. Pleurx drains x2, one to be removed outpatient. BUE and BLE US done, no change, no new clots. Pt to have MRI as outpatient. Voiding adequately. No BM today. Spouse at bedside. Writer went over discharge paperwork with pt. Pt to discharge home with transportation from spouse prior to 1500.

## 2020-07-16 NOTE — PROGRESS NOTES
Brief Oncology Attending Note    See full daily note by oncology consult service.     Doing ok. Eating, ambulating without difficulty. No events overnight. Labs and exam acceptable.   Discussed pleurx drainage further - nothing out of the L for about a month, has been trying every 4 days. Drains about  out of the right, trying every 2-3 days.   Discussed activity - can walk quite a bit without any kind of lightheadness or other trouble. Was sitting essentially and playing with his kids when the events that brought him occurred.    - Discussed possible role of anticoagulation. US was done today that shows the SVC/IJ, etc., clot isn't clearly bigger. So this question can be put on the back burner for the moment  - Would like brain MRI to be done, ok to do as outpatient as pt would really like to get home today. If no hemorrhage and brain mets appear treated, would consider anticoagulation, even if it was a prophy dose to maintain a more even risk/benefit ratio  - Close follow up in clinic  - I will send message to thoracic surgery RNCCs about the pleurxs. Left can be removed and they will take care of that as an outpatient. Right will stay and I asked Even and his wife to decrease the frequency to every 4-5 days and see what happens.   - Ok to discharge from oncology perspective; discharging today  - resume dabrafenib trametinib today or tomorrow    Anastasiia Eid MD

## 2020-07-17 NOTE — PROGRESS NOTES
[facility]: Post-Discharge Note  SITUATION                                                      Admission:    Admission Date: 07/14/20   Reason for Admission: Metastatic BRAF-V660E mutated melanoma, to lymph nodes, adrenal glands, lung, brain  Discharge:   Discharge Date: 07/16/20  Discharge Diagnosis: Metastatic BRAF-V660E mutated melanoma, to lymph nodes, adrenal glands, lung, brain    BACKGROUND                                                      Dallin Stevens is a 35 year old male with PMH for metastatic melanoma to lymph nodes, adrenal glands, lung, and brain, bilateral pleural effusions s/p bilateral pleurx catheters, chronic hypotension 2/2 adrenal mets, anemia, and h/o pericardial effusion c/b tamponade s/p pericardial window who presents after an unwitnessed fall at home.    ASSESSMENT      Discharge Assessment  Patient reports symptoms are: Improved  Does the patient have all of their medications?: Yes  Does patient know what their new medications are for?: Yes  Does patient have a follow-up appointment scheduled?: Yes  Does patient have any other questions or concerns?: No    Post-op  Did the patient have surgery or a procedure: No  Fever: No  Chills: No  Eating & Drinking: unable to tolerate solid foods  PO Intake: soft foods  Bowel Function: normal  Urinary Status: voiding without complaint/concerns        PLAN                                                      Outpatient Plan:      Future Appointments   Date Time Provider Department Center   7/24/2020  9:00 AM MR1 [facility]   7/24/2020  4:20 PM Winsome Simms APRN CNP [facility]   8/5/2020  1:15 PM  [facility] DRAW [facility]   8/6/2020  2:15 PM Benita Freitas MD [facility]   9/30/2020 12:00 PM [facility]   9/30/2020  1:00 PM Jose Carlos Arshad MD [facility]           Breanna Cerna CMA

## 2020-07-19 NOTE — DISCHARGE SUMMARY
"[facility], [place]  Hospitalist Discharge Summary      Date of Admission:  7/14/2020  Date of Discharge:  7/16/2020  3:10 PM  Discharging Provider: Reinier Rodriguez MD  Discharge Team: Hospitalist Service, Gold     Discharge Diagnoses   Syncope due to dehydration   Metastatic melanoma     Follow-ups Needed After Discharge   Follow-up Appointments     Adult [facility] Follow-up and recommended labs and tests      Follow up with primary care provider, Vero Tesfaye, within 7 days for   hospital follow- up.   Follow up with oncology as planned.     Appointments on [place] and/or [facility] (with [facility] or [facility]   provider or service). Call [phone] if you haven't heard regarding   these appointments within 7 days of discharge.             Unresulted Labs Ordered in the Past 30 Days of this Admission     No orders found from 6/14/2020 to 7/15/2020.          Discharge Disposition   Discharged to home  Condition at discharge: Stable    Hospital Course    Dallin Stevens is a 35 year old male with PMH for metastatic melanoma to lymph nodes, adrenal glands, lung, and brain, bilateral pleural effusions s/p bilateral pleurx catheters, chronic hypotension 2/2 adrenal mets, anemia, and h/o pericardial effusion c/b tamponade s/p pericardial window who presents after an unwitnessed fall at home.     #syncopal event  Patient presenting after unwitnessed fall at home with description of prodromal episode prior to event of feeling dizzy and like he was going to \"pass out\". No post ictal phase, incontinence or tongue biting c/w seizure. Likely description of jerking is syncopal convulsions. CT head w/out worsening of known brain lesions and w/out evidence of hemorrhage. EKG w/out obvious pre-excitation syndromes.   -EEG with signs of known brain mets but without new epileptiform or seizure activity  -telemetry was unremarkable  -no further falls or lightheadedness while admitted   -ruled out " for MI  -orthostatics wnl      #Hyponatremia  Patient with hyponatremia to 125 from prior normal 2 weeks ago.   Improved with fluids. Adrenal insufficiency was considered and standard kyleigh stim test was ordered. It was drawn in the wrong tube so test was incomplete, but post ACTH cortisol appears to have elevated appropriately making adrenal insufficiency unlikely.      #Lactic acidosis  Unclear etiology. No infectious symptoms. Possibly related to known malignancy and/or dehydration versus transient BP drop given known issues with chronic hypotension. Resolved.      #H/o pericardial effusion c/b tamponade s/p pericardial window  -telemetry unremarkable.   -stable/smaller on repeat TTE      #metastatic melanoma to lymph nodes, adrenal glands, lung, and brain  #Chronic cerebral edema  On multidrug therapy. Follows with Dr. Elfego Burnett. oncology consulted and was stable for discharge with outpatient f/u.      #chronic normocytic anemia  In setting of known malignancy. Hgb near baseline. On iron PTA  -continued iron supplement       Consultations This Hospital Stay   PHYSICAL THERAPY ADULT IP CONSULT  ONCOLOGY ADULT IP CONSULT    Code Status   Prior    Time Spent on this Encounter   I, Reinier Rodriguez MD, personally saw the patient today and spent greater than 30 minutes discharging this patient.       Reinier Rodriguez MD  [facility], [place]  ______________________________________________________________________    Physical Exam   Vital Signs:                    Weight: 121 lbs 9.6 oz  General Appearance: A&Ox3 in NAd  Respiratory: CTAB   Cardiovascular: RRR   GI: soft nt  Skin: wwp   Other: Rohan         Primary Care Physician   Vero Tesfaye    Discharge Orders      MRI Brain w contrast     CBC with platelets differential    Last Lab Result: Hemoglobin (g/dL)       Date                     Value                 07/16/2020               10.6 (L)         ----------      Comprehensive metabolic panel     Home care nursing referral      Reason for your hospital stay    Syncope  Metastatic melanoma     Adult [facility] Follow-up and recommended labs and tests    Follow up with primary care provider, Vero Tesfaye, within 7 days for hospital follow- up.   Follow up with oncology as planned.     Appointments on [place] and/or [facility] (with [facility] or [facility] provider or service). Call [phone] if you haven't heard regarding these appointments within 7 days of discharge.     Activity    Your activity upon discharge: activity as tolerated and no driving for today     Diet    Follow this diet upon discharge: Orders Placed This Encounter      Combination Diet Regular Diet Adult       Significant Results and Procedures   Results for orders placed or performed during the hospital encounter of 07/14/20   CT Head w/o Contrast    Narrative    EXAM: CT HEAD W/O CONTRAST  LOCATION: [facility]  DATE/TIME: 7/14/2020 10:13 PM    INDICATION: Fall, head injury, syncope versus seizure. History of melanoma with metastatic disease.  COMPARISON: Brain MRI dated 06/24/2020  TECHNIQUE: Routine without IV contrast. Multiplanar reformats. Dose reduction techniques were used.    FINDINGS:  INTRACRANIAL CONTENTS: No acute intracranial hemorrhage. The multifocal intracranial metastatic disease is better appreciated on the comparison MRI, however, lesions in the left parietal, bilateral frontal, and right temporal occipital region appear   grossly similar to the prior MRI. The surrounding edema is grossly stable. No midline shift or effacement of basal cisterns. Unchanged enlargement of the right temporal horn which may reflect mild entrapment. No CT evidence of acute infarct.    VISUALIZED ORBITS/SINUSES/MASTOIDS: No intraorbital abnormality. No paranasal sinus mucosal disease. No middle ear or mastoid effusion.    BONES/SOFT TISSUES: Redemonstrated right parietal and right  parieto-occipital craniotomy changes. No acute displaced fracture.      Impression    IMPRESSION:  1.  No acute intracranial process.  2.  Given differences in technique, no significant interval change from 06/24/2020. Redemonstrated intracranial metastatic disease with surrounding edema.   Cervical spine CT w/o contrast    Narrative    EXAM: CT CERVICAL SPINE W/O CONTRAST  LOCATION: [facility]  DATE/TIME: 7/14/2020 10:13 PM    INDICATION: Trauma. Neck injury. History of metastatic melanoma.  COMPARISON: Chest CT dated 07/01/2020  TECHNIQUE: Routine without IV contrast. Multiplanar reformats. Dose reduction techniques were used.    FINDINGS:  VERTEBRA: Normal vertebral body heights and alignment. No fracture or posttraumatic subluxation. No suspicious osseous lesions.    CANAL/FORAMINA: No canal or neural foraminal stenosis.    PARASPINAL: No acute soft tissue abnormality. Redemonstrated soft tissue mass1 in the right supraclavicular region and mediastinum, better evaluated on the prior chest CT.      Impression    IMPRESSION:  1.  No evidence of an acute osseous abnormality of the cervical spine.   POC US ECHO LIMITED    Impression    Limited Bedside Cardiac Ultrasound, performed and interpreted by me.   Indication: Syncope, hx of previous pericardial effusion.  Parasternal long axis, parasternal short axis and apical 4 chamber views were acquired.   Somewhat limited (Some drains in place, pt in c-spine precaution)    Findings:    No obvious signficant/gross LV disfunction, does have some evidence of relatively pericardial effusion w/ septation w/o clear evidence of tamponade physiology    IMPRESSION: Small pericardial effusion w/o clear evidence of tamponade physiology     US Upper Extremity Venous Duplex Right Portable     Value    Radiologist flags Nonocclusive thrombus in the left innominate vein. (Urgent)    Narrative    EXAMINATION: DOPPLER VENOUS ULTRASOUND OF THE RIGHT UPPER  EXTREMITY,  2020 12:56 PM     COMPARISON: None.    HISTORY: History of metastatic melanoma and clot of right IJ and SVC    TECHNIQUE:  Gray-scale evaluation with compression, spectral flow and  color Doppler assessment of the deep venous system of the right upper  extremity.    FINDINGS:  Right: Normal blood flow and waveforms are demonstrated in the  internal jugular, subclavian, and axillary veins. Focus of  nonocclusive thrombus visualized in the left innominate vein. There is  normal compressibility of the brachial, basilic and cephalic veins.      Impression    IMPRESSION:  1.  Tiny nonocclusive thrombus in the left innominate vein in the  region of the mass seen on recent CT.    [Urgent Result: Nonocclusive thrombus in the left innominate vein.]    Finding was identified on 2020 1:02 PM.     Ji FLORES was contacted by Dr. Gordon at 2020 1:14 PM  and verbalized understanding of the urgent finding.      I have personally reviewed the examination and initial interpretation  and I agree with the findings.    BRIGHT CHEN MD   Echo Limited    Narrative    [id number]  [id number]  [id number]  [id number]^JARED PATEL^ANNA^[facility],[place]  Echocardiography Laboratory  [address] [place], [place] [id number]     Name: JEANIE HERNANDEZ  MRN: [id number]  : 1985  Study Date: 07/15/2020 10:19 AM  Age: 35 yrs  Gender: Male  Patient Location: [facility]  Reason For Study: Syncope  Ordering Physician: ANNA HER  Performed By: Krystal Sears RDCS     BSA: 1.7 m2  Height: 70 in  Weight: 128 lb  HR: 99  BP: 99/64 mmHg  _____________________________________________________________________________  __        Procedure  Limited Echocardiogram with portions of two-dimensional, color and spectral  Doppler performed.  _____________________________________________________________________________  __        Interpretation Summary  Limited study.      Normal biventricular function with LVEF 55-60% and no regional WMAs.  No hemodynamically significant valvular abnormalities on limited assessment.  Normal IVC, estimated RAP 3mmHg.  Trivial pericardial effusion with no hemodynamic compromise.     Compared to echo on 4/19/20, pericardial effusion appears smaller.  _____________________________________________________________________________  __        Left Ventricle  Global and regional left ventricular function is normal with an EF of 55-60%.  No regional wall motion abnormalities are seen.     Right Ventricle  The right ventricle is normal size. Global right ventricular function is  normal.     Atria  The left atrium appears normal. The right atria appears normal.     Mitral Valve  The mitral valve is normal. No MR.        Aortic Valve  No aortic regurgitation is present.     Tricuspid Valve  The tricuspid valve is normal. Trace tricuspid insufficiency is present. The  peak velocity of the tricuspid regurgitant jet is not obtainable.     Vessels  The inferior vena cava was normal in size with preserved respiratory  variability. IVC diameter <2.1 cm collapsing >50% with sniff suggests a normal  RA pressure of 3 mmHg.     Pericardium  Trivial pericardial effusion is present.     Compared to Previous Study  This study was compared with the study from 04/19/20 .        Attestation  I have personally viewed the imaging and agree with the interpretation and  report as documented by the fellow, Nickie Heath, and/or edited by me.  _____________________________________________________________________________  __                          Report approved by: Zora Flores 07/15/2020 10:55 AM              _____________________________________________________________________________  __            Discharge Medications   Discharge Medication List as of 7/16/2020  2:40 PM      CONTINUE these medications which have NOT CHANGED    Details   acetaminophen (TYLENOL) 325 MG  tablet Take 2 tablets (650 mg) by mouth every 4 hours as needed for mild pain, fever or headaches (> 101 F), TransitionalIndication: Mild Pain; Fever; Headache      dabrafenib (TAFINLAR) 50 MG capsule Take 2 capsules (100 mg) by mouth 2 times daily Take at least 1 hr before or 2 hrs after a meal., Disp-120 capsule,R-0, E-Prescribe      ferrous sulfate (FEROSUL) 325 (65 Fe) MG tablet Take 1 tablet (325 mg) by mouth daily (with breakfast), Disp-30 tablet,R-4, E-Prescribe      LORazepam (ATIVAN) 0.5 MG tablet Take 1 tablet (0.5 mg) by mouth every 6 hours as needed for anxiety, Disp-30 tablet,R-1, E-Prescribe      melatonin 1 MG TABS tablet Take 1 tablet (1 mg) by mouth nightly as needed for sleep, Disp-30 tablet,R-0, E-Prescribe      trametinib (MEKINIST) 0.5 MG tablet Take 3 tablets (1.5 mg) by mouth daily Take 1 hr before or 2 hrs after a meal. STORE and DISPENSE from original container., Disp-90 tablet,R-0, E-Prescribe      Vitamin D, Cholecalciferol, 25 MCG (1000 UT) CAPS Historical           Allergies   No Known Allergies

## 2020-07-24 NOTE — LETTER
7/24/2020         RE: Dallin Stevens  [address] [address] [facility] [address]        Dear Colleague,    Thank you for referring your patient, Dallin Stevens, to the [facility]. Please see a copy of my visit note below.    Dallin Stevens is a 35 year old male who is being evaluated via a billable video visit.        Video-Visit Details    Type of service:  Video Visit    Video Start Time: 1636  Video End Time: 1650     Additional 30 minutes spent in consultation with Dr. Burnett, then a phone conversation with this patient     Originating Location (pt. Location): Home    Distant Location (provider location):  [facility]     Platform used for Video Visit: SERGEI Ortiz CNP      Reason for Visit: f/u metastatic melanoma     Oncology HPI:   Melanoma History  1.  2/03/2012, biopsy of left flank skin lesion revealed 1.7 mm superficial spreading melanoma without ulceration.  2. 2/23/2012, he had left flank wide local excision and left axillary sentinel node biopsy. One of two lymph node positive. He had stage III (pT2a pN1a M0) disease.  3. 3/02/2012, left axillary radical lymph node dissection was done. All 23 lymph nodes benign.  4. 4/02 to 4/27/2012, he received 4 weeks of high-dose interferon  5. 1/25/2017, CT scan revealed metastatic disease involving lymph nodes in the right supraclavicular, mediastinum and right hilum, and 3 small pulmonary nodules are present.  6. 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    7. 2/08/2017 to 11/08/2017, he received pembrolizumab  8. 6/28/2017, PET scan revealed significant improvement.  9. 11/29/2017, PET showed new focus of mild FDG uptake within a 6 mm right paratracheal lymph node.  10. 10/02/2018, PET scan revealed new hypermetabolic lymphadenopathy in the  supraclavicular region of the left neck and in the mediastinum.   11. 4/24/2019, MRI brain revealed intraparenchymal mass within the right parietal occipital lobe. 12. 4/26/2019, he had craniotomy and resection and pathology confirmed metastatic melanoma. No post-op radiation was delivered.  13. 7/23/2019, PET scan showed progression of disease. New hypermetabolic lymphadenopathy seen in the right supraclavicular fossa and anterior mediastinum.  14. 11/25/2019, MRI brain reveals metastasis in the left posterior frontal lobe, right peritrigonal region and right posterior frontal parafalcine lesion. PET scan showed progression with multiple new hypermetabolic subcutaneous nodules throughout the body.  No change in size of mediastinal lymphadenopathy.   15. 1/06/2020, admitted in the [facility] because of left-sided weakness. MRI Brain revealed a 3.3 cm hemorrhagic mass in the right frontal gyrus and other brain metastases.  16. 1/16/2020, he had right frontal craniotomy and resection. Pathology revealed metastatic melanoma.  17. 1/27/2020 to 1/31/2020, he received gamma knife radiation to 5 brain lesions, 2500 cGy in 5 fractions.  18.2/11/2020, he started treatment with pembrolizumab and received 4 cycles through 4/15/2020.  19. 4/18/2020, he presents with severe dyspnea, worsened mediastinal adenopathy, large pericardial effusion with tamponade, recurrent moderate pleural effusions. Bilateral pleurX catheters placed 4/20/20.  20. 4/29/2020, he starts treatment with Dabrafenib and Trametinib.  21. 5/22/2020, CT-CAP shows evidence of mixed initial response to BRAF inhibitor therapy, with a reduction in the size of the mediastinal metastases and adenopathy, small pulmonary and hepatic metastases, and decreasing size of subcutaneous lesions. Adrenal metastases and retroperitoneal metastases showed enlargement.   7/14/20: Nivolumab added      Interval history: Dallin is feeling ok. Remains weak and tired, but not  significantly different than before. No recurrence of syncope. No seizure activity. No headaches, vision changes, focal weakness, dizziness, difficulty with balance or coordination, speech difficulty. Eating/drinking ok, drinks about 60 ounces of H20/day. Bowels are moving, but sometimes feels bloated. No difficulty with urination. No edema, rash, bruising, bleeding. Denies shortness of breath. Has an occasional cough, unchanged. L pleurx catheter has not been draining, plans to have this removed next Thurs. R pleurx is draining  ml every other day, fluid is pink tinged. No fevers/chills.    Current Outpatient Medications   Medication Sig Dispense Refill     acetaminophen (TYLENOL) 325 MG tablet Take 2 tablets (650 mg) by mouth every 4 hours as needed for mild pain, fever or headaches (> 101 F)       dabrafenib (TAFINLAR) 50 MG capsule Take 2 capsules (100 mg) by mouth 2 times daily Take at least 1 hr before or 2 hrs after a meal. 120 capsule 0     ferrous sulfate (FEROSUL) 325 (65 Fe) MG tablet Take 1 tablet (325 mg) by mouth daily (with breakfast) 30 tablet 4     LORazepam (ATIVAN) 0.5 MG tablet Take 1 tablet (0.5 mg) by mouth every 6 hours as needed for anxiety 30 tablet 1     melatonin 1 MG TABS tablet Take 1 tablet (1 mg) by mouth nightly as needed for sleep 30 tablet 0     trametinib (MEKINIST) 0.5 MG tablet Take 3 tablets (1.5 mg) by mouth daily Take 1 hr before or 2 hrs after a meal. STORE and DISPENSE from original container. 90 tablet 0     Vitamin D, Cholecalciferol, 25 MCG (1000 UT) CAPS           No Known Allergies      Video physical exam  General: Patient appears pale, chronically ill, in no acute distress.   Skin: No visualized rash or lesions on visualized skin  Eyes: EOMI, no erythema, sclera icterus or discharge noted  Resp: Appears to be breathing comfortably without accessory muscle usage, speaking in full sentences, no cough  MSK: Appears to have normal range of motion based on visualized  movements  Neurologic: No apparent tremors,   Psych: affect engaged, pleasant, alert and oriented    The rest of a comprehensive physical examination is deferred due to PHE (public health emergency) video restrictions     There were no vitals taken for this visit.  Wt Readings from Last 4 Encounters:   07/16/20 55.2 kg (121 lb 9.6 oz)   07/14/20 58.6 kg (129 lb 3.2 oz)   05/23/20 55 kg (121 lb 4.8 oz)   04/19/20 59.7 kg (131 lb 11.2 oz)         Labs:   7/23/20 labs from care everywhere show are remarkable for a Na of 126, Cl 93. BUN/Cr wnl., LFTs wnl, WBC 6.9, Hgb 8.5, Platelets 324    Imaging:     EXAMINATION: DOPPLER VENOUS ULTRASOUND OF THE RIGHT UPPER EXTREMITY,  7/16/2020 12:56 PM      COMPARISON: None.     HISTORY: History of metastatic melanoma and clot of right IJ and SVC     TECHNIQUE:  Gray-scale evaluation with compression, spectral flow and  color Doppler assessment of the deep venous system of the right upper  extremity.     FINDINGS:  Right: Normal blood flow and waveforms are demonstrated in the  internal jugular, subclavian, and axillary veins. Focus of  nonocclusive thrombus visualized in the left innominate vein. There is  normal compressibility of the brachial, basilic and cephalic veins.                                                                      IMPRESSION:  1.  Tiny nonocclusive thrombus in the left innominate vein in the  region of the mass seen on recent CT.     [Urgent Result: Nonocclusive thrombus in the left innominate vein.]     Finding was identified on 7/16/2020 1:02 PM.      Ji FLORES was contacted by Dr. Gordon at 7/16/2020 1:14 PM  and verbalized understanding of the urgent finding.       I have personally reviewed the examination and initial interpretation  and I agree with the findings.     BRIGHT CHEN MD    MR BRAIN W/O & W CONTRAST 7/24/2020 10:02 AM     Provided History: Melanoma, assess treatment response; Metastatic  malignant melanoma (H).  ICD-10:  Metastatic malignant melanoma (H)     Comparison: Brain MRI 6/24/2020.     Technique: Multiplanar T1-weighted, axial FLAIR, and susceptibility  images were obtained without intravenous contrast. Following  intravenous gadolinium-based contrast administration, axial  T2-weighted, diffusion, and T1-weighted images (in multiple planes)  were obtained.     Contrast: 5.0 Gadavist     Findings:     Stable postsurgical changes of a right frontal and occipital  craniotomy with underlying resection cavities. Slight interval  increase in the extent of T2 hyperintense signal in the right  temporoparietal lobe. No significant change in 2.4 x 2.2 cm T1  hyperintense hemorrhagic lesion in the temporal horn of the right  lateral ventricle.     There are multiple enhancing lesions in supratentorial and  infratentorial brain with the surrounding edema. Some lesions  demonstrate associated diffusion restriction is susceptibility.  Compared to prior MRI, some are stable, some increased and some are  new. Representatives may include;     -6 x 8 mm lesion in the left parietal lobe (series 14, image 21),  previously measuring 12 x 9 mm, decreased  -11 x 9 mm lesion in the right posterior frontal lobe, previously  punctate, increased,  -6 x 4 mm lesion in the right frontal lobe, new.     There is no mass effect or midline shift. The ventricles are  proportionate to the cerebral sulci. Normal major vascular  intracranial flow-voids.     No abnormality of the skull marrow signal. The visualized portions of  paranasal sinuses, and mastoid air cells are relatively clear. The  orbits are grossly unremarkable.                                                                      Impression:   In this patient with history of multifocal intracranial metastatic  melanoma;  Mixed treatment response, as evidenced by decrease in the size of some  of the lesions while some lesions increased in size and development of  new lesions. Some lesions are  stable.     I have personally reviewed the examination and initial interpretation  and I agree with the findings.     MELIDA EDGAR MD    Impression/plan:     Metastatic BRAF-V660E mutated melanoma, to lymph nodes, adrenal glands,  lung, brain  -had progression on single agent pembrolizumab, started on dabrafenib and trametinib in April ,required a 25% dose reduction due to neutropenia and fevers  -  Nivolumab added on 7/14/20 as he had a mixed response  -tolerated well thus far  -reviewed brain MRI with him and with Dr. Burnett. We discussed the concerns that this could represent that his melanoma is progressing, but still may be possible this represent pseudo progression with the immunotherapy  -reviewed possible options of referring back for gamma knife, but don't feel comfortable with the break this would require in the dabrafenib and trametinib.  At this time, recommend monitoring closely, repeat brain MRI in 1 month or sooner if he should develop new symptoms  -will f/u with Dr. Burnett in August prior to the next cycle, will discuss repeating CT CAP prior to that visit      Bilateral pleural effusion s/t melanoma, s/p bilateral PleurX catheters, improved drainage  -L pleurx not draining, getting removed on Thurs.   -R pleurx with  ml every other day, could decrease to every 4 days     Neutropenia, associated with max doses dabrafenib/trametinib, grade 3  - improved with dose reduction      Anemia, normocytic, multifactorial  -hgb trending down again. Has some light bleeding in the pleurx drainage, no other apparent cause of bleeding  -looking at historical levels in care everywhere, hgb tends to range from 7.9-9.5, was higher while in the hospital at 10.5  -will repeat CBCDP next week    Syncopal event  -had an unwitnessed fall at home, felt dizzy and like he was going to pass out  -CT head without worsening of brain mets and w/o evidence of hemorrhage. EKG was unrevealing.  -EEG without new  epileptiform or seizure activity  -orthostatics wnl  -MRI today shows new lesion, but unsure if this relates as EEG without seizure activity. No new episodes of syncope or seizures  -will monitor    Hyponatremia  -unclear on the exact process that is contributing, whether SIADH or adrenal insufficiency. Hyponatremia is associated with trametinib (24-57%), dabrafenib (8-14%)  - Work-up in the hospital reviewed and discussed with Dr. Burnett.  Note that his random urine Na is a little high  -discussed with the patient, he is drinking about 60 ounces of water a day   -advised to add salt to diet, change some of his fluids to milk, gatorade  -will add NaCl 1 gm tablet, bid, recheck lytes next week Weds       Chronic Hypotension  Adrenal metastasis, concern for impending primary adrenal insufficiency  -cortisol drawn in the wrong tube in the hospital, but post-ACTH coritsol appears elevated appropriated making adrenal insufficiency unlikely  -continue slow position changes, monitor    L innominate vein thrombosis, non-occlusive  -has been present on prior CT imaging, not clearly progressive on US and no clinical symptoms suggesting worsening   -with possible progressive brain mets the risk of intracranial bleeding with anticoagulation is high. Will HOLD on anticoagulation at this time      Again, thank you for allowing me to participate in the care of your patient.        Sincerely,        SERGEI Carr CNP

## 2020-07-24 NOTE — TELEPHONE ENCOUNTER
Spoke with patient to schedule procedure with Dr. Starr Gunter    Procedure was scheduled on 07/30 in ENDO  Patient will have H&P with: not needed    Patient is aware they will receive a call to schedule a COVID-19 test before their procedure.   The test should be with-in 4days of procedure.     Patient is aware a / is needed day of surgery.   Surgery letter was sent via [employer], patient has my direct contact information for any further questions.

## 2020-07-24 NOTE — DISCHARGE INSTRUCTIONS
MRI Contrast Discharge Instructions    The IV contrast you received today will pass out of your body in your  urine. This will happen in the next 24 hours. You will not feel this process.  Your urine will not change color.    Drink at least 4 extra glasses of water or juice today (unless your doctor  has restricted your fluids). This reduces the stress on your kidneys.  You may take your regular medicines.    If you are on dialysis: It is best to have dialysis today.    If you have a reaction: Most reactions happen right away. If you have  any new symptoms after leaving the hospital (such as hives or swelling),  call your hospital at the correct number below. Or call your family doctor.  If you have breathing distress or wheezing, call 911.    Special instructions: ***    I have read and understand the above information.    Signature:______________________________________ Date:___________    Staff:__________________________________________ Date:___________     Time:__________    [facility] Departments:    ___[place]: [phone]  ___[facility]: [phone]  ___[place]: [phone] ___[facility]: [phone]  ___[facility]: [phone]  ___[facility]: [phone]  ___[phone]  ___[facility]: [phone]  ___[phone]

## 2020-07-24 NOTE — PROGRESS NOTES
"Dallin Stevens is a 35 year old male who is being evaluated via a billable video visit.      The patient has been notified of following:     \"This video visit will be conducted via a call between you and your physician/provider. We have found that certain health care needs can be provided without the need for an in-person physical exam.  This service lets us provide the care you need with a video conversation.  If a prescription is necessary we can send it directly to your pharmacy.  If lab work is needed we can place an order for that and you can then stop by our lab to have the test done at a later time.    Video visits are billed at different rates depending on your insurance coverage.  Please reach out to your insurance provider with any questions.    If during the course of the call the physician/provider feels a video visit is not appropriate, you will not be charged for this service.\"    Patient has given verbal consent for Video visit? Yes  How would you like to obtain your AVS? MyChart  If you are dropped from the video visit, the video invite should be resent to: Send to e-mail at: [email or web address]  Will anyone else be joining your video visit? No       GEOFF Noble        Video-Visit Details    Type of service:  Video Visit    Video Start Time: 1636  Video End Time: 1650     Additional 30 minutes spent in consultation with Dr. Burnett, then a phone conversation with this patient     Originating Location (pt. Location): Home    Distant Location (provider location):  [facility]     Platform used for Video Visit: SERGEI Ortiz CNP      Reason for Visit: f/u metastatic melanoma     Oncology HPI:   Melanoma History  1.  2/03/2012, biopsy of left flank skin lesion revealed 1.7 mm superficial spreading melanoma without ulceration.  2. 2/23/2012, he had left flank wide local excision and left axillary sentinel node biopsy. One of two lymph node positive. He had stage " III (pT2a pN1a M0) disease.  3. 3/02/2012, left axillary radical lymph node dissection was done. All 23 lymph nodes benign.  4. 4/02 to 4/27/2012, he received 4 weeks of high-dose interferon  5. 1/25/2017, CT scan revealed metastatic disease involving lymph nodes in the right supraclavicular, mediastinum and right hilum, and 3 small pulmonary nodules are present.  6. 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    7. 2/08/2017 to 11/08/2017, he received pembrolizumab  8. 6/28/2017, PET scan revealed significant improvement.  9. 11/29/2017, PET showed new focus of mild FDG uptake within a 6 mm right paratracheal lymph node.  10. 10/02/2018, PET scan revealed new hypermetabolic lymphadenopathy in the supraclavicular region of the left neck and in the mediastinum.   11. 4/24/2019, MRI brain revealed intraparenchymal mass within the right parietal occipital lobe. 12. 4/26/2019, he had craniotomy and resection and pathology confirmed metastatic melanoma. No post-op radiation was delivered.  13. 7/23/2019, PET scan showed progression of disease. New hypermetabolic lymphadenopathy seen in the right supraclavicular fossa and anterior mediastinum.  14. 11/25/2019, MRI brain reveals metastasis in the left posterior frontal lobe, right peritrigonal region and right posterior frontal parafalcine lesion. PET scan showed progression with multiple new hypermetabolic subcutaneous nodules throughout the body.  No change in size of mediastinal lymphadenopathy.   15. 1/06/2020, admitted in the [facility] because of left-sided weakness. MRI Brain revealed a 3.3 cm hemorrhagic mass in the right frontal gyrus and other brain metastases.  16. 1/16/2020, he had right frontal craniotomy and resection. Pathology revealed metastatic melanoma.  17. 1/27/2020 to 1/31/2020, he received gamma  knife radiation to 5 brain lesions, 2500 cGy in 5 fractions.  18.2/11/2020, he started treatment with pembrolizumab and received 4 cycles through 4/15/2020.  19. 4/18/2020, he presents with severe dyspnea, worsened mediastinal adenopathy, large pericardial effusion with tamponade, recurrent moderate pleural effusions. Bilateral pleurX catheters placed 4/20/20.  20. 4/29/2020, he starts treatment with Dabrafenib and Trametinib.  21. 5/22/2020, CT-CAP shows evidence of mixed initial response to BRAF inhibitor therapy, with a reduction in the size of the mediastinal metastases and adenopathy, small pulmonary and hepatic metastases, and decreasing size of subcutaneous lesions. Adrenal metastases and retroperitoneal metastases showed enlargement.   7/14/20: Nivolumab added      Interval history: Dallin is feeling ok. Remains weak and tired, but not significantly different than before. No recurrence of syncope. No seizure activity. No headaches, vision changes, focal weakness, dizziness, difficulty with balance or coordination, speech difficulty. Eating/drinking ok, drinks about 60 ounces of H20/day. Bowels are moving, but sometimes feels bloated. No difficulty with urination. No edema, rash, bruising, bleeding. Denies shortness of breath. Has an occasional cough, unchanged. L pleurx catheter has not been draining, plans to have this removed next Thurs. R pleurx is draining  ml every other day, fluid is pink tinged. No fevers/chills.    Current Outpatient Medications   Medication Sig Dispense Refill     acetaminophen (TYLENOL) 325 MG tablet Take 2 tablets (650 mg) by mouth every 4 hours as needed for mild pain, fever or headaches (> 101 F)       dabrafenib (TAFINLAR) 50 MG capsule Take 2 capsules (100 mg) by mouth 2 times daily Take at least 1 hr before or 2 hrs after a meal. 120 capsule 0     ferrous sulfate (FEROSUL) 325 (65 Fe) MG tablet Take 1 tablet (325 mg) by mouth daily (with breakfast) 30 tablet 4      LORazepam (ATIVAN) 0.5 MG tablet Take 1 tablet (0.5 mg) by mouth every 6 hours as needed for anxiety 30 tablet 1     melatonin 1 MG TABS tablet Take 1 tablet (1 mg) by mouth nightly as needed for sleep 30 tablet 0     trametinib (MEKINIST) 0.5 MG tablet Take 3 tablets (1.5 mg) by mouth daily Take 1 hr before or 2 hrs after a meal. STORE and DISPENSE from original container. 90 tablet 0     Vitamin D, Cholecalciferol, 25 MCG (1000 UT) CAPS           No Known Allergies      Video physical exam  General: Patient appears pale, chronically ill, in no acute distress.   Skin: No visualized rash or lesions on visualized skin  Eyes: EOMI, no erythema, sclera icterus or discharge noted  Resp: Appears to be breathing comfortably without accessory muscle usage, speaking in full sentences, no cough  MSK: Appears to have normal range of motion based on visualized movements  Neurologic: No apparent tremors,   Psych: affect engaged, pleasant, alert and oriented    The rest of a comprehensive physical examination is deferred due to PHE (public health emergency) video restrictions     There were no vitals taken for this visit.  Wt Readings from Last 4 Encounters:   07/16/20 55.2 kg (121 lb 9.6 oz)   07/14/20 58.6 kg (129 lb 3.2 oz)   05/23/20 55 kg (121 lb 4.8 oz)   04/19/20 59.7 kg (131 lb 11.2 oz)         Labs:   7/23/20 labs from care everywhere show are remarkable for a Na of 126, Cl 93. BUN/Cr wnl., LFTs wnl, WBC 6.9, Hgb 8.5, Platelets 324    Imaging:     EXAMINATION: DOPPLER VENOUS ULTRASOUND OF THE RIGHT UPPER EXTREMITY,  7/16/2020 12:56 PM      COMPARISON: None.     HISTORY: History of metastatic melanoma and clot of right IJ and SVC     TECHNIQUE:  Gray-scale evaluation with compression, spectral flow and  color Doppler assessment of the deep venous system of the right upper  extremity.     FINDINGS:  Right: Normal blood flow and waveforms are demonstrated in the  internal jugular, subclavian, and axillary veins. Focus  of  nonocclusive thrombus visualized in the left innominate vein. There is  normal compressibility of the brachial, basilic and cephalic veins.                                                                      IMPRESSION:  1.  Tiny nonocclusive thrombus in the left innominate vein in the  region of the mass seen on recent CT.     [Urgent Result: Nonocclusive thrombus in the left innominate vein.]     Finding was identified on 7/16/2020 1:02 PM.      Ji FLORES was contacted by Dr. Gordon at 7/16/2020 1:14 PM  and verbalized understanding of the urgent finding.       I have personally reviewed the examination and initial interpretation  and I agree with the findings.     BRIGHT CHEN MD    MR BRAIN W/O & W CONTRAST 7/24/2020 10:02 AM     Provided History: Melanoma, assess treatment response; Metastatic  malignant melanoma (H).  ICD-10: Metastatic malignant melanoma (H)     Comparison: Brain MRI 6/24/2020.     Technique: Multiplanar T1-weighted, axial FLAIR, and susceptibility  images were obtained without intravenous contrast. Following  intravenous gadolinium-based contrast administration, axial  T2-weighted, diffusion, and T1-weighted images (in multiple planes)  were obtained.     Contrast: 5.0 Gadavist     Findings:     Stable postsurgical changes of a right frontal and occipital  craniotomy with underlying resection cavities. Slight interval  increase in the extent of T2 hyperintense signal in the right  temporoparietal lobe. No significant change in 2.4 x 2.2 cm T1  hyperintense hemorrhagic lesion in the temporal horn of the right  lateral ventricle.     There are multiple enhancing lesions in supratentorial and  infratentorial brain with the surrounding edema. Some lesions  demonstrate associated diffusion restriction is susceptibility.  Compared to prior MRI, some are stable, some increased and some are  new. Representatives may include;     -6 x 8 mm lesion in the left parietal lobe  (series 14, image 21),  previously measuring 12 x 9 mm, decreased  -11 x 9 mm lesion in the right posterior frontal lobe, previously  punctate, increased,  -6 x 4 mm lesion in the right frontal lobe, new.     There is no mass effect or midline shift. The ventricles are  proportionate to the cerebral sulci. Normal major vascular  intracranial flow-voids.     No abnormality of the skull marrow signal. The visualized portions of  paranasal sinuses, and mastoid air cells are relatively clear. The  orbits are grossly unremarkable.                                                                      Impression:   In this patient with history of multifocal intracranial metastatic  melanoma;  Mixed treatment response, as evidenced by decrease in the size of some  of the lesions while some lesions increased in size and development of  new lesions. Some lesions are stable.     I have personally reviewed the examination and initial interpretation  and I agree with the findings.     MELIDA EDGAR MD    Impression/plan:     Metastatic BRAF-V660E mutated melanoma, to lymph nodes, adrenal glands,  lung, brain  -had progression on single agent pembrolizumab, started on dabrafenib and trametinib in April ,required a 25% dose reduction due to neutropenia and fevers  -  Nivolumab added on 7/14/20 as he had a mixed response  -tolerated well thus far  -reviewed brain MRI with him and with Dr. Burnett. We discussed the concerns that this could represent that his melanoma is progressing, but still may be possible this represent pseudo progression with the immunotherapy  -reviewed possible options of referring back for gamma knife, but don't feel comfortable with the break this would require in the dabrafenib and trametinib.  At this time, recommend monitoring closely, repeat brain MRI in 1 month or sooner if he should develop new symptoms  -will f/u with Dr. Burnett in August prior to the next cycle, will discuss repeating CT CAP prior  to that visit      Bilateral pleural effusion s/t melanoma, s/p bilateral PleurX catheters, improved drainage  -L pleurx not draining, getting removed on Thurs.   -R pleurx with  ml every other day, could decrease to every 4 days     Neutropenia, associated with max doses dabrafenib/trametinib, grade 3  - improved with dose reduction      Anemia, normocytic, multifactorial  -hgb trending down again. Has some light bleeding in the pleurx drainage, no other apparent cause of bleeding  -looking at historical levels in care everywhere, hgb tends to range from 7.9-9.5, was higher while in the hospital at 10.5  -will repeat CBCDP next week    Syncopal event  -had an unwitnessed fall at home, felt dizzy and like he was going to pass out  -CT head without worsening of brain mets and w/o evidence of hemorrhage. EKG was unrevealing.  -EEG without new epileptiform or seizure activity  -orthostatics wnl  -MRI today shows new lesion, but unsure if this relates as EEG without seizure activity. No new episodes of syncope or seizures  -will monitor    Hyponatremia  -unclear on the exact process that is contributing, whether SIADH or adrenal insufficiency. Hyponatremia is associated with trametinib (24-57%), dabrafenib (8-14%)  - Work-up in the hospital reviewed and discussed with Dr. Burnett.  Note that his random urine Na is a little high  -discussed with the patient, he is drinking about 60 ounces of water a day   -advised to add salt to diet, change some of his fluids to milk, gatorade  -will add NaCl 1 gm tablet, bid, recheck lytes next week Weds       Chronic Hypotension  Adrenal metastasis, concern for impending primary adrenal insufficiency  -cortisol drawn in the wrong tube in the hospital, but post-ACTH coritsol appears elevated appropriated making adrenal insufficiency unlikely  -continue slow position changes, monitor    L innominate vein thrombosis, non-occlusive  -has been present on prior CT imaging, not clearly  progressive on US and no clinical symptoms suggesting worsening   -with possible progressive brain mets the risk of intracranial bleeding with anticoagulation is high. Will HOLD on anticoagulation at this time

## 2020-07-29 NOTE — PROGRESS NOTES
Per  infectious disease lab, patient had COVID test performed on 7/27 at [facility], results negative.

## 2020-07-30 NOTE — PROGRESS NOTES
Per Winsome Simms NP, patient advised to take NaCl tabs TID for hyponatremia.  BMP recheck set up with [facility] for Monday, 8/3/20.  Results to be faxed to clinic.      Yoselin Gonzalez MBA, MSN, RN, ONC  RN Care Coordinator  [facility]

## 2020-07-30 NOTE — PROCEDURES
INTERVENTIONAL PULMONOLOGY       Procedure(s):    Tunnelled pleural catheter removal (left chest)    Indication:  Hx of metastatic melanoma with bilateral PleurX catheter placement in 4/2020, minimal drainage from left for past month (<20cc) without change in symptoms.    Attending of Record:  Russel Chavez MD     Interventional Pulmonary Fellow   Angle Prasad MD     Trainees Present:   None     Medications:    None    Sedation Time:   None    Time Out:  Performed    The patient's medical record has been reviewed.  The indication for the procedure was reviewed.  The necessary history and physical examination was performed and reviewed.  The risks, benefits and alternatives of the procedure were discussed with the the patient in detail and he had the opportunity to ask questions. All questions were answered to the best of my ability.  Verbal and written informed consent was obtained.  The proposed procedure and the patient's identification were verified prior to the procedure by the physician and the nurse.    After clinical evaluation and reviewing the indication, risks, alternatives and benefits of the procedure the patient was deemed to be in satisfactory condition to undergo the procedure.      A Tuberculosis risk assessment was performed:  The patient has no known RISK of Tuberculosis    The procedure was performed in a negative airflow room: Yes    Maneuvers / Procedure:      Indwelling pleural catheter removal: The patient's left chest was prepped in sterile fashion. The sutures were then removed. 10cc of 1% lidocaine was used to anesthetize the area. Using the curved tae, the track was peeled away. Scalpel was used to remove remaining tissue from cuff. The catheter then removed without difficulty. Skin was sutured once at previous insertion site. EBL was minimal. PleurX catheter intact after removal.     Any disposable equipment was visually inspected and deemed to be intact immediately post  procedure.      Relevant Pictures: None    Recommendations:     --Post PleurX catheter removal CXR obtained  --Followup with Dr. Chavez in interventional pulmonary clinic in 4 weeks with repeat CT Chest  --Suture can be removed by home health nurse in one week.      Angle Prasad  Interventional Pulmonary Fellow  [phone]    Russel Chavez MD, MHA    of Medicine  Interventional Pulmonary  Department of Pulmonary, Allergy, Critical Care and Sleep Medicine   [facility]  Pager: [phone]   Office: [phone]  Email: [email or web address]    Elle SARABIA, OCN  Clinical Nurse Specialist  Department of Thoracic Surgery  Office: [phone]  Email: [email or web address]    Rosenda Alexandre  Interventional Pulmonology Surgery Scheduler  Office: [phone]  Email: [email or web address]

## 2020-07-30 NOTE — DISCHARGE INSTRUCTIONS
Removing the chest tube  When the air, blood, pus, or extra fluid is gone from the pleural space, your healthcare provider will remove the tube. This may be done in your hospital bed. You may get more pain medicine before the tube is removed. As the tube is removed, you may be asked to inhale or exhale deeply and then hold your breath. After the tube is removed, the healthcare provider may close the incision with sutures. Or the incision may be left to close by itself. The provider will put a bandage over the incision. You may have an X-ray after the tube is removed. This is to make sure your lung is still inflated.  Follow-up care  After the tube is removed:    Care for the insertion site(s) as directed. Keep the bandage in place for 48 hours. Keep it dry.    Until a scab has formed on the incision site, you may shower but not take a bath. When a scab has formed you no longer need an adhesive bandage. After the incision has healed you may have a small scar.  When to call the doctor  While the tube is in place and after it has been removed, call the doctor (or alert your nurse) right away if you have any of the following:    Fever of 100.4 F (38 F) or higher, or as directed by your healthcare provider    Trouble breathing    Sharp chest pain that may spread to your shoulder or back    Bluish color of the skin    Weakness, dizziness, or fainting    A feeling of anxiety or restlessness    Fast pulse      Date Last Reviewed: 10/1/2016    4367-3182 [employer]. [address] [place] [address], [place] [address]. All rights reserved. This information is not intended as a substitute for professional medical care. Always follow your healthcare professional's instructions.

## 2020-07-31 NOTE — TELEPHONE ENCOUNTER
Called pt to discuss symptomatic mychart; he stated intermittent low grade temps throughout the day, today max was 99. O2 sats around 96 on room air. He does not feel sob or have any chest pain. His HR has been higher, documented during his cath removal yesterday as 113-120, he denied worsening OJRDAN or palpitations with activity, lying prone or trouble sleeping. He has been more fatigued and not doing much at home. Denied chills. [facility] to recheck labs, got Yoselin's message yesterday to start salt tablets. Paged Winsome Simms APRN CNP.    Per Winsome: monitor temps, if >100.4 go to ED for assessment, have home care also check CBC next week. Make sure pt is taking salt tablets. Called pt back with above info, he now state he was sent an email that homecare was coming today at 3pm. Writer called [facility] and was told visit today is only for VS and drain and RN does not carry lab tubes, will add CBC to labs on Monday.

## 2020-08-07 NOTE — PROGRESS NOTES
[facility]  MEDICAL ONCOLOGY PROGRESS NOTE  Aug 7, 2020    CHIEF COMPLAINT: Metastatic BRAF-mutated melanoma    Melanoma History  1.  2/03/2012, biopsy of left flank skin lesion revealed 1.7 mm superficial spreading melanoma without ulceration.  2. 2/23/2012, he had left flank wide local excision and left axillary sentinel node biopsy. One of two lymph node positive. He had stage III (pT2a pN1a M0) disease.  3. 3/02/2012, left axillary radical lymph node dissection was done. All 23 lymph nodes benign.  4. 4/02 to 4/27/2012, he received 4 weeks of high-dose interferon  5. 1/25/2017, CT scan revealed metastatic disease involving lymph nodes in the right supraclavicular, mediastinum and right hilum, and 3 small pulmonary nodules are present.  6. 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    7. 2/08/2017 to 11/08/2017, he received pembrolizumab  8. 6/28/2017, PET scan revealed significant improvement.  9. 11/29/2017, PET showed new focus of mild FDG uptake within a 6 mm right paratracheal lymph node.  10. 10/02/2018, PET scan revealed new hypermetabolic lymphadenopathy in the supraclavicular region of the left neck and in the mediastinum.   11. 4/24/2019, MRI brain revealed intraparenchymal mass within the right parietal occipital lobe. 12. 4/26/2019, he had craniotomy and resection and pathology confirmed metastatic melanoma. No post-op radiation was delivered.  13. 7/23/2019, PET scan showed progression of disease. New hypermetabolic lymphadenopathy seen in the right supraclavicular fossa and anterior mediastinum.  14. 11/25/2019, MRI brain reveals metastasis in the left posterior frontal lobe, right peritrigonal region and right posterior frontal parafalcine lesion. PET scan showed progression with multiple new hypermetabolic subcutaneous nodules throughout  the body.  No change in size of mediastinal lymphadenopathy.   15. 1/06/2020, admitted in the [facility] system because of left-sided weakness. MRI Brain revealed a 3.3 cm hemorrhagic mass in the right frontal gyrus and other brain metastases.  16. 1/16/2020, he had right frontal craniotomy and resection. Pathology revealed metastatic melanoma.  17. 1/27/2020 to 1/31/2020, he received gamma knife radiation to 5 brain lesions, 2500 cGy in 5 fractions.  18.2/11/2020, he started treatment with pembrolizumab and received 4 cycles through 4/15/2020.  19. 4/18/2020, he presents with severe dyspnea, worsened mediastinal adenopathy, large pericardial effusion with tamponade, recurrent moderate pleural effusions. Bilateral pleurX catheters placed 4/20/20.  20. 4/29/2020, he starts treatment with Dabrafenib and Trametinib.  21. 5/22/2020, CT-CAP shows evidence of mixed initial response to BRAF inhibitor therapy, with a reduction in the size of the mediastinal metastases and adenopathy, small pulmonary and hepatic metastases, and decreasing size of subcutaneous lesions. Adrenal metastases and retroperitoneal metastases showed enlargement.       HISTORY OF PRESENT ILLNESS  Dallin Stevens is a 35 year old male with BRAF mutated metastatic melanoma to lymph nodes, lung, and brain. He started BRAF/MEK inhibitors on 4/29, but required dose reduction for fevers and cytopenias. He returns to review results of restaging.    The current scan shows progressive disease with significant increase in size of mediastinal confluent basim mass, encasing the SVC, right main bronchus, right pulmonary artery, right brachiocephalic vein, right subclavian vessels, with mass effect on the trachea. There is significant increase in the size of AP window basim mass with pericardial invasion and possible left atrial extension. There is a new lobulated anterior mediastinal basim mass and significant increase in size of bilateral adrenal  metastases.    Labs again show hyponatremia to 122. He was recently started on salt tabs for this. Creatinine is normal at 0.61. Calcium is 7.6 with albumin 2.6. Hemoglobin is low at 8.3.    ECOG status is 1.    REVIEW OF SYSTEMS  A 12-point ROS negative except as in HPI    Current Outpatient Medications   Medication Sig Dispense Refill     acetaminophen (TYLENOL) 325 MG tablet Take 2 tablets (650 mg) by mouth every 4 hours as needed for mild pain, fever or headaches (> 101 F)       dabrafenib (TAFINLAR) 50 MG capsule Take 2 capsules (100 mg) by mouth 2 times daily Take at least 1 hr before or 2 hrs after a meal. 120 capsule 0     ferrous sulfate (FEROSUL) 325 (65 Fe) MG tablet Take 1 tablet (325 mg) by mouth daily (with breakfast) 30 tablet 4     LORazepam (ATIVAN) 0.5 MG tablet Take 1 tablet (0.5 mg) by mouth every 6 hours as needed for anxiety 30 tablet 1     melatonin 1 MG TABS tablet Take 1 tablet (1 mg) by mouth nightly as needed for sleep 30 tablet 0     sodium chloride 1 GM tablet Take 2 tablets (2 g) by mouth 3 times daily 180 tablet 1     trametinib (MEKINIST) 0.5 MG tablet Take 3 tablets (1.5 mg) by mouth daily Take 1 hr before or 2 hrs after a meal. STORE and DISPENSE from original container. 90 tablet 0     Vitamin D, Cholecalciferol, 25 MCG (1000 UT) CAPS          No Known Allergies  Immunization History   Administered Date(s) Administered     Flu, Unspecified 11/15/2019     Influenza Quad, Recombinant, p-free (RIV4) 09/25/2019       Past Medical History:   Diagnosis Date     Malignant melanoma (H)        Past Surgical History:   Procedure Laterality Date     BIOPSY OF SKIN LESION       CRANIOTOMY, EXCISE TUMOR COMPLEX, COMBINED  04/2019     DENTAL SURGERY      wisdom teeth     IR CHEST TUBE PLACEMENT NON-TUNNELED BILATERAL  4/19/2020     LYMPH NODE BIOPSY      arm, excision     MRI CRANIOTOMY LASER ABLATION Right 2/27/2020    Procedure: INTRAOPERATIVE MRI/STEALTH ASSISTED RIGHT LASER ABLATION OF BRAIN  METASTASIS;  Surgeon: Nitish Quintero MD;  Location: [facility] OR     OPTICAL TRACKING SYSTEM CRANIOTOMY, EXCISE TUMOR, COMBINED Right 1/16/2020    Procedure: stealth assisted Right craniotomy for tumor resection;  Surgeon: Nitish Quintero MD;  Location: [facility] OR     REMOVE CATHETER CHEST Left 7/30/2020    Procedure: REMOVAL, Left CATHETER, CHEST;  Surgeon: Russel Chavez MD;  Location: [facility] GI     THORACOSCOPY Bilateral 4/20/2020    Procedure: BILATERAL INSERTION OF PLEURX CATHETER;  Surgeon: Uday Bhakta MD;  Location: [facility] OR       SOCIAL HISTORY  History   Smoking Status     Never Smoker   Smokeless Tobacco     Never Used    Social History    Substance and Sexual Activity      Alcohol use: Not Currently     History   Drug Use Unknown       FAMILY HISTORY  Family History   Problem Relation Age of Onset     Anesthesia Reaction No family hx of      Cardiovascular No family hx of      Deep Vein Thrombosis No family hx of        PHYSICAL EXAMINATION  There were no vitals taken for this visit.  Wt Readings from Last 2 Encounters:   07/16/20 55.2 kg (121 lb 9.6 oz)   07/14/20 58.6 kg (129 lb 3.2 oz)   General: Well appearing young man, seated next to wife in living room  Head: Normocephalic  Eyes: No icterus  ENT: Oropharynx clear, speaking clearly  Pulm: Good effort on room air, no apparent dyspnea, speaking in full sentences  Neuro: Cranial nerves are grossly intact, there may be mild facial asymmetry  Skin: No rashes on visible skin    The rest of a comprehensive physical examination is deferred due to PHE (public health emergency) video visit restrictions.    IMAGING  CT-CAP, 8/4/2020  IMPRESSION: In this patient with history of metastatic melanoma, the  current scan shows progressive disease:  1. Significant increase in size of mediastinal confluent basim mass,  encasing the SVC, right main bronchus, right pulmonary artery, right  brachiocephalic vein, right subclavian vessels, with mass effect  on  the trachea.  2. Significant increase in the size of AP window basim mass with  pericardial invasion and possible extension left atrial extension.  Moderate pericardial effusion.  3. New lobulated anterior mediastinal basim mass.  4. Significant increase in size of bilateral adrenal metastasis.  5. Medial intraperitoneal heterogenous mass inferior to the spleen and  medial heterogenous mass in the right lower quadrant with  bowel  encasement. No bowel obstruction.  6. New peripheral left upper lobe nodule.  7. Increase in size of subcutaneous nodules including subcutaneous  nodule in the right upper quadrant right chest wall.  8. Moderate right pleural effusion. Right chest tube in dependent  medial right costophrenic sulcus.  9. Mild splenomegaly.  10. Nonocclusive thrombus in the right internal jugular vein.    ASSESSMENT AND PLAN  #1 Metastatic BRAF-[id number] mutated melanoma, to lymph nodes, adrenal glands,  lung, brain  #2 Bilateral pleural effusions, s/p bilateral PleurX catheters, improved  #3 Neutropenia, associated with max doses dabrafenib/trametinib, grade 3, improving with dose reduction  #4 Anemia, normocytic, multifactorial  Patient most recently progressed on single agent pembrolizumab, so he was switched to dabrafenib and trametinib. Due to toxicity he required 25% dose reduction. We added nivolumab, but he had a mixed response to treatment initially, and now on the recent scans definitively shows progression     We reviewed that we will need to do something different. I reviewed that time is not on our side. I would recommend he start chemoimmunotherapy. A combination of carboplatin/taxol and pembrolizumab has been studied, even in prior pembrolizumab progressors (Melanoma Res. 2020 Aug;30(4):364). We will need to start next week, ASAP.    #5 Chronic hypotension and weakness  #6 Adrenal metastasis, concern for impending primary adrenal insufficiency  #7 Hyponatremia, worsening on salt tabs  Continue  "salt tabs. Both adrenal metastases are large. I suspect his adrenal gland function is not normal. We will attmept to measure adrenal axis function.  -when seated, stand slowly prior to walking to avoid hypotension and falls  -consider midodrine for blood pressure support  -will repeat adrenal testing (8 AM serum cortisol and plasma ACTH) and consider a high-dose 250 mcg ACTH stimulation test.  -will check mineralocorticoid deficiency: renin concentration and serum aldosterone      Benita Pillai M.D.   of Medicine  Hematology, Oncology and Transplantation        Dallin Stevens is a 35 year old male who is being evaluated via a billable video visit.      The patient has been notified of following:     \"This video visit will be conducted via a call between you and your physician/provider. We have found that certain health care needs can be provided without the need for an in-person physical exam.  This service lets us provide the care you need with a video conversation.  If a prescription is necessary we can send it directly to your pharmacy.  If lab work is needed we can place an order for that and you can then stop by our lab to have the test done at a later time.    Video visits are billed at different rates depending on your insurance coverage.  Please reach out to your insurance provider with any questions.    If during the course of the call the physician/provider feels a video visit is not appropriate, you will not be charged for this service.\"    Patient has given verbal consent for Video visit? Yes    How would you like to obtain your AVS? MyChart     If you are dropped from the video visit, the video invite should be resent to: Text to cell phone: [phone]     Will anyone else be joining your video visit? No      Vitals - Patient Reported  Weight (Patient Reported): 54.4 kg (120 lb)  Height (Patient Reported): 177.8 cm (5' 10\")  BMI (Based on Pt Reported Ht/Wt): 17.22  Pain " Score: No Pain (0)    Moises No LPN    Video-Visit Details    Type of service:  Video Visit    Video Start Time: 3:00 PM  Video End Time: 3:30 PM    Originating Location (pt. Location): Home    Distant Location (provider location):  [facility]     Platform used for Video Visit: [employer]

## 2020-08-07 NOTE — LETTER
8/7/2020         RE: Dallin Stevens  [address] [address] [facility] [address]        Dear Colleague,    Thank you for referring your patient, Dallin Stevens, to the [facility]. Please see a copy of my visit note below.    [facility]  MEDICAL ONCOLOGY PROGRESS NOTE  Aug 7, 2020    CHIEF COMPLAINT: Metastatic BRAF-mutated melanoma    Melanoma History  1.  2/03/2012, biopsy of left flank skin lesion revealed 1.7 mm superficial spreading melanoma without ulceration.  2. 2/23/2012, he had left flank wide local excision and left axillary sentinel node biopsy. One of two lymph node positive. He had stage III (pT2a pN1a M0) disease.  3. 3/02/2012, left axillary radical lymph node dissection was done. All 23 lymph nodes benign.  4. 4/02 to 4/27/2012, he received 4 weeks of high-dose interferon  5. 1/25/2017, CT scan revealed metastatic disease involving lymph nodes in the right supraclavicular, mediastinum and right hilum, and 3 small pulmonary nodules are present.  6. 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    7. 2/08/2017 to 11/08/2017, he received pembrolizumab  8. 6/28/2017, PET scan revealed significant improvement.  9. 11/29/2017, PET showed new focus of mild FDG uptake within a 6 mm right paratracheal lymph node.  10. 10/02/2018, PET scan revealed new hypermetabolic lymphadenopathy in the supraclavicular region of the left neck and in the mediastinum.   11. 4/24/2019, MRI brain revealed intraparenchymal mass within the right parietal occipital lobe. 12. 4/26/2019, he had craniotomy and resection and pathology confirmed metastatic melanoma. No post-op radiation was delivered.  13. 7/23/2019, PET scan showed progression of disease. New hypermetabolic lymphadenopathy seen in the right supraclavicular fossa and anterior  mediastinum.  14. 11/25/2019, MRI brain reveals metastasis in the left posterior frontal lobe, right peritrigonal region and right posterior frontal parafalcine lesion. PET scan showed progression with multiple new hypermetabolic subcutaneous nodules throughout the body.  No change in size of mediastinal lymphadenopathy.   15. 1/06/2020, admitted in the [facility] because of left-sided weakness. MRI Brain revealed a 3.3 cm hemorrhagic mass in the right frontal gyrus and other brain metastases.  16. 1/16/2020, he had right frontal craniotomy and resection. Pathology revealed metastatic melanoma.  17. 1/27/2020 to 1/31/2020, he received gamma knife radiation to 5 brain lesions, 2500 cGy in 5 fractions.  18.2/11/2020, he started treatment with pembrolizumab and received 4 cycles through 4/15/2020.  19. 4/18/2020, he presents with severe dyspnea, worsened mediastinal adenopathy, large pericardial effusion with tamponade, recurrent moderate pleural effusions. Bilateral pleurX catheters placed 4/20/20.  20. 4/29/2020, he starts treatment with Dabrafenib and Trametinib.  21. 5/22/2020, CT-CAP shows evidence of mixed initial response to BRAF inhibitor therapy, with a reduction in the size of the mediastinal metastases and adenopathy, small pulmonary and hepatic metastases, and decreasing size of subcutaneous lesions. Adrenal metastases and retroperitoneal metastases showed enlargement.       HISTORY OF PRESENT ILLNESS  Dallin Stevens is a 35 year old male with BRAF mutated metastatic melanoma to lymph nodes, lung, and brain. He started BRAF/MEK inhibitors on 4/29, but required dose reduction for fevers and cytopenias. He returns to review results of restaging.    The current scan shows progressive disease with significant increase in size of mediastinal confluent basim mass, encasing the SVC, right main bronchus, right pulmonary artery, right brachiocephalic vein, right subclavian vessels, with mass effect on the  trachea. There is significant increase in the size of AP window basim mass with pericardial invasion and possible left atrial extension. There is a new lobulated anterior mediastinal basim mass and significant increase in size of bilateral adrenal metastases.    Labs again show hyponatremia to 122. He was recently started on salt tabs for this. Creatinine is normal at 0.61. Calcium is 7.6 with albumin 2.6. Hemoglobin is low at 8.3.    ECOG status is 1.    REVIEW OF SYSTEMS  A 12-point ROS negative except as in HPI    Current Outpatient Medications   Medication Sig Dispense Refill     acetaminophen (TYLENOL) 325 MG tablet Take 2 tablets (650 mg) by mouth every 4 hours as needed for mild pain, fever or headaches (> 101 F)       dabrafenib (TAFINLAR) 50 MG capsule Take 2 capsules (100 mg) by mouth 2 times daily Take at least 1 hr before or 2 hrs after a meal. 120 capsule 0     ferrous sulfate (FEROSUL) 325 (65 Fe) MG tablet Take 1 tablet (325 mg) by mouth daily (with breakfast) 30 tablet 4     LORazepam (ATIVAN) 0.5 MG tablet Take 1 tablet (0.5 mg) by mouth every 6 hours as needed for anxiety 30 tablet 1     melatonin 1 MG TABS tablet Take 1 tablet (1 mg) by mouth nightly as needed for sleep 30 tablet 0     sodium chloride 1 GM tablet Take 2 tablets (2 g) by mouth 3 times daily 180 tablet 1     trametinib (MEKINIST) 0.5 MG tablet Take 3 tablets (1.5 mg) by mouth daily Take 1 hr before or 2 hrs after a meal. STORE and DISPENSE from original container. 90 tablet 0     Vitamin D, Cholecalciferol, 25 MCG (1000 UT) CAPS          No Known Allergies  Immunization History   Administered Date(s) Administered     Flu, Unspecified 11/15/2019     Influenza Quad, Recombinant, p-free (RIV4) 09/25/2019       Past Medical History:   Diagnosis Date     Malignant melanoma (H)        Past Surgical History:   Procedure Laterality Date     BIOPSY OF SKIN LESION       CRANIOTOMY, EXCISE TUMOR COMPLEX, COMBINED  04/2019     DENTAL SURGERY       wisdom teeth     IR CHEST TUBE PLACEMENT NON-TUNNELED BILATERAL  4/19/2020     LYMPH NODE BIOPSY      arm, excision     MRI CRANIOTOMY LASER ABLATION Right 2/27/2020    Procedure: INTRAOPERATIVE MRI/STEALTH ASSISTED RIGHT LASER ABLATION OF BRAIN METASTASIS;  Surgeon: Nitish Quintero MD;  Location: [facility] OR     OPTICAL TRACKING SYSTEM CRANIOTOMY, EXCISE TUMOR, COMBINED Right 1/16/2020    Procedure: stealth assisted Right craniotomy for tumor resection;  Surgeon: Nitish Quintero MD;  Location: [facility] OR     REMOVE CATHETER CHEST Left 7/30/2020    Procedure: REMOVAL, Left CATHETER, CHEST;  Surgeon: Russel Chavez MD;  Location: [facility] GI     THORACOSCOPY Bilateral 4/20/2020    Procedure: BILATERAL INSERTION OF PLEURX CATHETER;  Surgeon: Uday Bhakta MD;  Location: [facility] OR       SOCIAL HISTORY  History   Smoking Status     Never Smoker   Smokeless Tobacco     Never Used    Social History    Substance and Sexual Activity      Alcohol use: Not Currently     History   Drug Use Unknown       FAMILY HISTORY  Family History   Problem Relation Age of Onset     Anesthesia Reaction No family hx of      Cardiovascular No family hx of      Deep Vein Thrombosis No family hx of        PHYSICAL EXAMINATION  There were no vitals taken for this visit.  Wt Readings from Last 2 Encounters:   07/16/20 55.2 kg (121 lb 9.6 oz)   07/14/20 58.6 kg (129 lb 3.2 oz)   General: Well appearing young man, seated next to wife in living room  Head: Normocephalic  Eyes: No icterus  ENT: Oropharynx clear, speaking clearly  Pulm: Good effort on room air, no apparent dyspnea, speaking in full sentences  Neuro: Cranial nerves are grossly intact, there may be mild facial asymmetry  Skin: No rashes on visible skin    The rest of a comprehensive physical examination is deferred due to PHE (public health emergency) video visit restrictions.    IMAGING  CT-CAP, 8/4/2020  IMPRESSION: In this patient with history of metastatic melanoma,  the  current scan shows progressive disease:  1. Significant increase in size of mediastinal confluent basim mass,  encasing the SVC, right main bronchus, right pulmonary artery, right  brachiocephalic vein, right subclavian vessels, with mass effect on  the trachea.  2. Significant increase in the size of AP window basim mass with  pericardial invasion and possible extension left atrial extension.  Moderate pericardial effusion.  3. New lobulated anterior mediastinal basim mass.  4. Significant increase in size of bilateral adrenal metastasis.  5. Medial intraperitoneal heterogenous mass inferior to the spleen and  medial heterogenous mass in the right lower quadrant with  bowel  encasement. No bowel obstruction.  6. New peripheral left upper lobe nodule.  7. Increase in size of subcutaneous nodules including subcutaneous  nodule in the right upper quadrant right chest wall.  8. Moderate right pleural effusion. Right chest tube in dependent  medial right costophrenic sulcus.  9. Mild splenomegaly.  10. Nonocclusive thrombus in the right internal jugular vein.    ASSESSMENT AND PLAN  #1 Metastatic BRAF-V660E mutated melanoma, to lymph nodes, adrenal glands,  lung, brain  #2 Bilateral pleural effusions, s/p bilateral PleurX catheters, improved  #3 Neutropenia, associated with max doses dabrafenib/trametinib, grade 3, improving with dose reduction  #4 Anemia, normocytic, multifactorial  Patient most recently progressed on single agent pembrolizumab, so he was switched to dabrafenib and trametinib. Due to toxicity he required 25% dose reduction. We added nivolumab, but he had a mixed response to treatment initially, and now on the recent scans definitively shows progression     We reviewed that we will need to do something different. I reviewed that time is not on our side. I would recommend he start chemoimmunotherapy. A combination of carboplatin/taxol and pembrolizumab has been studied, even in prior pembrolizumab  "progressors (Melanoma Res. 2020 Aug;30(4):364). We will need to start next week, ASAP.    #5 Chronic hypotension and weakness  #6 Adrenal metastasis, concern for impending primary adrenal insufficiency  #7 Hyponatremia, worsening on salt tabs  Continue salt tabs. Both adrenal metastases are large. I suspect his adrenal gland function is not normal. We will attmept to measure adrenal axis function.  -when seated, stand slowly prior to walking to avoid hypotension and falls  -consider midodrine for blood pressure support  -will repeat adrenal testing (8 AM serum cortisol and plasma ACTH) and consider a high-dose 250 mcg ACTH stimulation test.  -will check mineralocorticoid deficiency: renin concentration and serum aldosterone      Benita Pillai M.D.   of Medicine  Hematology, Oncology and Transplantation        Dallin Stevens is a 35 year old male who is being evaluated via a billable video visit.      The patient has been notified of following:     \"This video visit will be conducted via a call between you and your physician/provider. We have found that certain health care needs can be provided without the need for an in-person physical exam.  This service lets us provide the care you need with a video conversation.  If a prescription is necessary we can send it directly to your pharmacy.  If lab work is needed we can place an order for that and you can then stop by our lab to have the test done at a later time.    Video visits are billed at different rates depending on your insurance coverage.  Please reach out to your insurance provider with any questions.    If during the course of the call the physician/provider feels a video visit is not appropriate, you will not be charged for this service.\"    Patient has given verbal consent for Video visit? Yes    How would you like to obtain your AVS? MyChart     If you are dropped from the video visit, the video invite should be resent to: Text " "to cell phone: [phone]     Will anyone else be joining your video visit? No      Vitals - Patient Reported  Weight (Patient Reported): 54.4 kg (120 lb)  Height (Patient Reported): 177.8 cm (5' 10\")  BMI (Based on Pt Reported Ht/Wt): 17.22  Pain Score: No Pain (0)    Moises No LPN    Video-Visit Details    Type of service:  Video Visit    Video Start Time: 3:00 PM  Video End Time: 3:30 PM    Originating Location (pt. Location): Home    Distant Location (provider location):  [facility]     Platform used for Video Visit: ChristineWell          Again, thank you for allowing me to participate in the care of your patient.        Sincerely,        Benita Burnett MD    "

## 2020-08-13 NOTE — PROGRESS NOTES
Called and spoke with Dallin to let him know the plan of care will be for him to start chemotherapy tomorrow with the agents carbo and taxol.  Let him know that his insurance covers either keytruda alone or carbo taxol alone, but not together.   is working on an LMN for the keytruda but he needs to start therapy asap.  Let him know that he is due for labs tomorrow at 9:30, infusion at 10:00 and a video visit with Lea CALVERT.  He agreed with and verbalized understanding of the plan of care.  Per , ok to see Lea as a video visit instead of in person.

## 2020-08-14 NOTE — NURSING NOTE
Chief Complaint   Patient presents with     Blood Draw     LAbs drawn via PIV placed by RN in lab. VS taken.      Mallorie Alamo RN

## 2020-08-14 NOTE — PATIENT INSTRUCTIONS
- Continue same dose/frequency of salt tablets.  - Neulasta On Body device applied to patient today at _____ . Injection will start tomorrow at ____, approximately 27 hours after application applied today.  When the dose delivery starts, it will take about 45 minutes to complete. Device should have green flashing light.Call triage if injector flashes red or appears to be leaking .Keep device Neualsta 4 inches away from electrical equipment and to avoid showering 4 hours prior to injection. Side effect of Neulasta is bone pain. Start taking Claritin daily to help this side effect.   - You will be getting set up with a sleep study at home.       Mobile-XL Triage and after hours / weekends / holidays:  [phone]    Please call the triage or after hours line if you experience a temperature greater than or equal to 100.5, shaking chills, have uncontrolled nausea, vomiting and/or diarrhea, dizziness, shortness of breath, chest pain, bleeding, unexplained bruising, or if you have any other new/concerning symptoms, questions or concerns.      If you are having any concerning symptoms or wish to speak to a provider before your next infusion visit, please call your care coordinator or triage to notify them so we can adequately serve you.     If you need a refill on a narcotic prescription or other medication, please call before your infusion appointment.                 August 2020 Sunday Monday Tuesday Wednesday Thursday Friday Saturday                                 1       2     3     4    LAB   4:45 PM   (15 min.)    LAB   [facility]    CT CHEST/ABDOMEN/PELVIS W   5:20 PM   (20 min.)   UCCT1   [facility] CT 5     6     7    VIDEO VISIT RETURN   2:45 PM   (30 min.)   Benita Freitas MD   [facility] 8       9     10     11     12    P [facility] DRAW  12:15 PM   (15 min.)    [facility]   [facility] 13     14    [facility] DRAW   9:30 AM    (15 min.)   [facility]   [facility] Lab Draw    [facility] ONC INFUSION 360  10:00 AM   (360 min.)   UC ONCOLOGY INFUSION   [facility]    VIDEO VISIT RETURN  10:05 AM   (50 min.)   Lea Patel PA-C   [facility] 15       16     17     18     19    [facility] DRAW   9:45 AM   (15 min.)    [facility] DRAW   [facility] Lab Draw    VIDEO VISIT RETURN  11:45 AM   (50 min.)   Lea Patel PA-C   [facility] 20     21     22       23     24     25    MR BRAIN WWO   3:00 PM   (60 min.)   UC1   [facility] MRI 26     27     28     29       30     31 September 2020 Sunday Monday Tuesday Wednesday Thursday Friday Saturday             1     2     3     4     5       6     7     8    VIDEO VISIT RETURN   1:55 PM   (30 min.)   Russel Chavez MD   [facility] 9     10     11     12       13     14     15     16     17     18     19       20     21     22     23     24     25     26       27     28     29     30    MR BRAIN WWO  12:00 PM   (60 min.)   [id number]   [facility] MRI    [facility] RETURN   1:00 PM   (30 min.)   Jose Carlos Arshad MD   [facility]                               Recent Results (from the past 24 hour(s))   Cortisol [LAB61]    Collection Time: 08/14/20 10:02 AM   Result Value Ref Range    Cortisol Serum 20.2 4 - 22 ug/dL   CBC with platelets differential    Collection Time: 08/14/20 10:02 AM   Result Value Ref Range    WBC 4.8 4.0 - 11.0 10e9/L    RBC Count 3.90 (L) 4.4 - 5.9 10e12/L    Hemoglobin 9.9 (L) 13.3 - 17.7 g/dL    Hematocrit 32.3 (L) 40.0 - 53.0 %    MCV 83 78 - 100 fl    MCH 25.4 (L) 26.5 - 33.0 pg    MCHC 30.7 (L) 31.5 - 36.5 g/dL    RDW 17.9 (H) 10.0 - 15.0 %    Platelet Count 325 150 - 450 10e9/L    Diff Method Automated Method     % Neutrophils 73.6 %    % Lymphocytes 13.9 %    % Monocytes 10.0 %    %  Eosinophils 0.4 %    % Basophils 1.7 %    % Immature Granulocytes 0.4 %    Nucleated RBCs 0 0 /100    Absolute Neutrophil 3.6 1.6 - 8.3 10e9/L    Absolute Lymphocytes 0.7 (L) 0.8 - 5.3 10e9/L    Absolute Monocytes 0.5 0.0 - 1.3 10e9/L    Absolute Eosinophils 0.0 0.0 - 0.7 10e9/L    Absolute Basophils 0.1 0.0 - 0.2 10e9/L    Abs Immature Granulocytes 0.0 0 - 0.4 10e9/L    Absolute Nucleated RBC 0.0    Comprehensive metabolic panel    Collection Time: 08/14/20 10:02 AM   Result Value Ref Range    Sodium 126 (L) 133 - 144 mmol/L    Potassium 4.1 3.4 - 5.3 mmol/L    Chloride 96 94 - 109 mmol/L    Carbon Dioxide 22 20 - 32 mmol/L    Anion Gap 9 3 - 14 mmol/L    Glucose 101 (H) 70 - 99 mg/dL    Urea Nitrogen 6 (L) 7 - 30 mg/dL    Creatinine 0.65 (L) 0.66 - 1.25 mg/dL    GFR Estimate >90 >60 mL/min/[1.73_m2]    GFR Estimate If Black >90 >60 mL/min/[1.73_m2]    Calcium 8.4 (L) 8.5 - 10.1 mg/dL    Bilirubin Total 0.9 0.2 - 1.3 mg/dL    Albumin 2.7 (L) 3.4 - 5.0 g/dL    Protein Total 6.3 (L) 6.8 - 8.8 g/dL    Alkaline Phosphatase 85 40 - 150 U/L    ALT 11 0 - 70 U/L    AST 24 0 - 45 U/L

## 2020-08-14 NOTE — PROGRESS NOTES
Infusion Nursing Note:  Dallin Stevens presents today for Cycle 1 Day 1 Taxol, Carboplatin, Neulasta OnPro.    Patient seen by provider today: Yes: SANDRA East.   present during visit today: Not Applicable.    Note: Patient is not new to the infusion room today but is receiving Taxol, Carboplatin and Neualsta OnPro for the first time. Verified that patient recieved written chemotherapy information previously.  Verbally reviewed chemotherapy teaching, side effects, take-home medications, and follow-up schedule with patient. Patient instructed to call triage with any questions or if he experiences a temperature >100.4, shaking chills, uncontrolled nausea/vomiting/diarrhea, dizziness, shortness of breath, bleeding not relieved with pressure, or with any other concerns.    Taxol Infusion Rates  999ml/hr for 14ml  10 ml/hr for 5 minutes  25 ml/hr for 5 minutes  50 ml/hr for 5 minutes  100 ml/hr for 5 minutes  200 ml/hr for the remainder of the infusion    Pt tachypnic upon arrival to infusion today. At 1540 pt sleeping with oxygen saturation of 89% on room air. Woke pt up. Applied 1L NC. Pt bounced back up to 97% on 1L NC. HR remained 125 after 800mL of fluid. SANDRA East notified.     TORB 8/14/20 1655 SANDRA East / Christine Klein RN  - Giovanni to send home with oxygen saturation of 89%. Will set pt up with home sleep study.      Intravenous Access:  Peripheral IV placed.    Treatment Conditions:  Lab Results   Component Value Date    HGB 9.9 08/14/2020     Lab Results   Component Value Date    WBC 4.8 08/14/2020      Lab Results   Component Value Date    ANEU 3.6 08/14/2020     Lab Results   Component Value Date     08/14/2020      Lab Results   Component Value Date     08/14/2020                   Lab Results   Component Value Date    POTASSIUM 4.1 08/14/2020           Lab Results   Component Value Date    MAG 2.1 04/21/2020            Lab Results   Component Value Date    CR  0.65 08/14/2020                   Lab Results   Component Value Date    ABUNDIO 8.4 08/14/2020                Lab Results   Component Value Date    BILITOTAL 0.9 08/14/2020           Lab Results   Component Value Date    ALBUMIN 2.7 08/14/2020                    Lab Results   Component Value Date    ALT 11 08/14/2020           Lab Results   Component Value Date    AST 24 08/14/2020     Results reviewed, labs MET treatment parameters, ok to proceed with treatment.    Post Infusion Assessment:  Patient tolerated infusion without incident.  Blood return noted pre and post infusion.  Site patent and intact, free from redness, edema or discomfort.  No evidence of extravasations.  Access discontinued per protocol.     Neulasta On Pro- On Body injector applied to patient today on left arm at 1640 with light facing down. Writer discussed Neulasta injection would start tomorrow at 7:40pm, approximately 27 hours after application applied today.  Written and Verbal instruction reviewed with patient.  Pt instructed when the dose delivery starts, it will take about 45 minutes to complete. Pt aware Neulasta Onpro On-Body should have green flashing light and to call triage or on-call MD if injector flashes red or appears to be leaking. Pt aware to keep Onpro On-Body Neualsta 4 inches away from electrical equipment and to avoid showering 4 hours prior to injection. Neulasta Onpro Lot number: [id number].    Discharge Plan:   Prescription refills given for Compazine and Zofran. Pt verbalized an understanding to  Florinef, Ativan and Cortef at Pharmacy in [place].    Discharge instructions reviewed with: Patient.  Patient and/or family verbalized understanding of discharge instructions and all questions answered.  Copy of AVS reviewed with patient and/or family.  Patient will return 9/3 for next appointment.  Patient discharged in stable condition accompanied by: self.  Departure Mode: Wheelchair.  Face to Face time: 20.    Christine  MARYBEL Klein

## 2020-08-14 NOTE — LETTER
"    8/14/2020         RE: Dallin Stevens  [address] [facility] [address]        Dear Colleague,    Thank you for referring your patient, Dallin Stevens, to the [facility]. Please see a copy of my visit note below.    Dallin Stevens is a 35 year old male who is being evaluated via a billable video visit.      The patient has been notified of following:     \"This video visit will be conducted via a call between you and your physician/provider. We have found that certain health care needs can be provided without the need for an in-person physical exam.  This service lets us provide the care you need with a video conversation.  If a prescription is necessary we can send it directly to your pharmacy.  If lab work is needed we can place an order for that and you can then stop by our lab to have the test done at a later time.    Video visits are billed at different rates depending on your insurance coverage.  Please reach out to your insurance provider with any questions.    If during the course of the call the physician/provider feels a video visit is not appropriate, you will not be charged for this service.\"    Patient has given verbal consent for Video visit? Yes    How would you like to obtain your AVS? MyChart     If you are dropped from the video visit, the video invite should be resent to: Text to cell phone: [phone]     Will anyone else be joining your video visit? No     I have reviewed and updated the patient's allergies and medication list. Patient was asked to provide any patient recorded vital signs, height and/or weight.  Please see \"Patient Reported Vital Signs\" tab for that information.        Concerns: Patient has no new concerns.      Refills: Lorazepam       Willie Hollins, EMT      Video-Visit Details    Type of service:  Video Visit    Video Start Time: 10:31 AM  Video End Time: 10:45 AM    Originating Location (pt. Location): Other [facility]    Distant " Location (provider location):  [facility]     [facility]  MEDICAL ONCOLOGY PROGRESS NOTE  Aug 14, 2020    CHIEF COMPLAINT: Metastatic BRAF-mutated melanoma    Melanoma History  1.  2/03/2012, biopsy of left flank skin lesion revealed 1.7 mm superficial spreading melanoma without ulceration.  2. 2/23/2012, he had left flank wide local excision and left axillary sentinel node biopsy. One of two lymph node positive. He had stage III (pT2a pN1a M0) disease.  3. 3/02/2012, left axillary radical lymph node dissection was done. All 23 lymph nodes benign.  4. 4/02 to 4/27/2012, he received 4 weeks of high-dose interferon  5. 1/25/2017, CT scan revealed metastatic disease involving lymph nodes in the right supraclavicular, mediastinum and right hilum, and 3 small pulmonary nodules are present.  6. 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    7. 2/08/2017 to 11/08/2017, he received pembrolizumab  8. 6/28/2017, PET scan revealed significant improvement.  9. 11/29/2017, PET showed new focus of mild FDG uptake within a 6 mm right paratracheal lymph node.  10. 10/02/2018, PET scan revealed new hypermetabolic lymphadenopathy in the supraclavicular region of the left neck and in the mediastinum.   11. 4/24/2019, MRI brain revealed intraparenchymal mass within the right parietal occipital lobe. 12. 4/26/2019, he had craniotomy and resection and pathology confirmed metastatic melanoma. No post-op radiation was delivered.  13. 7/23/2019, PET scan showed progression of disease. New hypermetabolic lymphadenopathy seen in the right supraclavicular fossa and anterior mediastinum.  14. 11/25/2019, MRI brain reveals metastasis in the left posterior frontal lobe, right peritrigonal region and right posterior frontal  parafalcine lesion. PET scan showed progression with multiple new hypermetabolic subcutaneous nodules throughout the body.  No change in size of mediastinal lymphadenopathy.   15. 1/06/2020, admitted in the [facility] system because of left-sided weakness. MRI Brain revealed a 3.3 cm hemorrhagic mass in the right frontal gyrus and other brain metastases.  16. 1/16/2020, he had right frontal craniotomy and resection. Pathology revealed metastatic melanoma.  17. 1/27/2020 to 1/31/2020, he received gamma knife radiation to 5 brain lesions, 2500 cGy in 5 fractions.  18.2/11/2020, he started treatment with pembrolizumab and received 4 cycles through 4/15/2020.  19. 4/18/2020, he presents with severe dyspnea, worsened mediastinal adenopathy, large pericardial effusion with tamponade, recurrent moderate pleural effusions. Bilateral pleurX catheters placed 4/20/20.  20. 4/29/2020, he starts treatment with Dabrafenib and Trametinib.  21. 5/22/2020, CT-CAP shows evidence of mixed initial response to BRAF inhibitor therapy, with a reduction in the size of the mediastinal metastases and adenopathy, small pulmonary and hepatic metastases, and decreasing size of subcutaneous lesions. Adrenal metastases and retroperitoneal metastases showed enlargement.       HISTORY OF PRESENT ILLNESS  Dallin Stevens is a 35 year old male with BRAF mutated metastatic melanoma to lymph nodes, lung, and brain. He started BRAF/MEK inhibitors on 4/29, but required dose reduction for fevers and cytopenias. He returns to review results of restaging.    -Doing fair.  -Feels pretty run down.   -Breathing run down. Has mild dyspnea on exertion intermittently with stairs.   -Eating and drinking okay  -Feels fatigued and has leg weakness. Unable to exercise due to weakness.  -Spends much of day sitting around.   -Naps occasionally for an hour.   -Has abdominal bloating. Does not take any medication for bloating.   -Mood is up and down. Talks with wife about  mood.     REVIEW OF SYSTEMS  Patient denies any of the following except if noted above: fevers, chills, vision or hearing changes, chest pain, dyspnea at rest, abdominal pain, nausea, vomiting, diarrhea, constipation, urinary concerns, headaches, numbness, tingling, or issues with sleep.    Current Outpatient Medications   Medication Sig Dispense Refill     acetaminophen (TYLENOL) 325 MG tablet Take 2 tablets (650 mg) by mouth every 4 hours as needed for mild pain, fever or headaches (> 101 F)       ferrous sulfate (FEROSUL) 325 (65 Fe) MG tablet Take 1 tablet (325 mg) by mouth daily (with breakfast) 30 tablet 4     LORazepam (ATIVAN) 0.5 MG tablet Take 1 tablet (0.5 mg) by mouth every 4 hours as needed (Anxiety, Nausea/Vomiting or Sleep) 30 tablet 3     LORazepam (ATIVAN) 0.5 MG tablet Take 1 tablet (0.5 mg) by mouth every 6 hours as needed for anxiety 30 tablet 1     melatonin 1 MG TABS tablet Take 1 tablet (1 mg) by mouth nightly as needed for sleep 30 tablet 0     ondansetron (ZOFRAN) 8 MG tablet Take 1 tablet (8 mg) by mouth every 8 hours as needed (Nausea/Vomiting) 10 tablet 3     prochlorperazine (COMPAZINE) 10 MG tablet Take 1 tablet (10 mg) by mouth every 6 hours as needed (Nausea/Vomiting) 30 tablet 3     sodium chloride 1 GM tablet Take 2 tablets (2 g) by mouth 3 times daily 180 tablet 1     Vitamin D, Cholecalciferol, 25 MCG (1000 UT) CAPS          Video physical exam  General: Patient appears well in no acute distress. Cachetic body habitus.  Skin: No visualized rash or lesions on visualized skin  Eyes: EOMI, no erythema, sclera icterus or discharge noted  Resp: Appears to be breathing comfortably without accessory muscle usage, speaking in full sentences, no cough  MSK: Appears to have normal range of motion based on visualized movements  Neurologic: No apparent tremors, facial movements symmetric  Psych: affect normal, alert and oriented    The rest of a comprehensive physical examination is deferred  due to PHE (public health emergency) video restrictions      LABS   8/14/2020 10:02   Sodium 126 (L)   Potassium 4.1   Chloride 96   Carbon Dioxide 22   Urea Nitrogen 6 (L)   Creatinine 0.65 (L)   GFR Estimate >90   GFR Estimate If Black >90   Calcium 8.4 (L)   Anion Gap 9   Albumin 2.7 (L)   Protein Total 6.3 (L)   Bilirubin Total 0.9   Alkaline Phosphatase 85   ALT 11   AST 24   Cortisol Serum 20.2   Renin Activity 2.6   Glucose 101 (H)   WBC 4.8   Hemoglobin 9.9 (L)   Hematocrit 32.3 (L)   Platelet Count 325   RBC Count 3.90 (L)   MCV 83   MCH 25.4 (L)   MCHC 30.7 (L)   RDW 17.9 (H)   Diff Method Automated Method   % Neutrophils 73.6   % Lymphocytes 13.9   % Monocytes 10.0   % Eosinophils 0.4   % Basophils 1.7   % Immature Granulocytes 0.4   Nucleated RBCs 0   Absolute Neutrophil 3.6   Absolute Lymphocytes 0.7 (L)   Absolute Monocytes 0.5   Absolute Eosinophils 0.0   Absolute Basophils 0.1   Abs Immature Granulocytes 0.0   Absolute Nucleated RBC 0.0     ASSESSMENT AND PLAN  #1 Metastatic BRAF-V660E mutated melanoma, to lymph nodes, adrenal glands,  lung, brain  #2 Bilateral pleural effusions, s/p bilateral PleurX catheters, improved  #3 Neutropenia, associated with max doses dabrafenib/trametinib, grade 3, improving with dose reduction  #4 Anemia, normocytic, multifactorial  Patient most recently progressed on single agent pembrolizumab, so he was switched to dabrafenib and trametinib. Due to toxicity he required 25% dose reduction. We added nivolumab, but he had a mixed response to treatment initially, and now on the recent scans definitively shows progression. Due to disease progression, he will switch to treatment with carboplatin/taxol with pembrolizumab. As we are still awaiting insurance approval for pembrolizumab, will give only carboplatin and Taxol today. He will have close follow-up with me next week in person. Will plan to repeat imaging after 2 cycles.     #5 Chronic hypotension and weakness  #6  Adrenal metastasis, concern for impending primary adrenal insufficiency  #7 Hyponatremia, worsening on salt tabs  -Continue salt tabs. Both adrenal metastases are large. I suspect his adrenal gland function is not normal.   -Discussed with Dr. Burnett and will empirically start hydrocortisone and fludrocortisone.    #8 Abdominal bloating  -Discussed trying Gas-x.    #9 Insomnia  -Managed with Ativan at bedtime, which was refilled.    Lea Patel PA-C  [facility] [address] [address] [phone]        Again, thank you for allowing me to participate in the care of your patient.        Sincerely,        Lea Patel PA-C

## 2020-08-14 NOTE — PROGRESS NOTES
"Dallin Stevens is a 35 year old male who is being evaluated via a billable video visit.      The patient has been notified of following:     \"This video visit will be conducted via a call between you and your physician/provider. We have found that certain health care needs can be provided without the need for an in-person physical exam.  This service lets us provide the care you need with a video conversation.  If a prescription is necessary we can send it directly to your pharmacy.  If lab work is needed we can place an order for that and you can then stop by our lab to have the test done at a later time.    Video visits are billed at different rates depending on your insurance coverage.  Please reach out to your insurance provider with any questions.    If during the course of the call the physician/provider feels a video visit is not appropriate, you will not be charged for this service.\"    Patient has given verbal consent for Video visit? Yes    How would you like to obtain your AVS? MyChart     If you are dropped from the video visit, the video invite should be resent to: Text to cell phone: [phone]     Will anyone else be joining your video visit? No     I have reviewed and updated the patient's allergies and medication list. Patient was asked to provide any patient recorded vital signs, height and/or weight.  Please see \"Patient Reported Vital Signs\" tab for that information.        Concerns: Patient has no new concerns.      Refills: Lorazepam       LORENZO Crenshaw      Video-Visit Details    Type of service:  Video Visit    Video Start Time: 10:31 AM  Video End Time: 10:45 AM    Originating Location (pt. Location): [facility]    Distant Location (provider location):  [facility] CANCER [facility]     Platform used for Video Visit: Noom    [facility]  MEDICAL ONCOLOGY PROGRESS NOTE  Aug 14, 2020    CHIEF COMPLAINT: Metastatic BRAF-mutated melanoma    Melanoma " History  1.  2/03/2012, biopsy of left flank skin lesion revealed 1.7 mm superficial spreading melanoma without ulceration.  2. 2/23/2012, he had left flank wide local excision and left axillary sentinel node biopsy. One of two lymph node positive. He had stage III (pT2a pN1a M0) disease.  3. 3/02/2012, left axillary radical lymph node dissection was done. All 23 lymph nodes benign.  4. 4/02 to 4/27/2012, he received 4 weeks of high-dose interferon  5. 1/25/2017, CT scan revealed metastatic disease involving lymph nodes in the right supraclavicular, mediastinum and right hilum, and 3 small pulmonary nodules are present.  6. 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    7. 2/08/2017 to 11/08/2017, he received pembrolizumab  8. 6/28/2017, PET scan revealed significant improvement.  9. 11/29/2017, PET showed new focus of mild FDG uptake within a 6 mm right paratracheal lymph node.  10. 10/02/2018, PET scan revealed new hypermetabolic lymphadenopathy in the supraclavicular region of the left neck and in the mediastinum.   11. 4/24/2019, MRI brain revealed intraparenchymal mass within the right parietal occipital lobe. 12. 4/26/2019, he had craniotomy and resection and pathology confirmed metastatic melanoma. No post-op radiation was delivered.  13. 7/23/2019, PET scan showed progression of disease. New hypermetabolic lymphadenopathy seen in the right supraclavicular fossa and anterior mediastinum.  14. 11/25/2019, MRI brain reveals metastasis in the left posterior frontal lobe, right peritrigonal region and right posterior frontal parafalcine lesion. PET scan showed progression with multiple new hypermetabolic subcutaneous nodules throughout the body.  No change in size of mediastinal lymphadenopathy.   15. 1/06/2020, admitted in the [facility] because of left-sided  weakness. MRI Brain revealed a 3.3 cm hemorrhagic mass in the right frontal gyrus and other brain metastases.  16. 1/16/2020, he had right frontal craniotomy and resection. Pathology revealed metastatic melanoma.  17. 1/27/2020 to 1/31/2020, he received gamma knife radiation to 5 brain lesions, 2500 cGy in 5 fractions.  18.2/11/2020, he started treatment with pembrolizumab and received 4 cycles through 4/15/2020.  19. 4/18/2020, he presents with severe dyspnea, worsened mediastinal adenopathy, large pericardial effusion with tamponade, recurrent moderate pleural effusions. Bilateral pleurX catheters placed 4/20/20.  20. 4/29/2020, he starts treatment with Dabrafenib and Trametinib.  21. 5/22/2020, CT-CAP shows evidence of mixed initial response to BRAF inhibitor therapy, with a reduction in the size of the mediastinal metastases and adenopathy, small pulmonary and hepatic metastases, and decreasing size of subcutaneous lesions. Adrenal metastases and retroperitoneal metastases showed enlargement.       HISTORY OF PRESENT ILLNESS  Dallin Stevens is a 35 year old male with BRAF mutated metastatic melanoma to lymph nodes, lung, and brain. He started BRAF/MEK inhibitors on 4/29, but required dose reduction for fevers and cytopenias. He returns to review results of restaging.    -Doing fair.  -Feels pretty run down.   -Breathing run down. Has mild dyspnea on exertion intermittently with stairs.   -Eating and drinking okay  -Feels fatigued and has leg weakness. Unable to exercise due to weakness.  -Spends much of day sitting around.   -Naps occasionally for an hour.   -Has abdominal bloating. Does not take any medication for bloating.   -Mood is up and down. Talks with wife about mood.     REVIEW OF SYSTEMS  Patient denies any of the following except if noted above: fevers, chills, vision or hearing changes, chest pain, dyspnea at rest, abdominal pain, nausea, vomiting, diarrhea, constipation, urinary concerns, headaches,  numbness, tingling, or issues with sleep.    Current Outpatient Medications   Medication Sig Dispense Refill     acetaminophen (TYLENOL) 325 MG tablet Take 2 tablets (650 mg) by mouth every 4 hours as needed for mild pain, fever or headaches (> 101 F)       ferrous sulfate (FEROSUL) 325 (65 Fe) MG tablet Take 1 tablet (325 mg) by mouth daily (with breakfast) 30 tablet 4     LORazepam (ATIVAN) 0.5 MG tablet Take 1 tablet (0.5 mg) by mouth every 4 hours as needed (Anxiety, Nausea/Vomiting or Sleep) 30 tablet 3     LORazepam (ATIVAN) 0.5 MG tablet Take 1 tablet (0.5 mg) by mouth every 6 hours as needed for anxiety 30 tablet 1     melatonin 1 MG TABS tablet Take 1 tablet (1 mg) by mouth nightly as needed for sleep 30 tablet 0     ondansetron (ZOFRAN) 8 MG tablet Take 1 tablet (8 mg) by mouth every 8 hours as needed (Nausea/Vomiting) 10 tablet 3     prochlorperazine (COMPAZINE) 10 MG tablet Take 1 tablet (10 mg) by mouth every 6 hours as needed (Nausea/Vomiting) 30 tablet 3     sodium chloride 1 GM tablet Take 2 tablets (2 g) by mouth 3 times daily 180 tablet 1     Vitamin D, Cholecalciferol, 25 MCG (1000 UT) CAPS          Video physical exam  General: Patient appears well in no acute distress. Cachetic body habitus.  Skin: No visualized rash or lesions on visualized skin  Eyes: EOMI, no erythema, sclera icterus or discharge noted  Resp: Appears to be breathing comfortably without accessory muscle usage, speaking in full sentences, no cough  MSK: Appears to have normal range of motion based on visualized movements  Neurologic: No apparent tremors, facial movements symmetric  Psych: affect normal, alert and oriented    The rest of a comprehensive physical examination is deferred due to PHE (public health emergency) video restrictions      LABS   8/14/2020 10:02   Sodium 126 (L)   Potassium 4.1   Chloride 96   Carbon Dioxide 22   Urea Nitrogen 6 (L)   Creatinine 0.65 (L)   GFR Estimate >90   GFR Estimate If Black >90    Calcium 8.4 (L)   Anion Gap 9   Albumin 2.7 (L)   Protein Total 6.3 (L)   Bilirubin Total 0.9   Alkaline Phosphatase 85   ALT 11   AST 24   Cortisol Serum 20.2   Renin Activity 2.6   Glucose 101 (H)   WBC 4.8   Hemoglobin 9.9 (L)   Hematocrit 32.3 (L)   Platelet Count 325   RBC Count 3.90 (L)   MCV 83   MCH 25.4 (L)   MCHC 30.7 (L)   RDW 17.9 (H)   Diff Method Automated Method   % Neutrophils 73.6   % Lymphocytes 13.9   % Monocytes 10.0   % Eosinophils 0.4   % Basophils 1.7   % Immature Granulocytes 0.4   Nucleated RBCs 0   Absolute Neutrophil 3.6   Absolute Lymphocytes 0.7 (L)   Absolute Monocytes 0.5   Absolute Eosinophils 0.0   Absolute Basophils 0.1   Abs Immature Granulocytes 0.0   Absolute Nucleated RBC 0.0     ASSESSMENT AND PLAN  #1 Metastatic BRAF-V660E mutated melanoma, to lymph nodes, adrenal glands,  lung, brain  #2 Bilateral pleural effusions, s/p bilateral PleurX catheters, improved  #3 Neutropenia, associated with max doses dabrafenib/trametinib, grade 3, improving with dose reduction  #4 Anemia, normocytic, multifactorial  Patient most recently progressed on single agent pembrolizumab, so he was switched to dabrafenib and trametinib. Due to toxicity he required 25% dose reduction. We added nivolumab, but he had a mixed response to treatment initially, and now on the recent scans definitively shows progression. Due to disease progression, he will switch to treatment with carboplatin/taxol with pembrolizumab. As we are still awaiting insurance approval for pembrolizumab, will give only carboplatin and Taxol today. He will have close follow-up with me next week in person. Will plan to repeat imaging after 2 cycles.     #5 Chronic hypotension and weakness  #6 Adrenal metastasis, concern for impending primary adrenal insufficiency  #7 Hyponatremia, worsening on salt tabs  -Continue salt tabs. Both adrenal metastases are large. I suspect his adrenal gland function is not normal.   -Discussed with   Lex and will empirically start hydrocortisone and fludrocortisone.    #8 Abdominal bloating  -Discussed trying Gas-x.    #9 Insomnia  -Managed with Ativan at bedtime, which was refilled.    Lea Patel PA-C  [facility] [address] [address] [phone]

## 2020-08-15 NOTE — LETTER
Transition Communication Hand-off for Care Transitions to Next Level of Care Provider    Name: Dallin Stevens  : 1985  MRN #: [id number]  Primary Care Provider: Physician No Ref-Primary     Primary Clinic: No address on file     Reason for Hospitalization:  Seizures (H) [R56.9]  Brain metastasis (H) [C79.31]  Admit Date/Time: 8/15/2020 11:11 PM  Discharge Date: 20  Payor Source: Payor: BCBS / Plan: BCBS OUT OF STATE / Product Type: Indemnity /            Reason for Communication Hand-off Referral:  Recent hospitalization    Discharge Plan: Discharge to home  Resumption of [facility]  Oxygen provided by [facility] #[phone]       Concern for non-adherence with plan of care: No     Discharge Needs Assessment:  Needs      Most Recent Value   Equipment Currently Used at Home  shower chair           Follow-up plan:    Future Appointments   Date Time Provider Department [facility] O   2020  1:30 PM Jose Carlos Arshad MD [facility]   2020  2:00 PM Jose Carlos Arshad MD [facility]   2020  7:15 AM  [facility] LAB DRAW [facility]   2020  7:50 AM Winsome Simms APRN CNP [facility]   2020  9:30 AM [facility] ONCOLOGY INFUSION [facility]   9/3/2020  7:30 AM  [facility] LAB DRAW [facility]   9/3/2020  8:15 AM Benita Freitas MD [facility]   9/3/2020  9:30 AM  ONCOLOGY INFUSION [facility]   2020  2:10 PM Russel Chavez MD [facility]   2020 12:00 PM [address] O   2020  1:00 PM Jose Carlos Arshad MD [facility]       Any outstanding tests or procedures:        Referrals     Future Labs/Procedures    RADIATION THERAPY REFERRAL     Comments:    Your provider has referred you to: [facility]: Radiation Oncology Clinic [facility] [phone]   [email or web address]    Please be aware that coverage of these services is  subject to the terms and limitations of your health insurance plan.  Call member services at your health plan with any benefit or coverage questions.      Please bring the following to your appointment:    >>   Any x-rays, CTs or MRIs which have been performed.  Contact the facility where they were done to arrange for  prior to your scheduled appointment.   >>   List of current medications   >>   This referral request   >>   Any documents/labs given to you for this referral    Home care nursing referral     Comments:    [facility] #[phone]   Fax #[phone]  Skilled nursing visits to monitor cardiac and resp status  Monitor hydration, nutrition and bowel status  Monitor safety of ADLS  Pleurx drain care  Instruct in medications and eval effects  Lab draws per orders    Your provider has ordered home care nursing services. If you have not been contacted within 2 days of your discharge please call the inpatient department phone number at [phone] .    Home Care PT Referral for Hospital Discharge     Comments:    PT to eval and treat    Your provider has ordered home care - physical therapy. If you have not been contacted within 2 days of your discharge please call the department phone number listed on the top of this document.          Supplies     Future Labs/Procedures    Oxygen Adult/Peds     Process Instructions:    By signing this order, the Authorizing Provider is attesting that they have completed a face-to-face evaluation on the patient to determine their need for this equipment in the last 60 days. A new face-to-face evaluation is required each time  A new prescription for on eo f the specified items is ordered.   If an additional provider completed the evaluation, please indicate their name below.     **As of 2018, an order requisition and face sheet will print for all DME orders. Please give printed order and face sheet to patient if not obtaining product from [employer]  Medical DME closet.     Comments:    Oxygen Documentation:   I certify that this patient, Dallin Stevens has been under my care (or a nurse practitioner or physican's assistant working with me). This is the face-to-face encounter for oxygen medical necessity.      Patient is requiring 2 L of Oxygen with activity    Dallin Stevens is now in a chronic stable state and continues to require supplemental oxygen. Patient has continued oxygen desaturation due to Chronic Respiratory Failure with Hypoxia J93.11 and malignant pleural effusions.    Alternative treatment(s) tried or considered and deemed clinically infective for treatment of Chronic Respiratory Failure with Hypoxia J93.11 include pulmonary toileting and pleural effusions from cancer.  If portability is ordered, is the patient mobile within the home? yes    **Patients who qualify for home O2 coverage under the CMS guidelines require ABG tests or O2 sat readings obtained closest to, but no earlier than 2 days prior to the discharge, as evidence of the need for home oxygen therapy. Testing must be performed while patient is in the chronic stable state. See notes for O2 sats.**    Walker     Process Instructions:    By signing this order, the Authorizing Provider is attesting that they have completed a face-to-face evaluation on the patient to determine their need for this equipment in the last 60 days. A new face-to-face evaluation is required each time  A new prescription for on eo f the specified items is ordered.   If an additional provider completed the evaluation, please indicate their name below.     **As of 2018, an order requisition and face sheet will print for all DME orders. Please give printed order and face sheet to patient if not obtaining product from [facility] Medical DME closet.     Comments:    DME Documentation:   Describe the reason for need to support medical necessity: gait instability.     I, the undersigned, certify that the above  prescribed supplies are medically necessary for this patient and is both reasonable and necessary in reference to accepted standards of medical and necessary in reference to accepted standards of medical practice in the treatment of this patient's condition and is not prescribed as a convenience.          Key Recommendations:      Shelbi Haas RN  6B care coordinator #[phone]

## 2020-08-16 PROBLEM — R41.82 ALTERED MENTAL STATUS: Status: ACTIVE | Noted: 2020-01-01

## 2020-08-16 NOTE — PROVIDER NOTIFICATION
08/16/20 1300   Call Information   Date of Call 08/16/20   Time of Call 1336   Name of person requesting the team Elisabeth   Title of person requesting team RN   RRT Arrival time 1340   Time RRT ended 1415   Reason for call   Type of RRT Adult   Primary reason for call Respiratory   Respiratory O2sat less than 88% for greater than 5 minutes despite O2   Was patient transferred from the ED, ICU, or PACU within last 24 hours prior to RRT call? Yes   SBAR   Situation sudden increase in 02 needs to 13 liters. de-satted to 80%.    Background metastatic cancer to brain lunge adrenal glands.    Notable History/Conditions Cancer   Assessment fatigued. RR 35.Satting 90% on 13 liters with good waveform.   (sats came up without intervention and was able to wean o2. )   Interventions CXR  (radiation therapy consult)   Patient Outcome   Patient Outcome Stabilized on unit   RRT Team   Date Attending Physician notified 08/16/20   Time Attending Physician notified 1345   Physician(s) dr. keri Grace RN evelyne Rice   RT Ana MOTA   Post RRT Intervention Assessment   Post RRT Assessment Transferred to [facility]  (transfering soon when bed available. )   Date Follow Up Done 08/16/20   Time Follow Up Done 1630   Comments unchanged status on 5 liters. going to  for closer monitoring

## 2020-08-16 NOTE — PROVIDER NOTIFICATION
Admitted provider paged to notify patient desatted to 76% on RA, required 7LNC to get O2 >90%. Was on 2LNC with O2 95%. Desatted to mid 80s, currently on 3L oxymask. Dr. Harrington came to assess at bedside, no further interventions at this time.

## 2020-08-16 NOTE — PLAN OF CARE
Status: Admitted for AMS following seizure-like activity.  Vitals: Tachy in 120s, soft Bps, high RR, currently requiring 4L NC to maintain sats above 90%. On CCM  Neuros: Ox4, nystagmus all, 4/5 L side, 5/5 R side, mod grasp L hand, mod dorsi/plantar L foot. Fatigued, weakness throughout  IV: PIV infusing MIVF@125mL/hr.  Resp/trach: See episode of hypoxia in notes. One more episode of hypoxia noted, see flowsheets. Pt SaO2 71% requiring 7L O2 to return to mid 90s, now on 4LNC (per patient, facemask uncomfortable).   Diet: Combo regular diet  Bowel status: Unknown  : Voiding with hesitancy, takes 3+mins to empty bladder.   Skin: Redness blanchable to bottom. Drain site to lower right abdomen, covered in gauze and tegaderm, suture site to the left of tegaderm. Tumors protruding throughout upper back and right clavicle.  Pain: Pt reports no pain at this time, but discomfort in abdomen  Activity: Up with 1/GB, would benefit from walker.  Plan: Continue to monitor respiratory status and evaluate need to progress to higher level of care. Continue to monitor

## 2020-08-16 NOTE — PHARMACY-VANCOMYCIN DOSING SERVICE
Pharmacy Vancomycin Initial Note  Date of Service 2020  Patient's  1985  35 year old, male    Indication: Sepsis    Current estimated CrCl = Estimated Creatinine Clearance: 152.7 mL/min (A) (based on SCr of 0.56 mg/dL (L)).    Creatinine for last 3 days  2020: 10:02 AM Creatinine 0.65 mg/dL  8/15/2020: 11:52 PM Creatinine 0.56 mg/dL    Recent Vancomycin Level(s) for last 3 days  No results found for requested labs within last 72 hours.      Vancomycin IV Administrations (past 72 hours)      No vancomycin orders with administrations in past 72 hours.                Nephrotoxins and other renal medications (From now, onward)    Start     Dose/Rate Route Frequency Ordered Stop    20 1430  vancomycin 1250 mg in 0.9% NaCl 250 mL intermittent infusion 1,250 mg      20 mg/kg × 58.1 kg  over 90 Minutes Intravenous EVERY 12 HOURS 20 0157      20 0230  vancomycin 1500 mg in 0.9% NaCl 250 ml intermittent infusion 1,500 mg      1,500 mg  over 90 Minutes Intravenous ONCE 20 0153            Contrast Orders - past 72 hours (72h ago, onward)    None        Plan:  1.  Start vancomycin 1500 mg IV x 1 dose then 1250 mg IV q12h.   2.  Goal Trough Level: 15-20 mg/L   3.  Pharmacy will check trough levels as appropriate in 1-3 Days.    4. Serum creatinine levels will be ordered daily for the first week of therapy and at least twice weekly for subsequent weeks.    5. Hulbert method utilized to dose vancomycin therapy: Method 2    Yoselin Freire, PharmD, BCPS

## 2020-08-16 NOTE — PLAN OF CARE
Status: Pt admitted for unresponsive spells; concern for possible seizure. On CCM. Hx of metastatic melanoma.   Vitals: Pt BP is stable, consistently tachycardic in the 120s, respirations consistently high above 30s, O2 saturation unstable most of this shift. High 80s-low 90s on variable amounts of oxygen throughout this shift. 4L-13L via NC or oxymask. RRT called (see additional note). Triggered sepsis this shift and  Lactic drawn for 1.9 this afternoon.   Neuros: Alert and oriented x4. Denies N/T. Nystagmus of the eyes, pupils 4mm. L side moderate grasp. L side 4/5, R side 5/5.  IV: PIV infusing NS at 125mL/hr. Other PIV saline locked.   Resp/trach: Lung sounds clear. Tachypnea all shift. Denies SOB or increased work of breathing.   Diet: Regular diet. Refused food all shift. Reports no appetite.  Bowel status: BS+ no BM this shift.  : Low urine output, not drinking anything except sips of water with pills.  Skin: Looks very pale. CCM leads in place. Drain site to lower right abdomen dressing CDI. Blanchable redness on bottom.   Pain: Denies pain.  Activity: Not OOB at all this shift. Encouraged repositioning position changes and having HOB lowered increases breathing difficulty.   Social: Wife at bedside.  Plan: Plan is to transfer to . Continue to monitor and follow POC.

## 2020-08-16 NOTE — H&P
[facility], [place]    History and Physical - Night Float 2        Date of Admission:  8/15/2020    Assessment & Plan   Dallin Stevens is a 35 year old male admitted on 8/15/2020. He has a past medical history for metastatic melanoma to lymph nodes, adrenal glands, lung, and brain, bilateral pleural effusions s/p bilateral pleurx catheters, chronic hypotension 2/2 adrenal mets, anemia, and h/o pericardial effusion c/b tamponade s/p pericardial window who presents with unresponsive spells, found to have new intracranial metastatic lesions.    #Unresponsive Spells:   #Concern for Seizures:  Presenting with 2 unresponsive spells this evening.  Concerning for seizure activity based on history.  Though has had something similar in the past with negative seizure workup.  The metastatic intracranial lesions with vasogenic edema would be a possible nidus for seizure.  Prolactin also slightly elevated which can be seen in seizures. 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 SVC syndrome can have neurologic complications which needs to be monitored, though clinically he currently does not have evidence of SVC syndrome. Also the differential for altered mental status includes sepsis, especially given the elevated white blood cell count and potential immunocompromise state.  Hyponatremia also can cause neurologic changes, however this is chronic and appears to be at his baseline so unlikely.  No evidence of hypercapnia on blood gas.  - Status post IV Keppra load in the ED  - Await further neurology recs prior to further dosing  - NSG consult for new metastatic lesions, appreciate the recs  - No dexamethasone per NSG (receiving hydrocortisone as outpatient for possible adrenal insufficiency)  - q4h neuro checks  - Seizure precautions  - Defer EEG study to neurology  - Head MRI ordered     #Lactic Acidosis:  #Leukocytosis  Elevated to 4.2 in the emergency  room. Tachycardic but blood pressure stable. Could be elevated in the setting of seizures. WBC elevated but the patient is on Neulesta and this could also be elevated with seizures. Procal 0.47, which can be artificially elevated in cancer patients without sepsis. Started on broad spectrum antibiotics in the ED. Seems reasonable to continue overnight and de-escalate tomorrow morning if hemodynamically stable and lactic acid has resolved.  - Repeat Lactic Acid level  - Continue Vancomycin/Zosyn for now  - Repeat WBC in the AM  - Follow up blood cultures    #Hyponatremia:  #Concern for Adrenal Insufficiency   He has chronic hyponatremia, with concern for adrenal insufficiency given his adrenal metastasis. Sodium today was 126 which appears to be near his recent baseline. Was recently started on hydrocortisone/fludrocortisone as an outpatient.  Outpatient cortisol level was 20.2 on 8/14 making it unlikely that he has clinically significant adrenal insufficiency but will continue his medications for now.   - Continue outpatient hydrocortisone/fludrocortisone   - Trend sodium     #Metastatic Melanoma:   Recently started Cycle 1, Day 1 of Taxol, carboplatin, Neulesta yesterday. Head CT with new metastatic intracranial lesions. Will plan to consult oncology for assistance with managing his underlying malignancy  - Oncology consult, appreciate the recs  - NSG following    #Hypophosphatemia:  2.2 on presentation. Received 15 mmole in the ED. Continue to monitor.       Diet: Regular  DVT Prophylaxis: Pneumatic Compression Devices  Lu Catheter: not present  Code Status: Full       Disposition Plan   Expected discharge: 2 - 3 days, recommended to prior living arrangement once mental status at baseline and seizure workup completed.  Entered: Uche Starr MD 08/16/2020, 3:12 AM     The patient's care was discussed with the Patient.    Uche Starr MD  [facility], [place]  Pager:  [phone]  Please see sticky note for cross cover information  ______________________________________________________________________    Chief Complaint   Unresponsive spells    History is obtained from the patient    History of Present Illness   Dallin Stevens is a 35 year old male with PMH for metastatic melanoma to lymph nodes, adrenal glands, lung, and brain, bilateral pleural effusions s/p bilateral pleurx catheters, chronic hypotension 2/2 adrenal mets, anemia, and h/o pericardial effusion c/b tamponade s/p pericardial window who presents from home with two episodes of unresponsiveness.     Per the patient, his first episode of unresponsiveness occurred at 630 this evening and another 1 about 3 hours later.  Each episode lasted about 1 minute. He was seated in a chair and became unresponsive.  He was rigid and staring off into space.  The patient recall these episodes and consents them coming on.  He also thinks he had some sort of clarity during these episodes but he cannot respond to his wife was try to talk to him.  He recalls all events afterwards without any postictal confusion.  Denies any tongue biting.  Did have an episode of bladder incontinence, denies bowel incontinence.  Apparently has had one episode similar to this in the past, though has never been diagnosed with seizures.  Thought to be related to hyponatremia.  At the time of my interview the patient denied any headache, confusion, shortness of breath, chest pain, fever, abdominal pain.  Denies any weakness, tingling.  Denies any changes in vision. Has some mild swelling of the bilateral upper and lower extremities. Denies any facial or neck swelling.    Of note, he recently started chemo immunotherapy for his metastatic melanoma.  Yesterday with cycle 1 of Taxol, carboplatin, Neulasta.  He has melanoma history is pasted below from his most recent oncology visit.    In the emergency department he was alert and oriented and hemodynamically  stable.  Laboratory studies were notable for a lactic acid of 4.2 and a white count of 14.8.  He was started on broad-spectrum antibiotics with vancomycin and Zosyn with concern for possible sepsis.  CT head was completed which showed new metastatic lesions with mild amount of surrounding vasogenic edema.  Neurology was consulted with concern for seizures, there consults note is pending.  Neurosurgery was consulted who recommended against using steroids at this time and there is no surgical intervention indicated at this time.  He was loaded with Keppra in the emergency department and admitted to medicine for further work-up and management.    Review of Systems    The 10 point Review of Systems is negative other than noted in the HPI or here.     Past Medical History    I have reviewed this patient's medical history and updated it with pertinent information if needed.   Past Medical History:   Diagnosis Date     Malignant melanoma (H)        Past Surgical History   I have reviewed this patient's surgical history and updated it with pertinent information if needed.  Past Surgical History:   Procedure Laterality Date     BIOPSY OF SKIN LESION       CRANIOTOMY, EXCISE TUMOR COMPLEX, COMBINED  04/2019     DENTAL SURGERY      wisdom teeth     IR CHEST TUBE PLACEMENT NON-TUNNELED BILATERAL  4/19/2020     LYMPH NODE BIOPSY      arm, excision     MRI CRANIOTOMY LASER ABLATION Right 2/27/2020    Procedure: INTRAOPERATIVE MRI/STEALTH ASSISTED RIGHT LASER ABLATION OF BRAIN METASTASIS;  Surgeon: Nitish Quintero MD;  Location: [facility] OR     OPTICAL TRACKING SYSTEM CRANIOTOMY, EXCISE TUMOR, COMBINED Right 1/16/2020    Procedure: stealth assisted Right craniotomy for tumor resection;  Surgeon: Nitish Quintero MD;  Location: [facility] OR     REMOVE CATHETER CHEST Left 7/30/2020    Procedure: REMOVAL, Left CATHETER, CHEST;  Surgeon: Russel Chavez MD;  Location: [facility] GI     THORACOSCOPY Bilateral 4/20/2020    Procedure:  BILATERAL INSERTION OF PLEURX CATHETER;  Surgeon: Uday Bhakta MD;  Location: [facility] OR       Social History   I have reviewed this patient's social history and updated it with pertinent information if needed.  Social History     Tobacco Use     Smoking status: Never Smoker     Smokeless tobacco: Never Used   Substance Use Topics     Alcohol use: Not Currently     Drug use: Never       Family History     No significant family history    Prior to Admission Medications   Prior to Admission Medications   Prescriptions Last Dose Informant Patient Reported? Taking?   LORazepam (ATIVAN) 0.5 MG tablet   No No   Sig: Take 1 tablet (0.5 mg) by mouth every 6 hours as needed for anxiety   LORazepam (ATIVAN) 0.5 MG tablet   No No   Sig: Take 1 tablet (0.5 mg) by mouth every 4 hours as needed (Anxiety, Nausea/Vomiting or Sleep)   Vitamin D, Cholecalciferol, 25 MCG (1000 UT) CAPS   Yes No   acetaminophen (TYLENOL) 325 MG tablet  Self No No   Sig: Take 2 tablets (650 mg) by mouth every 4 hours as needed for mild pain, fever or headaches (> 101 F)   dabrafenib (TAFINLAR) 50 MG capsule   No No   Sig: Take 2 capsules (100 mg) by mouth 2 times daily Take at least 1 hr before or 2 hrs after a meal.   ferrous sulfate (FEROSUL) 325 (65 Fe) MG tablet   No No   Sig: Take 1 tablet (325 mg) by mouth daily (with breakfast)   fludrocortisone (FLORINEF) 0.1 MG tablet   No No   Sig: Take 1 tablet (0.1 mg) by mouth every other day   hydrocortisone (CORTEF) 10 MG tablet   No No   Sig: Take 20 mg in the morning and 10 mg in the early afternoon.   melatonin 1 MG TABS tablet  Self No No   Sig: Take 1 tablet (1 mg) by mouth nightly as needed for sleep   ondansetron (ZOFRAN) 8 MG tablet   No No   Sig: Take 1 tablet (8 mg) by mouth every 8 hours as needed (Nausea/Vomiting)   Patient not taking: Reported on 8/14/2020   prochlorperazine (COMPAZINE) 10 MG tablet   No No   Sig: Take 1 tablet (10 mg) by mouth every 6 hours as needed (Nausea/Vomiting)    Patient not taking: Reported on 8/14/2020   sodium chloride 1 GM tablet   No No   Sig: Take 2 tablets (2 g) by mouth 3 times daily   trametinib (MEKINIST) 0.5 MG tablet   No No   Sig: Take 3 tablets (1.5 mg) by mouth daily Take 1 hr before or 2 hrs after a meal. STORE and DISPENSE from original container.      Facility-Administered Medications: None     Allergies   No Known Allergies    Physical Exam   Vital Signs: Temp: 98.3  F (36.8  C) Temp src: Oral BP: 107/70 Pulse: 126 Heart Rate: 126 Resp: 21 SpO2: 96 %      Weight: 128 lbs 0 oz    General Appearance: Laying in bed, no acute distress  Eyes: White sclera, no icterus. EOMi. No nystagmus. PERRL   HEENT: Normal oropharynx without erythema. No facial swelling or fullness  Respiratory: CTAB, no wheezing or crackles  Cardiovascular: Tachycardic, regular. No murmurs appreciated  GI: Active bowel sounds. Mild diffuse tenderness with palpation. No guarding   Lymphatic: Large right supraclavicular mass  Skin: Clean, dry, intact. No rashes on exposed skin  Musculoskeletal: No deformity. No significant upper or lower extremity edema. Warm and well perfused  Neurologic: AOx3. CN II-XII intact. Strength intact in all four extremities. Sensation to light touch intact. Non-focal exam  Psychiatric: Flat affect    Data   Data reviewed today: I reviewed all medications, new labs and imaging results over the last 24 hours. I personally reviewed the EKG tracing showing sinus tachycardia.    Recent Labs   Lab 08/16/20  0407 08/15/20  2352 08/14/20  1002   WBC  --  14.8* 4.8   HGB  --  9.4* 9.9*   MCV  --  85 83   PLT  --  263 325   * 126* 126*   POTASSIUM  --  4.4 4.1   CHLORIDE  --  96 96   CO2  --  18* 22   BUN  --  12 6*   CR  --  0.56* 0.65*   ANIONGAP  --  12 9   ABUNDIO  --  7.6* 8.4*   GLC  --  124* 101*   ALBUMIN  --  2.4* 2.7*   PROTTOTAL  --  5.8* 6.3*   BILITOTAL  --  0.9 0.9   ALKPHOS  --  77 85   ALT  --  10 11   AST  --  57* 24   LIPASE  --  43*  --    TROPI  --   <0.015  --

## 2020-08-16 NOTE — CONSULTS
[facility]       NEUROSURGERY CONSULTATION NOTE    This consultation was requested by Dr. Lawson from the ED service.    Reason for Consultation: Metastatic melanoma with brain metastasis; with AMS episodes happening today.    HPI: Dallin Stevens is a 35 year old male with a known history of metastatic melanoma and known lesions to the brain s/p resection, laser ablation, and gamma knife who was seen for episodes of tensing up and loss of awareness. He had an episode like this in the past where he was evaluated and he was told it was likely due to his low sodium.    Today he states he had 2 episodes earlier on 8/15 where he would feel like he tenses up and cannot move. His s/o notes that he called out to get her attention when this happened and he would eventually stop responding. She believes he loses awareness, if not consciousness during this time. The episodes resolve fairly quickly and he goes back to his baseline. Currently he feels like he is back to his normal self. He has no specific ongoing symptoms due to these episodes at this time.    Of note, he just recently started chemotherapy again on 8/14.      PAST MEDICAL HISTORY:   Past Medical History:   Diagnosis Date     Malignant melanoma (H)        PAST SURGICAL HISTORY:   Past Surgical History:   Procedure Laterality Date     BIOPSY OF SKIN LESION       CRANIOTOMY, EXCISE TUMOR COMPLEX, COMBINED  04/2019     DENTAL SURGERY      wisdom teeth     IR CHEST TUBE PLACEMENT NON-TUNNELED BILATERAL  4/19/2020     LYMPH NODE BIOPSY      arm, excision     MRI CRANIOTOMY LASER ABLATION Right 2/27/2020    Procedure: INTRAOPERATIVE MRI/STEALTH ASSISTED RIGHT LASER ABLATION OF BRAIN METASTASIS;  Surgeon: Nitish Quintero MD;  Location: [facility] OR     OPTICAL TRACKING SYSTEM CRANIOTOMY, EXCISE TUMOR, COMBINED Right 1/16/2020    Procedure: stealth assisted Right craniotomy for tumor resection;  Surgeon: Nitish Quintero  "MD Gadiel;  Location: [facility] OR     REMOVE CATHETER CHEST Left 7/30/2020    Procedure: REMOVAL, Left CATHETER, CHEST;  Surgeon: Russel Chavez MD;  Location: [facility] GI     THORACOSCOPY Bilateral 4/20/2020    Procedure: BILATERAL INSERTION OF PLEURX CATHETER;  Surgeon: Uday Bhakta MD;  Location: [facility] OR       FAMILY HISTORY:   Family History   Problem Relation Age of Onset     Anesthesia Reaction No family hx of      Cardiovascular No family hx of      Deep Vein Thrombosis No family hx of        SOCIAL HISTORY:   Social History     Tobacco Use     Smoking status: Never Smoker     Smokeless tobacco: Never Used   Substance Use Topics     Alcohol use: Not Currently       MEDICATIONS:  (Not in a hospital admission)      Allergies:  No Known Allergies    ROS: 10 point ROS were all negative except for pertinent positives noted in my HPI.    Physical exam:   Blood pressure 107/70, pulse 126, temperature 98.3  F (36.8  C), temperature source Oral, resp. rate 21, height 1.753 m (5' 9\"), weight 58.1 kg (128 lb), SpO2 96 %.  CV: HR and BP as noted above  PULM: breathing comfortably  ABD: soft, non-distended  NEUROLOGIC:  -- Awake; Alert; oriented x 3  -- Follows commands briskly  -- Speech fluent, spontaneous. No aphasia or dysarthria.  -- no gaze preference. No apparent hemineglect.  Cranial Nerves:  -- visual fields full to confrontation, PERRL 3-2mm bilat and brisk, extraocular movements intact  -- face symmetrical, tongue midline  -- sensory V1-V3 intact bilaterally  -- hearing grossly intact bilat    Motor:  Normal bulk / tone; no tremor, rigidity, or bradykinesia.  No muscle wasting or fasciculations  No Pronator Drift     Delt Bi Tri Hand Flexion/  Extension Iliopsoas Quadriceps Hamstrings Tibialis Anterior Gastroc    C5 C6 C7 C8/T1 L2 L3 L4-S1 L4 S1   R 5 5 5 5 5 5 5 5 5   L 5 5 5 5 5 5 4+ 4+ 5   Sensory:  intact to LT x 4 extremities     Reflexes:     Bi BR Pat Ach    C5-6 C6 L2-4 S1   R 2+ 2+ 2+ 2+   L 2+ 2+ " 2+ 2+     Gait: Deferred      LABS:  Recent Labs   Lab 08/15/20  2352 08/14/20  1002   * 126*   POTASSIUM 4.4 4.1   CHLORIDE 96 96   CO2 18* 22   ANIONGAP 12 9   * 101*   BUN 12 6*   CR 0.56* 0.65*   ABUNDIO 7.6* 8.4*       Recent Labs   Lab 08/15/20  2352   WBC 14.8*   RBC 3.68*   HGB 9.4*   HCT 31.1*   MCV 85   MCH 25.5*   MCHC 30.2*   RDW 17.5*            IMAGING:  Recent Results (from the past 24 hour(s))   CT Head w/o Contrast    Narrative    EXAM: CT HEAD W/O CONTRAST  LOCATION: [facility]  DATE/TIME: 8/16/2020 12:15 AM    INDICATION: Altered level of consciousness (LOC), unexplained and metastatic melanoma.  COMPARISON: 07/14/2020.  TECHNIQUE: Routine without IV contrast. Multiplanar reformats. Dose reduction techniques were used.    FINDINGS:  INTRACRANIAL CONTENTS: No intracranial hemorrhage, extraaxial collection, or mass effect.  No CT evidence of acute infarct. Again visualized is multifocal intracranial metastatic melanoma. Lesions within the left parietal, bilateral frontal and right   temporal region are again visualized with surrounding vasogenic edema. There are though new lesions visualized in the interval. There is new edema and possible punctate increased density within the davian. There is vasogenic edema and a hyperdense lesion   of approximately 6 mm x 5 mm involving the left cerebellar hemisphere. There is a new hyperdense metastatic lesion involving the mid right temporal region with surrounding vasogenic edema measuring 1.5 cm x 1.3 cm. There is a new tiny area of vasogenic   edema and increased density about 2 mm in the right frontal lobe. The vasogenic edema surrounding the previously seen medial right frontal lobe lesion has increased in the interval. There is no midline shift. Rest of the brain parenchyma has normal   gray-white matter differentiation. The ventricular system, basal cisterns and the cortical sulci are within normal limits for the patient's  age.     VISUALIZED ORBITS/SINUSES/MASTOIDS: No intraorbital abnormality. No paranasal sinus mucosal disease. No middle ear or mastoid effusion.    BONES/SOFT TISSUES: Patient status post right frontal and right parietal craniotomy.      Impression    IMPRESSION:  1.  Known metastatic melanoma lesions which are hyperdense due to melanin with vasogenic edema involving both cerebral hemispheres again visualized. Vasogenic edema involving medial right frontal lobe relation has increased in the interval.    2.  New metastatic lesions with mild amount of surrounding vasogenic edema visualized within lateral right frontal lobe, mid right temporal lobe, davian and the left cerebellar hemisphere.    3.  No evidence of a midline shift, acute hemorrhage or focal area suggestive of acute infarct.    [Critical Result:  [[facility]: Aggressive metastatic melanoma to the brain.]    This report will be copied to the [facility] to ensure a provider acknowledges the finding. [facility] is available Monday through Friday 8am-3:30 pm.   ]    Finding was identified on 8/16/2020 12:29 AM.     Dr. Lawson was contacted Dr. Pope on 8/16/2020 12:41 AM and verbalized understanding of the critical result.    XR Chest Port 1 View    Narrative    EXAM: XR CHEST PORT 1 VW  LOCATION: [facility]  DATE/TIME: 8/16/2020 12:12 AM    INDICATION: Cough  COMPARISON: CT 08/04/2020      Impression    IMPRESSION: Increasing bilateral pleural effusions with adjacent atelectasis and consolidation. New left lower lobe consolidation and likely collapse. Multiple masses difficult to directly compare. Remainder stable.       ASSESSMENT:  35 year old male with known metastatic melanoma. CT head shows likely progression of disease; will need MRI to further clarify. He presents with episodes concerning of seizure, and lab values to corroborate that concern (elevated lactate, low sodium). He is currently at his neurologic  baseline.    RECOMMENDATIONS:  - No neurosurgical intervention indicated at this time   - Agree with neurology consult; defer to them regarding seizure workup and management  - MRI brain with and without contrast (non-urgent) (done)  - Recommend oncology consult for worsening disease  - No steroids recommended at this time    After discussing with Dr Quintero, following additions:  - Recommend Radiation Oncology consult for new lesions  - Patient case will be discussed at upcoming tumor conference  - no neurosurgical interventions at this time.      The patient was discussed with Dr. Oneill, neurosurgery chief resident, and Dr. Strong, neurosurgery staff, and they agree with the above.    Bravo Hernandez MD  Neurosurgery Resident, PGY-2    addended by:   Amarilis Cornell MD  Neurosurgery Resident PGY-1

## 2020-08-16 NOTE — ED NOTES
[facility], [place]   ED Nurse to Floor Handoff     Dallin Stevens is a 35 year old male who speaks English and lives with a spouse,  in a home  They arrived in the ED by ambulance from home    ED Chief Complaint: Altered Mental Status    ED Dx;   Final diagnoses:   Brain metastasis (H)   Seizures (H)         Needed?: No    Allergies: No Known Allergies.  Past Medical Hx:   Past Medical History:   Diagnosis Date     Malignant melanoma (H)       Baseline Mental status: WDL  Current Mental Status changes: at basesline    Infection present or suspected this encounter: no  Sepsis suspected: No  Isolation type: No active isolations     Activity level - Baseline/Home:  Independent  Activity Level - Current:   Stand with Assist    Bariatric equipment needed?: No    In the ED these meds were given:   Medications   sodium phosphate 15 mmol in D5W intermittent infusion (15 mmol Intravenous New Bag 8/16/20 0150)   vancomycin 1500 mg in 0.9% NaCl 250 ml intermittent infusion 1,500 mg (1,500 mg Intravenous Given 8/16/20 0231)   vancomycin 1250 mg in 0.9% NaCl 250 mL intermittent infusion 1,250 mg (has no administration in time range)   0.9% sodium chloride BOLUS (0 mLs Intravenous Stopped 8/16/20 0151)   piperacillin-tazobactam (ZOSYN) 4.5 g vial to attach to  mL bag (0 g Intravenous Stopped 8/16/20 0230)   levETIRAcetam (KEPPRA) 2,000 mg in sodium chloride 0.9 % 250 mL intermittent infusion (2,000 mg Intravenous Given 8/16/20 0138)       Drips running?  No    Home pump  No    Current LDAs  Peripheral IV 08/15/20 Distal;Left Upper arm (Active)   Number of days: 1       Chest Tube 1 Right Pleural 15.5 [place] (Active)   Number of days: 118       Chest Tube 2 Left Pleural 15.5 [place] (Active)   Number of days: 118       Incision/Surgical Site 01/16/20 Right Head (Active)   Number of days: 213       Labs results:   Labs Ordered and Resulted from Time of ED Arrival Up to the Time of  Departure from the ED   CBC WITH PLATELETS DIFFERENTIAL - Abnormal; Notable for the following components:       Result Value    WBC 14.8 (*)     RBC Count 3.68 (*)     Hemoglobin 9.4 (*)     Hematocrit 31.1 (*)     MCH 25.5 (*)     MCHC 30.2 (*)     RDW 17.5 (*)     Absolute Neutrophil 14.1 (*)     Absolute Lymphocytes 0.4 (*)     All other components within normal limits   COMPREHENSIVE METABOLIC PANEL - Abnormal; Notable for the following components:    Sodium 126 (*)     Carbon Dioxide 18 (*)     Glucose 124 (*)     Creatinine 0.56 (*)     Calcium 7.6 (*)     Albumin 2.4 (*)     Protein Total 5.8 (*)     AST 57 (*)     All other components within normal limits   LACTIC ACID WHOLE BLOOD - Abnormal; Notable for the following components:    Lactic Acid 4.2 (*)     All other components within normal limits   PHOSPHORUS - Abnormal; Notable for the following components:    Phosphorus 2.2 (*)     All other components within normal limits   BLOOD GAS VENOUS - Abnormal; Notable for the following components:    PCO2 Venous 35 (*)     Bicarbonate Venous 20 (*)     All other components within normal limits   LIPASE - Abnormal; Notable for the following components:    Lipase 43 (*)     All other components within normal limits   PROLACTIN - Abnormal; Notable for the following components:    Prolactin 25 (*)     All other components within normal limits   COVID-19 VIRUS (CORONAVIRUS) BY PCR   MAGNESIUM   TROPONIN I   PROCALCITONIN   UA MACROSCOPIC WITH REFLEX TO MICRO AND CULTURE   NURSING DRAW AND HOLD   BLOOD CULTURE   BLOOD CULTURE       Imaging Studies:   Recent Results (from the past 24 hour(s))   CT Head w/o Contrast    Narrative    EXAM: CT HEAD W/O CONTRAST  LOCATION: [facility]  DATE/TIME: 8/16/2020 12:15 AM    INDICATION: Altered level of consciousness (LOC), unexplained and metastatic melanoma.  COMPARISON: 07/14/2020.  TECHNIQUE: Routine without IV contrast. Multiplanar reformats. Dose reduction  techniques were used.    FINDINGS:  INTRACRANIAL CONTENTS: No intracranial hemorrhage, extraaxial collection, or mass effect.  No CT evidence of acute infarct. Again visualized is multifocal intracranial metastatic melanoma. Lesions within the left parietal, bilateral frontal and right   temporal region are again visualized with surrounding vasogenic edema. There are though new lesions visualized in the interval. There is new edema and possible punctate increased density within the davian. There is vasogenic edema and a hyperdense lesion   of approximately 6 mm x 5 mm involving the left cerebellar hemisphere. There is a new hyperdense metastatic lesion involving the mid right temporal region with surrounding vasogenic edema measuring 1.5 cm x 1.3 cm. There is a new tiny area of vasogenic   edema and increased density about 2 mm in the right frontal lobe. The vasogenic edema surrounding the previously seen medial right frontal lobe lesion has increased in the interval. There is no midline shift. Rest of the brain parenchyma has normal   gray-white matter differentiation. The ventricular system, basal cisterns and the cortical sulci are within normal limits for the patient's age.     VISUALIZED ORBITS/SINUSES/MASTOIDS: No intraorbital abnormality. No paranasal sinus mucosal disease. No middle ear or mastoid effusion.    BONES/SOFT TISSUES: Patient status post right frontal and right parietal craniotomy.      Impression    IMPRESSION:  1.  Known metastatic melanoma lesions which are hyperdense due to melanin with vasogenic edema involving both cerebral hemispheres again visualized. Vasogenic edema involving medial right frontal lobe relation has increased in the interval.    2.  New metastatic lesions with mild amount of surrounding vasogenic edema visualized within lateral right frontal lobe, mid right temporal lobe, davian and the left cerebellar hemisphere.    3.  No evidence of a midline shift, acute hemorrhage or  "focal area suggestive of acute infarct.    [Critical Result:  [[facility]: Aggressive metastatic melanoma to the brain.]    This report will be copied to the [facility] to ensure a provider acknowledges the finding. [facility] is available Monday through Friday 8am-3:30 pm.   ]    Finding was identified on 8/16/2020 12:29 AM.     Dr. Lawson was contacted Dr. Pope on 8/16/2020 12:41 AM and verbalized understanding of the critical result.    XR Chest Port 1 View    Narrative    EXAM: XR CHEST PORT 1 VW  LOCATION: [facility]  DATE/TIME: 8/16/2020 12:12 AM    INDICATION: Cough  COMPARISON: CT 08/04/2020      Impression    IMPRESSION: Increasing bilateral pleural effusions with adjacent atelectasis and consolidation. New left lower lobe consolidation and likely collapse. Multiple masses difficult to directly compare. Remainder stable.       Recent vital signs:   /70   Pulse 126   Temp 98.3  F (36.8  C) (Oral)   Resp 21   Ht 1.753 m (5' 9\")   Wt 58.1 kg (128 lb)   SpO2 96%   BMI 18.90 kg/m      [place] Coma Scale Score: 15 (08/16/20 0000)       Cardiac Rhythm: Tachycardia  Pt needs tele? Yes  Skin/wound Issues: None    Code Status: Full Code    Pain control: good    Nausea control: pt had none    Abnormal labs/tests/findings requiring intervention: Hypokalemia.  Brain mets.    Family present during ED course? Yes   Family Comments/Social Situation comments: n/a    Tasks needing completion: None    Umu Lane RN  [facility] -- [phone]  [phone]      "

## 2020-08-16 NOTE — CONSULTS
"      Endocrinology Inpatient New Consult   Service Date: 08/16/20      Dallin Stevens : MRN:[id number]  :YOB: 1985  Primary care provider: No Ref-Primary, Physician   Fellow: Guru Uday MD  Attending Physician:     Reason for Endocrine consult: Referred by   \"Hydrocortisone dosing and mgmt options \"    Visit/Communication Style   PHONE communication was used to talk with Dallin due to the COVID pandemic.  I did not personally see this patient.  Dallin consented to the use of phone communication: yes  Time spent speaking with the patient: 11-20 minutes         Assessment and Plan:    Dallin Stevens is a 35 year old male is admitted on 8/15/20 for unresponsive spells, seizures and new intracranial metastasis.    He has a past medical history for melanoma in 2012, metastatic melanoma to lymph nodes, adrenal glands, lung, and brain, bilateral pleural effusions s/p bilateral pleurx catheters, liver hypodensity, right chest wall mass, chronic hypotension, anemia, and h/o pericardial effusion c/b tamponade s/p pericardial window who presents with unresponsive spells, found to have new intracranial metastatic lesions.      Endocrine is consulted for concern for adrenal insufficiency due to chronic hyponatremia, bilateral adrenal metastasis 7.8 x6.9 on the right and 9.1 x 8.2 cm on the left, severe fatigue and exhaustion.    1. Regarding adrenal insufficiency (AI):    - His cortisol level of 20.2 on 8/14th and incomplete ACTH stim test cortisol 25.8 on 7/15th rules out both primary and secondary adrenal insufficiency.      His hyponatremia is chronic even in Mar and Apr 2020, while reason is unclear, his cortisol level checked at that time were 30 in Apr 2020 and 18 in May 2020. (His cortisol level are in good range for last 5 months, so less likely for acute on chronic secondary AI as well).    Other than tired/ fatigue - other AI symptoms are not found to suggest clinically AI. Fatigue could very " well come from his multiple underlying issue, especially chemotherapy medications he is taking can cause that.    On contrary, he has generous cortisol level checked at various times of the day at different time frame. However, clinically he has no adrenal excess symptoms - no easy skin bruising, skin bleeding, buffalo hump, weight gain, HTN    There is no role for hydrocortisone. Since adrenal gland is making glucocorticoid/cortisol sufficiently, less likely for loss of mineralocorticoid effect and no role for florinef.    2.  Immune checkpoint inhibitor therapy  (ICI):    ICI - Keytruda and Nivolumab can cause various endocrinopathies - 1. Thyroid - Graves, hyper and hypothyroidism, 2. Hypophysitis and pituitary deficiency 3. Adrenal insufficiency - primary and secondary 4. Type 1 DM    2a- Thyroid - TSH 1.62, free T4 1.61 in 4/2020 showed minimally increased free T4. But in May 2020, TSH is trending down to 0..51 but no free T4 level.       2b. Pituitary and ICI -  Hypophysitis happens with ICI. Clinically, he has no headache and last dose is in July 2020.  NO concern for diabetes insipidus due to normal specific gravity, clinical history and  sodium is at 128. Prolactin checked and is 25 when he had seizures.    2c. ICI and DM:     His last A1c is 5.3 and his recent BG are in 80-120mg range. ICI rarely causes type 1 DM, if BG worsens in future, can consider Type 1 DM workup.    3. Hyponatremia:    Hypothyroidism and adrenal insufficiency can cause low sodium level but with existing labs and history, this is not the reason. His urine specific gravity is wnl this admission and Urine osmolality in 7/202o makes less likely for SIADH but it is still a possibility due to multiple brain lesions and stress. He is drinking half gallon of water daily, denies thirsty less likely for polydipsia to cause hyponatremia but could be a contributor in addition to his multiple medications.    4.Chest CT showed - Thyroid gland is  unremarkable in appearance. Apparent hypodensity along the anterior aspect of the thyroid gland, likely artifactual.    Recommendation:    - Recommend to stop hydrocortisone if used for adrenal insufficiency  - Recommend to stop florinef if used for adrenal insufficiency  - Check TSH and free T4 for hyper or hypothyroidism and TSI for Graves  - In future, he is at risk for developing above mentioned issues and would benefit from endocrine outpatient followup.    Patient is staffed with . Discussed with patient wife who is majority of history provider and she clarified some answers with . They agree to stop both the above medications if not indicated. Paged [facility] team with above recommendations    Guru Uday MD, FACP  PGY 5 - Endocrinology Fellow  Pager: [phone]  Call ** [phone] then enter job code [phone] for on call person.      I, Mili Miller, personally evaluated this patient during a virtual visit. I discussed the patient with the felow and agree with the assessment and plan of care as documented in the fellow's note. I personally reviewed vital signs, mediations, labs and imaging.    Mili Miller MD  Endocrinology and Diabetes  [address] [address]  Tel [phone]      =============================================================================    HPI:    Dallin Stevens is a 35 year old male is admitted on 8/15/20 for unresponsive spells, seizures and new intracranial metastasis.    He has a past medical history for metastatic melanoma to lymph nodes, adrenal glands, lung, and brain, bilateral pleural effusions s/p bilateral pleurx catheters, liver hypodensity, right chest wall mass, chronic hypotension, anemia, and h/o pericardial effusion c/b tamponade s/p pericardial window who presents with unresponsive spells, found to have new intracranial metastatic lesions.     He is exhausted,tired, can't walk 10 feet from chair to bathroom,  "gradually worse over last 3 weeks.He denies nausea, vomiting. He has bloated sensation, constant in last 2 weeks. Denies loose stools. He denies dizziness/passing out/on standing up. His BP runs low and he has tachycardia    He is not losing weight. It is 120 lbs, stable in last many months. Denies hump in neck, denies easy bruising or skin bleeding, denies h/o HTN.    He was started on Keytruda 4 doses from Feb-Apr 2020 and one dose of nivolomub July 14th 2020. His scans show progression, Combination of carboplatin/taxol and pembrolizumab is planned, per wife, keytruda is not approved by insurance. Now he is on chemotherapy with  - received 8/14/20 Cycle 1 Day 1 Taxol, Carboplatin, Neulasta OnPro. Used different tyrosine kinase inhibitors in past - paclitaxel/dabrafenib/trametnib and gets decadron as part of chemotherapy but not on daily basis.    He was on immune checkpoint inhibiotrs -ICI therapy - but denies thyroid issue - denies cold or heat intolerance but wears long sleeve, denies bowel changes, denies weight issues. Denies headache, denies trouble vision, double vision.    He has Hem Onc visit 8/14/20, had low energy/fatigue/hyponatremia/bilateral adrenal metastasis,he was started on on hydrocortisone 20mg am and 10mg PM along with florinef 0.1mg every other day.    Endocrine review of system:    -He has nocturia 2-3 times/night, no daytime increased urination, excessive thirst or known sodium issues  -Drinks always lot of water ~0.5 gallon, but not ice cold. Denies any known history of diabetes personally    \"In the emergency department he was alert and oriented and hemodynamically stable.  Laboratory studies were notable for a lactic acid of 4.2 and a white count of 14.8.  He was started on broad-spectrum antibiotics with vancomycin and Zosyn with concern for possible sepsis.  CT head was completed which showed new metastatic lesions with mild amount of surrounding vasogenic edema.  Neurology and " "neurosurgery was consulted \"    His HR is elevated at ~110-124bpm, /68mmHg, BMI 18.9    ROS:  All 12 systems were reviewed and negative except as mentioned in HPI    Past Medical/Surgical History:   Reviewed in chart  Past Medical History:   Diagnosis Date     Malignant melanoma (H)      Past Surgical History:   Procedure Laterality Date     BIOPSY OF SKIN LESION       CRANIOTOMY, EXCISE TUMOR COMPLEX, COMBINED  04/2019     DENTAL SURGERY      wisdom teeth     IR CHEST TUBE PLACEMENT NON-TUNNELED BILATERAL  4/19/2020     LYMPH NODE BIOPSY      arm, excision     MRI CRANIOTOMY LASER ABLATION Right 2/27/2020    Procedure: INTRAOPERATIVE MRI/STEALTH ASSISTED RIGHT LASER ABLATION OF BRAIN METASTASIS;  Surgeon: Nitish Quintero MD;  Location: [facility] OR     OPTICAL TRACKING SYSTEM CRANIOTOMY, EXCISE TUMOR, COMBINED Right 1/16/2020    Procedure: stealth assisted Right craniotomy for tumor resection;  Surgeon: Nitish Quintero MD;  Location: [facility] OR     REMOVE CATHETER CHEST Left 7/30/2020    Procedure: REMOVAL, Left CATHETER, CHEST;  Surgeon: Russel Chavez MD;  Location: [facility] GI     THORACOSCOPY Bilateral 4/20/2020    Procedure: BILATERAL INSERTION OF PLEURX CATHETER;  Surgeon: Uday Bhakta MD;  Location: [facility] OR       Allergies:  Reviewed in chart    Current Medications:   Reviewed in chart    Family History:  Reviewed in chart    Social History:  Reviewed in chart    Physical Examination:  Vital signs:  Temp: 97.6  F (36.4  C) Temp src: Axillary BP: 103/68 Pulse: 124 Heart Rate: 116 Resp: 28 SpO2: 94 % O2 Device: Oxymask Oxygen Delivery: 4 LPM Height: 175.3 cm (5' 9\") Weight: 58.1 kg (128 lb)  Estimated body mass index is 18.9 kg/m  as calculated from the following:    Height as of this encounter: 1.753 m (5' 9\").    Weight as of this encounter: 58.1 kg (128 lb).    Previous Weights:   Wt Readings from Last 3 Encounters:   08/15/20 58.1 kg (128 lb)   08/14/20 58.5 kg (129 lb)   07/16/20 55.2 kg " (121 lb 9.6 oz)                    BMI:  Body mass index is 18.9 kg/m .    Phone visit    Endocrine Labs:    Results for JEANIE HERNANDEZ (MRN [id number]) as of 8/16/2020 14:38   Ref. Range 4/18/2020 14:48 4/19/2020 04:38 5/23/2020 07:54 5/23/2020 11:42 7/14/2020 09:00 7/14/2020 21:20 7/15/2020 15:30 8/14/2020 10:02   TSH Latest Ref Range: 0.40 - 4.00 mU/L 1.62  1.68  0.51 1.54     T4 Free Latest Ref Range: 0.76 - 1.46 ng/dL 1.61 (H)          Cortisol Serum Latest Ref Range: 4 - 22 ug/dL 30.1 (H) 30.2 (H)  18.0   25.8 (H) 20.2     MR BRAIN W/O & W CONTRAST 8/16/2020 12:20 PM     Provided History: Altered level of consciousness (LOC), unexplained;  History of metastatic melanoma, with concern for disease progression  and new intracranial lesions.     Per EPIC: The patient has been treated with Pembrolizumab, as well as  targeted treatment, with Dabrafenib and Trametinib that was initiated  in late April 2020. In mid-May, he had mixed response, detected, and  with progressive disease seen on scans. Most recently had right  frontal craniotomy with resection of the metastatic melanoma with  postoperative stereotactic radiosurgery in late January 2020.     Comparison: Head CT from 8/16/2020 and Brain MRI from 7/24/2020.     Technique: Multiplanar T1-weighted, axial FLAIR, and susceptibility  images were obtained without intravenous contrast. Following  intravenous gadolinium-based contrast administration, axial  T2-weighted, diffusion, and T1-weighted images (in multiple planes)  were obtained.     Contrast: 5.8CC GADAVIST      Findings:  Multiple metastatic lesions within both supra and infratentorial areas  with associated susceptibility and surrounding T2 hyperintense edema.  Two lesions have decreased in size, otherwise most of the lesions are  increased in size. There are also new lesions. For example,     Largest supratentorial lesion within the posterior horn of right  lateral ventricle is unchanged and compared to  7/24/2020, measures 2.3  x 2.2 cm.     Increased in size of the   -right anterior frontal lesion, measuring 1.8 x 1.7 cm, previously 0.8  x 0.5 cm.   -largest infratentorial lesion within the left cerebellar hemisphere,  measures 1.2 x 1.3 cm, previously 0.6 x 0.5 cm.  -lesion within the right posterior parietal lobe, measuring 0.9 x 0.9  cm, previously 0.3 cm.  -the right posterolateral temporal lobe lesion, measuring 1.8 x 1.5  cm, previously 0.9 x 0.8 cm.     -Decreasing size of the enhancing lesion within the right parasagittal  frontal area (series 12 image 21), measuring 0.6 x 0.9 cm, previously  1.1 x 1.0 cm and within the left parietal lobe (series 16 image 132),  measuring 0.8 x 0.7 cm, previously 1 x 0.9 cm.     New enhancing lesions within the left posterior frontal lobe (series  16 image 138) measures 0.3 cm, within the right parietal lobe (series  16 image 110) measuring 0.5 x 0.5 cm, within the right insula,  measuring 0.7 x 0.6 cm.     Postsurgical changes of right frontal and occipital craniectomy for  underlying metastases resections. No midline shift. The ventricles are  proportionate to the cerebral sulci. Normal major vascular  intracranial flow-voids. The orbits are grossly unremarkable. The  paranasal sinuses are clear. Trace right mastoid effusion.                                                                      Impression: In this patient with a history of metastatic melanoma,  findings are suggestive for progressive disease. Multiple metastatic  lesions within both supra and infratentorial areas with associated  hemorrhage. Most of the lesions increase in size when compared to  7/24/2020 brain MRI. There are also new lesions within the left  posterior frontal lobe, right parietal lobe, and within the right  Insula.    =====                                  CT CAP 8/4/2020                                      IMPRESSION: In this patient with history of metastatic melanoma, the  current  scan shows progressive disease:  1. Significant increase in size of mediastinal confluent basim mass,  encasing the SVC, right main bronchus, right pulmonary artery, right  brachiocephalic vein, right subclavian vessels, with mass effect on  the trachea.  2. Significant increase in the size of AP window basim mass with  pericardial invasion and possible extension left atrial extension.  Moderate pericardial effusion.  3. New lobulated anterior mediastinal basim mass.  4. Significant increase in size of bilateral adrenal metastasis.  5. Medial intraperitoneal heterogenous mass inferior to the spleen and  medial heterogenous mass in the right lower quadrant with  bowel  encasement. No bowel obstruction.  6. New peripheral left upper lobe nodule.  7. Increase in size of subcutaneous nodules including subcutaneous  nodule in the right upper quadrant right chest wall.  8. Moderate right pleural effusion. Right chest tube in dependent  medial right costophrenic sulcus.  9. Mild splenomegaly.  10. Nonocclusive thrombus in the right internal jugular vein.

## 2020-08-16 NOTE — ED NOTES
Bed: ED17  Expected date: 8/15/20  Expected time:   Means of arrival: Ambulance  Comments:  Anya  35 y M  Cancer history, AMS, Cough  Yellow

## 2020-08-16 NOTE — PROGRESS NOTES
RRT Called with RN reported: Event    Pt O2 saturation down to 80%, requiring 13L of oxygen with oxymask to bring him back up to low-mid 90s. RRT called and MD notified. Dr. Simpson came to assess patient at bedside.    Pt seen and assessed promptly at bedside. Wife present.    Pt sitting upright, no distress, feels breathing is at his baseline; wife feels breathing more deep breaths than before.    Reviewed MRI Brain results and findings  Onc and NSG also aware and were notified.  Rad Onc consulted, discussed to close loop with Oncology    Assessment and Plan:  Guarded Prognosis; Remains full code.  [facility] favors any cranial XRT to be targeted Gamma Knife; which would need to be re-assessed tomorrow.  Await CXR, ECHO, Transfer to     Wife confirmed that FULL CODE is current code status at my repeat visit.    Further Chart review:     CT 8/4 reviewed with pt and wife again.  IMPRESSION: In this patient with history of metastatic melanoma, the  current scan shows progressive disease:  1. Significant increase in size of mediastinal confluent basim mass, encasing the SVC, right main bronchus, right pulmonary artery, right brachiocephalic vein, right subclavian vessels, with mass effect on the trachea.  2. Significant increase in the size of AP window basim mass with pericardial invasion and possible extension left atrial extension. Moderate pericardial effusion.  3. New lobulated anterior mediastinal basim mass.  4. Significant increase in size of bilateral adrenal metastasis.  5. Medial intraperitoneal heterogenous mass inferior to the spleen and medial heterogenous mass in the right lower quadrant with  bowel encasement. No bowel obstruction.  6. New peripheral left upper lobe nodule.  7. Increase in size of subcutaneous nodules including subcutaneous nodule in the right upper quadrant right chest wall.  8. Moderate right pleural effusion. Right chest tube in dependent medial right costophrenic sulcus.  9. Mild  splenomegaly.  10. Nonocclusive thrombus in the right internal jugular vein.      Documented: DC Summary 4/21/2020 4/19: Bilateral Pleurex Pigtail chest tubes placed by Thoracic Surgery  Patient was felt to be too unstable and risks would be too high to intervene on the pericardial effusion per Thoracic.  #H/o pericardial effusion c/b tamponade s/p pericardial window   - Documented by Thoracic Consult on 4/19:  metastatic melanoma (currently on pembrolizumab) and massive right mediastinal mass with compression of innominate vein and trachea, large pericardial effusion causing recent cardiac tamponade s/p subxiphoid pericardial window at OSH on 4/7/2020, and bilateral pleural effusions    7/30/2020: LEFT Pleurex was removed  Interventional Pulm note: Tunnelled pleural catheter removal (left chest)  Indication:  Hx of metastatic melanoma with bilateral PleurX catheter placement in 4/2020, minimal drainage from left for past month (<20cc) without change in symptoms.    35 minutes of critical care time was spent with the patient. We specifically discussed the current documented clinical status, Goals of Care, and care coordination with greater than 50% of the time spent on counseling and coordination of care.

## 2020-08-16 NOTE — PROGRESS NOTES
Medicine Cross Cover Note:    Called to bedside to evaluate Mr. Puentes for hypoxia. Per report, he de-satted into the 70s and required 1-2 minutes of 7L NC to get his O2 sats back up. He reports that he was asymptomatic. His breathing is at baseline and he does not feel short of breath currently. At the time of my evaluation. The patient was laying comfortably in bed in no distress. Breathing comfortably with oxy mask on his face with 3L O2 flow. Lungs clear to auscultation. Denies any chest pain. I was able to turn him down to 2L and he maintained sats >90%. Unclear what caused him to desaturate suddenly. I suspect a VQ mismatch related to his tumor burden involving his bronchi and pulmonary vasculature, especially when he lays down. Also on the differential is PE, though this seems less likely given lack of chest discomfort.     Regardless, he is improving and stable currently. I do not feel that he needs any acute intervention outside of pulse oxymetry monitoring. He will remain on his current level of care.     This was discussed with bedside and charge RN. I will remain available should any further issues arise.    Uche Harrington MD  Internal Medicine  [phone]

## 2020-08-16 NOTE — ED TRIAGE NOTES
Pt BIBA for periods of confusion today..  Denies fever or sick contacts.  +Dry cough x 2 wks.  COVID test negative.  H/o Melanoma;  Recently received first chemo.  Pt currently alert and follows commands.

## 2020-08-16 NOTE — ED PROVIDER NOTES
[place] EMERGENCY DEPARTMENT ([facility])  8/15/20    History     Chief Complaint   Patient presents with     Altered Mental Status     HPI  Dallin Stevens is a 35 year old male with a past medical history of metastatic malignant melanoma with mets to the brain, lung, adrenal glands, hyponatremia, pleural effusion with a Pleurx catheter in place who presents to the Emergency Department with 2 episodes of unresponsiveness today.  Patient's wife reports that the patient had an episode at 630 this evening and another 1 at 930 this evening where he became unresponsive while seated in a chair.  She states that he became rigid and stared off into space and was unresponsive.  He seemed to return to normal after this episode ended.  The patient states that he recalled these episodes and felt them coming on.  He also recalls his wife trying to talk to him during these episodes but states that he could not respond.  The wife reports he has had an episode like this in the past and has had an extensive work-up and was not found to have seizures.  They were told that these episodes were likely related to hyponatremia in the past.  Patient is currently taking sodium pills.  Of note, patient started his first round of chemotherapy.  He has had a craniotomy with tumor excision and laser ablation of his brain tumors most recently in February of this year.  Patient is currently alert and denies headache, shortness of breath, chest pain, fever, abdominal pain.  He has had a decreased appetite and has not been taking good p.o. since his chemotherapy.  He has a chronic dry cough that is a result of the malignant mass in his lung that is causing compression of his SVC and heart.     Per chart review, the patient received his first dose of chemotherapy (Taxol, Carboplatin and Neualsta OnPro) yesterday at the infusion clinic.    I have reviewed the Medications, Allergies, Past Medical and Surgical History, and Social History in  the GI Track system.    PAST MEDICAL HISTORY:   Past Medical History:   Diagnosis Date     Malignant melanoma (H)        PAST SURGICAL HISTORY:   Past Surgical History:   Procedure Laterality Date     BIOPSY OF SKIN LESION       CRANIOTOMY, EXCISE TUMOR COMPLEX, COMBINED  04/2019     DENTAL SURGERY      wisdom teeth     IR CHEST TUBE PLACEMENT NON-TUNNELED BILATERAL  4/19/2020     LYMPH NODE BIOPSY      arm, excision     MRI CRANIOTOMY LASER ABLATION Right 2/27/2020    Procedure: INTRAOPERATIVE MRI/STEALTH ASSISTED RIGHT LASER ABLATION OF BRAIN METASTASIS;  Surgeon: Nitish Quintero MD;  Location: [facility] OR     OPTICAL TRACKING SYSTEM CRANIOTOMY, EXCISE TUMOR, COMBINED Right 1/16/2020    Procedure: stealth assisted Right craniotomy for tumor resection;  Surgeon: Nitish Quintero MD;  Location: [facility] OR     REMOVE CATHETER CHEST Left 7/30/2020    Procedure: REMOVAL, Left CATHETER, CHEST;  Surgeon: Russel Chavez MD;  Location: [facility] GI     THORACOSCOPY Bilateral 4/20/2020    Procedure: BILATERAL INSERTION OF PLEURX CATHETER;  Surgeon: Uday Bhakta MD;  Location: [facility] OR       Past medical history, past surgical history, medications, and allergies were reviewed with the patient. Additional pertinent items: None    FAMILY HISTORY:   Family History   Problem Relation Age of Onset     Anesthesia Reaction No family hx of      Cardiovascular No family hx of      Deep Vein Thrombosis No family hx of        SOCIAL HISTORY:   Social History     Tobacco Use     Smoking status: Never Smoker     Smokeless tobacco: Never Used   Substance Use Topics     Alcohol use: Not Currently     Social history was reviewed with the patient. Additional pertinent items: None      Patient's Medications   New Prescriptions    No medications on file   Previous Medications    ACETAMINOPHEN (TYLENOL) 325 MG TABLET    Take 2 tablets (650 mg) by mouth every 4 hours as needed for mild pain, fever or headaches (> 101 F)    FERROUS SULFATE  "(FEROSUL) 325 (65 FE) MG TABLET    Take 1 tablet (325 mg) by mouth daily (with breakfast)    FLUDROCORTISONE (FLORINEF) 0.1 MG TABLET    Take 1 tablet (0.1 mg) by mouth every other day    HYDROCORTISONE (CORTEF) 10 MG TABLET    Take 20 mg in the morning and 10 mg in the early afternoon.    LORAZEPAM (ATIVAN) 0.5 MG TABLET    Take 1 tablet (0.5 mg) by mouth every 6 hours as needed for anxiety    LORAZEPAM (ATIVAN) 0.5 MG TABLET    Take 1 tablet (0.5 mg) by mouth every 4 hours as needed (Anxiety, Nausea/Vomiting or Sleep)    MELATONIN 1 MG TABS TABLET    Take 1 tablet (1 mg) by mouth nightly as needed for sleep    ONDANSETRON (ZOFRAN) 8 MG TABLET    Take 1 tablet (8 mg) by mouth every 8 hours as needed (Nausea/Vomiting)    PROCHLORPERAZINE (COMPAZINE) 10 MG TABLET    Take 1 tablet (10 mg) by mouth every 6 hours as needed (Nausea/Vomiting)    SODIUM CHLORIDE 1 GM TABLET    Take 2 tablets (2 g) by mouth 3 times daily    VITAMIN D, CHOLECALCIFEROL, 25 MCG (1000 UT) CAPS       Modified Medications    No medications on file   Discontinued Medications    No medications on file        No Known Allergies     Review of Systems  A complete review of systems was performed with pertinent positives and negatives noted in the HPI, and all other systems negative.    Physical Exam   BP: 113/77  Pulse: 130  Heart Rate: 126  Temp: 98.3  F (36.8  C)  Resp: 20  Height: 175.3 cm (5' 9\")  Weight: 58.1 kg (128 lb)  SpO2: 92 %      Physical Exam  Vitals signs and nursing note reviewed.   Constitutional:       General: He is not in acute distress.     Appearance: He is ill-appearing. He is not toxic-appearing or diaphoretic.   HENT:      Head: Normocephalic and atraumatic.      Mouth/Throat:      Mouth: Mucous membranes are moist.   Eyes:      General: No scleral icterus.  Neck:      Musculoskeletal: Normal range of motion and neck supple. No neck rigidity.   Cardiovascular:      Rate and Rhythm: Regular rhythm. Tachycardia present. "   Pulmonary:      Effort: Tachypnea present.      Breath sounds: No stridor.   Abdominal:      General: Abdomen is flat. There is no distension.      Palpations: Abdomen is soft.      Tenderness: There is no abdominal tenderness. There is no guarding.   Musculoskeletal:         General: No deformity or signs of injury.   Skin:     Coloration: Skin is pale. Skin is not jaundiced.      Findings: No rash.   Neurological:      Mental Status: He is alert and oriented to person, place, and time.      GCS: GCS eye subscore is 4. GCS verbal subscore is 5. GCS motor subscore is 6.   Psychiatric:         Attention and Perception: Attention and perception normal.         Mood and Affect: Mood is depressed.         Speech: Speech normal.         Behavior: Behavior normal. Behavior is cooperative.         Cognition and Memory: Cognition normal.         ED Course        Procedures          CT CHEST ABDOMEN PELVIS W/CONTRAST IMPRESSION 8/4/2020:  1. Significant increase in size of mediastinal confluent basim mass,  encasing the SVC, right main bronchus, right pulmonary artery, right  brachiocephalic vein, right subclavian vessels, with mass effect on  the trachea.  2. Significant increase in the size of AP window basim mass with  pericardial invasion and possible extension left atrial extension.  Moderate pericardial effusion.  3. New lobulated anterior mediastinal basim mass.  4. Significant increase in size of bilateral adrenal metastasis.  5. Medial intraperitoneal heterogenous mass inferior to the spleen and  medial heterogenous mass in the right lower quadrant with  bowel  encasement. No bowel obstruction.  6. New peripheral left upper lobe nodule.  7. Increase in size of subcutaneous nodules including subcutaneous  nodule in the right upper quadrant right chest wall.  8. Moderate right pleural effusion. Right chest tube in dependent  medial right costophrenic sulcus.  9. Mild splenomegaly.  10. Nonocclusive thrombus in the  right internal jugular vein.         EKG Interpretation:      Interpreted by Constance Lawson MD  Time reviewed: 1200  Symptoms at time of EKG: tachycardia   Rhythm: sinus tachycardia  Rate: Tachycardia  Axis: Normal  Ectopy: none  Conduction: normal  ST Segments/ T Waves: Non-specific ST-T wave changes  Q Waves: v1, v2 and v3  Comparison to prior: anteroseptal Q waves are new    Clinical Impression: non-specific EKG and sinus tachycardia                   Critical Care Addendum    My initial assessment, based on my review of nursing observations, review of vital signs, focused history, physical exam, review of cardiac rhythm monitor, 12 lead ECG analysis and discussion with wife, established that Dallin Stevens has focal neurologic abnormalities and suspicion for sepsis and need for evaluation and early goal-directed therapy, which requires immediate intervention, and therefore he is critically ill.     After the initial assessment, the care team initiated multiple lab tests, initiated IV fluid administration and consulted with neurology, neurosurgery, oncology to provide stabilization care. Due to the critical nature of this patient, I reassessed nursing observations, vital signs, physical exam, review of cardiac rhythm monitor, mental status, neurologic status and respiratory status multiple times prior to his disposition.     Time also spent performing documentation, discussion with family to obtain medical information for decision making, reviewing test results, discussion with consultants and coordination of care.     Critical care time (excluding teaching time and procedures): 51 minutes.      The Lactic acid level is elevated due to seizure, at this time there is no sign of severe sepsis or septic shock.       Results for orders placed or performed during the hospital encounter of 08/15/20 (from the past 24 hour(s))   CBC with platelets differential   Result Value Ref Range    WBC 14.8 (H) 4.0 - 11.0 10e9/L     RBC Count 3.68 (L) 4.4 - 5.9 10e12/L    Hemoglobin 9.4 (L) 13.3 - 17.7 g/dL    Hematocrit 31.1 (L) 40.0 - 53.0 %    MCV 85 78 - 100 fl    MCH 25.5 (L) 26.5 - 33.0 pg    MCHC 30.2 (L) 31.5 - 36.5 g/dL    RDW 17.5 (H) 10.0 - 15.0 %    Platelet Count 263 150 - 450 10e9/L    Diff Method Automated Method     % Neutrophils 95.0 %    % Lymphocytes 2.6 %    % Monocytes 1.6 %    % Eosinophils 0.0 %    % Basophils 0.4 %    % Immature Granulocytes 0.4 %    Nucleated RBCs 0 0 /100    Absolute Neutrophil 14.1 (H) 1.6 - 8.3 10e9/L    Absolute Lymphocytes 0.4 (L) 0.8 - 5.3 10e9/L    Absolute Monocytes 0.2 0.0 - 1.3 10e9/L    Absolute Eosinophils 0.0 0.0 - 0.7 10e9/L    Absolute Basophils 0.1 0.0 - 0.2 10e9/L    Abs Immature Granulocytes 0.1 0 - 0.4 10e9/L    Absolute Nucleated RBC 0.0    Comprehensive metabolic panel   Result Value Ref Range    Sodium 126 (L) 133 - 144 mmol/L    Potassium 4.4 3.4 - 5.3 mmol/L    Chloride 96 94 - 109 mmol/L    Carbon Dioxide 18 (L) 20 - 32 mmol/L    Anion Gap 12 3 - 14 mmol/L    Glucose 124 (H) 70 - 99 mg/dL    Urea Nitrogen 12 7 - 30 mg/dL    Creatinine 0.56 (L) 0.66 - 1.25 mg/dL    GFR Estimate >90 >60 mL/min/[1.73_m2]    GFR Estimate If Black >90 >60 mL/min/[1.73_m2]    Calcium 7.6 (L) 8.5 - 10.1 mg/dL    Bilirubin Total 0.9 0.2 - 1.3 mg/dL    Albumin 2.4 (L) 3.4 - 5.0 g/dL    Protein Total 5.8 (L) 6.8 - 8.8 g/dL    Alkaline Phosphatase 77 40 - 150 U/L    ALT 10 0 - 70 U/L    AST 57 (H) 0 - 45 U/L   Lactic acid whole blood   Result Value Ref Range    Lactic Acid 4.2 (HH) 0.7 - 2.0 mmol/L   Magnesium   Result Value Ref Range    Magnesium 1.9 1.6 - 2.3 mg/dL   Phosphorus   Result Value Ref Range    Phosphorus 2.2 (L) 2.5 - 4.5 mg/dL   Troponin I   Result Value Ref Range    Troponin I ES <0.015 0.000 - 0.045 ug/L   Lipase   Result Value Ref Range    Lipase 43 (L) 73 - 393 U/L   Prolactin   Result Value Ref Range    Prolactin 25 (H) 2 - 18 ug/L   EKG 12-lead, tracing only   Result Value Ref Range     Interpretation ECG Click View Image link to view waveform and result    CT Head w/o Contrast    Narrative    EXAM: CT HEAD W/O CONTRAST  LOCATION: [facility]  DATE/TIME: 8/16/2020 12:15 AM    INDICATION: Altered level of consciousness (LOC), unexplained and metastatic melanoma.  COMPARISON: 07/14/2020.  TECHNIQUE: Routine without IV contrast. Multiplanar reformats. Dose reduction techniques were used.    FINDINGS:  INTRACRANIAL CONTENTS: No intracranial hemorrhage, extraaxial collection, or mass effect.  No CT evidence of acute infarct. Again visualized is multifocal intracranial metastatic melanoma. Lesions within the left parietal, bilateral frontal and right   temporal region are again visualized with surrounding vasogenic edema. There are though new lesions visualized in the interval. There is new edema and possible punctate increased density within the davian. There is vasogenic edema and a hyperdense lesion   of approximately 6 mm x 5 mm involving the left cerebellar hemisphere. There is a new hyperdense metastatic lesion involving the mid right temporal region with surrounding vasogenic edema measuring 1.5 cm x 1.3 cm. There is a new tiny area of vasogenic   edema and increased density about 2 mm in the right frontal lobe. The vasogenic edema surrounding the previously seen medial right frontal lobe lesion has increased in the interval. There is no midline shift. Rest of the brain parenchyma has normal   gray-white matter differentiation. The ventricular system, basal cisterns and the cortical sulci are within normal limits for the patient's age.     VISUALIZED ORBITS/SINUSES/MASTOIDS: No intraorbital abnormality. No paranasal sinus mucosal disease. No middle ear or mastoid effusion.    BONES/SOFT TISSUES: Patient status post right frontal and right parietal craniotomy.      Impression    IMPRESSION:  1.  Known metastatic melanoma lesions which are hyperdense due to melanin with vasogenic edema  involving both cerebral hemispheres again visualized. Vasogenic edema involving medial right frontal lobe relation has increased in the interval.    2.  New metastatic lesions with mild amount of surrounding vasogenic edema visualized within lateral right frontal lobe, mid right temporal lobe, davian and the left cerebellar hemisphere.    3.  No evidence of a midline shift, acute hemorrhage or focal area suggestive of acute infarct.    [Critical Result:  [Access Center: Aggressive metastatic melanoma to the brain.]    This report will be copied to the [facility] to ensure a provider acknowledges the finding. [facility] is available Monday through Friday 8am-3:30 pm.   ]    Finding was identified on 8/16/2020 12:29 AM.     Dr. Lawson was contacted Dr. Pope on 8/16/2020 12:41 AM and verbalized understanding of the critical result.    XR Chest Port 1 View    Narrative    EXAM: XR CHEST PORT 1 VW  LOCATION: [facility]  DATE/TIME: 8/16/2020 12:12 AM    INDICATION: Cough  COMPARISON: CT 08/04/2020      Impression    IMPRESSION: Increasing bilateral pleural effusions with adjacent atelectasis and consolidation. New left lower lobe consolidation and likely collapse. Multiple masses difficult to directly compare. Remainder stable.   Blood gas venous   Result Value Ref Range    Ph Venous 7.35 7.32 - 7.43 pH    PCO2 Venous 35 (L) 40 - 50 mm Hg    PO2 Venous 28 25 - 47 mm Hg    Bicarbonate Venous 20 (L) 21 - 28 mmol/L    Base Deficit Venous 5.5 mmol/L    FIO2 2L      Medications   0.9% sodium chloride BOLUS (1,000 mLs Intravenous New Bag 8/16/20 0051)   sodium phosphate 15 mmol in D5W intermittent infusion (has no administration in time range)   piperacillin-tazobactam (ZOSYN) 4.5 g vial to attach to  mL bag (4.5 g Intravenous New Bag 8/16/20 0119)   levETIRAcetam (KEPPRA) 2,000 mg in sodium chloride 0.9 % 250 mL intermittent infusion (has no administration in time range)              Assessments & Plan (with Medical Decision Making)   Dallin Stevens is a 35 year old male with a past medical history of metastatic malignant melanoma who presents to the Emergency Department with altered mental status.    Ddx: hyponatremia, seizure, syncope, brain mets, ICH, vasogenic edema    Patient with history of metastatic melanoma with nonspecific episodes of unresponsiveness.  Alert and oriented on arrival.  Denies headache.  Labs notable for an elevated lactate which may be indicative of seizure as the etiology of patient's unresponsive episodes.  Prolactin added on.  Neurology was consulted.  Head CT currently in process.    White blood cell count returned elevated to 14.8 with a left shift.  Hemoglobin stable.  I did notice that patient was recently given Neulasta.  I spoke with the oncology fellow on-call who did not feel Neulasta would cause this significant of a white count elevation that quickly.  I inquired about coverage for neutropenic fever versus regular broad-spectrum antibiotics.  The fellow deferred management of antibiotics to me and felt it could also be appropriate to hold antibiotics and admit him for observation.    I think given patient's immunocompromise status with an elevated lactate, white count, tachycardia, tachypnea it is safe for her to cover him broadly and de-escalate as appropriate.  We will give vancomycin and Zosyn since patient is not neutropenic.    I reviewed patient's noncontrast head CT and noted a new hypodense lesion in the right temporal lobe that I do not see on his head CT from July 13.  In discussion with radiology it appears this is a new metastatic lesion.  He has multiple new metastatic lesions that are hemorrhagic with significant vasogenic edema.    I consulted neurosurgery and called back neurology.  Surgery evaluated and do not think they would recommend Decadron for the vasogenic edema at this time.  Of note, patient is somewhat averse to this idea  because he has several side effects from steroids.  Neurology did recommend Keppra loading patient and will likely admit to their service.  I did discuss the results of the new CT findings with the patient and his wife.     Patient signed out to oncoming attending who will continue care. Disposition pending final neurology recs. Anticipate admission to their service vs medicine or less likely NSG. Please see addendum for results of work up and remaining management.      I have reviewed the nursing notes.    I have reviewed the findings, diagnosis, plan and need for follow up with the patient.    New Prescriptions    No medications on file       Final diagnoses:   Brain metastasis (H)   Seizures (H)       8/15/2020   [facility], [place], EMERGENCY DEPARTMENT     Constance Lawson MD  08/16/20 0134

## 2020-08-16 NOTE — CONSULTS
ATTENDING PHYSICIAN ADDENDUM AND NOTE: Sunday, August 16, 2020  I have evaluated this patient s case personally. The note above reflects my assessment and plan. I have personally reviewed lab results, and vital and radiology results. >50% of time was spent in assessment and coordination of care; >=35 minutes.  Mr. Dallin Stevens is a 34 yo gentleman with widely metastatic melanoma, initially diagnosed as stage III disease in 2012. He has a very complicated cancer history outlined in detail in primary oncologist Dr. Snider s most recent clinic note August 7. 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 Briefly, in terms of systemic therapy. The patient has been treated with Pembrolizumab, as well as targeted treatment, with Dabrafenib and Trametinib that was initiated in late April 2020. In mid-May, he had mixed response, detected, and with progressive disease seen on scans, specifically CT scan of the chest/abdomen/pelvis on August 4, Dr. nSider stated it in his August 7 note intention to proceed with treatment with carboplatin and paclitaxel, which the patient received a cycle 1/day one two days ago (Friday August 14, 2020); also with on pro-Neulasta for growth factor support. Per the note, the intention is also to treat with Pembro in addition to the cytotoxic chemotherapy. Also significantly, the patient has disseminated disease. The adrenal glands, for which he receives hydrocortisone for adrenal insufficiency, bilateral pleural effusions, necessitating Pleurex catheter s for palliative relief, and also recurrent multiple brain metastases. For this last neurologic issue, he most recently had right frontal craniotomy with resection of the metastatic melanoma with postoperative stereotactic radiosurgery by Dr. Jose Arshad in late January 2020.  The patient has a been admitted to the general medicine service August 15 for seizures, currently being evaluated by the neurology team. The patient and his  wife state today that he has a history of seizures, and he did not lose consciousness during yesterday s episode. They describe this episode as being characteristic of prior times when  my arms locked up.  In the ER, the patient received a IV load of Levatiraceteram, with intention to continue treatment 1000 milligrams twice daily. Neurosurgery team has also consulted, and requested no dexamethasone at this time, and also suggested oncology consultation due to worsening disease.  Today, the patient and wife state most concern that if dexamethasone were to be reinstituted, the request that it be done at a low dose as it causes agitation and insomnia for the patient. A CT scan of the head noncontrast completed in the emergency room setting showed known treated metastatic melanoma lesions, as well as new lesions in the lateral right frontal lobe, ponds, and left cerebellar hemisphere, with likely surrounding vasgenic edema. With this finding, a brain MRI is pending today to further elucidate the extent of intracranial progressive disease.  I personally have reviewed the CT scan head report and images, and there is no obvious evidence of intracranial hemorrhage..Brain MRIs pending. I performed extensive neurologic exam, and the most prominent cranial nerve abnormality is lack of consistent consensual reflex in the left pupil, consistent with potential abnormalities in cranial nerves III, IV, and likely VII, consistent with site of new and present intracranial metastasis. He has slight motor deficits in the left lower extremity, which the patient s wife states is not new - objectively, his right upper extremity, right lower extremity, and left upper extremity strength is 4+/4, LLE 4/5. He has good  strength. In terms of general appearance, he has significant pallor that his wife affirms is not new; generally cachectic and ill appearing but not in any acute danger at the time of evaluation.  Overall impression is that  the patient is a young adults with refractory metastatic melanoma, BRAF [id number], refractory in particular to recent targeted therapy and immunotherapy, who initiated carboplatin and paclitaxel two days ago with Pembrolizumab but admitted for seizure activity on day two of the cycle. We will await results of the brain MRI, and as the patient has been treated by Dr. Jose Carlos Arshad for radiation purposes in the past, based on the results and input from neurosurgery, may consider reviewing any new findings with him as well to determine potential radiotherapy options. We will continue to follow with you, and as he just received cancer directed systemic treatment the day prior to admission, we will continue to watch his blood count as well.  Patient was seen simultaneously as general medicine attending physician Dr. Simpson, with whom I reviewed the case in person today.    Lorne Joshua MD, PhD  Associate Professor of Medicine   Division of Hematology, Oncology and Transplantation

## 2020-08-16 NOTE — PROGRESS NOTES
Arrived from:  [facility]  Belongings/meds:  Backpack, cell phone, clothes, glasses  2 RN Skin Assessment Completed by:  Gunjan LINTON RN and Yoselin DUGAN RN  Non-intact findings documented (yes/no/NA): Drain site to lower left abdomen, suture slightly to the left of that. Chemo related patch to posterior of left upper arm. Blanchable redness to bottom.

## 2020-08-16 NOTE — SIGNIFICANT EVENT
Pt O2 saturation down to 80%, requiring 13L of oxygen with oxymask to bring him back up to low-mid 90s. RRT called and MD notified. Dr. Simpson came to assess patient at bedside.

## 2020-08-16 NOTE — ED NOTES
"     Emergency Department Patient Sign-out       Brief HPI and ED course:  Patient is a 35 year old male signed out to me by Dr. Lawson.  See initial ED Provider note for details of the presentation. In brief, patient with metastatic melanoma w/ brain mets. Likely seizure, mental status now normalized.     Vitals:   Patient Vitals for the past 24 hrs:   BP Temp Temp src Pulse Heart Rate Resp SpO2 Height Weight   08/16/20 0100 107/70 -- -- 126 126 -- 96 % -- --   08/16/20 0000 -- -- -- -- 126 21 92 % -- --   08/15/20 2317 113/77 98.3  F (36.8  C) Oral 130 -- 20 92 % 1.753 m (5' 9\") 58.1 kg (128 lb)       Received Sign-out Plan:    Pending studies include: neuro final recs      Plan:   - f/u neuro recs  - admit to neuro vs NSG vs medicine onc    Events after assuming care:  After care was assumed, a focused history and physical was performed. Agree with findings relayed by previous provider.     Neuro recommended admission to medicine/onc. Patient was admitted.      --  James Crowley MD   Emergency Medicine       James Crowley MD  08/16/20 0712    "

## 2020-08-16 NOTE — CONSULTS
Department of Radiation Oncology  [address] [address]  [phone]       Inpatient Consultation Note    Name: Dallin Stevens MRN: [id number]   : 1985   Date of Service: Aug 15, 2020 Referring: Dr. Simpson     Reason for consultation: new and worsening brain metastasis from metastatic melanoma    History of Present Illness   Mr. Stevens is a 35 year old male with a diagnosis of stage IV metastatic malignant melanoma, current hospitalized after developing multiple episodes of seizure like activity. He is referred to us for consideration of palliative radiotherapy to his chest and/or brain.     He was initially diagnosed with malignant melanoma of the left flank in . On 2/3/2012, he developed a left flank subcutaneous skin lesion from which a biopsy was taken and pathology revealed a 1.7 mm superficial spreading melanoma without ulceration. He had a wide local excision and left axillary sentinel lymph node biopsy on 2012 at [facility].  The pathology showed no evidence of residual disease at the primary site with 1/2 dissected nodes involved by tumor. He then underwent a completion left axillary lymph node dissection on 3/2/2012 with no evidence of residual disease in 23 dissected nodes. The final stage was stage III pT2a N1a M0. He then received 4 weeks course of high-dose interferon between 2012 - 2012.     He was under surveillance until 2017 at which time his follow-up imaging showed evidence of diffuse metastatic disease with right supraclavicular, mediastinal and hilar lymphadenopathy. He had a biopsy performed of a right supraclavicular lymph node on 2017. Pathology was consistent with a BRAF mutated metastatic malignant melanoma. He then received Pembrolizumab between 2017 and 2017. The patient at that point did not want to proceed with any further treatment. He had a surveillance PET/CT on  11/29/2017 which demonstrated a new focus of mild FDG uptake in the right paratracheal lymph node. No other evidence of lymphadenopathy or metastatic disease.     He was continued on observation and had a surveillance PET scan on 10/2/2018 which demonstrated new hypermetabolic lymphadenopathy involving the left supraclavicular region and mediastinum. He also had an MRI of the brain on 4/24/2019 which demonstrated a right parieto-occipital lobe mass measuring 3.7 x 5.0 cm. He then underwent a craniotomy and tumor resection with pathology revealing metastatic melanoma. He was offered and declined post-operative radiotherapy. He had a another PET scan on 7/23/319 which unfortunately showed progression of his disease.      Mr. Stevens had surveillance imaging on 11/25/2019 including a PET/CT which showed evidence of continued disease progression including development of multiple new hypermetabolic subcutaneous nodules throughout the body and MRI of the brain which showed progression of his intracranial metastasis. He presented to the [facility] on 1/6/2020 with acute left-sided weakness. He had an MRI of the brain performed on 1/16/2020 which showed progression of the enhancing right frontoparietal lesion with associated midline shift and effacement of the right and left lateral ventricles. There were 2 additional small enhancing lesions within the left medial temporal and anterior parietal lobes. He underwent a right frontal craniotomy and tumor resection by Dr. Quintero on 1/16/2020. His follow-up MRI demonstrated post-operative changes with otherwise stable appearance of his four other known metastatic lesions.    He then was seen by Dr. Arshad and underwent stereotactic radiosurgery to the resection cavity as well as several other lesions as detailed in radiation history below, completed 1/31/2020. He subsequently was started on Pembrolizumab on 2/11/2020 and  completed 4 cycles on 4/15/2020 before presenting with severe dyspnea, worsening mediastinal adenopathy, large pericardial effusion with tamponade, and recurrent moderate pleural effusions on 4/18/2020. He had pleurex catheters placed and was started on dabrafenib and trametinib on 4/29/2020. He had mixed response to therapy on 5/22/2020 CT CAP, but CT CAP on 8/4/2020 showed significant progression of disease in his chest, neck, and abdomen, prompting switch to carboplatin, paclitaxel, and Pembrolizumab on 8/14/2020. In the meantime, he followed with MRI brains with Dr. Arshad and was noted to have mixed response follow radiotherapy, including decrease and increase in size of lesions and some new lesions.     During infusion of his chemotherapy on 8/14/2020, he was noted to have concern for seizure like activity, prompting admission to [facility]. His neurologic exam was non-focal, though CT head on 8/15/2020 and MRI brain earlier today showed the progression of additional lesions as well as several new ones.  Additionally, he had several episodes of asymptomatic hypoxia with O2 saturation in the 70s. XR of the chest showed stable small bilateral pleural effusions and demonstrated his known mediastinal and right upper lobe masses. It was thought these episodes may be related to V-Q mismatch due to mass effect of the tumor on his mediastinal vessels.     On interview, the patient reports that he is very fatigued but denies pain, headaches, changes in vision, speech cognition, or hearing, focal weakness, or numbness. He reports feeling worn out from fighting his cancer and wanting to end his journey. He denies chest pain, dyspnea, or chest tightness.        Past Medical History:  Malignant melanoma  Chronic cutaneous candidiasis    Past Surgical History:  Craniotomy    Chemotherapy History:  As per HPI    Radiation History:  1. Rt frontoparietal and right posterotemporal lesions to 2500 cGy in 5 fractions via GK SRS from  "1/27/2020 to 1/31/2020  2. Left superior parietal, left hippocampal and left temporal lesions to 2700 cGy in 3 fractions via GK SRS from 1/27/2020 to 1/29/2020         Pregnant: Not Applicable  Implanted Cardiac Devices: No    Medications:    Acetaminophen    Hydrocortisone    Levatiracetam    Lorazepam    Melatonin    Allergies:  No known allergies    Social History:  Tobacco: Never smoker  Alcohol: Occasional    Lives in [place], [place]    Family History:  No known cancer history in the family    Review of Systems   A 10-point review of systems was performed. Pertinent findings are noted in the HPI.    Physical Exam   ECOG Status: 3    Vitals:  BP 94/63 (BP Location: Right arm)   Pulse 124   Temp 97.6  F (36.4  C) (Oral)   Resp 30   Ht 1.753 m (5' 9\")   Wt 58.1 kg (128 lb)   SpO2 93%   BMI 18.90 kg/m      Gen: Sleepy, laying in hospital bed, pale  Head: NC/AT  Eyes: EOMI, sclera anicteric  Ears: No external auricular lesions  Nose/sinus: No rhinorrhea or epistaxis  Pulm: No wheezing, stridor or respiratory distress  CV: Pale appearing  Musculoskeletal: Normal bulk and tone  Neuro: Face symmetric, moves upper extremities, full examination deferred    Imaging/Path/Labs   Imaging:   MR brain 8/16/2020  Impression: In this patient with a history of metastatic melanoma,  findings are suggestive for progressive disease. Multiple metastatic  lesions within both supra and infratentorial areas with associated  hemorrhage. Most of the lesions increase in size when compared to  7/24/2020 brain MRI. There are also new lesions within the left  posterior frontal lobe, right parietal lobe, and within the right  insula.     CT CAP 8/4/2020  IMPRESSION: In this patient with history of metastatic melanoma, the  current scan shows progressive disease:  1. Significant increase in size of mediastinal confluent basim mass,  encasing the SVC, right main bronchus, right pulmonary artery, right  brachiocephalic vein, right " subclavian vessels, with mass effect on  the trachea.  2. Significant increase in the size of AP window basim mass with  pericardial invasion and possible extension left atrial extension.  Moderate pericardial effusion.  3. New lobulated anterior mediastinal basim mass.  4. Significant increase in size of bilateral adrenal metastasis.  5. Medial intraperitoneal heterogenous mass inferior to the spleen and  medial heterogenous mass in the right lower quadrant with  bowel  encasement. No bowel obstruction.  6. New peripheral left upper lobe nodule.  7. Increase in size of subcutaneous nodules including subcutaneous  nodule in the right upper quadrant right chest wall.  8. Moderate right pleural effusion. Right chest tube in dependent  medial right costophrenic sulcus.  9. Mild splenomegaly.  10. Nonocclusive thrombus in the right internal jugular vein.    Path: Reviewed    Labs:        Lab Results   Component Value Date     08/16/2020    Lab Results   Component Value Date    CHLORIDE 99 08/16/2020    Lab Results   Component Value Date    BUN 9 08/16/2020      Lab Results   Component Value Date    POTASSIUM 3.6 08/16/2020    Lab Results   Component Value Date    CO2 18 08/16/2020    Lab Results   Component Value Date    CR 0.56 08/16/2020        Lab Results   Component Value Date    WBC 14.8 (H) 08/15/2020    HGB 9.4 (L) 08/15/2020    HCT 31.1 (L) 08/15/2020    MCV 85 08/15/2020     08/15/2020     Lab Results   Component Value Date    AST 57 (H) 08/15/2020    ALT 10 08/15/2020    ALKPHOS 77 08/15/2020    BILITOTAL 0.9 08/15/2020         Assessment    Mr. Stevens is a 35 year old male with refractory stage IV melanoma with large volume of disease. He has new and progressive brain metastases likely resulting in recent seizure-like activity as well as progressive growth of mediastinal masses which threaten his cardiopulmonary status.     Plan    I reviewed in detail with the patient the results from his recent  imaging as well as proposed radiotherapy options.     For his brain, I discussed we could consider repeat stereotactic radiosurgery for focal control of visible lesions, though I cautioned that it would only treat the disease apparent on MRI and that likely he would have additional progression in the near future of new lesions. Alternatively, I described a five fraction course of whole brain radiotherapy, including the risks, benefits, and logistics. Neither option would be anticipated to prolong his life, but may result in local control. Whole brain radiotherapy may delay the onset of new metastases but has significant side effects of fatigue, headaches, nausea, and potential worsening of his neurologic symptoms (particularly seizures), as well as longer term cognitive side effects. I also offered best supportive care, as well as the ability to decide on radiotherapy at a later date. At this time, the patient stated he was not interested in radiotherapy to the brain.    Regarding his chest, I stated that it was unlikely that we could safely achieve durable or effective local control with radiotherapy due to the large volume of disease and the large area of normal tissue that would be irradiated. I was not optimistic there would be benefit to justify the risks and side effects from treatment, so I did not recommend it. Mr. Stevens agreed and was not interested in radiotherapy to his chest.     I will check back with the patient and his wife tomorrow morning, but for now we are planning on no radiotherapy intervention at this time. Radiotherapy to his brain may be considered in the future if the patient changes his mind.     Antony Navarro MD PGY-4  Radiation Oncology, [facility] [place]  [phone] clinic  Pager [phone]    The patient was evaluated and discussed with staff, Dr. Arshad. I also relayed our recommendations to medical oncology and his primary team.     Attending addendum:   I saw and examined  the patient with the resident and agree with the documented plan of care.    Discussed whole brain radiotherapy vs observation in light of his extensive systemic disease. Mr. Stevens is inclined to hold of on radiotherapy for now which is completely rationale as radiotherapy will not likely prolong his survival which I estimate will be limited by his rapidly progressive thoracic disease. We will plan to speak with him tomorrow (8/17) once he has had a chance to discuss matters with his family and arrive at a plan of care regarding radiotherapy moving forward.    Jose Carlos Arshad MD/PhD    Dept of Radiation Oncology  [facility]

## 2020-08-16 NOTE — CONSULTS
"[facility]  Neurology Consultation    Patient Name:  Dallin Stevens  MRN:  [id number]    :  1985  Date of Service:  2020  Primary care provider:  No Ref-Primary, Physician      Neurology consultation service was asked to see Dallin Stevens by Dr. Lawson to evaluate for a spell of altered consciousness.    History of Present Illness:   35 year old male h/o metastatic melanoma s/p gamma knife 2020 and laser thermal ablation 2020 who presents to the emergency department following a spell of altered consciousness. His wife saw him \"stiffen up\" and he was unable to respond to her for roughly one minute during this event. Mr Stevens felt a sensation coming on, that he can only describe as \"knowing that I was going to stiffen up.\" He felt that he did not lose consciousness, or have a sensation of time-lapse, and was able to hear his wife speaking to him throughout the event. Two of these occurred the evening of 8/15 prompting the visit to the ED. He started an infusion of taxol, carboplatin and neualsta the day prior to these events.     One month ago he believes he had another event, which was also witnessed by his wife that she describes as identical.  He has otherwise not had a documented seizure, loss of consciousness, or generalized convulsion. He has been on Keppra regina-operatively for every neurosurgical procedure, and has not had ill effects and has been weaned following the procedure. Regarding steroids, dexamethasone, Mr Stevens describes feeling \"terrible\" in many respects: anger, frustration, agitation, and general malaise.     He was admitted to this hospital for that last spell from  until  where he was presumed to have had syncope due to dehydration.  He had a routine, 1 hour EEG which did not show epileptiform activity, and had no further events while admitted, so suspicion was lower for seizure.  He also did not have typical symptoms of seizure " "like such as incontinence, tongue biting, or a postictal phase.  The neurology service was not consulted during that admission.     ROS: His other review of systems is benign: he has had no new symptoms, no visual change, sensory change, new weakness, swallowing difficulty, speaking difficulty or any other new symptoms.         PMH  Past Medical History:   Diagnosis Date     Malignant melanoma (H)      Past Surgical History:   Procedure Laterality Date     BIOPSY OF SKIN LESION       CRANIOTOMY, EXCISE TUMOR COMPLEX, COMBINED  04/2019     DENTAL SURGERY      wisdom teeth     IR CHEST TUBE PLACEMENT NON-TUNNELED BILATERAL  4/19/2020     LYMPH NODE BIOPSY      arm, excision     MRI CRANIOTOMY LASER ABLATION Right 2/27/2020    Procedure: INTRAOPERATIVE MRI/STEALTH ASSISTED RIGHT LASER ABLATION OF BRAIN METASTASIS;  Surgeon: Nitish Quintero MD;  Location: [facility] OR     OPTICAL TRACKING SYSTEM CRANIOTOMY, EXCISE TUMOR, COMBINED Right 1/16/2020    Procedure: stealth assisted Right craniotomy for tumor resection;  Surgeon: Nitish Quintero MD;  Location: [facility] OR     REMOVE CATHETER CHEST Left 7/30/2020    Procedure: REMOVAL, Left CATHETER, CHEST;  Surgeon: Russel Chavez MD;  Location: [facility] GI     THORACOSCOPY Bilateral 4/20/2020    Procedure: BILATERAL INSERTION OF PLEURX CATHETER;  Surgeon: Uday Bhakta MD;  Location: [facility] OR       Medications   (Not in a hospital admission)      Allergies  No Known Allergies    Social History  Social History     Tobacco Use     Smoking status: Never Smoker     Smokeless tobacco: Never Used   Substance Use Topics     Alcohol use: Not Currently       Family History    Family History   Problem Relation Age of Onset     Anesthesia Reaction No family hx of      Cardiovascular No family hx of      Deep Vein Thrombosis No family hx of          Physical Examination   Vitals: /77   Pulse 130   Temp 98.3  F (36.8  C) (Oral)   Resp 20   Ht 1.753 m (5' 9\")   Wt 58.1 kg " (128 lb)   SpO2 92%   BMI 18.90 kg/m    General: Adult patient, lying in bed, appears generally fatigued  HEENT: NC/AT, no icterus, op pink and moist  Cardiac: RRR  Chest: non-labored on RA  Abdomen: S/NT/ND  Extremities: Warm, no edema  Skin: Protrusion at medial right clavicle  Neuro:  Mental status: Awake, alert, attentive, oriented to self, time, place, and circumstance. Language is fluent and coherent with intact comprehension of complex commands, naming and repetition.  Simple calculation is intact.  Cranial nerves: VFF, PERRL, conjugate gaze, EOMI, facial sensation intact, face symmetric, shoulder shrug strong, tongue/uvula midline, no dysarthria.   Motor: Normal bulk and tone. No abnormal movements.  There is very slight weakness of the left arm and left leg relative to the right.  Rapid alternating movements with the left hand are decreased.  Though all extremities can provide resistance against the examiner proximally and distally.  Reflexes: 2-3 beats of clonus at left ankle, which is asymmetric.    Sensory: Intact to light touch throughout, symmetrical.  No sensory extinction  Coordination: FNF and HS without ataxia or dysmetria. Rapid alternating movements less coordinated and slower on the left hand.    Investigations     CT Head 8/15/20  IMPRESSION:  1.  Known metastatic melanoma lesions which are hyperdense due to melanin with vasogenic edema involving both cerebral hemispheres again visualized. Vasogenic edema involving medial right frontal lobe relation has increased in the interval.     2.  New metastatic lesions with mild amount of surrounding vasogenic edema visualized within lateral right frontal lobe, mid right temporal lobe, davian and the left cerebellar hemisphere.     3.  No evidence of a midline shift, acute hemorrhage or focal area suggestive of acute infarct.    Labs are positive for leukocytosis to 14 K, and lactate elevation to 4.2 that quickly dropped to 2.0 following  fluids    Impression  Mr. Stevens is a 35-year-old man with a history of melanoma with known metastases to the brain who presented to the emergency department following 2 spells of abnormal movements that most likely represented seizures.  These spells appear to have been subtle, and were initially attributed to syncope during his hospitalization 1 month ago, though given his known cortical brain lesions his pretest probability that these were seizures is extremely high.  Whether or not these were seizures with preserved versus impaired awareness is debatable given his description of being awake throughout the event though unable to verbally respond, though this distinction will not appreciably .  I do recommend loading with an antiepileptic agent, he has tolerated Keppra in the past and it will not interact with any of his medications including his chemotherapeutic regimen.  I would recommend continuing antiepileptic regimen indefinitely.    His new appearing lesions on head CT with associated vasogenic edema are the most likely cause, so we did have a discussion about starting dexamethasone immediately to reduce the surrounding edema.  Mr. Stevens clearly remembers his past experiences with dexamethasone and felt ill from side effects in many respects including psychiatric and constitutionally so he was very much resistant to resuming these medications.  I did emphasize that decisions about his medications were ultimately his to make, and that after a night of sleep we could revisit this possibility.  Chart review showed that in the past he has received upwards of Dexamethasone 4 mg every 6 hours, or 6 mg every 12 hours, therefore a lower dose may be tolerable.    Recommendations  -Loaded with 2000 mg levetiracetam, IV (completed)  - Continue levetiracetam 1000 mg twice daily starting 13:00 8/16  - Starting 4 mg of dexamethasone daily (no dosing after 12 pm) was recommended to the patient, though he  refused  - No current indication for EEG  - Appreciate consultation with neurosurgery (completed)  - Appreciate input from his hematology/oncology team regarding his therapy options    Thank you for involving Neurology in the care of Dallin Stevens.  Please do not hesitate to call with questions/concerns (consult pager [phone]).      Patient was discussed with Dr. Hernandez who will see the patient in the morning.     Aime De Leon PGY4 Neurology Resident

## 2020-08-17 NOTE — PROGRESS NOTES
Hematology / Oncology  Daily Progress Note   Date of Service: 08/17/2020  Patient: Dallin Stevens  MRN: [id number]  Admission Date: 8/15/2020  Hospital Day # 1  Cancer Diagnosis: Metastatic Melanoma   Primary Outpatient Oncologist: Dr Elfego Burnett   Current Treatment Plan: Carbo/Taxol (D1C1 8/14/20)     Assessment & Plan:   Dallin Stevens is a 35 year old male with PMHx of Met Melanoma, now admitted after developing multiple episodes of seizure like activity.     # Widely Metastatic Melanoma   Please review initial oncology consult note by Dr Lorne Joshua from 8/16 for full oncologic history. In short, diagnosed as stage II disease 2012. On surveillance until 2017 when presented with met disease (R supraclav, mediastinal and hilar LN), Bx +BRAF mutated Met melanoma. Treatment history below.    - Pembrolizumab Feb-Nov 2017   - S/p craniotomy 4/2019 and tumor resection of met R parieto occipital mass (3.7 x 5 cm). Declined XRT   - S/p R frontal craniotomy and tumor resection with Dr Quintero 1/16/2020    - Stereotactic radiosurgery with Dr Arshad 1/31/2020    - Pembrolizumab 2/11 - 4/15 completed 4 cycles. Complicated by tamponade and recurrent pleural effusions. S/p pericardial window and bilat pleurx drains.    - Dabrafenib and Trametinib 4/29/20 with PD    - Carbo, Paclitaxel started 8/14/20 (did not receive Pembro)   During Carbo/Taxel 8/14 infusion concern for seizure like activity so was admitted to [facility]. Work up significant for new met intracranial disease and worsening L effusions. MRI brain 8/16 with Multiple metastatic lesions within both supra and infratentorial areas with associated hemorrhage. Most of the lesions increase in size when compared to 7/24/2020 brain MRI. There are also new lesions within the left posterior frontal lobe, right parietal lobe, and within the right insula. Rad Onc consulted, patient declined WBXRT vs gamma knife as unclear benefit. S/p therapeutic thora 8/17 with IP, a total of  "900 ml fluid was removed. Fluid was sent to micro and cytology for analysis (pending)       Recommendations:   - Agree with L thora by IP. Currently not stable enough to get L pleurx placed.   - Ongoing GOC discussions, will readdress code status. At this point patient not wanting palliative WBXRT. Consider palliative care consult. Patients primary oncologist will be on service Wednesday 8/19.     Patient was seen and plan of care was discussed with attending physician Dr. Joshua.    Thank you for the opportunity to partake in this patients plan of care. Please do not hesitate to page with questions. We will continue to follow.     Mine Barnett PA-C   Hematology/Oncology   Pager: [phone]   ___________________________________________________________________    Subjective & Interval History:    Overnight RR increased to 30s, on 5-6L NC, increased with AM after working with PT to 10L HFNC. Tachy 120s. Exhausted, shortness of breath, feeling like he is due for thora. L lung not drained x 2 days.     Physical Exam:    Blood pressure 108/49, pulse 124, temperature 98.4  F (36.9  C), temperature source Oral, resp. rate 30, height 1.753 m (5' 9\"), weight 58.1 kg (128 lb), SpO2 90 %.    General: sitting up in chair, in distress, working to breath   CV: Tachycardia   Resp: Increased work to breath, on 10L HFNC   GI: soft, non-tender, non-distended, bowel sounds present and normoactive  MSK: warm and well-perfused, normal tone  Skin: no rashes on limited exam, no jaundice, pallor   Neuro: Alert and interactive, moves all extremities equally     Labs & Studies: I personally reviewed the following studies:  ROUTINE LABS (Last four results):  CMP  Recent Labs   Lab 08/17/20  0458 08/16/20  1227 08/16/20  0407 08/15/20  2352 08/14/20  1002   * 128* 127* 126* 126*   POTASSIUM 3.5 3.6  --  4.4 4.1   CHLORIDE 101 99  --  96 96   CO2 20 18*  --  18* 22   ANIONGAP 10 11  --  12 9   * 106*  --  124* 101*   BUN 7 9  -- "  12 6*   CR 0.45* 0.56*  --  0.56* 0.65*   GFRESTIMATED >90 >90  --  >90 >90   GFRESTBLACK >90 >90  --  >90 >90   ABUNDIO 7.9* 7.6*  --  7.6* 8.4*   MAG 1.9  --   --  1.9  --    PHOS 2.4*  --   --  2.2*  --    PROTTOTAL 5.2*  --   --  5.8* 6.3*   ALBUMIN 2.0*  --   --  2.4* 2.7*   BILITOTAL 0.7  --   --  0.9 0.9   ALKPHOS 68  --   --  77 85   AST 33  --   --  57* 24   ALT 9  --   --  10 11     CBC  Recent Labs   Lab 08/17/20  0458 08/15/20  2352 08/14/20  1002   WBC 13.0* 14.8* 4.8   RBC 3.08* 3.68* 3.90*   HGB 7.9* 9.4* 9.9*   HCT 26.5* 31.1* 32.3*   MCV 86 85 83   MCH 25.6* 25.5* 25.4*   MCHC 29.8* 30.2* 30.7*   RDW 17.7* 17.5* 17.9*    263 325     INRNo lab results found in last 7 days.    Medications list for reference:  Current Facility-Administered Medications   Medication     acetaminophen (TYLENOL) tablet 650 mg     glucose gel 15-30 g    Or     dextrose 50 % injection 25-50 mL    Or     glucagon injection 1 mg     levETIRAcetam (KEPPRA) tablet 1,000 mg     lidocaine (LMX4) cream     lidocaine 1 % 0.1-1 mL     loratadine (CLARITIN) tablet 10 mg     LORazepam (ATIVAN) tablet 0.5 mg     melatonin tablet 1 mg     naloxone (NARCAN) injection 0.1-0.4 mg     No lozenges or gum should be given while patient on BIPAP/AVAPS/AVAPS AE     Patient may continue current oral medications     sodium chloride (PF) 0.9% PF flush 3 mL     sodium chloride (PF) 0.9% PF flush 3 mL     sodium chloride 0.9% infusion     sodium chloride tablet 2 g       ATTENDING PHYSICIAN ADDENDUM AND NOTE: Monday, August 17, 2020  I have evaluated this patient s case personally and also with the GUERRERO and oncology fellow Dr. Alexander. The note above reflects my assessment and plan. I have personally reviewed lab results, and vital and radiology results. >50% of time was spent in assessment and coordination of care; >=35 minutes.  Mr. Dallin Stevens is a 34 yo gentleman with widely metastatic melanoma, initially diagnosed as stage III disease in 2012.  He has a very complicated cancer history outlined in detail in primary oncologist Dr. Snider s most recent clinic note August 7. 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 Briefly, in terms of systemic therapy. The patient has been treated with Pembrolizumab, as well as targeted treatment, with Dabrafenib and Trametinib that was initiated in late April 2020. In mid-May, he had mixed response, detected, and with progressive disease seen on scans, specifically CT scan of the chest/abdomen/pelvis on August 4, Dr. Snider stated it in his August 7 note intention to proceed with treatment with carboplatin and paclitaxel, which the patient received a cycle 1/day one two days ago (Friday August 14, 2020); also with on pro-Neulasta for growth factor support. Per the note, the intention is also to treat with Pembro in addition to the cytotoxic chemotherapy. Also significantly, the patient has disseminated disease. The adrenal glands, for which he receives hydrocortisone for adrenal insufficiency, bilateral pleural effusions, necessitating Pleurex catheter s for palliative relief, and also recurrent multiple brain metastases. For this last neurologic issue, he most recently had right frontal craniotomy with resection of the metastatic melanoma with postoperative stereotactic radiosurgery by Dr. Jose Arshad in late January 2020.  Yesterday, the patient had desaturation and episodes of respiratory distress; it is believed that malignant effusions are the primary  of this issue, for which he will likely receive BiPAP for breathing today in addition to palliative drainage via his PleurX catheter. Brain MRI revealed further progression of his brain metastases, for which he is declining intervention offered by radiation oncology. As his performance status has declined considerably, we will discuss code status more urgently and be available with transition to comfort care if the patient and his wife feel  they wish to pursue that avenue, which he has expressed to other care providers and MD teams during this hospitalization.  Lorne Joshua MD, PhD  Associate Professor of Medicine   Division of Hematology, Oncology and Transplantation

## 2020-08-17 NOTE — PROGRESS NOTES
[facility], [facility], [place] Progress Note - Hospitalist Service, Gold 11       Date of Admission:  8/15/2020  Assessment & Plan     Dallni Stevens is a 35 year old male admitted on 8/15/2020. He has a past medical history for metastatic melanoma to lymph nodes, adrenal glands, lung, and brain, bilateral pleural effusions s/p bilateral pleurx catheters, chronic hypotension 2/2 adrenal mets, anemia, and h/o pericardial effusion c/b tamponade s/p pericardial window who presents with unresponsive spells, found to have new intracranial metastatic lesions.       Acute on Chronic Hypoxic Respiratory Failure  Acute on Chronic Pleural Effusion, L > R  - With resultant concern for possible seizure, uresponsive spells, hypoxia with any exertion  - Associated with Bilateral L > R effusions  - ECHO: Moderate L effusion  - Chronic: R pleurex: drained every other day, L Pleurex, removed about 2 weeks ago  - Plan: IP consult, Thora or Pleurex L for stabilization, and R Pleurex drain daily   Full Code, HFNC, Bed rest until respiratory stable    Increasing Brain Mets [place]  - MRI Brain: In this patient with a history of metastatic melanoma, findings are suggestive for progressive disease. Multiple metastatic lesions within both supra and infratentorial areas with associated hemorrhage. Most of the lesions increase in size when compared to 7/24/2020 brain MRI. There are also new lesions within the left posterior frontal lobe, right parietal lobe, and within the right insula.  - 8/17: Decided unlikely WBXRT or Gamma Knife, per pt/wife as unlikely effective    Hyponatremia; prev felt due to AI, but Endo r/o AI and rec to stop Fludro and HC  - on IVF and monitoring    Lactic acidosis and Leukocytosis, due to significant tumor burden and hypoxia  - Not due to sepsis, abx stopped 8/16 as per pt and wife request    Metastatic Melanoma  - 8/4: CT with significant increase; progression  - 8/14: s/p Chemo and  Neulasta  - 4/7/20: H/o Pericardial effusion s/p tamponade and pericardial window   - 4/2020: Bilateral Pleurex drains placed   7/30: L Pleurex drain removal by IP team due to low outputs  - Onc and IP and Rad Onc following  - Remains full code         Diet: Combination Diet Regular Diet Adult    DVT Prophylaxis: AC and VTE ppx is not appropriate due to Brain mets  Lu Catheter: not present  Code Status: Full Code           Disposition Plan   Expected discharge: 4 - 7 days, recommended to prior living arrangement once O2 use less than 2 liters/minute and Medically stable.  Entered: Uche Simpson MD 08/17/2020, 1:06 PM       The patient's care was discussed with the Bedside Nurse, Patient, Patient's Family and Onc, IP Consultant.    Uche Simpson MD  Hospitalist Service, [address]  Pager: [phone]  Please see sticky note for cross cover information    45 minutes of critical care time was spent with the patient at bedside for Respiratory failure, hypoxia, and increased O2 requirements. Updating RN and wife of plan.  We specifically discussed the current documented clinical status and care coordination with greater than 50% of the time spent on counseling and coordination of care with onc and IP teams.  ______________________________________________________________________    Interval History     Seen today for follow up:Persistent desaturation, hypoxia, fatigue, malign melanoma    Still periods with desaturations; increased O2 req. Tachycardia  Fatigue  Minimal exertional strength    As of: August 17, 2020  Patient denies any headache, sore throat  Patient denies any sleeping disturbance  Patient denies any nausea or vomiting or abdominal pain  Patient denies any shortness of breath   Patient denies any chest pain      Data reviewed today: I reviewed all medications, new labs and imaging results over the last 24 hours. I personally reviewed the chest x-ray image(s)  showing Bibasilar effusions, large thoracic mass.    Physical Exam   Vital Signs: Temp: 98.4  F (36.9  C) Temp src: Oral BP: 108/49   Heart Rate: 127 Resp: 30 SpO2: 90 % O2 Device: High Flow Nasal Cannula (HFNC) Oxygen Delivery: 6 LPM  Weight: 128 lbs 0 oz    General: Alert awake and oriented, tachypnea, diaphretic, pale, tired and ill appearing  Eyes: Normal pink mucosa with no + pallor, no scleral icterus.  Neck: R neck supraclavicular Mass, round, golf ball size  Heart:  Tachycardic, HR 120s, normal S1, normal S2, no murmurs rubs or gallops, PMI is nondisplaced   Peripherally there is no edema  Lungs: Tachypnea with Notable increased work of breathing, fair effort, lungs are c reduced breath sounds bilaterally   No wheezing or crackles   Breathing on NFNC to keep sats > 90%   R Pleurex dressing intact  Abdomen: Nontender, nondistended, normal active bowel sounds, no palpable masses  Extremities: Normal capillary refill, no edema, no clubbing, no cyanosis  Neuro: Alert and oriented to person place location and situation   Grossly arms and legs are without any focal motor or sensory deficits   Gait was not assessed   Cranial nerves II through XII are grossly intact  Psych: No acute psychosis, good mood, good spirits      Data   Recent Labs   Lab 08/17/20  0458 08/16/20  1227 08/16/20  0407 08/15/20  2352 08/14/20  1002   WBC 13.0*  --   --  14.8* 4.8   HGB 7.9*  --   --  9.4* 9.9*   MCV 86  --   --  85 83     --   --  263 325   * 128* 127* 126* 126*   POTASSIUM 3.5 3.6  --  4.4 4.1   CHLORIDE 101 99  --  96 96   CO2 20 18*  --  18* 22   BUN 7 9  --  12 6*   CR 0.45* 0.56*  --  0.56* 0.65*   ANIONGAP 10 11  --  12 9   ABUNDIO 7.9* 7.6*  --  7.6* 8.4*   * 106*  --  124* 101*   ALBUMIN 2.0*  --   --  2.4* 2.7*   PROTTOTAL 5.2*  --   --  5.8* 6.3*   BILITOTAL 0.7  --   --  0.9 0.9   ALKPHOS 68  --   --  77 85   ALT 9  --   --  10 11   AST 33  --   --  57* 24   LIPASE  --   --   --  43*  --    TROPI  --    --   --  <0.015  --      Recent Results (from the past 24 hour(s))   XR Chest Port 1 View    Narrative    Exam: XR CHEST PORT 1 VW, 2020 2:11 PM    Indication: episode hypoxia, persistent tachycardia    Comparison: Chest x-ray 2020, CT chest, abdomen and pelvis  2020    Findings:   Portable upright AP radiograph the chest. Right basilar chest tube is  stable in position. No pneumothorax. Mediastinal and right upper lobe  masses, as seen on comparison CT on 2020. Small bilateral pleural  effusions and overlying perihilar and bibasilar opacities. The  visualized upper abdomen is unremarkable.      Impression    Impression:   1. Stable small bilateral pleural effusions, with associated  atelectasis versus consolidation.  2. Right chest tube is stable in position.  3. Mediastinal and right upper lobe masses, as seen on the comparison  CT on 2020.    I have personally reviewed the examination and initial interpretation  and I agree with the findings.    THERESA KONG MD   Echo Limited    Narrative    [id number]  [id number]  [id number]  [id number]^TERRENCE^ANEUDY           [facility],[place]  Echocardiography Laboratory  [address] [place], [place] [address]     Name: JEANIE HERNANDEZ  MRN: [id number]  : 1985  Study Date: 2020 06:54 AM  Age: 35 yrs  Gender: Male  Patient Location: [facility]  Reason For Study: Pericardial Effusion  Ordering Physician: ANEUDY OCAMPO  Performed By: Jeanie Razo RDCS     BSA: 1.7 m2  Height: 69 in  Weight: 128 lb  HR: 129  BP: 108/86 mmHg  _____________________________________________________________________________  __        Procedure  Limited Portable Echo Adult.  _____________________________________________________________________________  __        Interpretation Summary  2 cm loculated lateral pericardial effusion.  Large left pleural effusion.  No Tamponade echo features apart from dilated  IVC.  _____________________________________________________________________________  __        Left Ventricle  Global and regional left ventricular function is normal with an EF of 55-60%.     Right Ventricle  Right ventricular function, chamber size, wall motion, and thickness are  normal.     Vessels  Dilation of the inferior vena cava is present with abnormal respiratory  variation in diameter.     Pericardium  2 cm loculated lateral pericardial effusion.  Large left pleural effusion.  No Tamponade echo features apart from dilated IVC.     _____________________________________________________________________________  __                       Report approved by: Zora Flores 08/17/2020 08:09 AM                 _____________________________________________________________________________  __        Medications     - MEDICATION INSTRUCTIONS -       - MEDICATION INSTRUCTIONS -       sodium chloride 125 mL/hr at 08/17/20 1149       levETIRAcetam  1,000 mg Oral BID     sodium chloride (PF)  3 mL Intracatheter Q8H     sodium chloride  2 g Oral TID

## 2020-08-17 NOTE — PROCEDURES
INTERVENTIONAL PULMONOLOGY       Procedure(s):    Thoracentesis (left chest)    Indication:  Pleural effusion    Attending of Record:  Casey Hammond MD    Interventional Pulmonary Fellow   None    Trainees Present:   None     Medications:    None    Sedation Time:   None    Time Out:  Performed    The patient's medical record has been reviewed.  The indication for the procedure was reviewed.  The necessary history and physical examination was performed and reviewed.  The risks, benefits and alternatives of the procedure were discussed with the the patient in detail and he had the opportunity to ask questions.  I discussed in particular the potential complications including risks of minor or life-threatening bleeding and/or infection, respiratory failure, vocal cord trauma / paralysis, pneumothorax, and discomfort. Sedation risks were also discussed including abnormal heart rhythms, low blood pressure, and respiratory failure. All questions were answered to the best of my ability.  Verbal and written informed consent was obtained.  The proposed procedure and the patient's identification were verified prior to the procedure by the physician and the nurse, respiratory therapist.    The patient was assessed for the adequacy for the procedure and to receive medications.   Mental Status:  Alert and oriented x 3  Airway examination:  Class II (Complete visualization of uvula)  Pulmonary:  Decreased breathsound throughout  CV:  RRR, no murmurs or gallops  ASA Grade:  (III)  Severe systemic disease    After clinical evaluation and reviewing the indication, risks, alternatives and benefits of the procedure the patient was deemed to be in satisfactory condition to undergo the procedure.      Immediately before administration of medications the patient was re-assessed for adequacy to receive sedatives including the heart rate, respiratory rate, mental status, oxygen saturation, blood pressure and adequacy of pulmonary  ventilation. These same parameters were continuously monitored throughout the procedure.    A Tuberculosis risk assessment was performed:  The patient has no known RISK of Tuberculosis    The procedure was performed in a negative airflow room: Yes    Maneuvers / Procedure:      Thoracentesis: Once the site was marked using US, A 8F Arrow thoracentesis catheter was used to aspirate fluid. The patient's left chest was prepped in sterile fashion. A total of 3 cc 1%lidocaine was used to anesthetize the area. A finder needle was used to aspirate fluid. The tube was then advanced into the pleural space while aspirating pleural fluid. The fluid was serosanguinous in color/consistency. A total of 900 ml fluid was removed. Fluid was sent to micro and cytology for analysis     Any disposable equipment was visually inspected and deemed to be intact immediately post procedure.      Relevant Pictures      Recommendations:     -->  Full consult note to follow  -->  CXR without pneumothorax  -->  Consider pleurx later this week

## 2020-08-17 NOTE — CONSULTS
[facility]    Pulmonary, Allergy, Critical Care and Sleep Medicine  Interventional pulmonology  Initial Consultation  August 17, 2020      Dallin Stevens MRN# [id number]   Age: 35 year old YOB: 1985     Date of Admission:  8/15/2020    Primary care provider: No Ref-Primary, Physician     Assessment and Plan:  Young man with metastatic melanoma here for worsening shortness of breath, acute hypoxic respiratory failure and malignant pleural effusion.    Thoracentesis today to improve oxygenation  We will have to consider replacement of left indwelling pleural catheter.  Unfortunately this is not well tolerated the patient is having significant dyspnea so we did not do this today.  Supplemental oxygen with goal saturation 89% or greater  We will need to get a CT scan if things do not improve quite a bit with this pleural fluid drainage.    We will continue to follow.    3 problems, 1 worsening with further work-up, 1 new  Modifier 25          Chief Complaint:     History obtained from patient, significant other, oncology team and chart review.    History of Present Illness: 35-year-old patient with a history of metastatic malignant melanoma complicated by pleural effusions, previous bilateral tunneled indwelling pleural catheters.  He was doing well 4 to 6 weeks ago with good functional status.  At that point he had the left-sided pleural catheter removed.  Right side was still putting out some fluid.  He began to have progressive shortness of breath over the last 7 to 10 days.  This seemed to be independent of chemo.  He is having disease progression.  No pleuritic pain.  No issues with the right-sided chest tube.  No other provocative, palliative or localizing factors.  Pertinent positives include weakness.  Pertinent negatives include no fever or hemoptysis.                       Past Medical History:     Past Medical History:   Diagnosis Date     Malignant melanoma (H)                  Past Surgical History:     Past Surgical History:   Procedure Laterality Date     BIOPSY OF SKIN LESION       CRANIOTOMY, EXCISE TUMOR COMPLEX, COMBINED  04/2019     DENTAL SURGERY      wisdom teeth     IR CHEST TUBE PLACEMENT NON-TUNNELED BILATERAL  4/19/2020     LYMPH NODE BIOPSY      arm, excision     MRI CRANIOTOMY LASER ABLATION Right 2/27/2020    Procedure: INTRAOPERATIVE MRI/STEALTH ASSISTED RIGHT LASER ABLATION OF BRAIN METASTASIS;  Surgeon: Nitish Quintero MD;  Location: [facility] OR     OPTICAL TRACKING SYSTEM CRANIOTOMY, EXCISE TUMOR, COMBINED Right 1/16/2020    Procedure: stealth assisted Right craniotomy for tumor resection;  Surgeon: Nitish Quintero MD;  Location: [facility] OR     REMOVE CATHETER CHEST Left 7/30/2020    Procedure: REMOVAL, Left CATHETER, CHEST;  Surgeon: Russel Chavez MD;  Location: [facility] GI     THORACOSCOPY Bilateral 4/20/2020    Procedure: BILATERAL INSERTION OF PLEURX CATHETER;  Surgeon: Uday Bhakta MD;  Location: [facility] OR            Social History:     Social History     Socioeconomic History     Marital status:      Spouse name: Not on file     Number of children: Not on file     Years of education: Not on file     Highest education level: Not on file   Occupational History     Not on file   Social Needs     Financial resource strain: Not on file     Food insecurity     Worry: Not on file     Inability: Not on file     Transportation needs     Medical: Not on file     Non-medical: Not on file   Tobacco Use     Smoking status: Never Smoker     Smokeless tobacco: Never Used   Substance and Sexual Activity     Alcohol use: Not Currently     Drug use: Never     Sexual activity: Yes     Partners: Female   Lifestyle     Physical activity     Days per week: Not on file     Minutes per session: Not on file     Stress: Not on file   Relationships     Social connections     Talks on phone: Not on file     Gets together: Not on file     Attends Denominational service: Not on  file     Active member of club or organization: Not on file     Attends meetings of clubs or organizations: Not on file     Relationship status: Not on file     Intimate partner violence     Fear of current or ex partner: Not on file     Emotionally abused: Not on file     Physically abused: Not on file     Forced sexual activity: Not on file   Other Topics Concern     Not on file   Social History Narrative     Not on file                Family History:     Family History   Problem Relation Age of Onset     Anesthesia Reaction No family hx of      Cardiovascular No family hx of      Deep Vein Thrombosis No family hx of        Please complete if now family history documented       Allergies:   Please see allergy list which was reviewed this admission.         Medications:       levETIRAcetam  1,000 mg Oral BID     loratadine  5 mg Oral BID     sodium chloride (PF)  3 mL Intracatheter Q8H     sodium chloride  2 g Oral TID     acetaminophen, glucose **OR** dextrose **OR** glucagon, lidocaine 4%, lidocaine (buffered or not buffered), LORazepam, melatonin, naloxone, - MEDICATION INSTRUCTIONS -, - MEDICATION INSTRUCTIONS -, sodium chloride (PF)         Review of Systems:   CONSTITUTIONAL: negative for fever, chills, change in weight  INTEGUMENTARY/SKIN: no rash or obvious new lesions  ENT/MOUTH: no sore throat, new sinus pain or nasal drainage  RESP: see interval history  CV: negative for chest pain, palpitations or peripheral edema  GI: no nausea, vomiting, change in stools  : no dysuria  MUSCULOSKELETAL: no myalgias, arthralgias  ENDOCRINE: blood sugars with adequate control  PSYCHIATRIC: mood stable  LYMPHATIC: no new lymphadenopathy  HEME: no bleeding or easy bruisability  NEURO: no numbness, weakness, headaches         Physical Exam:   Temp:  [97.6  F (36.4  C)-98.4  F (36.9  C)] 97.7  F (36.5  C)  Pulse:  [123-129] 123  Heart Rate:  [124-130] 125  Resp:  [22-43] 22  BP: ()/(49-80) 103/66  FiO2 (%):  [90 %]  90 %  SpO2:  [90 %-100 %] 95 %    Intake/Output Summary (Last 24 hours) at 8/17/2020 1713  Last data filed at 8/17/2020 1600  Gross per 24 hour   Intake 3589.67 ml   Output 875 ml   Net 2714.67 ml       Constitutional:   Very fatigued, awake and interactive     Eyes:   Nonicteric, RADHA     ENT:    oral mucosa moist without lesions     Neck:   Supple without supraclavicular or cervical lymphadenopathy     Lungs:   Decreased breath sounds bilaterally     Cardiovascular:   Normal S1 and S2.  RRR.  No murmur, gallop or rub.  Radial, DP and PT pulses normal and symmetric     Abdomen:   NABS, soft, nontender, nondistended.  No HSM.     Musculoskeletal:   No edema. Strength 5/5 and symmetric     Neurologic:   Alert and conversant.  Decreased muscle bulk and decreased strength.  No tremor.     Skin:   Warm, dry.  No rash on limited exam.    Psychiatric: Fatigued but friendly affect, no hallucinations.       Data:   Chest x-ray reviewed interpreted by me: Left base haziness,  Chest CT reviewed and interpreted by me: No effusion earlier this month  TTE result reviewed: Normal EF, pleural effusion.  CMP  Recent Labs   Lab 08/17/20  0458 08/16/20  1227 08/16/20  0407 08/15/20  2352 08/14/20  1002   * 128* 127* 126* 126*   POTASSIUM 3.5 3.6  --  4.4 4.1   CHLORIDE 101 99  --  96 96   CO2 20 18*  --  18* 22   ANIONGAP 10 11  --  12 9   * 106*  --  124* 101*   BUN 7 9  --  12 6*   CR 0.45* 0.56*  --  0.56* 0.65*   GFRESTIMATED >90 >90  --  >90 >90   GFRESTBLACK >90 >90  --  >90 >90   ABUNDIO 7.9* 7.6*  --  7.6* 8.4*   MAG 1.9  --   --  1.9  --    PHOS 2.4*  --   --  2.2*  --    PROTTOTAL 5.2*  --   --  5.8* 6.3*   ALBUMIN 2.0*  --   --  2.4* 2.7*   BILITOTAL 0.7  --   --  0.9 0.9   ALKPHOS 68  --   --  77 85   AST 33  --   --  57* 24   ALT 9  --   --  10 11     CBC  Recent Labs   Lab 08/17/20  0458 08/15/20  2352 08/14/20  1002   WBC 13.0* 14.8* 4.8   RBC 3.08* 3.68* 3.90*   HGB 7.9* 9.4* 9.9*   HCT 26.5* 31.1* 32.3*    MCV 86 85 83   MCH 25.6* 25.5* 25.4*   MCHC 29.8* 30.2* 30.7*   RDW 17.7* 17.5* 17.9*    263 325     INRNo lab results found in last 7 days.  Arterial Blood Gas  Recent Labs   Lab 08/16/20  0050   O2PER 2L     Urine Studies    Recent Labs   Lab Test 08/16/20  0645 07/14/20  2223 05/21/20  1732 04/18/20  1700 01/16/20  2205   URINEPH 5.5 8.0* 8.0* 7.0 7.0   NITRITE Negative Negative Negative Negative Negative   LEUKEST Negative Negative Negative Negative Negative   WBCU 3 2 2 0 0     CMV viral loads  No lab results found.  No results found for: CMQNT, CMVQ  EBV viral loads   No lab results found.  No results found for: EBVDN, EBRES, EBVDN, EBVSP, EBVPC, EBVPCR  Respiratory Virus Testing    No results found for: RS, FLUAG   Sputum Cultures in the last 3 months:  Specimen Description   Date Value Ref Range Status   08/16/2020 Blood Left Arm  Final   08/15/2020 Blood Left Arm  Final   05/22/2020 Pleural fluid Left Drain  Final   05/22/2020 Pleural fluid Right Drain  Final   05/22/2020 Pleural fluid Right Drain  Final   05/22/2020 Pleural fluid Right Drain  Final   05/22/2020 Pleural fluid Left Drain  Final   05/22/2020 Pleural fluid Left Drain  Final    Culture Micro   Date Value Ref Range Status   08/16/2020 No growth after 1 day  Preliminary   08/15/2020 No growth after 1 day  Preliminary   05/22/2020 No growth  Final   05/22/2020   Final    No anaerobes isolated  Since this specimen was not transported in the proper anaerobic transport media, the   absence of anaerobes in this culture does not rule out the presence of anaerobes in this   specimen.     05/22/2020 No growth  Final   05/22/2020   Final    No anaerobes isolated  Since this specimen was not transported in the proper anaerobic transport media, the   absence of anaerobes in this culture does not rule out the presence of anaerobes in this   specimen.            Attestation:

## 2020-08-17 NOTE — PHARMACY-ADMISSION MEDICATION HISTORY
Admission medication history interview status for the 8/15/2020 admission is complete. See Epic admission navigator for allergy information, pharmacy, prior to admission medications and immunization status.     Medication history interview sources:  Patient and SureScripts    Changes made to PTA medication list (reason)  Added: None  Deleted:  None  Changed: None    Additional medication history information (including reliability of information, actions taken by pharmacist):Patient was a good historian and could recall his medications.   -Patient received IV chemotherapy (Cycle 1 Day 1 Taxol, Carboplatin, Neulasta OnPro) on 8/14/20 at [facility].      Prior to Admission medications    Medication Sig Last Dose Taking? Auth Provider   acetaminophen (TYLENOL) 325 MG tablet Take 2 tablets (650 mg) by mouth every 4 hours as needed for mild pain, fever or headaches (> 101 F) Past Month at Unknown time Yes Rosanna Bills APRN CNP   ferrous sulfate (FEROSUL) 325 (65 Fe) MG tablet Take 1 tablet (325 mg) by mouth daily (with breakfast) Past Week at Unknown time Yes Benita Freitas MD   fludrocortisone (FLORINEF) 0.1 MG tablet Take 1 tablet (0.1 mg) by mouth every other day Past Week at Unknown time Yes Lea Patel PA-C   hydrocortisone (CORTEF) 10 MG tablet Take 20 mg in the morning and 10 mg in the early afternoon. Past Week at Unknown time Yes Lea Patel PA-C   LORazepam (ATIVAN) 0.5 MG tablet Take 1 tablet (0.5 mg) by mouth every 4 hours as needed (Anxiety, Nausea/Vomiting or Sleep) Past Week at Unknown time Yes Lea Patel PA-C   LORazepam (ATIVAN) 0.5 MG tablet Take 1 tablet (0.5 mg) by mouth every 6 hours as needed for anxiety Past Week at Unknown time Yes Benita Freitas MD   melatonin 1 MG TABS tablet Take 1 tablet (1 mg) by mouth nightly as needed for sleep Past Week at Unknown time Yes Jadyn Centeno PA-C   sodium chloride 1 GM  tablet Take 2 tablets (2 g) by mouth 3 times daily Past Week at Unknown time Yes Winsome Simms APRN CNP   Vitamin D, Cholecalciferol, 25 MCG (1000 UT) CAPS Take 1 capsule by mouth daily  Past Week at Unknown time Yes Reported, Patient   ondansetron (ZOFRAN) 8 MG tablet Take 1 tablet (8 mg) by mouth every 8 hours as needed (Nausea/Vomiting)  Patient not taking: Reported on 8/14/2020 More than a month at Unknown time  Benita Freitas MD   prochlorperazine (COMPAZINE) 10 MG tablet Take 1 tablet (10 mg) by mouth every 6 hours as needed (Nausea/Vomiting)  Patient not taking: Reported on 8/14/2020 More than a month at Unknown time  Benita Freitas MD         Medication history completed by: Angelique Rodriguez

## 2020-08-17 NOTE — PROGRESS NOTES
"Endocrine Progress Note  Patient: Dallin Stevens   MRN: [id number]  Date of Service: 08/17/2020    Subjective:  No acute events overnight.  No episodes of hypertension. Did have multiple episodes of desaturation since yesterday, on supplemental oxygen.  The patient underwent left sided thoracentesis by pulmonology team today.    Physical Examination:  /66 (BP Location: Right arm)   Pulse 123   Temp 97.7  F (36.5  C) (Axillary)   Resp (!) 32   Ht 1.753 m (5' 9\")   Wt 58.1 kg (128 lb)   SpO2 99%   BMI 18.90 kg/m    not performed    Medications:  Reviewed    Endocrine Labs:  ENDO ADRENAL LABS Latest Ref Rng & Units 8/14/2020 8/4/2020 7/16/2020   CORTISOL, SERUM 4 - 22 ug/dL 20.2     RENIN ACTIVITY ng/mL/hr 2.6       ENDO ADRENAL LABS Latest Ref Rng & Units 7/15/2020   CORTISOL, SERUM 4 - 22 ug/dL 25.8 (H)   RENIN ACTIVITY ng/mL/hr      ENDO THYROID LABS-P Latest Ref Rng & Units 7/14/2020 7/14/2020   TSH 0.40 - 4.00 mU/L 1.54 0.51   T4 FREE 0.76 - 1.46 ng/dL       ENDO THYROID LABS-P Latest Ref Rng & Units 5/23/2020 4/18/2020   TSH 0.40 - 4.00 mU/L 1.68 1.62   T4 FREE 0.76 - 1.46 ng/dL  1.61 (H)     ENDO THYROID LABS-[facility] Latest Ref Rng & Units 4/14/2020 3/25/2020   TSH 0.40 - 4.00 mU/L 1.47 0.64   T4 FREE 0.76 - 1.46 ng/dL       ENDO THYROID LABS-P Latest Ref Rng & Units 3/3/2020   TSH 0.40 - 4.00 mU/L 0.28 (L)   T4 FREE 0.76 - 1.46 ng/dL 1.30       Assessment and plan:  1. Concern for adrenal insufficiency:    Patient has bilateral adrenal gland metastasis secondary to metastatic melanoma, remains at risk for adrenal insufficiency in the future    All previous serum cortisol levels have been within range or even slightly above the upper limit of normal    This rules out adrenal insufficiency    Hydrocortisone and fludrocortisone has been discontinued    Endocrinology team will sign off the patient's care, please call us if you have any further questions    2. 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 I have verified the history and personally performed medical decision making. I agree with the assessment and plan of care as documented in the note. I have not discussed with the patient today.     Polly Mendes MD  Division of Diabetes and Endocrinology  Department of Medicine  [phone]

## 2020-08-17 NOTE — PLAN OF CARE
PT 6B / Discharge Planner PT   Patient plan for discharge: not discussed today  Current status: PT evaluation completed. Patient on 4L NC at rest, switched to10L oxymask prior to mobility. Patient completes bed mobility with SBA, and transfers with CGA/min A, ambulated ~10 ft in room to recliner with unilateral support from IV pole and min A for occasional LOB. Patient satting 95% post-ambulation. While sitting in chair patient's O2 steadily declining as low as 68% despite oxymask 15L, occasionally recovered to 80s but quickly desats again to low 70s. RN notified and in to assess patient, RT called and hooking patient up to [facility] at end of session. Patient denied significant feelings of SOB but noted to be tachypneic, HR generally 130s throughout.  Barriers to return to prior living situation: medical needs, respiratory status, significant deconditioning, weakness, balance, level of A  Recommendations for discharge: currently recommend TCU, pending improved respiratory status anticipate patient could return home with 24 hour A from family + HHPT.   Rationale for recommendations: Patient is significantly deconditioned and requiring high O2 requirements, pending medical course and progress in therapy anticipate patient will want to return home with his wife and kids, wife is able to provide 24 hr A as needed.       Entered by: Dick Camarillo 08/17/2020 10:11 AM

## 2020-08-17 NOTE — PLAN OF CARE
Neuro: A&Ox4. Nystagmus present. L side weaker than R.   Cardiac: ST 120s, 140-150s with activity. Afebrile.    Respiratory: Sating >90s on 5-6L NC. Tachypneic in 30s, shallow. Occasional productive cough. JORDAN.    GI/: Adequate urine output. No BM.   Diet/appetite: No appetite on regular diet.   Activity:  Assist of 1 with gait belt.   Pain: At acceptable level on current regimen. C/o generalized bone pain.   Skin: No new deficits noted. Redness blanchable on lower spine and coccyx. Mepilex placed. Encouraging frequent weight shifting. Left arm swelling noted.   LDA's: PIV x1 with NS @125mL/hr.     Plan: Continue with POC. Notify primary team with changes.

## 2020-08-17 NOTE — PLAN OF CARE
OT/6B - HOLD. OT will hold due to limited tolerance for multiple therapies at this time. Significant O2 desaturation with activity. PT currently following.

## 2020-08-17 NOTE — PLAN OF CARE
"/71 (BP Location: Right arm)   Pulse 124   Temp 98.3  F (36.8  C) (Oral)   Resp (!) 34   Ht 1.753 m (5' 9\")   Wt 58.1 kg (128 lb)   SpO2 94%   BMI 18.90 kg/m      Pt transferred to [facility] from  around 1845. Tachycardic with HR 120s and RR 30s. On 5L oxygen via NC. JORDAN. Claritin given to help with bone pain. Heat packs applied to BLE. NS at 125 mL/hr. 2 RN skin assessment completed by Hali RN and Luz RN. Continue to monitor and follow POC. Notify MD with any changes or concerns   "

## 2020-08-17 NOTE — PROGRESS NOTES
"CLINICAL NUTRITION SERVICES - ASSESSMENT NOTE     Nutrition Prescription    RECOMMENDATIONS FOR MDs/PROVIDERS TO ORDER:  None at this time     Malnutrition Status:    Unable to determine due to inability to complete all parameters of malnutrition     Recommendations already ordered by Registered Dietitian (RD):  Pt may order supplements PRN     Future/Additional Recommendations:  Monitor ability to obtain nutrition history, tolerance of diet order/intakes with need for additional supplementation      REASON FOR ASSESSMENT  Dallin Stevens is a/an 35 year old male assessed by the dietitian for Admission Nutrition Risk Screen for reduced oral intake over the last month    CLINICAL HISTORY   Past medical history for metastatic melanoma to lymph nodes, adrenal glands, lung, and brain, bilateral pleural effusions s/p bilateral pleurx catheters, chronic hypotension 2/2 adrenal mets, anemia, and h/o pericardial effusion c/b tamponade s/p pericardial window who presents with unresponsive spells, found to have new intracranial metastatic lesions.    NUTRITION HISTORY  Patient last assessed by RD on 1/27/2020. Information obtained from chart today due to pt busy with other providers early and then down in endoscopy this afternoon.     CURRENT NUTRITION ORDERS  Diet: Regular  Intake/Tolerance: No intakes documented     LABS  Na 131 (L), K+ 3.5 (WNL), Creatinine 0.45 (L), Po4 2.4 (L)  Glucose 113 (H)    MEDICATIONS   mL/hr     ANTHROPOMETRICS  Height: 175.3 cm (5' 9\") Previous height noted 180 cm (5'11)   Most Recent Weight: 58.1 kg (128 lb)    IBW: 72.7 kg vs 78.2 kg  BMI: Normal BMI  Weight History:   Wt Readings from Last 15 Encounters:   08/15/20 58.1 kg (128 lb)   08/14/20 58.5 kg (129 lb)   07/16/20 55.2 kg (121 lb 9.6 oz)   07/14/20 58.6 kg (129 lb 3.2 oz)   05/23/20 55 kg (121 lb 4.8 oz)   04/19/20 59.7 kg (131 lb 11.2 oz)   04/15/20 59.9 kg (132 lb)   03/25/20 58.3 kg (128 lb 9.6 oz)   03/03/20 57.4 kg (126 lb 9.6 " oz)   02/27/20 58.9 kg (129 lb 13.6 oz)   02/11/20 59.5 kg (131 lb 3.2 oz)   02/06/20 58.5 kg (129 lb)   02/04/20 60.1 kg (132 lb 9.6 oz)   01/30/20 59.7 kg (131 lb 9.6 oz)   01/21/20 59.1 kg (130 lb 6.4 oz)     Dosing Weight: 58 kg (actual)     ASSESSED NUTRITION NEEDS  Estimated Energy Needs: 8902-2293+ kcals/day (25 - 30 kcals/kg)  Justification: Obese  Estimated Protein Needs: 70-87 grams protein/day (1.2 - 1.5 grams of pro/kg)  Justification: Increased needs  Estimated Fluid Needs: 6219-8432 mL/day (1 mL/kcal)   Justification: Maintenance    PHYSICAL FINDINGS  Not able to assess     MALNUTRITION  % Intake: Unable to assess  % Weight Loss: Weight loss does not meet criteria  Subcutaneous Fat Loss: Unable to assess  Muscle Loss: Unable to assess  Fluid Accumulation/Edema: Mild  Malnutrition Diagnosis: Unable to determine due to inability to complete all parameters of malnutrition     NUTRITION DIAGNOSIS  Predicted inadequate nutrient intake (energy and protein) related to current hospitalization with possibility to NPO days and limited menu        INTERVENTIONS  Implementation  Nutrition Education: unable to provide    Medical food supplement therapy- pt may order PRN      Goals  Patient to consume % of nutritionally adequate meal trays TID, or the equivalent with supplements/snacks.     Monitoring/Evaluation  Progress toward goals will be monitored and evaluated per protocol.    Meg Bartlett RD, KELL  6B pager: [phone]

## 2020-08-17 NOTE — PROVIDER NOTIFICATION
Time of notification: 2:19 PM  Provider notified: Uche Simpson   Patient status: pt increased oxygen need from 6L by NC to 15 L by oxi-plus but continues to desat to low 70's. Pt just worked with PT and transferred from bed to chair. Pt denied chest pain or any discomfort.   Temp:  [97.6  F (36.4  C)-98.4  F (36.9  C)] 98.4  F (36.9  C)  Pulse:  [124-129] 124  Heart Rate:  [120-130] 124  Resp:  [26-43] 36  BP: ()/(49-80) 104/62  FiO2 (%):  [90 %] 90 %  SpO2:  [90 %-100 %] 100 %  Orders received: RT came at bedside and pt placed on Hi-Flow 100% . Bipap at bedside stand by if needed. Dr Cotter also came at bedside . It took about 10-15 minutes for a patient to recover to sat of 90%. pulmonary and oncology provider came over and saw pt. Right Pleurex drained and had 75 ml Sharee out-put. Pt tolerated well. Pt will be going to Endo to get the Left Pleurex drained. Will continue to monitor.

## 2020-08-18 NOTE — CONSULTS
[facility]  Palliative Care Consultation Note    Patient: Dallin Stevens  Date of Admission:  8/15/2020    Requesting Clinician / Team: Gold 11  Reason for consult: Goals of care    Recommendations & Discussion:    Met with Dallin and his mother Alba and introduced the role of the palliative care team     Dallin shares that it has been difficult learning of his cancer progression, but that he remains hopeful for additional treatment options. His goals remain restorative at this time. He is looking forward to meeting with his primary oncologist tomorrow and his wife Meg will also be present for this visit.     No initial symptomatic recommendations, he reports no significant ongoing pain.  He has had some dyspnea which has improved following thoracentesis.  He does not feel his symptoms are significant enough to consider medications such as opioids for dyspnea at this time    Discussed additional support available through palliative care including our social work and  colleagues, he was appreciative of this and will request their support if needed    Palliative care will continue to follow for ongoing goals of care conversations and patient/family support--- we plan to first let oncology discuss his prognosis and treatment options with him tomorrow given his primary oncologist is going to be seeing him. Once oncology meets with him tomorrow then after we will f/u for more goals and support discussion.         Thank you for the opportunity to participate in the care of this patient and family. Our team: will continue to follow.     During regular M-F work hours -- if you are not sure who specifically to contact -- please contact us by sending a text page to our team consult pager at [phone].    After regular work hours and on weekends/holidays, you can call our answering service at [phone]. Also, who's on call for us is available in [facility] Smart Web.      Dr. Clayton Rojas   Palliative Care Fellow  Staffed with Dr. Barry    Patient seen and evaluated with Dr. Rojas.   Agree with assessment and recommendations.    Total time spent was 65 minutes,  >50% of time was spent counseling and/or coordination of care regarding patient support, goals of care and symptom assessment for pt with metastatic melanoma, progressing brain mets and recurrent malignant pleural effusion with respiratory failure.    Alma Barry  Palliative Care   Pager [phone]      Assessments:  Dallin Stevens is a 35 year old male with medical history notable for metastatic melanoma with metastases to lymph nodes, adrenal glands, lung, and brain with bilateral malignant pleural effusions s/p pleurex catheter placement, complicated by a pericardial effusion with tamponade s/p pericardial window who was admitted on 8/15/2020 for unresponsive spells with new intracranial metastatic lesions. Palliative care was consulted for goals of care.     Today, the patient was seen for:  -Metastatic Melanoma  -Bilateral Pleural effusions   -Acute on chronic respiratory failure  -Goals of Care    Prognosis, Goals, & Planning:      Functional Status just prior to hospitalization: 3 (Capable of only limited self-care; needs help with ADLs; in bed/chair >50% of waking hours)      Prognosis, Goals, and/or Advance Care Planning were addressed today: Yes        Summary/Comments: Started initial conversation on goals of care. Patient shares that he remains hopeful for ongoing treatment options. He plans to meet with his oncologist tomorrow with his wife present to discuss this further.  He is open to ongoing discussions about his goals with palliative care.       Patient's decision making preferences: independently          Patient has decision-making capacity today for complex decisions: Yes            I have concerns about the patient/family's health literacy today: No           Patient has a completed Health Care  "Directive: No.       Code status: full code    Coping, Meaning, & Spirituality:   Mood, coping, and/or meaning in the context of serious illness were addressed today: Yes  Summary/Comments: Patient feels that he is coping \"as well as he can be\". He notes strong support from his family including his wife, two children, and his mother. He acknowledges that some of his family members may be having more difficulty coping than he is currently.     Social:     Living situation: Lives at home with his wife Meg and two children ages 4 and 6    Key family / caregivers: Wife Meg, Mother Alba    Current in-home services: Previously had in home nursing services primarily to assist with Pleurex drains    History of Present Illness:  History gathered today from: patient, family/loved ones, medical chart    Dallin Stevens is a 35 year old male with medical history notable for metastatic melanoma with metastases to lymph nodes, adrenal glands, lung, and brain with bilateral malignant pleural effusions s/p pleurex catheter placement, complicated by a pericardial effusion with tamponade s/p pericardial window who was admitted on 8/15/2020 for unresponsive spells with new intracranial metastatic lesions. Palliative care was consulted for goals of care.     Dallin reports that things were going \"okay\" at home prior to this hospitalization.  He had just started his first infusion cycle of a new chemotherapy regimen on 8/14/20.  During this he developed concern for possible seizure activity, so was admitted to [facility] for further evaluation. During this admission work-up has revealed metastatic progression with new intracranial disease.  He was also noted to have worsening pleural effusions and is now s/p therapeutic thoracentesis on 8/17/20. He continues to require supplemental oxygen, but is hoping to discharge home soon to be with his wife and children. Radiation therapy was also discussed, but this was declined by the patient as he " has previously had radiation and he had significant side effects associated with this. He reports that he has strong support at home from his wife Meg.  His mother Alba is present at today's visit and has also been very supportive. He shares that it has been difficult learning of his cancer progression during this admission, but that he remains hopeful for additional treatment options.  He shares that he and his wife are also interested in alternative treatment options. His goals remain restorative.  He plans to meet with his primary oncologist tomorrow. His wife Meg will also be here tomorrow and they are open to further discussions with palliative care as well.     He denies ongoing pain and reports that he likes to take as few medications as possible. He does note ongoing dyspnea with a continued oxygen requirement.  He feels that this did improve following the thoracentesis, and that it is not significant enough for him to consider medication treatment for ongoing dyspnea.  He does take lorazepam primarily at night for sleep.  He does not typically take this during the day for anxiety. He denies additional concerning symptoms at this time.    Key Palliative Symptom Data:  # Pain severity the last 12 hours: low  # Dyspnea severity the last 12 hours: moderate  # Nausea severity the last 12 hours: none  # Anxiety severity the last 12 hours: low     ROS:  Comprehensive ROS is reviewed and is negative except as here & per HPI.     Past Medical History:  Past Medical History:   Diagnosis Date     Malignant melanoma (H)         Past Surgical History:  Past Surgical History:   Procedure Laterality Date     BIOPSY OF SKIN LESION       CRANIOTOMY, EXCISE TUMOR COMPLEX, COMBINED  04/2019     DENTAL SURGERY      wisdom teeth     IR CHEST TUBE PLACEMENT NON-TUNNELED BILATERAL  4/19/2020     LYMPH NODE BIOPSY      arm, excision     MRI CRANIOTOMY LASER ABLATION Right 2/27/2020    Procedure: INTRAOPERATIVE MRI/STEALTH  ASSISTED RIGHT LASER ABLATION OF BRAIN METASTASIS;  Surgeon: Nitish Quintero MD;  Location: [facility] OR     OPTICAL TRACKING SYSTEM CRANIOTOMY, EXCISE TUMOR, COMBINED Right 1/16/2020    Procedure: stealth assisted Right craniotomy for tumor resection;  Surgeon: Nitish Quintero MD;  [facility]: [facility]     REMOVE CATHETER CHEST Left 7/30/2020    Procedure: REMOVAL, Left CATHETER, CHEST;  Surgeon: Russel Chavez MD;  Location: [facility] GI     THORACENTESIS N/A 8/17/2020    Procedure: THORACENTESIS;  Surgeon: Casey Hammond MD;  [facility]: [facility]     THORACOSCOPY Bilateral 4/20/2020    Procedure: BILATERAL INSERTION OF PLEURX CATHETER;  Surgeon: Uday Bhakta MD;  Location:  OR         Family History:  Family History   Problem Relation Age of Onset     Anesthesia Reaction No family hx of      Cardiovascular No family hx of      Deep Vein Thrombosis No family hx of         Allergies:  No Known Allergies     Medications:  I have reviewed this patient's medication profile and medications from this hospitalization.   Noted scheduled meds are:  Keppra 1000 mg BID  Claritin 5 mg BID    Noted PRN meds are:  Acetaminophen 650 mg q4 hours PRN mild pain  Lorazepam 0.5 mg q4 hours PRN anxiety, nausea, vomiting  Melatonin 1 mg at bedtime PRN sleep  Narcan PRN opioid reversal    Physical Exam:  Vital Signs: Temp: 98.3  F (36.8  C) Temp src: Oral BP: 121/78 Pulse: 128 Heart Rate: 135 Resp: 20 SpO2: 95 % O2 Device: Nasal cannula with humidification Oxygen Delivery: 6 LPM  Weight: 128 lbs 0 oz    Alert, comfortable appearing, NAD.  Head NCAT.  Eyes anicteric without injection  Face symmetric, eyes conjugate  Mouth pink, moist  Neck right supraclavicular mass  Lungs nasal cannula with 6L of supplemental oxygen, O2 saturations at 95%, no significant increased work of breathing, diminished at lung bases  CV tachycardic  Abd soft, ntnd, benign  Bilateral upper extremity edema  Skin warm, dry, without lesions  Neuro Face  symmetric  Neuropsych exam normal including affect, sensorium, gross memory, thought processes, and fund of knowledge.     Data reviewed:  Recent imaging reviewed, my comments on pertinents:   CXR 8/17/20: interval improvement in left-sided pleural effusion, left bibasilar atelectasis, right chest drain remains in place, slightly increased right pleural effusion, large unchanged mediastinal and right upper lobe mass  MRI Brain (8/16/20): Progressive metastatic melanoma with multiple lesions within both supra and infratentorial areas with associated hemorrhage. Increased in size since 7/24/20. Multiple new lesions    Recent lab data reviewed, my comments on pertinents:   Na 131, Ca 7.9, WBC 13, hemoglobin 7.9

## 2020-08-18 NOTE — PLAN OF CARE
Neuro: A&Ox4. Nystagmus present. Left side weaker than right side.   Cardiac: ST 130s    Respiratory: Sating >90s on 6LPM NC. Occasional productive cough. JORDAN.    GI/: Adequate urine output. No BM.   Diet/appetite: Poor appetite per report.   Activity:  Assist of 1.   Pain: At acceptable level on current regimen. C/o generalized bone pain.   Skin: No new deficits noted.   LDA's: PIV x1 with MIV @125mL/hr.   Plan: Continue with POC. Notify primary team with changes.

## 2020-08-18 NOTE — PLAN OF CARE
Neuro: A&Ox4.   Cardiac: SR. VSS.   Respiratory: dyspneic with minor activities . Weaned his O2 to 6 L by NC post thoracocentesis and Right Pleurex drained. Pt verbalized breathing better.   GI/: Adequate urine output. BM X1  Diet/appetite: poor po intake diet.   Activity: bedrest , activity intolerance .   Pain: At acceptable level on current regimen.   Skin: No new deficits noted.  LDA's: PIV patent and infusing well.   Plan: Continue with POC. Notify primary team with changes.

## 2020-08-18 NOTE — PROGRESS NOTES
[facility]   Patient is currently open to home care services with [facility]. Patient is currently receiving RN services. If appropriate provide orders in [facility] to resume home care. For any questions or concerns regarding home care services please call [phone].    Patsy Ward RN  [facility] Liaison  [phone]

## 2020-08-18 NOTE — PROVIDER NOTIFICATION
Notified Gold 11 cross of pt lactic 2.3    Spoke with Sarah ANDERSON, give 500mL NS bolus, lactic recheck ordered for 2100.

## 2020-08-18 NOTE — PLAN OF CARE
Neuro: A&Ox4. Denies numbness/tingling. Pupils PEERLA. Neuros intact.   Cardiac: Sinus tach 130's. VSS.   Respiratory: Sating >95% on RA.  GI/: Adequate urine output.  Diet/appetite: Tolerating regular diet. Eating well.  Activity:  Assist of 1, ambulated with PT in august. Independently repositioning in bed.   Pain: Denies, at acceptable level on current regimen.   Skin: No new deficits noted. Left arm/hand swollen, lymphedema wrap in place.   LDA's: Right pleurx, left PIV saline locked.    Plan: Continue with POC. Notify primary team with changes.

## 2020-08-18 NOTE — PROGRESS NOTES
Hematology / Oncology  Daily Progress Note   Date of Service: 08/18/2020  Patient: Dallin Stevens  MRN: [id number]  Admission Date: 8/15/2020  Hospital Day # 2  Cancer Diagnosis: Metastatic Melanoma   Primary Outpatient Oncologist: Dr Elfego Burnett   Current Treatment Plan: Carbo/Taxol (D1C1 8/14/20)     Assessment & Plan:   Dallin Stevens is a 35 year old male with PMHx of Met Melanoma, now admitted after developing multiple episodes of seizure like activity. Hospital stay complicated by AHRF and malignant pleural effusions, s/p thora with 900 ml off 8/17.     # Widely Metastatic Melanoma   # Pleural effusions   Please review initial oncology consult note by Dr Lorne Joshua from 8/16 for full oncologic history. In short, diagnosed as stage II disease 2012. On surveillance until 2017 when presented with met disease (R supraclav, mediastinal and hilar LN), Bx +BRAF mutated Met melanoma. Treatment history below.    - Pembrolizumab Feb-Nov 2017   - S/p craniotomy 4/2019 and tumor resection of met R parieto occipital mass (3.7 x 5 cm). Declined XRT   - S/p R frontal craniotomy and tumor resection by Dr Quintero 1/16/2020    - Stereotactic radiosurgery with Dr Arshad 1/31/2020    - Pembrolizumab 2/11 - 4/15 completed 4 cycles. Complicated by tamponade and recurrent pleural effusions. S/p pericardial window and bilat pleurx drains.    - Dabrafenib and Trametinib 4/29/20 with PD    - Carbo, Paclitaxel started 8/14/20 (did not receive Pembro, awaiting insurance approval)   During Carbo/Taxel 8/14 infusion concern for seizure like activity so was admitted to [facility]. Work up significant for new met intracranial disease and worsening L effusions. MRI brain 8/16 with Multiple metastatic lesions within both supra and infratentorial areas with associated hemorrhage. Most of the lesions increase in size when compared to 7/24/2020 brain MRI. There are also new lesions within the left posterior frontal lobe, right parietal lobe, and  "within the right insula. Rad Onc consulted, patient declined WBXRT vs gamma knife as unclear benefit. S/p therapeutic thora 8/17 with IP, a total of 900 ml fluid was removed. Fluid was sent to micro and cytology for analysis (pending)       Recommendations:   - S/p L thora with 900 ml off yesterday, cytology/micro pending. Currently not stable enough to get L pleurx placed, tenuous resp status although improved post para. Currently on 5-6 L NC.   - Ongoing GOC discussions, will readdress code status. At this point patient not wanting palliative WBXRT. Agree with palliative care consult, may also benefit from  support for two young children at home. Patients primary oncologist will be on service Wednesday 8/19.   - Avoid AC given hemorrhagic brain mets, risks > benefits      Patient was seen and plan of care was discussed with attending physician Dr. Joshua.    Thank you for the opportunity to partake in this patients plan of care. Please do not hesitate to page with questions. We will continue to follow.     Mine Barnett PA-C   Hematology/Oncology   Pager: [phone]   ___________________________________________________________________    Subjective & Interval History:    Overnight stable respiratory status on 5-6L NC, Tachy 120-130ss. Breathing improved after thora yesterday, wanting to go hope. Expressed concern with safety from him to go home and encouraged to stay in hospital for closer monitoring and to discuss overall prognosis with primary oncologist tomorrow.     Physical Exam:    Blood pressure 121/78, pulse 128, temperature 98.3  F (36.8  C), temperature source Oral, resp. rate 20, height 1.753 m (5' 9\"), weight 58.1 kg (128 lb), SpO2 95 %.    General: sitting up in bed, no acute distress   CV: Tachycardia   Resp: Increased work to breath, on 5L NC  MSK: warm and well-perfused, normal tone  Skin: no rashes on limited exam, no jaundice, pallor   Neuro: Alert and interactive, moves all extremities " equally     Labs & Studies: I personally reviewed the following studies:  ROUTINE LABS (Last four results):  CMP  Recent Labs   Lab 08/17/20  0458 08/16/20  1227 08/16/20  0407 08/15/20  2352 08/14/20  1002   * 128* 127* 126* 126*   POTASSIUM 3.5 3.6  --  4.4 4.1   CHLORIDE 101 99  --  96 96   CO2 20 18*  --  18* 22   ANIONGAP 10 11  --  12 9   * 106*  --  124* 101*   BUN 7 9  --  12 6*   CR 0.45* 0.56*  --  0.56* 0.65*   GFRESTIMATED >90 >90  --  >90 >90   GFRESTBLACK >90 >90  --  >90 >90   ABUNDIO 7.9* 7.6*  --  7.6* 8.4*   MAG 1.9  --   --  1.9  --    PHOS 2.4*  --   --  2.2*  --    PROTTOTAL 5.2*  --   --  5.8* 6.3*   ALBUMIN 2.0*  --   --  2.4* 2.7*   BILITOTAL 0.7  --   --  0.9 0.9   ALKPHOS 68  --   --  77 85   AST 33  --   --  57* 24   ALT 9  --   --  10 11     CBC  Recent Labs   Lab 08/17/20  0458 08/15/20  2352 08/14/20  1002   WBC 13.0* 14.8* 4.8   RBC 3.08* 3.68* 3.90*   HGB 7.9* 9.4* 9.9*   HCT 26.5* 31.1* 32.3*   MCV 86 85 83   MCH 25.6* 25.5* 25.4*   MCHC 29.8* 30.2* 30.7*   RDW 17.7* 17.5* 17.9*    263 325     INRNo lab results found in last 7 days.    Medications list for reference:  Current Facility-Administered Medications   Medication     acetaminophen (TYLENOL) tablet 650 mg     glucose gel 15-30 g    Or     dextrose 50 % injection 25-50 mL    Or     glucagon injection 1 mg     levETIRAcetam (KEPPRA) tablet 1,000 mg     lidocaine (LMX4) cream     lidocaine 1 % 0.1-1 mL     loratadine (CLARITIN) syrup 5 mg     LORazepam (ATIVAN) tablet 0.5 mg     melatonin tablet 1 mg     naloxone (NARCAN) injection 0.1-0.4 mg     No lozenges or gum should be given while patient on BIPAP/AVAPS/AVAPS AE     Patient may continue current oral medications     sodium chloride (PF) 0.9% PF flush 3 mL     sodium chloride (PF) 0.9% PF flush 3 mL     sodium chloride tablet 2 g       ATTENDING PHYSICIAN ADDENDUM AND NOTE: Tuesday, August 18, 2020  I have evaluated this patient s case personally and also  with the GUERRERO and oncology fellow Dr. Alexander. The note above reflects my assessment and plan. I have personally reviewed lab results, and vital and radiology results. >50% of time was spent in assessment and coordination of care; >=35 minutes.  Mr. Dallin Stevens is a 36 yo gentleman with widely metastatic melanoma, initially diagnosed as stage III disease in 2012. He has a very complicated cancer history outlined in detail in primary oncologist Dr. Snider s most recent clinic note August 7. 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 Briefly, in terms of systemic therapy. The patient has been treated with Pembrolizumab, as well as targeted treatment, with Dabrafenib and Trametinib that was initiated in late April 2020. In mid-May, he had mixed response, detected, and with progressive disease seen on scans, specifically CT scan of the chest/abdomen/pelvis on August 4, Dr. Snider stated it in his August 7 note intention to proceed with treatment with carboplatin and paclitaxel, which the patient received a cycle 1/day one two days ago (Friday August 14, 2020); also with on pro-Neulasta for growth factor support. Per the note, the intention is also to treat with Pembro in addition to the cytotoxic chemotherapy. Also significantly, the patient has disseminated disease. The adrenal glands, for which he receives hydrocortisone for adrenal insufficiency, bilateral pleural effusions, necessitating Pleurex catheter s for palliative relief, and also recurrent multiple brain metastases. For this last neurologic issue, he most recently had right frontal craniotomy with resection of the metastatic melanoma with postoperative stereotactic radiosurgery by Dr. Jose Arshad in late January 2020.  He remains dyspneic today, but relatively better today while on 6 liters oxygen via nasal cannula. He had some palliative relief with removal of pleural effusion fluid. His mother is at his bedside today; his wife is home  with their small children. I held in-depth discussion today, respecting his wish not to go into deeper detail about overall prognosis, but encouraged him to formulate and express to his family and to his healthcare teams his exact wishes regarding code status; to date he remains full code per his specifications. He and his wife hold out hope that his most recent chemotherapy regimen will additionally prolong his wife. He is anxious to leave the hospital today to see his young children; I counseled him that that may not be prudent in his current ill condition, with risks of respiratory distress and other acute issues worsening if at home and putting him at risk of immediate emergency rehospitalization and even death. His mother expressed that they would like his wife to be present for any discussions with his primary oncologist, Dr Pillai, who rotates onto this service tomorrow.  Lorne Joshua MD, PhD  Associate Professor of Medicine   Division of Hematology, Oncology and Transplantation

## 2020-08-18 NOTE — PLAN OF CARE
Neuro: A&Ox4.   Cardiac: ST. VSS.   Respiratory: Sating 95% on 6L.  GI/: Adequate urine output. BM X1  Diet/appetite: Tolerating diet. Eating well.  Activity:  Assist of SB, up to chair and in halls.  Pain: At acceptable level on current regimen.   Skin: No new deficits noted.  LDA's: R pleurex    Plan: Continue with POC. Notify primary team with changes.

## 2020-08-18 NOTE — PROGRESS NOTES
08/18/20 1500   Quick Adds   Type of Visit Initial Occupational Therapy Evaluation   Living Environment   Lives With child(janell), adult;spouse   Living Arrangements house   Functional Level   Ambulation 0-->independent   Transferring 0-->independent   Toileting 0-->independent   Bathing 1-->assistive equipment   Dressing 0-->independent   Eating 0-->independent   General Information   Referring Physician Mine Barnett PA-C    Additional Occupational Profile Info/Pertinent History of Current Problem past medical history for metastatic melanoma to lymph nodes, adrenal glands, lung, and brain   General Info Comments IV located at L forearm   Edema General Information   Onset of Edema   (chronic)   Affected Body Part(s) Left UE   Etiology Comments cancer, chronic L UE lymphedema   General Comments/Previous Edema Treatment/Edema Equipment patient reports using compression sleeve to L UE at baseline.   Edema Examination/Assessment   Skin Condition Pitting;Dryness;Intact   Skin Condition Comments Skin intact with generalized 1+ pitting in L UE. IV located in forearm.   Pitting Assessment 1+ pitting    Sensory Examination   Sensory Comments light touch intact   Pain Assessment   Patient Currently in Pain   (patient reports no pain at this time in L UE.)   Range of Motion (ROM)   ROM Comment WNL   Strength   Strength Comments WNL   General Therapy Interventions   Edema: Planned Interventions Gradient compression bandaging;Fit for compression garment;Edema exercises;Precautions to prevent infection/exacerbation;Education   Intervention Comments size E comperm compression sleeve applied from MCPs to IV in forearm.   Clinical Impression   Criteria for Skilled Therapeutic Interventions Met yes, treatment indicated   Edema: Patient Presentation Edema   Influenced by the following impairments decreased functional mobility.   Assessment of Occupational Performance 1-3 Performance Deficits   Clinical Decision Making  (Complexity) Low complexity   Therapy Frequency 3x/week   Predicted Duration of Therapy Intervention (days/wks) 1 week   Risks and Benefits of Treatment have been explained. Yes   Patient, Family & other staff in agreement with plan of care Yes   Total Evaluation Time   Total Evaluation Time (Minutes) 5

## 2020-08-18 NOTE — PROGRESS NOTES
[facility], [facility] Progress Note - Hospitalist Service, Gold 11       Date of Admission:  8/15/2020  Assessment & Plan     Dallin Stevens is a 35 year old male admitted on 8/15/2020. He has a past medical history for metastatic melanoma to lymph nodes, adrenal glands, lung, and brain, bilateral pleural effusions s/p bilateral pleurx catheters, chronic hypotension 2/2 adrenal mets, anemia, and h/o pericardial effusion c/b tamponade s/p pericardial window who presents with unresponsive spells, found to have new intracranial metastatic lesions.    Plans for today  -Consult Palliative care     Acute on Chronic Hypoxic Respiratory Failure  Acute on Chronic Pleural Effusion, L > R  - With resultant concern for possible seizure, uresponsive spells, hypoxia with any exertion  - Associated with Bilateral L > R effusions  - ECHO: Moderate L effusion  - Chronic: R pleurex: drained every other day, L Pleurex, removed about 2 weeks ago  - IP consulted, Left thora done yesterday but no L Pleurex placed due to stability. Plan for PleurX tomorrow at 11am  - Still needing 5L of O2 and though patient wishing to go home sooner than later, will likely need O2 on discharge.    Increasing Brain Mets [place]  - MRI Brain: In this patient with a history of metastatic melanoma, findings are suggestive for progressive disease. Multiple metastatic lesions within both supra and infratentorial areas with associated hemorrhage. Most of the lesions increase in size when compared to 7/24/2020 brain MRI. There are also new lesions within the left posterior frontal lobe, right parietal lobe, and within the right insula.  - 8/17: Decided unlikely WBXRT or Gamma Knife, per pt/wife as unlikely effective    Hyponatremia; prev felt due to AI, but Endo r/o AI and rec to stop Fludro and HC  - on IVF and monitoring    Lactic acidosis and Leukocytosis, due to significant tumor burden and hypoxia  - Not due to sepsis,  abx stopped 8/16 as per pt and wife request    Metastatic Melanoma  - 8/4: CT with significant increase; progression  - 8/14: s/p Chemo and Neulasta  - 4/7/20: H/o Pericardial effusion s/p tamponade and pericardial window   - 4/2020: Bilateral Pleurex drains placed   7/30: L Pleurex drain removal by IP team due to low outputs  - Onc and IP and Rad Onc following  - Remains full code  - Consult palliative for goals of care. Will need his wife, Meg to be involved in the conversation.       Diet: Combination Diet Regular Diet Adult . Clears at midnight. NPO at 5am for pleurx placement tomorrow at 11am  DVT Prophylaxis: AC and VTE ppx is not appropriate due to Brain mets  Lu Catheter: not present  Code Status: Full Code           Disposition Plan   Expected discharge: 2 - 3 days, recommended to prior living arrangement once O2 use less than 2 liters/minute and Medically stable.  Entered: Lavon Bauer MD 08/18/2020, 2:02 PM       The patient's care was discussed with the Bedside Nurse, Patient, Patient's Family and Onc, IP Consultant.    Lavon Bauer MD  Hospitalist Service, [address], [place]  Pager: [phone]  Please see sticky note for cross cover information    ______________________________________________________________________    Interval History     Feeling better overall and now needing 5L of O2.  Still hopes to cure his cancer at this point.  He hopes to go home with O2 as early as today.  Wife won't be in the hospital today.    No concerns about pain.  Continues to want to be full code.    ROS  Patient denies any headache, sore throat  Patient denies any sleeping disturbance  Patient denies any nausea or vomiting or abdominal pain  Patient denies any fevers/chills  Patient denies any chest pain      Data reviewed today: I reviewed all medications, new labs and imaging results over the last 24 hours. I personally reviewed the chest x-ray image(s) showing Bibasilar  effusions, large thoracic mass.    Physical Exam   Vital Signs: Temp: 98.3  F (36.8  C) Temp src: Oral BP: 121/78 Pulse: 128 Heart Rate: 135 Resp: 20 SpO2: 95 % O2 Device: Nasal cannula with humidification Oxygen Delivery: 6 LPM  Weight: 128 lbs 0 oz    General: Alert awake and oriented, tachypnea, diaphretic, pale, tired and ill appearing  Eyes: Normal pink mucosa with no + pallor, no scleral icterus.  Neck: R neck supraclavicular Mass, round, golf ball size  Heart:  Tachycardic, HR 120s, normal S1, normal S2, no murmurs rubs or gallops, PMI is nondisplaced   Peripherally there is no edema  Lungs: Tachypnea with Notable increased work of breathing, fair effort, lungs are c reduced breath sounds bilaterally   No wheezing or crackles   Breathing on NFNC to keep sats > 90%   R Pleurex dressing intact  Abdomen: Nontender, nondistended, normal active bowel sounds, no palpable masses  Extremities: Normal capillary refill, no edema, no clubbing, no cyanosis  Neuro: Alert and oriented to person place location and situation  Psych: No acute psychosis, good mood, good spirits      Data   Recent Labs   Lab 08/17/20  0458 08/16/20  1227 08/16/20  0407 08/15/20  2352 08/14/20  1002   WBC 13.0*  --   --  14.8* 4.8   HGB 7.9*  --   --  9.4* 9.9*   MCV 86  --   --  85 83     --   --  263 325   * 128* 127* 126* 126*   POTASSIUM 3.5 3.6  --  4.4 4.1   CHLORIDE 101 99  --  96 96   CO2 20 18*  --  18* 22   BUN 7 9  --  12 6*   CR 0.45* 0.56*  --  0.56* 0.65*   ANIONGAP 10 11  --  12 9   ABUNDIO 7.9* 7.6*  --  7.6* 8.4*   * 106*  --  124* 101*   ALBUMIN 2.0*  --   --  2.4* 2.7*   PROTTOTAL 5.2*  --   --  5.8* 6.3*   BILITOTAL 0.7  --   --  0.9 0.9   ALKPHOS 68  --   --  77 85   ALT 9  --   --  10 11   AST 33  --   --  57* 24   LIPASE  --   --   --  43*  --    TROPI  --   --   --  <0.015  --      Recent Results (from the past 24 hour(s))   XR Chest Port 1 View    Narrative    Exam: XR CHEST PORT 1 , 8/17/2020 2:12  PM    Indication: L thoracentesis    Comparison: Radiograph 8/16/2020, CT 8/4/2020    Findings:   AP view of the chest. Unchanged right basilar chest drain.  Significantly improved left-sided pleural effusion and left basilar  atelectasis. On the right there is slightly increased pleural effusion  and basilar atelectasis. No pneumothorax. Unchanged mediastinal and  right upper lobe mass. The cardiac silhouette is partially obscured.  No acute findings in the upper abdomen. Surgical clips noted in the  epigastric region. No acute osseous pathology. Surgical clips in the  left axilla.      Impression    Impression:   1. Interval improvement in a left-sided pleural effusion and left  basilar atelectasis.  2. Right chest drain remains in place, however there is slightly  increased right pleural effusion and right basilar atelectasis.  3. Large unchanged mediastinal and right upper lobe mass.    I have personally reviewed the examination and initial interpretation  and I agree with the findings.    POOL CALDWELL MD     Medications     - MEDICATION INSTRUCTIONS -       - MEDICATION INSTRUCTIONS -         levETIRAcetam  1,000 mg Oral BID     loratadine  5 mg Oral BID     sodium chloride (PF)  3 mL Intracatheter Q8H     sodium chloride  2 g Oral TID

## 2020-08-18 NOTE — PLAN OF CARE
Edema 6B: Lymphedema evaluation completed. L UE with chronic edema. Skin intact with 1+ pitting. IV located at mid forearm. Issued size E comperm compression sleeve from MCPs to IV for gentle management of edema. Compression sleeve can be worn 23/24 hours per day and removed daily for skin cares - see patient care order for details.

## 2020-08-18 NOTE — PROGRESS NOTES
"Interventional pulmonology follow-up note    Events: Improved oxygenation, improved dyspnea after thoracentesis    Subjective: Feeling better.  Hoping to go home tomorrow.  Interested in getting Pleurx placed if possible-preferably to be done before he goes home.    In discussion with medicine team there may be some goals of care discussion pending review with oncology.    /78 (BP Location: Right leg)   Pulse 128   Temp 98.3  F (36.8  C) (Oral)   Resp 20   Ht 1.753 m (5' 9\")   Wt 58.1 kg (128 lb)   SpO2 95%   BMI 18.90 kg/m    Breathing more comfortably, better voice, better energy  Post procedure chest x-ray reviewed interpreted by me: Improved left-sided effusion    Plan of care reviewed in detail with the patient and his mother.  Discussed with internal medicine team.    Plan  Continue supplemental oxygen.  Goal saturation 89% or greater.  His oxygen can likely be turned down.  Pleurx placement tomorrow morning at 11.  Clears only after midnight.  N.p.o. at 0500    Multiple bedside assessments and discussions today.  Greater than 25 minutes spent, greater than 50% counseling coordination of care.  "

## 2020-08-18 NOTE — PLAN OF CARE
PT 6B / Discharge Planner PT   Patient plan for discharge: Home  Current status: Improved tolerance for therapy today. Completes bed mobility and transfers with SBA, ambulated in hallway ~60ft x2 with 4WW + CGA, has JORDAN but maintains SpO2>90% on 6L NC throughout, 1 seated rest break for fatigue and SOB. -140s.  Barriers to return to prior living situation: medical needs, respiratory status, significant deconditioning, weakness, balance, level of A  Recommendations for discharge: anticipate patient could return home with 24 hour A from family + HHPT.   Rationale for recommendations: Patient is significantly deconditioned  but highly motivated to return home, pending medical course and progress in therapy anticipate patient will be appropriate to return home, has supportive family and wife is able to provide 24 hr A as needed. Patient would benefit from HHPT to maximize strength and functional independence.       Entered by: Dick Camarillo 08/18/2020 3:51 PM

## 2020-08-19 NOTE — PROCEDURES
INTERVENTIONAL PULMONOLOGY       Procedure(s):    Tunnelled pleural catheter placement (left chest)    Indication:  Recurrent malignant pleural effusion    Attending of Record:  Ro Charles MD     Interventional Pulmonary Fellow   Angle Prasad MD     Trainees Present:   None     Medications:    50 mcg fentanyl    Sedation Time: 30 minutes    Time Out:  Performed    The patient's medical record has been reviewed.  The indication for the procedure was reviewed.  The necessary history and physical examination was performed and reviewed.  The risks, benefits and alternatives of the procedure were discussed with the the patient in detail and he had the opportunity to ask questions. All questions were answered to the best of my ability.  Verbal and written informed consent was obtained.  The proposed procedure and the patient's identification were verified prior to the procedure by the physician and the nurse, respiratory therapist.    After clinical evaluation and reviewing the indication, risks, alternatives and benefits of the procedure the patient was deemed to be in satisfactory condition to undergo the procedure.      Immediately before administration of medications the patient was re-assessed for adequacy to receive sedatives including the heart rate, respiratory rate, mental status, oxygen saturation, blood pressure and adequacy of pulmonary ventilation. These same parameters were continuously monitored throughout the procedure.    A Tuberculosis risk assessment was performed:  The patient has no known RISK of Tuberculosis    The procedure was performed in a negative airflow room: Yes    Maneuvers / Procedure:      Indwelling pleural catheter insertion: Once the site was marked using US, the pleurX catheter kit was used for the procedure. The patient's left chest was prepped in sterile fashion. A total of 10cc 1%lidocaine used to anesthetize the area. A finder needle was used to aspirate fluid. The wire was  advanced using seldinger technique. A 1cm incision was made approximately 5cm anterior to the wire and at the wire site. The tunneling device was used to make a track towards the wire and insert the chest tube. A dilator was used to enlarge the track, then the peel-away catheter was used to insert the chest tube into the pleural space. The catheter was sutured into place using 2.0silk. A sterile dressing was placed. A total of 400cc was drained.    Patient did not have a a safe pocket to proceed with anterior placement of PleurX catheter. Discussed with patient option of placing the catheter in a more posterior option vs returning as an outpatient when fluid has reaccumulated further. Patient preferred to proceed with more posterior approach rather than returning.     Any disposable equipment was visually inspected and deemed to be intact immediately post procedure.      Relevant Pictures: None    Recommendations:     --Daily drainage of Pleurx catheter, can be spaced out to every other day based on symptoms.   --Stitches x 2 can be removed in one week by home health nurse.     Angle Prasad  Interventional Pulmonary Fellow  [phone]

## 2020-08-19 NOTE — PLAN OF CARE
"Blood pressure 97/77, pulse 133, temperature 98.6  F (37  C), temperature source Oral, resp. rate (!) 32, height 1.753 m (5' 9\"), weight 63.1 kg (139 lb 1.6 oz), SpO2 96 %.  Pt HR remains tachy up to 130's, BP stable.  Weaned to 3L NC after L pleur-x drain placed in Endo this afternoon.  RR 30's.  C/o discomfort at drain site, Tylenol given x 1.  Ativan x 1 for anxiety.  Needs R pleurx drained today still.  On Regular diet, has not eaten yet today.  Drinking little fluids.  Low UOP, void x 1.  Wife at bedside.  Waiting to have discussion with Oncology team.  Call light within reach.  Continue to monitor.   "

## 2020-08-19 NOTE — PROGRESS NOTES
Sepsis Evaluation Progress Note    I was called to see Dallin Stevens due to abnormal vital signs triggering the Sepsis SIRS screening alert. He is not known to have an infection.     Physical Exam   Vital Signs:  Temp: 97.7  F (36.5  C) Temp src: Oral BP: 106/75 Pulse: 128 Heart Rate: 133 Resp: 24 SpO2: 98 % O2 Device: Nasal cannula with humidification Oxygen Delivery: 5 LPM    Lab:  Lactic Acid   Date Value Ref Range Status   08/16/2020 2.0 0.7 - 2.0 mmol/L Final     Lactate for Sepsis Protocol   Date Value Ref Range Status   08/18/2020 2.3 (H) 0.7 - 2.0 mmol/L Final     Comment:     Significant result called to JOHN AIKEN BY FW [id number]       The patient is at baseline mental status.     The rest of their physical exam is significant for diminished in bilateral bases. Tachycardic.     Assessment & Plan   NO EVIDENCE OF SEPSIS at this time.  Vital sign, physical exam, and lab findings are likely due to fluid loss, deconditioning, pulmonary edema secondary to metastatic melanoma.    Disposition: The patient will remain on the current unit. We will continue to monitor this patient closely..  Sarah Cox MD    Sepsis Criteria   Sepsis: 2+ SIRS criteria due to infection  Severe Sepsis: Sepsis AND 1+ new sign of acute organ dysfunction (Note: lactate >2 is organ dysfunction)  Septic Shock: Sepsis AND hypotension despite volume resuscitation with 30 ml/kg crystalloid

## 2020-08-19 NOTE — PROGRESS NOTES
Patient underwent tunneled pleural catheter placement (left  chest) utilizing a 15.5 Fr. pleurX catheter.  LOT #[id number].   400 ml of blood-tinged  fluid drained and site was dressed with sterile dressing.   Fluid sent for cytology and cell count.    Detailed written orders and patient insurance information for drainage kits have been completed and faxed to [facility] at [phone], ATTN: [facility].  [email or web address] customer service can be reached at [phone].    Patient received a pleurX starter kit containing 4 drainage bottles and dressing packs.  This box is to be sent with patient upon hospital discharge.    Hospital staff may obtain pleurX drainage bottles with dressing packs from central supply for drainage while in-patient.        April Vance, RRT, RCP  Interventional Respiratory Care Specialist  Endoscopy Department  Interventional Pulmonology  Respiratory Care Services  [facility]   [phone] office  [phone] pager  [email or web address]

## 2020-08-19 NOTE — PLAN OF CARE
Edema 6B: Patient with noted edema at R UE as well, skin intact with firm 2+ pitting around elbow. Fit with size F comperm compression sleeve from MCPs to axilla. Recommend continued use of compression sleeve to L UE as well from MCPs to IV location. Compression sleeves can be worn 23/24 hours per day and removed daily for skin cares - see patient care order for details.

## 2020-08-19 NOTE — PROGRESS NOTES
[facility], [facility] Progress Note - Hospitalist Service, Gold 11       Date of Admission:  8/15/2020  Assessment & Plan     Dallin Stevens is a 35 year old male admitted on 8/15/2020. He has a past medical history for metastatic melanoma to lymph nodes, adrenal glands, lung, and brain, bilateral pleural effusions s/p bilateral pleurx catheters, chronic hypotension 2/2 adrenal mets, anemia, and h/o pericardial effusion c/b tamponade s/p pericardial window who presents with unresponsive spells, found to have new intracranial metastatic lesions.    Plans for today  -Pleurx Today  -Continued goals of care discussion  -No plans for labs tomorrow     Acute on Chronic Hypoxic Respiratory Failure  Acute on Chronic Pleural Effusion, L > R  - With resultant concern for possible seizure, uresponsive spells, hypoxia with any exertion  - Associated with Bilateral L > R effusions  - ECHO: Moderate L effusion  - Chronic: R pleurex: drained every other day, L Pleurex, removed about 2 weeks ago  - IP consulted, Left thora done yesterday but no L Pleurex placed due to stability. Plan for PleurX today  - Still needing 5L of O2 and will likely need O2 at home    Metastatic Melanoma  Increasing Brain Mets [place]  - MRI Brain: In this patient with a history of metastatic melanoma, findings are suggestive for progressive disease. Multiple metastatic lesions within both supra and infratentorial areas with associated hemorrhage. Most of the lesions increase in size when compared to 7/24/2020 brain MRI. There are also new lesions within the left posterior frontal lobe, right parietal lobe, and within the right insula.  - 8/4: CT with significant increase; progression  - 8/14: s/p Chemo and Neulasta  - Pt and wife will have ongoing discussions with his primary oncologist for next steps  - Onc and IP and Rad Onc and palliative following  - Remains full code    Hyponatremia (resolved)  -prev felt due to  AI, but Endo r/o AI and rec to stop Fludro and HC  - on IVF and monitoring    Lactic acidosis and Leukocytosis, due to significant tumor burden and hypoxia  - Not due to sepsis, abx stopped 8/16 as per pt and wife request     Diet: Regular Diet Adult .   DVT Prophylaxis: AC and VTE ppx is not appropriate due to Brain mets  Lu Catheter: not present  Code Status: Full Code           Disposition Plan   Expected discharge: 1-2 days, recommended to prior living arrangement once O2 use less than 2 liters/minute and Medically stable.  Entered: Lavon Bauer MD 08/19/2020, 1:06 PM       The patient's care was discussed with the Bedside Nurse, Patient, Patient's Family and Onc, IP Consultant.    Lavon Bauer MD  Hospitalist Service, [address], [place]  Pager: [phone]  Please see sticky note for cross cover information    ______________________________________________________________________    Interval History     No additional concerns today and ready to the have the pleurx placed.  Had an ok conversation with palliative team.  Ready to talk more with his primary oncologist.  Eating and stooling ok.    No concerns about pain.  Continues to want to be full code.    ROS  Patient denies any headache, sore throat  Patient denies any sleeping disturbance  Patient denies any nausea or vomiting or abdominal pain  Patient denies any fevers/chills  Patient denies any chest pain      Data reviewed today: I reviewed all medications, new labs and imaging results over the last 24 hours. I personally reviewed the chest x-ray image(s) showing Bibasilar effusions, large thoracic mass.    Physical Exam   Vital Signs: Temp: 98.6  F (37  C) Temp src: Oral BP: 109/78 Pulse: 134 RETIRE: Heart Rate: 130 Resp: (!) 32 SpO2: 98 % O2 Device: Nasal cannula with humidification Oxygen Delivery: 6 LPM  Weight: 139 lbs 1.6 oz    General: Alert awake and oriented, tachypnea, diaphretic, pale, tired and ill  appearing  Eyes: Normal pink mucosa with no + pallor, no scleral icterus.  Neck: R neck supraclavicular Mass, round, golf ball size  Heart:  Tachycardic, HR 120s, normal S1, normal S2, no murmurs rubs or gallops, PMI is nondisplaced   Peripherally there is no edema  Lungs: Tachypnea with Notable increased work of breathing, fair effort, lungs are c reduced breath sounds bilaterally   No wheezing or crackles   Breathing on NFNC to keep sats > 90%   R Pleurex dressing intact  Abdomen: Nontender, nondistended, normal active bowel sounds, no palpable masses  Extremities: Normal capillary refill, no edema, no clubbing, no cyanosis  Neuro: Alert and oriented to person place location and situation  Psych: No acute psychosis, good mood, good spirits      Data   Recent Labs   Lab 08/19/20  0517 08/17/20  0458 08/16/20  1227  08/15/20  2352   WBC 6.9 13.0*  --   --  14.8*   HGB 8.0* 7.9*  --   --  9.4*   MCV 86 86  --   --  85   * 174  --   --  263    131* 128*   < > 126*   POTASSIUM 3.8 3.5 3.6  --  4.4   CHLORIDE 105 101 99  --  96   CO2 23 20 18*  --  18*   BUN 8 7 9  --  12   CR 0.39* 0.45* 0.56*  --  0.56*   ANIONGAP 8 10 11  --  12   ABUNDIO 8.0* 7.9* 7.6*  --  7.6*   * 113* 106*  --  124*   ALBUMIN  --  2.0*  --   --  2.4*   PROTTOTAL  --  5.2*  --   --  5.8*   BILITOTAL  --  0.7  --   --  0.9   ALKPHOS  --  68  --   --  77   ALT  --  9  --   --  10   AST  --  33  --   --  57*   LIPASE  --   --   --   --  43*   TROPI  --   --   --   --  <0.015    < > = values in this interval not displayed.     No results found for this or any previous visit (from the past 24 hour(s)).  Medications     - MEDICATION INSTRUCTIONS -       - MEDICATION INSTRUCTIONS -         levETIRAcetam  1,000 mg Oral BID     loratadine  5 mg Oral BID     sodium chloride (PF)  3 mL Intracatheter Q8H     sodium chloride (PF)  3 mL Intracatheter Q8H     sodium chloride  2 g Oral TID

## 2020-08-19 NOTE — PLAN OF CARE
Discharge Planner PT   Patient plan for discharge: home  Current status: Pt seen for short session 2/2 transport to procedure. On 6LPM NC w/ sats ~94% with activity. Ambulates w/ 4WW for 40' w/ CGA. Pt needs CGA in static stance ~1min as well 2/2 proximal LE weakness.   Barriers to return to prior living situation: activity tolerance, mobility  Recommendations for discharge: anticipate home w/ 24/7 assist and  PT  Rationale for recommendations: Pt below baseline and deconditioned, pending medical status and therapy progress anticipate would be able to discharge home w/ assist from supportive wife.        Entered by: Zhanna Logan 08/19/2020 12:35 PM

## 2020-08-19 NOTE — PROGRESS NOTES
Care Coordinator Progress Note    Admission Date/Time:  8/15/2020  Attending MD:  Lavon Bauer MD    Data  Chart reviewed, discussed with interdisciplinary team.   Patient was admitted for:    Brain metastasis (H)  Seizures (H)  Malignant melanoma, unspecified site (H)  Generalized-onset seizures (H)  Encntr for obs for susp expsr to oth biolg agents ruled out  Metastatic malignant melanoma (H).    Concerns with insurance coverage for discharge needs: None identified  Current Living Situation: Patient lives with Wife  Support System: Involved and supportive  Services Involved: [facility] #[phone]  RN  Transportation at Discharge: Wife  Transportation to Medical Appointments: Wife  Barriers to Discharge: Medical clearance    Coordination of Care and Referrals: [facility] updated.       Assessment  Pt with history of metastatic melanoma admitted with unresponsive spells, found to have new intracranial metastatic lesions. Pt has bilateral pleural effusions s/p bilateral pleurx drains. Pt currently requires 5 liters of oxygen via nasal canula.  I have met with Pt and his Wife, Meg to introduce myself and care coordinator role. Prior to admission Pt was living with his Wife in [place]. Pt was ambulatory and did not require oxygen. Pt is very weak with poor endurance and requires contact guard assist. Pts Wife, Meg can provide 24hr assist. [facility] provides intermittent Nursing visits which includes draining the pleurx catheters.  I have added Nursing and PT to the discharge orders.  Pt will likely require oxygen post discharge which I can assist ordering once discharge date is known.  CC will follow.       Plan  Anticipated Discharge Date:  TBD  Anticipated Discharge Plan:  Discharge to home with Wife.  Intermittent Home Care visits provided by [facility].    Shelbi Haas RN   6B care coordinator #[phone]

## 2020-08-19 NOTE — PLAN OF CARE
"Neuro: A&Ox4. L side weaker than right.  Cardiac: ST.  VSS. /78 (BP Location: Right arm)   Pulse 126   Temp 97.4  F (36.3  C) (Axillary)   Resp 24   Ht 1.753 m (5' 9\")   Wt 63.1 kg (139 lb 1.6 oz)   SpO2 93%   BMI 20.54 kg/m             Respiratory: Sating >90% on 6L NC. Tachypneic.   GI/: Low UOP, dark hayes colored urine but poor oral intake. . No BM.   Diet/appetite: Poor appetite on regular diet; NPO since 0500 for procedure.   Activity:  Up with assist of 1.   Pain: No complaints of pain overnight.   Skin: No new deficits noted. L arm lymph wrap in place.   LDA's: PIV x1.      Plan: Continue with POC. Notify primary team with changes.  "

## 2020-08-19 NOTE — PLAN OF CARE
Neuro: A&Ox4. L side weaker than right. Neuros intact.   Cardiac: ST 130s. BPs stable.    Respiratory: Sating >90% on 6L NC. Tachypneic.   GI/: Low UOP, but poor oral intake. Per pt 1 unmeasured output this shift. No BM.   Diet/appetite: Poor appetite on regular diet.   Activity:  Up with assist of 1.   Pain: At acceptable level on current regimen.   Skin: No new deficits noted. L arm lymph wrap in place.   LDA's: PIV x1.     Plan: Continue with POC. Notify primary team with changes.

## 2020-08-19 NOTE — PROGRESS NOTES
Hematology / Oncology  Daily Progress Note   Date of Service: 08/19/2020  Patient: Dallin Stevens  MRN: [id number]  Admission Date: 8/15/2020  Hospital Day # 3  Cancer Diagnosis: Metastatic Melanoma   Primary Outpatient Oncologist: Dr Elfego uBrnett   Current Treatment Plan: Carbo/Taxol (D1C1 8/14/20)     Assessment & Plan:   Dallin Stevens is a 35 year old male with PMHx of Met Melanoma, now admitted after developing multiple episodes of seizure like activity. Hospital stay complicated by AHRF and malignant pleural effusions, s/p thora with 900 ml off 8/17.     # Widely Metastatic +BRAF Melanoma   # Pleural effusions   Please review initial oncology consult note by Dr Lorne Joshua from 8/16 for full oncologic history. In short, diagnosed as stage II disease 2012. On surveillance until 2017 when presented with met disease (R supraclav, mediastinal and hilar LN), Bx +BRAF mutated Met melanoma. Treatment history below.    - Pembrolizumab Feb-Nov 2017   - S/p craniotomy 4/2019 and tumor resection of met R parieto occipital mass (3.7 x 5 cm). Declined XRT   - S/p R frontal craniotomy and tumor resection by Dr Quintero 1/16/2020    - Stereotactic radiosurgery with Dr Arshad 1/31/2020    - Pembrolizumab 2/11 - 4/15 completed 4 cycles. Complicated by tamponade and recurrent pleural effusions. S/p pericardial window and bilat pleurx drains.    - Dabrafenib and Trametinib 4/29/20 with PD    - Carbo, Paclitaxel started 8/14/20 (did not receive Pembro, awaiting insurance approval)   During Carbo/Taxel 8/14 infusion concern for seizure like activity so was admitted to [facility]. Work up significant for new met intracranial disease and worsening L effusions. MRI brain 8/16 with Multiple metastatic lesions within both supra and infratentorial areas with associated hemorrhage. Most of the lesions increase in size when compared to 7/24/2020 brain MRI. There are also new lesions within the left posterior frontal lobe, right parietal lobe,  "and within the right insula. Started on Keppra 1g BID. Rad Onc consulted, patient declined WBXRT vs gamma knife as unclear benefit. S/p therapeutic thora 8/17 with IP, a total of 900 ml fluid was removed. Fluid was sent to micro and cytology for analysis (pending)   - Next oncology follow up with GUERRERO and labs 8/27     Recommendations:   - Agree with plan for L pleurx placement today, appreciate IP excellent care.   - Ongoing GOC discussions, discussed with Dallin and his wife that if planning to go forward with further treatment would recommend WBXRT as chemotherapy does not penetrate into the brain. Dallin wants to discuss further with his wife as to whether or not he would want WBXRT as has had significant side effects in the past. If does not wish to pursue would then strongly recommend hospice support.   - Agree with palliative care consult, appreciate assistance.   - Avoid AC given hemorrhagic brain mets, risks > benefits      Patient was seen and plan of care was discussed with attending physician Dr. Burnett.    Thank you for the opportunity to partake in this patients plan of care. Please do not hesitate to page with questions. We will continue to follow.     Mine Barnett PA-C   Hematology/Oncology   Pager: [phone]   ___________________________________________________________________    Subjective & Interval History:    Overnight stable respiratory status on 5-6L NC, Tachy 120-130ss. Breathing improved after thora yesterday, wanting to go hope. Expressed concern with safety from him to go home and encouraged to stay in hospital for closer monitoring and to discuss overall prognosis with primary oncologist tomorrow.     Physical Exam:    Blood pressure 97/75, pulse 126, temperature 97.7  F (36.5  C), temperature source Oral, resp. rate 24, height 1.753 m (5' 9\"), weight 63.1 kg (139 lb 1.6 oz), SpO2 98 %.    General: sitting up in bed, no acute distress   CV: Tachycardia   Resp: Increased work to breath, " on 5L NC  MSK: warm and well-perfused, normal tone  Skin: no rashes on limited exam, no jaundice, pallor   Neuro: Alert and interactive, moves all extremities equally     Labs & Studies: I personally reviewed the following studies:  ROUTINE LABS (Last four results):  CMP  Recent Labs   Lab 08/19/20  0517 08/17/20 0458 08/16/20  1227 08/16/20  0407 08/15/20  2352 08/14/20  1002    131* 128* 127* 126* 126*   POTASSIUM 3.8 3.5 3.6  --  4.4 4.1   CHLORIDE 105 101 99  --  96 96   CO2 23 20 18*  --  18* 22   ANIONGAP 8 10 11  --  12 9   * 113* 106*  --  124* 101*   BUN 8 7 9  --  12 6*   CR 0.39* 0.45* 0.56*  --  0.56* 0.65*   GFRESTIMATED >90 >90 >90  --  >90 >90   GFRESTBLACK >90 >90 >90  --  >90 >90   ABUNDIO 8.0* 7.9* 7.6*  --  7.6* 8.4*   MAG  --  1.9  --   --  1.9  --    PHOS  --  2.4*  --   --  2.2*  --    PROTTOTAL  --  5.2*  --   --  5.8* 6.3*   ALBUMIN  --  2.0*  --   --  2.4* 2.7*   BILITOTAL  --  0.7  --   --  0.9 0.9   ALKPHOS  --  68  --   --  77 85   AST  --  33  --   --  57* 24   ALT  --  9  --   --  10 11     CBC  Recent Labs   Lab 08/19/20  0517 08/17/20  0458 08/15/20  2352 08/14/20  1002   WBC 6.9 13.0* 14.8* 4.8   RBC 3.20* 3.08* 3.68* 3.90*   HGB 8.0* 7.9* 9.4* 9.9*   HCT 27.6* 26.5* 31.1* 32.3*   MCV 86 86 85 83   MCH 25.0* 25.6* 25.5* 25.4*   MCHC 29.0* 29.8* 30.2* 30.7*   RDW 17.6* 17.7* 17.5* 17.9*   * 174 263 325     INRNo lab results found in last 7 days.    Medications list for reference:  Current Facility-Administered Medications   Medication     acetaminophen (TYLENOL) tablet 650 mg     glucose gel 15-30 g    Or     dextrose 50 % injection 25-50 mL    Or     glucagon injection 1 mg     levETIRAcetam (KEPPRA) tablet 1,000 mg     lidocaine (LMX4) cream     lidocaine (LMX4) cream     lidocaine 1 % 0.1-1 mL     lidocaine 1 % 0.1-1 mL     loratadine (CLARITIN) syrup 5 mg     LORazepam (ATIVAN) tablet 0.5 mg     melatonin tablet 1 mg     naloxone (NARCAN) injection 0.1-0.4 mg      No lozenges or gum should be given while patient on BIPAP/AVAPS/AVAPS AE     Patient may continue current oral medications     sodium chloride (PF) 0.9% PF flush 3 mL     sodium chloride (PF) 0.9% PF flush 3 mL     sodium chloride (PF) 0.9% PF flush 3 mL     sodium chloride (PF) 0.9% PF flush 3 mL     sodium chloride tablet 2 g

## 2020-08-20 NOTE — PLAN OF CARE
DISCHARGE                         No discharge date for patient encounter.  ----------------------------------------------------------------------------  Discharged to: Home with home O2  Via: private transportation  Accompanied by: Family-mom  Discharge Instructions: diet, activity, medications, follow up appointments, when to call the MD, aftercare instructions.  Prescriptions: To be filled by discharge pharmacy; medication list reviewed & sent with pt  Follow Up Appointments: arranged; information given  Belongings: All sent with pt  IV: d/c'd  Telemetry: d/c'd  Pt exhibits understanding of above discharge instructions; all questions answered.    Discharge Paperwork: Signed, copied, and sent home with patient.

## 2020-08-20 NOTE — PLAN OF CARE
Neuro: A&Ox4. L side weaker than R. Flat.   Cardiac: ST 130s. BPs stable.   Respiratory: Sating >90% on 3L NC. LS clear/dim. Tachypneic. R pleur-x drained for 175mL.   GI/: Minimal UOP, 300mL this shift. No BM.   Diet/appetite: No appetite on regular diet, very poor oral intake.   Activity: Up with assist x1  Pain: At acceptable level on current regimen. Tylenol given x2. Claritin scheduled BID.   Skin: No new deficits noted. Mepi on coccyx and spine, redness blanchable.   LDA's: PIV x1.     Plan: Continue with POC. Notify primary team with changes.

## 2020-08-20 NOTE — DISCHARGE SUMMARY
[facility], [place]  Hospitalist Discharge Summary      Date of Admission:  8/15/2020  Date of Discharge:  No discharge date for patient encounter.  Discharging Provider: Lavon Bauer MD  Discharge Team: Hospitalist Service, Gold 11    Discharge Diagnoses   Widely Metastatic Melanoma  Recurrent Malignant Pleural effusions   Acute respiratory failure from pleural effusions  Seizures from brain tumor    Follow-ups Needed After Discharge     Unresulted Labs Ordered in the Past 30 Days of this Admission     Date and Time Order Name Status Description    8/19/2020 1206 Cytology non gyn In process     8/16/2020 0006 Blood culture Preliminary     8/16/2020 0006 Blood culture Preliminary         Discharge Disposition   Discharged to home  Condition at discharge: Stable    Hospital Course   Dallin Stevens is a 35 year old male admitted on 8/15/2020. He has a past medical history for metastatic melanoma to lymph nodes, adrenal glands, lung, and brain, bilateral pleural effusions s/p bilateral pleurx catheters, chronic hypotension 2/2 adrenal mets, anemia, and h/o pericardial effusion c/b tamponade s/p pericardial window who presents with unresponsive spells, found to have new intracranial metastatic lesions.     Acute on Chronic Hypoxic Respiratory Failure  Acute on Chronic Pleural Effusion  Patient was needing up to 10L O2 on admission. S/p therapeutic thora 8/17 with IP, a total of 900 ml fluid was removed. Fluid was sent to micro and cytology for analysis (pending). 626 WBCs, 94% neutrophils. Cultures pending, likely malignant per pulmonary. Left Pleurex had been removed 2 weeks ago and replaced again on 8/19.  -Discharge with 2L O2 with attestation for O2 needs at the bottom of note.     Metastatic Melanoma  Increasing Brain Mets Ledbetter  Seizures  - MRI Brain: In this patient with a history of metastatic melanoma, findings are suggestive for progressive disease. Multiple metastatic lesions  within both supra and infratentorial areas with associated hemorrhage. Most of the lesions increase in size when compared to 7/24/2020 brain MRI. There are also new lesions within the left posterior frontal lobe, right parietal lobe, and within the right insula.  - Onc, Rad Onc and Palliative with ongoing discussions with goals of care. Onc would recommend WBXRT as chemotherapy does not penetrate into the brain but patient very hesitant due to past side effects. If does not wish to pursue would then strongly recommend hospice support. His treatment options at this point are palliative.  - Remains full code  - Patient was started on keppra due to seizure activity and can follow up with neurology in 4-8 weeks.     Hyponatremia (resolved)  -prev felt due to AI, but Endo r/o AI and reccommended to stop Fludro and HC  -on IVF and monitoring    Consultations This Hospital Stay   PHARMACY TO DOSE VANCO  ONCOLOGY ADULT IP CONSULT  PHYSICAL THERAPY ADULT IP CONSULT  OCCUPATIONAL THERAPY ADULT IP CONSULT  ENDOCRINE NON-DIABETES ADULT IP CONSULT  RADIATION ONCOLOGY IP CONSULT  INTERVENTIONAL PULMONARY ADULT IP CONSULT  LYMPHEDEMA THERAPY IP CONSULT  MEDICATION HISTORY IP PHARMACY CONSULT  VASCULAR ACCESS CARE ADULT IP CONSULT  PALLIATIVE CARE ADULT IP CONSULT    Code Status   Full Code    Time Spent on this Encounter   I, Lavon Bauer MD, personally saw the patient today and spent greater than 30 minutes discharging this patient.       Lavon Bauer MD  [facility], [place]  ______________________________________________________________________    Physical Exam   Vital Signs: Temp: 97.8  F (36.6  C) Temp src: Oral BP: 96/75 Pulse: 130   Resp: 30 SpO2: 96 % O2 Device: Nasal cannula with humidification Oxygen Delivery: 1 LPM  Weight: 139 lbs 1.6 oz  General: Alert awake and oriented, tachypnea, diaphretic, pale, tired and ill appearing  Eyes: Normal pink mucosa with no + pallor, no scleral  icterus.  Neck: R neck supraclavicular Mass, round, golf ball size  Heart:  Tachycardic, normal S1, normal S2, no murmurs rubs or gallops, PMI is nondisplaced              Peripherally there is no edema  Lungs: Tachypnea with Notable increased work of breathing, fair effort, lungs are c reduced breath sounds bilaterally              No wheezing or crackles              Breathing on NFNC to keep sats > 90%              R Pleurex dressing intact  Abdomen: Nontender, nondistended, normal active bowel sounds, no palpable masses  Extremities: Normal capillary refill, no edema, no clubbing, no cyanosis  Neuro: Alert and oriented to person place location and situation  Psych: No acute psychosis, good mood, good spirits       Primary Care Physician   Physician No Ref-Primary    Discharge Orders      **CBC with platelets differential FUTURE 2mo    Last Lab Result: Hemoglobin (g/dL)       Date                     Value                 08/19/2020               8.0 (L)          ----------     **Comprehensive metabolic panel FUTURE 2mo     Home care nursing referral      Home Care PT Referral for Hospital Discharge      RADIATION THERAPY REFERRAL      Reason for your hospital stay    Chronic Pleural Effusion  Increasing Brain Mets Mallory     Activity    Your activity upon discharge: activity as tolerated     Full Code     Oxygen Adult/Peds    Oxygen Documentation:   I certify that this patient, Dallin Stevens has been under my care (or a nurse practitioner or physican's assistant working with me). This is the face-to-face encounter for oxygen medical necessity.      Patient is requiring 2 L of Oxygen with activity    Dallin Stevens is now in a chronic stable state and continues to require supplemental oxygen. Patient has continued oxygen desaturation due to Chronic Respiratory Failure with Hypoxia J93.11 and malignant pleural effusions.    Alternative treatment(s) tried or considered and deemed clinically infective for treatment of  Chronic Respiratory Failure with Hypoxia J93.11 include pulmonary toileting and pleural effusions from cancer.  If portability is ordered, is the patient mobile within the home? yes    **Patients who qualify for home O2 coverage under the CMS guidelines require ABG tests or O2 sat readings obtained closest to, but no earlier than 2 days prior to the discharge, as evidence of the need for home oxygen therapy. Testing must be performed while patient is in the chronic stable state. See notes for O2 sats.**     Diet    Follow this diet upon discharge: Orders Placed This Encounter      Regular Diet Adult       Significant Results and Procedures   Most Recent 3 CBC's:  Recent Labs   Lab Test 08/19/20  0517 08/17/20  0458 08/15/20  2352   WBC 6.9 13.0* 14.8*   HGB 8.0* 7.9* 9.4*   MCV 86 86 85   * 174 263     Most Recent 3 BMP's:  Recent Labs   Lab Test 08/19/20 0517 08/17/20 0458 08/16/20  1227    131* 128*   POTASSIUM 3.8 3.5 3.6   CHLORIDE 105 101 99   CO2 23 20 18*   BUN 8 7 9   CR 0.39* 0.45* 0.56*   ANIONGAP 8 10 11   ABUNDIO 8.0* 7.9* 7.6*   * 113* 106*     Most Recent 2 LFT's:  Recent Labs   Lab Test 08/17/20 0458 08/15/20  2352   AST 33 57*   ALT 9 10   ALKPHOS 68 77   BILITOTAL 0.7 0.9   ,   Results for orders placed or performed during the hospital encounter of 08/15/20   CT Head w/o Contrast    Narrative    EXAM: CT HEAD W/O CONTRAST  LOCATION: [facility]  DATE/TIME: 8/16/2020 12:15 AM    INDICATION: Altered level of consciousness (LOC), unexplained and metastatic melanoma.  COMPARISON: 07/14/2020.  TECHNIQUE: Routine without IV contrast. Multiplanar reformats. Dose reduction techniques were used.    FINDINGS:  INTRACRANIAL CONTENTS: No intracranial hemorrhage, extraaxial collection, or mass effect.  No CT evidence of acute infarct. Again visualized is multifocal intracranial metastatic melanoma. Lesions within the left parietal, bilateral frontal and right   temporal region are  again visualized with surrounding vasogenic edema. There are though new lesions visualized in the interval. There is new edema and possible punctate increased density within the davian. There is vasogenic edema and a hyperdense lesion   of approximately 6 mm x 5 mm involving the left cerebellar hemisphere. There is a new hyperdense metastatic lesion involving the mid right temporal region with surrounding vasogenic edema measuring 1.5 cm x 1.3 cm. There is a new tiny area of vasogenic   edema and increased density about 2 mm in the right frontal lobe. The vasogenic edema surrounding the previously seen medial right frontal lobe lesion has increased in the interval. There is no midline shift. Rest of the brain parenchyma has normal   gray-white matter differentiation. The ventricular system, basal cisterns and the cortical sulci are within normal limits for the patient's age.     VISUALIZED ORBITS/SINUSES/MASTOIDS: No intraorbital abnormality. No paranasal sinus mucosal disease. No middle ear or mastoid effusion.    BONES/SOFT TISSUES: Patient status post right frontal and right parietal craniotomy.      Impression    IMPRESSION:  1.  Known metastatic melanoma lesions which are hyperdense due to melanin with vasogenic edema involving both cerebral hemispheres again visualized. Vasogenic edema involving medial right frontal lobe relation has increased in the interval.    2.  New metastatic lesions with mild amount of surrounding vasogenic edema visualized within lateral right frontal lobe, mid right temporal lobe, davian and the left cerebellar hemisphere.    3.  No evidence of a midline shift, acute hemorrhage or focal area suggestive of acute infarct.    [Critical Result:  [Access Center: Aggressive metastatic melanoma to the brain.]    This report will be copied to the [facility] to ensure a provider acknowledges the finding. Access Center is available Monday through Friday 8am-3:30 pm.   ]    Finding was  identified on 8/16/2020 12:29 AM.     Dr. Lawson was contacted Dr. Pope on 8/16/2020 12:41 AM and verbalized understanding of the critical result.    XR Chest Port 1 View    Narrative    EXAM: XR CHEST PORT 1 VW  LOCATION: [facility]  DATE/TIME: 8/16/2020 12:12 AM    INDICATION: Cough  COMPARISON: CT 08/04/2020      Impression    IMPRESSION: Increasing bilateral pleural effusions with adjacent atelectasis and consolidation. New left lower lobe consolidation and likely collapse. Multiple masses difficult to directly compare. Remainder stable.   MR Brain w/o & w Contrast    Narrative     MR BRAIN W/O & W CONTRAST 8/16/2020 12:20 PM    Provided History: Altered level of consciousness (LOC), unexplained;  History of metastatic melanoma, with concern for disease progression  and new intracranial lesions.    Per EPIC: The patient has been treated with Pembrolizumab, as well as  targeted treatment, with Dabrafenib and Trametinib that was initiated  in late April 2020. In mid-May, he had mixed response, detected, and  with progressive disease seen on scans. Most recently had right  frontal craniotomy with resection of the metastatic melanoma with  postoperative stereotactic radiosurgery in late January 2020.    Comparison: Head CT from 8/16/2020 and Brain MRI from 7/24/2020.    Technique: Multiplanar T1-weighted, axial FLAIR, and susceptibility  images were obtained without intravenous contrast. Following  intravenous gadolinium-based contrast administration, axial  T2-weighted, diffusion, and T1-weighted images (in multiple planes)  were obtained.    Contrast: 5.8CC GADAVIST     Findings:  Multiple metastatic lesions within both supra and infratentorial areas  with associated susceptibility and surrounding T2 hyperintense edema.  Two lesions have decreased in size, otherwise most of the lesions are  increased in size. There are also new lesions. For example,    Largest supratentorial lesion within the  posterior horn of right  lateral ventricle is unchanged and compared to 7/24/2020, measures 2.3  x 2.2 cm.     Increased in size of the   -right anterior frontal lesion, measuring 1.8 x 1.7 cm, previously 0.8  x 0.5 cm.   -largest infratentorial lesion within the left cerebellar hemisphere,  measures 1.2 x 1.3 cm, previously 0.6 x 0.5 cm.  -lesion within the right posterior parietal lobe, measuring 0.9 x 0.9  cm, previously 0.3 cm.  -the right posterolateral temporal lobe lesion, measuring 1.8 x 1.5  cm, previously 0.9 x 0.8 cm.    -Decreasing size of the enhancing lesion within the right parasagittal  frontal area (series 12 image 21), measuring 0.6 x 0.9 cm, previously  1.1 x 1.0 cm and within the left parietal lobe (series 16 image 132),  measuring 0.8 x 0.7 cm, previously 1 x 0.9 cm.    New enhancing lesions within the left posterior frontal lobe (series  16 image 138) measures 0.3 cm, within the right parietal lobe (series  16 image 110) measuring 0.5 x 0.5 cm, within the right insula,  measuring 0.7 x 0.6 cm.    Postsurgical changes of right frontal and occipital craniectomy for  underlying metastases resections. No midline shift. The ventricles are  proportionate to the cerebral sulci. Normal major vascular  intracranial flow-voids. The orbits are grossly unremarkable. The  paranasal sinuses are clear. Trace right mastoid effusion.      Impression    Impression: In this patient with a history of metastatic melanoma,  findings are suggestive for progressive disease. Multiple metastatic  lesions within both supra and infratentorial areas with associated  hemorrhage. Most of the lesions increase in size when compared to  7/24/2020 brain MRI. There are also new lesions within the left  posterior frontal lobe, right parietal lobe, and within the right  insula.    I have personally reviewed the examination and initial interpretation  and I agree with the findings.    ANGELLA YEPEZ MD   XR Chest Port 1 View     Narrative    Exam: XR CHEST PORT 1 VW, 8/16/2020 2:11 PM    Indication: episode hypoxia, persistent tachycardia    Comparison: Chest x-ray 8/16/2020, CT chest, abdomen and pelvis  8/4/2020    Findings:   Portable upright AP radiograph the chest. Right basilar chest tube is  stable in position. No pneumothorax. Mediastinal and right upper lobe  masses, as seen on comparison CT on 8/4/2020. Small bilateral pleural  effusions and overlying perihilar and bibasilar opacities. The  visualized upper abdomen is unremarkable.      Impression    Impression:   1. Stable small bilateral pleural effusions, with associated  atelectasis versus consolidation.  2. Right chest tube is stable in position.  3. Mediastinal and right upper lobe masses, as seen on the comparison  CT on 8/4/2020.    I have personally reviewed the examination and initial interpretation  and I agree with the findings.    THERESA KONG MD   XR Chest Port 1 View    Narrative    Exam: XR CHEST PORT 1 VW, 8/17/2020 2:12 PM    Indication: L thoracentesis    Comparison: Radiograph 8/16/2020, CT 8/4/2020    Findings:   AP view of the chest. Unchanged right basilar chest drain.  Significantly improved left-sided pleural effusion and left basilar  atelectasis. On the right there is slightly increased pleural effusion  and basilar atelectasis. No pneumothorax. Unchanged mediastinal and  right upper lobe mass. The cardiac silhouette is partially obscured.  No acute findings in the upper abdomen. Surgical clips noted in the  epigastric region. No acute osseous pathology. Surgical clips in the  left axilla.      Impression    Impression:   1. Interval improvement in a left-sided pleural effusion and left  basilar atelectasis.  2. Right chest drain remains in place, however there is slightly  increased right pleural effusion and right basilar atelectasis.  3. Large unchanged mediastinal and right upper lobe mass.    I have personally reviewed the examination and  initial interpretation  and I agree with the findings.    POOL CALDWELL MD   XR Chest Port 1 View    Narrative    EXAM: XR CHEST PORT 1 VW  8/19/2020 12:36 PM     HISTORY:  post left side chest tube placement       COMPARISON:  Chest x-ray 8/17/2020    FINDINGS: Single view of the chest. Stable right basilar chest tube.  Interval placement of left basilar chest tube.    Trachea is midline. Obscured cardiac silhouette. Mediastinal and right  upper lobe mass significantly changed. Slightly decreased right and  increased left pleural effusions bilaterally. No pneumothorax.  Visualized upper abdomen is unremarkable.       Impression    IMPRESSION:   1. Interval placement of left basilar chest tube. No appreciable  pneumothorax.  2. Slightly decreased right and increased left bilateral pleural  effusions.  3. Unchanged large mediastinal and right upper lobe mass.    I have personally reviewed the examination and initial interpretation  and I agree with the findings.    THERESA KONG MD   XR Chest Port 1 View    Narrative    EXAMINATION:  XR CHEST PORT 1 VW 8/20/2020 4:27 AM.    COMPARISON: 8/19/2020    HISTORY:  increased drainage from left pleural catheter.    FINDINGS: AP semiupright radiograph of the chest. Unchanged position  of right basilar Pleurx catheter. Repositioning of left Pleurx  catheter, with tip now projecting over the left apex. Cardiac  silhouette is unchanged. Mild interval decrease in small right pleural  effusion. Stable small left pleural effusion. Lobulated right hilar  and right apical masses are unchanged. No acute osseous abnormality.      Impression    IMPRESSION:   1. No significant change in size of small left pleural effusion.  Interval repositioning of the left Pleurx catheter, with tip now  projecting over the left apex. This may account for increased drainage  from this catheter.  2. Unchanged right hilar and right apical lobulated masses compatible  with known metastatic  melanoma.    I have personally reviewed the examination and initial interpretation  and I agree with the findings.    MARY ORDAZ MD   Echo Limited    Narrative    [id number]  [id number]  [id number]  [id number]^TERRENCE^ANEUDY           [facility],[place]  Echocardiography Laboratory  [address] [place] [id number]     Name: JEANIE HERNANDEZ  MRN: [id number]  : 1985  Study Date: 2020 06:54 AM  Age: 35 yrs  Gender: Male  Patient Location: [facility]  Reason For Study: Pericardial Effusion  Ordering Physician: ANEUDY OCAMPO  Performed By: Jeanie Razo RDCS     BSA: 1.7 m2  Height: 69 in  Weight: 128 lb  HR: 129  BP: 108/86 mmHg  _____________________________________________________________________________  __        Procedure  Limited Portable Echo Adult.  _____________________________________________________________________________  __        Interpretation Summary  2 cm loculated lateral pericardial effusion.  Large left pleural effusion.  No Tamponade echo features apart from dilated IVC.  _____________________________________________________________________________  __        Left Ventricle  Global and regional left ventricular function is normal with an EF of 55-60%.     Right Ventricle  Right ventricular function, chamber size, wall motion, and thickness are  normal.     Vessels  Dilation of the inferior vena cava is present with abnormal respiratory  variation in diameter.     Pericardium  2 cm loculated lateral pericardial effusion.  Large left pleural effusion.  No Tamponade echo features apart from dilated IVC.     _____________________________________________________________________________  __                       Report approved by: Zora Flores 2020 08:09 AM                 _____________________________________________________________________________  __            Discharge Medications   Current Discharge Medication List      CONTINUE these medications which  have NOT CHANGED    Details   acetaminophen (TYLENOL) 325 MG tablet Take 2 tablets (650 mg) by mouth every 4 hours as needed for mild pain, fever or headaches (> 101 F)    Comments: Indication: Mild Pain; Fever; Headache  Associated Diagnoses: Malignant neoplasm of frontal lobe of brain (H)      ferrous sulfate (FEROSUL) 325 (65 Fe) MG tablet Take 1 tablet (325 mg) by mouth daily (with breakfast)  Qty: 30 tablet, Refills: 4    Associated Diagnoses: Metastatic malignant melanoma (H)      LORazepam (ATIVAN) 0.5 MG tablet Take 1 tablet (0.5 mg) by mouth every 6 hours as needed for anxiety  Qty: 30 tablet, Refills: 1    Associated Diagnoses: Anxiety      melatonin 1 MG TABS tablet Take 1 tablet (1 mg) by mouth nightly as needed for sleep  Qty: 30 tablet, Refills: 0    Associated Diagnoses: Adjustment insomnia      sodium chloride 1 GM tablet Take 2 tablets (2 g) by mouth 3 times daily  Qty: 180 tablet, Refills: 1    Associated Diagnoses: Hyponatremia      Vitamin D, Cholecalciferol, 25 MCG (1000 UT) CAPS Take 1 capsule by mouth daily       ondansetron (ZOFRAN) 8 MG tablet Take 1 tablet (8 mg) by mouth every 8 hours as needed (Nausea/Vomiting)  Qty: 10 tablet, Refills: 3    Associated Diagnoses: Malignant neoplasm metastatic to brain (H); Malignant melanoma of skin of trunk, except scrotum (H); Metastatic malignant melanoma (H)         STOP taking these medications       dabrafenib (TAFINLAR) 50 MG capsule Comments:   Reason for Stopping:         fludrocortisone (FLORINEF) 0.1 MG tablet Comments:   Reason for Stopping:         hydrocortisone (CORTEF) 10 MG tablet Comments:   Reason for Stopping:         prochlorperazine (COMPAZINE) 10 MG tablet Comments:   Reason for Stopping:         trametinib (MEKINIST) 0.5 MG tablet Comments:   Reason for Stopping:             Allergies   No Known Allergies     I certify that this patient, Dallin Stevens has been under my care (or a nurse practitioner or physican's assistant working  with me). This is the face-to-face encounter for oxygen medical necessity.      Dallin Stevens is now in a chronic stable state and continues to require supplemental oxygen. Patient has continued oxygen desaturation due to Chronic Respiratory Failure with Hypoxia J93.11  Pulmonary Neoplasm C34.90.    Alternative treatment(s) tried or considered and deemed clinically infective for treatment of Chronic Respiratory Failure with Hypoxia J93.11  Pulmonary Neoplasm C34.90 include pulmonary toileting.  If portability is ordered, is the patient mobile within the home? yes    **Patients who qualify for home O2 coverage under the CMS guidelines require ABG tests or O2 sat readings obtained closest to, but no earlier than 2 days prior to the discharge, as evidence of the need for home oxygen therapy. Testing must be performed while patient is in the chronic stable state. See notes for O2 sats.**

## 2020-08-20 NOTE — PROGRESS NOTES
[facility]  Palliative Care Daily Progress Note       Recommendations & Counseling       Met with Dallin and his mom Alba today. Discussed patient's guarded prognosis; Discussed that if patient decides not to pursue radiation therapy, that his cancer could likely progress quickly.     Dallin's goals remain restorative; He hopes to start alternative treatment options soon. He is not interested in discussing hospice at this time.     Recommend follow-up in palliative care clinic for symptom management as well as patient, family, & decisional support    Dr. Clayton Rojas   Palliative Care Fellow  Staffed with Dr. Barry      Patient seen and evaluated with Dr. Rojas.   Agree with assessment and recommendations.    Total time spent was 30 minutes,  >50% of time was spent counseling and/or coordination of care regarding patient support and goals of care for pt with metastatic melanoma.    Alma Barry  Palliative Care   Pager [phone]        Assessments          Dallin Stevens is a 35 year old male with medical history notable for metastatic melanoma with metastases to lymph nodes, adrenal glands, lung, and brain with bilateral malignant pleural effusions s/p pleurex catheter placement, complicated by a pericardial effusion with tamponade s/p pericardial window who was admitted on 8/15/2020 for unresponsive spells with new intracranial metastatic lesions. Palliative care was consulted for goals of care.      Today, the patient was seen for:  -Metastatic Melanoma  -Bilateral Pleural effusions   -Acute on chronic respiratory failure  -Goals of Care    Prognosis, Goals, or Advance Care Planning was addressed today with: Yes.  Mood, coping, and/or meaning in the context of serious illness were addressed today: No.  Summary/Comments: Patient's goals remain restorative. He is hoping to seek alternative treatment options. He is not interested in discussing hospice at this time.   "           Interval History:     Per patient or family/caregivers today:  Dallin notes no acute symptomatic concerns today. He met with his primary oncologist yesterday and discussed treatment options. It was recommended that he consider whole brain radiation. He is hesitant to do this as he previously had significant fatigue and side effects from radiation therapy. He shares that he and his wife are also looking into natural treatment options that they will likely start soon. He is not interested in discussing hospice at this time.     Key Palliative Symptoms:  We are not helping to manage these symptoms currently in this patient.             Review of Systems:     Besides above, ROS was reviewed and is unremarkable          Medications:     I have reviewed this patient's medication profile and medications during this hospitalization.    Noted meds:   Keppra 1000 mg BID  Claritin 5 mg BID    Acetaminophen 650 mg q4 hours PRN mild pain  Lorazepam 0.5 mg q4 hours PRN anxiety, nausea, vomiting  Melatonin 1 mg at bedtime PRN sleep  Narcan PRN opioid reversal           Physical Exam:   BP 96/75   Pulse 130   Temp 97.8  F (36.6  C) (Oral)   Resp 30   Ht 1.753 m (5' 9\")   Wt 63.1 kg (139 lb 1.6 oz)   SpO2 96%   BMI 20.54 kg/m      Alert, thin male, comfortable appearing, NAD.  Head NCAT.  Eyes anicteric without injection  Face symmetric, eyes conjugate  Mouth pink, moist  Neck right supraclavicular mass  Lungs nasal cannula with 6L of supplemental oxygen, O2 saturations at 95%, no significant increased work of breathing  CV tachycardic  Bilateral upper extremity edema  Skin warm, dry, without lesions  Neuro Face symmetric  Neuropsych exam normal including affect, sensorium, gross memory, thought processes, and fund of knowledge.            Data Reviewed:     Reviewed recent pertinent imaging, comments:   CXR 8/20/20: no significant change in size of small left pleural effusion. Repositioning of pleural catheter with " left pleurx catheter with tip projecting over the left apex. Unchanged right hilar and right apical lobulated masses - metastatic melanoma    Reviewed recent labs, comments:   Protein 5.2

## 2020-08-20 NOTE — PLAN OF CARE
Discharge Planner OT   Patient plan for discharge: home w/ A from wife and home therapy  Current status:  Pt completed UB washing/rinsing/drying w/ min A seated and LB washing/rinsing/drying w/ max A seated and standing after education and training on proper body mechanics to increase ind. OT educated pt on AE/DME to increase ind. Pt complete UB dressing pre-post washing w/ min A after set up. OT educated pt on proper  for LB dressing, pt max  A for LB dressing doffing/donning shorts, VSS  Barriers to return to prior living situation: medical status  Recommendations for discharge: home w/ home OT  Rationale for recommendations: to increase ind in ADLS       Entered by: Sabrina Figueroa 08/20/2020 1:32 PM

## 2020-08-20 NOTE — PLAN OF CARE
PT: Attempted to see pt for therapy session, however pt declining. Short discussion on mobility recs for home and equipment needs. Pt reporting wishing to receive FWW writer and understanding of education from writer on process of receiving one through facility. FWW issued.     Physical Therapy Discharge Summary    Reason for therapy discharge:    Discharged to home with home therapy.    Progress towards therapy goal(s). See goals on Care Plan in [facility] electronic health record for goal details.  Goals partially met.  Barriers to achieving goals:   discharge from facility.    Therapy recommendation(s):    Continued therapy is recommended.  Rationale/Recommendations:   PT to progress functional mobility and tolerance in home setting. Recommend use of FWW and w/c for community transportation and assist from family as needed with mobility.

## 2020-08-20 NOTE — PROGRESS NOTES
08/20/20 1100   Quick Adds   Type of Visit Initial Occupational Therapy Evaluation   Living Environment   Lives With child(janell), adult;spouse   Living Arrangements house   Home Accessibility stairs to enter home;stairs within home   Number of Stairs, Main Entrance 2   Number of Stairs, Within Home, Primary 10   Transportation Anticipated family or friend will provide   Living Environment Comment Pt lives with wife and 2 children (ages 4 and 6) in 2 story home with basement, bedroom is located on upper level, walk-in shower with shower chair. SO works from home so she is available to A PRN.    Functional Level   Ambulation 0-->independent   Transferring 0-->independent   Toileting 0-->independent   Bathing 1-->assistive equipment   Dressing 0-->independent   Eating 0-->independent   Communication 0-->understands/communicates without difficulty   Swallowing 0-->swallows foods/liquids without difficulty   Cognition 0 - no cognition issues reported   Fall history within last six months no   General Information   Onset of Illness/Injury or Date of Surgery - Date 08/15/20   Referring Physician Mine Barnett, RAFFAELE    Additional Occupational Profile Info/Pertinent History of Current Problem Dallin Stevens is a 35 year old male with PMHx of Met Melanoma, now admitted after developing multiple episodes of seizure like activity. Hospital stay complicated by AHRF and malignant pleural effusions, s/p thora with 900 ml off 8/17 and L pleurx placement 8/19.    Precautions/Limitations fall precautions   Cognitive Status Examination   Cognitive Comment OT: no concerns OT will reassess prn   Visual Perception   Visual Perception Wears glasses   Range of Motion (ROM)   ROM Comment OT: BUE WFL   Strength   Strength Comments OT: BUE overall deconditioned   Muscle Tone Assessment   Muscle Tone Comments OT: BUE overall deconditioned   Mobility   Bed Mobility Comments OT: min A   Transfer Skills   Transfer Comments OT: min A  "  Balance   Balance Comments OT: min A-SBA   Lower Body Dressing   Level of [place]: Dress Lower Body maximum assist (25% patients effort)   Instrumental Activities of Daily Living (IADL)   IADL Comments Pt's spouse can A prn   Activities of Daily Living Analysis   Impairments Contributing to Impaired Activities of Daily Living balance impaired;strength decreased;pain   Clinical Impression   Criteria for Skilled Therapeutic Interventions Met yes, treatment indicated   OT Diagnosis decreased ind in ADLS/IADLS   Influenced by the following impairments generalized weakness    Assessment of Occupational Performance 1-3 Performance Deficits   Identified Performance Deficits decreased ind in ADLS   Clinical Decision Making (Complexity) Low complexity   Therapy Frequency Daily   Predicted Duration of Therapy Intervention (days/wks) 8/22/20   Anticipated Discharge Disposition Home with Home Therapy   Risks and Benefits of Treatment have been explained. Yes   Patient, Family & other staff in agreement with plan of care Yes   [facility] TM \"6 Clicks\"   2016, Trustees of [facility], under license to [employer].  All rights reserved.   6 Clicks Short Forms Daily Activity Inpatient Short Form   [facility]  \"6 Clicks\" Daily Activity Inpatient Short Form   1. Putting on and taking off regular lower body clothing? 2 - A Lot   2. Bathing (including washing, rinsing, drying)? 2 - A Lot   3. Toileting, which includes using toilet, bedpan or urinal? 2 - A Lot   4. Putting on and taking off regular upper body clothing? 2 - A Lot   5. Taking care of personal grooming such as brushing teeth? 3 - A Little   6. Eating meals? 4 - None   Daily Activity Raw Score (Score out of 24.Lower scores equate to lower levels of function) 15   Total Evaluation Time   Total Evaluation Time (Minutes) 5     "

## 2020-08-20 NOTE — PROGRESS NOTES
Received oxygen in take at 1:40pm. Reviewed patients chart, all documentation is in the chart that is needed to bill the equipment.    1:49pm- Spoke with patient, offered choice; discussed equipment. I provided patient with a eta of 2 hours or less for delivery.    2:20pm- Left voicemail for ADRIANA Lake; letting her know we would be delivering oxygen within the next hour or so as my  is on the way.

## 2020-08-20 NOTE — DISCHARGE INSTRUCTIONS
Oxygen Provider:  Arranged through [employer], contact number [phone].  If you have any questions or concerns please call the oxygen company directly.

## 2020-08-20 NOTE — PLAN OF CARE
"Neuro: A&Ox4. afebrile  Cardiac: ST. VSS. /84 (BP Location: Right arm)   Pulse 127   Temp 97.4  F (36.3  C) (Axillary)   Resp 24   Ht 1.753 m (5' 9\")   Wt 63.1 kg (139 lb 1.6 oz)   SpO2 96%   BMI 20.54 kg/m     Respiratory: Sating 94-99% on 3 L ;  weaning was not attempted due to additional pain at the left pleurex site. Foam dressing appears CDI.  Gauze dressing is saturated.  Overnight CC notified.  GI/: Adequate urine output. No BM overnight  Diet/appetite: Tolerating  regular diet. Poor appetite  Activity:  Assist of 1 up to chair and in halls.  Pain: At acceptable level on current regimen; pt had additional pain overnight from the left side Pleurex site.  PRN Tylenol, ice packs and repositioning were not effective; Overnight CC was notified and a one time dose of oxycodone was given.  Skin: No new deficits noted.  LDA's: L PIV saline locked; Right and Left Pleurex drains currently clamped        Plan: Continue with POC. Notify primary team with changes.   "

## 2020-08-20 NOTE — PROGRESS NOTES
Care Coordinator - Discharge Planning    Admission Date/Time:  8/15/2020  Attending MD:  Lavon Bauer MD     Data  Date of initial CC assessment:  8/19/20  Chart reviewed, discussed with interdisciplinary team.   Patient was admitted for:   1. Metastatic malignant melanoma (H)    2. Brain metastasis (H)    3. Seizures (H)    4. Malignant melanoma, unspecified site (H)    5. Generalized-onset seizures (H)    6. Encntr for obs for susp expsr to oth biolg agents ruled out    7. Pleural effusion          Coordination of Care and Referrals: [facility] updated.  [facility] #[phone]     Oxygen ordered and delivered to [facility] hospital room for discharge.        Plan  Anticipated Discharge Date: 8/20/20  Anticipated Discharge Plan:  Discharge to home with resumption of [facility].  Oxygen provided by [facility]    CTS Handoff completed:  Yes    Shelbi Haas RN   6B care coordinator #[phone]

## 2020-08-20 NOTE — PROGRESS NOTES
Patient has been assessed for Home Oxygen needs. Oxygen readings:    *Pulse oximetry (SpO2) = 93% on room air at rest while awake.    *SpO2 improved to 98% on 2liters/minute at rest.    *SpO2 = 87% on room air during activity/with exercise.    *SpO2 improved to 94% on 2liters/minute during activity/with exercise.

## 2020-08-20 NOTE — PROVIDER NOTIFICATION
[id number]-Echo.Jeanmarie(Gold 11).Pt is having pain from new left Pleurex site.  Ice packs and Tylenol ineffective in reducing pain.  Gauze dressing saturated; foam CDI. Pt concerned.     0430--New orders received.

## 2020-08-21 NOTE — PLAN OF CARE
Occupational Therapy Discharge Summary    Reason for therapy discharge:    Discharged to home with home therapy.    Progress towards therapy goal(s). See goals on Care Plan in [facility] electronic health record for goal details.  Goals partially met.  Barriers to achieving goals:   discharge from facility.    Therapy recommendation(s):    Continued therapy is recommended.  Rationale/Recommendations:   OT/PT recommended to progress safety and IND with ADL/IADL and functional mobility.

## 2020-08-21 NOTE — PROGRESS NOTES
[facility]: Post-Discharge Note  SITUATION                                                      Admission:    Admission Date: 08/15/20   Reason for Admission: Widely Metastatic Melanoma  Discharge:   Discharge Date: 08/20/20  Discharge Diagnosis: Widely Metastatic Melanoma    BACKGROUND                                                      Dallin Stevens is a 35 year old male admitted on 8/15/2020. He has a past medical history for metastatic melanoma to lymph nodes, adrenal glands, lung, and brain, bilateral pleural effusions s/p bilateral pleurx catheters, chronic hypotension 2/2 adrenal mets, anemia, and h/o pericardial effusion c/b tamponade s/p pericardial window who presents with unresponsive spells, found to have new intracranial metastatic lesions.    ASSESSMENT      Discharge Assessment  Patient reports symptoms are: Unchanged  Does the patient have all of their medications?: Yes  Does patient know what their new medications are for?: Yes  Does patient have a follow-up appointment scheduled?: Yes    Post-op  Did the patient have surgery or a procedure: No  Fever: No  Chills: No  Eating & Drinking: unable to tolerate solid foods  PO Intake: soft foods  Bowel Function: normal  Urinary Status: voiding without complaint/concerns        PLAN                                                      Outpatient Plan:      Future Appointments   Date Time Provider Department Center   8/25/2020  1:30 PM Jose Carlos Arshad MD [facility]   8/25/2020  2:00 PM Jose Carlos Arshad MD [facility]   8/27/2020  7:15 AM  [facility]ASONIC LAB DRAW [facility]   8/27/2020  7:50 AM Winsome Simms APRN CNP [facility]   8/27/2020  9:30 AM  ONCOLOGY INFUSION [facility]   9/3/2020  7:30 AM  [facility]ASONIC LAB DRAW [facility]   9/3/2020  8:15 AM Benita Freitas MD [facility]   9/3/2020  9:30 AM  ONCOLOGY INFUSION [facility]   9/8/2020  2:10 PM Russel Chavez MD [facility]    9/30/2020 12:00 PM [id number]   9/30/2020  1:00 PM Jose Carlos Arshad MD [facility]           Breanna Cerna, CMA

## 2020-08-28 NOTE — PROGRESS NOTES
[facility]  MEDICAL ONCOLOGY PROGRESS NOTE  Aug 28, 2020    CHIEF COMPLAINT: Metastatic BRAF-mutated melanoma    Melanoma History  1.  2/03/2012, biopsy of left flank skin lesion revealed 1.7 mm superficial spreading melanoma without ulceration.  2. 2/23/2012, he had left flank wide local excision and left axillary sentinel node biopsy. One of two lymph node positive. He had stage III (pT2a pN1a M0) disease.  3. 3/02/2012, left axillary radical lymph node dissection was done. All 23 lymph nodes benign.  4. 4/02 to 4/27/2012, he received 4 weeks of high-dose interferon  5. 1/25/2017, CT scan revealed metastatic disease involving lymph nodes in the right supraclavicular, mediastinum and right hilum, and 3 small pulmonary nodules are present.  6. 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    7. 2/08/2017 to 11/08/2017, he received pembrolizumab  8. 6/28/2017, PET scan revealed significant improvement.  9. 11/29/2017, PET showed new focus of mild FDG uptake within a 6 mm right paratracheal lymph node.  10. 10/02/2018, PET scan revealed new hypermetabolic lymphadenopathy in the supraclavicular region of the left neck and in the mediastinum.   11. 4/24/2019, MRI brain revealed intraparenchymal mass within the right parietal occipital lobe. 12. 4/26/2019, he had craniotomy and resection and pathology confirmed metastatic melanoma. No post-op radiation was delivered.  13. 7/23/2019, PET scan showed progression of disease. New hypermetabolic lymphadenopathy seen in the right supraclavicular fossa and anterior mediastinum.  14. 11/25/2019, MRI brain reveals metastasis in the left posterior frontal lobe, right peritrigonal region and right posterior frontal parafalcine lesion. PET scan showed progression with multiple new hypermetabolic subcutaneous nodules throughout  the body.  No change in size of mediastinal lymphadenopathy.   15. 1/06/2020, admitted in the [facility] system because of left-sided weakness. MRI Brain revealed a 3.3 cm hemorrhagic mass in the right frontal gyrus and other brain metastases.  16. 1/16/2020, he had right frontal craniotomy and resection. Pathology revealed metastatic melanoma.  17. 1/27/2020 to 1/31/2020, he received gamma knife radiation to 5 brain lesions, 2500 cGy in 5 fractions.  18.2/11/2020, he started treatment with pembrolizumab and received 4 cycles through 4/15/2020.  19. 4/18/2020, he presents with severe dyspnea, worsened mediastinal adenopathy, large pericardial effusion with tamponade, recurrent moderate pleural effusions. Bilateral pleurX catheters placed 4/20/20.  20. 4/29/2020, he starts treatment with Dabrafenib and Trametinib.  21. 5/22/2020, CT-CAP shows evidence of mixed initial response to BRAF inhibitor therapy, with a reduction in the size of the mediastinal metastases and adenopathy, small pulmonary and hepatic metastases, and decreasing size of subcutaneous lesions. Adrenal metastases and retroperitoneal metastases showed enlargement.       HISTORY OF PRESENT ILLNESS  Dallin Stevens is a 35 year old male with BRAF mutated metastatic melanoma. He presents today to review his situation.    He has elected against receiving whole brain radiation therapy. He is ready to pursue more comfort and palliative measures. He remains interested in chemotherapy, but acknowledges he is weak and unable to receive chemotherapy currently.    ECOG status is 3.    REVIEW OF SYSTEMS  A 12-point ROS negative except as in HPI    Current Outpatient Medications   Medication Sig Dispense Refill     acetaminophen (TYLENOL) 325 MG tablet Take 2 tablets (650 mg) by mouth every 4 hours as needed for mild pain, fever or headaches (> 101 F)       ferrous sulfate (FEROSUL) 325 (65 Fe) MG tablet Take 1 tablet (325 mg) by mouth daily (with breakfast) 30  tablet 4     levETIRAcetam (KEPPRA) 1000 MG tablet Take 1 tablet (1,000 mg) by mouth 2 times daily 60 tablet 1     LORazepam (ATIVAN) 0.5 MG tablet Take 1 tablet (0.5 mg) by mouth every 6 hours as needed for anxiety 30 tablet 1     melatonin 1 MG TABS tablet Take 1 tablet (1 mg) by mouth nightly as needed for sleep 30 tablet 0     ondansetron (ZOFRAN) 8 MG tablet Take 1 tablet (8 mg) by mouth every 8 hours as needed (Nausea/Vomiting) 10 tablet 3     sodium chloride 1 GM tablet Take 2 tablets (2 g) by mouth 3 times daily 180 tablet 1     Vitamin D, Cholecalciferol, 25 MCG (1000 UT) CAPS Take 1 capsule by mouth daily          No Known Allergies  Immunization History   Administered Date(s) Administered     Flu, Unspecified 11/15/2019     Influenza Quad, Recombinant, p-free (RIV4) 09/25/2019       Past Medical History:   Diagnosis Date     Malignant melanoma (H)        Past Surgical History:   Procedure Laterality Date     BIOPSY OF SKIN LESION       CRANIOTOMY, EXCISE TUMOR COMPLEX, COMBINED  04/2019     DENTAL SURGERY      wisdom teeth     INSERT CHEST TUBE Left 8/19/2020    Procedure: INSERTION, CATHETER, INTERCOSTAL, FOR DRAINAGE;  Surgeon: Ro Charles MD;  Location:  GI     IR CHEST TUBE PLACEMENT NON-TUNNELED BILATERAL  4/19/2020     LYMPH NODE BIOPSY      arm, excision     MRI CRANIOTOMY LASER ABLATION Right 2/27/2020    Procedure: INTRAOPERATIVE MRI/STEALTH ASSISTED RIGHT LASER ABLATION OF BRAIN METASTASIS;  Surgeon: Nitish Quintero MD;  [facility]     OPTICAL TRACKING SYSTEM CRANIOTOMY, EXCISE TUMOR, COMBINED Right 1/16/2020    Procedure: stealth assisted Right craniotomy for tumor resection;  Surgeon: Nitish Quintero MD;  [facility]     REMOVE CATHETER CHEST Left 7/30/2020    Procedure: REMOVAL, Left CATHETER, CHEST;  Surgeon: Russel Chavez MD;  Location:  GI     THORACENTESIS N/A 8/17/2020    Procedure: THORACENTESIS;  Surgeon: Casey Hammond MD;  Location:  GI      THORACOSCOPY Bilateral 4/20/2020    Procedure: BILATERAL INSERTION OF PLEURX CATHETER;  Surgeon: Uday Bhakta MD;  [facility]       SOCIAL HISTORY  History   Smoking Status     Never Smoker   Smokeless Tobacco     Never Used    Social History    Substance and Sexual Activity      Alcohol use: Not Currently     History   Drug Use Unknown       FAMILY HISTORY  Family History   Problem Relation Age of Onset     Anesthesia Reaction No family hx of      Cardiovascular No family hx of      Deep Vein Thrombosis No family hx of        PHYSICAL EXAMINATION  There were no vitals taken for this visit.  Wt Readings from Last 2 Encounters:   08/19/20 63.1 kg (139 lb 1.6 oz)   08/14/20 58.5 kg (129 lb)   General: Well appearing young man, seated next to wife in living room  Head: Normocephalic  Eyes: No icterus  ENT: Oropharynx clear, speaking clearly  Pulm: Good effort on room air, no apparent dyspnea, speaking in full sentences  Neuro: Cranial nerves are grossly intact, there may be mild facial asymmetry  Skin: No rashes on visible skin    The rest of a comprehensive physical examination is deferred due to PHE (public health emergency) video visit restrictions.    IMAGING  CT-CAP, 8/4/2020  IMPRESSION: In this patient with history of metastatic melanoma, the  current scan shows progressive disease:  1. Significant increase in size of mediastinal confluent basim mass,  encasing the SVC, right main bronchus, right pulmonary artery, right  brachiocephalic vein, right subclavian vessels, with mass effect on  the trachea.  2. Significant increase in the size of AP window basim mass with  pericardial invasion and possible extension left atrial extension.  Moderate pericardial effusion.  3. New lobulated anterior mediastinal basim mass.  4. Significant increase in size of bilateral adrenal metastasis.  5. Medial intraperitoneal heterogenous mass inferior to the spleen and  medial heterogenous mass in the right  "lower quadrant with  bowel  encasement. No bowel obstruction.  6. New peripheral left upper lobe nodule.  7. Increase in size of subcutaneous nodules including subcutaneous  nodule in the right upper quadrant right chest wall.  8. Moderate right pleural effusion. Right chest tube in dependent  medial right costophrenic sulcus.  9. Mild splenomegaly.  10. Nonocclusive thrombus in the right internal jugular vein.    ASSESSMENT AND PLAN  #1 Metastatic BRAF-V660E mutated melanoma, to lymph nodes, adrenal glands,  lung, brain  #2 Bilateral pleural effusions, s/p bilateral PleurX catheters, improved  Patient has elected to pursue comfort and palliative measures only at this time. I agree with him and his wife. I will place the hospice consult.    Benita Pillai M.D.   of Medicine  Hematology, Oncology and Transplantation          Dallin Stevens is a 35 year old male who is being evaluated via a billable video visit.      The patient has been notified of following:     \"This video visit will be conducted via a call between you and your physician/provider. We have found that certain health care needs can be provided without the need for an in-person physical exam.  This service lets us provide the care you need with a video conversation.  If a prescription is necessary we can send it directly to your pharmacy.  If lab work is needed we can place an order for that and you can then stop by our lab to have the test done at a later time.    Video visits are billed at different rates depending on your insurance coverage.  Please reach out to your insurance provider with any questions.    If during the course of the call the physician/provider feels a video visit is not appropriate, you will not be charged for this service.\"    Patient has given verbal consent for Video visit? Yes    How would you like to obtain your AVS? MyChart     If you are dropped from the video visit, the video invite should be " "resent to: Send to e-mail at: [email or web address]     Will anyone else be joining your video visit? No          I have reviewed and updated the patient's allergies and medication list. Patient was asked to provide any patient recorded vital signs, height and/or weight.  Please see \"Patient Reported Vital Signs\" tab for that information.        Concerns: Patient has no new concerns.      Refills: None       LORENZO Crenshaw        Video-Visit Details    Type of service:  Video Visit    Video Start Time: 2:00 PM  Video End Time: 2:30 PM    Originating Location (pt. Location): Home    Distant Location (provider location):  [facility]     Platform used for Video Visit: Karmen          "

## 2020-08-28 NOTE — LETTER
8/28/2020         RE: Dallin Stevens  [address] [address]        Dear Colleague,    Thank you for referring your patient, Dallin Setvens, to the [facility]. Please see a copy of my visit note below.    [facility]  MEDICAL ONCOLOGY PROGRESS NOTE  Aug 28, 2020    CHIEF COMPLAINT: Metastatic BRAF-mutated melanoma    Melanoma History  1.  2/03/2012, biopsy of left flank skin lesion revealed 1.7 mm superficial spreading melanoma without ulceration.  2. 2/23/2012, he had left flank wide local excision and left axillary sentinel node biopsy. One of two lymph node positive. He had stage III (pT2a pN1a M0) disease.  3. 3/02/2012, left axillary radical lymph node dissection was done. All 23 lymph nodes benign.  4. 4/02 to 4/27/2012, he received 4 weeks of high-dose interferon  5. 1/25/2017, CT scan revealed metastatic disease involving lymph nodes in the right supraclavicular, mediastinum and right hilum, and 3 small pulmonary nodules are present.  6. 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    7. 2/08/2017 to 11/08/2017, he received pembrolizumab  8. 6/28/2017, PET scan revealed significant improvement.  9. 11/29/2017, PET showed new focus of mild FDG uptake within a 6 mm right paratracheal lymph node.  10. 10/02/2018, PET scan revealed new hypermetabolic lymphadenopathy in the supraclavicular region of the left neck and in the mediastinum.   11. 4/24/2019, MRI brain revealed intraparenchymal mass within the right parietal occipital lobe. 12. 4/26/2019, he had craniotomy and resection and pathology confirmed metastatic melanoma. No post-op radiation was delivered.  13. 7/23/2019, PET scan showed progression of disease. New hypermetabolic lymphadenopathy seen in the right supraclavicular fossa and anterior  mediastinum.  14. 11/25/2019, MRI brain reveals metastasis in the left posterior frontal lobe, right peritrigonal region and right posterior frontal parafalcine lesion. PET scan showed progression with multiple new hypermetabolic subcutaneous nodules throughout the body.  No change in size of mediastinal lymphadenopathy.   15. 1/06/2020, admitted in the [facility] because of left-sided weakness. MRI Brain revealed a 3.3 cm hemorrhagic mass in the right frontal gyrus and other brain metastases.  16. 1/16/2020, he had right frontal craniotomy and resection. Pathology revealed metastatic melanoma.  17. 1/27/2020 to 1/31/2020, he received gamma knife radiation to 5 brain lesions, 2500 cGy in 5 fractions.  18.2/11/2020, he started treatment with pembrolizumab and received 4 cycles through 4/15/2020.  19. 4/18/2020, he presents with severe dyspnea, worsened mediastinal adenopathy, large pericardial effusion with tamponade, recurrent moderate pleural effusions. Bilateral pleurX catheters placed 4/20/20.  20. 4/29/2020, he starts treatment with Dabrafenib and Trametinib.  21. 5/22/2020, CT-CAP shows evidence of mixed initial response to BRAF inhibitor therapy, with a reduction in the size of the mediastinal metastases and adenopathy, small pulmonary and hepatic metastases, and decreasing size of subcutaneous lesions. Adrenal metastases and retroperitoneal metastases showed enlargement.       HISTORY OF PRESENT ILLNESS  Dallin Stevens is a 35 year old male with BRAF mutated metastatic melanoma. He presents today to review his situation.    He has elected against receiving whole brain radiation therapy. He is ready to pursue more comfort and palliative measures. He remains interested in chemotherapy, but acknowledges he is weak and unable to receive chemotherapy currently.    ECOG status is 3.    REVIEW OF SYSTEMS  A 12-point ROS negative except as in HPI    Current Outpatient Medications   Medication Sig Dispense  Refill     acetaminophen (TYLENOL) 325 MG tablet Take 2 tablets (650 mg) by mouth every 4 hours as needed for mild pain, fever or headaches (> 101 F)       ferrous sulfate (FEROSUL) 325 (65 Fe) MG tablet Take 1 tablet (325 mg) by mouth daily (with breakfast) 30 tablet 4     levETIRAcetam (KEPPRA) 1000 MG tablet Take 1 tablet (1,000 mg) by mouth 2 times daily 60 tablet 1     LORazepam (ATIVAN) 0.5 MG tablet Take 1 tablet (0.5 mg) by mouth every 6 hours as needed for anxiety 30 tablet 1     melatonin 1 MG TABS tablet Take 1 tablet (1 mg) by mouth nightly as needed for sleep 30 tablet 0     ondansetron (ZOFRAN) 8 MG tablet Take 1 tablet (8 mg) by mouth every 8 hours as needed (Nausea/Vomiting) 10 tablet 3     sodium chloride 1 GM tablet Take 2 tablets (2 g) by mouth 3 times daily 180 tablet 1     Vitamin D, Cholecalciferol, 25 MCG (1000 UT) CAPS Take 1 capsule by mouth daily          No Known Allergies  Immunization History   Administered Date(s) Administered     Flu, Unspecified 11/15/2019     Influenza Quad, Recombinant, p-free (RIV4) 09/25/2019       Past Medical History:   Diagnosis Date     Malignant melanoma (H)        Past Surgical History:   Procedure Laterality Date     BIOPSY OF SKIN LESION       CRANIOTOMY, EXCISE TUMOR COMPLEX, COMBINED  04/2019     DENTAL SURGERY      wisdom teeth     INSERT CHEST TUBE Left 8/19/2020    Procedure: INSERTION, CATHETER, INTERCOSTAL, FOR DRAINAGE;  Surgeon: Ro Charles MD;  [facility]     IR CHEST TUBE PLACEMENT NON-TUNNELED BILATERAL  4/19/2020     LYMPH NODE BIOPSY      arm, excision     MRI CRANIOTOMY LASER ABLATION Right 2/27/2020    Procedure: INTRAOPERATIVE MRI/STEALTH ASSISTED RIGHT LASER ABLATION OF BRAIN METASTASIS;  Surgeon: Nitish Quintero MD;  Location:  OR     OPTICAL TRACKING SYSTEM CRANIOTOMY, EXCISE TUMOR, COMBINED Right 1/16/2020    Procedure: stealth assisted Right craniotomy for tumor resection;  Surgeon: Nitish Quintero MD;   [facility]     REMOVE CATHETER CHEST Left 7/30/2020    Procedure: REMOVAL, Left CATHETER, CHEST;  Surgeon: Russel Chavez MD;  [facility]     THORACENTESIS N/A 8/17/2020    Procedure: THORACENTESIS;  Surgeon: Casey Hammond MD;  [facility]     THORACOSCOPY Bilateral 4/20/2020    Procedure: BILATERAL INSERTION OF PLEURX CATHETER;  Surgeon: Uday Bhakta MD;  [facility]       SOCIAL HISTORY  History   Smoking Status     Never Smoker   Smokeless Tobacco     Never Used    Social History    Substance and Sexual Activity      Alcohol use: Not Currently     History   Drug Use Unknown       FAMILY HISTORY  Family History   Problem Relation Age of Onset     Anesthesia Reaction No family hx of      Cardiovascular No family hx of      Deep Vein Thrombosis No family hx of        PHYSICAL EXAMINATION  There were no vitals taken for this visit.  Wt Readings from Last 2 Encounters:   08/19/20 63.1 kg (139 lb 1.6 oz)   08/14/20 58.5 kg (129 lb)   General: Well appearing young man, seated next to wife in living room  Head: Normocephalic  Eyes: No icterus  ENT: Oropharynx clear, speaking clearly  Pulm: Good effort on room air, no apparent dyspnea, speaking in full sentences  Neuro: Cranial nerves are grossly intact, there may be mild facial asymmetry  Skin: No rashes on visible skin    The rest of a comprehensive physical examination is deferred due to PHE (public health emergency) video visit restrictions.    IMAGING  CT-CAP, 8/4/2020  IMPRESSION: In this patient with history of metastatic melanoma, the  current scan shows progressive disease:  1. Significant increase in size of mediastinal confluent basim mass,  encasing the SVC, right main bronchus, right pulmonary artery, right  brachiocephalic vein, right subclavian vessels, with mass effect on  the trachea.  2. Significant increase in the size of AP window basim mass with  pericardial invasion and possible extension left atrial  "extension.  Moderate pericardial effusion.  3. New lobulated anterior mediastinal basim mass.  4. Significant increase in size of bilateral adrenal metastasis.  5. Medial intraperitoneal heterogenous mass inferior to the spleen and  medial heterogenous mass in the right lower quadrant with  bowel  encasement. No bowel obstruction.  6. New peripheral left upper lobe nodule.  7. Increase in size of subcutaneous nodules including subcutaneous  nodule in the right upper quadrant right chest wall.  8. Moderate right pleural effusion. Right chest tube in dependent  medial right costophrenic sulcus.  9. Mild splenomegaly.  10. Nonocclusive thrombus in the right internal jugular vein.    ASSESSMENT AND PLAN  #1 Metastatic BRAF-V660E mutated melanoma, to lymph nodes, adrenal glands,  lung, brain  #2 Bilateral pleural effusions, s/p bilateral PleurX catheters, improved  Patient has elected to pursue comfort and palliative measures only at this time. I agree with him and his wife. I will place the hospice consult.    Benita Pillai M.D.   of Medicine  Hematology, Oncology and Transplantation          Dallin Stevens is a 35 year old male who is being evaluated via a billable video visit.      The patient has been notified of following:     \"This video visit will be conducted via a call between you and your physician/provider. We have found that certain health care needs can be provided without the need for an in-person physical exam.  This service lets us provide the care you need with a video conversation.  If a prescription is necessary we can send it directly to your pharmacy.  If lab work is needed we can place an order for that and you can then stop by our lab to have the test done at a later time.    Video visits are billed at different rates depending on your insurance coverage.  Please reach out to your insurance provider with any questions.    If during the course of the call the " "physician/provider feels a video visit is not appropriate, you will not be charged for this service.\"    Patient has given verbal consent for Video visit? Yes    How would you like to obtain your AVS? MyChart     If you are dropped from the video visit, the video invite should be resent to: Send to e-mail at: [email or web address]     Will anyone else be joining your video visit? No          I have reviewed and updated the patient's allergies and medication list. Patient was asked to provide any patient recorded vital signs, height and/or weight.  Please see \"Patient Reported Vital Signs\" tab for that information.        Concerns: Patient has no new concerns.      Refills: None       LORENZO Crenshaw        Video-Visit Details    Type of service:  Video Visit    Video Start Time: 2:00 PM  Video End Time: 2:30 PM    Originating Location (pt. Location): Home    Distant Location (provider location):  [facility]CER [facility]     Platform used for Video Visit: [facility]            Again, thank you for allowing me to participate in the care of your patient.        Sincerely,        Benita Burnett MD    "

## 2020-08-28 NOTE — PROGRESS NOTES
Clinic notes, face sheet and DME order for a hospital bed was faxed to [facility] at [phone].

## 2020-08-30 NOTE — PROGRESS NOTES
ORAL CHEMOTHERAPY DISCONTINUATION       Primary Oncologist:  Dr. Burnett  Primary Oncology Clinic: [facility]  Cancer Diagnosis:  Metastatic Melanoma  Therapy History:  C1D1: 4/29/2020  Mekinist 1.5 mg daily + Tafinlar 100 mg twice daily  Therapy Ended On:  8/14/2020  Reason For Discontinuation: disease progression    Additional Notes:  Thanks you for the opportunity to be a part of this patient's oral chemotherapy. The oncology pharmacy will no longer be following this patient for oral chemotherapy. If there are any questions or the plan changes, feel free to contact us.    Preston Wu Pharmacy Intern  Oral Chemotherapy Monitoring Program  [phone]

## 2020-09-02 LAB
LABORATORY COMMENT REPORT: NORMAL
SARS-COV-2 RNA SPEC QL NAA+PROBE: NEGATIVE
SARS-COV-2 RNA SPEC QL NAA+PROBE: NORMAL
SPECIMEN SOURCE: NORMAL
SPECIMEN SOURCE: NORMAL

## 2020-09-04 ENCOUNTER — PATIENT OUTREACH (OUTPATIENT)
Dept: ONCOLOGY | Facility: CLINIC | Age: 35
End: 2020-09-04

## 2020-09-05 NOTE — PROGRESS NOTES
NOTIFICATION OF A  PATIENT  EMAIL THIS COMPLETED INFORMATION TO THE APPROPRIATE ENTITY:    [place]: [email or web address]   [facility]:  [email or web address]   [email or web address]: [email or web address]   [facility]:  [email or web address]     PATIENT INFORMATION   Last name: Rodney First name: Dallin   Medical Record Number: [id number] County of Death: [place]   YOB: 1985 Date of Death: 2020   PARENT (FOR PATIENTS UNDER 18) OR LEGAL GUARDIAN (IF APPLICABLE):   Relationship to  patient:   Last name: First name:   Date of Birth: Telephone Number:   Address:     City: State: Zip Code:   SURVIVING SPOUSE INFORMATION (IF THERE IS A SURVIVING SPOUSE)   Last name:  NIRANJAN VELASCO First name:   Date of Birth: Telephone number:   Address:     City: State: Zip Code:   PERSON THAT INFORMED US OF PATIENT'S DEATH   Last name:[facility] verified [place] [employer]    First name:   Relationship to  patient: Telephone number:

## 2021-01-15 NOTE — PROGRESS NOTES
Mom called back, she has the long acting insulins that are covered Physical Therapy Discharge Summary    Reason for therapy discharge:    Discharged to home with home therapy.    Progress towards therapy goal(s). See goals on Care Plan in [facility] electronic health record for goal details.  Goals met    Therapy recommendation(s):    Continued therapy is recommended.  Rationale/Recommendations:  Further therapy is reccomended to ease transition home, ensure safety and progress exercsies for functional strengthening as well as reduce risk of rehospitalization ..

## 2021-05-26 VITALS
SYSTOLIC BLOOD PRESSURE: 102 MMHG | OXYGEN SATURATION: 95 % | RESPIRATION RATE: 16 BRPM | HEART RATE: 115 BPM | RESPIRATION RATE: 16 BRPM | TEMPERATURE: 98.9 F | DIASTOLIC BLOOD PRESSURE: 60 MMHG | SYSTOLIC BLOOD PRESSURE: 100 MMHG | HEART RATE: 104 BPM | DIASTOLIC BLOOD PRESSURE: 62 MMHG | OXYGEN SATURATION: 96 % | TEMPERATURE: 97.7 F

## 2021-05-26 VITALS
RESPIRATION RATE: 16 BRPM | OXYGEN SATURATION: 98 % | DIASTOLIC BLOOD PRESSURE: 68 MMHG | TEMPERATURE: 98.6 F | HEART RATE: 109 BPM | SYSTOLIC BLOOD PRESSURE: 122 MMHG

## 2021-05-26 VITALS
HEART RATE: 108 BPM | TEMPERATURE: 97.9 F | DIASTOLIC BLOOD PRESSURE: 62 MMHG | RESPIRATION RATE: 18 BRPM | OXYGEN SATURATION: 97 % | SYSTOLIC BLOOD PRESSURE: 86 MMHG

## 2021-05-26 VITALS
TEMPERATURE: 99 F | RESPIRATION RATE: 16 BRPM | DIASTOLIC BLOOD PRESSURE: 60 MMHG | SYSTOLIC BLOOD PRESSURE: 100 MMHG | HEART RATE: 124 BPM | OXYGEN SATURATION: 93 %

## 2021-05-26 VITALS
SYSTOLIC BLOOD PRESSURE: 96 MMHG | DIASTOLIC BLOOD PRESSURE: 60 MMHG | RESPIRATION RATE: 16 BRPM | HEART RATE: 107 BPM | OXYGEN SATURATION: 96 % | TEMPERATURE: 97.3 F

## 2021-05-26 VITALS
HEART RATE: 92 BPM | TEMPERATURE: 96.4 F | SYSTOLIC BLOOD PRESSURE: 98 MMHG | DIASTOLIC BLOOD PRESSURE: 62 MMHG | OXYGEN SATURATION: 97 % | RESPIRATION RATE: 18 BRPM

## 2021-05-26 VITALS
TEMPERATURE: 99.7 F | HEART RATE: 117 BPM | RESPIRATION RATE: 24 BRPM | DIASTOLIC BLOOD PRESSURE: 75 MMHG | OXYGEN SATURATION: 99 % | SYSTOLIC BLOOD PRESSURE: 105 MMHG

## 2021-05-26 VITALS
SYSTOLIC BLOOD PRESSURE: 102 MMHG | TEMPERATURE: 97.4 F | DIASTOLIC BLOOD PRESSURE: 58 MMHG | OXYGEN SATURATION: 99 % | RESPIRATION RATE: 18 BRPM | HEART RATE: 99 BPM

## 2021-05-26 VITALS
OXYGEN SATURATION: 98 % | HEART RATE: 100 BPM | DIASTOLIC BLOOD PRESSURE: 60 MMHG | TEMPERATURE: 98.4 F | RESPIRATION RATE: 16 BRPM | SYSTOLIC BLOOD PRESSURE: 102 MMHG

## 2021-05-26 VITALS
DIASTOLIC BLOOD PRESSURE: 60 MMHG | OXYGEN SATURATION: 99 % | TEMPERATURE: 97.3 F | RESPIRATION RATE: 18 BRPM | SYSTOLIC BLOOD PRESSURE: 114 MMHG | HEART RATE: 108 BPM

## 2021-05-26 VITALS
SYSTOLIC BLOOD PRESSURE: 100 MMHG | TEMPERATURE: 96.5 F | OXYGEN SATURATION: 100 % | RESPIRATION RATE: 18 BRPM | DIASTOLIC BLOOD PRESSURE: 58 MMHG | HEART RATE: 106 BPM

## 2021-05-26 VITALS
SYSTOLIC BLOOD PRESSURE: 96 MMHG | RESPIRATION RATE: 18 BRPM | TEMPERATURE: 96.7 F | OXYGEN SATURATION: 98 % | DIASTOLIC BLOOD PRESSURE: 58 MMHG | HEART RATE: 100 BPM

## 2021-05-26 VITALS
DIASTOLIC BLOOD PRESSURE: 60 MMHG | HEART RATE: 101 BPM | SYSTOLIC BLOOD PRESSURE: 118 MMHG | RESPIRATION RATE: 16 BRPM | TEMPERATURE: 97.8 F | OXYGEN SATURATION: 93 %

## 2021-05-26 VITALS
DIASTOLIC BLOOD PRESSURE: 68 MMHG | TEMPERATURE: 99 F | SYSTOLIC BLOOD PRESSURE: 104 MMHG | OXYGEN SATURATION: 94 % | HEART RATE: 120 BPM | RESPIRATION RATE: 18 BRPM

## 2021-05-26 VITALS
RESPIRATION RATE: 16 BRPM | TEMPERATURE: 98.4 F | HEART RATE: 109 BPM | OXYGEN SATURATION: 92 % | DIASTOLIC BLOOD PRESSURE: 60 MMHG | SYSTOLIC BLOOD PRESSURE: 102 MMHG

## 2021-05-26 VITALS
DIASTOLIC BLOOD PRESSURE: 60 MMHG | RESPIRATION RATE: 16 BRPM | TEMPERATURE: 97.3 F | SYSTOLIC BLOOD PRESSURE: 104 MMHG | OXYGEN SATURATION: 94 % | HEART RATE: 112 BPM

## 2021-05-26 VITALS
DIASTOLIC BLOOD PRESSURE: 62 MMHG | SYSTOLIC BLOOD PRESSURE: 106 MMHG | HEART RATE: 103 BPM | OXYGEN SATURATION: 96 % | RESPIRATION RATE: 16 BRPM | TEMPERATURE: 98.2 F

## 2021-05-26 VITALS
RESPIRATION RATE: 16 BRPM | DIASTOLIC BLOOD PRESSURE: 68 MMHG | TEMPERATURE: 98.5 F | OXYGEN SATURATION: 98 % | SYSTOLIC BLOOD PRESSURE: 102 MMHG | HEART RATE: 104 BPM

## 2021-05-26 VITALS
TEMPERATURE: 98.6 F | OXYGEN SATURATION: 98 % | SYSTOLIC BLOOD PRESSURE: 100 MMHG | RESPIRATION RATE: 16 BRPM | HEART RATE: 106 BPM | DIASTOLIC BLOOD PRESSURE: 60 MMHG

## 2021-05-26 VITALS
HEART RATE: 117 BPM | OXYGEN SATURATION: 95 % | SYSTOLIC BLOOD PRESSURE: 92 MMHG | TEMPERATURE: 96.7 F | DIASTOLIC BLOOD PRESSURE: 60 MMHG

## 2021-05-27 VITALS
TEMPERATURE: 97.6 F | DIASTOLIC BLOOD PRESSURE: 58 MMHG | OXYGEN SATURATION: 96 % | SYSTOLIC BLOOD PRESSURE: 102 MMHG | RESPIRATION RATE: 16 BRPM | HEART RATE: 97 BPM

## 2021-05-27 VITALS
OXYGEN SATURATION: 96 % | SYSTOLIC BLOOD PRESSURE: 96 MMHG | DIASTOLIC BLOOD PRESSURE: 60 MMHG | RESPIRATION RATE: 16 BRPM | TEMPERATURE: 98.2 F | HEART RATE: 100 BPM

## 2021-05-27 VITALS
HEART RATE: 80 BPM | DIASTOLIC BLOOD PRESSURE: 68 MMHG | TEMPERATURE: 97.2 F | SYSTOLIC BLOOD PRESSURE: 90 MMHG | RESPIRATION RATE: 16 BRPM | OXYGEN SATURATION: 95 %

## 2021-05-27 VITALS — OXYGEN SATURATION: 98 % | SYSTOLIC BLOOD PRESSURE: 100 MMHG | HEART RATE: 120 BPM | DIASTOLIC BLOOD PRESSURE: 60 MMHG

## 2021-05-27 VITALS
RESPIRATION RATE: 32 BRPM | RESPIRATION RATE: 20 BRPM | OXYGEN SATURATION: 96 % | TEMPERATURE: 98.6 F | HEART RATE: 130 BPM | DIASTOLIC BLOOD PRESSURE: 58 MMHG | OXYGEN SATURATION: 94 % | SYSTOLIC BLOOD PRESSURE: 98 MMHG | HEART RATE: 113 BPM | DIASTOLIC BLOOD PRESSURE: 68 MMHG | SYSTOLIC BLOOD PRESSURE: 110 MMHG | TEMPERATURE: 98 F

## 2021-05-27 VITALS
OXYGEN SATURATION: 98 % | DIASTOLIC BLOOD PRESSURE: 50 MMHG | SYSTOLIC BLOOD PRESSURE: 110 MMHG | DIASTOLIC BLOOD PRESSURE: 50 MMHG | TEMPERATURE: 98.7 F | HEART RATE: 107 BPM | TEMPERATURE: 97.1 F | SYSTOLIC BLOOD PRESSURE: 80 MMHG | RESPIRATION RATE: 16 BRPM | OXYGEN SATURATION: 99 % | HEART RATE: 127 BPM

## 2021-05-27 VITALS
TEMPERATURE: 98 F | SYSTOLIC BLOOD PRESSURE: 100 MMHG | HEART RATE: 83 BPM | RESPIRATION RATE: 16 BRPM | OXYGEN SATURATION: 98 % | DIASTOLIC BLOOD PRESSURE: 66 MMHG

## 2021-05-27 VITALS
TEMPERATURE: 98.1 F | RESPIRATION RATE: 22 BRPM | HEART RATE: 102 BPM | HEART RATE: 86 BPM | DIASTOLIC BLOOD PRESSURE: 66 MMHG | OXYGEN SATURATION: 90 % | OXYGEN SATURATION: 97 % | TEMPERATURE: 97.8 F | DIASTOLIC BLOOD PRESSURE: 58 MMHG | SYSTOLIC BLOOD PRESSURE: 92 MMHG | RESPIRATION RATE: 18 BRPM | TEMPERATURE: 99.4 F | DIASTOLIC BLOOD PRESSURE: 64 MMHG | HEART RATE: 116 BPM | SYSTOLIC BLOOD PRESSURE: 102 MMHG | SYSTOLIC BLOOD PRESSURE: 102 MMHG | OXYGEN SATURATION: 97 %

## 2021-05-27 VITALS
OXYGEN SATURATION: 98 % | RESPIRATION RATE: 16 BRPM | SYSTOLIC BLOOD PRESSURE: 102 MMHG | DIASTOLIC BLOOD PRESSURE: 72 MMHG | TEMPERATURE: 98 F | HEART RATE: 105 BPM

## 2021-05-27 VITALS
RESPIRATION RATE: 18 BRPM | DIASTOLIC BLOOD PRESSURE: 62 MMHG | OXYGEN SATURATION: 100 % | SYSTOLIC BLOOD PRESSURE: 100 MMHG | TEMPERATURE: 98 F | HEART RATE: 100 BPM

## 2021-05-27 VITALS
TEMPERATURE: 99.2 F | DIASTOLIC BLOOD PRESSURE: 60 MMHG | RESPIRATION RATE: 20 BRPM | SYSTOLIC BLOOD PRESSURE: 100 MMHG | OXYGEN SATURATION: 93 % | HEART RATE: 110 BPM

## 2021-05-27 VITALS
OXYGEN SATURATION: 95 % | RESPIRATION RATE: 16 BRPM | DIASTOLIC BLOOD PRESSURE: 64 MMHG | HEART RATE: 68 BPM | SYSTOLIC BLOOD PRESSURE: 100 MMHG | TEMPERATURE: 97.6 F

## 2021-05-27 VITALS
SYSTOLIC BLOOD PRESSURE: 90 MMHG | DIASTOLIC BLOOD PRESSURE: 62 MMHG | OXYGEN SATURATION: 99 % | HEART RATE: 112 BPM | RESPIRATION RATE: 18 BRPM | TEMPERATURE: 97.9 F

## 2021-05-27 VITALS
HEART RATE: 127 BPM | DIASTOLIC BLOOD PRESSURE: 64 MMHG | TEMPERATURE: 98.3 F | SYSTOLIC BLOOD PRESSURE: 117 MMHG | OXYGEN SATURATION: 97 %

## 2021-05-27 VITALS
DIASTOLIC BLOOD PRESSURE: 64 MMHG | OXYGEN SATURATION: 99 % | HEART RATE: 99 BPM | TEMPERATURE: 98.6 F | RESPIRATION RATE: 16 BRPM | SYSTOLIC BLOOD PRESSURE: 98 MMHG

## 2021-05-27 VITALS
RESPIRATION RATE: 18 BRPM | OXYGEN SATURATION: 95 % | HEART RATE: 87 BPM | SYSTOLIC BLOOD PRESSURE: 96 MMHG | TEMPERATURE: 98.2 F | DIASTOLIC BLOOD PRESSURE: 64 MMHG

## 2021-05-27 VITALS
TEMPERATURE: 98.2 F | DIASTOLIC BLOOD PRESSURE: 60 MMHG | SYSTOLIC BLOOD PRESSURE: 92 MMHG | OXYGEN SATURATION: 99 % | HEART RATE: 102 BPM | RESPIRATION RATE: 18 BRPM

## 2021-05-27 VITALS
OXYGEN SATURATION: 95 % | HEART RATE: 93 BPM | DIASTOLIC BLOOD PRESSURE: 57 MMHG | RESPIRATION RATE: 20 BRPM | SYSTOLIC BLOOD PRESSURE: 113 MMHG | TEMPERATURE: 97.2 F

## 2021-05-27 VITALS
RESPIRATION RATE: 18 BRPM | OXYGEN SATURATION: 94 % | DIASTOLIC BLOOD PRESSURE: 64 MMHG | HEART RATE: 140 BPM | TEMPERATURE: 98 F | SYSTOLIC BLOOD PRESSURE: 102 MMHG

## 2021-05-27 VITALS
HEART RATE: 98 BPM | DIASTOLIC BLOOD PRESSURE: 62 MMHG | SYSTOLIC BLOOD PRESSURE: 102 MMHG | OXYGEN SATURATION: 95 % | RESPIRATION RATE: 16 BRPM | TEMPERATURE: 97.9 F

## 2021-05-27 VITALS
OXYGEN SATURATION: 94 % | HEART RATE: 97 BPM | DIASTOLIC BLOOD PRESSURE: 73 MMHG | TEMPERATURE: 98.2 F | RESPIRATION RATE: 16 BRPM | HEART RATE: 100 BPM | DIASTOLIC BLOOD PRESSURE: 60 MMHG | TEMPERATURE: 96.6 F | SYSTOLIC BLOOD PRESSURE: 106 MMHG | SYSTOLIC BLOOD PRESSURE: 122 MMHG | OXYGEN SATURATION: 99 %

## 2021-05-27 VITALS
TEMPERATURE: 97.9 F | RESPIRATION RATE: 18 BRPM | OXYGEN SATURATION: 99 % | DIASTOLIC BLOOD PRESSURE: 58 MMHG | HEART RATE: 103 BPM | SYSTOLIC BLOOD PRESSURE: 92 MMHG

## 2021-05-27 VITALS
DIASTOLIC BLOOD PRESSURE: 72 MMHG | RESPIRATION RATE: 20 BRPM | SYSTOLIC BLOOD PRESSURE: 98 MMHG | OXYGEN SATURATION: 94 % | HEART RATE: 103 BPM | TEMPERATURE: 98.6 F

## 2021-05-27 VITALS
TEMPERATURE: 97.6 F | DIASTOLIC BLOOD PRESSURE: 52 MMHG | OXYGEN SATURATION: 98 % | RESPIRATION RATE: 18 BRPM | SYSTOLIC BLOOD PRESSURE: 72 MMHG | HEART RATE: 110 BPM

## 2021-05-27 VITALS
OXYGEN SATURATION: 80 % | RESPIRATION RATE: 30 BRPM | HEART RATE: 123 BPM | DIASTOLIC BLOOD PRESSURE: 50 MMHG | TEMPERATURE: 99.9 F | SYSTOLIC BLOOD PRESSURE: 98 MMHG

## 2021-05-27 VITALS
DIASTOLIC BLOOD PRESSURE: 60 MMHG | RESPIRATION RATE: 16 BRPM | TEMPERATURE: 97.5 F | HEART RATE: 115 BPM | OXYGEN SATURATION: 98 % | SYSTOLIC BLOOD PRESSURE: 98 MMHG

## 2021-05-27 VITALS
DIASTOLIC BLOOD PRESSURE: 58 MMHG | OXYGEN SATURATION: 98 % | SYSTOLIC BLOOD PRESSURE: 94 MMHG | HEART RATE: 104 BPM | RESPIRATION RATE: 17 BRPM | TEMPERATURE: 99.3 F

## 2021-05-27 VITALS
RESPIRATION RATE: 16 BRPM | TEMPERATURE: 99.1 F | SYSTOLIC BLOOD PRESSURE: 118 MMHG | OXYGEN SATURATION: 98 % | DIASTOLIC BLOOD PRESSURE: 70 MMHG | HEART RATE: 77 BPM

## 2021-05-27 VITALS
OXYGEN SATURATION: 98 % | TEMPERATURE: 97.9 F | DIASTOLIC BLOOD PRESSURE: 60 MMHG | HEART RATE: 92 BPM | SYSTOLIC BLOOD PRESSURE: 100 MMHG

## 2021-05-27 VITALS
SYSTOLIC BLOOD PRESSURE: 102 MMHG | OXYGEN SATURATION: 97 % | HEART RATE: 115 BPM | TEMPERATURE: 99 F | DIASTOLIC BLOOD PRESSURE: 58 MMHG

## 2021-05-27 VITALS
OXYGEN SATURATION: 97 % | SYSTOLIC BLOOD PRESSURE: 120 MMHG | HEART RATE: 101 BPM | TEMPERATURE: 97.7 F | RESPIRATION RATE: 18 BRPM | DIASTOLIC BLOOD PRESSURE: 64 MMHG

## 2021-05-27 VITALS — DIASTOLIC BLOOD PRESSURE: 60 MMHG | RESPIRATION RATE: 16 BRPM | TEMPERATURE: 97.9 F | SYSTOLIC BLOOD PRESSURE: 100 MMHG

## 2021-05-27 VITALS
RESPIRATION RATE: 30 BRPM | HEART RATE: 123 BPM | DIASTOLIC BLOOD PRESSURE: 54 MMHG | OXYGEN SATURATION: 96 % | TEMPERATURE: 98 F | SYSTOLIC BLOOD PRESSURE: 92 MMHG

## 2021-05-27 VITALS
OXYGEN SATURATION: 93 % | RESPIRATION RATE: 16 BRPM | TEMPERATURE: 98.3 F | SYSTOLIC BLOOD PRESSURE: 98 MMHG | DIASTOLIC BLOOD PRESSURE: 60 MMHG | HEART RATE: 116 BPM

## 2021-05-27 VITALS
SYSTOLIC BLOOD PRESSURE: 98 MMHG | OXYGEN SATURATION: 98 % | TEMPERATURE: 98 F | DIASTOLIC BLOOD PRESSURE: 60 MMHG | HEART RATE: 101 BPM | RESPIRATION RATE: 18 BRPM

## 2021-05-27 VITALS
SYSTOLIC BLOOD PRESSURE: 100 MMHG | HEART RATE: 111 BPM | RESPIRATION RATE: 16 BRPM | DIASTOLIC BLOOD PRESSURE: 62 MMHG | OXYGEN SATURATION: 93 % | TEMPERATURE: 98.7 F

## 2021-05-27 VITALS
SYSTOLIC BLOOD PRESSURE: 98 MMHG | DIASTOLIC BLOOD PRESSURE: 60 MMHG | OXYGEN SATURATION: 97 % | HEART RATE: 106 BPM | TEMPERATURE: 98.2 F

## 2021-05-27 VITALS
TEMPERATURE: 98.4 F | SYSTOLIC BLOOD PRESSURE: 100 MMHG | RESPIRATION RATE: 20 BRPM | HEART RATE: 115 BPM | OXYGEN SATURATION: 97 % | DIASTOLIC BLOOD PRESSURE: 60 MMHG

## 2021-05-27 VITALS
OXYGEN SATURATION: 98 % | SYSTOLIC BLOOD PRESSURE: 108 MMHG | DIASTOLIC BLOOD PRESSURE: 70 MMHG | DIASTOLIC BLOOD PRESSURE: 56 MMHG | HEART RATE: 118 BPM | HEART RATE: 132 BPM | TEMPERATURE: 98.2 F | RESPIRATION RATE: 18 BRPM | TEMPERATURE: 97 F | SYSTOLIC BLOOD PRESSURE: 94 MMHG

## 2021-05-27 VITALS
DIASTOLIC BLOOD PRESSURE: 62 MMHG | TEMPERATURE: 98 F | HEART RATE: 98 BPM | OXYGEN SATURATION: 90 % | RESPIRATION RATE: 18 BRPM | SYSTOLIC BLOOD PRESSURE: 98 MMHG

## 2021-05-27 VITALS
TEMPERATURE: 98 F | SYSTOLIC BLOOD PRESSURE: 118 MMHG | OXYGEN SATURATION: 98 % | RESPIRATION RATE: 16 BRPM | HEART RATE: 111 BPM | DIASTOLIC BLOOD PRESSURE: 70 MMHG

## 2021-05-27 VITALS
OXYGEN SATURATION: 93 % | RESPIRATION RATE: 22 BRPM | HEART RATE: 102 BPM | SYSTOLIC BLOOD PRESSURE: 114 MMHG | DIASTOLIC BLOOD PRESSURE: 73 MMHG | TEMPERATURE: 99.1 F

## 2021-05-27 VITALS
DIASTOLIC BLOOD PRESSURE: 60 MMHG | OXYGEN SATURATION: 96 % | TEMPERATURE: 98.7 F | SYSTOLIC BLOOD PRESSURE: 98 MMHG | HEART RATE: 111 BPM

## 2021-05-27 VITALS
DIASTOLIC BLOOD PRESSURE: 62 MMHG | OXYGEN SATURATION: 98 % | SYSTOLIC BLOOD PRESSURE: 100 MMHG | HEART RATE: 106 BPM | RESPIRATION RATE: 16 BRPM | TEMPERATURE: 97.6 F

## 2021-05-27 VITALS
TEMPERATURE: 98.3 F | DIASTOLIC BLOOD PRESSURE: 64 MMHG | SYSTOLIC BLOOD PRESSURE: 98 MMHG | OXYGEN SATURATION: 95 % | RESPIRATION RATE: 18 BRPM | HEART RATE: 103 BPM

## 2021-05-27 VITALS
TEMPERATURE: 98.4 F | OXYGEN SATURATION: 93 % | DIASTOLIC BLOOD PRESSURE: 64 MMHG | RESPIRATION RATE: 32 BRPM | SYSTOLIC BLOOD PRESSURE: 108 MMHG | HEART RATE: 130 BPM

## 2021-05-27 VITALS
DIASTOLIC BLOOD PRESSURE: 52 MMHG | SYSTOLIC BLOOD PRESSURE: 94 MMHG | RESPIRATION RATE: 18 BRPM | OXYGEN SATURATION: 98 % | TEMPERATURE: 97.7 F | HEART RATE: 119 BPM

## 2021-05-27 VITALS
DIASTOLIC BLOOD PRESSURE: 85 MMHG | HEART RATE: 129 BPM | OXYGEN SATURATION: 91 % | SYSTOLIC BLOOD PRESSURE: 127 MMHG | TEMPERATURE: 97.7 F

## 2021-05-27 VITALS
OXYGEN SATURATION: 95 % | TEMPERATURE: 98.7 F | DIASTOLIC BLOOD PRESSURE: 56 MMHG | SYSTOLIC BLOOD PRESSURE: 110 MMHG | HEART RATE: 130 BPM

## 2021-05-27 VITALS
SYSTOLIC BLOOD PRESSURE: 106 MMHG | RESPIRATION RATE: 16 BRPM | DIASTOLIC BLOOD PRESSURE: 60 MMHG | OXYGEN SATURATION: 98 % | HEART RATE: 90 BPM | TEMPERATURE: 98.2 F

## 2021-05-27 VITALS
HEART RATE: 97 BPM | TEMPERATURE: 97.2 F | SYSTOLIC BLOOD PRESSURE: 100 MMHG | OXYGEN SATURATION: 98 % | RESPIRATION RATE: 18 BRPM | DIASTOLIC BLOOD PRESSURE: 62 MMHG

## 2021-05-27 VITALS
OXYGEN SATURATION: 99 % | HEART RATE: 119 BPM | DIASTOLIC BLOOD PRESSURE: 62 MMHG | TEMPERATURE: 97.5 F | SYSTOLIC BLOOD PRESSURE: 88 MMHG | RESPIRATION RATE: 18 BRPM

## 2021-05-27 VITALS
SYSTOLIC BLOOD PRESSURE: 106 MMHG | OXYGEN SATURATION: 98 % | HEART RATE: 110 BPM | DIASTOLIC BLOOD PRESSURE: 60 MMHG | TEMPERATURE: 98.8 F

## 2021-05-27 VITALS
RESPIRATION RATE: 17 BRPM | TEMPERATURE: 97.3 F | HEART RATE: 116 BPM | SYSTOLIC BLOOD PRESSURE: 104 MMHG | OXYGEN SATURATION: 95 % | DIASTOLIC BLOOD PRESSURE: 62 MMHG

## 2021-05-27 VITALS
SYSTOLIC BLOOD PRESSURE: 98 MMHG | RESPIRATION RATE: 18 BRPM | OXYGEN SATURATION: 98 % | HEART RATE: 102 BPM | TEMPERATURE: 97.4 F | DIASTOLIC BLOOD PRESSURE: 62 MMHG

## 2021-06-04 VITALS
SYSTOLIC BLOOD PRESSURE: 98 MMHG | RESPIRATION RATE: 28 BRPM | BODY MASS INDEX: 18.96 KG/M2 | OXYGEN SATURATION: 94 % | HEART RATE: 127 BPM | WEIGHT: 128 LBS | HEIGHT: 69 IN | DIASTOLIC BLOOD PRESSURE: 60 MMHG

## 2021-06-04 VITALS
BODY MASS INDEX: 19.1 KG/M2 | WEIGHT: 133.4 LBS | WEIGHT: 133.4 LBS | BODY MASS INDEX: 19.1 KG/M2 | HEIGHT: 70 IN | HEIGHT: 70 IN

## 2021-06-07 NOTE — ANESTHESIA CARE TRANSFER NOTE
Last vitals:   Vitals:    04/08/20 0500   BP: 99/64   Pulse: (!) 113   Resp: 23   Temp:    SpO2: 95%     Patient's level of consciousness is awake and drowsy  Spontaneous respirations: yes  Maintains airway independently: yes  Dentition unchanged: yes  Oropharynx: oropharynx clear of all foreign objects    QCDR Measures:  ASA# 20 - Surgical Safety Checklist: WHO surgical safety checklist completed prior to induction    PQRS# 430 - Adult PONV Prevention: 4558F-8P - Pt did NOT receive => 2 anti-emetic agents  ASA# 8 - Peds PONV Prevention: NA - Not pediatric patient, not GA or 2 or more risk factors NOT present  PQRS# 424 - Nevaeh-op Temp Management: 4559F - At least one body temp DOCUMENTED => 35.5C or 95.9F within required timeframe  PQRS# 426 - PACU Transfer Protocol: - Transfer of care checklist used  ASA# 14 - Acute Post-op Pain: ASA14B - Patient did NOT experience pain >= 7 out of 10

## 2021-06-07 NOTE — ANESTHESIA PREPROCEDURE EVALUATION
Anesthesia Evaluation      Patient summary reviewed   No history of anesthetic complications     Airway   Mallampati: II  Neck ROM: full   Pulmonary    (+) shortness of breath, decreased breath sounds,     ROS comment: 4/6/20 Chest CTA:    1.  No pulmonary embolism. There is massive conglomerate inhomogeneous mediastinal lymphadenopathy which is most pronounced within the right upper mediastinum where lymphadenopathy measures 11.1 x 10.9 x 8.6 cm. There is some narrowing of the trachea in   the coronal plane at the thoracic inlet with extension into the lower right neck. Bulky right axillary lymphadenopathy. There is near occlusion of the right innominate vein, and the SVC is not visible superiorly and is presumably occluded with occlusive   thrombus also seen in the left innominate vein. Multiple collateral vessels in the right upper chest wall and back     2.  Moderate-sized pleural effusions with bibasilar consolidation.     3.  Large pericardial effusion.                         Cardiovascular   Exercise tolerance: < 4 METS  Rhythm: regular  Rate: abnormal,      ROS comment: Cardiac Tamponade attempted evacuation 4/6  Now requiring oxygen via NC and tachycardic into the 120s  PE comment: Tachycardia, sinus,     Neuro/Psych      Comments: January Heat CT IMPRESSION:   1.  No change in the area of recent intraparenchymal hemorrhage in the parasagittal right frontal area with associated vasogenic edema since yesterday. Stable approximately 4 mm midline shift to the left.  2.  Stable recent intraparenchymal hemorrhage in the medial right temporal occipital region and atria of the right lateral ventricle with stable small amount of intraventricular hemorrhage with stable localized surrounding edema. Stable associated   partial entrapment of the temporal horn of the right lateral ventricle.  3.  Stable 6 mm presumed partially hemorrhagic lesion left parietal area without surrounding edema.  4.  No new area of  intracranial hemorrhage on today's study. No new infarct. No acute calvarial fracture.      Endo/Other       GI/Hepatic/Renal       Other findings: 35 yo M with a history of metastatic melanoma and large intrathoracic mass occluding SVC and RA no presenting to us for pericardial window placement. Appears chronically ill, pale, malnourished, alopecia,   2 18 g PIVs in bilateral ACs  eyeglasses      Dental - normal exam                        Anesthesia Plan  Planned anesthetic: MAC  Possible femoral CVC, A-line, and large bore IV access   At this time, given the patient's comorbidities he is an extremely high risk to undergo any anesthetic.  He understands that this risk includes death and wishes to proceed.  He would like everything done, thus is FULL CODE.  ASA 5 - emergent   Induction: intravenous   Anesthetic plan and risks discussed with: patient  Anesthesia plan special considerations: antiemetics, arterial catheterization, IV therapy two IVs,   Post-op plan: other (continued ICU cares post-operatively)

## 2021-06-07 NOTE — ANESTHESIA POSTPROCEDURE EVALUATION
Patient: Dallin Stevens  Procedure(s):  CREATION, PERICARDIAL WINDOW SUBXYPHOID  Anesthesia type: MAC    Patient location: ICU  Last vitals:   Vitals Value Taken Time   BP  4/7/2020  4:33 PM   Temp  4/7/2020  4:33 PM   Pulse 128 4/7/2020  4:31 PM   Resp 32 4/7/2020  4:31 PM   SpO2 97 % 4/7/2020  4:31 PM   Vitals shown include unvalidated device data.  Post vital signs: stable  Level of consciousness: awake and responds to simple questions  Post-anesthesia pain: pain controlled  Post-anesthesia nausea and vomiting: no  Pulmonary: return to baseline, nasal cannula  Cardiovascular: stable and blood pressure at baseline  Hydration: adequate  Anesthetic events: no    QCDR Measures:  ASA# 11 - Nevaeh-op Cardiac Arrest: ASA11B - Patient did NOT experience unanticipated cardiac arrest  ASA# 12 - Nevaeh-op Mortality Rate: ASA12B - Patient did NOT die  ASA# 13 - PACU Re-Intubation Rate: ASA13B - Patient did NOT require a new airway mgmt  ASA# 10 - Composite Anes Safety: ASA10A - No serious adverse event    Additional Notes:

## 2021-06-07 NOTE — ANESTHESIA PROCEDURE NOTES
Arterial Line  Reason for Procedure: hemodynamic monitoring  Patient location during procedure: OR pre-induction  Start time: 4/7/2020 1:05 PM  End time: 4/7/2020 1:10 PM  Staffing:  Performing  Anesthesiologist: Arely Overton MD  Sterile Precautions:  sterile barriers used during insertion: cap, mask, sterile gloves, large sheet, and hand hygiene used.  Arterial Line:   Immediately prior to procedure a time out was called to verify the correct patient, procedure, equipment, support staff and site/side marked as required  Laterality: right  Location: radial  Prepped with: ChloroPrep    Needle gauge: 20 G  Number of Attempts: 1  Secured with: tape and transparent dressing  Flushed with: saline  1% lidocaine local anesthesia used for skin prep.   See MAR for additional medications given.  Ultrasound evaluation of access site: yes  Vessel patent by US exam    Concurrent real time visualization of needle entry    Permanent ultrasound image captured

## 2021-06-10 NOTE — TELEPHONE ENCOUNTER
RN Triage  Dallin calling to schedule his pre-procedure COVID test. Verified order in place and transferred to COVID scheduling team.    Germaine Trujillo RN  [facility] Nurse Advisor

## 2022-09-09 NOTE — PLAN OF CARE
Pt transferred from  at 2200. Denies pain/nausea/dizziness. Awaiting results of EEG and cortisol test. Continuous telemetry monitoring in place. Pt hopeful about possibly  discharging tomorrow. Received ativan and melatonin at bedtime. Voiding using urinal. Encouraged to call for help when getting up. Continue with POC.    no

## 2023-08-16 NOTE — PROGRESS NOTES
Social Work Services Progress Note    Hospital Day: 5  Date of Initial Social Work Evaluation:  Completed by ARU JOEL during pt's  ARU stay on 1/10/2020  Collaborated with:  Admissions at [facility] (Keri)    Data:  Pt was hospitalized at [facility] in [place] from 1/6/2020 - 1/9/2020 at which time, pt was discharged to [facility].  Pt remained at [facility] until 1/15/2020 at which time, pt was discharge to [facility] for tumor resection.  Pt was evaluated by Physical Therapy on 1/19/2020 and ARU is recommended.  Pt has pending OT eval orders.    Intervention:  Chart reviewed. JOEL phoned Admissions (Keri) at [facility]. JOEL asked Keri to let this know if/when PMR eval orders are needed.  Pt does not have a bed hold at [facility].  Keri will follow pt to determine whether or not pt is appropriate for return admit as time of discharge from [facility].        Plan:    Anticipated Disposition:  Rehab placement.  Physical Therapy recommends ARU, Await OT rec's.      Barriers to d/c plan:  Medical readiness    Follow Up:  JOEL will continue to follow.    GYPSY Montano  Social Work, 6A  Phone:  [phone]  Pager:  [phone]  1/19/2020         Finasteride Counseling:  I discussed with the patient the risks of use of finasteride including but not limited to decreased libido, decreased ejaculate volume, gynecomastia, and depression. Women should not handle medication.  All of the patient's questions and concerns were addressed. Finasteride Male Counseling: Finasteride Counseling:  I discussed with the patient the risks of use of finasteride including but not limited to decreased libido, decreased ejaculate volume, gynecomastia, and depression. Women should not handle medication.  All of the patient's questions and concerns were addressed.

## 2024-04-03 NOTE — PROGRESS NOTES
Hematology / Oncology  Daily Progress Note   Date of Service: 08/20/2020  Patient: Dallin Stevens  MRN: [id number]  Admission Date: 8/15/2020  Hospital Day # 4  Cancer Diagnosis: Metastatic Melanoma   Primary Outpatient Oncologist: Dr Elfego Burnett   Current Treatment Plan: Carbo/Taxol (D1C1 8/14/20)     Assessment & Plan:   Dallin Stevens is a 35 year old male with PMHx of Met Melanoma, now admitted after developing multiple episodes of seizure like activity. Hospital stay complicated by AHRF and malignant pleural effusions, s/p thora with 900 ml off 8/17 and L pleurx placement 8/19.     # Widely Metastatic +BRAF Melanoma   # Recurrent Malignant Pleural effusions   Please review initial oncology consult note by Dr Lorne Joshua from 8/16 for full oncologic history. In short, diagnosed as stage II disease 2012. On surveillance until 2017 when presented with met disease (R supraclav, mediastinal and hilar LN), Bx +BRAF mutated Met melanoma. Treatment history below.    - Pembrolizumab Feb-Nov 2017   - S/p craniotomy 4/2019 and tumor resection of met R parieto occipital mass (3.7 x 5 cm). Declined XRT   - S/p R frontal craniotomy and tumor resection by Dr Quintero 1/16/2020    - Stereotactic radiosurgery with Dr Arshad 1/31/2020    - Pembrolizumab 2/11 - 4/15 completed 4 cycles. Complicated by tamponade and recurrent pleural effusions. S/p pericardial window and bilat pleurx drains.    - Dabrafenib and Trametinib 4/29/20 with PD    - Carbo, Paclitaxel started 8/14/20 (did not receive Pembro, awaiting insurance approval)   During Carbo/Taxel 8/14 infusion concern for seizure like activity so was admitted to [facility]. Work up significant for new met intracranial disease and worsening L effusions. MRI brain 8/16 with Multiple metastatic lesions within both supra and infratentorial areas with associated hemorrhage. Most of the lesions increase in size when compared to 7/24/2020 brain MRI. There are also new lesions within the  - ID following for antibiotics: Rocephin and Ampicillin      left posterior frontal lobe, right parietal lobe, and within the right insula. Started on Keppra 1g BID. Rad Onc consulted, patient declined WBXRT vs gamma knife as unclear benefit. S/p therapeutic thora 8/17 with IP, a total of 900 ml fluid was removed. Fluid was sent to micro and cytology for analysis (pending). 626 WBCs, 94% neutrophils. Cultures pending, likely malignant per pulm.   - Next oncology follow up with [facility], labs and possible Keytruda (pending insurance) 8/27     Recommendations:   - Ongoing GOC discussions, discussed with Dallin again today that if planning to go forward with further treatment would recommend WBXRT as chemotherapy does not penetrate into the brain. Dallin very hesitant to have WBXRT as had significant side effects in the past. If does not wish to pursue would then strongly recommend hospice support. Ongoing conversations with his family and also seeking alternative therapies elsewhere that can start as early as tomorrow per his wife. Educated them that we can arrange WBXRT outpatient if he were to change his mind. Close oncology follow up next week.   - Agree with palliative care consult, appreciate assistance and arrangement of outpatient follow up.   - Avoid AC given hemorrhagic brain mets, risks > benefits       Patient was seen and plan of care was discussed with attending physician Dr. Burnett.    Thank you for the opportunity to partake in this patients plan of care. Please do not hesitate to page with questions. We will continue to follow.     Mine Barnett PA-C   Hematology/Oncology   Pager: [phone]   ___________________________________________________________________    Subjective & Interval History:    Overnight stable respiratory status now on 3L NC, Tachy 120-130s.Local pain at [facility] site. Patient again requesting to go home today with his mother. Afebrile. No changes in bowels or bladder.     Physical Exam:    Blood pressure 96/73, pulse 125, temperature 97.8  " F (36.6  C), temperature source Oral, resp. rate 22, height 1.753 m (5' 9\"), weight 63.1 kg (139 lb 1.6 oz), SpO2 98 %.    General: sitting up in bed, no acute distress, chronically ill appearing   CV: Tachycardia   Resp: Increased work to breath, on 3L NC, course breath sounds   MSK: warm and well-perfused, normal tone  Skin: no rashes on limited exam, no jaundice, diffuse pallor   Neuro: Alert and interactive, moves all extremities equally     Labs & Studies: I personally reviewed the following studies:  ROUTINE LABS (Last four results):  CMP  Recent Labs   Lab 08/20/20  0526 08/19/20  0517 08/17/20  0458 08/16/20  1227 08/16/20  0407 08/15/20  2352 08/14/20  1002   NA  --  135 131* 128* 127* 126* 126*   POTASSIUM  --  3.8 3.5 3.6  --  4.4 4.1   CHLORIDE  --  105 101 99  --  96 96   CO2  --  23 20 18*  --  18* 22   ANIONGAP  --  8 10 11  --  12 9   GLC  --  120* 113* 106*  --  124* 101*   BUN  --  8 7 9  --  12 6*   CR  --  0.39* 0.45* 0.56*  --  0.56* 0.65*   GFRESTIMATED  --  >90 >90 >90  --  >90 >90   GFRESTBLACK  --  >90 >90 >90  --  >90 >90   ABUNDIO  --  8.0* 7.9* 7.6*  --  7.6* 8.4*   MAG  --   --  1.9  --   --  1.9  --    PHOS  --   --  2.4*  --   --  2.2*  --    PROTTOTAL 5.2*  --  5.2*  --   --  5.8* 6.3*   ALBUMIN  --   --  2.0*  --   --  2.4* 2.7*   BILITOTAL  --   --  0.7  --   --  0.9 0.9   ALKPHOS  --   --  68  --   --  77 85   AST  --   --  33  --   --  57* 24   ALT  --   --  9  --   --  10 11     CBC  Recent Labs   Lab 08/19/20  0517 08/17/20  0458 08/15/20  2352 08/14/20  1002   WBC 6.9 13.0* 14.8* 4.8   RBC 3.20* 3.08* 3.68* 3.90*   HGB 8.0* 7.9* 9.4* 9.9*   HCT 27.6* 26.5* 31.1* 32.3*   MCV 86 86 85 83   MCH 25.0* 25.6* 25.5* 25.4*   MCHC 29.0* 29.8* 30.2* 30.7*   RDW 17.6* 17.7* 17.5* 17.9*   * 174 263 325     INRNo lab results found in last 7 days.    Medications list for reference:  Current Facility-Administered Medications   Medication     acetaminophen (TYLENOL) tablet 650 mg     " glucose gel 15-30 g    Or     dextrose 50 % injection 25-50 mL    Or     glucagon injection 1 mg     levETIRAcetam (KEPPRA) tablet 1,000 mg     lidocaine (LMX4) cream     lidocaine (LMX4) cream     lidocaine 1 % 0.1-1 mL     lidocaine 1 % 0.1-1 mL     LORazepam (ATIVAN) tablet 0.5 mg     melatonin tablet 1 mg     naloxone (NARCAN) injection 0.1-0.4 mg     No lozenges or gum should be given while patient on BIPAP/AVAPS/AVAPS AE     oxyCODONE (ROXICODONE) tablet 5 mg     Patient may continue current oral medications     sodium chloride (PF) 0.9% PF flush 3 mL     sodium chloride (PF) 0.9% PF flush 3 mL     sodium chloride (PF) 0.9% PF flush 3 mL     sodium chloride (PF) 0.9% PF flush 3 mL     sodium chloride tablet 2 g

## 2024-05-11 NOTE — PROGRESS NOTES
Patient is serviced by [facility].  Per the request of Dr. Burnett the following labs were ordered:    Cbc,cmp, ferritin, iron and iron binding, b12, folic acid, retic, haptogobin and LD.     These will be drawn tomorrow and resulted in Epic.  Lab results will also be faxed to [phone].    Yoselin Gonzalez MBA, MSN, RN, ONC  RN Care Coordinator  [facility]      
95.9

## 2024-10-23 NOTE — PROGRESS NOTES
Faxed medical clearance office visit note, EKG tracing, and lab result to [facility] fax # [phone]    I spoke to patient and wife regarding treatment for his metastatic melanoma which is progressing on pembrolizumab.  Discussed regarding switching to combination of BRAF and MEK inhibitor.  Patient and his wife are agreeable.  Pembrolizumab will be discontinued.  Patient will be started on dabrafenib and trametinib combination.  Some of the side effects reviewed.  Pharmacy will call and also discuss with him regarding side effects.

## 2025-02-11 NOTE — TELEPHONE ENCOUNTER
"Patient is currently scheduled for an appointment at [facility] in [place].  Called patient to review current visitor restrictions and complete COVID-19 Patient Infection/Travel Screening Tool.     Due to the recent public health concerns around COVID-19 and in an effort to keep our patients and staff safe and healthy, we are implementing a screening process for the patients that come to our clinic.      I am going to ask you a few questions, please answer yes or no.  Your honesty about any symptoms is critical, as it keeps patients and staff healthy.      Do you have a:  Fever (or reported chills)?  No  Cough?  No  Shortness of breath?  No  Rash?  No    In the last month, have you been in contact with someone who was confirmed or suspected to have Coronavirus/COVID-19?  No    Have you traveled internationally in the last month?  No  If so, where?       I also wanted to let you know that to protect our patients from the flu and other common illnesses, [facility] enforce visitor restrictions year round, but due to the community spread of COVID-19 in [place], we are taking additional precautionary steps to ensure the health of our patients.  At this time, NO visitors are allowed on our hospital and clinic campuses.     Patient PASSED the screening assessment.    Patient instructed to come to the clinic as planned for their scheduled appointment and to call the clinic if any symptoms develop prior to their appointment.    \"COVID-19 is contagious and can be dangerous for our patients and staff.  Please send us a [employer] message or call our clinic before coming in if you feel any of the following symptoms: fever, cough, congestion, runny nose, sore throat, muscle aches and pains, or shortness of breath.  If you are already at our clinic, it is very important that you be honest about any symptoms you are experiencing to ensure your safety and that of other patients and staff who treat " Dos 2/11/25  Patient seen in PT gym today  Participation and progress toward goals noted  Continues working on strength, mobility, balance and increasing endurance  Progress and problems discussed with patient and therapists  Questions answered  Chart reviewed and discussed with Dr osborn and medicine team as well as Rylie Puri, my FNP  Continue present management  Subjective  HPI: 68 year old WM with a PMH of cervical myelopathy, CAD, CHF, COPD, DM, HLD, HTN, and PVD presented to the ED at [facility] on 1/14/25 for Neurosurgery. Patient presented in ED following discharge from [facility]. He reports associated symptoms of bilateral arm weakness, bilateral leg weakness, and worsening neck pain. Notes right leg is weaker than left. He also complains of bilateral hand, bilateral leg, and bilateral feet numbness. He stated that he has been unable to care for himself at home because of the weakness, and having to use a wheelchair to get around. Patient's daughter at bedside reports frequent falls at home. No urinary or fecal incontinence. Per chart review, the patient was admitted to [facility] for cervical myelopathy with the plan of surgery. Previous extensive admission at [facility] for UTI which cleared and resolved with antibiotics; initially, did not have surgery secondary to UTI. Now amendable for surgical intervention since UTI has resolved. Patient refused surgery with on-call neurosurgeon at [facility] and discharged at noon today. He was advised to come here for the surgery. MRI on 01/05 showed severe C3-4 level central canal stenosis with increased signal hyperintensity within the cervical cord from mid to lower aspect of C3 through mid aspect of C4 with central cord myelomalacia. No syrinx is detected. Prior C5-C7 intervertebral body fusion. Neurosurgery was consulted with recommendation for surgical intervention needed. Cardiology consulted with low risk for MACE for high risk neurosurgical procedure. On 1/24, patient  "you.  If you do have symptoms, we will have a nurse and/or provider asses you to determine next steps.\"    Angelique Botello MA on 4/13/2020 at 1:17 PM    " underwent C2-6 PCDF and C2-3- C4-5 decompression by Dr. Ramírez. 1 Hemovac drain placed to site. Hard cervical collar in place. On 1/25, wound care was consulted for right heel and left heel wound with recommendation to cleanse bilateral heels with normal saline, paint with betadine, apply small foam, and change every 2 days. Recommended bilateral heel boots. Herrera was removed. Labs showed low Na of 133, elevated BUN/Cr of 29.7/ 1.31, and low H&H of 10.6 & 31.9. BP ranging 125/58- 182/80. PT/OT efvals completed with deficits noted with recommendation for high intensity therapy needed. ON 1/26, Labs showed low H&H of 10.1 & 29.5. Patient having urinary retention requiring in and out caths. On 1/27, hemovac removed. Neurosurgery recommended to DC staples on POD 14, will need follow up in 2 weeks after DC from hospital with XR uprights of cervical spine with Izzy, NP, continued Percocet and Robaxin for pain management, and signed off of case. Herrera had to be reinserted for urinary retention, and started Flomax. BP ranging 123/61- 169/70. On 1/28, patient started on Relistar for constipation with 2 large Bms noted. Labs showed low H&H of 9.2 & 27.3. On 1/30, wound care reassessed with 2 wounds noted to bilateral anterior legs. Cleansed with soap and water, and applied aquaphor to sites. Patient is AAOx4.   Participating with therapy. Functional status includes setup assist needed for eating, minimal assist for bed mobility, contact guard assist for transfers with RW, walked 28ft with RW at minimal assist, minimal assist for toilet transfer, total assist for toileting due to herrera in place, stand by assist for grooming, max assist for upper body dressing, and max assist for lower body dressing. Patient was evaluated, accepted, and admitted to inpatient rehab to improve functional status. Transferred to [facility] on 1/31 without incident.     2/11: Seen with PT, seated in WC after doing stairs. States that he has rails on his  stairs at home and he was able to do all of them today. Discussed discharge planning.  Participating in therapy without current complaint. Let him know that I was also talking to  about his requests not being met. Appreciative. VSSAF with noted SBP elevation prior to morning medications. IM following.            Review of Systems  Psychiatric: Denies mental health history. Pt. Smokes cigarettes.     Depression/Anxiety: notes some anxiety in regards to eating and getting enough nutrition to his mouth     DULoxetine DR capsule 30 mg qd, start 2/1  ALPRAZolam tablet 0.25 mg TID PRN Anxiety  Pain: L>R side of neck, numbness (hands, legs, feet), shooting pains  DULoxetine DR capsule 30 mg qd, start 2/1  methocarbamoL tablet 750 mg AC & HS  pregabalin capsule 100mg TID  LIDOcaine 5 % patch 1 patch neck q24hr, 1800  acetaminophen tablet 650 mg q6h PRN mild pain   oxyCODONE-acetaminophen 5-325 mg 1 tablet q4h PRN mod pain  oxyCODONE-acetaminophen  mg per tablet 1 tablet q4h PRN severe pain  cyclobenzaprine tablet 5 mg TID PRN spasm  Bowels/Bladder: Last BM 2/9 (formed moderate) 2/7 following suppository (finally agreeable and said it worked within 15 minutes); 1/28 x 2 (large) following Relistor. Previously refusing suppository  Gray catheter reinserted 2/2 urinary retention-discontinued. Urinating    docusate sodium capsule 100 mg BID  linaCLOtide capsule 145 mcg qAM  tamsulosin 24 hr capsule 0.4 mg qHS  Appetite: good. Hungry. Intake difficult functionally with hand paresthesias. Assist. Improving with built up utensils   Sleep: good with melatonin  melatonin tablet 6 mg qHS PRN Insomnia              Physical Exam  General: well-developed, well-nourished, in no acute distress  Neck: hard collar, supple  Respiratory: equal chest rise, no SOB, no audible wheeze  Cardiovascular: regular rate and rhythm, no edema  Gastrointestinal: soft, non-tender, non-distended   Musculoskeletal: BUE/BLE  weakness  Integumentary: no rashes, bilateral heel wounds, bilateral anterior leg wounds, posterior cervical incision-staples-dressing-c/d/i  Neurologic: cranial nerves intact, BUE/BLE weakness, numbness (hands, legs, feet), balance deficits  *MD performed and documented physical examination                       Assessment/Plan  Hospital   Anemia   Hyponatremia   Spinal cord compression   Status post cervical spinal fusion   Hypokalemia     Non-Hospital   HTN (hypertension)   Hyperlipidemia   Ischemic cardiomyopathy with implantable cardioverter-defibrillator (ICD)   Tobacco user   Type 2 diabetes mellitus   Peripheral arterial disease with history of revascularization   Chronic systolic CHF (congestive heart failure), NYHA class 3   Chronic low back pain   Cervical myelopathy   Carotid artery stenosis, asymptomatic, left   Arteriosclerosis of coronary artery   S/P triple vessel bypass   COPD (chronic obstructive pulmonary disease)   Gastro-esophageal reflux disease without esophagitis   Anxiety disorder, unspecified   Cervical radiculopathy   Lumbar radiculopathy   Myelomalacia of cervical cord       Wounds: bilateral heel wounds, bilateral anterior leg wounds, posterior cervical incision-staples-dressing-c/d/i  On 1/24, patient underwent C2-6 PCDF and C2-3- C4-5 decompression by Dr. Ramírez.  (Hemovac drain placed to site)  Precautions: spinal  Bracing: Hard cervical collar, bilateral heel boots in bed  Swallowing: Regular Diet  Function: Tolerating therapy. Continue PT/OT  VTE Prophylaxis:   enoxaparin injection 40 mg q24hr  Code Status: FULL CODE   Discharge: Lives alone in [place] in a 2nd story apartment. Completed high school. He has no  history.  Is disabled. . He was living alone and completely independent. Children: (2). Date pending.                   Shannan Puri NP, conducted additional independent physical examination and assisted with medical documentation.

## 2025-03-31 NOTE — PROGRESS NOTES
Patient returned call and notified Dr. Hull reviewed blood work and continue current meds and diet. Patient advised the office needs his pulse reading for his upcoming CTA scheduled on April 4. Patient states he has an at home blood pressure monitor that also checks pulse. Patient will take his blood pressure and pulse today and tomorrow am and will contact writer with results. Patient states in general his pulse usually runs between 70-80.    Writer will wait bp and pulse readings 4/1/25   Time of notification: 2:53 PM    Provider notified:Attending: Reinier Rodriguez MD, pgr [id number] and Ambreen Franklin PA-C  Hospitalist Service, [address], [place]  Pager: [phone]    Patient status:provider called to discuss cortrosyn.  Cortisol lab was drawn. Cortrosyn was administered. Lab called to notify nurse that blood draw for cortisol level was placed in the wrong tube. Recheck is scheduled for 1530  Temp:  [98.1  F (36.7  C)-99.2  F (37.3  C)] 99.2  F (37.3  C)  Pulse:  [] 87  Heart Rate:  [] 107  Resp:  [14-28] 24  BP: ()/(64-83) 108/68  SpO2:  [97 %-100 %] 98 %  Orders received: awaiting call back for further instruction.

## 2025-05-14 NOTE — TELEPHONE ENCOUNTER
Dallin called with continued concern about symptoms he's having. He's had these in the past but they had been coming and going. This time the symptoms have been steadily present for several days now.      Increase in heartrate as high as in the 120's. This a.m. when he first got up heart rate was in the 130's.  O2 sat lower at 94%. It's been as low as 93%.  Feverish with meds  As high as 100.4  Increased swelling in arms.  He's admitedly getting anxious about these changes.    No chest pain or breathing difficulty.    He understands that going in to be evaluated is always okay.  We discussed he consider taking his lorazepam.  Other options are to continue to monitor his symptoms.    He is going to discuss things with his wife. He said if he goes in it will be to the [facility].  I also asked Dallin to call us back for further questions/concerns.  Patient stated understanding of all the above.    COVID 19 Nurse Triage Plan/Patient Instructions    Please be aware that novel coronavirus (COVID-19) may be circulating in the community. If you develop symptoms such as fever, cough, or SOB or if you have concerns about the presence of another infection including coronavirus (COVID-19), please contact your health care provider or visit [email or web address].     Disposition/Instructions    ED Visit recommended. Follow protocol based instructions.     Bring Your Own Device:  Please also bring your smart device(s) (smart phones, tablets, laptops) and their charging cables for your personal use and to communicate with your care team during your visit.    Thank you for taking steps to prevent the spread of this virus.  o Limit your contact with others.  o Wear a simple mask to cover your cough.  o Wash your hands well and often.    Resources    [facility]: About COVID-19: [email or web address]    CDC: What to Do If You're Sick: www.cdc.gov/coronavirus/2019-ncov/about/steps-when-sick.html    CDC: Ending Home Isolation:  www.cdc.gov/coronavirus/2019-ncov/hcp/disposition-in-home-patients.html     CDC: Caring for Someone: www.cdc.gov/coronavirus/2019-ncov/if-you-are-sick/care-for-someone.html     [facility]: Interim Guidance for Hospital Discharge to Home: www.[email or web address]/diseases/[email or web address]/hcp/hospdischarge.[email or web address]    [facility] clinical trials (COVID-19 research studies): [email or web address]-clinical-trials     Below are the COVID-19 hotlines at the [place] [facility] ([facility]). Interpreters are available.   o For health questions: Call 862-650-4048 or 1-870.830.4563 (7 a.m. to 7 p.m.)  o For questions about schools and childcare: Call 372-452-3685 or 1-541.791.6200 (7 a.m. to 7 p.m.)       Natalee SHARPE RN [facility]                      Reason To Defer Override: He will have the lesion biopsied in [place] where he lives. Introduction Text (Please End With A Colon): The following procedure was deferred:  Detail Level: Detailed Size Of Lesion In Cm (Optional): 0 Procedure To Be Performed At Next Visit: Biopsy by shave method

## (undated) DEVICE — Device

## (undated) DEVICE — ADH SKIN CLOSURE PREMIERPRO EXOFIN 1.0ML 3470

## (undated) DEVICE — ENDO VALVE SUCTION BRONCH EVIS MAJ-209

## (undated) DEVICE — SPONGE COTTONOID 1/2X1 1/2" 20-06S

## (undated) DEVICE — SYR 03ML LL W/O NDL 309657

## (undated) DEVICE — SOL WATER IRRIG 1000ML BOTTLE 2F7114

## (undated) DEVICE — PIN SKULL MAYFIELD ADULT TITANIUM 3/PK A1120

## (undated) DEVICE — DRAPE SPLIT SHEET 77X108 REINFORCED 29436

## (undated) DEVICE — LINEN TOWEL PACK X30 5481

## (undated) DEVICE — PREP DURAPREP 26ML APL 8630

## (undated) DEVICE — DRAPE MICROSCOPE LEICA 54X150" AR8033650

## (undated) DEVICE — ENDO VALVE BX EVIS MAJ-210

## (undated) DEVICE — SU MONOCRYL 3-0 PS-1 27" Y936H

## (undated) DEVICE — WIPES FOLEY CARE SURESTEP PROVON DFC100

## (undated) DEVICE — BLADE CLIPPER SGL USE 9680

## (undated) DEVICE — SPONGE COTTONOID 1X3" 20-10S

## (undated) DEVICE — SPONGE COTTONOID 1/4X1/4" 20-01S

## (undated) DEVICE — DRSG PRIMAPORE 03 1/8X6" 66000318

## (undated) DEVICE — GLOVE PROTEXIS BLUE W/NEU-THERA 7.5  2D73EB75

## (undated) DEVICE — SYR 30ML LL W/O NDL 302832

## (undated) DEVICE — TUBING STRYKER IRRIGATION CASSETTE 5400-050-001

## (undated) DEVICE — CATH TRAY FOLEY SURESTEP 16FR W/URNE MTR STLK LATEX A303316A

## (undated) DEVICE — PREP POVIDONE IODINE SCRUB 7.5% 4OZ APL82212

## (undated) DEVICE — ESU CORD BIPOLAR AND IRR TUBING AESCULAP US355

## (undated) DEVICE — PREP CHLORAPREP 26ML TINTED ORANGE  260815

## (undated) DEVICE — SOL NACL 0.9% INJ 1000ML BAG 2B1324X

## (undated) DEVICE — SOL RINGERS LACTATED 1000ML BAG 07953-09

## (undated) DEVICE — SU VICRYL 4-0 PS-2 18" UND J496H

## (undated) DEVICE — PERFORATOR 14MM CODMAN

## (undated) DEVICE — PREP SKIN SCRUB TRAY 4461A

## (undated) DEVICE — DRAPE SHEET REV FOLD 3/4 9349

## (undated) DEVICE — TIP ASPIRATOR PAYNER ANG SONOPET UNV 25KHZ 360D 5450-800-312

## (undated) DEVICE — LINEN TOWEL PACK X6 WHITE 5487

## (undated) DEVICE — STRAP UNIVERSAL POSITIONING 2-PIECE 4X47X76" 91-287

## (undated) DEVICE — PACK CRANIOTOMY

## (undated) DEVICE — SU VICRYL 2-0 CT-2 CR 8X18" J726D

## (undated) DEVICE — COVER CAMERA VIDEO LASER 9X96" 04-CC227

## (undated) DEVICE — ADH FLOSEAL W/HUMAN THROMBIN 5ML W/APPLICATOR TIP ADS201844

## (undated) DEVICE — DRSG DRAIN 2X2" 7087

## (undated) DEVICE — TUBING SUCTION 10'X3/16" N510

## (undated) DEVICE — LINEN TOWEL PACK X5 5464

## (undated) DEVICE — SYR 10ML LL W/O NDL 302995

## (undated) DEVICE — RETR ELASTIC STAYS LONE STAR BLUNT DUAL LEAD 3550-1G

## (undated) DEVICE — RX BACITRACIN OINTMENT 0.9G 1/32OZ CUR001109

## (undated) DEVICE — SURGICEL HEMOSTAT 4X8" 1952

## (undated) DEVICE — SPONGE SURGIFOAM 100 1974

## (undated) DEVICE — SPONGE COTTONOID 1/2X3" 20-07S

## (undated) DEVICE — DRAPE MAYO STAND 23X54 8337

## (undated) DEVICE — SPONGE COTTONOID 1/2X1/2" 20-04S

## (undated) DEVICE — TIES BANDING T50R

## (undated) DEVICE — SOL NACL 0.9% IRRIG 1000ML BOTTLE 2F7124

## (undated) DEVICE — TAPE MEDIPORE 4"X2YD 2864

## (undated) DEVICE — COMB STERILE 7" PLASTIC 14-1200

## (undated) DEVICE — DRAPE IOBAN INCISE 23X17" 6650EZ

## (undated) DEVICE — PREP CHLORAPREP CLEAR 3ML 260400

## (undated) DEVICE — SUCTION MANIFOLD NEPTUNE 2 SYS 4 PORT 0702-020-000

## (undated) DEVICE — LINEN GOWN XLG 5407

## (undated) DEVICE — JELLY LUBRICATING SURGILUBE 2OZ TUBE

## (undated) DEVICE — PACK NEURO MINOR UMMC SNE32MNMU4

## (undated) DEVICE — MARKER SPHERES PASSIVE MEDT PACK 5 8801075

## (undated) DEVICE — SUCTION DRY CHEST DRAIN OASIS 3600-100

## (undated) DEVICE — PACK GOWN 3/PK DISP XL SBA32GPFCB

## (undated) DEVICE — DRAPE POUCH INSTRUMENT 1018

## (undated) DEVICE — PAD CHUX UNDERPAD 23X24" 7136

## (undated) DEVICE — SU SILK 0 SH 30" K834H

## (undated) DEVICE — GLOVE PROTEXIS MICRO 7.0  2D73PM70

## (undated) DEVICE — DRAIN PLEURAL CATH KIT PLEURX 15.5FR 50-7000B

## (undated) DEVICE — SU NUROLON 4-0 TF CR 8X18" C584D

## (undated) DEVICE — NDL ANGIOCATH 14GA 1.25" 4048

## (undated) DEVICE — DRAPE CRANIOTOMY W/POUCH 9450

## (undated) DEVICE — SU VICRYL 0 UR-6 27" J603H

## (undated) DEVICE — DRAPE ISOLATION BAG 1003

## (undated) DEVICE — ESU GROUND PAD ADULT W/CORD E7507

## (undated) DEVICE — PREP POVIDONE IODINE SOLUTION 10% 4OZ

## (undated) DEVICE — SPONGE TONSIL W/STRING MED 23275-680

## (undated) DEVICE — ESU PENCIL W/COATED BLADE E2450H

## (undated) RX ORDER — DEXAMETHASONE SODIUM PHOSPHATE 4 MG/ML
INJECTION, SOLUTION INTRA-ARTICULAR; INTRALESIONAL; INTRAMUSCULAR; INTRAVENOUS; SOFT TISSUE
Status: DISPENSED
Start: 2020-01-01

## (undated) RX ORDER — BUPIVACAINE HYDROCHLORIDE 2.5 MG/ML
INJECTION, SOLUTION EPIDURAL; INFILTRATION; INTRACAUDAL
Status: DISPENSED
Start: 2020-01-01

## (undated) RX ORDER — FENTANYL CITRATE 50 UG/ML
INJECTION, SOLUTION INTRAMUSCULAR; INTRAVENOUS
Status: DISPENSED
Start: 2020-01-01

## (undated) RX ORDER — LIDOCAINE HYDROCHLORIDE 10 MG/ML
INJECTION, SOLUTION EPIDURAL; INFILTRATION; INTRACAUDAL; PERINEURAL
Status: DISPENSED
Start: 2020-01-01

## (undated) RX ORDER — EPHEDRINE SULFATE 50 MG/ML
INJECTION, SOLUTION INTRAMUSCULAR; INTRAVENOUS; SUBCUTANEOUS
Status: DISPENSED
Start: 2020-01-01

## (undated) RX ORDER — PROPOFOL 10 MG/ML
INJECTION, EMULSION INTRAVENOUS
Status: DISPENSED
Start: 2020-01-01

## (undated) RX ORDER — GLYCOPYRROLATE 0.2 MG/ML
INJECTION, SOLUTION INTRAMUSCULAR; INTRAVENOUS
Status: DISPENSED
Start: 2020-01-01

## (undated) RX ORDER — DEXMEDETOMIDINE HYDROCHLORIDE 100 UG/ML
INJECTION, SOLUTION INTRAVENOUS
Status: DISPENSED
Start: 2020-01-01

## (undated) RX ORDER — DEXAMETHASONE SODIUM PHOSPHATE 10 MG/ML
INJECTION, SOLUTION INTRAMUSCULAR; INTRAVENOUS
Status: DISPENSED
Start: 2020-01-01

## (undated) RX ORDER — LIDOCAINE HYDROCHLORIDE 20 MG/ML
INJECTION, SOLUTION EPIDURAL; INFILTRATION; INTRACAUDAL; PERINEURAL
Status: DISPENSED
Start: 2020-01-01

## (undated) RX ORDER — BACITRACIN 50000 [IU]/1
INJECTION, POWDER, FOR SOLUTION INTRAMUSCULAR
Status: DISPENSED
Start: 2020-01-01

## (undated) RX ORDER — HYDROMORPHONE HYDROCHLORIDE 1 MG/ML
INJECTION, SOLUTION INTRAMUSCULAR; INTRAVENOUS; SUBCUTANEOUS
Status: DISPENSED
Start: 2020-01-01

## (undated) RX ORDER — PHENYLEPHRINE HCL IN 0.9% NACL 1 MG/10 ML
SYRINGE (ML) INTRAVENOUS
Status: DISPENSED
Start: 2020-01-01

## (undated) RX ORDER — ONDANSETRON 2 MG/ML
INJECTION INTRAMUSCULAR; INTRAVENOUS
Status: DISPENSED
Start: 2020-01-01

## (undated) RX ORDER — ACETAMINOPHEN 325 MG/1
TABLET ORAL
Status: DISPENSED
Start: 2020-01-01

## (undated) RX ORDER — SODIUM CHLORIDE 9 MG/ML
INJECTION, SOLUTION INTRAVENOUS
Status: DISPENSED
Start: 2020-01-01

## (undated) RX ORDER — BUPIVACAINE HYDROCHLORIDE AND EPINEPHRINE 5; 5 MG/ML; UG/ML
INJECTION, SOLUTION EPIDURAL; INTRACAUDAL; PERINEURAL
Status: DISPENSED
Start: 2020-01-01

## (undated) RX ORDER — BUPIVACAINE HYDROCHLORIDE AND EPINEPHRINE 2.5; 5 MG/ML; UG/ML
INJECTION, SOLUTION EPIDURAL; INFILTRATION; INTRACAUDAL; PERINEURAL
Status: DISPENSED
Start: 2020-01-01